# Patient Record
Sex: MALE | Race: WHITE | NOT HISPANIC OR LATINO | Employment: OTHER | URBAN - METROPOLITAN AREA
[De-identification: names, ages, dates, MRNs, and addresses within clinical notes are randomized per-mention and may not be internally consistent; named-entity substitution may affect disease eponyms.]

---

## 2017-01-05 ENCOUNTER — ALLSCRIPTS OFFICE VISIT (OUTPATIENT)
Dept: OTHER | Facility: OTHER | Age: 77
End: 2017-01-05

## 2017-02-01 ENCOUNTER — ALLSCRIPTS OFFICE VISIT (OUTPATIENT)
Dept: OTHER | Facility: OTHER | Age: 77
End: 2017-02-01

## 2017-02-27 ENCOUNTER — ALLSCRIPTS OFFICE VISIT (OUTPATIENT)
Dept: OTHER | Facility: OTHER | Age: 77
End: 2017-02-27

## 2017-06-12 ENCOUNTER — GENERIC CONVERSION - ENCOUNTER (OUTPATIENT)
Dept: OTHER | Facility: OTHER | Age: 77
End: 2017-06-12

## 2017-06-12 ENCOUNTER — TRANSCRIBE ORDERS (OUTPATIENT)
Dept: LAB | Facility: HOSPITAL | Age: 77
End: 2017-06-12

## 2017-06-12 ENCOUNTER — APPOINTMENT (OUTPATIENT)
Dept: LAB | Facility: HOSPITAL | Age: 77
End: 2017-06-12
Payer: COMMERCIAL

## 2017-06-12 DIAGNOSIS — K22.70 BARRETT'S ESOPHAGUS WITHOUT DYSPLASIA: ICD-10-CM

## 2017-06-12 DIAGNOSIS — E78.5 HYPERLIPIDEMIA: ICD-10-CM

## 2017-06-12 DIAGNOSIS — N18.30 CHRONIC KIDNEY DISEASE, STAGE III (MODERATE) (HCC): ICD-10-CM

## 2017-06-12 DIAGNOSIS — I42.9 CARDIOMYOPATHY (HCC): ICD-10-CM

## 2017-06-12 DIAGNOSIS — C18.9 MALIGNANT NEOPLASM OF COLON (HCC): ICD-10-CM

## 2017-06-12 DIAGNOSIS — I10 ESSENTIAL (PRIMARY) HYPERTENSION: ICD-10-CM

## 2017-06-12 DIAGNOSIS — R73.01 IMPAIRED FASTING GLUCOSE: ICD-10-CM

## 2017-06-12 DIAGNOSIS — E03.9 HYPOTHYROIDISM: ICD-10-CM

## 2017-06-12 LAB
ALBUMIN SERPL BCP-MCNC: 3.6 G/DL (ref 3.5–5)
ALP SERPL-CCNC: 71 U/L (ref 46–116)
ALT SERPL W P-5'-P-CCNC: 25 U/L (ref 12–78)
ANION GAP SERPL CALCULATED.3IONS-SCNC: 5 MMOL/L (ref 4–13)
AST SERPL W P-5'-P-CCNC: 20 U/L (ref 5–45)
BILIRUB SERPL-MCNC: 1.11 MG/DL (ref 0.2–1)
BUN SERPL-MCNC: 22 MG/DL (ref 5–25)
CALCIUM SERPL-MCNC: 8.7 MG/DL (ref 8.3–10.1)
CHLORIDE SERPL-SCNC: 112 MMOL/L (ref 100–108)
CHOLEST SERPL-MCNC: 162 MG/DL (ref 50–200)
CO2 SERPL-SCNC: 27 MMOL/L (ref 21–32)
CREAT SERPL-MCNC: 1.54 MG/DL (ref 0.6–1.3)
EST. AVERAGE GLUCOSE BLD GHB EST-MCNC: 100 MG/DL
GFR SERPL CREATININE-BSD FRML MDRD: 44.3 ML/MIN/1.73SQ M
GLUCOSE P FAST SERPL-MCNC: 97 MG/DL (ref 65–99)
HBA1C MFR BLD: 5.1 % (ref 4.2–6.3)
HDLC SERPL-MCNC: 58 MG/DL (ref 40–60)
LDLC SERPL CALC-MCNC: 79 MG/DL (ref 0–100)
POTASSIUM SERPL-SCNC: 4.6 MMOL/L (ref 3.5–5.3)
PROT SERPL-MCNC: 7 G/DL (ref 6.4–8.2)
SODIUM SERPL-SCNC: 144 MMOL/L (ref 136–145)
TRIGL SERPL-MCNC: 126 MG/DL
TSH SERPL DL<=0.05 MIU/L-ACNC: 2.16 UIU/ML (ref 0.36–3.74)

## 2017-06-12 PROCEDURE — 80061 LIPID PANEL: CPT

## 2017-06-12 PROCEDURE — 83036 HEMOGLOBIN GLYCOSYLATED A1C: CPT

## 2017-06-12 PROCEDURE — 84443 ASSAY THYROID STIM HORMONE: CPT

## 2017-06-12 PROCEDURE — 36415 COLL VENOUS BLD VENIPUNCTURE: CPT

## 2017-06-12 PROCEDURE — 80053 COMPREHEN METABOLIC PANEL: CPT

## 2017-06-23 ENCOUNTER — ALLSCRIPTS OFFICE VISIT (OUTPATIENT)
Dept: OTHER | Facility: OTHER | Age: 77
End: 2017-06-23

## 2017-07-19 ENCOUNTER — ALLSCRIPTS OFFICE VISIT (OUTPATIENT)
Dept: OTHER | Facility: OTHER | Age: 77
End: 2017-07-19

## 2017-10-03 ENCOUNTER — ALLSCRIPTS OFFICE VISIT (OUTPATIENT)
Dept: OTHER | Facility: OTHER | Age: 77
End: 2017-10-03

## 2017-10-18 ENCOUNTER — ALLSCRIPTS OFFICE VISIT (OUTPATIENT)
Dept: OTHER | Facility: OTHER | Age: 77
End: 2017-10-18

## 2017-10-19 NOTE — PROGRESS NOTES
Assessment  Assessed    1  Cardiomyopathy (425 4) (I42 9)   2  Hypertension (401 9) (I10)   3  Hyperlipidemia (272 4) (E78 5)   4  Ank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 (720 0) (M45 9)   5  Cardiac arrhythmia (427 9) (I49 9)    Plan  Right bundle branch block with left anterior fascicular block    · EKG/ECG- POC; Status:Complete;   Done: 58PXL8410   Perform: In Office; Due:18Oct2018; Last Updated Lucas Tapia; 10/18/2017 8:09:40 AM;Ordered; For:Right bundle branch block with left anterior fascicular block; Ordered By:Josie Villegas; Discussion/Summary  Cardiology Discussion Summary Free Text Note Form [facility]:   1  Cardiomyopathyimproved but still at risk for ventricular arrhythmias due to scar from CMP  Discussed with pt that ICD generator change would likely be soon as he is nearing ZACHARIAH  Discussed generator change procedure  current meds  HTN-well controlled on current medical regimen  R knee pain-stable, does not want to see ortho yet, takes intermittent Aleve  ank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-stable  Lifestyle modificationreal active due to R knee pain  Recommend increased cardiovascular activity as tolerated  Will f/u in one year with Dr Bari Solorio and call before this if needed  We will contact pt when device hits ZACHARIAH to set up gen  change  Chief Complaint  Chief Complaint Free Text Note Form: Pt is here for a yearly f/u office visit  Pt does not have any cardiac complaints or symptoms  History of Present Illness  Cardiology HPI Free Text Note Form [facility]: Patient is a pleasant 60-year-old male that follows with Dr Bari Solorio with a history of cardiomyopathy, hypertension and dyslipidemia  Patient has and a single-chamber ICD in place which is nearing ZACHARIAH  Last ejection fraction in 2012 improved to 50%  presents today for 1 year follow-up  He is feeling well with no complaints of chest pain, shortness of breath, palpitations or dizziness  He has no orthopnea, PND or lower extremity edema   He has recently lost some weight on purpose  He does continue to have right knee pain but did not want to see orthopedics yet  He also has intermittent back pain if he does a lot of activities  He states his knee only bothers him after he coughs or mows the lawn  Review of Systems  Cardiology Male ROS:     Cardiac: no chest pain,-- no fainting/blackouts-- and-- no palpitations present  Psychological: no palpitations present  General: no trouble sleeping-- and-- no lack of energy/fatigue  Respiratory: no shortness of breath  ROS Reviewed:   ROS reviewed  Active Problems  Problems    1  Ank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 (720 0) (M45 9)   2  Arthritis (716 90) (M19 90)   3  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)   4  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 (600 21,599 69)   (N40 1,N13 8)   5  Cancer, colon (153 9) (C18 9)   6  Cardiac arrhythmia (427 9) (I49 9)   7  Cardiomyopathy (425 4) (I42 9)   8  CKD (chronic kidney disease) stage 3, GFR 30-59 ml/min (585 3) (N18 3)   9  Dermatitis, eczematoid (692 9) (L30 9)   10  Encounter for screening colonoscopy (V76 51) (Z12 11)   11  Esophageal reflux (530 81) (K21 9)   12  Gout (274 9) (M10 9)   13  Hyperlipidemia (272 4) (E78 5)   14  Hypertension (401 9) (I10)   15  Hypothyroidism (244 9) (E03 9)   16  Impaired fasting glucose (790 21) (R73 01)   17  Medicare annual wellness visit, initial (V70 0) (Z00 00)   18  Medicare annual wellness visit, subsequent (V70 0) (Z00 00)   19  Microscopic hematuria (599 72) (R31 29)   20  Need for immunization against influenza (V04 81) (Z23)   21  Need for pneumococcal vaccination (V03 82) (Z23)   22  Need for vaccination with 13-polyvalent pneumococcal conjugate vaccine (V03 82) (Z23)   23  Non-toxic multinodular goiter (241 1) (E04 2)   24  Polymyalgia rheumatica (725) (M35 3)   25  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)   26  Screening for osteoporosis (V82 81) (Z13 820)   27   Seborrheic dermatitis (690 10) (L21 9)   28  Skin lesion (709 9) (L98 9)   29  Spondyloarthropathy (724 9)   30  Thrombocytopenia (287 5) (D69 6)   31  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)    Past Medical History  Problems    1  History of Achilles tendinitis, unspecified laterality (726 71) (M76 60)   2  History of Closed Fracture Of Multiple Ribs (807 09)   3  History of Dayna Of [employer] On [address] [facility] (Not Motorcycle (F752 7)   4  History of Colon Cancer (V10 05)   5  History of Congestive heart failure (428 0) (I50 9)   6  History of Fracture Of Multiple Ribs (807 09)   7  History of diverticulitis of colon (V12 79) (Z87 19)   8  History of gastroenteritis (V12 79) (Z87 19)   9  History of upper respiratory infection (V12 09) (Z87 09)   10  History of Non-toxic multinodular goiter (241 1) (E04 2)   11  History of Postprocedural Pneumonia (997 32)   12  History of Supraventricular tachycardia (427 89) (I47 1)  Active Problems And Past Medical History Reviewed: The active problems and past medical history were reviewed and updated today  Surgical History  Problems    1  History of Cardio-Defib Pulse Gen Venous Insertion Of Electrode For Ventricular Pacing   2  History of Catheter Ablation Atrioventricular Node   3  History of Complete Colonoscopy   4  History of Heart Surgery   5  History of Implantable Cardioverter-Defibrillator   6  History of Inguinal Hernia Repair   7  History of Knee Surgery   8  History of Right Hemicolectomy   9  History of Tonsillectomy With Adenoidectomy  Surgical History Reviewed: The surgical history was reviewed and updated today  Family History  Mother    1  Family history of Congestive Heart Failure   2  Family history of Hypertension (V17 49)   3  Family history of Osteoporosis (V17 81)  Father    4  Family history of Chronic Obstructive Pulmonary Disease   5  Family history of Emphysema   6  Family history of Hypertension (V17 49)  Sister    7   Family history of Heart Valve Replacement   8  Family history of Osteoporosis (V17 81)  Family History    9  Family history of Breast Cancer (V16 3)  Family History Reviewed: The family history was reviewed and updated today  Social History  Problems    · Denied: History of Alcohol Use (History)   · Marital History - Currently    · Never A Smoker   · Occupation: Retired  Social History Reviewed: The social history was reviewed and updated today  The social history was reviewed and is unchanged  Current Meds   1  Aspirin EC 81 MG Oral Tablet Delayed Release; TAKE 1 TABLET DAILY; Therapy: (Recorded:23Jun2017) to Recorded   2  Atorvastatin Calcium 10 MG Oral Tablet; Take 1 tablet daily; Therapy: 34INW7106 to (Evaluate:32Lzh2673)  Requested for: 19XMZ4022; Last   Rx:08Vjv3097 Ordered   3  Bisoprolol Fumarate 10 MG Oral Tablet; Take 1 tablet twice daily; Therapy: 41Ljv6361 to (Evaluate:58Kjp0284)  Requested for: 91Osk0125; Last   Rx:80Tlx9841 Ordered   4  Calcium 600+D 600-800 MG-UNIT Oral Tablet; Take 1 tablet daily; Therapy: 63HWG5583 to Recorded   5  Co Q-10 200 MG Oral Capsule; Take 1 tablet daily; Therapy: (Recorded:41Rpx6373) to Recorded   6  Finasteride 5 MG Oral Tablet; TAKE 1 TABLET AT BEDTIME; Therapy: 42Hbu7372 to (Last Rx:29Mly3980)  Requested for: 23Jun2017 Ordered   7  Lisinopril 20 MG Oral Tablet; take 1 tablet every day; Therapy: 58DVJ1508 to (Evaluate:30Jun2018)  Requested for: 77GDC9495; Last   Rx:31Rnl9215 Ordered   8  Multi-Day Oral Tablet; TAKE 1 TABLET DAILY; Therapy: 63OZQ2846 to (Evaluate:09Apr2014) Recorded   9  Omeprazole 40 MG Oral Capsule Delayed Release; take 1 capsule daily; Therapy: 63XAP3177 to (Evaluate:01Dbd5781)  Requested for: 19KFB2052; Last   Rx:65Zzo5201 Ordered   10  ProAir  (90 Base) MCG/ACT Inhalation Aerosol Solution; INHALE 2 PUFFS    EVERY 4-6 HOURS AS NEEDED;     Therapy: 21ZEC6239 to (Evaluate:28Apr2017)  Requested for: 28EDD0116; Last Rx: 69RZF3606 Ordered   11  Vitamin D 1000 UNIT CAPS; take 1 capsule daily; Therapy: 42XYQ6175 to Recorded  Medication List Reviewed: The medication list was reviewed and updated today  Allergies  Medication    1  No Known Drug Allergies    Vitals  Vital Signs    Recorded: 75XRZ3157 08:07AM   Heart Rate 57   Systolic 915, RUE, Sitting   Diastolic 82, RUE, Sitting   Height 6 ft 1 in   Weight 207 lb 1 oz   BMI Calculated 27 32   BSA Calculated 2 18     Physical Exam    Constitutional   General appearance: No acute distress, well appearing and well nourished  Eyes   Conjunctiva and Sclera examination: Conjunctiva pink, sclera anicteric  Pulmonary   Respiratory effort: No increased work of breathing or signs of respiratory distress  Auscultation of lungs: Clear to auscultation, no rales, no rhonchi, no wheezing, good air movement  Cardiovascular   Auscultation of heart: Normal rate and rhythm, normal S1 and S2, no murmurs  Examination of extremities for edema and/or varicosities: Normal     Abdomen   Abdomen: Non-tender and no distention  Skin - Skin and subcutaneous tissue: Normal without rashes or lesions  Skin is warm and well perfused, normal turgor  Neurologic - Speech: Normal     Psychiatric - Orientation to person, place, and time: Normal -- Mood and affect: Normal       Results/Data  Diagnostic Studies Reviewed Cardio: I personally reviewed the recording/images in the office today  My interpretation follows  Echocardiogram/KITA: Echocardiogram February 7, 2012 reviewed showing EF 50+/-5%, no wall motion abnormalities, mildly dilated LV with no significant valvular abnomalities  ECG Report: NSR, 57 BPM, first degree AVB with widened QRS at 134ms      Health Management  Burt esophagus   EGD; every 3 years; Last 85YRG9541; Next Due: 01Kmb8876; Active  Encounter for screening colonoscopy   COLONOSCOPY; every 3 years; Last 57Dei1952; Next Due: 08Wrh9920;  [facility] 702 Maintenance Medicare Annual Wellness Visit; every 1 year; Next Due: 26PKK2150;  Overdue    Future Appointments    Date/Time Provider Specialty Site   02/07/2018 11:00 AM Cardiology, 2021 Aj Pearson   11/03/2017 02:00 PM Cardiology, Device Remote   [address]   12/26/2017 08:40 AM Spencer Lynn, [address]   11/09/2017 08:20 AM Key Jerome MD Gastroenterology Adult [address]     Signatures   Electronically signed by : Jaswinder Brown [facility]; Oct 18 2017  8:45AM EST                       (Author)    Electronically signed by : TEJA Herron ; Oct 18 2017  2:26PM EST                       (Author)

## 2017-10-23 ENCOUNTER — ALLSCRIPTS OFFICE VISIT (OUTPATIENT)
Dept: OTHER | Facility: OTHER | Age: 77
End: 2017-10-23

## 2017-10-31 ENCOUNTER — GENERIC CONVERSION - ENCOUNTER (OUTPATIENT)
Dept: OTHER | Facility: OTHER | Age: 77
End: 2017-10-31

## 2017-11-03 ENCOUNTER — ALLSCRIPTS OFFICE VISIT (OUTPATIENT)
Dept: OTHER | Facility: OTHER | Age: 77
End: 2017-11-03

## 2017-11-07 ENCOUNTER — GENERIC CONVERSION - ENCOUNTER (OUTPATIENT)
Dept: OTHER | Facility: OTHER | Age: 77
End: 2017-11-07

## 2017-11-09 ENCOUNTER — ALLSCRIPTS OFFICE VISIT (OUTPATIENT)
Dept: OTHER | Facility: OTHER | Age: 77
End: 2017-11-09

## 2017-11-10 NOTE — PROGRESS NOTES
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)   2  Esophageal reflux (530 81) (K21 9)    Discussion/Summary  Discussion Summary:   1  Srinivasan's esophagus - C3M4  Last EGD in December 2016  Biopsies negative for dysplasia  Continue omeprazole 40 mg daily  Repeat EGD is recommended in December 2019     2  Gastroesophageal reflux disease - continue reflux precaution  Continue omeprazole 40 mg daily  Since his symptoms are well controlled he does not need follow-up until he gets his next EGD which is going to be in December 2019  Patient is also due for surveillance colonoscopy in 2019  Will we will coordinate his EGD and colonoscopy  His colonoscopy will be performed by Dr Rd Martinez  Counseling Documentation With Imm: The patient was counseled regarding prognosis,-- patient and family education,-- impressions  Goals and Barriers: The patient has the current Goals: See above  The patent has the current Barriers: None  Patient's Capacity to Self-Care: Patient is able to Self-Care  Chief Complaint  Chief Complaint Free Text Note Form: Patient is here for followup for srinivasan's esophagus and GERD  History of Present Illness  HPI: 67-year-old male with gastroesophageal reflux disease and Srinivasan's esophagus here for follow-up visit  He is on omeprazole 40 mg daily  His reflux symptoms are well controlled  He had C3M4 Srinivasan's esophagus  Biopsy was negative for dysplasia  Review of Systems  Complete-Male GI Adult:  Constitutional: No fever or chills, feels well, no tiredness, no recent weight gain or weight loss  Eyes: No complaints of eye pain, no red eyes, no discharge from eyes, no itchy eyes  ENT: no complaints of earache, no hearing loss, no nosebleeds, no nasal discharge, no sore throat, no hoarseness  Cardiovascular: No complaints of slow heart rate, no fast heart rate, no chest pain, no palpitations, no leg claudication, no lower extremity    Respiratory: No complaints of shortness of breath, no wheezing, no cough, no SOB on exertion, no orthopnea or PND  Gastrointestinal: GERD, but-- as noted in HPI  Genitourinary: No complaints of dysuria, no incontinence, no hesitancy, no nocturia, no genital lesion, no testicular pain  Musculoskeletal: No complaints of arthralgia, no myalgias, no joint swelling or stiffness, no limb pain or swelling  Integumentary: No complaints of skin rash or skin lesions, no itching, no skin wound, no dry skin  Neurological: No compliants of headache, no confusion, no convulsions, no numbness or tingling, no dizziness or fainting, no limb weakness, no difficulty walking  Psychiatric: Is not suicidal, no sleep disturbances, no anxiety or depression, no change in personality, no emotional problems  Endocrine: No complaints of proptosis, no hot flashes, no muscle weakness, no erectile dysfunction, no deepening of the voice, no feelings of weakness  Hematologic/Lymphatic: No complaints of swollen glands, no swollen glands in the neck, does not bleed easily, no easy bruising  ROS Reviewed:   ROS reviewed  Active Problems    1  Ank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 (720 0) (M45 9)   2  Arthritis (716 90) (M19 90)   3  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)   4  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 (600 21,599 69) (N40 1,N13 8)   5  Cancer, colon (153 9) (C18 9)   6  Cardiac arrhythmia (427 9) (I49 9)   7  Cardiomyopathy (425 4) (I42 9)   8  CKD (chronic kidney disease) stage 3, GFR 30-59 ml/min (585 3) (N18 3)   9  Dermatitis, eczematoid (692 9) (L30 9)   10  Encounter for screening colonoscopy (V76 51) (Z12 11)   11  Esophageal reflux (530 81) (K21 9)   12  Gout (274 9) (M10 9)   13  Hyperlipidemia (272 4) (E78 5)   14  Hypertension (401 9) (I10)   15  Hypothyroidism (244 9) (E03 9)   16  Impaired fasting glucose (790 21) (R73 01)   17  Medicare annual wellness visit, initial (V70 0) (Z00 00)   18   Medicare annual wellness visit, subsequent (V70 0) (Z00 00)   19  Microscopic hematuria (599 72) (R31 29)   20  Need for immunization against influenza (V04 81) (Z23)   21  Need for pneumococcal vaccination (V03 82) (Z23)   22  Need for vaccination with 13-polyvalent pneumococcal conjugate vaccine (V03 82) (Z23)   23  Non-toxic multinodular goiter (241 1) (E04 2)   24  Polymyalgia rheumatica (725) (M35 3)   25  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)   26  Screening for osteoporosis (V82 81) (Z13 820)   27  Seborrheic dermatitis (690 10) (L21 9)   28  Skin lesion (709 9) (L98 9)   29  Spondyloarthropathy (724 9)   30  Thrombocytopenia (287 5) (D69 6)   31  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)    Past Medical History  1  History of Achilles tendinitis, unspecified laterality (726 71) (M76 60)   2  History of Closed Fracture Of Multiple Ribs (807 09)   3  History of Dayna Of 2 [employer] On [address] [facility] (Not Motorcycle (L009 2)   4  History of Colon Cancer (V10 05)   5  History of Congestive heart failure (428 0) (I50 9)   6  History of Fracture Of Multiple Ribs (807 09)   7  History of diverticulitis of colon (V12 79) (Z87 19)   8  History of gastroenteritis (V12 79) (Z87 19)   9  History of upper respiratory infection (V12 09) (Z87 09)   10  History of Non-toxic multinodular goiter (241 1) (E04 2)   11  History of Postprocedural Pneumonia (997 32)   12  History of Supraventricular tachycardia (427 89) (I47 1)  Active Problems And Past Medical History Reviewed: The active problems and past medical history were reviewed and updated today  Surgical History  1  History of Cardio-Defib Pulse Gen Venous Insertion Of Electrode For Ventricular Pacing   2  History of Catheter Ablation Atrioventricular Node   3  History of Complete Colonoscopy   4  History of Heart Surgery   5  History of Implantable Cardioverter-Defibrillator   6  History of Inguinal Hernia Repair   7  History of Knee Surgery   8  History of Right Hemicolectomy   9  History of Tonsillectomy With Adenoidectomy  Surgical History Reviewed: The surgical history was reviewed and updated today  Family History  Mother    1  Family history of Congestive Heart Failure   2  Family history of Hypertension (V17 49)   3  Family history of Osteoporosis (V17 81)  Father    4  Family history of Chronic Obstructive Pulmonary Disease   5  Family history of Emphysema   6  Family history of Hypertension (V17 49)  Sister    7  Family history of Heart Valve Replacement   8  Family history of Osteoporosis (V17 81)  Family History    9  Family history of Breast Cancer (V16 3)  Family History Reviewed: The family history was reviewed and updated today  Social History     · Denied: History of Alcohol Use (History)   · Marital History - Currently    · Never A Smoker   · Occupation: Retired  Social History Reviewed: The social history was reviewed and updated today  The social history was reviewed and is unchanged  Current Meds   1  Aspirin EC 81 MG Oral Tablet Delayed Release; TAKE 1 TABLET DAILY; Therapy: (Recorded:23Jun2017) to Recorded   2  Atorvastatin Calcium 10 MG Oral Tablet; Take 1 tablet daily; Therapy: 21TQD8793 to (Evaluate:41Crl3968)  Requested for: 90PYG0478; Last Rx:30Olv2879 Ordered   3  Bisoprolol Fumarate 10 MG Oral Tablet; Take 1 tablet twice daily; Therapy: 52Ltv6456 to (Evaluate:32Vie2386)  Requested for: 38Sai8608; Last Rx:25Xxm1959 Ordered   4  Calcium 600+D 600-800 MG-UNIT Oral Tablet; Take 1 tablet daily; Therapy: 98EGL3750 to Recorded   5  Co Q-10 200 MG Oral Capsule; Take 1 tablet daily; Therapy: (Recorded:18Oct2017) to Recorded   6  Finasteride 5 MG Oral Tablet; TAKE 1 TABLET AT BEDTIME; Therapy: 91Vdy6507 to (Last Rx:39Aey3180)  Requested for: 23Jun2017 Ordered   7  Lisinopril 20 MG Oral Tablet; take 1 tablet every day;  Therapy: 38YOO7166 to (Evaluate:30Jun2018)  Requested for: 43NMD4744; Last Rx:00Wsq7603 Ordered   8  Multi-Day Oral Tablet; TAKE 1 TABLET DAILY; Therapy: 23MIW4987 to (Evaluate:09Apr2014) Recorded   9  Omeprazole 40 MG Oral Capsule Delayed Release; take 1 capsule daily; Therapy: 37GPQ3758 to (Evaluate:86Ixz4270)  Requested for: 81MFA9471; Last Rx:53Vwn1071 Ordered   10  ProAir  (90 Base) MCG/ACT Inhalation Aerosol Solution; INHALE 2 PUFFS  EVERY 4-6 HOURS AS NEEDED; Therapy: 18GDK6616 to (Evaluate:28Apr2017)  Requested for: 72GEB6954; Last  Rx:63Zdq3733 Ordered   11  Vitamin D 1000 UNIT CAPS; take 1 capsule daily; Therapy: 10FBK5121 to Recorded  Medication List Reviewed: The medication list was reviewed and updated today  Allergies  1  No Known Drug Allergies    Vitals  Vital Signs    Recorded: 52VOZ7247 08:18AM   Temperature 98 F, Tympanic   Heart Rate 65   Respiration 18   Systolic 763   Diastolic 76   Height 6 ft 1 in   Weight 209 lb    BMI Calculated 27 57   BSA Calculated 2 19       Physical Exam   Constitutional  General appearance: No acute distress, well appearing and well nourished  Ears, Nose, Mouth, and Throat  Nasal mucosa, septum, and turbinates: Normal without edema or erythema  Oropharynx: Normal with no erythema, edema, exudate or lesions  Pulmonary  Respiratory effort: No increased work of breathing or signs of respiratory distress  Auscultation of lungs: Clear to auscultation, equal breath sounds bilaterally, no wheezes, no rales, no rhonci  Cardiovascular  Auscultation of heart: Normal rate and rhythm, normal S1 and S2, without murmurs  Abdomen  Abdomen: Non-tender, no masses  Liver and spleen: No hepatomegaly or splenomegaly  Lymphatic  Palpation of lymph nodes in neck: No lymphadenopathy  Musculoskeletal  Gait and station: Normal    Digits and nails: Normal without clubbing or cyanosis  Inspection/palpation of joints, bones, and muscles: Normal    Skin  Skin and subcutaneous tissue: Normal without rashes or lesions     Neurologic  Cranial nerves: Cranial nerves 2-12 intact  Psychiatric  Orientation to person, place and time: Normal          Health Management  Burt esophagus   EGD; every 3 years; Last 70BIE6636; Next Due: 46Hgn1527; Active  Encounter for screening colonoscopy   COLONOSCOPY; every 3 years; Last 21Ibx7237; Next Due: 93Ifm8718; Overdue  Health Maintenance   Medicare Annual Wellness Visit; every 1 year; Next Due: 38FYF3966; Overdue    Future Appointments    Date/Time Provider Specialty Site   02/07/2018 11:00 AM Cardiology, 2021 Aj Chapin [facility]   12/06/2017 11:00 AM Cardiology, Device Remote   [address]   01/08/2018 11:00 AM Cardiology, Device Remote   [address]   12/26/2017 08:40 AM MD Song Sultana   11/15/2017 03:15 PM TEJA Padgett   Surgical Oncology CANCER CARE [facility] SURGICAL ONCOLOGY   01/09/2018 01:15 PM [facility] Larsen[facility]       Signatures   Electronically signed by : Lexis Torres MD; Nov 9 2017  8:49AM EST                       (Author)

## 2017-11-15 ENCOUNTER — ALLSCRIPTS OFFICE VISIT (OUTPATIENT)
Dept: OTHER | Facility: OTHER | Age: 77
End: 2017-11-15

## 2017-11-15 DIAGNOSIS — C43.61 MALIGNANT MELANOMA OF RIGHT UPPER EXTREMITY INCLUDING SHOULDER (HCC): ICD-10-CM

## 2017-11-16 ENCOUNTER — HOSPITAL ENCOUNTER (OUTPATIENT)
Dept: RADIOLOGY | Facility: HOSPITAL | Age: 77
Discharge: HOME | End: 2017-11-16
Attending: SURGERY
Payer: COMMERCIAL

## 2017-11-16 ENCOUNTER — TRANSCRIBE ORDERS (OUTPATIENT)
Dept: RADIOLOGY | Facility: HOSPITAL | Age: 77
End: 2017-11-16

## 2017-11-16 ENCOUNTER — APPOINTMENT (OUTPATIENT)
Dept: LAB | Facility: HOSPITAL | Age: 77
End: 2017-11-16
Attending: SURGERY
Payer: COMMERCIAL

## 2017-11-16 DIAGNOSIS — C43.61 MALIGNANT MELANOMA OF RIGHT UPPER EXTREMITY INCLUDING SHOULDER (HCC): ICD-10-CM

## 2017-11-16 LAB
ALBUMIN SERPL BCP-MCNC: 3.4 G/DL (ref 3.5–5)
ALP SERPL-CCNC: 67 U/L (ref 46–116)
ALT SERPL W P-5'-P-CCNC: 26 U/L (ref 12–78)
AST SERPL W P-5'-P-CCNC: 18 U/L (ref 5–45)
BILIRUB DIRECT SERPL-MCNC: 0.24 MG/DL (ref 0–0.2)
BILIRUB SERPL-MCNC: 1.05 MG/DL (ref 0.2–1)
PROT SERPL-MCNC: 7.1 G/DL (ref 6.4–8.2)

## 2017-11-16 PROCEDURE — 36415 COLL VENOUS BLD VENIPUNCTURE: CPT

## 2017-11-16 PROCEDURE — 71020 HB CHEST X-RAY 2VW FRONTAL&LATL: CPT

## 2017-11-16 PROCEDURE — 80076 HEPATIC FUNCTION PANEL: CPT

## 2017-11-16 NOTE — CONSULTS
Assessment    1  Malignant melanoma of right upper extremity (172 6) (C43 61)    Plan  Malignant melanoma of right upper extremity    · Vicodin 5-300 MG Oral Tablet; TAKE ONE TABLET BY MOUTH EVERY 4 HOURS ASNEEDED FOR PAIN   Rx By: Valentine Ge; Dispense: 0 Days ; #:15 Tablet; Refill: 0;For: Malignant melanoma of right upper extremity; OSWALDO = N; Print Rx   · (1) HEPATIC FUNCTION PANEL; Status:Active; Requested for:37Hrf2207;    Perform:[facility]; GRQ:[id number]; Ordered; For:Malignant melanoma of right upper extremity; Ordered By:Rudy Galindo;   · * XR CHEST PA & LATERAL; Status:Active; Requested for:68Ida0148;    Perform:[facility]; KOP:[id number]; Ordered;melanoma of right upper extremity; Ordered By:Rudy Galindo;   · Follow-up visit in 1 week Evaluation and Treatment  Follow-up  Status: Hold For -Scheduling  Requested for: 57ALS2660   Ordered;Malignant melanoma of right upper extremity; Ordered By: Valentine Ge Performed:  Order Comments: in office wide excision Due: 60XSZ6119    Discussion/Summary  Discussion Summary:   60-year-old male with a clinical I3mI8Y0 melanoma  He gives no signs or symptoms of metastasis  He will be staged with a chest x-ray and liver function studies  Assuming these are normal, I would proceed with wide excision  I also discussed the reasoning and rationale behind sentinel lymph node biopsy  With his depth of melanoma, his risk of a positive sentinel node is 3% or less  I did discuss sentinel node status will give us the best prognostic information from his melanoma and if it is positive this may change his treatment  We will have the pathology reviewed by our dermatopathologist  If the number of mitoses is low we will proceed with wide excision  If it is high, we will plan on sentinel lymph node biopsy  risks of wide excision here in the office to include bleeding, infection, recurrence, need for further surgery, and wound complications   Informed consent was obtained  We will schedule this at our earliest mutual convenience once we have his pathology reviewed  He is agreeable to this  All his questions were answered  Goals and Barriers: The patient has the current Goals: Cure  The patent has the current Barriers: None  Patient's Capacity to Self-Care: Patient is able to Self-Care  Medication SE Review and Pt Understands Tx: The treatment plan was reviewed with the patient/guardian  The patient/guardian understands and agrees with the treatment plan   Self Referrals:   Self Referrals: No      Chief Complaint  Chief Complaint Free Text Note Form: Pt here for consult for melanoma on his right arm, which he thinks he first noticed about 6 months ago  Denies any other symptoms  History of Present Illness  HPI: 78-year-old male who saw his dermatologist for his yearly visit and a new pigmented lesion was seen on the right upper arm  This measured approximately size of an eraser head  He noticed this out of the blue approximately 3-4 months prior to his visit  It was not itching or bleeding  Biopsy was performed and this revealed a 0 35 mm melanoma  No ulceration but there was at least one mitoses  He comes in now for an opinion regarding this  He denies any abdominal pain, nausea, vomiting, back pain, bone pain, headaches, or cough  He does have a history of blistering sunburns as a child  No family history of melanoma  He does have an AICD  Review of Systems  Complete Male ROS Surg Onc:  Constitutional: The patient denies new or recent history of general fatigue, no recent weight loss, no change in appetite  Eyes: No complaints of visual problems, no scleral icterus  ENT: no complaints of ear pain, no hoarseness, no difficulty swallowing,-- no tinnitus-- and-- no new masses in head, oral cavity, or neck  Cardiovascular: No complaints of chest pain, no palpitations, no ankle edema  Respiratory: No complaints of shortness of breath, no cough  Gastrointestinal: No complaints of jaundice, no bloody stools, no pale stools  Genitourinary: No complaints of dysuria, no hematuria, no nocturia, no frequent urination, no urethral discharge  Musculoskeletal: No complaints of weakness, paralysis, joint stiffness or arthralgias,  Integumentary: skin lesion-- and-- skin wound, but-- no rashes,-- no itching,-- no breast pain,-- no breast lump-- and-- no nipple discharge  Neurological: No complaints of convulsions, no seizures, no dizziness  Hematologic/Lymphatic: No complaints of easy bruising  ROS Reviewed:   ROS reviewed  Active Problems    1  Ank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 (720 0) (M45 9)   2  Arthritis (716 90) (M19 90)   3  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)   4  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 (600 21,599 69) (N40 1,N13 8)   5  Cancer, colon (153 9) (C18 9)   6  Cardiac arrhythmia (427 9) (I49 9)   7  Cardiomyopathy (425 4) (I42 9)   8  CKD (chronic kidney disease) stage 3, GFR 30-59 ml/min (585 3) (N18 3)   9  Encounter for screening colonoscopy (V76 51) (Z12 11)   10  Esophageal reflux (530 81) (K21 9)   11  Gout (274 9) (M10 9)   12  Hyperlipidemia (272 4) (E78 5)   13  Hypertension (401 9) (I10)   14  Hypothyroidism (244 9) (E03 9)   15  Impaired fasting glucose (790 21) (R73 01)   16  Microscopic hematuria (599 72) (R31 29)   17  Non-toxic multinodular goiter (241 1) (E04 2)   18  Polymyalgia rheumatica (725) (M35 3)   19  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)   20  Skin lesion (709 9) (L98 9)   21  Thrombocytopenia (287 5) (D69 6)    Past Medical History    1  History of Achilles tendinitis, unspecified laterality (726 71) (M76 60)   2  History of Closed Fracture Of Multiple Ribs (807 09)   3  History of [place] Of [employer] On [address] [place] [address] (Not Motorcycle (N406 5)   4  History of Colon Cancer (V10 05)   5  History of Congestive heart failure (428 0) (I50 9)   6  History of Fracture Of Multiple Ribs (807 09)   7  History of diverticulitis of colon (V12 79) (Z87 19)   8  History of eczema (V13 3) (Z87 2)   9  History of gastroenteritis (V12 79) (Z87 19)   10  History of influenza vaccination (V49 89) (Z92 29)   11  History of pneumococcal vaccination (V49 89) (Z92 29)   12  History of seborrheic dermatitis (V13 3) (Z87 2)   13  History of upper respiratory infection (V12 09) (Z87 09)   14  History of Medicare annual wellness visit, initial (V70 0) (Z00 00)   15  History of Medicare annual wellness visit, subsequent (V70 0) (Z00 00)   16  History of Need for vaccination with 13-polyvalent pneumococcal conjugate vaccine  (V03 82) (Z23)   17  History of Non-toxic multinodular goiter (241 1) (E04 2)   18  History of Postprocedural Pneumonia (997 32)   19  History of Screening for osteoporosis (V82 81) (Z13 820)   20  History of Spondyloarthropathy (724 9)   21  History of Supraventricular tachycardia (427 89) (I47 1)   22  History of 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)  Active Problems And Past Medical History Reviewed: The active problems and past medical history were reviewed and updated today  Surgical History  1  History of Cardio-Defib Pulse Gen Venous Insertion Of Electrode For Ventricular Pacing   2  History of Catheter Ablation Atrioventricular Node   3  History of Complete Colonoscopy   4  History of Heart Surgery   5  History of Implantable Cardioverter-Defibrillator   6  History of Inguinal Hernia Repair   7  History of Knee Surgery   8  History of Right Hemicolectomy   9  History of Tonsillectomy With Adenoidectomy  Surgical History Reviewed: The surgical history was reviewed and updated today  Family History  Mother    1  Family history of Congestive Heart Failure   2  Family history of Hypertension (V17 49)   3  Family history of Osteoporosis (V17 81)  Father    4  Family history of Chronic Obstructive Pulmonary Disease   5   Family history of Emphysema   6  Family history of Hypertension (V17 49)  Sister    7  Family history of Heart Valve Replacement   8  Family history of Osteoporosis (V17 81)  Family History    9  Family history of Breast Cancer (V16 3)  Family History Reviewed: The family history was reviewed and updated today  Social History     · Denied: History of Alcohol Use (History)   · Marital History - Currently    · Never A Smoker   · Occupation: Retired  Social History Reviewed: The social history was reviewed and updated today  Current Meds   1  Aspirin EC 81 MG Oral Tablet Delayed Release; TAKE 1 TABLET DAILY; Therapy: (Recorded:23Jun2017) to Recorded   2  Atorvastatin Calcium 10 MG Oral Tablet; Take 1 tablet daily; Therapy: 73NSC7584 to (Evaluate:64Vqz0954)  Requested for: 07OMI3813; Last Rx:79Ahc9017 Ordered   3  Bisoprolol Fumarate 10 MG Oral Tablet; Take 1 tablet twice daily; Therapy: 49Kig5865 to (Evaluate:07Afi9295)  Requested for: 57Pjg5321; Last Rx:37Lgh9986 Ordered   4  Calcium 600+D 600-800 MG-UNIT Oral Tablet; Take 1 tablet daily; Therapy: 40JJJ0472 to Recorded   5  Co Q-10 200 MG Oral Capsule; Take 1 tablet daily; Therapy: (Recorded:18Oct2017) to Recorded   6  Finasteride 5 MG Oral Tablet; TAKE 1 TABLET AT BEDTIME; Therapy: 17Ctp1781 to (Last Rx:51Akk5153)  Requested for: 23Jun2017 Ordered   7  Lisinopril 20 MG Oral Tablet; take 1 tablet every day; Therapy: 15YWY2762 to (Evaluate:30Jun2018)  Requested for: 23TOK6932; Last Rx:41Laz4884 Ordered   8  Multi-Day Oral Tablet; TAKE 1 TABLET DAILY; Therapy: 82ADD3842 to (Evaluate:09Apr2014) Recorded   9  Omeprazole 40 MG Oral Capsule Delayed Release; take 1 capsule daily; Therapy: 60LJA2954 to (Evaluate:39Jvg2908)  Requested for: 33KYD9582; Last Rx:34Jdh1539 Ordered   10  ProAir  (90 Base) MCG/ACT Inhalation Aerosol Solution; INHALE 2 PUFFS  EVERY 4-6 HOURS AS NEEDED;   Therapy: 82ICL4565 to (Evaluate:28Apr2017)  Requested for: 00VYP6967; Last  Rx:13Qgl5340 Ordered   11  Vitamin D 1000 UNIT CAPS; take 1 capsule daily; Therapy: 41TSP4269 to Recorded  Medication List Reviewed: The medication list was reviewed and updated today  Allergies  1  No Known Drug Allergies    Vitals  Vital Signs    Recorded: 26ZMB7581 03:47PM   Temperature 97 7 F   Heart Rate 85   Respiration 16   Systolic 479   Diastolic 88   Height 6 ft 1 in   Weight 212 lb    BMI Calculated 27 97   BSA Calculated 2 2   O2 Saturation 94       Physical Exam   Constitutional: General appearance: The Patient is well-developed, well-nourished male who appears his stated age in no acute distress  He is pleasant and talkative  HEENT: FERN, EOMI and the sclerae are anicteric  -- There is no oral pathology noted  -- There is no nasal pathology noted  -- The thyroid is normal to inspection and palpation  -- There is no appreciable cervical adenopathy   -- There are no carotid bruits  -- The Cranial Nerves II - XII are grossly intact  -- Neck is supple without adenopathy  Chest: The chest is normal to inspection  -- The lungs are clear to auscultation  -- There are bilaterally symmetrical breath sounds  -- There is a RRR, normal S1 and S2, without murmurs, rub or gallop  -- The pulses are normal at the radial and dorsalis pedis and symmetrical    Abdomen: The abdomen is normal to inspection and percussion  It is soft, non-tender  There are normal bowel sounds  There are no abnormal masses  -- There is no evidence of hepatosplenomegaly  -- He does not have an umbilical hernia  Extremities: There is no clubbing or cyanosis  -- There is no edema  -- Sensation is intact to light touch  Neuro: Grossly nonfocal    Lymphatic: no evidence of cervical adenopathy bilaterally  -- no evidence of axillary adenopathy bilaterally  -- no evidence of inguinal adenopathy bilaterally  Skin: Warm, anicteric  -- (Biopsy site is healing well  No evidence of local recurrence or satellite lesions)  Results/Data  Diagnostic Studies Reviewed Surg Onc:  Pathology Review Melanoma of the right upper extremity 0 35 mmulcerationleast one mitoses        Future Appointments    Date/Time Provider Specialty Site   02/07/2018 11:00 AM Cardiology, [address] [facility]   12/06/2017 11:00 AM Cardiology, Device Remote   [address]   01/08/2018 11:00 AM Cardiology, Device Remote   [address]   12/26/2017 08:40 AM Lisandro Dorsey MD Family Medicine [facility] [address]   01/09/2018 01:15 PM Magnolia Hernandez, [address]  Urology  [address] [facility] FOR UROLOGY [facility]       Signatures   Electronically signed by : TEJA Catalan ; Nov 15 2017  4:17PM EST                       (Author)

## 2017-11-21 ENCOUNTER — GENERIC CONVERSION - ENCOUNTER (OUTPATIENT)
Dept: OTHER | Facility: OTHER | Age: 77
End: 2017-11-21

## 2017-12-05 ENCOUNTER — LAB REQUISITION (OUTPATIENT)
Dept: LAB | Facility: HOSPITAL | Age: 77
End: 2017-12-05
Payer: COMMERCIAL

## 2017-12-05 ENCOUNTER — GENERIC CONVERSION - ENCOUNTER (OUTPATIENT)
Dept: OTHER | Facility: OTHER | Age: 77
End: 2017-12-05

## 2017-12-05 DIAGNOSIS — C43.61 MALIGNANT MELANOMA OF RIGHT UPPER EXTREMITY INCLUDING SHOULDER (HCC): ICD-10-CM

## 2017-12-05 PROCEDURE — 88341 IMHCHEM/IMCYTCHM EA ADD ANTB: CPT | Performed by: SURGERY

## 2017-12-05 PROCEDURE — 88342 IMHCHEM/IMCYTCHM 1ST ANTB: CPT | Performed by: SURGERY

## 2017-12-05 PROCEDURE — 88305 TISSUE EXAM BY PATHOLOGIST: CPT | Performed by: SURGERY

## 2017-12-07 ENCOUNTER — ALLSCRIPTS OFFICE VISIT (OUTPATIENT)
Dept: OTHER | Facility: OTHER | Age: 77
End: 2017-12-07

## 2017-12-07 ENCOUNTER — GENERIC CONVERSION - ENCOUNTER (OUTPATIENT)
Dept: OTHER | Facility: OTHER | Age: 77
End: 2017-12-07

## 2017-12-13 ENCOUNTER — GENERIC CONVERSION - ENCOUNTER (OUTPATIENT)
Dept: OTHER | Facility: OTHER | Age: 77
End: 2017-12-13

## 2017-12-13 ENCOUNTER — APPOINTMENT (OUTPATIENT)
Dept: LAB | Facility: HOSPITAL | Age: 77
End: 2017-12-13
Payer: COMMERCIAL

## 2017-12-13 ENCOUNTER — TRANSCRIBE ORDERS (OUTPATIENT)
Dept: LAB | Facility: HOSPITAL | Age: 77
End: 2017-12-13

## 2017-12-13 DIAGNOSIS — Z13.820 ENCOUNTER FOR SCREENING FOR OSTEOPOROSIS: ICD-10-CM

## 2017-12-13 DIAGNOSIS — E04.2 NONTOXIC MULTINODULAR GOITER: ICD-10-CM

## 2017-12-13 DIAGNOSIS — I10 ESSENTIAL (PRIMARY) HYPERTENSION: ICD-10-CM

## 2017-12-13 DIAGNOSIS — R73.01 IMPAIRED FASTING GLUCOSE: ICD-10-CM

## 2017-12-13 DIAGNOSIS — E03.9 HYPOTHYROIDISM: ICD-10-CM

## 2017-12-13 DIAGNOSIS — E78.5 HYPERLIPIDEMIA: ICD-10-CM

## 2017-12-13 LAB
ALBUMIN SERPL BCP-MCNC: 3.5 G/DL (ref 3.5–5)
ALP SERPL-CCNC: 74 U/L (ref 46–116)
ALT SERPL W P-5'-P-CCNC: 23 U/L (ref 12–78)
ANION GAP SERPL CALCULATED.3IONS-SCNC: 5 MMOL/L (ref 4–13)
AST SERPL W P-5'-P-CCNC: 20 U/L (ref 5–45)
BASOPHILS # BLD AUTO: 0.03 THOUSANDS/ΜL (ref 0–0.1)
BASOPHILS NFR BLD AUTO: 0 % (ref 0–1)
BILIRUB SERPL-MCNC: 1.01 MG/DL (ref 0.2–1)
BUN SERPL-MCNC: 19 MG/DL (ref 5–25)
CALCIUM SERPL-MCNC: 9 MG/DL (ref 8.3–10.1)
CHLORIDE SERPL-SCNC: 109 MMOL/L (ref 100–108)
CHOLEST SERPL-MCNC: 160 MG/DL (ref 50–200)
CO2 SERPL-SCNC: 29 MMOL/L (ref 21–32)
CREAT SERPL-MCNC: 1.47 MG/DL (ref 0.6–1.3)
EOSINOPHIL # BLD AUTO: 0.31 THOUSAND/ΜL (ref 0–0.61)
EOSINOPHIL NFR BLD AUTO: 3 % (ref 0–6)
ERYTHROCYTE [DISTWIDTH] IN BLOOD BY AUTOMATED COUNT: 14.3 % (ref 11.6–15.1)
EST. AVERAGE GLUCOSE BLD GHB EST-MCNC: 103 MG/DL
GFR SERPL CREATININE-BSD FRML MDRD: 46 ML/MIN/1.73SQ M
GLUCOSE P FAST SERPL-MCNC: 89 MG/DL (ref 65–99)
HBA1C MFR BLD: 5.2 % (ref 4.2–6.3)
HCT VFR BLD AUTO: 45 % (ref 36.5–49.3)
HDLC SERPL-MCNC: 61 MG/DL (ref 40–60)
HGB BLD-MCNC: 15.6 G/DL (ref 12–17)
LDLC SERPL CALC-MCNC: 69 MG/DL (ref 0–100)
LYMPHOCYTES # BLD AUTO: 1.57 THOUSANDS/ΜL (ref 0.6–4.47)
LYMPHOCYTES NFR BLD AUTO: 17 % (ref 14–44)
MCH RBC QN AUTO: 32.8 PG (ref 26.8–34.3)
MCHC RBC AUTO-ENTMCNC: 34.7 G/DL (ref 31.4–37.4)
MCV RBC AUTO: 95 FL (ref 82–98)
MONOCYTES # BLD AUTO: 0.92 THOUSAND/ΜL (ref 0.17–1.22)
MONOCYTES NFR BLD AUTO: 10 % (ref 4–12)
NEUTROPHILS # BLD AUTO: 6.56 THOUSANDS/ΜL (ref 1.85–7.62)
NEUTS SEG NFR BLD AUTO: 70 % (ref 43–75)
NRBC BLD AUTO-RTO: 0 /100 WBCS
PLATELET # BLD AUTO: 169 THOUSANDS/UL (ref 149–390)
PMV BLD AUTO: 9.8 FL (ref 8.9–12.7)
POTASSIUM SERPL-SCNC: 4.4 MMOL/L (ref 3.5–5.3)
PROT SERPL-MCNC: 7.7 G/DL (ref 6.4–8.2)
RBC # BLD AUTO: 4.76 MILLION/UL (ref 3.88–5.62)
SODIUM SERPL-SCNC: 143 MMOL/L (ref 136–145)
T4 FREE SERPL-MCNC: 0.94 NG/DL (ref 0.76–1.46)
TRIGL SERPL-MCNC: 149 MG/DL
TSH SERPL DL<=0.05 MIU/L-ACNC: 4.08 UIU/ML (ref 0.36–3.74)
WBC # BLD AUTO: 9.4 THOUSAND/UL (ref 4.31–10.16)

## 2017-12-13 PROCEDURE — 83036 HEMOGLOBIN GLYCOSYLATED A1C: CPT

## 2017-12-13 PROCEDURE — 84443 ASSAY THYROID STIM HORMONE: CPT

## 2017-12-13 PROCEDURE — 80061 LIPID PANEL: CPT

## 2017-12-13 PROCEDURE — 85025 COMPLETE CBC W/AUTO DIFF WBC: CPT

## 2017-12-13 PROCEDURE — 84439 ASSAY OF FREE THYROXINE: CPT

## 2017-12-13 PROCEDURE — 36415 COLL VENOUS BLD VENIPUNCTURE: CPT

## 2017-12-13 PROCEDURE — 80053 COMPREHEN METABOLIC PANEL: CPT

## 2017-12-15 ENCOUNTER — GENERIC CONVERSION - ENCOUNTER (OUTPATIENT)
Dept: OTHER | Facility: OTHER | Age: 77
End: 2017-12-15

## 2017-12-26 ENCOUNTER — GENERIC CONVERSION - ENCOUNTER (OUTPATIENT)
Dept: OTHER | Facility: OTHER | Age: 77
End: 2017-12-26

## 2017-12-26 ENCOUNTER — ALLSCRIPTS OFFICE VISIT (OUTPATIENT)
Dept: OTHER | Facility: OTHER | Age: 77
End: 2017-12-26

## 2017-12-27 NOTE — PROGRESS NOTES
Assessment   1  Hypertension (401 9) (I10)   2  Hyperlipidemia (272 4) (E78 5)   3  Hypothyroidism (244 9) (E03 9)   4  Cardiomyopathy (425 4) (I42 9)   5  CKD (chronic kidney disease) stage 3, GFR 30-59 ml/min (585 3) (N18 3)   6  Impaired fasting glucose (790 21) (R73 01)   7  Screening for osteoporosis (V82 81) (Z13 820)   8  Non-toxic multinodular goiter (241 1) (E04 2)   9  Malignant melanoma of right upper extremity (172 6) (C43 61)    Plan   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    · Finasteride 5 MG Oral Tablet; TAKE 1 TABLET AT BEDTIME  Cardiomyopathy, CKD (chronic kidney disease) stage 3, GFR 30-59 ml/min,    Hyperlipidemia, Hypertension, Hypothyroidism, Impaired fasting glucose, Non-toxic    multinodular goiter    · (1) COMPREHENSIVE METABOLIC PANEL; Status:Active; Requested QSC:[id number];    · (1) HEMOGLOBIN A1C; Status:Active; Requested DKP:[id number];    · (1) LIPID PANEL FASTING W DIRECT LDL REFLEX; Status:Active; Requested    TMY:60VYA6999;    · (1) TSH WITH FT4 REFLEX; Status:Active; Requested RHC:16WVX3134;   Esophageal reflux    · Omeprazole 40 MG Oral Capsule Delayed Release; take 1 capsule daily  Health Maintenance    · *VB - Fall Risk Assessment  (Dx Z13 89 Screen for Neurologic Disorder);    Status:Complete;   Done: 77VAK5298 09:10AM   · *VB-Depression Screening; Status:Complete;   Done: 79WRL8467 09:10AM  Hyperlipidemia    · Atorvastatin Calcium 10 MG Oral Tablet; Take 1 tablet daily  Hypertension    · Bisoprolol Fumarate 10 MG Oral Tablet;  Take 1 tablet twice daily  Hypertension, Hypothyroidism    · Lisinopril 20 MG Oral Tablet; take 1 tablet every day  Malignant melanoma of right upper extremity    · Vicodin 5-300 MG Oral Tablet  Non-toxic multinodular goiter    · US THYROID; Status:Hold For - Scheduling; Requested for:27Ygg1125;   PMH: History of acute bronchitis with bronchospasm    · ProAir  (90 Base) MCG/ACT Inhalation Aerosol Solution; INHALE 2 PUFFS EVERY 4-6 HOURS AS NEEDED  Screening for osteoporosis    · * DXA BONE DENSITY SPINE HIP AND PELVIS; Status:Hold For - Scheduling; Requested    for:08Vlj2621; Unlinked    · Aspirin EC 81 MG Oral Tablet Delayed Release; TAKE 1 TABLET DAILY   · Calcium 600+D 600-800 MG-UNIT Oral Tablet; Take 1 tablet daily   · Co Q-10 200 MG Oral Capsule; Take 1 tablet daily   · Multi-Day Oral Tablet; TAKE 1 TABLET DAILY   · Vitamin D 1000 UNIT CAPS; take 1 capsule daily    Discussion/Summary      Reviewed lab in 12/2017 4 08 and Free T4 0 94 ok 5 2 normal ok showed Cr 1 47 and GFR 46 stable 160/149/61/69 good current meds  thyroid 6/2016 stable  Give US today  PCV13 5/2015  Got pneumovax 2016  Dr Naresh Ramirez for colonoscopy in 3/14/2016, repeat in 3 years  urology for PSA  is on omeprazole for years  Would like to check Dexa  Give Dexa script today again  in 6 months  Possible side effects of new medications were reviewed with the patient/guardian today  The treatment plan was reviewed with the patient/guardian  The patient/guardian understands and agrees with the treatment plan      Chief Complaint   Patient here for 6 month follow up for Hyperlipidemia, Hypertension, Hypothyroidism, Impaired fasting glucose, and Non-toxic multinodular goiter  History of Present Illness   Pt is here by himself  not check BP at home  He is on bisoprolol and lisinopril   cardiology for cardiomyopathy, tachycardia, s/p pacemaker  Stable  Cancer care for melanoma s/p wide excision on right upper arm recently  Stable  atorvastatin now  Denies SE   hgA1C 5 2 normal TSH 4 08 slightly elevated and Free T4 0 94 normal  Not on meds  of colon cancer s/p surgery and chemo, had colonoscopy 3/2016 which showed diverticulosis  Repeat in 3 years  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, do EGD every 3 years  Last EGD was in 12/2016  Pt is on omeprazole 40mg daily now  Dr Sima Vera for COPD before, but pt states he is not COPD  Not follow any more  Denies SOB  Does not use any inhaler  urology for BPH yearly, PSA 0 5 normal in 12/2016  with wife  Does all ADL's  Still drive  recent falls  depression  The patient states his hyperlipidemia has been under good control since the last visit  Symptoms: The patient is currently asymptomatic  Medications: the patient is adherent with his medication regimen  -- He denies medication side effects  the patient's LDL goal is 7069 mg/dL  -- 12/2017  The patient presents for follow-up of essential hypertension  The patient states he has been doing well with his blood pressure control since the last visit  Comorbid Illnesses: cardiac failure  Symptoms: The patient is currently asymptomatic  Home monitoring: The patient is not checking blood pressure at home  Medications: the patient is adherent with his medication regimen  -- He denies medication side effects  Review of Systems        Constitutional: No fever or chills, feels well, no tiredness, no recent weight gain or weight loss  ENT: no complaints of earache, no hearing loss, no nosebleeds, no nasal discharge, no sore throat, no hoarseness  Cardiovascular: No complaints of slow heart rate, no fast heart rate, no chest pain, no palpitations, no leg claudication, no lower extremity  Respiratory: No complaints of shortness of breath, no wheezing, no cough, no SOB on exertion, no orthopnea or PND  Gastrointestinal: No complaints of abdominal pain, no constipation, no nausea or vomiting, no diarrhea or bloody stools  Musculoskeletal: No complaints of arthralgia, no myalgias, no joint swelling or stiffness, no limb pain or swelling  Preventive Quality 65 and Older: The patient is currently asymptomatic Symptoms Include: no recent fall       Active Problems   1  Ank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 (720 0) (M45 9)   2  Arthritis (716 90) (M19 90)   3  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)   4   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 (600 21,599 69)     (N40 1,N13 8)   5  Cancer, colon (153 9) (C18 9)   6  Cardiac arrhythmia (427 9) (I49 9)   7  Cardiomyopathy (425 4) (I42 9)   8  CKD (chronic kidney disease) stage 3, GFR 30-59 ml/min (585 3) (N18 3)   9  Encounter for screening colonoscopy (V76 51) (Z12 11)   10  Esophageal reflux (530 81) (K21 9)   11  Gout (274 9) (M10 9)   12  Hyperlipidemia (272 4) (E78 5)   13  Hypertension (401 9) (I10)   14  Hypothyroidism (244 9) (E03 9)   15  Impaired fasting glucose (790 21) (R73 01)   16  Malignant melanoma of right upper extremity (172 6) (C43 61)   17  Microscopic hematuria (599 72) (R31 29)   18  Non-toxic multinodular goiter (241 1) (E04 2)   19  Polymyalgia rheumatica (725) (M35 3)   20  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)   21  Skin lesion (709 9) (L98 9)   22  Thrombocytopenia (287 5) (D69 6)    Past Medical History   1  History of Achilles tendinitis, unspecified laterality (726 71) (M76 60)   2  History of Closed Fracture Of Multiple Ribs (807 09)   3  History of [place] Of [employer] On [address] [facility] (O686 3)   4  History of Colon Cancer (V10 05)   5  History of Congestive heart failure (428 0) (I50 9)   6  History of Fracture Of Multiple Ribs (807 09)   7  History of diverticulitis of colon (V12 79) (Z87 19)   8  History of eczema (V13 3) (Z87 2)   9  History of gastroenteritis (V12 79) (Z87 19)   10  History of influenza vaccination (V49 89) (Z92 29)   11  History of pneumococcal vaccination (V49 89) (Z92 29)   12  History of seborrheic dermatitis (V13 3) (Z87 2)   13  History of upper respiratory infection (V12 09) (Z87 09)   14  History of Medicare annual wellness visit, initial (V70 0) (Z00 00)   15  History of Medicare annual wellness visit, subsequent (V70 0) (Z00 00)   16  History of Need for vaccination with 13-polyvalent pneumococcal conjugate vaccine      (V03 82) (Z23)   17  History of Non-toxic multinodular goiter (241 1) (E04 2)   18  History of Postprocedural Pneumonia (997 32)   19  History of Spondyloarthropathy (724 9)   20  History of Supraventricular tachycardia (427 89) (I47 1)   21  History of 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)    Surgical History   1  History of Cardio-Defib Pulse Gen Venous Insertion Of Electrode For Ventricular Pacing   2  History of Catheter Ablation Atrioventricular Node   3  History of Complete Colonoscopy   4  History of Excision Of Lesion Arms Malignant   5  History of Heart Surgery   6  History of Implantable Cardioverter-Defibrillator   7  History of Inguinal Hernia Repair   8  History of Knee Surgery   9  History of Right Hemicolectomy   10  History of Tonsillectomy With Adenoidectomy    Family History   Mother    1  Family history of Congestive Heart Failure   2  Family history of Hypertension (V17 49)   3  Family history of Osteoporosis (V17 81)  Father    4  Family history of Chronic Obstructive Pulmonary Disease   5  Family history of Emphysema   6  Family history of Hypertension (V17 49)  Sister    7  Family history of Heart Valve Replacement   8  Family history of Osteoporosis (V17 81)  Family History    9  Family history of Breast Cancer (V16 3)    Social History    · Denied: History of Alcohol Use (History)   · Marital History - Currently    · Never A Smoker   · Occupation: Retired    Current Meds    1  Aspirin EC 81 MG Oral Tablet Delayed Release; TAKE 1 TABLET DAILY; Therapy: (Recorded:00Agz8641) to Recorded   2  Atorvastatin Calcium 10 MG Oral Tablet; Take 1 tablet daily; Therapy: 90OOH3990 to (Evaluate:38Jve8276)  Requested for: 57QSM4382; Last     Rx:38Sra7093 Ordered   3  Bisoprolol Fumarate 10 MG Oral Tablet; Take 1 tablet twice daily; Therapy: 64Tlb9455 to (Evaluate:59Mnu6622)  Requested for: 30Gip5880; Last     Rx:07Oqs5049 Ordered   4  Calcium 600+D 600-800 MG-UNIT Oral Tablet;  Take 1 tablet daily; Therapy: 09MZJ0249 to Recorded   5  Co Q-10 200 MG Oral Capsule; Take 1 tablet daily; Therapy: (Recorded:95Uox9252) to Recorded   6  Finasteride 5 MG Oral Tablet; TAKE 1 TABLET AT BEDTIME; Therapy: 55Vns1046 to (Last Rx:66Zbo9555)  Requested for: 23Jun2017 Ordered   7  Lisinopril 20 MG Oral Tablet; take 1 tablet every day; Therapy: 16WBH2095 to (Evaluate:30Jun2018)  Requested for: 41IFW6102; Last     Rx:56Gyc2846 Ordered   8  Multi-Day Oral Tablet; TAKE 1 TABLET DAILY; Therapy: 44RDG0769 to (Evaluate:09Apr2014) Recorded   9  Omeprazole 40 MG Oral Capsule Delayed Release; take 1 capsule daily; Therapy: 64QJW6961 to (Evaluate:12Jun2018)  Requested for: 28VKU0090; Last     Rx:74Qmg0987 Ordered   10  ProAir  (90 Base) MCG/ACT Inhalation Aerosol Solution; INHALE 2 PUFFS      EVERY 4-6 HOURS AS NEEDED; Therapy: 26EDE9873 to (Evaluate:28Apr2017)  Requested for: 58FGW7502; Last      Rx:84Adv6550 Ordered   11  Vicodin 5-300 MG Oral Tablet; TAKE ONE TABLET BY MOUTH EVERY 4 HOURS AS      NEEDED FOR PAIN;      Therapy: 59WXR4458 to (Last Rx:48Hhr0722) Ordered   12  Vitamin D 1000 UNIT CAPS; take 1 capsule daily; Therapy: 97Ipu6096 to Recorded    Allergies   1  No Known Drug Allergies    Vitals   Vital Signs    Recorded: 01QZR8105 08:59AM Recorded: 27RWK7573 08:25AM   Temperature  97 2 F, Tympanic   Heart Rate  68, L Radial   Respiration  16   Systolic 320 035, LUE   Diastolic 82 98, LUE   Height  6 ft 0 75 in   Weight  214 lb 6 4 oz   BMI Calculated  28 48   BSA Calculated  2 21   Pain Scale  0     Physical Exam        Constitutional      General appearance: No acute distress, well appearing and well nourished  Pulmonary      Respiratory effort: No increased work of breathing or signs of respiratory distress  Auscultation of lungs: Clear to auscultation, equal breath sounds bilaterally, no wheezes, no rales, no rhonci         Cardiovascular      Auscultation of heart: Normal rate and rhythm, normal S1 and S2, without murmurs  Examination of extremities for edema and/or varicosities: Normal        Carotid pulses: Normal        Abdomen      Abdomen: Non-tender, no masses  Liver and spleen: No hepatomegaly or splenomegaly  Lymphatic      Palpation of lymph nodes in neck: No lymphadenopathy  Musculoskeletal      Gait and station: Normal           Results/Data   (1) CBC/PLT/DIFF 39RFA0520 08:45AM Real Time Content   TW Order Number: [id number]      Test Name Result Flag Reference   WBC COUNT 9 40 Thousand/uL  4 31-10 16   RBC COUNT 4 76 Million/uL  3 88-5 62   HEMOGLOBIN 15 6 g/dL  12 0-17 0   HEMATOCRIT 45 0 %  36 5-49 3   MCV 95 fL  82-98   MCH 32 8 pg  26 8-34 3   MCHC 34 7 g/dL  31 4-37 4   RDW 14 3 %  11 6-15 1   MPV 9 8 fL  8 9-12 7   PLATELET COUNT 830 Thousands/uL  149-390   nRBC AUTOMATED 0 /100 WBCs     NEUTROPHILS RELATIVE PERCENT 70 %  43-75   LYMPHOCYTES RELATIVE PERCENT 17 %  14-44   MONOCYTES RELATIVE PERCENT 10 %  4-12   EOSINOPHILS RELATIVE PERCENT 3 %  0-6   BASOPHILS RELATIVE PERCENT 0 %  0-1   NEUTROPHILS ABSOLUTE COUNT 6 56 Thousands/? ??L  1 85-7 62   LYMPHOCYTES ABSOLUTE COUNT 1 57 Thousands/? ??L  0 60-4 47   MONOCYTES ABSOLUTE COUNT 0 92 Thousand/? ??L  0 17-1 22   EOSINOPHILS ABSOLUTE COUNT 0 31 Thousand/? ??L  0 00-0 61   BASOPHILS ABSOLUTE COUNT 0 03 Thousands/? ??L  0 00-0 10      (1) COMPREHENSIVE METABOLIC PANEL 93XDX8474 78:34DR Real Time Content    Order Number: [id number]      Test Name Result Flag Reference   SODIUM 143 mmol/L  136-145   POTASSIUM 4 4 mmol/L  3 5-5 3   CHLORIDE 109 mmol/L H 100-108   CARBON DIOXIDE 29 mmol/L  21-32   ANION GAP (CALC) 5 mmol/L  4-13   BLOOD UREA NITROGEN 19 mg/dL  5-25   CREATININE 1 47 mg/dL H 0 60-1 30   Standardized to IDMS reference method   CALCIUM 9 0 mg/dL  8 3-10 1   BILI, TOTAL 1 01 mg/dL H 0 20-1 00   ALK PHOSPHATAS 74 U/L     ALT (SGPT) 23 U/L  12-78   Specimen collection should occur prior to Sulfasalazine and/or Sulfapyridine administration due to the potential for falsely depressed results  AST(SGOT) 20 U/L  5-45   Specimen collection should occur prior to Sulfasalazine administration due to the potential for falsely depressed results  ALBUMIN 3 5 g/dL  3 5-5 0   TOTAL PROTEIN 7 7 g/dL  6 4-8 2   eGFR 46 ml/min/1 73sq m     [facility] Disease Education Program recommendations are as follows:     GFR calculation is accurate only with a steady state creatinine     Chronic Kidney disease less than 60 ml/min/1 73 sq  meters     Kidney failure less than 15 ml/min/1 73 sq  meters  GLUCOSE FASTING 89 mg/dL  65-99   Specimen collection should occur prior to Sulfasalazine administration due to the potential for falsely depressed results  Specimen collection should occur prior to Sulfapyridine administration due to the potential for falsely elevated results  (1) HEMOGLOBIN A1C 34Yxn5569 08:45AM Yana YIN Order Number: [id number]      Test Name Result Flag Reference   HEMOGLOBIN A1C 5 2 %  4 2-6 3   EST  AVG  GLUCOSE 103 mg/dl        (1) LIPID PANEL FASTING W DIRECT LDL REFLEX 70ATS2723 08:45AM Yana YIN Order Number: [id number]      Test Name Result Flag Reference   CHOLESTEROL 160 mg/dL     LDL CHOLESTEROL CALCULATED 69 mg/dL  0-100   Triglyceride:           Normal <150 mg/dl      Borderline High 150-199 mg/dl      High 200-499 mg/dl      Very High >499 mg/dl               Cholesterol:          Desirable <200 mg/dl       Borderline High 200-239 mg/dl       High >239 mg/dl               HDL Cholesterol:          High>59 mg/dL       Low <41 mg/dL               HDL Cholesterol:          High>59 mg/dL       Low <41 mg/dL               This screening LDL is a calculated result  It does not have the accuracy of the Direct Measured LDL in the monitoring of patients with hyperlipidemia and/or statin therapy        Direct Measure LDL (IXL423) must be ordered separately in these patients  TRIGLYCERIDES 149 mg/dL  <=150   Specimen collection should occur prior to N-Acetylcysteine or Metamizole administration due to the potential for falsely depressed results  HDL,DIRECT 61 mg/dL H 40-60   Specimen collection should occur prior to Metamizole administration due to the potential for falsley depressed results  (1) TSH WITH FT4 REFLEX 61Tki4289 08:45AM Spencer YIN Order Number: [id number]      Test Name Result Flag Reference   TSH 4 080 uIU/mL H 0 358-3 740   Patients undergoing fluorescein dye angiography may retain small amounts of fluorescein in the body for 48-72 hours post procedure  Samples containing fluorescein can produce falsely depressed TSH values  If the patient had this procedure,a specimen should be resubmitted post fluorescein clearance  T4,FREE 0 94 ng/dL  0 76-1 46   Specimen collection should occur prior to Sulfasalazine administration due to the potential for falsely elevated results  Health Management   Burt esophagus   EGD; every 3 years; Last 76YUZ4611; Next Due: 05Fpd9099; Active  Encounter for screening colonoscopy   COLONOSCOPY; every 3 years; Last 58Thl4018; Next Due: 32Xzj1281; Overdue  Health Maintenance   Medicare Annual Wellness Visit; every 1 year; Next Due: 64LXI6852; Overdue    Future Appointments      Date/Time Provider Specialty Site   02/07/2018 11:00 AM Cardiology, [address] [facility]   01/08/2018 11:00 AM Cardiology, Device Remote  [address]   06/15/2018 08:30 AM TEJA James  Surgical Oncology [facility]   01/26/2018 09:15 AM Ailyn Hoff [facility] Urology [facility] Paola Dietz     Signatures    Electronically signed by :  Nuha Calix MD; Dec 26 2017  9:10AM EST                       (Author)

## 2018-01-05 DIAGNOSIS — N40.1 ENLARGED PROSTATE WITH LOWER URINARY TRACT SYMPTOMS (LUTS): ICD-10-CM

## 2018-01-08 ENCOUNTER — APPOINTMENT (OUTPATIENT)
Dept: LAB | Facility: HOSPITAL | Age: 78
End: 2018-01-08
Attending: UROLOGY
Payer: COMMERCIAL

## 2018-01-08 ENCOUNTER — TRANSCRIBE ORDERS (OUTPATIENT)
Dept: LAB | Facility: HOSPITAL | Age: 78
End: 2018-01-08

## 2018-01-08 ENCOUNTER — GENERIC CONVERSION - ENCOUNTER (OUTPATIENT)
Dept: OTHER | Facility: OTHER | Age: 78
End: 2018-01-08

## 2018-01-08 DIAGNOSIS — N40.1 ENLARGED PROSTATE WITH LOWER URINARY TRACT SYMPTOMS (LUTS): ICD-10-CM

## 2018-01-08 LAB — PSA SERPL-MCNC: 0.4 NG/ML (ref 0–4)

## 2018-01-08 PROCEDURE — 84153 ASSAY OF PSA TOTAL: CPT

## 2018-01-08 PROCEDURE — 36415 COLL VENOUS BLD VENIPUNCTURE: CPT

## 2018-01-09 NOTE — RESULT NOTES
Verified Results  (1) TISSUE EXAM [id number] 12:31PM Libertad Bear     Test Name Result Flag Reference   LAB AP CASE REPORT (Report)     Surgical Pathology Report             Case: [id number]                   Authorizing Provider: Bess Rm MD      Collected:      12/05/2016 1231        Ordering Location:   [address] [place] [address]   Received:      12/05/2016 [facility] Endoscopy                               Pathologist:      Vu Lee MD                                 Specimens:  A) - Esophagus, Biopsy          B) - Esophagus, Biopsy          C) - Esophagus, Biopsy   LAB AP FINAL DIAGNOSIS (Report)     A  Esophagus, 37 cm (biopsy):    - Cardiac gastric mucosa with intestinal metaplasia (goblet cells   present) and reactive epithelial changes  - Findings compatible with Burt's esophagus in the appropriate   endoscopic context  - No dysplasia or neoplasia identified  B  Esophagus, 35 cm (biopsy):    - Cardiac gastric mucosa with extensive intestinal metaplasia (goblet   cells present) and reactive epithelial changes  - Findings compatible with Burt's esophagus in the appropriate   endoscopic context  - No dysplasia or neoplasia identified  C  Esophagus, 33 cm (biopsy):    - Cardiac gastric and squamous junctional mucosa with few likely goblet   cells present     - Findings suggestive of Burt's esophagus in the appropriate   endoscopic context  - Squamous eosinophils number less than 2 per HPF     - No dysplasia or neoplasia identified  Electronically signed by Vu Lee MD on 12/12/2016 at 2:53 PM  Preliminary result electronically signed by Vu Lee MD on 12/7/2016 at 1:39 PM   LAB AP NOTE      Interpretation performed at [facility], [address] [place] [address], [address] [place] [address]  Intradepartmental consultation concurs with the diagnosis     LAB AP SURGICAL ADDITIONAL INFORMATION (Report)     These tests were developed and their performance characteristics   determined by [facility] or [facility]  They may not be cleared or approved by the [place]  Food and   Drug Administration  The FDA has determined that such clearance or   approval is not necessary  These tests are used for clinical purposes  They should not be regarded as investigational or for research  This   laboratory has been approved by [place] [address], designated as a high-complexity   laboratory and is qualified to perform these tests  LAB AP GROSS DESCRIPTION (Report)     A  The specimen is received in formalin, labeled with the patient's name   and hospital number, and is designated biopsy at 37 cm; Burt's; rule   out dysplasia, are four irregularly shaped fragments of tan-pink soft   tissue measuring 0 5 x 0 5 x 0 1 cm in aggregate  Entirely submitted  One   cassette  B  The specimen is received in formalin, labeled with the patient's name   and hospital number, and is designated biopsy at 35 cm; Burt's; rule   out dysplasia, are two irregularly shaped fragments of tan-red soft   tissue each measuring 0 5 cm in greatest dimension  Entirely submitted  One cassette  C  The specimen is received in formalin, labeled with the patient's name   and hospital number, and is designated biopsy at 33 cm; Burt's; rule   out dysplasia, are two irregularly shaped fragments of tan-red soft   tissue each measuring 0 3 cm in greatest dimension  Entirely submitted  One cassette  Note: The estimated total formalin fixation time based upon information   provided by the submitting clinician and the standard processing schedule   is 16 0 hours        RLR   LAB AP CLINICAL INFORMATION      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

## 2018-01-10 NOTE — PROGRESS NOTES
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)   4  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 (600 21,599 69)   (N40 1,N13 8)   5  Cancer, colon (153 9) (C18 9)   6  Cardiac arrhythmia (427 9) (I49 9)   7  Cardiomyopathy (425 4) (I42 9)   8  CKD (chronic kidney disease) stage 3, GFR 30-59 ml/min (585 3) (N18 3)   9  Dermatitis, eczematoid (692 9) (L30 9)   10  Encounter for screening colonoscopy (V76 51) (Z12 11)   11  Esophageal reflux (530 81) (K21 9)   12  Gout (274 9) (M10 9)   13  Hyperlipidemia (272 4) (E78 5)   14  Hypertension (401 9) (I10)   15  Hypothyroidism (244 9) (E03 9)   16  Impaired fasting glucose (790 21) (R73 01)   17  Medicare annual wellness visit, initial (V70 0) (Z00 00)   18  Medicare annual wellness visit, subsequent (V70 0) (Z00 00)   19  Microscopic hematuria (599 72) (R31 29)   20  Need for immunization against influenza (V04 81) (Z23)   21  Need for pneumococcal vaccination (V03 82) (Z23)   22  Need for vaccination with 13-polyvalent pneumococcal conjugate vaccine (V03 82) (Z23)   23  Non-toxic multinodular goiter (241 1) (E04 2)   24  Polymyalgia rheumatica (725) (M35 3)   25  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)   26  Screening for osteoporosis (V82 81) (Z13 820)   27  Seborrheic dermatitis (690 10) (L21 9)   28  Skin lesion (709 9) (L98 9)   29  Spondyloarthropathy (724 9)   30  Thrombocytopenia (287 5) (D69 6)   31  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)    Current Meds   1  Aspirin EC 81 MG Oral Tablet Delayed Release; TAKE 1 TABLET DAILY; Therapy: (Recorded:23Jun2017) to Recorded   2  Atorvastatin Calcium 10 MG Oral Tablet; Take 1 tablet daily; Therapy: 52YBV2350 to (Evaluate:73Cbt4574)  Requested for: 54WPA8327; Last   Rx:91Bax4562 Ordered   3  Bisoprolol Fumarate 10 MG Oral Tablet; Take 1 tablet twice daily; Therapy: 71Boi5504 to (Evaluate:07Wrt7685)  Requested for: 31Utt6368; Last   Rx:79Xng9426 Ordered   4  Calcium 600+D 600-800 MG-UNIT Oral Tablet; Take 1 tablet daily; Therapy: 43TWS7360 to Recorded   5  Co Q-10 200 MG Oral Capsule; Take 1 tablet daily; Therapy: (Recorded:27Dgv8157) to Recorded   6  Finasteride 5 MG Oral Tablet; TAKE 1 TABLET AT BEDTIME; Therapy: 56Qjn9218 to (Last Rx:46Rxd1334)  Requested for: 23Jun2017 Ordered   7  Lisinopril 20 MG Oral Tablet; take 1 tablet every day; Therapy: 29RWO1507 to (Evaluate:30Jun2018)  Requested for: 26ROI8309; Last   Rx:08Neb1647 Ordered   8  Multi-Day Oral Tablet; TAKE 1 TABLET DAILY; Therapy: 29GQW7685 to (Evaluate:09Apr2014) Recorded   9  Omeprazole 40 MG Oral Capsule Delayed Release; take 1 capsule daily; Therapy: 75RXB5051 to (Evaluate:47Gpo2730)  Requested for: 20WZB4512; Last   Rx:70Sys9883 Ordered   10  ProAir  (90 Base) MCG/ACT Inhalation Aerosol Solution; INHALE 2 PUFFS    EVERY 4-6 HOURS AS NEEDED; Therapy: 95ENQ3225 to (Evaluate:28Apr2017)  Requested for: 19VFF9289; Last    Rx:24Lyq4488 Ordered   11  Vitamin D 1000 UNIT CAPS; take 1 capsule daily; Therapy: 82Mef1995 to Recorded    Allergies    1  No Known Drug Allergies    Plan  Need for immunization against influenza    · Fluzone High-Dose 0 5 ML Intramuscular Suspension Prefilled Syringe    Future Appointments    Date/Time Provider Specialty Site   02/07/2018 11:00 AM Cardiology, [address] Aj [facility]y   11/03/2017 02:00 PM Cardiology, Device Remote   [facility]   12/26/2017 08:40 AM Elljw Showers, [address] [facility]   11/09/2017 08:20 Jenelle Raya MD Gastroenterology Adult [address] [address]     Signatures   Electronically signed by :  Félix Waters MD; Oct 23 2017  3:53PM EST                       (Review)

## 2018-01-12 ENCOUNTER — HOSPITAL ENCOUNTER (OUTPATIENT)
Dept: RADIOLOGY | Facility: HOSPITAL | Age: 78
Discharge: HOME | End: 2018-01-12
Payer: COMMERCIAL

## 2018-01-12 VITALS
BODY MASS INDEX: 27.7 KG/M2 | SYSTOLIC BLOOD PRESSURE: 145 MMHG | RESPIRATION RATE: 18 BRPM | TEMPERATURE: 98 F | DIASTOLIC BLOOD PRESSURE: 76 MMHG | HEART RATE: 65 BPM | HEIGHT: 73 IN | WEIGHT: 209 LBS

## 2018-01-12 VITALS
OXYGEN SATURATION: 94 % | TEMPERATURE: 97.7 F | DIASTOLIC BLOOD PRESSURE: 88 MMHG | SYSTOLIC BLOOD PRESSURE: 138 MMHG | BODY MASS INDEX: 28.1 KG/M2 | HEART RATE: 85 BPM | RESPIRATION RATE: 16 BRPM | HEIGHT: 73 IN | WEIGHT: 212 LBS

## 2018-01-12 DIAGNOSIS — E04.2 NONTOXIC MULTINODULAR GOITER: ICD-10-CM

## 2018-01-12 PROCEDURE — 76536 US EXAM OF HEAD AND NECK: CPT

## 2018-01-12 NOTE — PROGRESS NOTES
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)   8  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 (600 21,599 69)   (N40 1,N13 8)   9  Blood pressure elevated (401 9) (I10)   10  Bronchitis (490) (J40)   11  Cancer, colon (153 9) (C18 9)   12  Cardiac arrhythmia (427 9) (I49 9)   13  Cardiomyopathy (425 4) (I42 9)   14  Chest congestion (786 9) (R09 89)   15  Chronic obstructive pulmonary disease (496) (J44 9)   16  CKD (chronic kidney disease) stage 3, GFR 30-59 ml/min (585 3) (N18 3)   17  COPD exacerbation (491 21) (J44 1)   18  Cough (786 2) (R05)   19  Dehydration (276 51) (E86 0)   20  Dehydration (276 51) (E86 0)   21  Dermatitis, eczematoid (692 9) (L30 9)   22  Diverticulitis of colon (562 11) (K57 32)   23  Double vision (368 2) (H53 2)   24  Encounter for screening colonoscopy (V76 51) (Z12 11)   25  Esophageal reflux (530 81) (K21 9)   26  Gout (274 9) (M10 9)   27  Hyperlipidemia (272 4) (E78 5)   28  Hypertension (401 9) (I10)   29  Hypothyroidism (244 9) (E03 9)   30  Impaired fasting glucose (790 21) (R73 01)   31  Loss of weight (783 21) (R63 4)   32  Medicare annual wellness visit, initial (V70 0) (Z00 00)   33  Microscopic hematuria (599 72) (R31 29)   34  Nausea (787 02) (R11 0)   35  Need for immunization against influenza (V04 81) (Z23)   36  Need for vaccination with 13-polyvalent pneumococcal conjugate vaccine (V03 82) (Z23)   37  Non-toxic multinodular goiter (241 1) (E04 2)   38  Pleural effusion (511 9) (J90)   39  Polymyalgia rheumatica (725) (M35 3)   40  Post-nasal drip (784 91) (R09 82)   41  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)   42  Seborrheic dermatitis (690 10) (L21 9)   43  Spondyloarthropathy (724 9)   44  Tachycardia (785 0) (R00 0)   45  Thrombocytopenia (287 5) (D69 6)   46  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)    Current Meds   1  Aspirin EC 81 MG Oral Tablet Delayed Release; TAKE 1 TABLET DAILY; Therapy: (Recorded:16Mar2016) to Recorded   2  Atorvastatin Calcium 10 MG Oral Tablet; Take 1 tablet daily; Therapy: 46DIQ1534 to (Evaluate:67Gvd4337)  Requested for: 21Jun2016; Last   Rx:22Feb2016 Ordered   3  Bisoprolol Fumarate 10 MG Oral Tablet; take 1 tablet twice a day; Therapy: 95VMX0223 to (Last Rx:07Kjr6812)  Requested for: 21Jun2016 Ordered   4  Calcium 600+D 600-800 MG-UNIT Oral Tablet; Take 1 tablet daily; Therapy: 24RKM9646 to Recorded   5  CoQ-10 10 MG CAPS; Therapy: (Recorded:16Mar2016) to Recorded   6  Cyclobenzaprine HCl - 5 MG Oral Tablet; TAKE 1 TABLET AT BEDTIME AS NEEDED; Therapy: 21Jun2016 to (Elkin Gallegos)  Requested for: 21Jun2016; Last   Rx:21Jun2016 Ordered   7  Finasteride 5 MG Oral Tablet; TAKE 1 TABLET AT BEDTIME; Therapy: 63Aaz7469 to (Last YH:94YLQ6760)  Requested for: 21Jun2016 Ordered   8  Lisinopril 20 MG Oral Tablet; take 1 tablet every day; Therapy: 95IBE3199 to (Elkin Gallegos)  Requested for: 41UQO3243; Last   Rx:34Pmq0785 Ordered   9  Multi-Day Oral Tablet; TAKE 1 TABLET DAILY; Therapy: 59UPF4614 to (Evaluate:09Apr2014) Recorded   10  Omeprazole 20 MG Oral Capsule Delayed Release; TAKE 1 CAPSULE DAILY EVERY    MORNING BEFORE BREAKFAST; Therapy: 04DCG2786 to (Greyson Quick)  Requested for: 62XYQ7521; Last    Rx:09Oct2016 Ordered   11  Omeprazole 20 MG Oral Capsule Delayed Release; TAKE 1 CAPSULE DAILY; Therapy: 55ANN5352 to (Kianna Ribera)  Requested for: 21Jun2016; Last    Rx:28Nov2012 Ordered   12   Vitamin D 1000 UNIT CAPS; take 1 capsule daily; Therapy: 04Pkz1690 to Recorded    Allergies    1  No Known Drug Allergies    Plan  Need for immunization against influenza    · Fluzone High-Dose 0 5 ML Intramuscular Suspension Prefilled Syringe    Future Appointments    Date/Time Provider Specialty Site   02/01/2017 11:00 AM Cardiology, 2021 Aj Pearson   05/01/2017 03:00 PM Cardiology, Device Remote  [address]   12/21/2016 09:40 AM Song Santos   12/05/2016 12:30 PM Jonah Galloway MD Gastroenterology Adult [address]     Signatures   Electronically signed by :  Paulo Harry MD; Nov 12 2016 11:53AM EST                       (Review)

## 2018-01-12 NOTE — RESULT NOTES
Verified Results  (1) URINE CULTURE 86KND6626 05:04PM Jaqui De La Fuente     Test Name Result Flag Reference   CLINICAL REPORT (Report)     Test:        Urine culture  Specimen Source:  Urine, Unspecified Source  Specimen Type:   Urine  Specimen Date:   3/16/2016 5:04 PM  Result Date:    3/17/2016 6:20 PM  Result Status:   Final result  Resulting Lab:   [address] [address]            Tel: [phone]                 CULTURE                                       ------------------                                   No Growth <1000 cfu/mL

## 2018-01-12 NOTE — RESULT NOTES
Message   US thyroid showed left nodule stable  Verified Results  US THYROID [id number] 09:28AM Zari Hernandez Order Number: [id number]   TW Order Number: [id number]     Test Name Result Flag Reference   US THYROID (Report)     THYROID ULTRASOUND     INDICATION: Nodule follow-up     COMPARISON: 2/11/2015 and priors     TECHNIQUE:  Ultrasound of the thyroid was performed with a high frequency linear transducer in transverse and sagittal planes including volumetric imaging sweeps as well as traditional still imaging technique  FINDINGS:   Normal homogeneous smooth echotexture  Right gland: 3 6 x 1 8 x 1 2 cm  No dominant nodules  Left gland: 3 5 x 1 9 x 0 9 cm  Left lower pole solid nodule with tiny echogenic foci which may represent calcifications measures 8 x 7 x 8 mm  Prior measurement 7 x 6 x 8 mm  This is not significantly changed since the most recent exam given variation in measuring technique  On the   prior exam of 12/10/2008, this measured 6 x 5 x 8 mm  Isthmus: 5 cm in AP dimension  No dominant nodules  IMPRESSION:      Subcentimeter left lower pole nodule without significant change         Workstation performed: [id number]     Signed by:   Jocelyn Chapin MD   6/28/16

## 2018-01-12 NOTE — RESULT NOTES
Verified Results  (1) COMPREHENSIVE METABOLIC PANEL [id number] 44:16DB Gisella Shen    Order Number: [id number]     Test Name Result Flag Reference   SODIUM 144 mmol/L  136-145   POTASSIUM 4 6 mmol/L  3 5-5 3   CHLORIDE 112 mmol/L H 100-108   CARBON DIOXIDE 27 mmol/L  21-32   ANION GAP (CALC) 5 mmol/L  4-13   BLOOD UREA NITROGEN 22 mg/dL  5-25   CREATININE 1 54 mg/dL H 0 60-1 30   Standardized to IDMS reference method   CALCIUM 8 7 mg/dL  8 3-10 1   BILI, TOTAL 1 11 mg/dL H 0 20-1 00   ALK PHOSPHATAS 71 U/L     ALT (SGPT) 25 U/L  12-78   AST(SGOT) 20 U/L  5-45   ALBUMIN 3 6 g/dL  3 5-5 0   TOTAL PROTEIN 7 0 g/dL  6 4-8 2   eGFR Non-African American 44 3 ml/min/1 73sq m     National Kidney Disease Education Program recommendations are as follows:  GFR calculation is accurate only with a steady state creatinine  Chronic Kidney disease less than 60 ml/min/1 73 sq  meters  Kidney failure less than 15 ml/min/1 73 sq  meters  GLUCOSE FASTING 97 mg/dL  65-99     (1) HEMOGLOBIN A1C 12Jun2017 09:20AM Gisella Shen    Order Number: [id number]     Test Name Result Flag Reference   HEMOGLOBIN A1C 5 1 %  4 2-6 3   EST  AVG  GLUCOSE 100 mg/dl       (1) LIPID PANEL FASTING W DIRECT LDL REFLEX 60SFH7572 09:20AM Gisella Shen    Order Number: [id number]     Test Name Result Flag Reference   CHOLESTEROL 162 mg/dL     LDL CHOLESTEROL CALCULATED 79 mg/dL  0-100   - Patient Instructions:  This is a fasting blood test  Water, black tea or black coffee only after 9:00pm the night before test   Drink 2 glasses of water the morning of test       Triglyceride:         Normal              <150 mg/dl       Borderline High    150-199 mg/dl       High               200-499 mg/dl       Very High          >499 mg/dl  Cholesterol:         Desirable        <200 mg/dl      Borderline High  200-239 mg/dl      High             >239 mg/dl  HDL Cholesterol:        High    >59 mg/dL      Low     <41 mg/dL  LDL Cholesterol:        Optimal          <100 mg/dl        Near Optimal     100-129 mg/dl        Above Optimal          Borderline High   130-159 mg/dl          High              160-189 mg/dl          Very High        >189 mg/dl  LDL CALCULATED:    This screening LDL is a calculated result  It does not have the accuracy of the Direct Measured LDL in the monitoring of patients with hyperlipidemia and/or statin therapy  Direct Measure LDL (BLH360) must be ordered separately in these patients  TRIGLYCERIDES 126 mg/dL  <=150   Specimen collection should occur prior to N-Acetylcysteine or Metamizole administration due to the potential for falsely depressed results  HDL,DIRECT 58 mg/dL  40-60   Specimen collection should occur prior to Metamizole administration due to the potential for falsely depressed results  (1) TSH WITH FT4 REFLEX 12Jun2017 09:20AM [facility] Order Number: [id number]     Test Name Result Flag Reference   TSH 2 160 uIU/mL  0 358-3 740   Patients undergoing fluorescein dye angiography may retain small amounts of fluorescein in the body for 48-72 hours post procedure  Samples containing fluorescein can produce falsely depressed TSH values  If the patient had this procedure,a specimen should be resubmitted post fluorescein clearance

## 2018-01-12 NOTE — RESULT NOTES
Verified Results  Urine Dip Automated- POC [id number] 02:08PM Jacki Crawford     Test Name Result Flag Reference   Color Orange     Clarity Hazy     Leukocytes 0     Nitrite 0     Urobilinogen 0     Protein 1     Ph 3 25     Specific Gravity 0     Ketone 0     Glucose 0

## 2018-01-13 VITALS
BODY MASS INDEX: 28.47 KG/M2 | WEIGHT: 214.8 LBS | TEMPERATURE: 99 F | HEART RATE: 64 BPM | SYSTOLIC BLOOD PRESSURE: 110 MMHG | RESPIRATION RATE: 12 BRPM | HEIGHT: 73 IN | DIASTOLIC BLOOD PRESSURE: 68 MMHG | OXYGEN SATURATION: 94 %

## 2018-01-14 VITALS
DIASTOLIC BLOOD PRESSURE: 82 MMHG | HEART RATE: 60 BPM | WEIGHT: 217.38 LBS | SYSTOLIC BLOOD PRESSURE: 122 MMHG | BODY MASS INDEX: 28.81 KG/M2 | HEIGHT: 73 IN

## 2018-01-14 VITALS
HEIGHT: 73 IN | SYSTOLIC BLOOD PRESSURE: 132 MMHG | BODY MASS INDEX: 27.44 KG/M2 | HEART RATE: 57 BPM | DIASTOLIC BLOOD PRESSURE: 82 MMHG | WEIGHT: 207.06 LBS

## 2018-01-14 VITALS
HEART RATE: 64 BPM | HEIGHT: 73 IN | WEIGHT: 209.2 LBS | RESPIRATION RATE: 16 BRPM | SYSTOLIC BLOOD PRESSURE: 120 MMHG | TEMPERATURE: 97 F | BODY MASS INDEX: 27.73 KG/M2 | DIASTOLIC BLOOD PRESSURE: 76 MMHG

## 2018-01-15 ENCOUNTER — GENERIC CONVERSION - ENCOUNTER (OUTPATIENT)
Dept: OTHER | Facility: OTHER | Age: 78
End: 2018-01-15

## 2018-01-18 ENCOUNTER — HOSPITAL ENCOUNTER (OUTPATIENT)
Dept: RADIOLOGY | Age: 78
Discharge: HOME | End: 2018-01-18
Payer: COMMERCIAL

## 2018-01-18 DIAGNOSIS — Z13.820 ENCOUNTER FOR SCREENING FOR OSTEOPOROSIS: ICD-10-CM

## 2018-01-18 PROCEDURE — 77080 DXA BONE DENSITY AXIAL: CPT

## 2018-01-19 ENCOUNTER — GENERIC CONVERSION - ENCOUNTER (OUTPATIENT)
Dept: OTHER | Facility: OTHER | Age: 78
End: 2018-01-19

## 2018-01-22 VITALS
DIASTOLIC BLOOD PRESSURE: 76 MMHG | RESPIRATION RATE: 16 BRPM | OXYGEN SATURATION: 96 % | SYSTOLIC BLOOD PRESSURE: 142 MMHG | HEIGHT: 73 IN | TEMPERATURE: 97.8 F | BODY MASS INDEX: 28.1 KG/M2 | WEIGHT: 212 LBS | HEART RATE: 77 BPM

## 2018-01-23 VITALS
HEART RATE: 68 BPM | RESPIRATION RATE: 16 BRPM | SYSTOLIC BLOOD PRESSURE: 140 MMHG | BODY MASS INDEX: 28.41 KG/M2 | DIASTOLIC BLOOD PRESSURE: 82 MMHG | TEMPERATURE: 97.2 F | WEIGHT: 214.4 LBS | HEIGHT: 73 IN

## 2018-01-23 NOTE — RESULT NOTES
Verified Results  US THYROID 12Jan2018 08:15AM Dayanara Smith    Order Number: [id number]    - Patient Instructions: To schedule this appointment, please contact Central Scheduling at [phone]  Test Name Result Flag Reference   US THYROID (Report)     THYROID ULTRASOUND     INDICATION: Nontoxic multinodular goiter  Follow-up nodule  COMPARISON: Thyroid ultrasound 6/27/2016     TECHNIQUE:  Ultrasound of the thyroid was performed with a high frequency linear transducer in transverse and sagittal planes including volumetric imaging sweeps as well as traditional still imaging technique  FINDINGS: Normal homogeneous smooth echotexture  Right lobe: 3 3 x 1 8 x 1 7 cm  Left lobe: 4 0 x 2 1 x 1 6 cm  Isthmus: 0 9 cm      1 1 x 0 8 x 0 8 cm isoechoic lower pole solid nodule is probably unchanged accounting for differences in interobserver variability  It is not taller than wide  Punctate echogenic foci again seen which could represent calcifications  Margins are    somewhat ill-defined  This is most compatible with TI-RADS category TR 4  IMPRESSION:   No nodule meets current ACR criteria for requiring biopsy but followup ultrasound is recommended in 1 year  Reference: ACR Thyroid Imaging, Reporting and Data System (TI-RADS): White Paper of the [place] [facility]   J AM Manoj Radiol 3448;72:763-529  (additional recommendations based on American Thyroid Association 2015 guidelines )       Workstation performed: [id number]     Signed by:   Ligia Thomas DO   1/12/18

## 2018-01-23 NOTE — RESULT NOTES
Verified Results  * DXA BONE DENSITY SPINE HIP AND PELVIS [id number] 07:39AM Maldonado Lara    Order Number: [id number]    - Patient Instructions: To schedule this appointment, please contact Central Scheduling at [phone]  Test Name Result Flag Reference   DXA BONE DENSITY SPINE HIP AND PELVIS (Report)     CENTRAL DXA SCAN     CLINICAL HISTORY:  69year old  male risk factors include history of compression fracture T12  Colon carcinoma  Gastroesophageal reflux with Prilosec use  TECHNIQUE: Bone densitometry was performed using a Horizon A bone densitometer  Regions of interest appear properly placed  There are no obvious fractures or other confounding variables which could limit the study  Degenerative changes of the lumbar    spine and hip  This will falsely elevate the bone mineral densities in these regions  COMPARISON: June 9, 2009     RESULTS:    LUMBAR SPINE: L1-L4:   BMD 1 163 gm/cm2   T-score 0 7   Z-score 1 7     LEFT TOTAL HIP:   BMD 1 0 18 gm/cm2   T-score -0 1   Z-score 0 8     LEFT FEMORAL NECK:   BMD 0 743 gm/cm2   T-score -1 4   Z-score 0 0             IMPRESSION:   1  Based on the [facility] classification, the T-score of -1 4 in the left hip is consistent with low bone mineral density  Although not part of the [facility] classification, the presence of a fragility fracture (stress fracture), regardless of T-score, should be    considered diagnostic of OSTEOPOROSIS (provided other causes for the fracture have been excluded)  2  When compared to the prior examination, there has been a 6 % INCREASE in the total bone mineral density of the lumbar spine and a 5 % DECREASE in the total bone mineral density of the left hip  The increase in bone mineral density seen in the    lumbar spine is likely falsely elevated due to progression of degenerative changes within this region   When follow-up densitometry is performed, it is recommended that bone mineral density of the forearm also be assessed, due to the advanced    degenerative changes seen in the lumbar spine and hip  Bone densitometry of the forearm is less susceptible to degenerative disc and degenerative joint disease  3  Any secondary causes of low bone mineral density should be excluded prior to treatment, if clinically indicated  4  A daily intake of at least 1200 mg calcium and 800 to 1000 IU of Vitamin D, as well as weight bearing and muscle strengthening exercise, fall prevention and avoidance of tobacco and excessive alcohol intake as basic preventive measures are suggested  5  Repeat DXA in 18 - 24 months, on the same machine, as clinically indicated  The 10 year risk of hip fracture is 3%, with the 10 year risk of major osteoporotic fracture being 10%, as calculated by the [facility] fracture risk assessment tool (FRAX)  The current NOF guidelines recommend treating patients with FRAX 10 year risk score of   >3% for hip fracture and >20% for major osteoporotic fracture        WHO CLASSIFICATION:   Normal (a T-score of -1 0 or higher)   Low bone mineral density (a T-score of less than -1 0 but higher than -2 5)   Osteoporosis (a T-score of -2 5 or less)   Severe osteoporosis (a T-score of -2 5 or less with a fragility fracture)             Workstation performed: [id number]     Signed by:   Henry Palacios DO   1/18/18

## 2018-01-23 NOTE — RESULT NOTES
Verified Results  (1) CBC/PLT/DIFF [id number] 08:45AM Ayan Guerra    Order Number: [id number]     Test Name Result Flag Reference   WBC COUNT 9 40 Thousand/uL  4 31-10 16   RBC COUNT 4 76 Million/uL  3 88-5 62   HEMOGLOBIN 15 6 g/dL  12 0-17 0   HEMATOCRIT 45 0 %  36 5-49 3   MCV 95 fL  82-98   MCH 32 8 pg  26 8-34 3   MCHC 34 7 g/dL  31 4-37 4   RDW 14 3 %  11 6-15 1   MPV 9 8 fL  8 9-12 7   PLATELET COUNT 955 Thousands/uL  149-390   nRBC AUTOMATED 0 /100 WBCs     NEUTROPHILS RELATIVE PERCENT 70 %  43-75   LYMPHOCYTES RELATIVE PERCENT 17 %  14-44   MONOCYTES RELATIVE PERCENT 10 %  4-12   EOSINOPHILS RELATIVE PERCENT 3 %  0-6   BASOPHILS RELATIVE PERCENT 0 %  0-1   NEUTROPHILS ABSOLUTE COUNT 6 56 Thousands/? ??L  1 85-7 62   LYMPHOCYTES ABSOLUTE COUNT 1 57 Thousands/? ??L  0 60-4 47   MONOCYTES ABSOLUTE COUNT 0 92 Thousand/? ??L  0 17-1 22   EOSINOPHILS ABSOLUTE COUNT 0 31 Thousand/? ??L  0 00-0 61   BASOPHILS ABSOLUTE COUNT 0 03 Thousands/? ??L  0 00-0 10     (1) COMPREHENSIVE METABOLIC PANEL [id number] 67:18BQ Ayan Guerra    Order Number: [id number]     Test Name Result Flag Reference   SODIUM 143 mmol/L  136-145   POTASSIUM 4 4 mmol/L  3 5-5 3   CHLORIDE 109 mmol/L H 100-108   CARBON DIOXIDE 29 mmol/L  21-32   ANION GAP (CALC) 5 mmol/L  4-13   BLOOD UREA NITROGEN 19 mg/dL  5-25   CREATININE 1 47 mg/dL H 0 60-1 30   Standardized to IDMS reference method   CALCIUM 9 0 mg/dL  8 3-10 1   BILI, TOTAL 1 01 mg/dL H 0 20-1 00   ALK PHOSPHATAS 74 U/L     ALT (SGPT) 23 U/L  12-78   Specimen collection should occur prior to Sulfasalazine and/or Sulfapyridine administration due to the potential for falsely depressed results  AST(SGOT) 20 U/L  5-45   Specimen collection should occur prior to Sulfasalazine administration due to the potential for falsely depressed results     ALBUMIN 3 5 g/dL  3 5-5 0   TOTAL PROTEIN 7 7 g/dL  6 4-8 2   eGFR 46 ml/min/1 73sq m     National Kidney Disease Education Program recommendations are as follows:  GFR calculation is accurate only with a steady state creatinine  Chronic Kidney disease less than 60 ml/min/1 73 sq  meters  Kidney failure less than 15 ml/min/1 73 sq  meters  GLUCOSE FASTING 89 mg/dL  65-99   Specimen collection should occur prior to Sulfasalazine administration due to the potential for falsely depressed results  Specimen collection should occur prior to Sulfapyridine administration due to the potential for falsely elevated results  (1) HEMOGLOBIN A1C [id number] 08:45AM Jolayne Fleischer TW Order Number: [id number]     Test Name Result Flag Reference   HEMOGLOBIN A1C 5 2 %  4 2-6 3   EST  AVG  GLUCOSE 103 mg/dl       (1) LIPID PANEL FASTING W DIRECT LDL REFLEX [id number] 08:45AM Jolayne Fleischer TW Order Number: [id number]     Test Name Result Flag Reference   CHOLESTEROL 160 mg/dL     LDL CHOLESTEROL CALCULATED 69 mg/dL  0-100   Triglyceride:        Normal <150 mg/dl   Borderline High 150-199 mg/dl   High 200-499 mg/dl   Very High >499 mg/dl      Cholesterol:       Desirable <200 mg/dl    Borderline High 200-239 mg/dl    High >239 mg/dl      HDL Cholesterol:       High>59 mg/dL    Low <41 mg/dL      HDL Cholesterol:       High>59 mg/dL    Low <41 mg/dL      This screening LDL is a calculated result  It does not have the accuracy of the Direct Measured LDL in the monitoring of patients with hyperlipidemia and/or statin therapy  Direct Measure LDL (PSJ088) must be ordered separately in these patients  TRIGLYCERIDES 149 mg/dL  <=150   Specimen collection should occur prior to N-Acetylcysteine or Metamizole administration due to the potential for falsely depressed results  HDL,DIRECT 61 mg/dL H 40-60   Specimen collection should occur prior to Metamizole administration due to the potential for falsley depressed results       (1) TSH WITH FT4 REFLEX [id number] 08:45AM Jolayne Fleischer TW Order Number: [id number]     Test Name Result Flag Reference   TSH 4 080 uIU/mL H 0 358-3 740   Patients undergoing fluorescein dye angiography may retain small amounts of fluorescein in the body for 48-72 hours post procedure  Samples containing fluorescein can produce falsely depressed TSH values  If the patient had this procedure,a specimen should be resubmitted post fluorescein clearance  T4,FREE 0 94 ng/dL  0 76-1 46   Specimen collection should occur prior to Sulfasalazine administration due to the potential for falsely elevated results

## 2018-01-24 VITALS
WEIGHT: 213 LBS | TEMPERATURE: 97.7 F | BODY MASS INDEX: 28.23 KG/M2 | SYSTOLIC BLOOD PRESSURE: 122 MMHG | HEIGHT: 73 IN | HEART RATE: 60 BPM | RESPIRATION RATE: 16 BRPM | DIASTOLIC BLOOD PRESSURE: 88 MMHG

## 2018-01-24 VITALS
SYSTOLIC BLOOD PRESSURE: 142 MMHG | BODY MASS INDEX: 28.36 KG/M2 | HEART RATE: 51 BPM | HEIGHT: 73 IN | OXYGEN SATURATION: 94 % | WEIGHT: 214 LBS | TEMPERATURE: 97.6 F | RESPIRATION RATE: 16 BRPM | DIASTOLIC BLOOD PRESSURE: 86 MMHG

## 2018-01-26 ENCOUNTER — OFFICE VISIT (OUTPATIENT)
Dept: UROLOGY | Facility: AMBULATORY SURGERY CENTER | Age: 78
End: 2018-01-26
Payer: COMMERCIAL

## 2018-01-26 VITALS
HEIGHT: 72 IN | SYSTOLIC BLOOD PRESSURE: 140 MMHG | BODY MASS INDEX: 28.69 KG/M2 | WEIGHT: 211.8 LBS | DIASTOLIC BLOOD PRESSURE: 86 MMHG | HEART RATE: 70 BPM

## 2018-01-26 DIAGNOSIS — N40.1 BPH WITH OBSTRUCTION/LOWER URINARY TRACT SYMPTOMS: Primary | ICD-10-CM

## 2018-01-26 DIAGNOSIS — R31.29 MICROSCOPIC HEMATURIA: ICD-10-CM

## 2018-01-26 DIAGNOSIS — N13.8 BPH WITH OBSTRUCTION/LOWER URINARY TRACT SYMPTOMS: Primary | ICD-10-CM

## 2018-01-26 PROCEDURE — 99213 OFFICE O/P EST LOW 20 MIN: CPT | Performed by: NURSE PRACTITIONER

## 2018-01-26 RX ORDER — UBIDECARENONE 75 MG
1 CAPSULE ORAL DAILY
COMMUNITY

## 2018-01-26 RX ORDER — FINASTERIDE 5 MG/1
5 TABLET, FILM COATED ORAL DAILY
Qty: 90 TABLET | Refills: 3 | Status: SHIPPED | OUTPATIENT
Start: 2018-01-26 | End: 2018-01-26 | Stop reason: SDUPTHER

## 2018-01-26 RX ORDER — FINASTERIDE 5 MG/1
5 TABLET, FILM COATED ORAL DAILY
Qty: 90 TABLET | Refills: 0 | Status: SHIPPED | OUTPATIENT
Start: 2018-01-26 | End: 2018-02-07 | Stop reason: SDUPTHER

## 2018-01-26 NOTE — PROGRESS NOTES
1/26/2018    Alhaji Kori  1940  [id number]        Assessment  BPH  Microscopic hematuria      Discussion  Vadim Esqueda is a 69 y o  male being managed by Dr Jarrett Velasquez  He is doing well with no urinary complaints  He will continue to take finasteride daily  Refills were provided  His PSA remains stable at 0 4 ( 0 8 corrected with use of finasteride)  We discussed based on age and PSA stability we no longer need to continue with prostate cancer screening per AUA guidelines  He will return in 1 year for follow-up  All questions were answered  History of Present Illness  69 y o  male with a history of BPH and microscopic hematuria presents today for 1 year follow up  He continues to take Finasteride daily  He denies any lower urinary tract symptoms except occasional incomplete bladder emptying with his 1st void in the a m  He denies any gross hematuria  He denies any changes in his overall health  Review of Systems  Review of Systems   Constitutional: Negative  HENT: Negative  Respiratory: Negative  Cardiovascular: Negative  Gastrointestinal: Negative  Genitourinary: Negative  Musculoskeletal: Negative  Skin: Negative  Neurological: Negative  Hematological: Negative            Past Medical History  Past Medical History:   Diagnosis Date    AICD (automatic cardioverter/defibrillator) present     Arthritis     Asthma     Burt esophagus     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     Cardiac arrhythmia     Cardiomyopathy ([facility] [address] )     CKD (chronic kidney disease)     Colon cancer (HCC)     COPD (chronic obstructive pulmonary disease) (HCC)     Diverticulitis     Esophageal reflux     Gout     Hernia     Hyperlipidemia     Hypertension     Hypertension     Hypothyroidism     Pleural effusion     Polymyalgia rheumatica (HCC)     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     Spondyloarthropathy [facility])        Past Social History  Past Surgical History:   Procedure Laterality Date    ATRIAL ABLATION SURGERY      CARDIAC DEFIBRILLATOR PLACEMENT      CARDIAC DEFIBRILLATOR PLACEMENT      COLONOSCOPY      COLONOSCOPY N/A 3/14/2016    Procedure: COLONOSCOPY;  Surgeon: Justin Vanegas MD;  Location: [facility]; Service:     HEMICOLECTOMY      HERNIA REPAIR      KNEE SURGERY      MT ESOPHAGOGASTRODUODENOSCOPY TRANSORAL DIAGNOSTIC N/A 12/5/2016    Procedure: ESOPHAGOGASTRODUODENOSCOPY (EGD); Surgeon: Mima Bellamy MD;  Location: [facility]; Service: Gastroenterology    TONSILLECTOMY         Past Family History  History reviewed  No pertinent family history  Past Social history  Social History     Social History    Marital status: /Civil Union     Spouse name: N/A    Number of children: N/A    Years of education: N/A     Occupational History    Not on file  Social History Main Topics    Smoking status: Never Smoker    Smokeless tobacco: Never Used    Alcohol use No    Drug use: No    Sexual activity: Not Currently     Other Topics Concern    Not on file     Social History Narrative    No narrative on file       Current Medications  Current Outpatient Prescriptions   Medication Sig Dispense Refill    aspirin 81 MG tablet Take 81 mg by mouth daily   atorvastatin (LIPITOR) 10 mg tablet Take 10 mg by mouth daily   bisoprolol (ZEBETA) 10 MG tablet Take 10 mg by mouth 2 (two) times a day   Calcium Carb-Cholecalciferol (CALCIUM 600 + D PO) Take 1 tablet by mouth daily   Coenzyme Q10 (CO Q-10) 200 MG CAPS Take 1 tablet by mouth daily      finasteride (PROSCAR) 5 mg tablet Take 1 tablet by mouth daily 90 tablet 0    lisinopril (ZESTRIL) 20 mg tablet Take 20 mg by mouth daily   Multiple Vitamins-Minerals (CENTRUM SILVER PO) Take 1 tablet by mouth daily   omeprazole (PriLOSEC) 20 mg delayed release capsule Take 20 mg by mouth daily        Vitamin D, Cholecalciferol, 1000 UNITS CAPS Take 1 tablet by mouth daily   DHA-EPA-Coenzyme Q10-Vitamin E (GNP COQ-10 & FISH OIL) [id number] CAPS Take 200 mg by mouth  No current facility-administered medications for this visit  Allergies  No Known Allergies    Past Medical History, Social History, Family History, medications and allergies were reviewed  Vitals  Vitals:    01/26/18 1038   BP: 140/86   Pulse: 70   Weight: 96 1 kg (211 lb 12 8 oz)   Height: 6' (1 829 m)       Physical Exam  Skin: warm, dry, intact  Pulmonary: Non-labored breathing  Abdomen: Soft, non-tender, non-distended  Musculoskeletal: AROM with no joint deformity or tenderness    Neurology: alert, oriented x3, affect appropriate      Results  Lab Results   Component Value Date    PSA 0 4 01/08/2018    PSA 0 5 12/14/2016    PSA 0 5 12/07/2015     Lab Results   Component Value Date    GLUCOSE 98 12/14/2016    CALCIUM 9 0 12/13/2017     12/13/2017    K 4 4 12/13/2017    CO2 29 12/13/2017     (H) 12/13/2017    BUN 19 12/13/2017    CREATININE 1 47 (H) 12/13/2017     Lab Results   Component Value Date    WBC 9 40 12/13/2017    HGB 15 6 12/13/2017    HCT 45 0 12/13/2017    MCV 95 12/13/2017     12/13/2017

## 2018-02-07 ENCOUNTER — CLINICAL SUPPORT (OUTPATIENT)
Dept: CARDIOLOGY CLINIC | Facility: CLINIC | Age: 78
End: 2018-02-07
Payer: COMMERCIAL

## 2018-02-07 DIAGNOSIS — E27.0 HYPERCORTISOLEMIA (HCC): Primary | ICD-10-CM

## 2018-02-07 DIAGNOSIS — Z95.810 AICD (AUTOMATIC CARDIOVERTER/DEFIBRILLATOR) PRESENT: ICD-10-CM

## 2018-02-07 DIAGNOSIS — N13.8 BPH WITH OBSTRUCTION/LOWER URINARY TRACT SYMPTOMS: ICD-10-CM

## 2018-02-07 DIAGNOSIS — Z45.02 ENCOUNTER FOR IMPLANTABLE DEFIBRILLATOR REPROGRAMMING OR CHECK: ICD-10-CM

## 2018-02-07 DIAGNOSIS — N40.1 BPH WITH OBSTRUCTION/LOWER URINARY TRACT SYMPTOMS: ICD-10-CM

## 2018-02-07 DIAGNOSIS — I47.2 VENTRICULAR TACHYARRHYTHMIA (HCC): Primary | ICD-10-CM

## 2018-02-07 DIAGNOSIS — I10 HYPERTENSION, UNSPECIFIED TYPE: ICD-10-CM

## 2018-02-07 PROCEDURE — 93282 PRGRMG EVAL IMPLANTABLE DFB: CPT | Performed by: INTERNAL MEDICINE

## 2018-02-07 RX ORDER — ATORVASTATIN CALCIUM 10 MG/1
10 TABLET, FILM COATED ORAL DAILY
Qty: 90 TABLET | Refills: 3 | Status: SHIPPED | OUTPATIENT
Start: 2018-02-07 | End: 2018-02-10 | Stop reason: SDUPTHER

## 2018-02-07 RX ORDER — LISINOPRIL 20 MG/1
20 TABLET ORAL DAILY
Qty: 90 TABLET | Refills: 3 | Status: SHIPPED | OUTPATIENT
Start: 2018-02-07 | End: 2019-01-20 | Stop reason: SDUPTHER

## 2018-02-07 RX ORDER — FINASTERIDE 5 MG/1
5 TABLET, FILM COATED ORAL DAILY
Qty: 90 TABLET | Refills: 3 | Status: SHIPPED | OUTPATIENT
Start: 2018-02-07 | End: 2018-12-13 | Stop reason: SDUPTHER

## 2018-02-07 RX ORDER — BISOPROLOL FUMARATE 10 MG/1
10 TABLET ORAL 2 TIMES DAILY
Qty: 180 TABLET | Refills: 3 | Status: SHIPPED | OUTPATIENT
Start: 2018-02-07 | End: 2018-02-28 | Stop reason: SDUPTHER

## 2018-02-07 NOTE — PROGRESS NOTES
DEVICE INTERROGATED IN THE [place] OFFICE  BATTERY VOLTAGE NEARING ZACHARIAH (2 63V-RRT=2 63V BUT HAS NOT INDICATED ZACHARIAH)  WILL SCHEDULE MONTHLY BATTERY CHECKS  -3%  ALL LEAD PARAMETERS WITHIN NORMAL LIMITS  ALL OTHER TESTING WITHIN NORMAL LIMITS  NO SIGNIFICANT HIGH RATE EPISODES  OPTI-VOL WITHIN NORMAL LIMITS  NORMAL DEVICE FUNCTION   GV

## 2018-02-10 DIAGNOSIS — E27.0 HYPERCORTISOLEMIA (HCC): ICD-10-CM

## 2018-02-10 RX ORDER — ATORVASTATIN CALCIUM 10 MG/1
TABLET, FILM COATED ORAL
Qty: 90 TABLET | Refills: 3 | Status: SHIPPED | OUTPATIENT
Start: 2018-02-10 | End: 2020-08-07

## 2018-02-12 ENCOUNTER — TELEPHONE (OUTPATIENT)
Dept: UROLOGY | Facility: AMBULATORY SURGERY CENTER | Age: 78
End: 2018-02-12

## 2018-02-12 NOTE — TELEPHONE ENCOUNTER
Patient called office 2/7/18 requesting finasteride be sent to [address], and request was sent  Today received fax from [facility] on [facility] for finasteride refill request   Left message with patient to see if he wants it at [facility] or at [facility]  Requested patient call back

## 2018-02-13 NOTE — TELEPHONE ENCOUNTER
Called patient and he informed me that someone had reached out to him yesterday and took care of sending in medication

## 2018-02-28 DIAGNOSIS — I10 HYPERTENSION, UNSPECIFIED TYPE: ICD-10-CM

## 2018-02-28 RX ORDER — BISOPROLOL FUMARATE 10 MG/1
TABLET ORAL
Qty: 180 TABLET | Refills: 2 | Status: SHIPPED | OUTPATIENT
Start: 2018-02-28 | End: 2020-08-07

## 2018-03-07 NOTE — PROGRESS NOTES
"  Discussion/Summary  Normal device function      Results/Data  Cardiac Device Remote [id number] 04:17AM Josette Davey     Test Name Result Flag Reference   MISCELLANEOUS COMMENT (Report)     NONBILLABLE- CARELINK TRANSMISSION BATTERY CK  L6EEYCUSTZ VOLTAGE NEARING ZACHARIAH (2 63V/RRT=2 63V)  WILL CONTINUE MONTHLY BATTERY CHECKS   - 4%  ALL LEAD PARAMETERS APPEAR WITHIN NORMAL LIMITS & STABLE  NO HIGH RATE EPISODES  OPTI-VOL WITHIN NORMAL LIMITS  APPROPRIATELY FUNCTIONING ICD NEARING ZACHARIAH   eb   Cardiac Electrophysiology Report      [id number] Regional Medical CenteryaimaBeverly Hospital_[id number]  pdf   DEVICE TYPE ICD       Cardiac Electrophysiology Report [id number] 04:17AM Josette Davey     Test Name Result Flag Reference   Cardiac Electrophysiology Report      [id number]  pdf     Signatures   Electronically signed by : Yolanda Leiva, ; Nov  3 2017  8:47AM EST                       (Author)    Electronically signed by : Beatrice Connelly DO; Nov 5 2017  6:15PM EST                       (Author)    "

## 2018-03-07 NOTE — PROGRESS NOTES
"  Discussion/Summary  Normal device function      Results/Data  Cardiac Device Remote [id number] 12:37PM Yunier Slava     Test Name Result Flag Reference   MISCELLANEOUS COMMENT (Report)     CARELINK TRANSMISSION: BATTERY VOLTAGE NEARING ZACHARIAH 2 63V (RRT=2 63V)  ZACHARIAH NOT INDICATED  WILL CONTINUE MONTHLY BATTERY CHECKS   3 5%  ALL LEAD PARAMETERS WITHIN NORMAL LIMITS  NO SIGNIFICANT HIGH RATE EPISODES  OPTI-VOL WITHIN NORMAL LIMITS  NORMAL DEVICE FUNCTION  RG   Cardiac Electrophysiology Report      [id number] Scottsdale Thompson Peak Medical CentertoniaFree Hospital for Women_[id number]  pdf   DEVICE TYPE ICD       Cardiac Electrophysiology Report [id number] 12:37PM Yunier Maricao     Test Name Result Flag Reference   Cardiac Electrophysiology Report      [id number]  pdf     Signatures   Electronically signed by : Jw Hannah, ; Dec  7 2017  2:37PM EST                       (Author)    Electronically signed by : Justin Phan DO; Dec  7 2017  4:54PM EST                       (Author)    "

## 2018-03-07 NOTE — PROGRESS NOTES
"  Discussion/Summary  Normal device function     Approaching ZACHARIAH  Results/Data  Cardiac Device In Clinic [id number] 04:30PM [facility]     Test Name Result Flag Reference   MISCELLANEOUS COMMENT      DEVICE INTERROGATED IN THE [place] OFFICE: BATTERY VOLTAGE NEARING ZACHARIAH, (6-9 MONTHS)  WILL SCHEDULE MONTHLY BATTERY CHECKS   1 3%  NO SIGNIFICANT HIGH RATE EPISODES  ALL LEAD PARAMETERS WITHIN NORMAL LIMITS  NORMAL DEVICE FUNCTION  NC   Cardiac Electrophysiology Report      [id number] [id number]  pdf   DEVICE TYPE ICD       Cardiac Electrophysiology Report [id number] 04:30PM Let[facility]     Test Name Result Flag Reference   Cardiac Electrophysiology Report      [id number]  pdf     Signatures   Electronically signed by : Denia Jang, ; Feb 1 2017  1:42PM EST                       (Author)    Electronically signed by : TEJA Schrader ; Feb 6 2017  9:59PM EST                       (Author)    "

## 2018-03-07 NOTE — PROGRESS NOTES
"  Discussion/Summary  Normal device function      Results/Data  Cardiac Device Remote [id number] 05:22AM Riley Pin     Test Name Result Flag Reference   MISCELLANEOUS COMMENT      CARELINK TRANSMISSION: Belkis Gregorio ADEQUATE   2%  ALL AVAILABLE LEAD PARAMETERS WITHIN NORMAL LIMITS  NO SIGNIFICANT HIGH RATE EPISODES  OPTI-VOL WITHIN NORMAL LIMITS  NORMAL DEVICE FUNCTION  ---CONNER   Cardiac Electrophysiology Report      [id number]}  pdf   DEVICE TYPE ICD       Cardiac Electrophysiology Report [id number] 05:22AM Lima Pin     Test Name Result Flag Reference   Cardiac Electrophysiology Report      [id number]  pdf     Signatures   Electronically signed by : Cornelio Carolina, ; Jul 26 2016 10:26AM EST                       (Author)    Electronically signed by : Karla Polo DO; Jul 27 2016  1:46PM EST                       (Author)    "

## 2018-03-07 NOTE — PROGRESS NOTES
"  Discussion/Summary  Normal device function      Results/Data  Cardiac Device Remote [id number] 07:33AM Ruby Quinonez     Test Name Result Flag Reference   MISCELLANEOUS COMMENT      CARELINK TRANSMISSION: BATTERY VOLTAGE NEARING ZACHARIAH (2 63V/RRT=2 63V)  WILL CONTINUE MONTHLY BATTERY CHECKS   6 1%  ALL LEAD PARAMETERS WITHIN NORMAL LIMITS  NO HIGH RATE EPISODES  OPTI-VOL WITHIN NORMAL LIMITS  NORMAL DEVICE FUNCTION  RG   Cardiac Electrophysiology Report      [id number] Valley Regional Medical CentertoniaVibra Hospital of [facility] [place]_[id number]  pdf   DEVICE TYPE ICD       Cardiac Electrophysiology Report [id number] 07:33AM Ruby Quinonez     Test Name Result Flag Reference   Cardiac Electrophysiology Report      [id number]  pdf     Signatures   Electronically signed by : Gloria Aldrich, ; Oct  3 2017 11:23AM EST                       (Author)    Electronically signed by : Jimenez Montoya DO; Oct  3 2017  4:16PM EST                       (Author)    "

## 2018-03-07 NOTE — PROGRESS NOTES
"  Discussion/Summary  Normal device function      Results/Data  Results   Cardiac Device Remote [id number] 07:35AM Ruby Quinonez     Test Name Result Flag Reference   MISCELLANEOUS COMMENT      CARELINK TRANSMISSION: BATTERY VOLTAGE ADEQUATE (2 91V-RRT=2 63V)  -1%  NO SIGNIFICANT HIGH RATE EPISODES  OPTI-VOL WITHIN NORMAL LIMITS  ALL AVAILABLE LEAD PARAMETERS WITHIN NORMAL LIMITS  NORMAL DEVICE FUNCTION  GV   Cardiac Electrophysiology Report      [id number]  pdf   DEVICE TYPE ICD       Cardiac Electrophysiology Report [id number] 07:35AM Ruby Quinonez     Test Name Result Flag Reference   Cardiac Electrophysiology Report      [id number]  pdf     Signatures   Electronically signed by : Azucena Callahan RN; Apr 22 2016  1:48PM EST                       (Author)    Electronically signed by : Jimenez Montoya DO;  Apr 23 2016  4:59PM EST                       (Author)    "

## 2018-03-07 NOTE — PROGRESS NOTES
"  Discussion/Summary  Normal device function      Results/Data  Cardiac Device Remote [id number] 04:22AM Lucille Harrisr     Test Name Result Flag Reference   MISCELLANEOUS COMMENT (Report)     CARELINK TRANSMISSION: BATTERY VOLTAGE ADEQUATE (2 80V/ RRT = 2 63V);  = 1 5%; NO SIGNIFICANT HIGH RATE EPISODES; ALL AVAILABLE LEAD PARAMETERS APPEAR WITHIN NORMAL LIMITS/STABLE; OPTI-VOL APPEAR TO BE TRENDING UPWARD BUT NOT WITHIN NORMAL LIMITS; NORMAL DEVICE FUNCTION  eb   Cardiac Electrophysiology Report      [id number]_[facility]_[id number]_[id number]  pdf   DEVICE TYPE ICD       Cardiac Electrophysiology Report [id number] 04:22AM Lucille Harrisr     Test Name Result Flag Reference   Cardiac Electrophysiology Report      [id number]  pdf     Signatures   Electronically signed by : Stephy Gilliland, ; Oct 28 2016  2:13PM EST                       (Author)    Electronically signed by : Gerardo Elias DO; Oct 30 2016  7:34PM EST                       (Author)    "

## 2018-03-07 NOTE — PROGRESS NOTES
"  Discussion/Summary  Normal device function      Results/Data  Cardiac Device Remote [id number] 07:18AM Lev Car     Test Name Result Flag Reference   MISCELLANEOUS COMMENT (Report)     CARELINK TRANSMISSION: BATTERY VOLTAGE NEARING ZACHARIAH  WILL SCHEDULE MONTHLY BATTERY CHECKS  (2 65V ZACHARIAH 2 63V)   3%  ALL AVAILABLE LEAD PARAMETERS WITHIN NORMAL LIMITS  NO SIGNIFICANT HIGH RATE EPISODES  OPTI-VOL WITHIN NORMAL LIMITS  NORMAL DEVICE FUNCTION  ---CONNER   Cardiac Electrophysiology Report      [id number]_[facility]_[id number]_[id number]  pdf   DEVICE TYPE ICD       Cardiac Electrophysiology Report [id number] 07:18AM Lev Car     Test Name Result Flag Reference   Cardiac Electrophysiology Report      [id number] pdf     Signatures   Electronically signed by : Teri Holder, ; Jul 21 2017 10:02AM EST                       (Author)    Electronically signed by : Joaquim Dave DO; Jul 21 2017  1:20PM EST                       (Author)    "

## 2018-03-07 NOTE — PROGRESS NOTES
"  Discussion/Summary  Normal device function      Results/Data  Results   Cardiac Device In Clinic [id number] 07:45PM Mabell[facility] enrique Urban     Test Name Result Flag Reference   MISCELLANEOUS COMMENT      DEVICE INTERROGATED IN THE [place] OFFICE: BATTERY VOLTAGE ADEQUATE  ALL AVAILABLE LEAD PARAMETERS WITHIN NORMAL LIMITS  NO SIGNIFICANT HIGH RATE EPISODES  OPTI-VOL WITHIN NORMAL LIMITS  NORMAL DEVICE FUNCTION  NC   Cardiac Electrophysiology Report      [id number] [id number]  pdf   DEVICE TYPE ICD       Cardiac Electrophysiology Report [id number] 07:45PM Mabel Shondaelly     Test Name Result Flag Reference   Cardiac Electrophysiology Report      [id number]  pdf     Signatures   Electronically signed by : Louis Lares, ; Feb 1 2016 10:20AM EST                       (Author)    Electronically signed by : Stephen Velez DO; Feb 13 2016  4:04PM EST                       (Author)    "

## 2018-03-08 ENCOUNTER — CLINICAL SUPPORT (OUTPATIENT)
Dept: CARDIOLOGY CLINIC | Facility: CLINIC | Age: 78
End: 2018-03-08

## 2018-03-08 DIAGNOSIS — Z95.810 PRESENCE OF CARDIOVERTER DEFIBRILLATOR: ICD-10-CM

## 2018-03-08 DIAGNOSIS — I47.2 VENTRICULAR TACHYCARDIA (HCC): Primary | ICD-10-CM

## 2018-03-08 PROCEDURE — 99024 POSTOP FOLLOW-UP VISIT: CPT | Performed by: INTERNAL MEDICINE

## 2018-03-09 NOTE — PROGRESS NOTES
NONBILLABLE CARELINK TRANSMISSION: (MIKA CK)  BATTERY VOLTAGE NEARING ZACHARIAH (2 62V-RRT=2 63V BUT HAS NOT INDICATED ZACHARIAH)  WILL CONTINUE MONTHLY BATTERY CHECKS  -1%  ALL LEAD PARAMETERS APPEAR WITHIN NORMAL LIMITS  NO SIGNIFICANT HIGH RATE EPISODES  OPTI-VOL WITHIN NORMAL LIMITS  NORMAL DEVICE FUNCTION   eb

## 2018-03-19 ENCOUNTER — TELEPHONE (OUTPATIENT)
Dept: GASTROENTEROLOGY | Facility: CLINIC | Age: 78
End: 2018-03-19

## 2018-03-19 NOTE — TELEPHONE ENCOUNTER
Dr Gaurav Jeffrey patient requesting refill on omeprazole 40 mg ,  90 day supply, with 3 refills if possible called into [facility]

## 2018-03-20 ENCOUNTER — TELEPHONE (OUTPATIENT)
Dept: GASTROENTEROLOGY | Facility: CLINIC | Age: 78
End: 2018-03-20

## 2018-03-20 DIAGNOSIS — K22.70 BARRETT'S ESOPHAGUS WITHOUT DYSPLASIA: Primary | ICD-10-CM

## 2018-03-20 RX ORDER — OMEPRAZOLE 20 MG/1
20 CAPSULE, DELAYED RELEASE ORAL DAILY
Qty: 90 CAPSULE | Refills: 3 | Status: SHIPPED | OUTPATIENT
Start: 2018-03-20 | End: 2019-02-03 | Stop reason: SDUPTHER

## 2018-04-11 ENCOUNTER — CLINICAL SUPPORT (OUTPATIENT)
Dept: CARDIOLOGY CLINIC | Facility: CLINIC | Age: 78
End: 2018-04-11

## 2018-04-11 DIAGNOSIS — I47.2 PAROXYSMAL VENTRICULAR TACHYCARDIA (HCC): Primary | ICD-10-CM

## 2018-04-11 DIAGNOSIS — Z95.810 AICD (AUTOMATIC CARDIOVERTER/DEFIBRILLATOR) PRESENT: ICD-10-CM

## 2018-04-11 PROCEDURE — 99024 POSTOP FOLLOW-UP VISIT: CPT | Performed by: INTERNAL MEDICINE

## 2018-04-11 NOTE — PROGRESS NOTES
CARELINK TRANSMISSION: *NB/MIKA*BATTERY VOLTAGE NEARING ZACHARIAH   WILL SCHEDULE MONTHLY BATTERY CHECKS   1 5%  ALL AVAILABLE LEAD PARAMETERS WITHIN NORMAL LIMITS  NO SIGNIFICANT HIGH RATE EPISODES  OPTI-VOL WITHIN NORMAL LIMITS  NORMAL DEVICE FUNCTION   NC

## 2018-04-30 ENCOUNTER — OFFICE VISIT (OUTPATIENT)
Dept: FAMILY MEDICINE CLINIC | Facility: CLINIC | Age: 78
End: 2018-04-30
Payer: COMMERCIAL

## 2018-04-30 VITALS
OXYGEN SATURATION: 95 % | RESPIRATION RATE: 20 BRPM | WEIGHT: 209.6 LBS | DIASTOLIC BLOOD PRESSURE: 84 MMHG | SYSTOLIC BLOOD PRESSURE: 142 MMHG | TEMPERATURE: 98.4 F | HEART RATE: 60 BPM | BODY MASS INDEX: 28.43 KG/M2

## 2018-04-30 DIAGNOSIS — J40 BRONCHITIS: Primary | ICD-10-CM

## 2018-04-30 PROBLEM — L98.9 SKIN LESION: Status: ACTIVE | Noted: 2017-06-23

## 2018-04-30 PROBLEM — C43.61 MALIGNANT MELANOMA OF RIGHT UPPER EXTREMITY (HCC): Status: ACTIVE | Noted: 2017-11-15

## 2018-04-30 PROCEDURE — 99213 OFFICE O/P EST LOW 20 MIN: CPT | Performed by: FAMILY MEDICINE

## 2018-04-30 RX ORDER — HYDROCODONE BITARTRATE AND ACETAMINOPHEN 5; 300 MG/1; MG/1
1 TABLET ORAL EVERY 4 HOURS PRN
COMMUNITY
Start: 2017-11-15 | End: 2018-04-30 | Stop reason: CLARIF

## 2018-04-30 RX ORDER — PROMETHAZINE HYDROCHLORIDE AND CODEINE PHOSPHATE 6.25; 1 MG/5ML; MG/5ML
5 SYRUP ORAL EVERY 4 HOURS PRN
Qty: 120 ML | Refills: 0 | Status: SHIPPED | OUTPATIENT
Start: 2018-04-30 | End: 2018-05-21 | Stop reason: ALTCHOICE

## 2018-04-30 RX ORDER — AZITHROMYCIN 250 MG/1
TABLET, FILM COATED ORAL
Qty: 6 TABLET | Refills: 0 | Status: SHIPPED | OUTPATIENT
Start: 2018-04-30 | End: 2018-05-04

## 2018-04-30 NOTE — PROGRESS NOTES
Chief Complaint   Patient presents with    Cold Like Symptoms     Symptoms chest congestion and productive cough since last Wednesday  Assessment/Plan:    A lot of fluids and rest  Tylenol or motrin for fever or pain  Give zpack  Side effects educated patient  Give cough medication  Side effects educated patient  Call office if symptoms no improving or worse  Diagnoses and all orders for this visit:    Bronchitis  -     azithromycin (ZITHROMAX) 250 mg tablet; Take 2 tabs on day 1, then take 1 tab daily from day 2-day 5   -     promethazine-codeine (PHENERGAN WITH CODEINE) 6 25-10 mg/5 mL syrup; Take 5 mL by mouth every 4 (four) hours as needed for cough          Subjective:      Patient ID: Daniel Loza is a 69 y o  male  HPI  Pt is here by himself  c/o cough for 5 days  Cough with phlegm  Sometimes wheezing, no SOB  Denies fever, ear pain, sore throat, chest pain, n/v/abd pain  No smoking  Denies hx of asthma or COPd  Had sick contact  The following portions of the patient's history were reviewed and updated as appropriate: allergies, current medications, past family history, past medical history, past social history, past surgical history and problem list     Review of Systems   Constitutional: Negative for appetite change, chills and fever  HENT: Negative for congestion, ear pain, sinus pain and sore throat  Eyes: Negative for discharge and itching  Respiratory: Positive for cough and wheezing  Negative for apnea, chest tightness and shortness of breath  Cardiovascular: Negative for chest pain, palpitations and leg swelling  Gastrointestinal: Negative for abdominal pain, anal bleeding, constipation, diarrhea, nausea and vomiting  Endocrine: Negative for cold intolerance, heat intolerance and polyuria  Genitourinary: Negative for difficulty urinating and dysuria  Musculoskeletal: Negative for arthralgias, back pain and myalgias     Skin: Negative for rash  Neurological: Negative for dizziness and headaches  Psychiatric/Behavioral: Negative for agitation  Objective:      /84 (BP Location: Left arm, Patient Position: Sitting, Cuff Size: Standard)   Pulse 60   Temp 98 4 °F (36 9 °C) (Tympanic)   Resp 20   Wt 95 1 kg (209 lb 9 6 oz)   BMI 28 43 kg/m²          Physical Exam   Constitutional: He appears well-developed  HENT:   Head: Normocephalic and atraumatic  Right Ear: External ear normal    Left Ear: External ear normal    Mouth/Throat: Oropharynx is clear and moist    b/l nasal swollen   Eyes: Conjunctivae are normal  Pupils are equal, round, and reactive to light  Neck: Normal range of motion  Neck supple  Cardiovascular: Normal rate, regular rhythm, normal heart sounds and intact distal pulses  Exam reveals no gallop and no friction rub  No murmur heard  Pulmonary/Chest: Effort normal  No respiratory distress  He has wheezes  He has rales  He exhibits no tenderness  Abdominal: Soft  Bowel sounds are normal    Musculoskeletal: Normal range of motion  Neurological: He is alert

## 2018-05-08 ENCOUNTER — HOSPITAL ENCOUNTER (OUTPATIENT)
Dept: RADIOLOGY | Facility: HOSPITAL | Age: 78
Discharge: HOME | End: 2018-05-08
Payer: COMMERCIAL

## 2018-05-08 ENCOUNTER — OFFICE VISIT (OUTPATIENT)
Dept: FAMILY MEDICINE CLINIC | Facility: CLINIC | Age: 78
End: 2018-05-08
Payer: COMMERCIAL

## 2018-05-08 ENCOUNTER — TRANSCRIBE ORDERS (OUTPATIENT)
Dept: RADIOLOGY | Facility: HOSPITAL | Age: 78
End: 2018-05-08

## 2018-05-08 VITALS
OXYGEN SATURATION: 96 % | TEMPERATURE: 97.2 F | HEART RATE: 64 BPM | SYSTOLIC BLOOD PRESSURE: 142 MMHG | RESPIRATION RATE: 16 BRPM | BODY MASS INDEX: 28.43 KG/M2 | DIASTOLIC BLOOD PRESSURE: 88 MMHG | WEIGHT: 209.6 LBS

## 2018-05-08 DIAGNOSIS — R05.9 COUGHING: ICD-10-CM

## 2018-05-08 DIAGNOSIS — R05.9 COUGHING: Primary | ICD-10-CM

## 2018-05-08 DIAGNOSIS — R06.2 WHEEZING: ICD-10-CM

## 2018-05-08 PROCEDURE — 99214 OFFICE O/P EST MOD 30 MIN: CPT | Performed by: FAMILY MEDICINE

## 2018-05-08 PROCEDURE — 71046 X-RAY EXAM CHEST 2 VIEWS: CPT

## 2018-05-08 RX ORDER — ALBUTEROL SULFATE 90 UG/1
2 AEROSOL, METERED RESPIRATORY (INHALATION) EVERY 6 HOURS PRN
Qty: 1 INHALER | Refills: 0
Start: 2018-05-08 | End: 2018-09-20 | Stop reason: ALTCHOICE

## 2018-05-08 NOTE — PROGRESS NOTES
Chief Complaint   Patient presents with    URI     Symptoms chest congestion, productive cough, tickle in throat, and wheezing for 2 weeks seen 04/30/18 finished antibiotic, no improvements  Assessment/Plan:    Will check CXR  If positive for pneumonia, will give Levaquin  If negative for penumonia, will give prednisone  Advised pt to use proair as needed for wheezing  Pt agreed  Diagnoses and all orders for this visit:    Coughing  -     XR chest pa & lateral; Future          Subjective:      Patient ID: Juancarlos Mayberry is a 69 y o  male  HPI    Pt is here by himself  c/o cough for 3 weeks  Cough with phlegm, sometimes wheezing  Denies SOB  Denies fever, chest pain, n/v/abd pain  Got zpack last week, helped some, but still cough and wheezing  Denies hx of asthma or COPD  Denies smoking  The following portions of the patient's history were reviewed and updated as appropriate: allergies, current medications, past family history, past medical history, past social history, past surgical history and problem list     Review of Systems   Constitutional: Negative for appetite change, chills and fever  HENT: Negative for congestion, ear pain, sinus pain and sore throat  Eyes: Negative for discharge and itching  Respiratory: Positive for cough and wheezing  Negative for apnea, chest tightness and shortness of breath  Cardiovascular: Negative for chest pain, palpitations and leg swelling  Gastrointestinal: Negative for abdominal pain, anal bleeding, constipation, diarrhea, nausea and vomiting  Endocrine: Negative for cold intolerance, heat intolerance and polyuria  Genitourinary: Negative for difficulty urinating and dysuria  Musculoskeletal: Negative for arthralgias, back pain and myalgias  Skin: Negative for rash  Neurological: Negative for dizziness and headaches  Psychiatric/Behavioral: Negative for agitation           Objective:      Vitals:    05/08/18 1419   BP: 142/88   Pulse: 64   Resp: 16   Temp: (!) 97 2 °F (36 2 °C)   SpO2: 96%              Physical Exam   Constitutional: He appears well-developed  HENT:   Head: Normocephalic and atraumatic  Right Ear: External ear normal    Left Ear: External ear normal    Nose: Nose normal    Throat---allergy signs   Eyes: Conjunctivae are normal  Pupils are equal, round, and reactive to light  Neck: Normal range of motion  Neck supple  Cardiovascular: Normal rate, regular rhythm, normal heart sounds and intact distal pulses  Pulmonary/Chest: Effort normal  He has wheezes  He has rales  Abdominal: Soft  Bowel sounds are normal    Musculoskeletal: Normal range of motion  Neurological: He is alert

## 2018-05-10 ENCOUNTER — TELEPHONE (OUTPATIENT)
Dept: FAMILY MEDICINE CLINIC | Facility: CLINIC | Age: 78
End: 2018-05-10

## 2018-05-11 ENCOUNTER — TELEPHONE (OUTPATIENT)
Dept: FAMILY MEDICINE CLINIC | Facility: CLINIC | Age: 78
End: 2018-05-11

## 2018-05-11 ENCOUNTER — IN-CLINIC DEVICE VISIT (OUTPATIENT)
Dept: CARDIOLOGY CLINIC | Facility: CLINIC | Age: 78
End: 2018-05-11
Payer: COMMERCIAL

## 2018-05-11 DIAGNOSIS — I47.2 VT (VENTRICULAR TACHYCARDIA) (HCC): Primary | ICD-10-CM

## 2018-05-11 DIAGNOSIS — J45.909 REACTIVE AIRWAY DISEASE WITHOUT COMPLICATION, UNSPECIFIED ASTHMA SEVERITY, UNSPECIFIED WHETHER PERSISTENT: ICD-10-CM

## 2018-05-11 DIAGNOSIS — R06.2 WHEEZING: Primary | ICD-10-CM

## 2018-05-11 DIAGNOSIS — Z95.810 PRESENCE OF IMPLANTABLE CARDIOVERTER-DEFIBRILLATOR (ICD): ICD-10-CM

## 2018-05-11 PROCEDURE — 93296 REM INTERROG EVL PM/IDS: CPT | Performed by: INTERNAL MEDICINE

## 2018-05-11 PROCEDURE — 93295 DEV INTERROG REMOTE 1/2/MLT: CPT | Performed by: INTERNAL MEDICINE

## 2018-05-11 RX ORDER — PREDNISONE 10 MG/1
TABLET ORAL
Qty: 28 TABLET | Refills: 0 | Status: SHIPPED | OUTPATIENT
Start: 2018-05-11 | End: 2018-06-15 | Stop reason: HOSPADM

## 2018-05-11 NOTE — PROGRESS NOTES
MDT SINGLE CHAMBER ICD  CARELINK TRANSMISSION:  BATTERY VOLTAGE NEARING ZACHARIAH (2 63V/RRT=2 63V, ZACHARIAH NOT INDICATED)   WILL CONTINUE MONTHLY BATTERY CHECKS    1 4%    ALL LEAD PARAMETERS WITHIN NORMAL LIMITS   NO SIGNIFICANT HIGH RATE EPISODES   OPTI-VOL WITHIN NORMAL LIMITS   NORMAL DEVICE FUNCTION   RG

## 2018-05-21 ENCOUNTER — OFFICE VISIT (OUTPATIENT)
Dept: CARDIOLOGY CLINIC | Facility: CLINIC | Age: 78
End: 2018-05-21
Payer: COMMERCIAL

## 2018-05-21 VITALS
HEART RATE: 63 BPM | SYSTOLIC BLOOD PRESSURE: 142 MMHG | BODY MASS INDEX: 27.45 KG/M2 | HEIGHT: 73 IN | WEIGHT: 207.1 LBS | DIASTOLIC BLOOD PRESSURE: 84 MMHG

## 2018-05-21 DIAGNOSIS — I45.2 RBBB (RIGHT BUNDLE BRANCH BLOCK WITH LEFT ANTERIOR FASCICULAR BLOCK): Primary | ICD-10-CM

## 2018-05-21 DIAGNOSIS — I10 ESSENTIAL HYPERTENSION: ICD-10-CM

## 2018-05-21 DIAGNOSIS — I45.2 RIGHT BUNDLE BRANCH BLOCK WITH LEFT ANTERIOR FASCICULAR BLOCK: ICD-10-CM

## 2018-05-21 DIAGNOSIS — I47.1 SUPRAVENTRICULAR TACHYCARDIA (HCC): ICD-10-CM

## 2018-05-21 DIAGNOSIS — I42.9 CARDIOMYOPATHY, UNSPECIFIED TYPE (HCC): ICD-10-CM

## 2018-05-21 PROCEDURE — 99214 OFFICE O/P EST MOD 30 MIN: CPT | Performed by: INTERNAL MEDICINE

## 2018-05-21 PROCEDURE — 93000 ELECTROCARDIOGRAM COMPLETE: CPT | Performed by: INTERNAL MEDICINE

## 2018-05-21 NOTE — PROGRESS NOTES
EPS Progress Note - Junior Martinez 69 y o  male MRN: [id number]           ASSESSMENT:  1  RBBB (right bundle branch block with left anterior fascicular block)  POCT ECG   2  Supraventricular tachycardia ([address] )     3  Cardiomyopathy, unspecified type ([address] )     4  Essential hypertension     5  Right bundle branch block with left anterior fascicular block     6  Bradycardia        PLAN:  Will get an echo and at time of generator change will upgrade to dual chamber AICD  He has LBBB morphology conduction delay  He has hx successful avnrt ablation  NICM that had resolved  HTN adequate control  On atorvastatin for hyperlipidemia  HR histogram flat will upgrade to dual chamber aicd  In [employer] league if possible will do generator in September  Has not tripped quirino yet  HPI:   Interim history overall feeling  Has to rest at times during activity  No trouble with breathing aches and pains in back  ROS:  negative, palpitations, tachycardia, irregular heart beat, paroxysmal nocturnal dyspnea, orthopnea, perisyncopal episodes, lower extremity edema  positive arthritis  Prostate problems  All other 12 point ROS negative  Objective:     Vitals: Blood pressure 142/84, pulse 63, height 6' 1" (1 854 m), weight 93 9 kg (207 lb 1 6 oz)  , Body mass index is 27 32 kg/m²  ,        Physical Exam:    GEN: Junior Martinez appears well, alert and oriented x 3, pleasant and cooperative   HEENT: pupils equal, round, and reactive to light; extraocular muscles intact  NECK: supple, no carotid bruits   HEART: regular rhythm, normal S1 and S2, no murmurs, clicks, gallops or rubs   LUNGS: clear to auscultation bilaterally; no wheezes, rales, or rhonchi   ABDOMEN: normal bowel sounds, soft, no tenderness, no distention  EXTREMITIES: peripheral pulses normal; no clubbing, cyanosis, or edema  NEURO: no focal findings   SKIN: normal without suspicious lesions on exposed skin    Medications:      Current Outpatient Prescriptions:     albuterol (PROAIR HFA) 90 mcg/act inhaler, Inhale 2 puffs every 6 (six) hours as needed for wheezing, Disp: 1 Inhaler, Rfl: 0    aspirin 81 MG tablet, Take 81 mg by mouth daily  , Disp: , Rfl:     atorvastatin (LIPITOR) 10 mg tablet, TAKE 1 TABLET DAILY, Disp: 90 tablet, Rfl: 3    bisoprolol (ZEBETA) 10 MG tablet, TAKE 1 TABLET TWICE DAILY, Disp: 180 tablet, Rfl: 2    Calcium Carb-Cholecalciferol (CALCIUM 600 + D PO), Take 1 tablet by mouth daily  , Disp: , Rfl:     Coenzyme Q10 (CO Q-10) 200 MG CAPS, Take 1 tablet by mouth daily, Disp: , Rfl:     finasteride (PROSCAR) 5 mg tablet, Take 1 tablet (5 mg total) by mouth daily, Disp: 90 tablet, Rfl: 3    lisinopril (ZESTRIL) 20 mg tablet, Take 1 tablet (20 mg total) by mouth daily, Disp: 90 tablet, Rfl: 3    Multiple Vitamins-Minerals (CENTRUM SILVER PO), Take 1 tablet by mouth daily  , Disp: , Rfl:     omeprazole (PriLOSEC) 20 mg delayed release capsule, Take 1 capsule (20 mg total) by mouth daily, Disp: 90 capsule, Rfl: 3    predniSONE 10 mg tablet, Take 4 tabs for 4 days, then 3 tabs for 2 days, then 2 tabs for 2 days and then 1 tab for 2 day, Disp: 28 tablet, Rfl: 0    Vitamin D, Cholecalciferol, 1000 UNITS CAPS, Take 1 tablet by mouth daily  , Disp: , Rfl:      Family History: father - emphysema  mother - no cardiac problems  Labs & Results:  Below is the patient's most recent value for Albumin, ALT, AST, BUN, Calcium, Chloride, Cholesterol, CO2, Creatinine, GFR, Glucose, HDL, Hematocrit, Hemoglobin, Hemoglobin A1C, LDL, Magnesium, Phosphorus, Platelets, Potassium, PSA, Sodium, Triglycerides, and WBC     Lab Results   Component Value Date    ALT 23 12/13/2017    AST 20 12/13/2017    BUN 19 12/13/2017    CALCIUM 9 0 12/13/2017     (H) 12/13/2017    CHOL 160 12/13/2017    CO2 29 12/13/2017    CREATININE 1 47 (H) 12/13/2017    HDL 61 (H) 12/13/2017    HCT 45 0 12/13/2017    HGB 15 6 12/13/2017    HGBA1C 5 2 12/13/2017 MG 2 0 2015     2017    K 4 4 2017    PSA 0 4 2018     2017    TRIG 149 2017    WBC 9 40 2017     Note: for a comprehensive list of the patient's lab results, access the Results Review activity  Cardiac testing:   Results for orders placed in visit on 06/23/15   Echo complete with contrast if indicated    Narrative [address]   [facility] [place], [place], [address] [facility]   Phone: [phone]   TRANSTHORACIC ECHOCARDIOGRAM   2D, M-MODE, DOPPLER, AND COLOR DOPPLER   Study date:  2015   Patient: Nathan Elizonod   MR number: [id number]   Account number: [de-identified]   : 1940   Age: 69 years   Gender: Male   Status: Outpatient   Location: Echo lab   Height: 73 in   Weight: 199 5 lb   BP: 105/ 59 mmHg   Indications: Cardiomyopathy  Sonographer:  Kimberly Arciniega RDCS   Primary Physician:  Sergio Dwyer MD   Referring Physician:  BRANDON Paz   Group:  [facility]   Interpreting Physician:  Cha Jean-Baptiste MD   SUMMARY   LEFT VENTRICLE:   Systolic function was mildly reduced  Ejection fraction was estimated to be 45   %  There were no regional wall motion abnormalities  There was mild diffuse hypokinesis  Wall thickness was markedly increased  LEFT ATRIUM:   The atrium was mildly to moderately dilated  RIGHT ATRIUM:   The atrium was mildly dilated  MITRAL VALVE:   There was trace regurgitation  TRICUSPID VALVE:   There was trace regurgitation  Pulmonary artery systolic pressure was within the normal range  PULMONIC VALVE:   There was trace regurgitation  TRANSTHORACIC ECHOCARDIOGRAM   Patient: Nathan Elizondo   MR number: [id number]    ------ Page 2   HISTORY: PRIOR HISTORY: Cardiomyopathy, ICD implanted, hypertension and   hyperlipidemia  Cardiomyopathy  Risk factors: hypertension and   hypercholesterolemia  PRIOR PROCEDURES: ICD implantation     PROCEDURE: The procedure was performed in the echo lab  This was a routine   study  The transthoracic approach was used  The study included complete 2D   imaging, M-mode, complete spectral Doppler, and color Doppler  Images were   obtained from the parasternal, apical, subcostal, and suprasternal notch   acoustic windows  Subcostal views were limited  This was a technically   difficult study  LEFT VENTRICLE: Size was normal  Systolic function was mildly reduced  Ejection   fraction was estimated to be 45 %  There were no regional wall motion   abnormalities  There was mild diffuse hypokinesis  Wall thickness was markedly   increased  No evidence of apical thrombus  DOPPLER: Left ventricular diastolic   function parameters were normal    RIGHT VENTRICLE: The size was normal  Systolic function was normal  Wall   thickness was normal    LEFT ATRIUM: The atrium was mildly to moderately dilated  RIGHT ATRIUM: The atrium was mildly dilated  MITRAL VALVE: Valve structure was normal  There was mild thickening  There was   normal leaflet separation  DOPPLER: The transmitral velocity was within the   normal range  There was no evidence for stenosis  There was trace    regurgitation  AORTIC VALVE: The valve was trileaflet  Leaflets exhibited mildly increased   thickness, mild calcification, and normal cuspal separation  DOPPLER:   Transaortic velocity was within the normal range  There was no evidence for   stenosis  There was no significant regurgitation  TRICUSPID VALVE: The valve structure was normal  There was normal leaflet   separation  DOPPLER: The transtricuspid velocity was within the normal range  There was no evidence for stenosis  There was trace regurgitation  Pulmonary   artery systolic pressure was within the normal range  PULMONIC VALVE: Leaflets exhibited normal thickness, no calcification, and   normal cuspal separation  DOPPLER: The transpulmonic velocity was within the   normal range   There was trace regurgitation  PERICARDIUM: There was no pericardial effusion  The pericardium was normal in   appearance  AORTA: The root exhibited normal size  SYSTEMIC VEINS: IVC: The inferior vena cava was normal in size     SYSTEM MEASUREMENT TABLES   TRANSTHORACIC ECHOCARDIOGRAM   Patient: Shaara Shrestha   MR number: [id number]    ------ Page 3   2D   %FS: 25 44 %   EF(Teich): 50 58 %   IVSd: 1 99 cm   LA Diam: 4 54 cm   [id number]: 51 4 %   LVIDd: 4 15 cm   LVIDs: 3 1 cm   LVPWd: 2 01 cm   CW   TR Vmax: 2 45 m/s   TR maxP 09 mmHg   PW   E/E': 10 59   MV E/A Ratio: 0 63   [employer] Accredited Echocardiography Laboratory   Prepared and electronically signed by   Kevan Romero MD   Signed [id number] 13:11:17                EKG personally reviewed by Dorys Nichols DO  NSR with LBBB morphology ecg PVC present

## 2018-06-12 ENCOUNTER — REMOTE DEVICE CLINIC VISIT (OUTPATIENT)
Dept: CARDIOLOGY CLINIC | Facility: CLINIC | Age: 78
End: 2018-06-12

## 2018-06-12 DIAGNOSIS — Z95.810 PRESENCE OF AUTOMATIC CARDIOVERTER/DEFIBRILLATOR (AICD): ICD-10-CM

## 2018-06-12 DIAGNOSIS — I47.2 VENTRICULAR TACHYCARDIA (HCC): Primary | ICD-10-CM

## 2018-06-12 PROCEDURE — 99024 POSTOP FOLLOW-UP VISIT: CPT | Performed by: INTERNAL MEDICINE

## 2018-06-15 ENCOUNTER — OFFICE VISIT (OUTPATIENT)
Dept: SURGICAL ONCOLOGY | Facility: CLINIC | Age: 78
End: 2018-06-15
Payer: COMMERCIAL

## 2018-06-15 VITALS
RESPIRATION RATE: 16 BRPM | SYSTOLIC BLOOD PRESSURE: 142 MMHG | TEMPERATURE: 97.6 F | BODY MASS INDEX: 27.17 KG/M2 | DIASTOLIC BLOOD PRESSURE: 88 MMHG | HEIGHT: 73 IN | HEART RATE: 70 BPM | WEIGHT: 205 LBS

## 2018-06-15 DIAGNOSIS — C43.61 MALIGNANT MELANOMA OF RIGHT UPPER EXTREMITY (HCC): Primary | ICD-10-CM

## 2018-06-15 PROCEDURE — 99214 OFFICE O/P EST MOD 30 MIN: CPT | Performed by: SURGERY

## 2018-06-15 NOTE — PROGRESS NOTES
Surgical Oncology Follow Up       [address] [facility] CANCER CARE SURGICAL ONCOLOGY [facility]  [address]  [place] [address] [facility] [address]  [address] Krystal Ornelas  1940  [id number]  [address] [facility] CANCER [facility] SURGICAL ONCOLOGY [facility]  [address]  [place] [address] [facility] [address] [phone]    Diagnoses and all orders for this visit:    Malignant melanoma of right upper extremity [facility])        Chief Complaint   Patient presents with    Follow-up     Pt here for 6 mo melanoma f/u  Return in about 6 months (around 12/15/2018) for Office Visit  No history exists  Diagnosis and Staging: [id number] melanoma December 2017   Treatment History: Wide excision melanoma December 2017   Current Therapy: Observation   Disease Status: TONY     History of Present Illness:   Patient returns in follow-up of his melanoma  He is doing well at this time with no complaints  He denies any new abdominal pain, nausea, vomiting, back pain, bone pain, headaches, or cough  He continues to follow-up with his dermatologist     Review of Systems  Complete ROS Surg Onc:   Complete ROS Surg Onc:   Constitutional: The patient denies new or recent history of general fatigue, no recent weight loss, no change in appetite  Eyes: No complaints of visual problems, no scleral icterus  ENT: no complaints of ear pain, no hoarseness, no difficulty swallowing,  no tinnitus and no new masses in head, oral cavity, or neck  Cardiovascular: No complaints of chest pain, no palpitations, no ankle edema  Respiratory: No complaints of shortness of breath, no cough  Gastrointestinal: No complaints of jaundice, no bloody stools, no pale stools  Genitourinary: No complaints of dysuria, no hematuria, no nocturia, no frequent urination, no urethral discharge     Musculoskeletal: No complaints of weakness, paralysis, joint stiffness or arthralgias  Integumentary: No complaints of rash, no new lesions  Neurological: No complaints of convulsions, no seizures, no dizziness  Hematologic/Lymphatic: No complaints of easy bruising  Endocrine:  No hot or cold intolerance  No polydipsia, polyphagia, or polyuria  Allergy/immunology:  No environmental allergies  No food allergies  Not immunocompromised  Skin:  No pallor or rash  No wound          Patient Active Problem List   Diagnosis    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    Microscopic hematuria    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    Arthritis    Burt esophagus    Cancer, colon ([facility] )    Cardiac arrhythmia    Cardiomyopathy ([facility] )    CKD (chronic kidney disease) stage 3, GFR 30-59 ml/min    Esophageal reflux    Gout    Hyperlipidemia    Hypertension    Hypothyroidism    Impaired fasting glucose    Malignant melanoma of right upper extremity (HCC)    Non-toxic multinodular goiter    Polymyalgia rheumatica (HCC)    Right bundle branch block with left anterior fascicular block    Skin lesion    Thrombocytopenia (HCC)     Past Medical History:   Diagnosis Date    AICD (automatic cardioverter/defibrillator) present     Arthritis     Asthma     Burt esophagus     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     Cardiac arrhythmia     Cardiomyopathy ([facility]     CKD (chronic kidney disease)     Colon cancer (HCC)     Congestive heart failure (CHF) (HCC)     COPD (chronic obstructive pulmonary disease) ([facility] )     Diverticulitis     Last assessed: 4/5/13    Esophageal reflux     Gout     Hernia     Hyperlipidemia     Hypertension     Hypertension     Hypothyroidism     Pleural effusion     Polymyalgia rheumatica (HCC)     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     Spondyloarthropathy ([facility] )     Last assessed: 11/28/12     Past Surgical History:   Procedure Laterality Date    ARM SKIN LESION BIOPSY / EXCISION      Malignant    ATRIAL ABLATION SURGERY      AV NODE ABLATION      CARDIAC DEFIBRILLATOR PLACEMENT  2009    Cardio-Defib Pulse Gen Venous Insertion of Electrode for Ventricular Pacing  Implantable    CARDIAC SURGERY  2009    Catheterization    COLONOSCOPY N/A 3/14/2016    Procedure: COLONOSCOPY;  Surgeon: Kana Carroll MD;  Location: [facility]; Service  February 22, 2013, mildly enlarged prostate, history of colon CA with normal appearance of anastomosis at the midtransverse colon, severe diverticulosis in the sigmoid, descending and transverse colon  Repeat colonoscopy in 3 yrs    HEMICOLECTOMY Right 05/05/2004    INGUINAL HERNIA REPAIR Bilateral     Left 3/2004, right 5/2008    KNEE SURGERY  1972    Meniscectomy    SC ESOPHAGOGASTRODUODENOSCOPY TRANSORAL DIAGNOSTIC N/A 12/5/2016    Procedure: ESOPHAGOGASTRODUODENOSCOPY (EGD); Surgeon: Jaqueline Jewell MD;  Location: [facility]; Service: Gastroenterology    TONSILLECTOMY AND ADENOIDECTOMY       Family History   Problem Relation Age of Onset    Heart failure Mother         Congestive    Hypertension Mother     Osteoporosis Mother     COPD Father     Emphysema Father     Hypertension Father     Heart disease Sister         Heart Valve Replacement    Osteoporosis Sister     Breast cancer Family      Social History     Social History    Marital status: /Civil Union     Spouse name: N/A    Number of children: N/A    Years of education: N/A     Occupational History    Manager-Retired      Social History Main Topics    Smoking status: Never Smoker    Smokeless tobacco: Never Used    Alcohol use No    Drug use: No    Sexual activity: Not Currently     Other Topics Concern    Not on file     Social History Narrative    No narrative on file       Current Outpatient Prescriptions:     aspirin 81 MG tablet, Take 81 mg by mouth daily  , Disp: , Rfl:     atorvastatin (LIPITOR) 10 mg tablet, TAKE 1 TABLET DAILY, Disp: 90 tablet, Rfl: 3    bisoprolol (ZEBETA) 10 MG tablet, TAKE 1 TABLET TWICE DAILY, Disp: 180 tablet, Rfl: 2    Calcium Carb-Cholecalciferol (CALCIUM 600 + D PO), Take 1 tablet by mouth daily  , Disp: , Rfl:     Coenzyme Q10 (CO Q-10) 200 MG CAPS, Take 1 tablet by mouth daily, Disp: , Rfl:     finasteride (PROSCAR) 5 mg tablet, Take 1 tablet (5 mg total) by mouth daily, Disp: 90 tablet, Rfl: 3    lisinopril (ZESTRIL) 20 mg tablet, Take 1 tablet (20 mg total) by mouth daily, Disp: 90 tablet, Rfl: 3    Multiple Vitamins-Minerals (CENTRUM SILVER PO), Take 1 tablet by mouth daily  , Disp: , Rfl:     omeprazole (PriLOSEC) 20 mg delayed release capsule, Take 1 capsule (20 mg total) by mouth daily, Disp: 90 capsule, Rfl: 3    Vitamin D, Cholecalciferol, 1000 UNITS CAPS, Take 1 tablet by mouth daily  , Disp: , Rfl:     albuterol (PROAIR HFA) 90 mcg/act inhaler, Inhale 2 puffs every 6 (six) hours as needed for wheezing, Disp: 1 Inhaler, Rfl: 0  No Known Allergies  Vitals:    06/15/18 0836   BP: 142/88   Pulse: 70   Resp: 16   Temp: 97 6 °F (36 4 °C)       Physical Exam  Constitutional: General appearance: The Patient is well-developed and well-nourished who appears the stated age in no acute distress  Patient is pleasant and talkative  HEENT:  Normocephalic  Sclerae are anicteric  Mucous membranes are moist  Neck is supple without adenopathy  No JVD  Chest: The lungs are clear to auscultation  Cardiac: Heart is regular rate  Abdomen: Abdomen is soft, non-tender, non-distended and without masses  Extremities: There is no clubbing or cyanosis  There is no edema  Symmetric  Neuro: Grossly nonfocal  Gait is normal      Lymphatic: No evidence of cervical adenopathy bilaterally  No evidence of axillary adenopathy bilaterally  No evidence of inguinal adenopathy bilaterally  Skin: Warm, anicteric    wide excision on the right arm has healed well    No evidence of local recurrence or in-transit disease  Psych:  Patient is pleasant and talkative  Breasts:        Pathology:      Labs:      Imaging  No results found  I reviewed the above laboratory and imaging data  Discussion/Summary:   64-year-old male status post wide excision of a [id number] melanoma  He is clinically TONY at 6 months  There is no evidence of recurrence by physical exam, or symptomatology  He will continue to follow up with his dermatologist   I will see him again in 6 months for another clinical exam  If he has any new, persistent, or unexplained symptoms he will contact us and directed imaging may be performed  He is agreeable to this  All his questions were answered

## 2018-06-15 NOTE — LETTER
Gillian 15, 2018     Garima Navarro, [address]    Patient: Leonides Skinner   YOB: 1940   Date of Visit: 6/15/2018       Dear Dr Octavio Espinoza: Thank you for referring Leonides Skinner to me for evaluation  Below are my notes for this consultation  If you have questions, please do not hesitate to call me  I look forward to following your patient along with you  Sincerely,        Constantino Camarena MD        CC: Cyndie Goldberg, MD Tereso Sarna, MD  6/15/2018  8:54 AM  Sign at close encounter               Surgical Oncology Follow Up       [facility]  [address]  [address] [address] [phone]    Leonides Skinner  1940  [id number]  [address] [facility] [place]  [address]  [place] [address]   [phone]    Diagnoses and all orders for this visit:    Malignant melanoma of right upper extremity [facility])        Chief Complaint   Patient presents with    Follow-up     Pt here for 6 mo melanoma f/u  Return in about 6 months (around 12/15/2018) for Office Visit  No history exists  Diagnosis and Staging: [id number] melanoma December 2017   Treatment History: Wide excision melanoma December 2017   Current Therapy: Observation   Disease Status: TONY     History of Present Illness:   Patient returns in follow-up of his melanoma  He is doing well at this time with no complaints  He denies any new abdominal pain, nausea, vomiting, back pain, bone pain, headaches, or cough  He continues to follow-up with his dermatologist     Review of Systems  Complete ROS Surg Onc:   Complete ROS Surg Onc:   Constitutional: The patient denies new or recent history of general fatigue, no recent weight loss, no change in appetite  Eyes: No complaints of visual problems, no scleral icterus     ENT: no complaints of ear pain, no hoarseness, no difficulty swallowing,  no tinnitus and no new masses in head, oral cavity, or neck  Cardiovascular: No complaints of chest pain, no palpitations, no ankle edema  Respiratory: No complaints of shortness of breath, no cough  Gastrointestinal: No complaints of jaundice, no bloody stools, no pale stools  Genitourinary: No complaints of dysuria, no hematuria, no nocturia, no frequent urination, no urethral discharge  Musculoskeletal: No complaints of weakness, paralysis, joint stiffness or arthralgias  Integumentary: No complaints of rash, no new lesions  Neurological: No complaints of convulsions, no seizures, no dizziness  Hematologic/Lymphatic: No complaints of easy bruising  Endocrine:  No hot or cold intolerance  No polydipsia, polyphagia, or polyuria  Allergy/immunology:  No environmental allergies  No food allergies  Not immunocompromised  Skin:  No pallor or rash  No wound          Patient Active Problem List   Diagnosis    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    Microscopic hematuria    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    Arthritis    Burt esophagus    Cancer, colon ([facility] )    Cardiac arrhythmia    Cardiomyopathy ([facility]    CKD (chronic kidney disease) stage 3, GFR 30-59 ml/min    Esophageal reflux    Gout    Hyperlipidemia    Hypertension    Hypothyroidism    Impaired fasting glucose    Malignant melanoma of right upper extremity (HCC)    Non-toxic multinodular goiter    Polymyalgia rheumatica (HCC)    Right bundle branch block with left anterior fascicular block    Skin lesion    Thrombocytopenia (HCC)     Past Medical History:   Diagnosis Date    AICD (automatic cardioverter/defibrillator) present     Arthritis     Asthma     Burt esophagus     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     Cardiac arrhythmia     Cardiomyopathy ([facility]     CKD (chronic kidney disease)  Colon cancer ([facility] )     Congestive heart failure (CHF) (HCC)     COPD (chronic obstructive pulmonary disease) ([facility] )     Diverticulitis     Last assessed: 4/5/13    Esophageal reflux     Gout     Hernia     Hyperlipidemia     Hypertension     Hypertension     Hypothyroidism     Pleural effusion     Polymyalgia rheumatica (HCC)     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     Sp[facility]yl[facility] ([facility] )     Last assessed: 11/28/12     Past Surgical History:   Procedure Laterality Date    ARM SKIN LESION BIOPSY / EXCISION      Malignant    ATRIAL ABLATION SURGERY      AV NODE ABLATION      CARDIAC DEFIBRILLATOR PLACEMENT  2009    Cardio-Defib Pulse Gen Venous Insertion of Electrode for Ventricular Pacing  Implantable    CARDIAC SURGERY  2009    Catheterization    COLONOSCOPY N/A 3/14/2016    Procedure: COLONOSCOPY;  Surgeon: Ventura Rdz MD;  [facility]; Service  February 22, 2013, mildly enlarged prostate, history of colon CA with normal appearance of anastomosis at the midtransverse colon, severe diverticulosis in the sigmoid, descending and transverse colon  Repeat colonoscopy in 3 yrs    HEMICOLECTOMY Right 05/05/2004    INGUINAL HERNIA REPAIR Bilateral     Left 3/2004, right 5/2008    KNEE SURGERY  1972    Meniscectomy    NC ESOPHAGOGASTRODUODENOSCOPY TRANSORAL DIAGNOSTIC N/A 12/5/2016    Procedure: ESOPHAGOGASTRODUODENOSCOPY (EGD); Surgeon: Brittany Miles MD;  [facility];   Service: Gastroenterology    TONSILLECTOMY AND ADENOIDECTOMY       Family History   Problem Relation Age of Onset    Heart failure Mother         Congestive    Hypertension Mother     Osteoporosis Mother     COPD Father     Emphysema Father     Hypertension Father     Heart disease Sister         Heart Valve Replacement    Osteoporosis Sister     Breast cancer Family      Social History     Social History    Marital status: /Civil Union     Spouse name: N/A    Number of children: N/A    Years of education: N/A     Occupational History    Manager-Retired      Social History Main Topics    Smoking status: Never Smoker    Smokeless tobacco: Never Used    Alcohol use No    Drug use: No    Sexual activity: Not Currently     Other Topics Concern    Not on file     Social History Narrative    No narrative on file       Current Outpatient Prescriptions:     aspirin 81 MG tablet, Take 81 mg by mouth daily  , Disp: , Rfl:     atorvastatin (LIPITOR) 10 mg tablet, TAKE 1 TABLET DAILY, Disp: 90 tablet, Rfl: 3    bisoprolol (ZEBETA) 10 MG tablet, TAKE 1 TABLET TWICE DAILY, Disp: 180 tablet, Rfl: 2    Calcium Carb-Cholecalciferol (CALCIUM 600 + D PO), Take 1 tablet by mouth daily  , Disp: , Rfl:     Coenzyme Q10 (CO Q-10) 200 MG CAPS, Take 1 tablet by mouth daily, Disp: , Rfl:     finasteride (PROSCAR) 5 mg tablet, Take 1 tablet (5 mg total) by mouth daily, Disp: 90 tablet, Rfl: 3    lisinopril (ZESTRIL) 20 mg tablet, Take 1 tablet (20 mg total) by mouth daily, Disp: 90 tablet, Rfl: 3    Multiple Vitamins-Minerals (CENTRUM SILVER PO), Take 1 tablet by mouth daily  , Disp: , Rfl:     omeprazole (PriLOSEC) 20 mg delayed release capsule, Take 1 capsule (20 mg total) by mouth daily, Disp: 90 capsule, Rfl: 3    Vitamin D, Cholecalciferol, 1000 UNITS CAPS, Take 1 tablet by mouth daily  , Disp: , Rfl:     albuterol (PROAIR HFA) 90 mcg/act inhaler, Inhale 2 puffs every 6 (six) hours as needed for wheezing, Disp: 1 Inhaler, Rfl: 0  No Known Allergies  Vitals:    06/15/18 0836   BP: 142/88   Pulse: 70   Resp: 16   Temp: 97 6 °F (36 4 °C)       Physical Exam  Constitutional: General appearance: The Patient is well-developed and well-nourished who appears the stated age in no acute distress  Patient is pleasant and talkative  HEENT:  Normocephalic  Sclerae are anicteric  Mucous membranes are moist  Neck is supple without adenopathy  No JVD       Chest: The lungs are clear to auscultation  Cardiac: Heart is regular rate  Abdomen: Abdomen is soft, non-tender, non-distended and without masses  Extremities: There is no clubbing or cyanosis  There is no edema  Symmetric  Neuro: Grossly nonfocal  Gait is normal      Lymphatic: No evidence of cervical adenopathy bilaterally  No evidence of axillary adenopathy bilaterally  No evidence of inguinal adenopathy bilaterally  Skin: Warm, anicteric     wide excision on the right arm has healed well  No evidence of local recurrence or in-transit disease  Psych:  Patient is pleasant and talkative  Breasts:        Pathology:      Labs:      Imaging  No results found  I reviewed the above laboratory and imaging data  Discussion/Summary:   28-year-old male status post wide excision of a [id number] melanoma  He is clinically TONY at 6 months  There is no evidence of recurrence by physical exam, or symptomatology  He will continue to follow up with his dermatologist   I will see him again in 6 months for another clinical exam  If he has any new, persistent, or unexplained symptoms he will contact us and directed imaging may be performed  He is agreeable to this  All his questions were answered

## 2018-06-16 DIAGNOSIS — N18.30 CHRONIC KIDNEY DISEASE, STAGE III (MODERATE) (HCC): ICD-10-CM

## 2018-06-16 DIAGNOSIS — E04.2 NONTOXIC MULTINODULAR GOITER: ICD-10-CM

## 2018-06-16 DIAGNOSIS — R73.01 IMPAIRED FASTING GLUCOSE: ICD-10-CM

## 2018-06-16 DIAGNOSIS — E03.9 HYPOTHYROIDISM: ICD-10-CM

## 2018-06-16 DIAGNOSIS — I42.9 CARDIOMYOPATHY (HCC): ICD-10-CM

## 2018-06-16 DIAGNOSIS — I10 ESSENTIAL (PRIMARY) HYPERTENSION: ICD-10-CM

## 2018-06-16 DIAGNOSIS — E78.5 HYPERLIPIDEMIA: ICD-10-CM

## 2018-06-18 ENCOUNTER — APPOINTMENT (OUTPATIENT)
Dept: LAB | Facility: CLINIC | Age: 78
End: 2018-06-18
Payer: COMMERCIAL

## 2018-06-18 DIAGNOSIS — E03.9 HYPOTHYROIDISM: ICD-10-CM

## 2018-06-18 DIAGNOSIS — I10 ESSENTIAL (PRIMARY) HYPERTENSION: ICD-10-CM

## 2018-06-18 DIAGNOSIS — E78.5 HYPERLIPIDEMIA: ICD-10-CM

## 2018-06-18 DIAGNOSIS — R73.01 IMPAIRED FASTING GLUCOSE: ICD-10-CM

## 2018-06-18 DIAGNOSIS — E04.2 NONTOXIC MULTINODULAR GOITER: ICD-10-CM

## 2018-06-18 DIAGNOSIS — I42.9 CARDIOMYOPATHY (HCC): ICD-10-CM

## 2018-06-18 DIAGNOSIS — N18.30 CHRONIC KIDNEY DISEASE, STAGE III (MODERATE) (HCC): ICD-10-CM

## 2018-06-18 LAB
ALBUMIN SERPL BCP-MCNC: 3.7 G/DL (ref 3.5–5)
ALP SERPL-CCNC: 63 U/L (ref 46–116)
ALT SERPL W P-5'-P-CCNC: 27 U/L (ref 12–78)
ANION GAP SERPL CALCULATED.3IONS-SCNC: 6 MMOL/L (ref 4–13)
AST SERPL W P-5'-P-CCNC: 21 U/L (ref 5–45)
BILIRUB SERPL-MCNC: 1.02 MG/DL (ref 0.2–1)
BUN SERPL-MCNC: 18 MG/DL (ref 5–25)
CALCIUM SERPL-MCNC: 8.6 MG/DL (ref 8.3–10.1)
CHLORIDE SERPL-SCNC: 109 MMOL/L (ref 100–108)
CHOLEST SERPL-MCNC: 145 MG/DL (ref 50–200)
CO2 SERPL-SCNC: 27 MMOL/L (ref 21–32)
CREAT SERPL-MCNC: 1.51 MG/DL (ref 0.6–1.3)
EST. AVERAGE GLUCOSE BLD GHB EST-MCNC: 117 MG/DL
GFR SERPL CREATININE-BSD FRML MDRD: 44 ML/MIN/1.73SQ M
GLUCOSE P FAST SERPL-MCNC: 100 MG/DL (ref 65–99)
HBA1C MFR BLD: 5.7 % (ref 4.2–6.3)
HDLC SERPL-MCNC: 56 MG/DL (ref 40–60)
LDLC SERPL CALC-MCNC: 65 MG/DL (ref 0–100)
POTASSIUM SERPL-SCNC: 4.5 MMOL/L (ref 3.5–5.3)
PROT SERPL-MCNC: 7.3 G/DL (ref 6.4–8.2)
SODIUM SERPL-SCNC: 142 MMOL/L (ref 136–145)
TRIGL SERPL-MCNC: 120 MG/DL
TSH SERPL DL<=0.05 MIU/L-ACNC: 3.16 UIU/ML (ref 0.36–3.74)

## 2018-06-18 PROCEDURE — 36415 COLL VENOUS BLD VENIPUNCTURE: CPT

## 2018-06-18 PROCEDURE — 84443 ASSAY THYROID STIM HORMONE: CPT

## 2018-06-18 PROCEDURE — 80061 LIPID PANEL: CPT

## 2018-06-18 PROCEDURE — 80053 COMPREHEN METABOLIC PANEL: CPT

## 2018-06-18 PROCEDURE — 83036 HEMOGLOBIN GLYCOSYLATED A1C: CPT

## 2018-06-21 ENCOUNTER — HOSPITAL ENCOUNTER (OUTPATIENT)
Dept: NON INVASIVE DIAGNOSTICS | Facility: CLINIC | Age: 78
Discharge: HOME | End: 2018-06-21
Payer: COMMERCIAL

## 2018-06-21 DIAGNOSIS — I42.9 CARDIOMYOPATHY, UNSPECIFIED TYPE (HCC): ICD-10-CM

## 2018-06-21 PROCEDURE — 93306 TTE W/DOPPLER COMPLETE: CPT | Performed by: INTERNAL MEDICINE

## 2018-06-21 PROCEDURE — 93306 TTE W/DOPPLER COMPLETE: CPT

## 2018-06-29 ENCOUNTER — OFFICE VISIT (OUTPATIENT)
Dept: FAMILY MEDICINE CLINIC | Facility: CLINIC | Age: 78
End: 2018-06-29
Payer: COMMERCIAL

## 2018-06-29 VITALS
WEIGHT: 205.6 LBS | HEART RATE: 68 BPM | TEMPERATURE: 97.2 F | BODY MASS INDEX: 28.78 KG/M2 | RESPIRATION RATE: 16 BRPM | HEIGHT: 71 IN | SYSTOLIC BLOOD PRESSURE: 130 MMHG | DIASTOLIC BLOOD PRESSURE: 84 MMHG

## 2018-06-29 DIAGNOSIS — M85.80 OSTEOPENIA, UNSPECIFIED LOCATION: ICD-10-CM

## 2018-06-29 DIAGNOSIS — Z00.00 MEDICARE ANNUAL WELLNESS VISIT, SUBSEQUENT: Primary | ICD-10-CM

## 2018-06-29 DIAGNOSIS — I10 ESSENTIAL HYPERTENSION: ICD-10-CM

## 2018-06-29 DIAGNOSIS — R73.01 IMPAIRED FASTING GLUCOSE: ICD-10-CM

## 2018-06-29 DIAGNOSIS — E78.5 HYPERLIPIDEMIA, UNSPECIFIED HYPERLIPIDEMIA TYPE: ICD-10-CM

## 2018-06-29 DIAGNOSIS — N18.30 CKD (CHRONIC KIDNEY DISEASE) STAGE 3, GFR 30-59 ML/MIN (HCC): ICD-10-CM

## 2018-06-29 DIAGNOSIS — E03.9 HYPOTHYROIDISM, UNSPECIFIED TYPE: ICD-10-CM

## 2018-06-29 PROCEDURE — 99214 OFFICE O/P EST MOD 30 MIN: CPT | Performed by: FAMILY MEDICINE

## 2018-06-29 PROCEDURE — 3725F SCREEN DEPRESSION PERFORMED: CPT | Performed by: FAMILY MEDICINE

## 2018-06-29 PROCEDURE — 1101F PT FALLS ASSESS-DOCD LE1/YR: CPT | Performed by: FAMILY MEDICINE

## 2018-06-29 PROCEDURE — G0439 PPPS, SUBSEQ VISIT: HCPCS | Performed by: FAMILY MEDICINE

## 2018-06-29 NOTE — PROGRESS NOTES
Chief Complaint   Patient presents with   [facility] Wellness Visit     Annual wellness visit   Follow-up     6 month follow up  Assessment and Plan:  Problem List Items Addressed This Visit        Endocrine    Hypothyroidism    Relevant Orders    TSH, 3rd generation    Impaired fasting glucose    Relevant Orders    Hemoglobin A1C       Cardiovascular and Mediastinum    Hypertension    Relevant Orders    Comprehensive metabolic panel       Musculoskeletal and Integument    Osteopenia       Genitourinary    CKD (chronic kidney disease) stage 3, GFR 30-59 ml/min       Other    Hyperlipidemia    Relevant Orders    Lipid panel      Other Visit Diagnoses     Medicare annual wellness visit, subsequent    -  Primary        MMSE 30/30 today  Living will---DNR per pt  POA---wife  Health Maintenance Due   Topic Date Due    SLP PLAN OF CARE  1940    DTaP,Tdap,and Td Vaccines (1 - Tdap) 06/26/1962    GLAUCOMA SCREENING 67+ YR  06/26/2008    Annual Camila Webb Visit (AWV)  12/21/2017         HPI:  Finesse Aguilar is a 69 y o  male here for his Subsequent Wellness Visit      Patient Active Problem List   Diagnosis    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    Microscopic hematuria    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    Arthritis    Burt esophagus    Cancer[facility] colon ([facility]    Cardiac arrhythmia    Cardiomyopathy ([facility]    CKD (chronic kidney disease) stage 3, GFR 30-59 ml/min    Esophageal reflux    Gout    Hyperlipidemia    Hypertension    Hypothyroidism    Impaired fasting glucose    Malignant melanoma of right upper extremity (HCC)    Non-toxic multinodular goiter    Polymyalgia rheumatica (HCC)    Right bundle branch block with left anterior fascicular block    Skin lesion    Thrombocytopenia (HCC)    Osteopenia     Past Medical History:   Diagnosis Date    AICD (automatic cardioverter/defibrillator) present     Arthritis     Asthma     Burt esophagus  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     Cardiac arrhythmia     [facility]     CKD (chronic kidney disease)     Colon cancer (HCC)     Congestive heart failure (CHF) (HCC)     COPD [facility]ch[facility] obstructive [facility] disease) [facility]     Diverticulitis     Last assessed: 4/5/13    Esophageal reflux     Gout     Hernia     Hyperlipidemia     Hypertension     Hypertension     Hypothyroidism     Pleural effusion     Polymyalgia rheumatica (HCC)     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     Spondyloarthropathy ([facility] )     Last assessed: 11/28/12     Past Surgical History:   Procedure Laterality Date    ARM SKIN LESION BIOPSY / EXCISION      Malignant    ATRIAL ABLATION SURGERY      AV NODE ABLATION      CARDIAC DEFIBRILLATOR PLACEMENT  2009    Cardio-Defib Pulse Gen Venous Insertion of Electrode for Ventricular Pacing  Implantable    CARDIAC SURGERY  2009    Catheterization    COLONOSCOPY N/A 3/14/2016    Procedure: COLONOSCOPY;  Surgeon: Carlos Cheung MD;  [facility]; Service  February 22, 2013, mildly enlarged prostate, history of colon CA with normal appearance of anastomosis at the midtransverse colon, severe diverticulosis in the sigmoid, descending and transverse colon  Repeat colonoscopy in 3 yrs    HEMICOLECTOMY Right 05/05/2004    INGUINAL HERNIA REPAIR Bilateral     Left 3/2004, right 5/2008    KNEE SURGERY  1972    Meniscectomy    IA ESOPHAGOGASTRODUODENOSCOPY TRANSORAL DIAGNOSTIC N/A 12/5/2016    Procedure: ESOPHAGOGASTRODUODENOSCOPY (EGD); Surgeon: Marleny Richardson MD;  [facility];   Service: Gastroenterology    TONSILLECTOMY AND ADENOIDECTOMY       Family History   Problem Relation Age of Onset    Heart failure Mother         Congestive    Hypertension Mother     Osteoporosis Mother     COPD Father     Emphysema Father     Hypertension Father     Heart disease Sister         Heart Valve Replacement    Osteoporosis Sister     Breast cancer Family      History   Smoking Status    Never Smoker   Smokeless Tobacco    Never Used     History   Alcohol Use    Yes     Comment: Rare      History   Drug Use No         Current Outpatient Prescriptions   Medication Sig Dispense Refill    albuterol (PROAIR HFA) 90 mcg/act inhaler Inhale 2 puffs every 6 (six) hours as needed for wheezing 1 Inhaler 0    aspirin 81 MG tablet Take 81 mg by mouth daily   atorvastatin (LIPITOR) 10 mg tablet TAKE 1 TABLET DAILY 90 tablet 3    bisoprolol (ZEBETA) 10 MG tablet TAKE 1 TABLET TWICE DAILY 180 tablet 2    Calcium Carb-Cholecalciferol (CALCIUM 600 + D PO) Take 1 tablet by mouth daily   Coenzyme Q10 (CO Q-10) 200 MG CAPS Take 1 tablet by mouth daily      finasteride (PROSCAR) 5 mg tablet Take 1 tablet (5 mg total) by mouth daily 90 tablet 3    lisinopril (ZESTRIL) 20 mg tablet Take 1 tablet (20 mg total) by mouth daily 90 tablet 3    Multiple Vitamins-Minerals (CENTRUM SILVER PO) Take 1 tablet by mouth daily   omeprazole (PriLOSEC) 20 mg delayed release capsule Take 1 capsule (20 mg total) by mouth daily 90 capsule 3    Vitamin D, Cholecalciferol, 1000 UNITS CAPS Take 1 tablet by mouth daily  No current facility-administered medications for this visit        No Known Allergies  Immunization History   Administered Date(s) Administered    Influenza Split High Dose Preservative Free IM 10/28/2014, 10/26/2015, 11/11/2016, 10/23/2017    Influenza TIV (IM) 11/27/2001, 10/04/2007, 12/09/2008, 10/29/2010, 10/19/2011, 09/29/2012, 10/31/2013    Pneumococcal Conjugate 13-Valent 05/13/2015    Pneumococcal Polysaccharide PPV23 11/28/2006, 12/21/2016    Zoster 12/01/2011       Patient Care Team:  Camila Kaur MD as PCP - General  MD Davis Lagunas MD Norval Catchings, MD    Medicare Screening Tests and Risk Assessments:  V Clinical     ISAR:   Previous hospitalizations?:  No       Once in a Lifetime Medicare Screening:   AAA screening performed? (if performed, please add date to Health Maintenance):  No       Medicare Screening Tests and Risk Assessment:   AAA Risk Assessment     Tobacco use (males only):  No    Family history of AAA:  No   Osteoporosis Risk Assessment    :  Yes    Age over 48:  Yes    HIV Risk Assessment        Drug and Alcohol Use:   Tobacco use    Cigarettes:  never smoker    Smokeless:  never used smokeless tobacco    Tobacco use duration    Tobacco Cessation Readiness    Alcohol use    Alcohol use:  rare use    Alcohol Treatment Readiness   Illicit Drug Use    Drug use:  never        Diet & Exercise:   Diet   What is your diet?:  Limited junk food   How many servings a day of the following:   Fruits and Vegetables:  1-2 Meat:  1-2   Whole Grains:  1 Simple Carbs:  1   Dairy:  2 Soda:  1   Coffee:  0 Tea:  0   Exercise    Do you currently exercise?:  yes    Frequency:  rarely   Times per week:  3 Minutes per week:  300    Type of exercise:  walking       Cognitive Impairment Screening:   Depression screening preformed:  Yes     PHQ-9 Depression scale score:  0   Cognitive Impairment Screening        Functional Ability/Level of Safety:   Hearing     Bilateral:  normal   Left:  normal Right:  normal   Hearing aid:  No    Hearing Impairment Assessment    Current Activities    Help needed with the folllowing:    ADL    Fall Risk   Have you fallen in the last 12 months?:  No    How many times?:  0    Injury History       Home Safety:   Home Safety Risk Factors       Advanced Directives:   Advanced Directives    Living Will:  Yes    Advanced directive:  Yes    Patient's End of Life Decisions        Urinary Incontinence:       Glaucoma:            Provider Screening     Preventative Screening/Counseling:   Cardiovascular Screening/Counseling:   (Labs Q5 years, EKG optional one-time)   General:  Risks and Benefits Discussed, Screening Current Diabetes Screening/Counseling:   (2 tests/year if Pre-Diabetes or 1 test/year if no Diabetes)   General:  Risks and Benefits Discussed           Colorectal Cancer Screening/Counseling:   (FOBT Q1 yr; Flex Sig Q4 yrs or Q10 yrs after Screening Colonoscopy; Screening Colonoscpy Q2 yrs High Risk or Q10 yrs Low Risk; Barium Enema Q2 yrs High Risk or Q4 yrs Low Risk)   General:  Risks and Benefits Discussed, Screening Current           Prostate Cancer Screening/Counseling:   (Annual)    General:  Risks and Benefits Discussed, Screening Current          Breast Cancer Screening/Counseling:   (Baseline Age 28 - 43; Annual Age 36+)         Cervical Cancer Screening/Counseling:   (Annual for High Risk or Childbearing Age with Abnormal Pap in Last 3 yrs; Every 2 all others)         Osteoporosis Screening/Counseling:   (Every 2 Yrs if at risk or more if medically necessary)   General:  Risks and Benefits Discussed, Screening Current           AAA Screening/Counseling:   (Once per Lifetime with risk factors)    Family History of AAA:  No  Tobacco use (males only):  No         Glaucoma Screening/Counseling:   (Annual)         HIV Screening/Counseling:   (Voluntary; Once annually for high risk OR 3 times for Pregnancy at diagnosis of IUP; 3rd trimester; and at Labor         Hepatitis C Screening:             Immunizations:        Other Preventative Couseling (Non-Medicare Wellness Visit Required):       Referrals (Non-Medicare Wellness Visit Required):       Medical Equipment/Suppliers:             Physical Exam

## 2018-06-29 NOTE — PROGRESS NOTES
Chief Complaint   Patient presents with   [facility] Wellness Visit     Annual wellness visit   Follow-up     6 month follow up  Health Maintenance   Topic Date Due    SLP PLAN OF CARE  1940    DTaP,Tdap,and Td Vaccines (1 - Tdap) 06/26/1962    GLAUCOMA SCREENING 67+ YR  06/26/2008    Annual Wellnes Visit (AWV)  12/21/2017    INFLUENZA VACCINE  09/01/2018    TSH LEVEL  12/18/2018    HEMOGLOBIN A1C  12/18/2018    Fall Risk  12/26/2018    CRC Screening: Colonoscopy  03/14/2019    Depression Screening PHQ-9  06/29/2019    DXA SCAN  01/18/2023    PNEUMOCOCCAL POLYSACCHARIDE VACCINE AGE 65 AND OVER  Completed     Assessment/Plan:  Reviewed lab in 6/2018  TSH normal  CMP showed Cr 1 51 elevated but stable  HgA1c 5 7 slightly elevated  Lipid 145/120/56/65    HTN---controlled  Continue bisoprolol and lisinopril  Hyperlipidemia---controlled  Continue atorvastain  IFG---low carb diet  Hypothyroidism---not on any levothyroxine  6/2018 TSH normal  CKD3---stable  Keep hydrated  Avoid motrin/ibuprofen/aleve NSAIDs  Osteopenia---1/2018 Dexa  Continue calcium, vitD and walk daily  Got PCV13 5/2015  Got pneumovax 2016  Got zoster in 2011  FU Dr Cristin Bojorquez for colonoscopy in 3/14/2016, repeat in 3 years  FU urology for PSA  He is on omeprazole for years  1/2018 Dexa showed osteopenia  RTO in 6 months  Living will---DNR per pt  POA----wife  Diagnoses and all orders for this visit:    Medicare annual wellness visit, subsequent    Essential hypertension  -     Comprehensive metabolic panel; Future    Hyperlipidemia, unspecified hyperlipidemia type  -     Lipid panel; Future    Impaired fasting glucose  -     Hemoglobin A1C; Future    Hypothyroidism, unspecified type  -     TSH, 3rd generation; Future    CKD (chronic kidney disease) stage 3, GFR 30-59 ml/min    Osteopenia, unspecified location          Subjective:      Patient ID: dAriana Lugo is a 69 y o  male     HPI    Pt is here by himself  HTN---Does not check BP at home  He is on bisoprolol and lisinopril  FU cardiology for cardiomyopathy, tachycardia, s/p pacemaker  Stable  [facility] Cancer care for melanoma s/p wide excision on right upper arm recently  Stable  Hyperlipidemia---on atorvastatin now  Denies SE    IFG---12/2017 hgA1C 5 2 normal  Hypothyroidism---12/2017 TSH 4 08 slightly elevated and Free T4 0 94 normal  Not on meds  Hx of colon cancer s/p surgery and chemo, had colonoscopy 3/2016 which showed diverticulosis  Repeat in 3 years  FU GI Dr Jonathan Maurice for Burt's, do EGD every 3 years  Last EGD was in 12/2016  Pt is on omeprazole 40mg daily now  FU Dr Celsa Maravilla for COPD before, but pt states he is not COPD  Not follow any more  Denies SOB  Does not use any inhaler  FU urology for BPH yearly, PSA 0 5 normal in 12/2016  Lives with wife  Does all ADL's  Still drive  Denies recent falls  denies depression  Denies smoking, alcohol or drugs  The following portions of the patient's history were reviewed and updated as appropriate: allergies, current medications, past family history, past medical history, past social history, past surgical history and problem list     Review of Systems   Constitutional: Negative for appetite change, chills and fever  HENT: Negative for congestion, ear pain, sinus pain and sore throat  Eyes: Negative for discharge and itching  Respiratory: Negative for apnea, cough, chest tightness, shortness of breath and wheezing  Cardiovascular: Negative for chest pain, palpitations and leg swelling  Gastrointestinal: Negative for abdominal pain, anal bleeding, constipation, diarrhea, nausea and vomiting  Endocrine: Negative for cold intolerance, heat intolerance and polyuria  Genitourinary: Negative for difficulty urinating and dysuria  Musculoskeletal: Negative for arthralgias, back pain and myalgias  Skin: Negative for rash     Neurological: Negative for dizziness and headaches  Psychiatric/Behavioral: Negative for agitation  Objective:    Vitals:    06/29/18 0859   BP: 130/84   Pulse: 68   Resp: 16   Temp: (!) 97 2 °F (36 2 °C)                Physical Exam   Constitutional: He appears well-developed  HENT:   Head: Normocephalic and atraumatic  Eyes: Conjunctivae are normal  Pupils are equal, round, and reactive to light  Neck: Normal range of motion  Neck supple  Cardiovascular: Normal rate, regular rhythm, normal heart sounds and intact distal pulses  Pulmonary/Chest: Effort normal and breath sounds normal    Abdominal: Soft  Bowel sounds are normal    Musculoskeletal: Normal range of motion  Neurological: He is alert

## 2018-07-17 ENCOUNTER — REMOTE DEVICE CLINIC VISIT (OUTPATIENT)
Dept: CARDIOLOGY CLINIC | Facility: CLINIC | Age: 78
End: 2018-07-17

## 2018-07-17 DIAGNOSIS — I47.2 VT (VENTRICULAR TACHYCARDIA) (HCC): Primary | ICD-10-CM

## 2018-07-17 DIAGNOSIS — Z95.810 PRESENCE OF IMPLANTABLE CARDIOVERTER-DEFIBRILLATOR (ICD): ICD-10-CM

## 2018-07-17 PROCEDURE — 99024 POSTOP FOLLOW-UP VISIT: CPT | Performed by: INTERNAL MEDICINE

## 2018-07-17 NOTE — PROGRESS NOTES
MDT SINGLE CHAMBER ICD  NON-BILLABLE CARELINK TRANSMISSION TO CHECK BATTERY STATUS:  BATTERY VOLTAGE NEAR ZACHARIAH (2 61V/RRT=2 63V)   ZACHARIAH NOT INDICATED   WILL CONTINUE MONTHLY BATTERY CHECKS    3 9%    ALL LEAD PARAMETERS WITHIN NORMAL LIMITS   NO SIGNIFICANT HIGH RATE EPISODES   OPTI-VOL WITHIN NORMAL LIMITS   NORMAL DEVICE FUNCTION   RG

## 2018-08-21 ENCOUNTER — REMOTE DEVICE CLINIC VISIT (OUTPATIENT)
Dept: CARDIOLOGY CLINIC | Facility: CLINIC | Age: 78
End: 2018-08-21
Payer: COMMERCIAL

## 2018-08-21 DIAGNOSIS — Z95.810 AICD (AUTOMATIC CARDIOVERTER/DEFIBRILLATOR) PRESENT: ICD-10-CM

## 2018-08-21 DIAGNOSIS — I47.2 VENTRICULAR TACHYARRHYTHMIA (HCC): Primary | ICD-10-CM

## 2018-08-21 PROCEDURE — 93296 REM INTERROG EVL PM/IDS: CPT | Performed by: INTERNAL MEDICINE

## 2018-08-21 PROCEDURE — 93295 DEV INTERROG REMOTE 1/2/MLT: CPT | Performed by: INTERNAL MEDICINE

## 2018-08-21 NOTE — PROGRESS NOTES
Results for orders placed or performed in visit on 08/21/18   Cardiac EP device report    Narrative    MDT SINGLE CHAMBER ICD  CARELINK TRANSMISSION: BATTERY VOLTAGE NEARING ZACHARIAH (2 61V-RRT=2 63V BUT HAS NOT INDICATED ZACHARIAH)  WILL SCHEDULE MONTHLY BATTERY CHECKS  -4%  ALL AVAILABLE LEAD PARAMETERS WITHIN NORMAL LIMITS  NO SIGNIFICANT HIGH RATE EPISODES  OPTI-VOL WITHIN NORMAL LIMITS  NORMAL DEVICE FUNCTION   GV

## 2018-09-20 ENCOUNTER — OFFICE VISIT (OUTPATIENT)
Dept: CARDIOLOGY CLINIC | Facility: CLINIC | Age: 78
End: 2018-09-20
Payer: COMMERCIAL

## 2018-09-20 VITALS
HEART RATE: 64 BPM | WEIGHT: 205.5 LBS | DIASTOLIC BLOOD PRESSURE: 78 MMHG | BODY MASS INDEX: 28.77 KG/M2 | HEIGHT: 71 IN | SYSTOLIC BLOOD PRESSURE: 110 MMHG

## 2018-09-20 DIAGNOSIS — Z45.02 AICD AT END OF BATTERY LIFE: Primary | ICD-10-CM

## 2018-09-20 DIAGNOSIS — E78.00 PURE HYPERCHOLESTEROLEMIA: ICD-10-CM

## 2018-09-20 DIAGNOSIS — I47.1 SUPRAVENTRICULAR TACHYCARDIA (HCC): ICD-10-CM

## 2018-09-20 DIAGNOSIS — I42.0 DILATED CARDIOMYOPATHY (HCC): ICD-10-CM

## 2018-09-20 DIAGNOSIS — I10 ESSENTIAL HYPERTENSION: ICD-10-CM

## 2018-09-20 DIAGNOSIS — I45.2 RIGHT BUNDLE BRANCH BLOCK WITH LEFT ANTERIOR FASCICULAR BLOCK: ICD-10-CM

## 2018-09-20 DIAGNOSIS — Z45.02 ICD (IMPLANTABLE CARDIOVERTER-DEFIBRILLATOR) BATTERY DEPLETION: Primary | ICD-10-CM

## 2018-09-20 PROCEDURE — 93000 ELECTROCARDIOGRAM COMPLETE: CPT | Performed by: INTERNAL MEDICINE

## 2018-09-20 PROCEDURE — 99214 OFFICE O/P EST MOD 30 MIN: CPT | Performed by: INTERNAL MEDICINE

## 2018-09-20 NOTE — PROGRESS NOTES
EPS Progress Note - Marleny Arenas 69 y o  male MRN: [id number]           ASSESSMENT:  1  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  POCT ECG   2  Essential hypertension     3  Dilated cardiomyopathy ([address] )     4  Supraventricular tachycardia ([facility] [address] )     5  Right bundle branch block with left anterior fascicular block     6  Pure hypercholesterolemia             PLAN:   his blood pressure is well controlled     His cardiomyopathy has improved greatly on medications including beta-blocker and ACE-inhibitor    He did ask me about aspirin use today with new study and his 10 year cardiovascular risk is greater than 10% therefore recommend he stay on baby aspirin    He has sinus bradycardia and his conduction system disease he is due for generator change and I recommended an upgrade to a dual-chamber ICD    He also is tolerating his statin and his lipid panel is well controlled     I am arranging for left-sided venogram and upgrade to dual-chamber ICD his ICD has reached elective replacement    HPI:   Interim history index ICD has reached elective replacement index 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 he is doing overall well cardiovascular perspective chest pain no shortness breath no lightheadedness no dizziness all other 12 point review of systems          ROS: 12 point ROS negative complaints    Objective:     Vitals: Blood pressure 110/78, pulse 64, height 5' 11" (1 803 m), weight 93 2 kg (205 lb 8 oz)  , Body mass index is 28 66 kg/m²  ,        Physical Exam:    GEN: Marleny Arenas appears well, alert and oriented x 3, pleasant and cooperative   HEENT: pupils equal, round, and reactive to light; extraocular muscles intact  NECK: supple, no carotid bruits   HEART: regular rhythm, normal S1 and S2, no murmurs, clicks, gallops or rubs   LUNGS: clear to auscultation bilaterally; no wheezes, rales, or rhonchi   ABDOMEN: normal bowel sounds, soft, no tenderness, no distention  EXTREMITIES: peripheral pulses normal; no clubbing, cyanosis, or edema  NEURO: no focal findings   SKIN: normal without suspicious lesions on exposed skin    Medications:      Current Outpatient Prescriptions:     aspirin 81 MG tablet, Take 81 mg by mouth daily  , Disp: , Rfl:     atorvastatin (LIPITOR) 10 mg tablet, TAKE 1 TABLET DAILY, Disp: 90 tablet, Rfl: 3    bisoprolol (ZEBETA) 10 MG tablet, TAKE 1 TABLET TWICE DAILY, Disp: 180 tablet, Rfl: 2    Calcium Carb-Cholecalciferol (CALCIUM 600 + D PO), Take 1 tablet by mouth daily  , Disp: , Rfl:     Coenzyme Q10 (CO Q-10) 200 MG CAPS, Take 1 tablet by mouth daily, Disp: , Rfl:     finasteride (PROSCAR) 5 mg tablet, Take 1 tablet (5 mg total) by mouth daily, Disp: 90 tablet, Rfl: 3    lisinopril (ZESTRIL) 20 mg tablet, Take 1 tablet (20 mg total) by mouth daily, Disp: 90 tablet, Rfl: 3    Multiple Vitamins-Minerals (CENTRUM SILVER PO), Take 1 tablet by mouth daily  , Disp: , Rfl:     omeprazole (PriLOSEC) 20 mg delayed release capsule, Take 1 capsule (20 mg total) by mouth daily, Disp: 90 capsule, Rfl: 3    Vitamin D, Cholecalciferol, 1000 UNITS CAPS, Take 1 tablet by mouth daily  , Disp: , Rfl:      Family History:   negative for known early coronary disease   social history:  Nonsmoker nondrinker  Labs & Results:  Below is the patient's most recent value for Albumin, ALT, AST, BUN, Calcium, Chloride, Cholesterol, CO2, Creatinine, GFR, Glucose, HDL, Hematocrit, Hemoglobin, Hemoglobin A1C, LDL, Magnesium, Phosphorus, Platelets, Potassium, PSA, Sodium, Triglycerides, and WBC     Lab Results   Component Value Date    ALT 27 06/18/2018    AST 21 06/18/2018    BUN 18 06/18/2018    CALCIUM 8 6 06/18/2018     (H) 06/18/2018    CHOL 163 12/19/2015    CO2 27 06/18/2018    CREATININE 1 51 (H) 06/18/2018    HDL 56 06/18/2018    HCT 45 0 12/13/2017    HGB 15 6 12/13/2017    HGBA1C 5 7 06/18/2018    MG 2 0 06/26/2015    PLT 169 2017    K 4 5 2018    PSA 0 4 2018     2018    TRIG 120 2018    WBC 9 40 2017     Note: for a comprehensive list of the patient's lab results, access the Results Review activity  Cardiac testing:   Results for orders placed during the hospital encounter of 18   Echo complete with contrast if indicated    Narrative [address]  [facility], [address] [place] [address]  [phone]    Transthoracic Echocardiogram  2D, M-mode, Doppler, and Color Doppler    Study date:  2018    Patient: Gladis Godwin  MR number: [id number]  Account number: [de-identified]  : 1940  Age: 69 years  Gender: Male  Status: Outpatient  Location: [address] [place] [address] [facility]  Height: 73 in  Weight: 205 lb  BP: 142/ 88 mmHg    Indications: Cardiomyopathy    Diagnoses: I42 9 - Cardiomyopathy, unspecified    Sonographer:  TORIE Perdomo  Primary Physician:  Trenton Vernon MD  Referring Physician:  Jolie Sena DO  Group:  [facility]  Interpreting Physician:  Alyssa Caballero MD    SUMMARY    COMPARISONS:  Ejection fraction has not changed  Aortic regurgitation not reported  study    LEFT VENTRICLE:  The ventricle was mildly dilated  Systolic function was at the lower limits of normal  Ejection fraction was estimated in the range of 50 %  Although no diagnostic regional wall motion abnormality was identified, this possibility cannot be completely excluded on the basis of this study  Wall thickness was mildly increased  Doppler parameters were consistent with abnormal left ventricular relaxation (grade 1 diastolic dysfunction)  LEFT ATRIUM:  The atrium was mildly dilated  MITRAL VALVE:  Valve structure was normal   There was mild regurgitation  AORTIC VALVE:  The valve was trileaflet  Leaflets exhibited mildly increased thickness, mild calcification, and normal cuspal separation    There was mild to moderate regurgitation  TRICUSPID VALVE:  There was mild regurgitation  Pulmonary artery systolic pressure was at the upper limits of normal     COMPARISONS:  Ejection fraction has not changed  Aortic regurgitation not reported 2015 study    HISTORY: PRIOR HISTORY: Hypertension, hyperlipidemia, cardiomyopathy, congested heart failure, ICD implantation, right, arrhythmia, bundle branch block, pleural effusion, gout, asthma, COPD, chronic kidney disease    PROCEDURE: The study was performed in the [address]  This was a routine study  The transthoracic approach was used  The study included complete 2D imaging, M-mode, complete spectral Doppler, and color Doppler  Image  quality was adequate  LEFT VENTRICLE: The ventricle was mildly dilated  Systolic function was at the lower limits of normal  Ejection fraction was estimated in the range of 50 %  Although no diagnostic regional wall motion abnormality was identified, this  possibility cannot be completely excluded on the basis of this study  Wall thickness was mildly increased  DOPPLER: Doppler parameters were consistent with abnormal left ventricular relaxation (grade 1 diastolic dysfunction)  RIGHT VENTRICLE: The size was normal  Systolic function was normal  Wall thickness was normal     LEFT ATRIUM: The atrium was mildly dilated  RIGHT ATRIUM: Size was at the upper limits of normal     MITRAL VALVE: Valve structure was normal  There was normal leaflet separation  DOPPLER: The transmitral velocity was within the normal range  There was no evidence for stenosis  There was mild regurgitation  The regurgitant jet was  centrally directed  AORTIC VALVE: The valve was trileaflet  Leaflets exhibited mildly increased thickness, mild calcification, and normal cuspal separation  DOPPLER: Transaortic velocity was within the normal range  There was no evidence for stenosis  There  was mild to moderate regurgitation      TRICUSPID VALVE: The valve structure was normal  There was normal leaflet separation  DOPPLER: The transtricuspid velocity was within the normal range  There was no evidence for stenosis  There was mild regurgitation  Pulmonary artery  systolic pressure was at the upper limits of normal     PULMONIC VALVE: Leaflets exhibited normal thickness, no calcification, and normal cuspal separation  DOPPLER: The transpulmonic velocity was within the normal range  There was no significant regurgitation  PERICARDIUM: There was no pericardial effusion  The pericardium was normal in appearance  AORTA: The root exhibited normal size  SYSTEMIC VEINS: IVC: The inferior vena cava was normal in size  SYSTEM MEASUREMENT TABLES    2D  %FS: 14 92 %  AV Diam: 4 13 cm  EDV(Teich): 204 74 ml  EF Biplane: 35 68 %  EF(Cube): 38 41 %  EF(Teich): 30 93 %  ESV(Cube): 157 59 ml  ESV(Teich): 141 4 ml  IVSd: 1 4 cm  LA Area: 23 26 cm2  LA Diam: 4 74 cm  LVEDV MOD A2C: 299 32 ml  LVEDV MOD A4C: 281 87 ml  LVEDV MOD BP: 291 9 ml  LVEF MOD A2C: 32 06 %  LVEF MOD A4C: 41 26 %  LVESV MOD A2C: 203 36 ml  LVESV MOD A4C: 165 56 ml  LVESV MOD BP: 187 74 ml  LVIDd: 6 35 cm  LVIDs: 5 4 cm  LVLd A2C: 9 71 cm  LVLd A4C: 9 6 cm  LVLs A2C: 8 9 cm  LVLs A4C: 8 45 cm  LVPWd: 1 37 cm  RA Area: 21 21 cm2  RV Diam : 3 81 cm  SV MOD A2C: 95 96 ml  SV MOD A4C: 116 31 ml  SV(Cube): 98 28 ml  SV(Teich): 63 33 ml    CW  AR Dec [place]: 1 9 m/s2  AR Dec Time: 2175 49 ms  AR PHT: 630 89 ms  AR Vmax: 4 12 m/s  AR maxP 11 mmHg  TR Vmax: 2 83 m/s  TR maxP 05 mmHg    MM  TAPSE: 1 72 cm    PW  AVC: 426 61 ms  E': 0 03 m/s  E/E': 13 41  MV A Benigno: 0 45 m/s  MV Dec [place]: 1 68 m/s2  MV DecT: 239 15 ms  MV E Benigno: 0 4 m/s  MV E/A Ratio: 0 9    [employer] Accredited Echocardiography Laboratory    Prepared and electronically signed by    Julia Pierre MD  Signed 2018 13:02:37       No results found for this or any previous visit    No results found for this or any previous visit  No results found for this or any previous visit  EKG personally reviewed by Dayna Dietz, DO    Sinus rhythm nonspecific interventricular conduction

## 2018-09-21 ENCOUNTER — REMOTE DEVICE CLINIC VISIT (OUTPATIENT)
Dept: CARDIOLOGY CLINIC | Facility: CLINIC | Age: 78
End: 2018-09-21

## 2018-09-21 ENCOUNTER — APPOINTMENT (OUTPATIENT)
Dept: LAB | Facility: HOSPITAL | Age: 78
End: 2018-09-21
Attending: INTERNAL MEDICINE
Payer: COMMERCIAL

## 2018-09-21 DIAGNOSIS — Z95.810 AICD (AUTOMATIC CARDIOVERTER/DEFIBRILLATOR) PRESENT: ICD-10-CM

## 2018-09-21 DIAGNOSIS — I47.2 PAROXYSMAL VENTRICULAR TACHYCARDIA (HCC): Primary | ICD-10-CM

## 2018-09-21 LAB
ALBUMIN SERPL BCP-MCNC: 3.4 G/DL (ref 3.5–5)
ALP SERPL-CCNC: 68 U/L (ref 46–116)
ALT SERPL W P-5'-P-CCNC: 22 U/L (ref 12–78)
ANION GAP SERPL CALCULATED.3IONS-SCNC: 7 MMOL/L (ref 4–13)
AST SERPL W P-5'-P-CCNC: 19 U/L (ref 5–45)
BASOPHILS # BLD AUTO: 0.04 THOUSANDS/ΜL (ref 0–0.1)
BASOPHILS NFR BLD AUTO: 1 % (ref 0–1)
BILIRUB SERPL-MCNC: 1.34 MG/DL (ref 0.2–1)
BUN SERPL-MCNC: 20 MG/DL (ref 5–25)
CALCIUM SERPL-MCNC: 8.7 MG/DL (ref 8.3–10.1)
CHLORIDE SERPL-SCNC: 108 MMOL/L (ref 100–108)
CO2 SERPL-SCNC: 26 MMOL/L (ref 21–32)
CREAT SERPL-MCNC: 1.39 MG/DL (ref 0.6–1.3)
EOSINOPHIL # BLD AUTO: 0.25 THOUSAND/ΜL (ref 0–0.61)
EOSINOPHIL NFR BLD AUTO: 3 % (ref 0–6)
ERYTHROCYTE [DISTWIDTH] IN BLOOD BY AUTOMATED COUNT: 13.8 % (ref 11.6–15.1)
GFR SERPL CREATININE-BSD FRML MDRD: 49 ML/MIN/1.73SQ M
GLUCOSE SERPL-MCNC: 84 MG/DL (ref 65–140)
HCT VFR BLD AUTO: 43.8 % (ref 36.5–49.3)
HGB BLD-MCNC: 14.5 G/DL (ref 12–17)
IMM GRANULOCYTES # BLD AUTO: 0.03 THOUSAND/UL (ref 0–0.2)
IMM GRANULOCYTES NFR BLD AUTO: 0 % (ref 0–2)
LYMPHOCYTES # BLD AUTO: 1.43 THOUSANDS/ΜL (ref 0.6–4.47)
LYMPHOCYTES NFR BLD AUTO: 20 % (ref 14–44)
MCH RBC QN AUTO: 32.6 PG (ref 26.8–34.3)
MCHC RBC AUTO-ENTMCNC: 33.1 G/DL (ref 31.4–37.4)
MCV RBC AUTO: 98 FL (ref 82–98)
MONOCYTES # BLD AUTO: 0.79 THOUSAND/ΜL (ref 0.17–1.22)
MONOCYTES NFR BLD AUTO: 11 % (ref 4–12)
NEUTROPHILS # BLD AUTO: 4.77 THOUSANDS/ΜL (ref 1.85–7.62)
NEUTS SEG NFR BLD AUTO: 65 % (ref 43–75)
NRBC BLD AUTO-RTO: 0 /100 WBCS
PLATELET # BLD AUTO: 152 THOUSANDS/UL (ref 149–390)
PMV BLD AUTO: 9.7 FL (ref 8.9–12.7)
POTASSIUM SERPL-SCNC: 4.8 MMOL/L (ref 3.5–5.3)
PROT SERPL-MCNC: 6.9 G/DL (ref 6.4–8.2)
RBC # BLD AUTO: 4.45 MILLION/UL (ref 3.88–5.62)
SODIUM SERPL-SCNC: 141 MMOL/L (ref 136–145)
WBC # BLD AUTO: 7.31 THOUSAND/UL (ref 4.31–10.16)

## 2018-09-21 PROCEDURE — 36415 COLL VENOUS BLD VENIPUNCTURE: CPT

## 2018-09-21 PROCEDURE — 80053 COMPREHEN METABOLIC PANEL: CPT

## 2018-09-21 PROCEDURE — 99024 POSTOP FOLLOW-UP VISIT: CPT | Performed by: INTERNAL MEDICINE

## 2018-09-21 PROCEDURE — 85025 COMPLETE CBC W/AUTO DIFF WBC: CPT

## 2018-09-21 NOTE — PROGRESS NOTES
Results for orders placed or performed in visit on 09/21/18   Cardiac EP device report    Narrative    MDT SINGLE CHAMBER ICD  CARELINK TRANSMISSION: *MIKA/NB*BATTERY VOLTAGE NEARING ZACHARIAH  WILL SCHEDULE MONTHLY BATTERY CHECKS   2%  ALL AVAILABLE LEAD PARAMETERS WITHIN NORMAL LIMITS  NO SIGNIFICANT HIGH RATE EPISODES  OPTI-VOL WITHIN NORMAL LIMITS  NORMAL DEVICE FUNCTION   [place]

## 2018-10-04 ENCOUNTER — HOSPITAL ENCOUNTER (OUTPATIENT)
Dept: RADIOLOGY | Facility: HOSPITAL | Age: 78
Discharge: HOME | End: 2018-10-04
Attending: INTERNAL MEDICINE
Payer: COMMERCIAL

## 2018-10-04 DIAGNOSIS — Z45.02 AICD AT END OF BATTERY LIFE: ICD-10-CM

## 2018-10-04 PROCEDURE — 36005 INJECTION EXT VENOGRAPHY: CPT

## 2018-10-04 PROCEDURE — 75820 VEIN X-RAY ARM/LEG: CPT

## 2018-10-04 RX ADMIN — IODIXANOL 20 ML: 320 INJECTION, SOLUTION INTRAVASCULAR at 14:18

## 2018-10-09 ENCOUNTER — TELEPHONE (OUTPATIENT)
Dept: INPATIENT UNIT | Facility: HOSPITAL | Age: 78
End: 2018-10-09

## 2018-10-10 ENCOUNTER — APPOINTMENT (OUTPATIENT)
Dept: RADIOLOGY | Facility: HOSPITAL | Age: 78
End: 2018-10-10
Payer: COMMERCIAL

## 2018-10-10 ENCOUNTER — HOSPITAL ENCOUNTER (OUTPATIENT)
Dept: NON INVASIVE DIAGNOSTICS | Facility: HOSPITAL | Age: 78
Discharge: HOME | End: 2018-10-10
Attending: INTERNAL MEDICINE | Admitting: INTERNAL MEDICINE
Payer: COMMERCIAL

## 2018-10-10 ENCOUNTER — ANESTHESIA EVENT (OUTPATIENT)
Dept: SURGERY | Facility: HOSPITAL | Age: 78
End: 2018-10-10
Payer: COMMERCIAL

## 2018-10-10 ENCOUNTER — ANESTHESIA (OUTPATIENT)
Dept: SURGERY | Facility: HOSPITAL | Age: 78
End: 2018-10-10
Payer: COMMERCIAL

## 2018-10-10 VITALS
OXYGEN SATURATION: 93 % | TEMPERATURE: 97.7 F | WEIGHT: 205 LBS | HEIGHT: 71 IN | RESPIRATION RATE: 16 BRPM | BODY MASS INDEX: 28.7 KG/M2 | DIASTOLIC BLOOD PRESSURE: 85 MMHG | SYSTOLIC BLOOD PRESSURE: 135 MMHG | HEART RATE: 60 BPM

## 2018-10-10 DIAGNOSIS — Z45.02 ENCOUNTER DUE TO AICD AT END OF BATTERY LIFE: ICD-10-CM

## 2018-10-10 LAB
ANION GAP SERPL CALCULATED.3IONS-SCNC: 5 MMOL/L (ref 4–13)
ATRIAL RATE: 58 BPM
ATRIAL RATE: 61 BPM
BASOPHILS # BLD AUTO: 0.04 THOUSANDS/ΜL (ref 0–0.1)
BASOPHILS NFR BLD AUTO: 1 % (ref 0–1)
BUN SERPL-MCNC: 23 MG/DL (ref 5–25)
CALCIUM SERPL-MCNC: 8.8 MG/DL (ref 8.3–10.1)
CHLORIDE SERPL-SCNC: 107 MMOL/L (ref 100–108)
CO2 SERPL-SCNC: 27 MMOL/L (ref 21–32)
CREAT SERPL-MCNC: 1.45 MG/DL (ref 0.6–1.3)
EOSINOPHIL # BLD AUTO: 0.21 THOUSAND/ΜL (ref 0–0.61)
EOSINOPHIL NFR BLD AUTO: 3 % (ref 0–6)
ERYTHROCYTE [DISTWIDTH] IN BLOOD BY AUTOMATED COUNT: 13.6 % (ref 11.6–15.1)
GFR SERPL CREATININE-BSD FRML MDRD: 46 ML/MIN/1.73SQ M
GLUCOSE P FAST SERPL-MCNC: 94 MG/DL (ref 65–99)
GLUCOSE SERPL-MCNC: 94 MG/DL (ref 65–140)
HCT VFR BLD AUTO: 41.9 % (ref 36.5–49.3)
HGB BLD-MCNC: 14.1 G/DL (ref 12–17)
IMM GRANULOCYTES # BLD AUTO: 0.02 THOUSAND/UL (ref 0–0.2)
IMM GRANULOCYTES NFR BLD AUTO: 0 % (ref 0–2)
INR PPP: 0.97 (ref 0.86–1.17)
LYMPHOCYTES # BLD AUTO: 1.33 THOUSANDS/ΜL (ref 0.6–4.47)
LYMPHOCYTES NFR BLD AUTO: 18 % (ref 14–44)
MCH RBC QN AUTO: 31.5 PG (ref 26.8–34.3)
MCHC RBC AUTO-ENTMCNC: 33.7 G/DL (ref 31.4–37.4)
MCV RBC AUTO: 94 FL (ref 82–98)
MONOCYTES # BLD AUTO: 0.73 THOUSAND/ΜL (ref 0.17–1.22)
MONOCYTES NFR BLD AUTO: 10 % (ref 4–12)
NEUTROPHILS # BLD AUTO: 4.94 THOUSANDS/ΜL (ref 1.85–7.62)
NEUTS SEG NFR BLD AUTO: 68 % (ref 43–75)
NRBC BLD AUTO-RTO: 0 /100 WBCS
P AXIS: -23 DEGREES
P AXIS: 49 DEGREES
PLATELET # BLD AUTO: 166 THOUSANDS/UL (ref 149–390)
PMV BLD AUTO: 9.3 FL (ref 8.9–12.7)
POTASSIUM SERPL-SCNC: 4.7 MMOL/L (ref 3.5–5.3)
PR INTERVAL: 272 MS
PR INTERVAL: 306 MS
PROTHROMBIN TIME: 13 SECONDS (ref 11.8–14.2)
QRS AXIS: -47 DEGREES
QRS AXIS: -48 DEGREES
QRSD INTERVAL: 140 MS
QRSD INTERVAL: 142 MS
QT INTERVAL: 486 MS
QT INTERVAL: 486 MS
QTC INTERVAL: 477 MS
QTC INTERVAL: 489 MS
RBC # BLD AUTO: 4.47 MILLION/UL (ref 3.88–5.62)
SODIUM SERPL-SCNC: 139 MMOL/L (ref 136–145)
T WAVE AXIS: 129 DEGREES
T WAVE AXIS: 133 DEGREES
VENTRICULAR RATE: 58 BPM
VENTRICULAR RATE: 61 BPM
WBC # BLD AUTO: 7.27 THOUSAND/UL (ref 4.31–10.16)

## 2018-10-10 PROCEDURE — 80048 BASIC METABOLIC PNL TOTAL CA: CPT | Performed by: PHYSICIAN ASSISTANT

## 2018-10-10 PROCEDURE — C1721 AICD, DUAL CHAMBER: HCPCS

## 2018-10-10 PROCEDURE — 33241 REMOVE PULSE GENERATOR: CPT

## 2018-10-10 PROCEDURE — C1892 INTRO/SHEATH,FIXED,PEEL-AWAY: HCPCS | Performed by: INTERNAL MEDICINE

## 2018-10-10 PROCEDURE — 71045 X-RAY EXAM CHEST 1 VIEW: CPT

## 2018-10-10 PROCEDURE — 85610 PROTHROMBIN TIME: CPT | Performed by: PHYSICIAN ASSISTANT

## 2018-10-10 PROCEDURE — 33241 REMOVE PULSE GENERATOR: CPT | Performed by: INTERNAL MEDICINE

## 2018-10-10 PROCEDURE — C1898 LEAD, PMKR, OTHER THAN TRANS: HCPCS

## 2018-10-10 PROCEDURE — 93005 ELECTROCARDIOGRAM TRACING: CPT

## 2018-10-10 PROCEDURE — 93010 ELECTROCARDIOGRAM REPORT: CPT | Performed by: INTERNAL MEDICINE

## 2018-10-10 PROCEDURE — 85025 COMPLETE CBC W/AUTO DIFF WBC: CPT | Performed by: PHYSICIAN ASSISTANT

## 2018-10-10 PROCEDURE — 33249 INSJ/RPLCMT DEFIB W/LEAD(S): CPT

## 2018-10-10 PROCEDURE — 33249 INSJ/RPLCMT DEFIB W/LEAD(S): CPT | Performed by: INTERNAL MEDICINE

## 2018-10-10 RX ORDER — ATORVASTATIN CALCIUM 10 MG/1
10 TABLET, FILM COATED ORAL DAILY
Status: DISCONTINUED | OUTPATIENT
Start: 2018-10-10 | End: 2018-10-10 | Stop reason: HOSPADM

## 2018-10-10 RX ORDER — FENTANYL CITRATE 50 UG/ML
INJECTION, SOLUTION INTRAMUSCULAR; INTRAVENOUS AS NEEDED
Status: DISCONTINUED | OUTPATIENT
Start: 2018-10-10 | End: 2018-10-10 | Stop reason: SURG

## 2018-10-10 RX ORDER — SODIUM CHLORIDE, SODIUM LACTATE, POTASSIUM CHLORIDE, CALCIUM CHLORIDE 600; 310; 30; 20 MG/100ML; MG/100ML; MG/100ML; MG/100ML
100 INJECTION, SOLUTION INTRAVENOUS CONTINUOUS
Status: CANCELLED | OUTPATIENT
Start: 2018-10-10

## 2018-10-10 RX ORDER — ACETAMINOPHEN 325 MG/1
650 TABLET ORAL EVERY 4 HOURS PRN
Status: DISCONTINUED | OUTPATIENT
Start: 2018-10-10 | End: 2018-10-10 | Stop reason: HOSPADM

## 2018-10-10 RX ORDER — PROPOFOL 10 MG/ML
INJECTION, EMULSION INTRAVENOUS CONTINUOUS PRN
Status: DISCONTINUED | OUTPATIENT
Start: 2018-10-10 | End: 2018-10-10 | Stop reason: SURG

## 2018-10-10 RX ORDER — HYDROXYZINE 50 MG/1
50 TABLET, FILM COATED ORAL EVERY 6 HOURS PRN
Status: DISCONTINUED | OUTPATIENT
Start: 2018-10-10 | End: 2018-10-10 | Stop reason: HOSPADM

## 2018-10-10 RX ORDER — DOCUSATE SODIUM 100 MG/1
100 CAPSULE, LIQUID FILLED ORAL 2 TIMES DAILY PRN
Status: DISCONTINUED | OUTPATIENT
Start: 2018-10-10 | End: 2018-10-10 | Stop reason: HOSPADM

## 2018-10-10 RX ORDER — PANTOPRAZOLE SODIUM 20 MG/1
20 TABLET, DELAYED RELEASE ORAL
Status: DISCONTINUED | OUTPATIENT
Start: 2018-10-10 | End: 2018-10-10 | Stop reason: HOSPADM

## 2018-10-10 RX ORDER — PROMETHAZINE HYDROCHLORIDE 25 MG/ML
12.5 INJECTION, SOLUTION INTRAMUSCULAR; INTRAVENOUS ONCE AS NEEDED
Status: CANCELLED | OUTPATIENT
Start: 2018-10-10

## 2018-10-10 RX ORDER — BISOPROLOL FUMARATE 5 MG/1
10 TABLET ORAL 2 TIMES DAILY
Status: DISCONTINUED | OUTPATIENT
Start: 2018-10-10 | End: 2018-10-10 | Stop reason: HOSPADM

## 2018-10-10 RX ORDER — LIDOCAINE HYDROCHLORIDE 10 MG/ML
INJECTION, SOLUTION INFILTRATION; PERINEURAL CODE/TRAUMA/SEDATION MEDICATION
Status: COMPLETED | OUTPATIENT
Start: 2018-10-10 | End: 2018-10-10

## 2018-10-10 RX ORDER — LISINOPRIL 20 MG/1
20 TABLET ORAL DAILY
Status: DISCONTINUED | OUTPATIENT
Start: 2018-10-10 | End: 2018-10-10 | Stop reason: HOSPADM

## 2018-10-10 RX ORDER — ASPIRIN 81 MG/1
81 TABLET, CHEWABLE ORAL DAILY
Status: DISCONTINUED | OUTPATIENT
Start: 2018-10-10 | End: 2018-10-10 | Stop reason: HOSPADM

## 2018-10-10 RX ORDER — GENTAMICIN SULFATE 40 MG/ML
INJECTION, SOLUTION INTRAMUSCULAR; INTRAVENOUS CODE/TRAUMA/SEDATION MEDICATION
Status: COMPLETED | OUTPATIENT
Start: 2018-10-10 | End: 2018-10-10

## 2018-10-10 RX ORDER — FENTANYL CITRATE/PF 50 MCG/ML
25 SYRINGE (ML) INJECTION
Status: CANCELLED | OUTPATIENT
Start: 2018-10-10

## 2018-10-10 RX ORDER — FINASTERIDE 5 MG/1
5 TABLET, FILM COATED ORAL DAILY
Status: DISCONTINUED | OUTPATIENT
Start: 2018-10-10 | End: 2018-10-10 | Stop reason: HOSPADM

## 2018-10-10 RX ORDER — METOCLOPRAMIDE HYDROCHLORIDE 5 MG/ML
10 INJECTION INTRAMUSCULAR; INTRAVENOUS ONCE AS NEEDED
Status: CANCELLED | OUTPATIENT
Start: 2018-10-10

## 2018-10-10 RX ORDER — MIDAZOLAM HYDROCHLORIDE 1 MG/ML
INJECTION INTRAMUSCULAR; INTRAVENOUS AS NEEDED
Status: DISCONTINUED | OUTPATIENT
Start: 2018-10-10 | End: 2018-10-10 | Stop reason: SURG

## 2018-10-10 RX ORDER — ONDANSETRON 2 MG/ML
4 INJECTION INTRAMUSCULAR; INTRAVENOUS ONCE AS NEEDED
Status: CANCELLED | OUTPATIENT
Start: 2018-10-10

## 2018-10-10 RX ORDER — SODIUM CHLORIDE 9 MG/ML
INJECTION, SOLUTION INTRAVENOUS CONTINUOUS PRN
Status: DISCONTINUED | OUTPATIENT
Start: 2018-10-10 | End: 2018-10-10 | Stop reason: SURG

## 2018-10-10 RX ORDER — HYDROMORPHONE HCL/PF 1 MG/ML
0.4 SYRINGE (ML) INJECTION
Status: CANCELLED | OUTPATIENT
Start: 2018-10-10

## 2018-10-10 RX ADMIN — FENTANYL CITRATE 50 MCG: 50 INJECTION, SOLUTION INTRAMUSCULAR; INTRAVENOUS at 09:42

## 2018-10-10 RX ADMIN — LIDOCAINE HYDROCHLORIDE 20 ML: 10 INJECTION, SOLUTION INFILTRATION; PERINEURAL at 10:19

## 2018-10-10 RX ADMIN — IOHEXOL 10 ML: 350 INJECTION, SOLUTION INTRAVENOUS at 10:29

## 2018-10-10 RX ADMIN — GENTAMICIN SULFATE 80 MG: 40 INJECTION, SOLUTION INTRAMUSCULAR; INTRAVENOUS at 10:54

## 2018-10-10 RX ADMIN — MIDAZOLAM 2 MG: 1 INJECTION INTRAMUSCULAR; INTRAVENOUS at 09:42

## 2018-10-10 RX ADMIN — SODIUM CHLORIDE: 0.9 INJECTION, SOLUTION INTRAVENOUS at 09:00

## 2018-10-10 RX ADMIN — PROPOFOL 60 MCG/KG/MIN: 10 INJECTION, EMULSION INTRAVENOUS at 09:45

## 2018-10-10 RX ADMIN — CEFAZOLIN SODIUM 2000 MG: 2 SOLUTION INTRAVENOUS at 09:40

## 2018-10-10 RX ADMIN — ACETAMINOPHEN 650 MG: 325 TABLET, FILM COATED ORAL at 13:43

## 2018-10-10 NOTE — PROGRESS NOTES
Per Pop mccormack Pa with EP once the x-ray is read and patients bedrest is over the patient can be discharged

## 2018-10-10 NOTE — DISCHARGE INSTRUCTIONS
Please refer to post ICD implantation discharge instructions and restrictions below and your ICD booklet/temporary card  Keep incision dry for one week  Do not use lotions/powders/creams on incision  Remove outer bandage 48 hours after implantation  Leave underlying steri-strips in place, they will either fall off on their own or will be removed at 2 week follow up appointment  No overhead reaching/pushing/pulling/lifting greater than 5-10lbs with left arm for one month  Please call the office if you notice redness, swelling, bleeding, or drainage from incision or if you develop fevers    Implantable Cardioverter Defibrillator      If you have any questions, please call [phone] to speak with a nurse (8:30am-4pm, or [phone] after hours)  For appointments, please call [phone]  WHAT YOU SHOULD KNOW:    An implantable cardioverter defibrillator (ICD) is a small device that monitors your heart rate and rhythm  It is commonly placed inside your upper chest region  It may be used if you have a ventricular arrhythmia, which is an irregular, dangerous rhythm from the bottom chamber of your heart  Some arrhythmias may cause your heart to suddenly stop beating  An ICD can give a shock to your heart to make it start beating again, or it can give pacing therapy (also known as pain-free therapy) to return your heart to normal rhythm  It is also a pacemaker, so it will pace your heart if needed to prevent it from beating too slowly            AFTER YOU LEAVE:      Follow up with your primary healthcare provider or cardiologist as directed: You will need to follow-up to have your ICD checked and make sure you are not having problems  Write down your questions so you remember to ask them during your visits  Self-care:   · Ask about activity: Ask if you need to avoid moving your shoulder or arm, and for how long  Ask if you should avoid lifting heavy objects   Do not play any contact sports, such as football or wrestling, until your primary healthcare provider [facility] or cardiologist tells you it is okay  You may only be able to drive for a certain amount of time per day, or during certain hours  Ask when you can return to work  · Care for your skin over the ICD: Ask your PHP or cardiologist when it is okay for you to bathe  Do not put any lotion or powder on the incision area  Do not rub or wear tight clothing over the ICD area until your PHP or cardiologist tells you it is okay  When you get a shock from your ICD: A shock may feel like someone has hit you, or you may feel a thump in the chest  If someone is touching you when you get a shock, they may feel a tingling feeling  The first time you feel a shock, it may scare you  Sit or lie down and stay calm  Ask someone to stay with you if possible  Please either call your cardiologist or report to an emergency room  Safety instructions when you have an ICD:   · Carry an ID card for the ICD: This card has important information about your ICD  · Stay away from magnets or machines with electric fields: This includes MRI machines  Avoid leaning into a car engine or doing welding  These things can interfere with how your ICD works  · Tell airport security you have an ICD: You may need to be searched by hand when you go through a security gate  The security gate or handheld wand could harm your ICD  · Keep an ICD diary: Record when you get a shock and what you were doing before you got the shock  Keep track of how you felt before and after the shock, as well as how many shocks you received  Write down the day and time of each shock  Bring the diary with you when you see your PHP or cardiologist    · Carry medical alert identification: Wear medical alert jewelry or carry a card that says you have an ICD  Ask your PHP or cardiologist where to get these items  Contact your primary healthcare provider or cardiologist if:   · You have a fever      · You feel 1 or more shocks from your ICD and feel fine afterwards  · Your feet or ankles swell  · The area around your ICD is painful or tender after surgery  · The skin around your stitches or staples is red, swollen, or draining pus or fluid  · You have chills, a cough, and feel weak or achy  · You are sad or anxious and find it hard to do your usual activities  · You have questions or concerns about your condition or care  Seek care immediately or call 911 if:   · Your stitches or staples come apart  · Blood soaks through your bandage  · You feel more than 3 shocks in a row from your ICD  · You become weak, dizzy, or faint  · You feel your heart skip beats or beat very fast or slow, but you do not feel a shock from your ICD  · You have chest pain that does not go away with rest or medicine  © 2014 [employer] is for End User's use only and may not be sold, redistributed or otherwise used for commercial purposes  All illustrations and images included in CareNotes® are the copyrighted property of [employer]  or Paul Moreno  The above information is an  only  It is not intended as medical advice for individual conditions or treatments  Talk to your doctor, nurse or pharmacist before following any medical regimen to see if it is safe and effective for you

## 2018-10-10 NOTE — ANESTHESIA PREPROCEDURE EVALUATION
Review of Systems/Medical History  Patient summary reviewed  Chart reviewed  No history of anesthetic complications     Cardiovascular  Exercise tolerance (METS): <4,  Pacemaker/AICD (ICD at end of battery life), Hyperlipidemia, Hypertension controlled, Dysrhythmias (RBBB with LAFB) , CHF , NYHA Classification: II ,   Comment: Cardiomyopathy, LVEF 50% 6/21/18,  Pulmonary  COPD mild- PRN medicaiton , Asthma , well controlled/ stable ,        GI/Hepatic    GERD , Esophageal disease srinivasan esophagus, GI malignancy (colon Ca),        Chronic kidney disease stage 3, Prostatic disorder, benign prostatic hyperplasia       Endo/Other  History of thyroid disease , hypothyroidism,      GYN  Negative gynecology ROS          Hematology    Thrombocytopenia,    Musculoskeletal  Gout,   Comment: Polymyalgia rheumatica Arthritis     Neurology  Negative neurology ROS      Psychology   Negative psychology ROS              Physical Exam    Airway    Mallampati score: II  TM Distance: >3 FB  Neck ROM: full     Dental   No notable dental hx     Cardiovascular      Pulmonary      Other Findings        Anesthesia Plan  ASA Score- 3     Anesthesia Type- IV sedation with anesthesia with ASA Monitors  Additional Monitors:   Airway Plan:         Plan Factors- Patient instructed to abstain from smoking on day of procedure  Patient did not smoke on day of surgery  Induction-     Postoperative Plan- Plan for postoperative opioid use  Informed Consent- Anesthetic plan and risks discussed with patient  I personally reviewed this patient with the CRNA  Discussed and agreed on the Anesthesia Plan with the CRNA  Ariadna Rojas

## 2018-10-10 NOTE — ANESTHESIA POSTPROCEDURE EVALUATION
Post-Op Assessment Note      CV Status:  Stable    Mental Status:  Alert and awake    Hydration Status:  Euvolemic    PONV Controlled:  Controlled    Airway Patency:  Patent    Post Op Vitals Reviewed: Yes          Staff: Anesthesiologist, CRNA           BP   156/87   Temp      Pulse  72   Resp   16   SpO2   98%

## 2018-10-10 NOTE — PROGRESS NOTES
Xray was read and confirmed by Irasema MERCHANT, with EP and at this time patient can be discharged will provide information

## 2018-10-10 NOTE — H&P (VIEW-ONLY)
EPS Progress Note - Jesus Goodrich 69 y o  male MRN: [id number]           ASSESSMENT:  1  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  POCT ECG   2  Essential hypertension     3  Dilated cardiomyopathy ([address] )     4  Supraventricular tachycardia ([address] )     5  Right bundle branch block with left anterior fascicular block     6  Pure hypercholesterolemia             PLAN:   his blood pressure is well controlled     His cardiomyopathy has improved greatly on medications including beta-blocker and ACE-inhibitor    He did ask me about aspirin use today with new study and his 10 year cardiovascular risk is greater than 10% therefore recommend he stay on baby aspirin    He has sinus bradycardia and his conduction system disease he is due for generator change and I recommended an upgrade to a dual-chamber ICD    He also is tolerating his statin and his lipid panel is well controlled     I am arranging for left-sided venogram and upgrade to dual-chamber ICD his ICD has reached elective replacement    HPI:   Interim history index ICD has reached elective replacement index 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 he is doing overall well cardiovascular perspective chest pain no shortness breath no lightheadedness no dizziness all other 12 point review of systems          ROS: 12 point ROS negative complaints    Objective:     Vitals: Blood pressure 110/78, pulse 64, height 5' 11" (1 803 m), weight 93 2 kg (205 lb 8 oz)  , Body mass index is 28 66 kg/m²  ,        Physical Exam:    GEN: Jesus Goodrich appears well, alert and oriented x 3, pleasant and cooperative   HEENT: pupils equal, round, and reactive to light; extraocular muscles intact  NECK: supple, no carotid bruits   HEART: regular rhythm, normal S1 and S2, no murmurs, clicks, gallops or rubs   LUNGS: clear to auscultation bilaterally; no wheezes, rales, or rhonchi   ABDOMEN: normal bowel sounds, soft, no tenderness, no distention  EXTREMITIES: peripheral pulses normal; no clubbing, cyanosis, or edema  NEURO: no focal findings   SKIN: normal without suspicious lesions on exposed skin    Medications:      Current Outpatient Prescriptions:     aspirin 81 MG tablet, Take 81 mg by mouth daily  , Disp: , Rfl:     atorvastatin (LIPITOR) 10 mg tablet, TAKE 1 TABLET DAILY, Disp: 90 tablet, Rfl: 3    bisoprolol (ZEBETA) 10 MG tablet, TAKE 1 TABLET TWICE DAILY, Disp: 180 tablet, Rfl: 2    Calcium Carb-Cholecalciferol (CALCIUM 600 + D PO), Take 1 tablet by mouth daily  , Disp: , Rfl:     Coenzyme Q10 (CO Q-10) 200 MG CAPS, Take 1 tablet by mouth daily, Disp: , Rfl:     finasteride (PROSCAR) 5 mg tablet, Take 1 tablet (5 mg total) by mouth daily, Disp: 90 tablet, Rfl: 3    lisinopril (ZESTRIL) 20 mg tablet, Take 1 tablet (20 mg total) by mouth daily, Disp: 90 tablet, Rfl: 3    Multiple Vitamins-Minerals (CENTRUM SILVER PO), Take 1 tablet by mouth daily  , Disp: , Rfl:     omeprazole (PriLOSEC) 20 mg delayed release capsule, Take 1 capsule (20 mg total) by mouth daily, Disp: 90 capsule, Rfl: 3    Vitamin D, Cholecalciferol, 1000 UNITS CAPS, Take 1 tablet by mouth daily  , Disp: , Rfl:      Family History:   negative for known early coronary disease   social history:  Nonsmoker nondrinker  Labs & Results:  Below is the patient's most recent value for Albumin, ALT, AST, BUN, Calcium, Chloride, Cholesterol, CO2, Creatinine, GFR, Glucose, HDL, Hematocrit, Hemoglobin, Hemoglobin A1C, LDL, Magnesium, Phosphorus, Platelets, Potassium, PSA, Sodium, Triglycerides, and WBC     Lab Results   Component Value Date    ALT 27 06/18/2018    AST 21 06/18/2018    BUN 18 06/18/2018    CALCIUM 8 6 06/18/2018     (H) 06/18/2018    CHOL 163 12/19/2015    CO2 27 06/18/2018    CREATININE 1 51 (H) 06/18/2018    HDL 56 06/18/2018    HCT 45 0 12/13/2017    HGB 15 6 12/13/2017    HGBA1C 5 7 06/18/2018    MG 2 0 06/26/2015    PLT 169 2017    K 4 5 2018    PSA 0 4 2018     2018    TRIG 120 2018    WBC 9 40 2017     Note: for a comprehensive list of the patient's lab results, access the Results Review activity  Cardiac testing:   Results for orders placed during the hospital encounter of 18   Echo complete with contrast if indicated    Narrative [address]  [address]  [phone]    Transthoracic Echocardiogram  2D, M-mode, Doppler, and Color Doppler    Study date:  2018    Patient: Renae Guzman  MR number: [id number]  Account number: [de-identified]  : 1940  Age: 69 years  Gender: Male  Status: Outpatient  Location: [address] [place] [address] and [facility] [place]  Height: 73 in  Weight: 205 lb  BP: 142/ 88 mmHg    Indications: Cardiomyopathy    Diagnoses: I42 9 - Cardiomyopathy, unspecified    Sonographer:  TORIE Coleman  Primary Physician:  Ricki Abdi MD  Referring Physician:  Sophia Chavarria DO  Group:  [id number]  Interpreting Physician:  Joshua Connelly MD    SUMMARY    COMPARISONS:  Ejection fraction has not changed  Aortic regurgitation not reported  study    LEFT VENTRICLE:  The ventricle was mildly dilated  Systolic function was at the lower limits of normal  Ejection fraction was estimated in the range of 50 %  Although no diagnostic regional wall motion abnormality was identified, this possibility cannot be completely excluded on the basis of this study  Wall thickness was mildly increased  Doppler parameters were consistent with abnormal left ventricular relaxation (grade 1 diastolic dysfunction)  LEFT ATRIUM:  The atrium was mildly dilated  MITRAL VALVE:  Valve structure was normal   There was mild regurgitation  AORTIC VALVE:  The valve was trileaflet  Leaflets exhibited mildly increased thickness, mild calcification, and normal cuspal separation    There was mild to moderate regurgitation  TRICUSPID VALVE:  There was mild regurgitation  Pulmonary artery systolic pressure was at the upper limits of normal     COMPARISONS:  Ejection fraction has not changed  Aortic regurgitation not reported 2015 study    HISTORY: PRIOR HISTORY: Hypertension, hyperlipidemia, cardiomyopathy, congested heart failure, ICD implantation, right, arrhythmia, bundle branch block, pleural effusion, gout, asthma, COPD, chronic kidney disease    PROCEDURE: The study was performed in the [address]  This was a routine study  The transthoracic approach was used  The study included complete 2D imaging, M-mode, complete spectral Doppler, and color Doppler  Image  quality was adequate  LEFT VENTRICLE: The ventricle was mildly dilated  Systolic function was at the lower limits of normal  Ejection fraction was estimated in the range of 50 %  Although no diagnostic regional wall motion abnormality was identified, this  possibility cannot be completely excluded on the basis of this study  Wall thickness was mildly increased  DOPPLER: Doppler parameters were consistent with abnormal left ventricular relaxation (grade 1 diastolic dysfunction)  RIGHT VENTRICLE: The size was normal  Systolic function was normal  Wall thickness was normal     LEFT ATRIUM: The atrium was mildly dilated  RIGHT ATRIUM: Size was at the upper limits of normal     MITRAL VALVE: Valve structure was normal  There was normal leaflet separation  DOPPLER: The transmitral velocity was within the normal range  There was no evidence for stenosis  There was mild regurgitation  The regurgitant jet was  centrally directed  AORTIC VALVE: The valve was trileaflet  Leaflets exhibited mildly increased thickness, mild calcification, and normal cuspal separation  DOPPLER: Transaortic velocity was within the normal range  There was no evidence for stenosis  There  was mild to moderate regurgitation      TRICUSPID VALVE: The valve structure was normal  There was normal leaflet separation  DOPPLER: The transtricuspid velocity was within the normal range  There was no evidence for stenosis  There was mild regurgitation  Pulmonary artery  systolic pressure was at the upper limits of normal     PULMONIC VALVE: Leaflets exhibited normal thickness, no calcification, and normal cuspal separation  DOPPLER: The transpulmonic velocity was within the normal range  There was no significant regurgitation  PERICARDIUM: There was no pericardial effusion  The pericardium was normal in appearance  AORTA: The root exhibited normal size  SYSTEMIC VEINS: IVC: The inferior vena cava was normal in size  SYSTEM MEASUREMENT TABLES    2D  %FS: 14 92 %  AV Diam: 4 13 cm  EDV(Teich): 204 74 ml  EF Biplane: 35 68 %  EF(Cube): 38 41 %  EF(Teich): 30 93 %  ESV(Cube): 157 59 ml  ESV(Teich): 141 4 ml  IVSd: 1 4 cm  LA Area: 23 26 cm2  LA Diam: 4 74 cm  LVEDV MOD A2C: 299 32 ml  LVEDV MOD A4C: 281 87 ml  LVEDV MOD BP: 291 9 ml  LVEF MOD A2C: 32 06 %  LVEF MOD A4C: 41 26 %  LVESV MOD A2C: 203 36 ml  LVESV MOD A4C: 165 56 ml  LVESV MOD BP: 187 74 ml  LVIDd: 6 35 cm  LVIDs: 5 4 cm  LVLd A2C: 9 71 cm  LVLd A4C: 9 6 cm  LVLs A2C: 8 9 cm  LVLs A4C: 8 45 cm  LVPWd: 1 37 cm  RA Area: 21 21 cm2  RV Diam : 3 81 cm  SV MOD A2C: 95 96 ml  SV MOD A4C: 116 31 ml  SV(Cube): 98 28 ml  SV(Teich): 63 33 ml    CW  AR Dec Campbell: 1 9 m/s2  AR Dec Time: 2175 49 ms  AR PHT: 630 89 ms  AR Vmax: 4 12 m/s  AR maxP 11 mmHg  TR Vmax: 2 83 m/s  TR maxP 05 mmHg    MM  TAPSE: 1 72 cm    PW  AVC: 426 61 ms  E': 0 03 m/s  E/E': 13 41  MV A Benigno: 0 45 m/s  MV Dec Campbell: 1 68 m/s2  MV DecT: 239 15 ms  MV E Benigno: 0 4 m/s  MV E/A Ratio: 0 9    [employer] Accredited Echocardiography Laboratory    Prepared and electronically signed by    Lorine Osler, MD  Signed 2018 13:02:37       No results found for this or any previous visit    No results found for this or any previous visit  No results found for this or any previous visit  EKG personally reviewed by Ashely Jean, DO    Sinus rhythm nonspecific interventricular conduction

## 2018-10-24 ENCOUNTER — IN-CLINIC DEVICE VISIT (OUTPATIENT)
Dept: CARDIOLOGY CLINIC | Facility: CLINIC | Age: 78
End: 2018-10-24

## 2018-10-24 DIAGNOSIS — Z45.02 ENCOUNTER FOR IMPLANTABLE DEFIBRILLATOR REPROGRAMMING OR CHECK: ICD-10-CM

## 2018-10-24 DIAGNOSIS — I49.5 SICK SINUS SYNDROME (HCC): Primary | ICD-10-CM

## 2018-10-24 DIAGNOSIS — I47.2 VENTRICULAR TACHYARRHYTHMIA (HCC): ICD-10-CM

## 2018-10-24 DIAGNOSIS — Z95.810 AICD (AUTOMATIC CARDIOVERTER/DEFIBRILLATOR) PRESENT: ICD-10-CM

## 2018-10-24 PROCEDURE — 99024 POSTOP FOLLOW-UP VISIT: CPT | Performed by: INTERNAL MEDICINE

## 2018-10-24 NOTE — PROGRESS NOTES
Results for orders placed or performed in visit on 10/24/18   Cardiac EP device report    Narrative    MDT DUAL CHAMBER ICD  DEVICE INTERROGATED IN THE [facility] OFFICE  WOUND CHECK: INCISION CLEAN AND DRY WITH EDGES APPROXIMATED; WOUND CARE AND RESTRICTIONS REVIEWED WITH PATIENT  BATTERY VOLTAGE ADEQUATE (10 YRS)  AP-54%, -1%  ALL LEAD PARAMETERS WITHIN NORMAL LIMITS  NO SIGNIFICANT HIGH RATE EPISODES  OPTI-VOL WITHIN NORMAL LIMITS  NORMAL DEVICE FUNCTION   GV

## 2018-12-10 DIAGNOSIS — E27.0 HYPERCORTISOLEMIA (HCC): ICD-10-CM

## 2018-12-10 DIAGNOSIS — I10 HYPERTENSION, UNSPECIFIED TYPE: ICD-10-CM

## 2018-12-10 DIAGNOSIS — E78.00 HYPERCHOLESTEROLEMIA: Primary | ICD-10-CM

## 2018-12-10 RX ORDER — ATORVASTATIN CALCIUM 10 MG/1
TABLET, FILM COATED ORAL
Qty: 90 TABLET | Refills: 3 | Status: SHIPPED | OUTPATIENT
Start: 2018-12-10 | End: 2018-12-13

## 2018-12-10 RX ORDER — BISOPROLOL FUMARATE 10 MG/1
TABLET ORAL
Qty: 180 TABLET | Refills: 3 | Status: SHIPPED | OUTPATIENT
Start: 2018-12-10 | End: 2018-12-13

## 2018-12-10 RX ORDER — A/SINGAPORE/GP1908/2015 IVR-180 (H1N1) (AN A/MICHIGAN/45/2015 (H1N1)PDM09-LIKE VIRUS), A/HONG KONG/4801/2014, NYMC X-263B (H3N2) (AN A/HONG KONG/4801/2014-LIKE VIRUS), AND B/BRISBANE/60/2008, WILD TYPE (A B/BRISBANE/60/2008-LIKE VIRUS) 15; 15; 15 UG/.5ML; UG/.5ML; UG/.5ML
INJECTION, SUSPENSION INTRAMUSCULAR
Refills: 0 | COMMUNITY
Start: 2018-10-20 | End: 2018-12-13

## 2018-12-13 ENCOUNTER — OFFICE VISIT (OUTPATIENT)
Dept: SURGICAL ONCOLOGY | Facility: CLINIC | Age: 78
End: 2018-12-13
Payer: COMMERCIAL

## 2018-12-13 VITALS
TEMPERATURE: 98.6 F | DIASTOLIC BLOOD PRESSURE: 60 MMHG | SYSTOLIC BLOOD PRESSURE: 120 MMHG | BODY MASS INDEX: 29.4 KG/M2 | HEART RATE: 80 BPM | HEIGHT: 71 IN | WEIGHT: 210 LBS | RESPIRATION RATE: 18 BRPM

## 2018-12-13 DIAGNOSIS — C43.61 MALIGNANT MELANOMA OF RIGHT UPPER EXTREMITY (HCC): Primary | ICD-10-CM

## 2018-12-13 DIAGNOSIS — N40.1 BPH WITH OBSTRUCTION/LOWER URINARY TRACT SYMPTOMS: ICD-10-CM

## 2018-12-13 DIAGNOSIS — N13.8 BPH WITH OBSTRUCTION/LOWER URINARY TRACT SYMPTOMS: ICD-10-CM

## 2018-12-13 PROCEDURE — 4040F PNEUMOC VAC/ADMIN/RCVD: CPT | Performed by: SURGERY

## 2018-12-13 PROCEDURE — 99213 OFFICE O/P EST LOW 20 MIN: CPT | Performed by: SURGERY

## 2018-12-13 RX ORDER — FINASTERIDE 5 MG/1
5 TABLET, FILM COATED ORAL DAILY
Qty: 90 TABLET | Refills: 0 | Status: SHIPPED | OUTPATIENT
Start: 2018-12-13 | End: 2019-06-03 | Stop reason: SDUPTHER

## 2018-12-13 NOTE — PROGRESS NOTES
Surgical Oncology Follow Up       [address] [facility] [place]  [address]  [place] [address] [facility] [address]  [address] [facility] [place]  1940  [id number]  [address] [facility] [place]  [address] [facility] [address]  [place] [address] [facility] [address] [phone]    Diagnoses and all orders for this visit:    Malignant melanoma of right upper extremity ([address] )    Other orders  -     Discontinue: FLUAD 0 5 ML KAYLA; ADM 0 5ML IM UTD        Chief Complaint   Patient presents with    Follow-up     pt is here for a 6 month Melanoma follow up       Return in about 6 months (around 6/13/2019) for Office Visit  Malignant melanoma of right upper extremity ([address] )    10/31/2017 Initial Diagnosis     Malignant melanoma of right upper extremity ([address] )         12/5/2017 Surgery     Wide excision            Diagnosis and Staging: [id number] melanoma December 2017   Treatment History: Wide excision melanoma December 2017   Current Therapy: Observation   Disease Status: TONY     History of Present Illness:  Patient returns in follow-up of his melanoma  He is doing well at this time with no complaints  Denies any new abdominal pain, nausea, vomiting, back pain, bone pain, headaches, cough  He recently saw his dermatologist Dr Deuce Null and had several lesions removed  Review of Systems  Complete ROS Surg Onc:   Complete ROS Surg Onc:   Constitutional: The patient denies new or recent history of general fatigue, no recent weight loss, no change in appetite  Eyes: No complaints of visual problems, no scleral icterus  ENT: no complaints of ear pain, no hoarseness, no difficulty swallowing,  no tinnitus and no new masses in head, oral cavity, or neck  Cardiovascular: No complaints of chest pain, no palpitations, no ankle edema  Respiratory: No complaints of shortness of breath, no cough  Gastrointestinal: No complaints of jaundice, no bloody stools, no pale stools  Genitourinary: No complaints of dysuria, no hematuria, no nocturia, no frequent urination, no urethral discharge  Musculoskeletal: No complaints of weakness, paralysis, joint stiffness or arthralgias  Integumentary: No complaints of rash, no new lesions  Neurological: No complaints of convulsions, no seizures, no dizziness  Hematologic/Lymphatic: No complaints of easy bruising  Endocrine:  No hot or cold intolerance  No polydipsia, polyphagia, or polyuria  Allergy/immunology:  No environmental allergies  No food allergies  Not immunocompromised  Skin:  No pallor or rash  No wound          Patient Active Problem List   Diagnosis    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    Microscopic hematuria    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    Arthritis    Burt esophagus    Cancer, colon ([facility] )    Cardiac arrhythmia    Cardiomyopathy ([facility] )    CKD (chronic kidney disease) stage 3, GFR 30-59 ml/min (HCC)    Esophageal reflux    Gout    Hyperlipidemia    Hypertension    Hypothyroidism    Impaired fasting glucose    Malignant melanoma of right upper extremity (HCC)    Non-toxic multinodular goiter    Polymyalgia rheumatica (HCC)    Right bundle branch block with left anterior fascicular block    Skin lesion    Thrombocytopenia (HCC)    Osteopenia    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     Past Medical History:   Diagnosis Date    AICD (automatic cardioverter/defibrillator) present     Arthritis     Asthma     Burt esophagus     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     Cardiac arrhythmia     Cardiomyopathy ([facility] )     CKD (chronic kidney disease)     Colon cancer (HCC)     Congestive heart failure (CHF) (HCC)     COPD (chronic obstructive pulmonary disease) ([facility] )     Diverticulitis     Last assessed: 4/5/13    Esophageal reflux     Gout     Hernia     Hyperlipidemia     Hypertension     Hypertension     Hypothyroidism     Pleural effusion     Polymyalgia rheumatica (HCC)     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     Spondyloarthropathy ([address] )     Last assessed: 11/28/12     Past Surgical History:   Procedure Laterality Date    ARM SKIN LESION BIOPSY / EXCISION      Malignant    ATRIAL ABLATION SURGERY      AV NODE ABLATION      CARDIAC DEFIBRILLATOR PLACEMENT  2009    Cardio-Defib Pulse Gen Venous Insertion of Electrode for Ventricular Pacing  Implantable    CARDIAC SURGERY  2009    Catheterization    COLONOSCOPY N/A 3/14/2016    Procedure: COLONOSCOPY;  Surgeon: Lilliam Fallon MD;  Location: [facility]; Service  February 22, 2013, mildly enlarged prostate, history of colon CA with normal appearance of anastomosis at the midtransverse colon, severe diverticulosis in the sigmoid, descending and transverse colon  Repeat colonoscopy in 3 yrs    HEMICOLECTOMY Right 05/05/2004    INGUINAL HERNIA REPAIR Bilateral     Left 3/2004, right 5/2008    IR VENOGRAM  10/4/2018    KNEE SURGERY  1972    Meniscectomy    PA ESOPHAGOGASTRODUODENOSCOPY TRANSORAL DIAGNOSTIC N/A 12/5/2016    Procedure: ESOPHAGOGASTRODUODENOSCOPY (EGD); Surgeon: Rosa Isela Hurtado MD;  Location: [facility];   Service: Gastroenterology    TONSILLECTOMY AND ADENOIDECTOMY       Family History   Problem Relation Age of Onset    Heart failure Mother         Congestive    Hypertension Mother     Osteoporosis Mother     COPD Father     Emphysema Father     Hypertension Father     Heart disease Sister         Heart Valve Replacement    Osteoporosis Sister     Breast cancer Family      Social History     Social History    Marital status: /Civil Union     Spouse name: N/A    Number of children: N/A    Years of education: N/A     Occupational History    Manager-Retired      Social History Main Topics    Smoking status: Never Smoker    Smokeless tobacco: Never Used    Alcohol use No      Comment: Rare    Drug use: No    Sexual activity: Not Currently     Other Topics Concern    Not on file     Social History Narrative    No narrative on file       Current Outpatient Prescriptions:     aspirin 81 MG tablet, Take 81 mg by mouth daily  , Disp: , Rfl:     atorvastatin (LIPITOR) 10 mg tablet, TAKE 1 TABLET DAILY, Disp: 90 tablet, Rfl: 3    bisoprolol (ZEBETA) 10 MG tablet, TAKE 1 TABLET TWICE DAILY, Disp: 180 tablet, Rfl: 2    Calcium Carb-Cholecalciferol (CALCIUM 600 + D PO), Take 1 tablet by mouth daily  , Disp: , Rfl:     Coenzyme Q10 (CO Q-10) 200 MG CAPS, Take 1 tablet by mouth daily, Disp: , Rfl:     finasteride (PROSCAR) 5 mg tablet, Take 1 tablet (5 mg total) by mouth daily, Disp: 90 tablet, Rfl: 3    lisinopril (ZESTRIL) 20 mg tablet, Take 1 tablet (20 mg total) by mouth daily, Disp: 90 tablet, Rfl: 3    Multiple Vitamins-Minerals (CENTRUM SILVER PO), Take 1 tablet by mouth daily  , Disp: , Rfl:     omeprazole (PriLOSEC) 20 mg delayed release capsule, Take 1 capsule (20 mg total) by mouth daily, Disp: 90 capsule, Rfl: 3    Vitamin D, Cholecalciferol, 1000 UNITS CAPS, Take 1 tablet by mouth daily  , Disp: , Rfl:   No Known Allergies  Vitals:    12/13/18 0838   BP: 120/60   Pulse: 80   Resp: 18   Temp: 98 6 °F (37 °C)       Physical Exam  Constitutional: General appearance: The Patient is well-developed and well-nourished who appears the stated age in no acute distress  Patient is pleasant and talkative  HEENT:  Normocephalic  Sclerae are anicteric  Mucous membranes are moist  Neck is supple without adenopathy  No JVD  Chest: The lungs are clear to auscultation  Cardiac: Heart is regular rate  Abdomen: Abdomen is soft, non-tender, non-distended and without masses  Extremities: There is no clubbing or cyanosis  There is no edema  Symmetric    Neuro: Grossly nonfocal  Gait is normal  Lymphatic: No evidence of cervical adenopathy bilaterally  No evidence of axillary adenopathy bilaterally  No evidence of inguinal adenopathy bilaterally  Skin: Warm, anicteric    wide excision on the right upper arm has healed well  No evidence of local recurrence or in-transit disease  Psych:  Patient is pleasant and talkative  Breasts:        Pathology:  [unfilled]    Labs:      Imaging  No results found  I reviewed the above laboratory and imaging data  Discussion/Summary: 31-year-old male status post wide excision of a [id number] melanoma  He is clinically TONY at 1 year  There is no evidence of recurrence by physical exam, or symptomatology  He will continue to follow up with his dermatologist   I will see him again in 6 months for another clinical exam  If he has any new, persistent, or unexplained symptoms he will contact us and directed imaging may be performed  He is agreeable to this  All his questions were answered

## 2018-12-13 NOTE — LETTER
December 13, 2018     Anirudh Max, [facility] [address] [facility]    Patient: Sarai Jacobs   YOB: 1940   Date of Visit: 12/13/2018       Dear Dr Sidra Arteaga: Thank you for referring Sarai Jacobs to me for evaluation  Below are my notes for this consultation  If you have questions, please do not hesitate to call me  I look forward to following your patient along with you  Sincerely,        Nic Starks MD        CC: MD Nic Butler MD  12/13/2018  8:54 AM  Sign at close encounter               Surgical Oncology Follow Up       [facility] [address]  [address]  [address] [address]  An Elijah Jeff [birth year]  1940  [id number]  [address] [facility] Sandra Power  [address]  [facility] [address] [place] [address]  [phone]    Diagnoses and all orders for this visit:    Malignant melanoma of right upper extremity ([address] )    Other orders  -     Discontinue: FLUAD 0 5 ML KAYLA; ADM 0 5ML IM UTD        Chief Complaint   Patient presents with    Follow-up     pt is here for a 6 month Melanoma follow up       Return in about 6 months (around 6/13/2019) for Office Visit  Malignant melanoma of right upper extremity ([address] )    10/31/2017 Initial Diagnosis     Malignant melanoma of right upper extremity ([address] )         12/5/2017 Surgery     Wide excision            Diagnosis and Staging: [id number] melanoma December 2017   Treatment History: Wide excision melanoma December 2017   Current Therapy: Observation   Disease Status: TONY     History of Present Illness:  Patient returns in follow-up of his melanoma  He is doing well at this time with no complaints  Denies any new abdominal pain, nausea, vomiting, back pain, bone pain, headaches, cough    He recently saw his dermatologist Dr Brendan Goodson and had several lesions removed  Review of Systems  Complete ROS Surg Onc:   Complete ROS Surg Onc:   Constitutional: The patient denies new or recent history of general fatigue, no recent weight loss, no change in appetite  Eyes: No complaints of visual problems, no scleral icterus  ENT: no complaints of ear pain, no hoarseness, no difficulty swallowing,  no tinnitus and no new masses in head, oral cavity, or neck  Cardiovascular: No complaints of chest pain, no palpitations, no ankle edema  Respiratory: No complaints of shortness of breath, no cough  Gastrointestinal: No complaints of jaundice, no bloody stools, no pale stools  Genitourinary: No complaints of dysuria, no hematuria, no nocturia, no frequent urination, no urethral discharge  Musculoskeletal: No complaints of weakness, paralysis, joint stiffness or arthralgias  Integumentary: No complaints of rash, no new lesions  Neurological: No complaints of convulsions, no seizures, no dizziness  Hematologic/Lymphatic: No complaints of easy bruising  Endocrine:  No hot or cold intolerance  No polydipsia, polyphagia, or polyuria  Allergy/immunology:  No environmental allergies  No food allergies  Not immunocompromised  Skin:  No pallor or rash  No wound          Patient Active Problem List   Diagnosis    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    Microscopic hematuria    [facility]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 ([facility]    Arthritis    Burt esophagus    Cancer, colon ([facility] )    Cardiac arrhythmia    Cardiomyopathy ([facility] )    CKD (chronic kidney disease) stage 3, GFR 30-59 ml/min (HCC)    Esophageal reflux    Gout    Hyperlipidemia    Hypertension    Hypothyroidism    Impaired fasting glucose    Malignant melanoma of right upper extremity (HCC)    Non-toxic multinodular goiter    Polymyalgia rheumatica (HCC)    Right bundle branch block with left anterior fascicular block    Skin lesion    Thrombocytopenia (HCC)    Osteopenia  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     Past Medical History:   Diagnosis Date    AICD (automatic cardioverter/defibrillator) present     Arthritis     Asthma     Burt esophagus     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     Cardiac arrhythmia     Cardiomyopathy ([facility] )     CKD (chronic kidney disease)     Colon cancer (HCC)     Congestive heart failure (CHF) (HCC)     COPD (chronic obstructive pulmonary disease) ([facility] )     Diverticulitis     Last assessed: 4/5/13    Esophageal reflux     Gout     Hernia     Hyperlipidemia     Hypertension     Hypertension     Hypothyroidism     Pleural effusion     Polymyalgia rheumatica (HCC)     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     Spondyloarthropathy ([facility] )     Last assessed: 11/28/12     Past Surgical History:   Procedure Laterality Date    ARM SKIN LESION BIOPSY / EXCISION      Malignant    ATRIAL ABLATION SURGERY      AV NODE ABLATION      CARDIAC DEFIBRILLATOR PLACEMENT  2009    Cardio-Defib Pulse Gen Venous Insertion of Electrode for Ventricular Pacing  Implantable    CARDIAC SURGERY  2009    Catheterization    COLONOSCOPY N/A 3/14/2016    Procedure: COLONOSCOPY;  Surgeon: Jacqueline Lanza MD;  Location: [facility]; Service  February 22, 2013, mildly enlarged prostate, history of colon CA with normal appearance of anastomosis at the midtransverse colon, severe diverticulosis in the sigmoid, descending and transverse colon  Repeat colonoscopy in 3 yrs    HEMICOLECTOMY Right 05/05/2004    INGUINAL HERNIA REPAIR Bilateral     Left 3/2004, right 5/2008    IR VENOGRAM  10/4/2018    KNEE SURGERY  1972    Meniscectomy    IN ESOPHAGOGASTRODUODENOSCOPY TRANSORAL DIAGNOSTIC N/A 12/5/2016    Procedure: ESOPHAGOGASTRODUODENOSCOPY (EGD); Surgeon: Hans Monge MD;  Location: [facility];   Service: Gastroenterology    TONSILLECTOMY AND ADENOIDECTOMY Family History   Problem Relation Age of Onset    Heart failure Mother         Congestive    Hypertension Mother     Osteoporosis Mother     COPD Father     Emphysema Father     Hypertension Father     Heart disease Sister         Heart Valve Replacement    Osteoporosis Sister     Breast cancer Family      Social History     Social History    Marital status: /Civil Union     Spouse name: N/A    Number of children: N/A    Years of education: N/A     Occupational History    Manager-Retired      Social History Main Topics    Smoking status: Never Smoker    Smokeless tobacco: Never Used    Alcohol use No      Comment: Rare    Drug use: No    Sexual activity: Not Currently     Other Topics Concern    Not on file     Social History Narrative    No narrative on file       Current Outpatient Prescriptions:     aspirin 81 MG tablet, Take 81 mg by mouth daily  , Disp: , Rfl:     atorvastatin (LIPITOR) 10 mg tablet, TAKE 1 TABLET DAILY, Disp: 90 tablet, Rfl: 3    bisoprolol (ZEBETA) 10 MG tablet, TAKE 1 TABLET TWICE DAILY, Disp: 180 tablet, Rfl: 2    Calcium Carb-Cholecalciferol (CALCIUM 600 + D PO), Take 1 tablet by mouth daily  , Disp: , Rfl:     Coenzyme Q10 (CO Q-10) 200 MG CAPS, Take 1 tablet by mouth daily, Disp: , Rfl:     finasteride (PROSCAR) 5 mg tablet, Take 1 tablet (5 mg total) by mouth daily, Disp: 90 tablet, Rfl: 3    lisinopril (ZESTRIL) 20 mg tablet, Take 1 tablet (20 mg total) by mouth daily, Disp: 90 tablet, Rfl: 3    Multiple Vitamins-Minerals (CENTRUM SILVER PO), Take 1 tablet by mouth daily  , Disp: , Rfl:     omeprazole (PriLOSEC) 20 mg delayed release capsule, Take 1 capsule (20 mg total) by mouth daily, Disp: 90 capsule, Rfl: 3    Vitamin D, Cholecalciferol, 1000 UNITS CAPS, Take 1 tablet by mouth daily  , Disp: , Rfl:   No Known Allergies  Vitals:    12/13/18 0838   BP: 120/60   Pulse: 80   Resp: 18   Temp: 98 6 °F (37 °C)       Physical Exam  Constitutional: General appearance: The Patient is well-developed and well-nourished who appears the stated age in no acute distress  Patient is pleasant and talkative  HEENT:  Normocephalic  Sclerae are anicteric  Mucous membranes are moist  Neck is supple without adenopathy  No JVD  Chest: The lungs are clear to auscultation  Cardiac: Heart is regular rate  Abdomen: Abdomen is soft, non-tender, non-distended and without masses  Extremities: There is no clubbing or cyanosis  There is no edema  Symmetric  Neuro: Grossly nonfocal  Gait is normal      Lymphatic: No evidence of cervical adenopathy bilaterally  No evidence of axillary adenopathy bilaterally  No evidence of inguinal adenopathy bilaterally  Skin: Warm, anicteric     wide excision on the right upper arm has healed well  No evidence of local recurrence or in-transit disease  Psych:  Patient is pleasant and talkative  Breasts:        Pathology:  [unfilled]    Labs:      Imaging  No results found  I reviewed the above laboratory and imaging data  Discussion/Summary: 80-year-old male status post wide excision of a [id number] melanoma  He is clinically TONY at 1 year  There is no evidence of recurrence by physical exam, or symptomatology  He will continue to follow up with his dermatologist   I will see him again in 6 months for another clinical exam  If he has any new, persistent, or unexplained symptoms he will contact us and directed imaging may be performed  He is agreeable to this  All his questions were answered

## 2018-12-29 ENCOUNTER — APPOINTMENT (OUTPATIENT)
Dept: LAB | Facility: HOSPITAL | Age: 78
End: 2018-12-29
Payer: COMMERCIAL

## 2018-12-29 DIAGNOSIS — I10 ESSENTIAL HYPERTENSION: ICD-10-CM

## 2018-12-29 DIAGNOSIS — E03.9 HYPOTHYROIDISM, UNSPECIFIED TYPE: ICD-10-CM

## 2018-12-29 DIAGNOSIS — R73.01 IMPAIRED FASTING GLUCOSE: ICD-10-CM

## 2018-12-29 DIAGNOSIS — E78.5 HYPERLIPIDEMIA, UNSPECIFIED HYPERLIPIDEMIA TYPE: ICD-10-CM

## 2018-12-29 LAB
ALBUMIN SERPL BCP-MCNC: 3.5 G/DL (ref 3.5–5)
ALP SERPL-CCNC: 68 U/L (ref 46–116)
ALT SERPL W P-5'-P-CCNC: 29 U/L (ref 12–78)
ANION GAP SERPL CALCULATED.3IONS-SCNC: 8 MMOL/L (ref 4–13)
AST SERPL W P-5'-P-CCNC: 19 U/L (ref 5–45)
BILIRUB SERPL-MCNC: 1.03 MG/DL (ref 0.2–1)
BUN SERPL-MCNC: 20 MG/DL (ref 5–25)
CALCIUM SERPL-MCNC: 8.9 MG/DL (ref 8.3–10.1)
CHLORIDE SERPL-SCNC: 109 MMOL/L (ref 100–108)
CHOLEST SERPL-MCNC: 152 MG/DL (ref 50–200)
CO2 SERPL-SCNC: 27 MMOL/L (ref 21–32)
CREAT SERPL-MCNC: 1.4 MG/DL (ref 0.6–1.3)
EST. AVERAGE GLUCOSE BLD GHB EST-MCNC: 103 MG/DL
GFR SERPL CREATININE-BSD FRML MDRD: 48 ML/MIN/1.73SQ M
GLUCOSE P FAST SERPL-MCNC: 87 MG/DL (ref 65–99)
HBA1C MFR BLD: 5.2 % (ref 4.2–6.3)
HDLC SERPL-MCNC: 55 MG/DL (ref 40–60)
LDLC SERPL CALC-MCNC: 66 MG/DL (ref 0–100)
NONHDLC SERPL-MCNC: 97 MG/DL
POTASSIUM SERPL-SCNC: 4.5 MMOL/L (ref 3.5–5.3)
PROT SERPL-MCNC: 7.1 G/DL (ref 6.4–8.2)
SODIUM SERPL-SCNC: 144 MMOL/L (ref 136–145)
TRIGL SERPL-MCNC: 153 MG/DL
TSH SERPL DL<=0.05 MIU/L-ACNC: 3.35 UIU/ML (ref 0.36–3.74)

## 2018-12-29 PROCEDURE — 80053 COMPREHEN METABOLIC PANEL: CPT

## 2018-12-29 PROCEDURE — 83036 HEMOGLOBIN GLYCOSYLATED A1C: CPT

## 2018-12-29 PROCEDURE — 36415 COLL VENOUS BLD VENIPUNCTURE: CPT

## 2018-12-29 PROCEDURE — 80061 LIPID PANEL: CPT

## 2018-12-29 PROCEDURE — 84443 ASSAY THYROID STIM HORMONE: CPT

## 2019-01-04 ENCOUNTER — OFFICE VISIT (OUTPATIENT)
Dept: FAMILY MEDICINE CLINIC | Facility: CLINIC | Age: 79
End: 2019-01-04
Payer: COMMERCIAL

## 2019-01-04 VITALS
DIASTOLIC BLOOD PRESSURE: 88 MMHG | WEIGHT: 208 LBS | BODY MASS INDEX: 29.12 KG/M2 | SYSTOLIC BLOOD PRESSURE: 128 MMHG | HEIGHT: 71 IN | HEART RATE: 72 BPM | RESPIRATION RATE: 16 BRPM

## 2019-01-04 DIAGNOSIS — N18.30 HYPERTENSIVE KIDNEY DISEASE WITH STAGE 3 CHRONIC KIDNEY DISEASE (HCC): ICD-10-CM

## 2019-01-04 DIAGNOSIS — E78.2 MIXED HYPERLIPIDEMIA: ICD-10-CM

## 2019-01-04 DIAGNOSIS — E03.9 HYPOTHYROIDISM, UNSPECIFIED TYPE: ICD-10-CM

## 2019-01-04 DIAGNOSIS — R73.01 IMPAIRED FASTING GLUCOSE: ICD-10-CM

## 2019-01-04 DIAGNOSIS — I12.9 HYPERTENSIVE KIDNEY DISEASE WITH STAGE 3 CHRONIC KIDNEY DISEASE (HCC): ICD-10-CM

## 2019-01-04 DIAGNOSIS — I10 ESSENTIAL HYPERTENSION: Primary | ICD-10-CM

## 2019-01-04 DIAGNOSIS — K22.719 BARRETT'S ESOPHAGUS WITH DYSPLASIA: ICD-10-CM

## 2019-01-04 PROCEDURE — 3725F SCREEN DEPRESSION PERFORMED: CPT | Performed by: FAMILY MEDICINE

## 2019-01-04 PROCEDURE — 99214 OFFICE O/P EST MOD 30 MIN: CPT | Performed by: FAMILY MEDICINE

## 2019-01-04 PROCEDURE — 3074F SYST BP LT 130 MM HG: CPT | Performed by: FAMILY MEDICINE

## 2019-01-04 PROCEDURE — 1101F PT FALLS ASSESS-DOCD LE1/YR: CPT | Performed by: FAMILY MEDICINE

## 2019-01-04 PROCEDURE — 3079F DIAST BP 80-89 MM HG: CPT | Performed by: FAMILY MEDICINE

## 2019-01-04 NOTE — PROGRESS NOTES
Chief Complaint   Patient presents with    Follow-up     6 month follow up  Health Maintenance   Topic Date Due    SLP PLAN OF CARE  1940    DTaP,Tdap,and Td Vaccines (1 - Tdap) 06/26/1962    CRC Screening: Colonoscopy  03/14/2019    Fall Risk  06/29/2019    Depression Screening PHQ  06/29/2019    Medicare Annual Wellness Visit (AWV)  06/29/2019    HEMOGLOBIN A1C  06/29/2019    DXA SCAN  01/18/2023    INFLUENZA VACCINE  Completed    Pneumococcal PPSV23/PCV13 65+ Years / High and Highest Risk  Completed     Assessment/Plan:  Reviewed lab in 12/2018  TSH ok  CMP ok  Cr 1 4 and GFR 48 stable  hgA1C 5 2 normal  Lipid 152/153/55/66 ok    HTN---controlled  Continue bisoprolol and lisinopril  FU cardiology  Hyperlipidemia---controlled  Low fat diet  Continue atorvastain  IFG---low carb diet  Hypothyroidism---not on any levothyroxine  Controlled  CKD3---stable  Keep hydrated  Avoid motrin/ibuprofen/aleve NSAIDs       Got flu shot this season  Got PCV13 5/2015  Got pneumovax 2016  Got zoster in 2011  Will check insurance for coverage of Tdap and shingrix  FU Dr Willis Alarcon for colonoscopy in 3/14/2016, repeat in 3 years  FU urology for PSA  He is on omeprazole for years  1/2018 Dexa showed osteopenia  RTO in 6 months          Diagnoses and all orders for this visit:    Essential hypertension  -     CBC; Future  -     Comprehensive metabolic panel; Future    Mixed hyperlipidemia  -     Lipid panel; Future    Impaired fasting glucose  -     Hemoglobin A1C; Future    Hypothyroidism, unspecified type    Burt's esophagus with dysplasia    Hypertensive kidney disease with stage 3 chronic kidney disease ([facility] )    Other orders  -     Cancel: Ambulatory referral to Gastroenterology; Future          Subjective:      Patient ID: Abigail Moss is a 69 y o  male  HPI    Pt is here by himself  HTN---Does not check BP at home  He is on bisoprolol and lisinopril  BP today 128/88 today  FU cardiology for cardiomyopathy, tachycardia, s/p pacemaker  Stable       [facility] Cancer care for melanoma s/p wide excision on right upper arm recently  Stable       Hyperlipidemia---on atorvastatin now  Denies SE      IFG---12/2018 hgA1C 5 2 normal    Hypothyroidism---12/2017 TSH 4 08 slightly elevated and Free T4 0 94 normal  Not on meds  Hx of colon cancer s/p surgery and chemo, had colonoscopy 3/2016 which showed diverticulosis  Repeat in 3 years       FU GI Dr Paulina Frank for Burt's, do EGD every 3 years  Last EGD was in 12/2016  Pt is on omeprazole 40mg daily now  FU Dr Fernando Correa for COPD before, but pt states he is not COPD  Not follow any more  Denies SOB  Does not use any inhaler  FU urology for BPH yearly  Lives with wife  Does all ADL's  Still drive  Denies recent falls  Denies depression          The following portions of the patient's history were reviewed and updated as appropriate: allergies, current medications, past family history, past medical history, past social history, past surgical history and problem list     Review of Systems   Constitutional: Negative for appetite change, chills and fever  HENT: Negative for congestion, ear pain, sinus pain and sore throat  Eyes: Negative for discharge and itching  Respiratory: Negative for apnea, cough, chest tightness, shortness of breath and wheezing  Cardiovascular: Negative for chest pain, palpitations and leg swelling  Gastrointestinal: Negative for abdominal pain, anal bleeding, constipation, diarrhea, nausea and vomiting  Endocrine: Negative for cold intolerance, heat intolerance and polyuria  Genitourinary: Negative for difficulty urinating and dysuria  Musculoskeletal: Negative for arthralgias, back pain and myalgias  Skin: Negative for rash  Neurological: Negative for dizziness and headaches  Psychiatric/Behavioral: Negative for agitation           Objective:      /88 (BP Location: Left arm, Patient Position: Sitting, Cuff Size: Standard)   Pulse 72   Resp 16   Ht 5' 11" (1 803 m)   Wt 94 3 kg (208 lb)   BMI 29 01 kg/m²          Physical Exam   Constitutional: He appears well-developed  HENT:   Head: Normocephalic and atraumatic  Eyes: Pupils are equal, round, and reactive to light  Conjunctivae are normal    Neck: Normal range of motion  Neck supple  Cardiovascular: Normal rate, regular rhythm, normal heart sounds and intact distal pulses  Pulmonary/Chest: Effort normal and breath sounds normal    Abdominal: Soft  Bowel sounds are normal    Musculoskeletal: Normal range of motion  Neurological: He is alert

## 2019-01-20 DIAGNOSIS — I10 HYPERTENSION, UNSPECIFIED TYPE: ICD-10-CM

## 2019-01-21 RX ORDER — LISINOPRIL 20 MG/1
TABLET ORAL
Qty: 90 TABLET | Refills: 3 | Status: SHIPPED | OUTPATIENT
Start: 2019-01-21 | End: 2019-08-30 | Stop reason: SDUPTHER

## 2019-01-28 ENCOUNTER — OFFICE VISIT (OUTPATIENT)
Dept: FAMILY MEDICINE CLINIC | Facility: CLINIC | Age: 79
End: 2019-01-28
Payer: COMMERCIAL

## 2019-01-28 ENCOUNTER — APPOINTMENT (OUTPATIENT)
Dept: LAB | Facility: HOSPITAL | Age: 79
DRG: 291 | End: 2019-01-28
Payer: COMMERCIAL

## 2019-01-28 VITALS
HEART RATE: 88 BPM | OXYGEN SATURATION: 95 % | SYSTOLIC BLOOD PRESSURE: 138 MMHG | TEMPERATURE: 97.6 F | RESPIRATION RATE: 16 BRPM | DIASTOLIC BLOOD PRESSURE: 82 MMHG | BODY MASS INDEX: 29.57 KG/M2 | WEIGHT: 211.2 LBS | HEIGHT: 71 IN

## 2019-01-28 DIAGNOSIS — I42.0 DILATED CARDIOMYOPATHY (HCC): ICD-10-CM

## 2019-01-28 DIAGNOSIS — J40 BRONCHITIS: Primary | ICD-10-CM

## 2019-01-28 DIAGNOSIS — R06.02 SOB (SHORTNESS OF BREATH): Primary | ICD-10-CM

## 2019-01-28 LAB
ALBUMIN SERPL BCP-MCNC: 3.4 G/DL (ref 3.5–5)
ALP SERPL-CCNC: 74 U/L (ref 46–116)
ALT SERPL W P-5'-P-CCNC: 25 U/L (ref 12–78)
ANION GAP SERPL CALCULATED.3IONS-SCNC: 7 MMOL/L (ref 4–13)
AST SERPL W P-5'-P-CCNC: 23 U/L (ref 5–45)
BILIRUB SERPL-MCNC: 1.19 MG/DL (ref 0.2–1)
BUN SERPL-MCNC: 22 MG/DL (ref 5–25)
CALCIUM SERPL-MCNC: 8.5 MG/DL (ref 8.3–10.1)
CHLORIDE SERPL-SCNC: 108 MMOL/L (ref 100–108)
CO2 SERPL-SCNC: 26 MMOL/L (ref 21–32)
CREAT SERPL-MCNC: 1.57 MG/DL (ref 0.6–1.3)
GFR SERPL CREATININE-BSD FRML MDRD: 42 ML/MIN/1.73SQ M
GLUCOSE P FAST SERPL-MCNC: 93 MG/DL (ref 65–99)
NT-PROBNP SERPL-MCNC: 6942 PG/ML
POTASSIUM SERPL-SCNC: 4.6 MMOL/L (ref 3.5–5.3)
PROT SERPL-MCNC: 7 G/DL (ref 6.4–8.2)
SODIUM SERPL-SCNC: 141 MMOL/L (ref 136–145)

## 2019-01-28 PROCEDURE — 36415 COLL VENOUS BLD VENIPUNCTURE: CPT

## 2019-01-28 PROCEDURE — 83880 ASSAY OF NATRIURETIC PEPTIDE: CPT

## 2019-01-28 PROCEDURE — 99214 OFFICE O/P EST MOD 30 MIN: CPT | Performed by: FAMILY MEDICINE

## 2019-01-28 PROCEDURE — 80053 COMPREHEN METABOLIC PANEL: CPT

## 2019-01-28 RX ORDER — AZITHROMYCIN 250 MG/1
TABLET, FILM COATED ORAL
Qty: 6 TABLET | Refills: 0 | Status: SHIPPED | OUTPATIENT
Start: 2019-01-28 | End: 2019-02-01

## 2019-01-28 RX ORDER — ALBUTEROL SULFATE 90 UG/1
2 AEROSOL, METERED RESPIRATORY (INHALATION) EVERY 4 HOURS PRN
Qty: 18 G | Refills: 0 | Status: SHIPPED | OUTPATIENT
Start: 2019-01-28 | End: 2019-07-11 | Stop reason: ALTCHOICE

## 2019-01-28 RX ORDER — FUROSEMIDE 20 MG/1
20 TABLET ORAL 2 TIMES DAILY
Qty: 30 TABLET | Refills: 0 | Status: SHIPPED | OUTPATIENT
Start: 2019-01-28 | End: 2019-01-29

## 2019-01-28 NOTE — PROGRESS NOTES
Chief Complaint   Patient presents with    Cold Like Symptoms     Symptoms non productive cough when laying down, chest congestion, and SOB started last week  Health Maintenance   Topic Date Due    SLP PLAN OF CARE  1940    DTaP,Tdap,and Td Vaccines (1 - Tdap) 06/26/1962    CRC Screening: Colonoscopy  03/14/2019    Medicare Annual Wellness Visit (AWV)  06/29/2019    HEMOGLOBIN A1C  06/29/2019    Fall Risk  01/04/2020    Depression Screening PHQ  01/04/2020    DXA SCAN  01/18/2023    INFLUENZA VACCINE  Completed    Pneumococcal PPSV23/PCV13 65+ Years / High and Highest Risk  Completed     Assessment/Plan:    A lot of fluids and rest  Tylenol or motrin for fever or pain  Give zpack  Side effects educated patient  Give albuterol, use as needed  Will check labs r/o CHF  Call office if symptoms no improving or worse  Diagnoses and all orders for this visit:    Bronchitis  -     azithromycin (ZITHROMAX) 250 mg tablet; Take 2 tabs on day 1, then take 1 tab daily from day 2-day 5   -     albuterol (VENTOLIN HFA) 90 mcg/act inhaler; Inhale 2 puffs every 4 (four) hours as needed for wheezing or shortness of breath    Dilated cardiomyopathy (HCC)  -     Comprehensive metabolic panel; Future  -     NT-BNP PRO; Future    Other orders  -     PREVIDENT 5000 ENAMEL PROTECT 1 1-5 % PSTE; Use as directed          Subjective:      Patient ID: Nadine Martínez is a 69 y o  male  HPI    Pt is here by himself  C/o cough and wheezing for 1 week  Worse if he lay down  Pt states he cannot sleep in bed at night  Cough is better now, but he still feels wheezing and SOB while laying down  Denies fever, chest pain, n/v/abd pain  No diarrhea  No smoking  Denies hx of asthma or COPD  Tried albuterol which helped  FU cardiology for cardiomyopathy, tachycardia, s/p pacemaker       The following portions of the patient's history were reviewed and updated as appropriate: allergies, current medications, past family history, past medical history, past social history, past surgical history and problem list     Review of Systems   Constitutional: Negative for appetite change, chills and fever  HENT: Negative for congestion, ear pain, sinus pain and sore throat  Eyes: Negative for discharge and itching  Respiratory: Positive for cough, shortness of breath and wheezing  Negative for apnea and chest tightness  Cardiovascular: Negative for chest pain, palpitations and leg swelling  Gastrointestinal: Negative for abdominal pain, anal bleeding, constipation, diarrhea, nausea and vomiting  Endocrine: Negative for cold intolerance, heat intolerance and polyuria  Genitourinary: Negative for difficulty urinating and dysuria  Musculoskeletal: Negative for arthralgias, back pain and myalgias  Skin: Negative for rash  Neurological: Negative for dizziness and headaches  Psychiatric/Behavioral: Negative for agitation  Objective:      /82 (BP Location: Left arm, Patient Position: Sitting, Cuff Size: Standard)   Pulse 88   Temp 97 6 °F (36 4 °C) (Oral)   Resp 16   Ht 5' 11" (1 803 m)   Wt 95 8 kg (211 lb 3 2 oz)   SpO2 95%   BMI 29 46 kg/m²          Physical Exam   Constitutional: He appears well-developed  HENT:   Head: Normocephalic and atraumatic  Right Ear: External ear normal    Left Ear: External ear normal    Mouth/Throat: Oropharynx is clear and moist    B/l nasal swollen    Eyes: Pupils are equal, round, and reactive to light  Conjunctivae are normal    Neck: Normal range of motion  Neck supple  Cardiovascular: Normal rate, regular rhythm, normal heart sounds and intact distal pulses  Pulmonary/Chest: Effort normal and breath sounds normal  No respiratory distress  He has no wheezes  He has no rales  He exhibits no tenderness  Abdominal: Soft  Bowel sounds are normal    Musculoskeletal: Normal range of motion  He exhibits no edema     Neurological: He is alert

## 2019-01-29 ENCOUNTER — HOSPITAL ENCOUNTER (INPATIENT)
Facility: HOSPITAL | Age: 79
LOS: 1 days | Discharge: HOME | DRG: 291 | End: 2019-01-29
Attending: EMERGENCY MEDICINE | Admitting: HOSPITALIST
Payer: COMMERCIAL

## 2019-01-29 ENCOUNTER — APPOINTMENT (INPATIENT)
Dept: NON INVASIVE DIAGNOSTICS | Facility: HOSPITAL | Age: 79
DRG: 291 | End: 2019-01-29
Payer: COMMERCIAL

## 2019-01-29 ENCOUNTER — APPOINTMENT (EMERGENCY)
Dept: RADIOLOGY | Facility: HOSPITAL | Age: 79
DRG: 291 | End: 2019-01-29
Payer: COMMERCIAL

## 2019-01-29 VITALS
OXYGEN SATURATION: 92 % | HEIGHT: 72 IN | DIASTOLIC BLOOD PRESSURE: 57 MMHG | WEIGHT: 205 LBS | SYSTOLIC BLOOD PRESSURE: 121 MMHG | RESPIRATION RATE: 18 BRPM | BODY MASS INDEX: 27.77 KG/M2 | TEMPERATURE: 97.8 F | HEART RATE: 64 BPM

## 2019-01-29 DIAGNOSIS — I42.0 DILATED CARDIOMYOPATHY (HCC): Primary | ICD-10-CM

## 2019-01-29 DIAGNOSIS — I50.21 ACUTE SYSTOLIC CONGESTIVE HEART FAILURE (HCC): ICD-10-CM

## 2019-01-29 DIAGNOSIS — R06.02 SOB (SHORTNESS OF BREATH): ICD-10-CM

## 2019-01-29 DIAGNOSIS — R06.02 SHORTNESS OF BREATH: ICD-10-CM

## 2019-01-29 DIAGNOSIS — N18.30 CKD (CHRONIC KIDNEY DISEASE) STAGE 3, GFR 30-59 ML/MIN (HCC): ICD-10-CM

## 2019-01-29 PROBLEM — J44.9 COPD (CHRONIC OBSTRUCTIVE PULMONARY DISEASE) (HCC): Status: ACTIVE | Noted: 2019-01-29

## 2019-01-29 PROBLEM — J40 BRONCHITIS: Status: ACTIVE | Noted: 2019-01-29

## 2019-01-29 PROBLEM — I50.23 ACUTE ON CHRONIC SYSTOLIC CONGESTIVE HEART FAILURE (HCC): Status: ACTIVE | Noted: 2019-01-29

## 2019-01-29 LAB
ALBUMIN SERPL BCP-MCNC: 3.8 G/DL (ref 3.5–5)
ALP SERPL-CCNC: 81 U/L (ref 46–116)
ALT SERPL W P-5'-P-CCNC: 28 U/L (ref 12–78)
ANION GAP SERPL CALCULATED.3IONS-SCNC: 6 MMOL/L (ref 4–13)
AST SERPL W P-5'-P-CCNC: 22 U/L (ref 5–45)
ATRIAL RATE: 357 BPM
BASOPHILS # BLD AUTO: 0.06 THOUSANDS/ΜL (ref 0–0.1)
BASOPHILS NFR BLD AUTO: 1 % (ref 0–1)
BILIRUB SERPL-MCNC: 1.28 MG/DL (ref 0.2–1)
BUN SERPL-MCNC: 20 MG/DL (ref 5–25)
CALCIUM SERPL-MCNC: 8.8 MG/DL (ref 8.3–10.1)
CHLORIDE SERPL-SCNC: 108 MMOL/L (ref 100–108)
CO2 SERPL-SCNC: 28 MMOL/L (ref 21–32)
CREAT SERPL-MCNC: 1.52 MG/DL (ref 0.6–1.3)
EOSINOPHIL # BLD AUTO: 0.26 THOUSAND/ΜL (ref 0–0.61)
EOSINOPHIL NFR BLD AUTO: 3 % (ref 0–6)
ERYTHROCYTE [DISTWIDTH] IN BLOOD BY AUTOMATED COUNT: 15.1 % (ref 11.6–15.1)
GFR SERPL CREATININE-BSD FRML MDRD: 44 ML/MIN/1.73SQ M
GLUCOSE SERPL-MCNC: 119 MG/DL (ref 65–140)
HCT VFR BLD AUTO: 47.3 % (ref 36.5–49.3)
HGB BLD-MCNC: 15.9 G/DL (ref 12–17)
IMM GRANULOCYTES # BLD AUTO: 0.03 THOUSAND/UL (ref 0–0.2)
IMM GRANULOCYTES NFR BLD AUTO: 0 % (ref 0–2)
LYMPHOCYTES # BLD AUTO: 1.99 THOUSANDS/ΜL (ref 0.6–4.47)
LYMPHOCYTES NFR BLD AUTO: 19 % (ref 14–44)
MCH RBC QN AUTO: 32.3 PG (ref 26.8–34.3)
MCHC RBC AUTO-ENTMCNC: 33.6 G/DL (ref 31.4–37.4)
MCV RBC AUTO: 96 FL (ref 82–98)
MONOCYTES # BLD AUTO: 0.86 THOUSAND/ΜL (ref 0.17–1.22)
MONOCYTES NFR BLD AUTO: 8 % (ref 4–12)
NEUTROPHILS # BLD AUTO: 7.21 THOUSANDS/ΜL (ref 1.85–7.62)
NEUTS SEG NFR BLD AUTO: 69 % (ref 43–75)
NRBC BLD AUTO-RTO: 0 /100 WBCS
NT-PROBNP SERPL-MCNC: 6192 PG/ML
P AXIS: 87 DEGREES
PLATELET # BLD AUTO: 211 THOUSANDS/UL (ref 149–390)
PMV BLD AUTO: 9.3 FL (ref 8.9–12.7)
POTASSIUM SERPL-SCNC: 4.2 MMOL/L (ref 3.5–5.3)
PROT SERPL-MCNC: 7.6 G/DL (ref 6.4–8.2)
QRS AXIS: -56 DEGREES
QRSD INTERVAL: 134 MS
QT INTERVAL: 428 MS
QTC INTERVAL: 477 MS
RBC # BLD AUTO: 4.92 MILLION/UL (ref 3.88–5.62)
SODIUM SERPL-SCNC: 142 MMOL/L (ref 136–145)
T WAVE AXIS: 113 DEGREES
TROPONIN I SERPL-MCNC: 0.02 NG/ML
VENTRICULAR RATE: 75 BPM
WBC # BLD AUTO: 10.41 THOUSAND/UL (ref 4.31–10.16)

## 2019-01-29 PROCEDURE — 80053 COMPREHEN METABOLIC PANEL: CPT | Performed by: EMERGENCY MEDICINE

## 2019-01-29 PROCEDURE — 93325 DOPPLER ECHO COLOR FLOW MAPG: CPT | Performed by: INTERNAL MEDICINE

## 2019-01-29 PROCEDURE — 99223 1ST HOSP IP/OBS HIGH 75: CPT | Performed by: HOSPITALIST

## 2019-01-29 PROCEDURE — 99222 1ST HOSP IP/OBS MODERATE 55: CPT | Performed by: INTERNAL MEDICINE

## 2019-01-29 PROCEDURE — 36415 COLL VENOUS BLD VENIPUNCTURE: CPT

## 2019-01-29 PROCEDURE — 71046 X-RAY EXAM CHEST 2 VIEWS: CPT

## 2019-01-29 PROCEDURE — 93321 DOPPLER ECHO F-UP/LMTD STD: CPT | Performed by: INTERNAL MEDICINE

## 2019-01-29 PROCEDURE — 83880 ASSAY OF NATRIURETIC PEPTIDE: CPT | Performed by: EMERGENCY MEDICINE

## 2019-01-29 PROCEDURE — 93306 TTE W/DOPPLER COMPLETE: CPT

## 2019-01-29 PROCEDURE — 99285 EMERGENCY DEPT VISIT HI MDM: CPT

## 2019-01-29 PROCEDURE — 84484 ASSAY OF TROPONIN QUANT: CPT | Performed by: EMERGENCY MEDICINE

## 2019-01-29 PROCEDURE — 85025 COMPLETE CBC W/AUTO DIFF WBC: CPT | Performed by: EMERGENCY MEDICINE

## 2019-01-29 PROCEDURE — 93308 TTE F-UP OR LMTD: CPT | Performed by: INTERNAL MEDICINE

## 2019-01-29 PROCEDURE — 93005 ELECTROCARDIOGRAM TRACING: CPT

## 2019-01-29 PROCEDURE — RECHECK: Performed by: NURSE PRACTITIONER

## 2019-01-29 PROCEDURE — 93010 ELECTROCARDIOGRAM REPORT: CPT | Performed by: INTERNAL MEDICINE

## 2019-01-29 RX ORDER — FUROSEMIDE 10 MG/ML
20 INJECTION INTRAMUSCULAR; INTRAVENOUS ONCE
Status: COMPLETED | OUTPATIENT
Start: 2019-01-29 | End: 2019-01-29

## 2019-01-29 RX ORDER — FUROSEMIDE 20 MG/1
40 TABLET ORAL DAILY
Qty: 30 TABLET | Refills: 0
Start: 2019-01-29 | End: 2019-02-09 | Stop reason: SDUPTHER

## 2019-01-29 RX ORDER — LISINOPRIL 20 MG/1
20 TABLET ORAL DAILY
Status: DISCONTINUED | OUTPATIENT
Start: 2019-01-29 | End: 2019-01-29 | Stop reason: HOSPADM

## 2019-01-29 RX ORDER — ONDANSETRON 2 MG/ML
4 INJECTION INTRAMUSCULAR; INTRAVENOUS EVERY 8 HOURS PRN
Status: DISCONTINUED | OUTPATIENT
Start: 2019-01-29 | End: 2019-01-29 | Stop reason: HOSPADM

## 2019-01-29 RX ORDER — MELATONIN
1000 DAILY
Status: DISCONTINUED | OUTPATIENT
Start: 2019-01-29 | End: 2019-01-29 | Stop reason: HOSPADM

## 2019-01-29 RX ORDER — CALCIUM CARBONATE 500(1250)
1 TABLET ORAL
Status: DISCONTINUED | OUTPATIENT
Start: 2019-01-29 | End: 2019-01-29 | Stop reason: HOSPADM

## 2019-01-29 RX ORDER — BISOPROLOL FUMARATE 5 MG/1
10 TABLET ORAL DAILY
Status: DISCONTINUED | OUTPATIENT
Start: 2019-01-29 | End: 2019-01-29 | Stop reason: HOSPADM

## 2019-01-29 RX ORDER — FINASTERIDE 5 MG/1
5 TABLET, FILM COATED ORAL DAILY
Status: DISCONTINUED | OUTPATIENT
Start: 2019-01-29 | End: 2019-01-29 | Stop reason: HOSPADM

## 2019-01-29 RX ORDER — ASPIRIN 81 MG/1
81 TABLET, CHEWABLE ORAL DAILY
Status: DISCONTINUED | OUTPATIENT
Start: 2019-01-29 | End: 2019-01-29 | Stop reason: HOSPADM

## 2019-01-29 RX ORDER — ATORVASTATIN CALCIUM 10 MG/1
10 TABLET, FILM COATED ORAL
Status: DISCONTINUED | OUTPATIENT
Start: 2019-01-29 | End: 2019-01-29 | Stop reason: HOSPADM

## 2019-01-29 RX ORDER — FUROSEMIDE 10 MG/ML
20 INJECTION INTRAMUSCULAR; INTRAVENOUS
Status: DISCONTINUED | OUTPATIENT
Start: 2019-01-29 | End: 2019-01-29

## 2019-01-29 RX ORDER — CHOLECALCIFEROL (VITAMIN D3) 125 MCG
200 CAPSULE ORAL DAILY
Status: DISCONTINUED | OUTPATIENT
Start: 2019-01-29 | End: 2019-01-29 | Stop reason: HOSPADM

## 2019-01-29 RX ORDER — AZITHROMYCIN 250 MG/1
500 TABLET, FILM COATED ORAL EVERY 24 HOURS
Status: DISCONTINUED | OUTPATIENT
Start: 2019-01-29 | End: 2019-01-29 | Stop reason: HOSPADM

## 2019-01-29 RX ORDER — FUROSEMIDE 40 MG/1
40 TABLET ORAL DAILY
Status: DISCONTINUED | OUTPATIENT
Start: 2019-01-30 | End: 2019-01-29 | Stop reason: HOSPADM

## 2019-01-29 RX ORDER — PANTOPRAZOLE SODIUM 40 MG/1
40 TABLET, DELAYED RELEASE ORAL
Status: DISCONTINUED | OUTPATIENT
Start: 2019-01-29 | End: 2019-01-29 | Stop reason: HOSPADM

## 2019-01-29 RX ADMIN — AZITHROMYCIN 500 MG: 250 TABLET, FILM COATED ORAL at 09:35

## 2019-01-29 RX ADMIN — VITAMIN D, TAB 1000IU (100/BT) 1000 UNITS: 25 TAB at 11:20

## 2019-01-29 RX ADMIN — Medication 1 TABLET: at 09:21

## 2019-01-29 RX ADMIN — CALCIUM 1 TABLET: 500 TABLET ORAL at 09:21

## 2019-01-29 RX ADMIN — LISINOPRIL 20 MG: 20 TABLET ORAL at 09:20

## 2019-01-29 RX ADMIN — Medication 200 MG: at 11:20

## 2019-01-29 RX ADMIN — BISOPROLOL FUMARATE 10 MG: 5 TABLET ORAL at 09:21

## 2019-01-29 RX ADMIN — FUROSEMIDE 20 MG: 10 INJECTION, SOLUTION INTRAMUSCULAR; INTRAVENOUS at 09:18

## 2019-01-29 RX ADMIN — PANTOPRAZOLE SODIUM 40 MG: 40 TABLET, DELAYED RELEASE ORAL at 09:30

## 2019-01-29 NOTE — ED ATTENDING ATTESTATION
Lindsay Moody MD, saw and evaluated the patient  I have discussed the patient with the resident/non-physician practitioner and agree with the resident's/non-physician practitioner's findings, Plan of Care, and MDM as documented in the resident's/non-physician practitioner's note, except where noted  All available labs and Radiology studies were reviewed  At this point I agree with the current assessment done in the Emergency Department  I have conducted an independent evaluation of this patient a history and physical is as follows:      Critical Care Time  Procedures     70 yo male with hx of cardiomyopathy, c/o sob worse at rest or exertion over the last 10 days  Pt last ef 66% with diastolic dysfunction  no fever, no cp, occassional cough, no weight gain  No n/v/d  Vss, afebrile, lungs cta, rrr, abdomen soft nontender, no pedal edema  Cardiac wokup, bnp, cxr, ekg

## 2019-01-29 NOTE — ASSESSMENT & PLAN NOTE
· Patient had been seen by his PCP on January 28th and was diagnosed with bronchitis but had yet to start the Zithromax he was prescribed  · Recommended to continue with Z-Zacarias as prescribed by his PCP  · Albuterol as prescribed by his PCP as needed  · Follow-up with his primary care provider if symptoms worsen or fail to improve

## 2019-01-29 NOTE — ASSESSMENT & PLAN NOTE
· Presented with shortness of breath and reports of orthopnea  · BNP 6192  · Patient states he feels better since admission  · Appreciate Cardiology consultation recommendations noted plan of care discussed  · Was given IV Lasix in the ED and will continue with 40 mg of Lasix after discharge  He had seen his PCP yesterday and was already prescribed 20 mg b i d  It was discussed he should take 40 mg once a day    · Daily weights  · Follow-up with established cardiologist after discharge

## 2019-01-29 NOTE — H&P
History and Physical - [facility] Internal Medicine    Patient Information: Portia Adams 69 y o  male MRN: [id number]  Unit/Bed#: CRB Encounter: [id number]  Admitting Physician: Elizabeth Hdz MD  PCP: Maximo Wilkinson MD  Date of Admission:  01/29/19    Assessment/Plan:    Hospital Problem List:     Principal Problem:    Acute on chronic systolic congestive heart failure ([facility] )  Active Problems:    Burt esophagus    Cardiomyopathy ([place] [address] )    CKD (chronic kidney disease) stage 3, GFR 30-59 ml/min ([facility])    Hyperlipidemia    Hypertension    Shortness of breath    COPD (chronic obstructive pulmonary disease) ([facility] )    Present on Admission:   Burt esophagus   Cardiomyopathy ([place] [address] )   CKD (chronic kidney disease) stage 3, GFR 30-59 ml/min ([facility] )   Hyperlipidemia   Hypertension   Acute on chronic systolic congestive heart failure ([facility] )   COPD (chronic obstructive pulmonary disease) ([place] [address] )      Plan for the Primary Problem(s):  · Acute on chronic systolic CHF: in pt with hx systolic chf/AICD, last known EF pt reports was 55%  · Admit for further work-up and treatment, lasix IV bid, daily weights, I/o, check echo, cardiology consultation will be obtained, continue lisinopril as taking prior to admission, monitor hemodynamics and electrolytes    Plan for Additional Problems:   · Stage 3 CKD, creatinine at baseline, monitor closely with diuresis  · COPD: no evidence of acute exacerbation, prn bronchodilators, to complete course of azithromycin  · Barretts esophagus: on ppi as outpt will continue  · Hypertension: resume outpt antihypertensive regimen, on lisinopril   · HL: on atorvastatin  · Hx colon cancer noted    VTE Prophylaxis: Enoxaparin (Lovenox)  / sequential compression device   Code Status: full  POLST: There is no POLST form on file for this patient (pre-hospital)    Anticipated Length of Stay:  Patient will be admitted on an Inpatient basis with an anticipated length of stay of  Greater than 2 midnights  Justification for Hospital Stay: IV lasix, work up chf, cardiology consult    Total Time for Visit, including Counseling / Coordination of Care: 45 minutes  Greater than 50% of this total time spent on direct patient counseling and coordination of care  Chief Complaint:   Shortness of breath    History of Present Illness:    Rj Michelle is a 69 y o  male with hx cardiomyopathy, hypertension, COPD, PMR, Barrettes esophagus, HL, presents to the ED for evaluation of shortness of breath  Patient reports chest congestion and SOB, cough productive of yelow sputum for past week, his PCP prescribed azithromycin which he has not picked up from pharmacy yet  He reports over the past 2-3 days a different sensation of shortness of breath that was similar to when he had congestive heart failure  He denies chest pain, LH or palpitations, no fevers, chills or sweats, no increased LE edema  His PCP performed labs and he was told his BNP was elevated and se prescribed lasix which he has not been able to  from pharmacy  In ED he was given lasix 20mg IV and reports feeling better          Review of Systems:  No fevers, chills or sweats  No chest pain, LH or palpitations  No change in urin e habits  No abd pain, nausea or emesis, no diarrhea  Remainder 12 system review negative unless mentioned in HPI    All systems are reviewed  Positive as per history of presenting illness  Patient answered no to all other questions         Past Medical and Surgical History:     Past Medical History:   Diagnosis Date    AICD (automatic cardioverter/defibrillator) present     Arthritis     Asthma     Burt esophagus     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     Cardiac arrhythmia     Cardiomy[facility] )     CKD (chronic kidney disease)     Colon cancer (HCC)     Congestive heart failure (CHF) (HCC)     COPD (chronic obstructive pulmonary disease) (HCC)     Diverticulitis     Last assessed: 4/5/13    Esophageal reflux     Gout     Hernia     Hyperlipidemia     Hypertension     Hypertension     Hypothyroidism     Pleural effusion     Polymyalgia rheumatica (HCC)     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     Spondyloarthropathy ([facility] )     Last assessed: 11/28/12       Past Surgical History:   Procedure Laterality Date    ARM SKIN LESION BIOPSY / EXCISION      Malignant    ATRIAL ABLATION SURGERY      AV NODE ABLATION      CARDIAC DEFIBRILLATOR PLACEMENT  2009    Cardio-Defib Pulse Gen Venous Insertion of Electrode for Ventricular Pacing  Implantable    CARDIAC SURGERY  2009    Catheterization    COLONOSCOPY N/A 3/14/2016    Procedure: COLONOSCOPY;  Surgeon: Ale Nash MD;  Location: [facility]; Service  February 22, 2013, mildly enlarged prostate, history of colon CA with normal appearance of anastomosis at the midtransverse colon, severe diverticulosis in the sigmoid, descending and transverse colon  Repeat colonoscopy in 3 yrs    HEMICOLECTOMY Right 05/05/2004    INGUINAL HERNIA REPAIR Bilateral     Left 3/2004, right 5/2008    IR VENOGRAM  10/4/2018    KNEE SURGERY  1972    Meniscectomy    NH ESOPHAGOGASTRODUODENOSCOPY TRANSORAL DIAGNOSTIC N/A 12/5/2016    Procedure: ESOPHAGOGASTRODUODENOSCOPY (EGD); Surgeon: Marelyn Hammans, MD;  Location: [facility]; Service: Gastroenterology    TONSILLECTOMY AND ADENOIDECTOMY         Meds/Allergies:    Prior to Admission medications    Medication Sig Start Date End Date Taking? Authorizing Provider   albuterol (VENTOLIN HFA) 90 mcg/act inhaler Inhale 2 puffs every 4 (four) hours as needed for wheezing or shortness of breath 1/28/19  Yes Regi Peraza MD   aspirin 81 MG tablet Take 81 mg by mouth daily     Yes Historical Provider, MD   atorvastatin (LIPITOR) 10 mg tablet TAKE 1 TABLET DAILY 2/10/18  Yes Cody Grissom,    azithromycin (ZITHROMAX) 250 mg tablet Take 2 tabs on day 1, then take 1 tab daily from day 2-day 5  1/28/19 2/1/19 Yes Maximo Wilkinson MD   bisoprolol (ZEBETA) 10 MG tablet TAKE 1 TABLET TWICE DAILY 2/28/18  Yes Cody Jurado DO   Calcium Carb-Cholecalciferol (CALCIUM 600 + D PO) Take 1 tablet by mouth daily  Yes Historical Provider, MD   Coenzyme Q10 (CO Q-10) 200 MG CAPS Take 1 tablet by mouth daily   Yes Historical Provider, MD   finasteride (PROSCAR) 5 mg tablet Take 1 tablet (5 mg total) by mouth daily 12/13/18  Yes BRANDON Fuentes   furosemide (LASIX) 20 mg tablet Take 1 tablet (20 mg total) by mouth 2 (two) times a day 1/28/19  Yes Maximo Wilkinson MD   lisinopril (ZESTRIL) 20 mg tablet TAKE ONE TABLET BY MOUTH EVERY DAY 1/21/19  Yes Alice Smith MD   Multiple Vitamins-Minerals (CENTRUM SILVER PO) Take 1 tablet by mouth daily  Yes Historical Provider, MD   omeprazole (PriLOSEC) 20 mg delayed release capsule Take 1 capsule (20 mg total) by mouth daily 3/20/18  Yes Lily Smith PA-C   PREVIDENT 5000 ENAMEL PROTECT 1 1-5 % PSTE Use as directed 1/22/19  Yes Historical Provider, MD   Vitamin D, Cholecalciferol, 1000 UNITS CAPS Take 1 tablet by mouth daily  Yes Historical Provider, MD     I have reviewed home medications using allscripts      Allergies: No Known Allergies    Social History:     Marital Status: /Civil Union   Patient Pre-hospital Diet Restrictions:   Substance Use History:   History   Alcohol Use No     Comment: Rare     History   Smoking Status    Never Smoker   Smokeless Tobacco    Never Used     History   Drug Use No       Family History:    non-contributory      Physical Exam:  Vitals:   Blood Pressure: 127/76 (01/29/19 0630)  Pulse: 66 (01/29/19 0630)  Temperature: 97 8 °F (36 6 °C) (01/29/19 0516)  Temp Source: Tympanic (01/29/19 0516)  Respirations: 13 (01/29/19 0630)  Height: 6' (182 9 cm) (01/29/19 0516)  Weight - Scale: 93 kg (205 lb) (01/29/19 0516)  SpO2: 94 % (01/29/19 0630)    Vital signs are reviewed as above  No distress  Awake and alert  RRR  Decreased air exchange, no wheezes  Soft, +bs  No edema  No rash  Calm and cooperative  +distal pulses  AAO x3, nonfocal exam          Additional Data:     Lab Results: I have personally reviewed pertinent reports  Results from last 7 days  Lab Units 01/29/19  0531   WBC Thousand/uL 10 41*   HEMOGLOBIN g/dL 15 9   HEMATOCRIT % 47 3   PLATELETS Thousands/uL 211   NEUTROS PCT % 69   LYMPHS PCT % 19   MONOS PCT % 8   EOS PCT % 3       Results from last 7 days  Lab Units 01/29/19  0531   POTASSIUM mmol/L 4 2   CHLORIDE mmol/L 108   CO2 mmol/L 28   BUN mg/dL 20   CREATININE mg/dL 1 52*   CALCIUM mg/dL 8 8   ALK PHOS U/L 81   ALT U/L 28   AST U/L 22           Imaging: I have personally reviewed pertinent reports  Pulmonary vascular congestion    Allscripts Records Reviewed: Yes     ** Please Note: Dragon 360 Dictation voice to text software may have been used in the creation of this document   **

## 2019-01-29 NOTE — CONSULTS
Consultation - Cardiology Team One  Debbie Rivera 69 y o  male MRN: [id number]  Unit/Bed#: CRB Encounter: [id number]    Inpatient consult to Cardiology  Consult performed by: Serjio Montes ordered by: Ema Triana        Physician Requesting Consult: Travis Hurley MD  Reason for Consult / Principal Problem:  Acute on chronic systolic CHF    Assessment:  URI:  Symptoms of cough, congestion, wheezing x1 week  Sub PCP yesterday and prescribed Z-Zacarias which she has not started yet  White count 10 4  Very mild Acute on chronic systolic/diastolic CHF:  EF 51%, grade 1 diastolic dysfunction per echo 06/2018  Appears euvolemic on exam   BNP 6942 however no prior for comparison so baseline unclear  Chest x-ray with mild central pulmonary vascular distension  Reports no change in weight, - LANDRY  Given 20 mg IV Lasix  Patient is not on a diuretic at home  Dilated cardiomyopathy s/p dual chamber ICD:  Recently upgraded to a dual-chamber ICD 10/2018  Hypertension:  Average /80  Hyperlipidemia:  Lipid panel 12/2018 , , HDL 55, LDL 66  On atorvastatin 10 mg daily  CKD III:  Creatinine 1 57 which is at baseline  COPD: Denies this diagnosis and history of smoking     Plan/Recommendations:  · Patient does not appear overtly volume overloaded on exam however describes symptoms of both URI and very mild CHF with orthopnea  · Given 20 mg IV Lasix  Will monitor output, and if any symptomatic improvement although patient denies any current symptoms  · Will obtain a limited echocardiogram to evaluate heart function and valvular status    · If Echocardiogram is stable and patient feels symptomatic improvement after Lasix may discharge today on daily oral Lasix for maintenance     ____________________________________________________________    CC:  Shortness of breath, cough, congestion x 10 days    History of Present Illness   HPI: Debbie Rivera is a 69y o  year old male who has a history of dilated cardiomyopathy s/p dual chamber ICD, hypertension, SVT, dyslipidemia, CKD 3, COPD who follows with cardiologist Dr Katie Angulo presented to the emergency room with 10 days of shortness of breath, cough and congestion  In the ER BNP was 6942, creatinine at baseline at 1 57, WBC 10 4, troponin negative x1, other labs unremarkable  Chest x-ray was obtained with report pending  Cardiology was consulted for further evaluation management of acute on chronic CHF  Home medication regimen includes aspirin 81 mg daily, atorvastatin 10 mg daily, bisoprolol 10 mg b i d, lisinopril 20 mg daily  Patient is not on home diuretic  Patient resting in bed during consultation and states over the last week he has had congestion and cough feeling like he had bronchitis  Over the last 2 days he has been sleeping in a recliner as he has a sense of shortness of breath and be unable to take a deep breath when lying down flat  He went to his primary care physician yesterday and was prescribed a Z-Zacarias which she has not picked up from the pharmacy yet  Last evening patient had shortness of breath and some wheezing prompting emergency room evaluation as this felt similar to his CHF in 2009  Patient states he coughed quite a bit prior to coming into the ER and his symptoms had resolved by the time he got to the hospital   Patient states his weight has been at 205 and only yesterday morning it was up to 207  In the ER he is back 02/02/2005  Patient denies any chest pain, palpitations, lightheadedness, dizziness, syncope, near syncope, or lower extremity edema  Echocardiogram 06/21/2018:   Mildly dilated LV with lower limits of normal EF at 50%, no wall motion abnormality, grade 1 diastolic dysfunction, normal right ventricular size and function, mild YOU, mild-moderate AI  No significant change in EF when compared to this echocardiogram from 2015       EKG reviewed personally:  Not obtained      Telemetry reviewed personally:  Normal sinus rhythm with no arrhythmias, heart blocks or pauses noted  Review of Systems   Constitution: Negative  Negative for chills and weakness  HENT: Positive for congestion  Cardiovascular: Negative for chest pain, dyspnea on exertion, leg swelling, near-syncope, orthopnea, palpitations and paroxysmal nocturnal dyspnea  Respiratory: Positive for cough and shortness of breath  Gastrointestinal: Negative for diarrhea and nausea  Psychiatric/Behavioral: Negative for altered mental status and suicidal ideas  All other systems reviewed and are negative  Historical Information   Past Medical History:   Diagnosis Date    AICD (automatic cardioverter/defibrillator) present     Arthritis     Asthma     Burt esophagus     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     Cardiac arrhythmia     Cardiomyopathy ([facility] )     CKD (chronic kidney disease)     Colon cancer (HCC)     [facility]     COPD (chronic obstructive pulmonary disease) ([facility] )     Diverticulitis     Last assessed: 4/5/13    Esophageal reflux     Gout     Hernia     Hyperlipidemia     Hypertension     Hypertension     Hypothyroidism     Pleural effusion     Polymyalgia rheumatica (HCC)     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     Spondyloarthropathy ([place] [id number] )     Last assessed: 11/28/12     Past Surgical History:   Procedure Laterality Date    ARM SKIN LESION BIOPSY / EXCISION      Malignant    ATRIAL ABLATION SURGERY      AV NODE ABLATION      CARDIAC DEFIBRILLATOR PLACEMENT  2009    Cardio-Defib Pulse Gen Venous Insertion of Electrode for Ventricular Pacing  Implantable    CARDIAC SURGERY  2009    Catheterization    COLONOSCOPY N/A 3/14/2016    Procedure: COLONOSCOPY;  Surgeon: Chicho Brasher MD;  Location: [facility]; Service   February 22, 2013, mildly enlarged prostate, history of colon CA with normal appearance of anastomosis at the midtransverse colon, severe diverticulosis in the sigmoid, descending and transverse colon  Repeat colonoscopy in 3 yrs    HEMICOLECTOMY Right 05/05/2004    INGUINAL HERNIA REPAIR Bilateral     Left 3/2004, right 5/2008    IR VENOGRAM  10/4/2018    KNEE SURGERY  1972    Meniscectomy    W[place] ESOPHAGOGASTRODUODENOSCOPY TRANSORAL DIAGNOSTIC N/A 12/5/2016    Procedure: ESOPHAGOGASTRODUODENOSCOPY (EGD); Surgeon: Arlette Gomez MD;  Location: [facility];   Service: Gastroenterology    TONSILLECTOMY AND ADENOIDECTOMY       History   Alcohol Use No     Comment: Rare     History   Drug Use No     History   Smoking Status    Never Smoker   Smokeless Tobacco    Never Used     Family History:   Family History   Problem Relation Age of Onset    Heart failure Mother         Congestive    Hypertension Mother     Osteoporosis Mother     COPD Father     Emphysema Father     Hypertension Father     Heart disease Sister         Heart Valve Replacement    Osteoporosis Sister     Breast cancer Family        Meds/Allergies   all current active meds have been reviewed, current meds:   Current Facility-Administered Medications   Medication Dose Route Frequency    aspirin chewable tablet 81 mg  81 mg Oral Daily    atorvastatin (LIPITOR) tablet 10 mg  10 mg Oral After Dinner    azithromycin (ZITHROMAX) tablet 500 mg  500 mg Oral Q24H    bisoprolol (ZEBETA) tablet 10 mg  10 mg Oral Daily    calcium carbonate (OYSTER SHELL,OSCAL) 500 mg tablet 1 tablet  1 tablet Oral Daily With Breakfast    cholecalciferol (VITAMIN D3) tablet 1,000 Units  1,000 Units Oral Daily    co-enzyme Q-10 capsule 200 mg  200 mg Oral Daily    enoxaparin (LOVENOX) subcutaneous injection 40 mg  40 mg Subcutaneous Daily    finasteride (PROSCAR) tablet 5 mg  5 mg Oral Daily    furosemide (LASIX) injection 20 mg  20 mg Intravenous Once    furosemide (LASIX) injection 20 mg  20 mg Intravenous BID (diuretic)    lisinopril (ZESTRIL) tablet 20 mg  20 mg Oral Daily    multivitamin-minerals (CENTRUM) tablet 1 tablet  1 tablet Oral Daily    ondansetron (ZOFRAN) injection 4 mg  4 mg Intravenous Q8H PRN    pantoprazole (PROTONIX) EC tablet 40 mg  40 mg Oral Early Morning    and PTA meds:   Prior to Admission Medications   Prescriptions Last Dose Informant Patient Reported? Taking? Calcium Carb-Cholecalciferol (CALCIUM 600 + D PO)  Self Yes Yes   Sig: Take 1 tablet by mouth daily  Coenzyme Q10 (CO Q-10) 200 MG CAPS  Self Yes Yes   Sig: Take 1 tablet by mouth daily   Multiple Vitamins-Minerals (CENTRUM SILVER PO)  Self Yes Yes   Sig: Take 1 tablet by mouth daily  PREVIDENT 5000 ENAMEL PROTECT 1 1-5 % PSTE  Self Yes Yes   Sig: Use as directed   Vitamin D, Cholecalciferol, 1000 UNITS CAPS  Self Yes Yes   Sig: Take 1 tablet by mouth daily  albuterol (VENTOLIN HFA) 90 mcg/act inhaler   No Yes   Sig: Inhale 2 puffs every 4 (four) hours as needed for wheezing or shortness of breath   aspirin 81 MG tablet  Self Yes Yes   Sig: Take 81 mg by mouth daily  atorvastatin (LIPITOR) 10 mg tablet  Self No Yes   Sig: TAKE 1 TABLET DAILY   azithromycin (ZITHROMAX) 250 mg tablet   No Yes   Sig: Take 2 tabs on day 1, then take 1 tab daily from day 2-day 5    bisoprolol (ZEBETA) 10 MG tablet  Self No Yes   Sig: TAKE 1 TABLET TWICE DAILY   finasteride (PROSCAR) 5 mg tablet  Self No Yes   Sig: Take 1 tablet (5 mg total) by mouth daily   furosemide (LASIX) 20 mg tablet   No Yes   Sig: Take 1 tablet (20 mg total) by mouth 2 (two) times a day   lisinopril (ZESTRIL) 20 mg tablet  Self No Yes   Sig: TAKE ONE TABLET BY MOUTH EVERY DAY   omeprazole (PriLOSEC) 20 mg delayed release capsule  Self No Yes   Sig: Take 1 capsule (20 mg total) by mouth daily      Facility-Administered Medications: None          No Known Allergies    Objective   Vitals: Blood pressure 127/76, pulse 66, temperature 97 8 °F (36 6 °C), temperature source Tympanic, resp   rate 13, height 6' (1 829 m), weight 93 kg (205 lb), SpO2 94 %  ,     Body mass index is 27 8 kg/m²  ,     Systolic (91UAT), SZP:273 , Min:127 , BHH:311     Diastolic (22GKN), HAH:48, Min:76, Max:84    No intake or output data in the 24 hours ending 01/29/19 0846  Weight (last 2 days)     Date/Time   Weight    01/29/19 0516  93 (205)            Invasive Devices     Peripheral Intravenous Line            Peripheral IV 01/29/19 Right Antecubital less than 1 day              Physical Exam   Constitutional: He is oriented to person, place, and time  He appears well-developed and well-nourished  HENT:   Head: Normocephalic and atraumatic  Neck: Normal range of motion  Neck supple  No JVD present  Cardiovascular: Normal rate, regular rhythm and normal heart sounds  Pulmonary/Chest: Effort normal and breath sounds normal  No respiratory distress  He has no wheezes  He has no rales  ICD in left chest  Very slightly diminished breath sounds   Abdominal: Soft  Bowel sounds are normal    Musculoskeletal: Normal range of motion  He exhibits no edema  Neurological: He is alert and oriented to person, place, and time  Skin: Skin is warm and dry  Psychiatric: He has a normal mood and affect  His behavior is normal  Judgment and thought content normal    Nursing note and vitals reviewed        LABORATORY RESULTS:    Results from last 7 days  Lab Units 01/29/19  0531   TROPONIN I ng/mL 0 02     CBC with diff:   Results from last 7 days  Lab Units 01/29/19  0531   WBC Thousand/uL 10 41*   HEMOGLOBIN g/dL 15 9   HEMATOCRIT % 47 3   MCV fL 96   PLATELETS Thousands/uL 211   MCH pg 32 3   MCHC g/dL 33 6   RDW % 15 1   MPV fL 9 3   NRBC AUTO /100 WBCs 0     CMP:  Results from last 7 days  Lab Units 01/29/19  0531 01/28/19  1412   POTASSIUM mmol/L 4 2 4 6   CHLORIDE mmol/L 108 108   CO2 mmol/L 28 26   BUN mg/dL 20 22   CREATININE mg/dL 1 52* 1 57*   CALCIUM mg/dL 8 8 8 5   AST U/L 22 23   ALT U/L 28 25   ALK PHOS U/L 81 74   EGFR ml/min/1 73sq m 44 42     BMP:  Results from last 7 days  Lab Units 19  0531 19  1412   POTASSIUM mmol/L 4 2 4 6   CHLORIDE mmol/L 108 108   CO2 mmol/L 28 26   BUN mg/dL 20 22   CREATININE mg/dL 1 52* 1 57*   CALCIUM mg/dL 8 8 8 5       Lab Results   Component Value Date    NTBNP 6,192 (H) 2019    NTBNP 6,942 (H) 2019     Lipid Profile:   Lab Results   Component Value Date    CHOL 163 2015    CHOL 168 2015    CHOL 155 2014     Lab Results   Component Value Date    HDL 55 2018    HDL 56 2018    HDL 61 (H) 2017     Lab Results   Component Value Date    LDLCALC 66 2018    LDLCALC 65 2018    LDLCALC 69 2017     Lab Results   Component Value Date    TRIG 153 (H) 2018    TRIG 120 2018    TRIG 149 2017     Cardiac testing:   Results for orders placed during the hospital encounter of 18   Echo complete with contrast if indicated    [address] [facility], [address] [place] [address]  [phone]    Transthoracic Echocardiogram  2D, M-mode, Doppler, and Color Doppler    Study date:  2018    Patient: Kamilla Taylor  MR number: [id number]  Account number: [de-identified]  : 1940  Age: 69 years  Gender: Male  Status: Outpatient  [facility] [address] [place] [address] [facility]  Height: 73 in  Weight: 205 lb  BP: 142/ 88 mmHg    Indications: Cardiomyopathy    Diagnoses: I42 9 - Cardiomyopathy, unspecified    Sonographer:  TORIE Torres  Primary Physician:  Mordechai Nyhan, MD  Referring Physician:  Krishan Velazco DO  Group:  [facility]  Interpreting Physician:  Patrick Allred MD    SUMMARY    COMPARISONS:  Ejection fraction has not changed  Aortic regurgitation not reported  study    LEFT VENTRICLE:  The ventricle was mildly dilated    Systolic function was at the lower limits of normal  Ejection fraction was estimated in the range of 50 %   Although no diagnostic regional wall motion abnormality was identified, this possibility cannot be completely excluded on the basis of this study  Wall thickness was mildly increased  Doppler parameters were consistent with abnormal left ventricular relaxation (grade 1 diastolic dysfunction)  LEFT ATRIUM:  The atrium was mildly dilated  MITRAL VALVE:  Valve structure was normal   There was mild regurgitation  AORTIC VALVE:  The valve was trileaflet  Leaflets exhibited mildly increased thickness, mild calcification, and normal cuspal separation  There was mild to moderate regurgitation  TRICUSPID VALVE:  There was mild regurgitation  Pulmonary artery systolic pressure was at the upper limits of normal     COMPARISONS:  Ejection fraction has not changed  Aortic regurgitation not reported 2015 study    HISTORY: PRIOR HISTORY: Hypertension, hyperlipidemia, cardiomyopathy, congested heart failure, ICD implantation, right, arrhythmia, bundle branch block, pleural effusion, gout, asthma, COPD, chronic kidney disease    PROCEDURE: The study was performed in the [facility]  This was a routine study  The transthoracic approach was used  The study included complete 2D imaging, M-mode, complete spectral Doppler, and color Doppler  Image  quality was adequate  LEFT VENTRICLE: The ventricle was mildly dilated  Systolic function was at the lower limits of normal  Ejection fraction was estimated in the range of 50 %  Although no diagnostic regional wall motion abnormality was identified, this  possibility cannot be completely excluded on the basis of this study  Wall thickness was mildly increased  DOPPLER: Doppler parameters were consistent with abnormal left ventricular relaxation (grade 1 diastolic dysfunction)  RIGHT VENTRICLE: The size was normal  Systolic function was normal  Wall thickness was normal     LEFT ATRIUM: The atrium was mildly dilated      RIGHT ATRIUM: Size was at the upper limits of normal     MITRAL VALVE: Valve structure was normal  There was normal leaflet separation  DOPPLER: The transmitral velocity was within the normal range  There was no evidence for stenosis  There was mild regurgitation  The regurgitant jet was  centrally directed  AORTIC VALVE: The valve was trileaflet  Leaflets exhibited mildly increased thickness, mild calcification, and normal cuspal separation  DOPPLER: Transaortic velocity was within the normal range  There was no evidence for stenosis  There  was mild to moderate regurgitation  TRICUSPID VALVE: The valve structure was normal  There was normal leaflet separation  DOPPLER: The transtricuspid velocity was within the normal range  There was no evidence for stenosis  There was mild regurgitation  Pulmonary artery  systolic pressure was at the upper limits of normal     PULMONIC VALVE: Leaflets exhibited normal thickness, no calcification, and normal cuspal separation  DOPPLER: The transpulmonic velocity was within the normal range  There was no significant regurgitation  PERICARDIUM: There was no pericardial effusion  The pericardium was normal in appearance  AORTA: The root exhibited normal size  SYSTEMIC VEINS: IVC: The inferior vena cava was normal in size      SYSTEM MEASUREMENT TABLES    2D  %FS: 14 92 %  AV Diam: 4 13 cm  EDV(Teich): 204 74 ml  EF Biplane: 35 68 %  EF(Cube): 38 41 %  EF(Teich): 30 93 %  ESV(Cube): 157 59 ml  ESV(Teich): 141 4 ml  IVSd: 1 4 cm  LA Area: 23 26 cm2  LA Diam: 4 74 cm  LVEDV MOD A2C: 299 32 ml  LVEDV MOD A4C: 281 87 ml  LVEDV MOD BP: 291 9 ml  LVEF MOD A2C: 32 06 %  LVEF MOD A4C: 41 26 %  LVESV MOD A2C: 203 36 ml  LVESV MOD A4C: 165 56 ml  LVESV MOD BP: 187 74 ml  LVIDd: 6 35 cm  LVIDs: 5 4 cm  LVLd A2C: 9 71 cm  LVLd A4C: 9 6 cm  LVLs A2C: 8 9 cm  LVLs A4C: 8 45 cm  LVPWd: 1 37 cm  RA Area: 21 21 cm2  RV Diam : 3 81 cm  SV MOD A2C: 95 96 ml  SV MOD A4C: 116 31 ml  SV(Cube): 98 28 ml  SV(Teich): 63 33 ml    CW  AR Dec [place]: 1 9 m/s2  AR Dec Time: 2175 49 ms  AR PHT: 630 89 ms  AR Vmax: 4 12 m/s  AR maxP 11 mmHg  TR Vmax: 2 83 m/s  TR maxP 05 mmHg    MM  TAPSE: 1 72 cm    PW  AVC: 426 61 ms  E': 0 03 m/s  E/E': 13 41  MV A Ben[place]: 0 45 m/s  MV Dec [place]: 1 68 m/s2  MV DecT: 239 15 ms  MV E Benign[place]: 0 4 m/s  MV E/A Ratio: 0 9    [employer] Accredited Echocardiography Laboratory    Prepared and electronically signed by    Agatha Severino MD  Signed 2018 13:02:37         Imaging: I have personally reviewed pertinent reports  No results found  Counseling / Coordination of Care  Total floor / unit time spent today 45 minutes  Greater than 50% of total time was spent with the patient and / or family counseling and / or coordination of care  A description of the counseling / coordination of care: Review of history, current assessment, development of a plan  Code Status: Level 1 - Full Code    ** Please Note: Dragon 360 Dictation voice to text software may have been used in the creation of this document   **

## 2019-01-29 NOTE — ASSESSMENT & PLAN NOTE
· Has history of dilated cardiomyopathy diagnosed in 2009 status post ICD  · Echocardiogram completed shows reduced ejection fraction 40% with prior EF of 50% noted June of 2018  · Per Cardiology will need outpatient stress test  · Follow-up with status cardiologist after discharge

## 2019-01-29 NOTE — ED PROVIDER NOTES
ASSESSMENT AND PLAN    Arjun Silva is a 69 y o  male with a history of nonischemic cardiomyopathy with his most recent ejection fraction being 60% who presents with progressive dyspnea at rest and with exertion  On arrival, the patient is hemodynamically stable, well-appearing, without acute distress  He is nontoxic in appearance  He does have an increased work of breathing which appears to be conversational, however does not display any signs concerning for significant fluid overload on exam   Overall, his physical exam is largely unremarkable   -lab work significant for a BNP of 6192  He also had a BNP drawn yesterday, however no prior BNPs in the past to compare to  Lab work otherwise largely unremarkable  -EKG not ischemic  -bedside echocardiogram displayed grossly decreased function, as well as diffuse hypokinesis  This does appear to be a change from his prior echocardiogram   -chest x-ray with increased cephalization, concerning for congestive heart failure  -will give single dose of IV Lasix  -plan for admission to the medicine service for further evaluation dyspnea, which may be secondary to worsening of his cardiomyopathy, and resultant congestive heart failure  He was certainly benefit from repeat echocardiogram   Discussed this plan with the patient  I discussed this case in detail with the admitting medicine team     History  Chief Complaint   Patient presents with    Shortness of Breath     Patient reports SOB since yesterday  Was seen at PCP and told his BNP was elevated  Patient reports feeling that he can't catch his breath, similar to feeling when dx with cardiomyopathy  This is a 61-year-old male who presents for evaluation of dyspnea  The patient does have a history of nonischemic cardiomyopathy, the most recent ejection fraction of 60%  He was diagnosed cardiomyopathy several years ago    The patient states that his dyspnea is worse with exertion over the last 10 days, however over the last week has also been occurring even with rest   He states that he feels similar to when he was initially diagnosed with his cardiomyopathy  He denies any fevers, chills, recent infectious type symptoms, recent episodes of chest pain, pleuritic chest pain, nausea, vomiting, diarrhea, abdominal pain, urinary symptoms  He has no recent travel, recent surgeries, or recent hospitalizations  Prior to Admission Medications   Prescriptions Last Dose Informant Patient Reported? Taking? Calcium Carb-Cholecalciferol (CALCIUM 600 + D PO)  Self Yes Yes   Sig: Take 1 tablet by mouth daily  Coenzyme Q10 (CO Q-10) 200 MG CAPS  Self Yes Yes   Sig: Take 1 tablet by mouth daily   Multiple Vitamins-Minerals (CENTRUM SILVER PO)  Self Yes Yes   Sig: Take 1 tablet by mouth daily  PREVIDENT 5000 ENAMEL PROTECT 1 1-5 % PSTE  Self Yes Yes   Sig: Use as directed   Vitamin D, Cholecalciferol, 1000 UNITS CAPS  Self Yes Yes   Sig: Take 1 tablet by mouth daily  albuterol (VENTOLIN HFA) 90 mcg/act inhaler   No Yes   Sig: Inhale 2 puffs every 4 (four) hours as needed for wheezing or shortness of breath   aspirin 81 MG tablet  Self Yes Yes   Sig: Take 81 mg by mouth daily     atorvastatin (LIPITOR) 10 mg tablet  Self No Yes   Sig: TAKE 1 TABLET DAILY   azithromycin (ZITHROMAX) 250 mg tablet   No Yes   Sig: Take 2 tabs on day 1, then take 1 tab daily from day 2-day 5    bisoprolol (ZEBETA) 10 MG tablet  Self No Yes   Sig: TAKE 1 TABLET TWICE DAILY   finasteride (PROSCAR) 5 mg tablet  Self No Yes   Sig: Take 1 tablet (5 mg total) by mouth daily   furosemide (LASIX) 20 mg tablet   No Yes   Sig: Take 1 tablet (20 mg total) by mouth 2 (two) times a day   lisinopril (ZESTRIL) 20 mg tablet  Self No Yes   Sig: TAKE ONE TABLET BY MOUTH EVERY DAY   omeprazole (PriLOSEC) 20 mg delayed release capsule  Self No Yes   Sig: Take 1 capsule (20 mg total) by mouth daily      Facility-Administered Medications: None Past Medical History:   Diagnosis Date    AICD (automatic cardioverter/defibrillator) present     Arthritis     Asthma     Burt esophagus     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     Cardiac arrhythmia     Cardiomyopathy ([facility] )     CKD (chronic kidney disease)     Colon cancer (HCC)     Congestive heart failure (CHF) (HCC)     COPD (chronic obstructive pulmonary disease) ([facility] )     Diverticulitis     Last assessed: 4/5/13    Esophageal reflux     Gout     Hernia     Hyperlipidemia     Hypertension     Hypertension     Hypothyroidism     Pleural effusion     Polymyalgia rheumatica (HCC)     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     Spondyloarthropathy ([facility] )     Last assessed: 11/28/12       Past Surgical History:   Procedure Laterality Date    ARM SKIN LESION BIOPSY / EXCISION      Malignant    ATRIAL ABLATION SURGERY      AV NODE ABLATION      CARDIAC DEFIBRILLATOR PLACEMENT  2009    Cardio-Defib Pulse Gen Venous Insertion of Electrode for Ventricular Pacing  Implantable    CARDIAC SURGERY  2009    Catheterization    COLONOSCOPY N/A 3/14/2016    Procedure: COLONOSCOPY;  Surgeon: Vinicius Fuentes MD;  Location: [facility]; Service  February 22, 2013, mildly enlarged prostate, history of colon CA with normal appearance of anastomosis at the midtransverse colon, severe diverticulosis in the sigmoid, descending and transverse colon  Repeat colonoscopy in 3 yrs    HEMICOLECTOMY Right 05/05/2004    INGUINAL HERNIA REPAIR Bilateral     Left 3/2004, right 5/2008    IR VENOGRAM  10/4/2018    KNEE SURGERY  1972    Meniscectomy    TX ESOPHAGOGASTRODUODENOSCOPY TRANSORAL DIAGNOSTIC N/A 12/5/2016    Procedure: ESOPHAGOGASTRODUODENOSCOPY (EGD); Surgeon: Peter Hernandez MD;  Location: [facility];   Service: Gastroenterology    TONSILLECTOMY AND ADENOIDECTOMY         Family History   Problem Relation Age of Onset    Heart failure Mother         Congestive    Hypertension Mother     Osteoporosis Mother     COPD Father     Emphysema Father     Hypertension Father     Heart disease Sister         Heart Valve Replacement    Osteoporosis Sister     Breast cancer Family      I have reviewed and agree with the history as documented  Social History   Substance Use Topics    Smoking status: Never Smoker    Smokeless tobacco: Never Used    Alcohol use No      Comment: Rare        Review of Systems   Constitutional: Negative for chills and fever  HENT: Negative for congestion and sinus pain  Eyes: Negative for photophobia and visual disturbance  Respiratory: Positive for cough and shortness of breath  Cardiovascular: Negative for chest pain and palpitations  Gastrointestinal: Negative for abdominal pain, diarrhea, nausea and vomiting  Genitourinary: Negative for dysuria and hematuria  Musculoskeletal: Negative for neck pain and neck stiffness  Skin: Negative for pallor and rash  Neurological: Negative for light-headedness and headaches  Physical Exam  ED Triage Vitals [01/29/19 0516]   Temperature Pulse Respirations Blood Pressure SpO2   97 8 °F (36 6 °C) 80 22 152/82 94 %      Temp Source Heart Rate Source Patient Position - Orthostatic VS BP Location FiO2 (%)   Tympanic Monitor Sitting Left arm --      Pain Score       No Pain           Orthostatic Vital Signs  Vitals:    01/29/19 1124 01/29/19 1305 01/29/19 1405 01/29/19 1501   BP: 111/77 128/75 125/80 121/57   Pulse: 73 81 60 64   Patient Position - Orthostatic VS: Sitting Lying Lying Lying       Physical Exam   Constitutional: He is oriented to person, place, and time  Resting comfortably on stretcher  Awake alert, well-appearing, no acute distress  Nontoxic in appearance  Appears stated age   HENT:   Head: Normocephalic and atraumatic  Mouth/Throat: Oropharynx is clear and moist  No oropharyngeal exudate     Eyes: Pupils are equal, round, and reactive to light  No scleral icterus  Neck: Normal range of motion  No JVD present  Cardiovascular: Normal rate, regular rhythm and normal heart sounds  No murmur heard  Pulmonary/Chest:   Mildly increased respiratory effort  Respiratory rate in the mid 20s  No rales, rhonchi, or wheezing appreciated   Abdominal: Soft  He exhibits no distension  There is no tenderness  Musculoskeletal: Normal range of motion  He exhibits no edema  Neurological: He is alert and oriented to person, place, and time  He exhibits normal muscle tone  Skin: Skin is warm and dry  No rash noted  No pallor  ED Medications  Medications   furosemide (LASIX) injection 20 mg (20 mg Intravenous Given 1/29/19 0918)       Diagnostic Studies  Results Reviewed     Procedure Component Value Units Date/Time    Troponin I [[id number]]  (Normal) Collected:  01/29/19 0531    Lab Status:  Final result Specimen:  Blood from Arm, Right Updated:  01/29/19 0607     Troponin I 0 02 ng/mL     Comprehensive metabolic panel [[id number]]  (Abnormal) Collected:  01/29/19 0531    Lab Status:  Final result Specimen:  Blood from Arm, Right Updated:  01/29/19 2994     Sodium 142 mmol/L      Potassium 4 2 mmol/L      Chloride 108 mmol/L      CO2 28 mmol/L      ANION GAP 6 mmol/L      BUN 20 mg/dL      Creatinine 1 52 (H) mg/dL      Glucose 119 mg/dL      Calcium 8 8 mg/dL      AST 22 U/L      ALT 28 U/L      Alkaline Phosphatase 81 U/L      Total Protein 7 6 g/dL      Albumin 3 8 g/dL      Total Bilirubin 1 28 (H) mg/dL      eGFR 44 ml/min/1 73sq m     Narrative:         National Kidney Disease Education Program recommendations are as follows:  GFR calculation is accurate only with a steady state creatinine  Chronic Kidney disease less than 60 ml/min/1 73 sq  meters  Kidney failure less than 15 ml/min/1 73 sq  meters      NT-BNP PRO (BNP for [facility], [facility], [facility], [place], [place], [facility], [facility], [place] campuses) [[id number]]  (Abnormal) Collected:  01/29/19 0531    Lab Status:  Final result Specimen:  Blood from Arm, Right Updated:  01/29/19 0605     NT-proBNP 6,192 (H) pg/mL     CBC and differential [[id number]]  (Abnormal) Collected:  01/29/19 0531    Lab Status:  Final result Specimen:  Blood from Arm, Right Updated:  01/29/19 0543     WBC 10 41 (H) Thousand/uL      RBC 4 92 Million/uL      Hemoglobin 15 9 g/dL      Hematocrit 47 3 %      MCV 96 fL      MCH 32 3 pg      MCHC 33 6 g/dL      RDW 15 1 %      MPV 9 3 fL      Platelets 586 Thousands/uL      nRBC 0 /100 WBCs      Neutrophils Relative 69 %      Immat GRANS % 0 %      Lymphocytes Relative 19 %      Monocytes Relative 8 %      Eosinophils Relative 3 %      Basophils Relative 1 %      Neutrophils Absolute 7 21 Thousands/µL      Immature Grans Absolute 0 03 Thousand/uL      Lymphocytes Absolute 1 99 Thousands/µL      Monocytes Absolute 0 86 Thousand/µL      Eosinophils Absolute 0 26 Thousand/µL      Basophils Absolute 0 06 Thousands/µL                  X-ray chest 2 views   Final Result by Patrica Syed MD (01/29 8145)      Cardiomegaly and mild central pulmonary vascular distention  Minimal pleural effusions  Workstation performed: [id number]               Procedures  ECG 12 Lead Documentation  Date/Time: 1/31/2019 2:15 PM  Performed by: Adele Tovar  Authorized by: Adele Tovar     ECG reviewed by me, the ED Provider: yes    Patient location:  ED  Previous ECG:     Previous ECG:  Compared to current    Similarity:  No change    Comparison to cardiac monitor: Yes    Interpretation:     Interpretation: non-specific    Comments:      Sinus rhythm with intermittent paced beats, and intermittent PVCs  No ST/T-wave changes  Left bundle branch block    On comparison to prior EKG, prior EKG is entirely paced          Phone Consults  ED Phone Contact    ED Course                               MDM    Disposition  Final diagnoses:   Acute systolic congestive heart failure ([facility] )     Time reflects when diagnosis was documented in both MDM as applicable and the Disposition within this note     Time User Action Codes Description Comment    1/29/2019  7:44 AM Rayray Picket Add [I42 0] Dilated cardiomyopathy ([facility] )     1/29/2019  7:44 AM Rayray Picket Modify [I42 0] Dilated cardiomyopathy ([facility] )     1/29/2019  7:44 AM Rayray Picket Remove [I42 0] Dilated cardiomyopathy ([facility] )     1/29/2019  7:44 AM Rayray Picket Add [M45 47] Acute systolic congestive heart failure ([facility] )     1/29/2019  7:46 AM Hildred Bathe Modify [M33 39] Acute systolic congestive heart failure ([facility] )     1/29/2019  2:13 PM Lovina Ita L Add [R06 02] SOB (shortness of breath)     1/29/2019  2:17 PM Lovina Ita L Add [N18 3] CKD (chronic kidney disease) stage 3, GFR 30-59 ml/min ([facility] )     1/29/2019  2:41 PM Dagoberto Jansky Modify [R08 99] Acute systolic congestive heart failure ([facility] )     1/29/2019  2:41 PM Dagoberto Jansky Add [I42 0] Dilated cardiomyopathy ([facility] )     1/29/2019  2:41 PM Dagoberto Jansky Add [R06 02] Shortness of breath       ED Disposition     ED Disposition Condition Date/Time Comment    Discharge  Tue Jan 29, 2019  3:42 PM Case was discussed with KIEL and the patient's admission status was agreed to be Admission Status: inpatient status to the service of Dr Fatuma Euceda   Follow-up Information     Follow up With Specialties Details Why Contact Puma Mancilla MD Family Medicine Schedule an appointment as soon as possible for a visit Recommend appointment for post hospital follow-up to be seen within 5-7 days after hospital discharge [address] [facility]  [address] [place], [address] [place], Cardiology Imaging, Nurse Practitioner Go on 2/11/2019 at 4:00 p m  Please arrive 15 minutes prior to scheduled appointment time with a list of current medications   [address] [place] [address]  [phone]            Discharge Medication List as of 1/29/2019  2:23 PM      CONTINUE these medications which have CHANGED    Details   furosemide (LASIX) 20 mg tablet Take 2 tablets (40 mg total) by mouth daily, Starting Tue 1/29/2019, No Print         CONTINUE these medications which have NOT CHANGED    Details   albuterol (VENTOLIN HFA) 90 mcg/act inhaler Inhale 2 puffs every 4 (four) hours as needed for wheezing or shortness of breath, Starting Mon 1/28/2019, Normal      aspirin 81 MG tablet Take 81 mg by mouth daily  , Historical Med      atorvastatin (LIPITOR) 10 mg tablet TAKE 1 TABLET DAILY, Normal      azithromycin (ZITHROMAX) 250 mg tablet Take 2 tabs on day 1, then take 1 tab daily from day 2-day 5 , Normal      bisoprolol (ZEBETA) 10 MG tablet TAKE 1 TABLET TWICE DAILY, Normal      Calcium Carb-Cholecalciferol (CALCIUM 600 + D PO) Take 1 tablet by mouth daily  , Historical Med      Coenzyme Q10 (CO Q-10) 200 MG CAPS Take 1 tablet by mouth daily, Historical Med      finasteride (PROSCAR) 5 mg tablet Take 1 tablet (5 mg total) by mouth daily, Starting Thu 12/13/2018, Normal      lisinopril (ZESTRIL) 20 mg tablet TAKE ONE TABLET BY MOUTH EVERY DAY, Normal      Multiple Vitamins-Minerals (CENTRUM SILVER PO) Take 1 tablet by mouth daily  , Historical Med      omeprazole (PriLOSEC) 20 mg delayed release capsule Take 1 capsule (20 mg total) by mouth daily, Starting Tue 3/20/2018, Normal      PREVIDENT 5000 ENAMEL PROTECT 1 1-5 % PSTE Use as directed, Starting Tue 1/22/2019, Historical Med      Vitamin D, Cholecalciferol, 1000 UNITS CAPS Take 1 tablet by mouth daily  , Historical Med             Outpatient Discharge Orders  Basic metabolic panel (BMP)   Standing Status: Future  Standing Exp   Date: 01/29/20     Discharge Diet     Activity as tolerated     Call provider for:  persistent nausea or vomiting     Call provider for:  severe uncontrolled pain     Call provider for:  difficulty breathing, headache or visual disturbances     Call provider for:  persistent dizziness or light-headedness     Call provider for:  extreme fatigue     Call provider for:     NM myocardial perfusion spect (stress and/or rest)   Standing Status: Future Number of Occurrences: 1 Standing Exp  Date: 01/29/23         ED Provider  Attending physically available and evaluated Rj Michelle I managed the patient along with the ED Attending      Electronically Signed by         Tiesha Cr MD  01/31/19 0064

## 2019-01-29 NOTE — ASSESSMENT & PLAN NOTE
· Present on admission suspect secondary to bronchitis and suspected mild acute congestive heart failure exacerbation  · To start on Lasix 40 mg daily  · Echocardiogram with EF of 40% (down from 50% June 2018)  · Daily weights this was discussed with patient who states he has already been doing this on a regular basis    · Follow-up with established cardiologist after discharge  · To have and a cardiac stress test as per Cardiology after discharge

## 2019-01-29 NOTE — ASSESSMENT & PLAN NOTE
· To start on Lasix 40 mg daily per Cardiology (patient was already called in a prescription by his PCP for Lasix 20 mg b i d   Discussed with patient that he should take 40 mg daily as directed by Cardiology)  · Suspected Creatinine baseline approximately 1 4-1 45  · Repeat BMP in 1 week to ensure stability given new start Lasix

## 2019-01-29 NOTE — DISCHARGE INSTRUCTIONS
Please continue to take the prescription of a Z-Zacarias as prescribed by your primary care provider    Lasix will be 40 mg once daily (your family doctor had prescribed 20 mg twice a day into your pharmacy given that this was already prescribed just recommend to take take 2 tablets once a day to equal to 40 mg as directed by your cardiologist)    Continue to check your weight daily if you gain more than 3 lb in 1 day or 5 lb in 2 days to 1 week notify your cardiologist

## 2019-01-29 NOTE — ED NOTES
Per admitting, awaiting report from cardiology after reading echo; may d/c patient rather than admit to Dedrick Cortez RN  01/29/19 5445

## 2019-01-29 NOTE — DISCHARGE SUMMARY
Discharge- Angeles Rivera 1940, 69 y o  male MRN: [id number]    Unit/Bed#: CRB Encounter: [id number]    Primary Care Provider: Radha Roberts MD   Date and time admitted to hospital: 1/29/2019  5:17 AM        Shortness of breath   Assessment & Plan    · Present on admission suspect secondary to bronchitis and suspected mild acute congestive heart failure exacerbation  · To start on Lasix 40 mg daily  · Echocardiogram with EF of 40% (down from 50% June 2018)  · Daily weights this was discussed with patient who states he has already been doing this on a regular basis  · Follow-up with established cardiologist after discharge  · To have and a cardiac stress test as per Cardiology after discharge     * Acute on chronic systolic congestive heart failure ([address] )   Assessment & Plan    · Presented with shortness of breath and reports of orthopnea  · BNP 6192  · Patient states he feels better since admission  · [address] Cardiology consultation recommendations noted plan of care discussed  · Was given IV Lasix in the ED and will continue with 40 mg of Lasix after discharge  He had seen his PCP yesterday and was already prescribed 20 mg b i d  It was discussed he should take 40 mg once a day    · Daily weights  · Follow-up with established cardiologist after discharge     Bronchitis   Assessment & Plan    · Patient had been seen by his PCP on January 28th and was diagnosed with bronchitis but had yet to start the Zithromax he was prescribed  · Recommended to continue with Z-Zacarias as prescribed by his PCP  · Albuterol as prescribed by his PCP as needed  · Follow-up with his primary care provider if symptoms worsen or fail to improve     Hypertension   Assessment & Plan    · Blood pressure stable  · Continue home meds     Hyperlipidemia   Assessment & Plan    · Continue statin     CKD (chronic kidney disease) stage 3, GFR 30-59 ml/min ([facility])   Assessment & Plan    · To start on Lasix 40 mg daily per Cardiology (patient was already called in a prescription by his PCP for Lasix 20 mg b i d  Discussed with patient that he should take 40 mg daily as directed by Cardiology)  · Suspected Creatinine baseline approximately 1 4-1 45  · Repeat BMP in 1 week to ensure stability given new start Lasix     Cardiomyopathy [facility])   Assessment & Plan    · Has history of dilated cardiomyopathy diagnosed in 2009 status post ICD  · Echocardiogram completed shows reduced ejection fraction 40% with prior EF of 50% noted June of 2018  · Per Cardiology will need outpatient stress test  · Follow-up with status cardiologist after discharge         Discharging Physician / Practitioner: Ailyn Munoz  PCP: Regi Peraza MD  Admission Date:   Admission Orders     Ordered        01/29/19 0746  Inpatient Admission  Once         01/29/19 0746  Inpatient Admission (expected length of stay for this patient Order details is greater than two midnights)  Once             Discharge Date: 01/29/19    Resolved Problems  Date Reviewed: 1/29/2019    None          Consultations During Hospital Stay:  · cardiology    Procedures Performed:   ECHO; EF 40%There was moderate diffuse hypokinesis with distinct regional wall motion abnormalities  There was akinesis of the basal inferior wall(s)  Wall thickness was at the upper limits of normal The changes were consistent with eccentric hypertrophy  Mild pulm HTN      Significant Findings / Test Results:   CXR cardiomegaly and mild central pulm vascular congestion  Minimal pleural effusions    Incidental Findings:   · All findings as above     Test Results Pending at Discharge (will require follow up):   · none     Outpatient Tests Requested:  · Per cardiology plan for OP ischemic workup after d/c    Complications:  none    Reason for Admission: SOB    Hospital Course:      Eva Simpson is a 69 y o  male patient with hx of cardiomyopathy, hypertension, COPD, PMR, Barrettes esophagus, HL, who originally presented to the hospital on 1/29/2019 due to SOB  He was seen ot his PCP office for cough, yellow sputum and SOB and was prescribed zithromax and lasix  He was unable to  the medications for the pharmacy and his symptoms persisted therefore he presented tot the ER  He was given lasix 20 mg IV in ED and felt improved  Cardiology was consulted and ordered an echo  He was started on lasix 40 mg daily (same dose prescribed by PCP but 40mg daily advised not 20mg BID as per PCP)  Echo showed a decline in his EF to 40% with regional wall motion abnormalities in the basal inferior walls  Given that he was feeling better with the addition of diuretics Cardiology cleared him for discharge but recommended an OP ischemic workup  He was advised to have an OP ischemia workup with a nuclear stress test and lasix 40mg daily with low sodium FR diet with daily weights per cardiology  The patient, initially admitted to the hospital as inpatient, was discharged earlier than expected given the following: Improvement in respiratory status after receiving IV Lasix and clearance from Cardiology team with outpatient services arranged       Please see above list of diagnoses and related plan for additional information  Condition at Discharge: stable     Discharge Day Visit / Exam:     Subjective:  Denies any further respiratory difficulty  Cough is occasional and productive  No chest pain  No nausea or vomiting  Tolerating diet  Vitals: Blood Pressure: 125/80 (01/29/19 1405)  Pulse: 60 (01/29/19 1405)  Temperature: 97 8 °F (36 6 °C) (01/29/19 1124)  Temp Source: Oral (01/29/19 1124)  Respirations: 18 (01/29/19 1405)  Height: 6' (182 9 cm) (01/29/19 0516)  Weight - Scale: 93 kg (205 lb) (01/29/19 0516)  SpO2: 90 % (01/29/19 1405)  Exam:   Physical Exam   Constitutional: He is oriented to person, place, and time  He appears well-developed and well-nourished  No distress  HENT:   Head: Normocephalic and atraumatic     Mouth/Throat: Oropharynx is clear and moist    Neck: Normal range of motion  Neck supple  Cardiovascular: Normal rate and regular rhythm  No murmur heard  Pulmonary/Chest: Effort normal and breath sounds normal  No respiratory distress  Abdominal: Soft  Bowel sounds are normal  He exhibits no distension  There is no tenderness  Musculoskeletal: Normal range of motion  He exhibits no edema  Neurological: He is alert and oriented to person, place, and time  No cranial nerve deficit  Skin: Skin is warm and dry  Psychiatric: He has a normal mood and affect  His behavior is normal    Vitals reviewed  Discussion with Family: wife at bedside    Discharge instructions/Information to patient and family:   See after visit summary for information provided to patient and family  Provisions for Follow-Up Care:  See after visit summary for information related to follow-up care and any pertinent home health orders  Disposition:     Home    For Discharges to [facility] SNF:   · Not Applicable to this Patient - Not Applicable to this Patient    Planned Readmission: no     Discharge Statement:  I spent 45 minutes discharging the patient  This time was spent on the day of discharge  I had direct contact with the patient on the day of discharge  Greater than 50% of the total time was spent examining patient, answering all patient questions, arranging and discussing plan of care with patient as well as directly providing post-discharge instructions  Additional time then spent on discharge activities  Discharge Medications:  See after visit summary for reconciled discharge medications provided to patient and family        ** Please Note: This note has been constructed using a voice recognition system **

## 2019-01-29 NOTE — QUICK NOTE
Echocardiogram was reviewed that shows decline in EF to 40% with regional wall motion abnormalities in the basal inferior walls  Reviewed results with patient in detail  Symptomatically patient feels well without any recurrent shortness of breath      Plan/recommendations:  · Outpatient ischemic evaluation with nuclear stress test in 1 week  · Start Lasix 40 mg daily for maintenance  · Recommend low-sodium, fluid-restricted diet with daily weights  · Follow-up with BRANDON Evans 2/11/19

## 2019-01-29 NOTE — Clinical Note
Case was discussed with KIEL and the patient's admission status was agreed to be Admission Status: inpatient status to the service of Dr Rio Zuh

## 2019-01-30 ENCOUNTER — TRANSITIONAL CARE MANAGEMENT (OUTPATIENT)
Dept: FAMILY MEDICINE CLINIC | Facility: CLINIC | Age: 79
End: 2019-01-30

## 2019-01-30 NOTE — UTILIZATION REVIEW
Network Utilization Review Department  Phone: [phone]; Fax [phone]  [email or web address]  ATTENTION: Please call with any questions or concerns to [phone]  and carefully listen to the prompts so that you are directed to the right person  Send all requests for admission clinical reviews, approved or denied determinations and any other requests to fax [phone]  All voicemails are confidential   =======================================================================  Initial Clinical Review- [place]  Admission: Date/Time/Statement: 1/29/19 @ 0746  Orders Placed This Encounter   Procedures    Inpatient Admission (expected length of stay for this patient Order details is greater than two midnights)     Standing Status:   Standing     Number of Occurrences:   1     Order Specific Question:   Admitting Physician     Answer:   Courtney Bhatia [[id number]]     Order Specific Question:   Level of Care     Answer:   Med Surg [[id number]]     Order Specific Question:   Estimated length of stay     Answer:   More than 2 Midnights     Order Specific Question:   Certification     Answer:   I certify that inpatient services are medically necessary for this patient for a duration of greater than two midnights  See H&P and MD Progress Notes for additional information about the patient's course of treatment  ED: Date/Time/Mode of Arrival:   ED Arrival Information     Expected Arrival Acuity Means of Arrival Escorted By Service Admission Type    - 1/29/2019 05:12 Emergent Walk-In Family Member Hospitalist Emergency    Arrival Complaint    Shortness of Breath        Chief Complaint:   Chief Complaint   Patient presents with    Shortness of Breath     Patient reports SOB since yesterday  Was seen at PCP and told his BNP was elevated  Patient reports feeling that he can't catch his breath, similar to feeling when dx with cardiomyopathy       History of Illness:   Arjun Kwon is a 69 y o  male with hx cardiomyopathy, hypertension, COPD, PMR, Barrettes esophagus, HL, presents to the ED for evaluation of shortness of breath  Patient reports chest congestion and SOB, cough productive of yelow sputum for past week, his PCP prescribed azithromycin which he has not picked up from pharmacy yet  He reports over the past 2-3 days a different sensation of shortness of breath that was similar to when he had congestive heart failure  ED Vital Signs:   ED Triage Vitals [19 0516]   Temperature Pulse Respirations Blood Pressure SpO2   97 8 °F (36 6 °C) 80 22 152/82 94 %      Temp Source Heart Rate Source Patient Position - Orthostatic VS BP Location FiO2 (%)   Tympanic Monitor Sitting Left arm --      Pain Score       No Pain        Wt Readings from Last 1 Encounters:   19 93 kg (205 lb)   Pertinent Labs/Diagnostic Test Results:   WBC Thousand/uL 10 41*   HEMOGLOBIN g/dL 15 9   HEMATOCRIT % 47 3   PLATELETS Thousands/uL 211   Sodium 141  POTASSIUM mmol/L 4 2   CHLORIDE mmol/L 108   CO2 mmol/L 28   BUN mg/dL 20   CREATININE mg/dL 1 52*   CALCIUM mg/dL 8 8   ALK PHOS U/L 81   ALT U/L 28   AST U/L 22   Glucose 93  NT-proBNP 6,942 (H) <450 pg/mL   TROP 0 02  Chest X:  Cardiomegaly and mild central pulmonary vascular distention   Minimal pleural effusions    EC, Sinus rhythm with premature ventricular or aberrantly conducted complexes  Left axis deviation  Non-specific intra-ventricular conduction block  Left ventricular hypertrophy with QRS widening and repolarization abnormality    ED Treatment:   Medication Administration from 2019 0512 to 2019 1215       Date/Time Order Dose Route Action Action by Comments     2019 0918 furosemide (LASIX) injection 20 mg 20 mg Intravenous Given Kevin August RN bp 141/94     2019 1031 aspirin chewable tablet 81 mg 81 mg Oral Not Given Kevin August RN      2019 0935 azithromycin (ZITHROMAX) tablet 500 mg 500 mg Oral Given Patrick Wiseman RN      01/29/2019 9949 bisoprolol (ZEBETA) tablet 10 mg 10 mg Oral Given Patrick Wiseman RN bp 345/62     01/29/2019 0921 calcium carbonate (OYSTER SHELL,OSCAL) 500 mg tablet 1 tablet 1 tablet Oral Given Patrick Wiseman RN      01/29/2019 1120 co-enzyme Q-10 capsule 200 mg 200 mg Oral Given Patrick Wiseman RN      01/29/2019 1031 finasteride (PROSCAR) tablet 5 mg 5 mg Oral Not Given Patrick Wiseman RN      01/29/2019 0920 lisinopril (ZESTRIL) tablet 20 mg 20 mg Oral Given Patrick Wiseman RN bp 141/94     01/29/2019 0921 multivitamin-minerals (CENTRUM) tablet 1 tablet 1 tablet Oral Given Patrick Wiseman RN      01/29/2019 0930 pantoprazole (PROTONIX) EC tablet 40 mg 40 mg Oral Given Patrick Wiseman RN      01/29/2019 1120 cholecalciferol (VITAMIN D3) tablet 1,000 Units 1,000 Units Oral Given Patrick Wiseman RN      01/29/2019 0931 enoxaparin (LOVENOX) subcutaneous injection 40 mg 40 mg Subcutaneous Not Given Patrick Wiseman RN         Past Medical/Surgical History:    Active Ambulatory Problems     Diagnosis Date Noted    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 12/02/2013    Microscopic hematuria 12/02/2013    Ank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 ([facility] ) 12/30/2014    Arthritis 11/26/2013    Ubrt esophagus 04/05/2013    Cancer, colon ([place] [address] ) 04/05/2013    Cardiac arrhythmia 04/05/2013    Cardiomyopathy ([facility] ) 10/26/2012    CKD (chronic kidney disease) stage 3, GFR 30-59 ml/min ([facility]) 06/21/2016    Esophageal reflux 11/28/2012    Gout 09/29/2012    Hyperlipidemia 09/05/2012    Hypertension 04/05/2013    Hypothyroidism 05/30/2013    Impaired fasting glucose 05/30/2013    Malignant melanoma of right upper extremity ([facility] ) 11/15/2017    Non-toxic multinodular goiter 11/28/2012    Polymyalgia rheumatica ([facility] ) 11/26/2013    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    Skin lesion 06/23/2017    Thrombocytopenia [facility] ) 06/23/2015  Osteopenia 06/29/2018    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 09/20/2018    Hypertensive kidney disease with stage 3 chronic kidney disease ([place] [address] ) 01/04/2019     Past Medical History:   Diagnosis Date    AICD (automatic cardioverter/defibrillator) present     Arthritis     Asthma     Burt esophagus     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     Cardiac arrhythmia     Cardiomyopathy ([place] [address] )     CKD (chronic kidney disease)     [facility] cancer [facility])     Congestive heart failure (CHF) ([facility])     COPD (chronic obstructive pulmonary disease) ([facility])     Diverticulitis     Esophageal reflux     Gout     Hernia     Hyperlipidemia     Hypertension     Hypertension     Hypothyroidism     Pleural effusion     Polymyalgia rheumatica ([facility])     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     Spondyloarthropathy ([place] [address] )      Admitting Diagnosis: Shortness of breath [R06 02]  Age/Sex: 69 y o  male  Assessment/Plan:   Present on Admission:   Burt esophagus   Cardiomyopathy ([place] [address] )   CKD (chronic kidney disease) stage 3, GFR 30-59 ml/min ([facility])   Hyperlipidemia   Hypertension   Acute on chronic systolic congestive heart failure (HCC)   COPD (chronic obstructive pulmonary disease) ([place] [address] )        Plan for the Primary Problem(s):  · Acute on chronic systolic CHF: in pt with hx systolic chf/AICD, last known EF pt reports was 55%  ?  Admit for further work-up and treatment, lasix IV bid, daily weights, I/o, check echo, cardiology consultation will be obtained, continue lisinopril as taking prior to admission, monitor hemodynamics and electrolytes     Plan for Additional Problems:   · Stage 3 CKD, creatinine at baseline, monitor closely with diuresis  · COPD: no evidence of acute exacerbation, prn bronchodilators, to complete course of azithromycin  · Barretts esophagus: on ppi as outpt will continue  · Hypertension: resume outpt antihypertensive regimen, on lisinopril   · HL: on atorvastatin  · Hx colon cancer noted     VTE Prophylaxis: Enoxaparin (Lovenox)  / sequential compression device   Anticipated Length of Stay:  Patient will be admitted on an Inpatient basis with an anticipated length of stay of  Greater than 2 midnights  Justification for Hospital Stay: IV lasix, work up chf, cardiology consult  Admission Orders:  Medication Dose Route Frequency    aspirin chewable tablet 81 mg  81 mg Oral Daily    atorvastatin (LIPITOR) tablet 10 mg  10 mg Oral After Dinner    azithromycin (ZITHROMAX) tablet 500 mg  500 mg Oral Q24H    bisoprolol (ZEBETA) tablet 10 mg  10 mg Oral Daily    calcium carbonate (OYSTER SHELL,OSCAL) 500 mg tablet 1 tablet  1 tablet Oral Daily With Breakfast    cholecalciferol (VITAMIN D3) tablet 1,000 Units  1,000 Units Oral Daily    co-enzyme Q-10 capsule 200 mg  200 mg Oral Daily    enoxaparin (LOVENOX) subcutaneous injection 40 mg  40 mg Subcutaneous Daily    finasteride (PROSCAR) tablet 5 mg  5 mg Oral Daily    furosemide (LASIX) injection 20 mg  20 mg Intravenous Once    furosemide (LASIX) injection 20 mg  20 mg Intravenous BID (diuretic)    lisinopril (ZESTRIL) tablet 20 mg  20 mg Oral Daily    multivitamin-minerals (CENTRUM) tablet 1 tablet  1 tablet Oral Daily    ondansetron (ZOFRAN) injection 4 mg  4 mg Intravenous Q8H PRN    pantoprazole (PROTONIX) EC tablet 40 mg      Activity as tolerated / Up and OOB as tolerated  NPO > Stress - Cancelled  TELM  Cardio diet  Consult cardio  ECHO:  Pending  --------------------------------------------------------------------------------------------------------------------------------------------------  Consult Cardio  URI:  Symptoms of cough, congestion, wheezing x1 week  Sub PCP yesterday and prescribed Z-Zacarias which she has not started yet  White count 10 4    Very mild Acute on chronic systolic/diastolic CHF:  EF 51%, grade 1 diastolic dysfunction per echo 06/2018  Appears euvolemic on exam   BNP 6942 however no prior for comparison so baseline unclear  Chest x-ray with mild central pulmonary vascular distension  Reports no change in weight, - LANDRY  Given 20 mg IV Lasix  Patient is not on a diuretic at home  Dilated cardiomyopathy s/p dual chamber ICD:  Recently upgraded to a dual-chamber ICD 10/2018  Hypertension:  Average /80  Hyperlipidemia:  Lipid panel 12/2018 , , HDL 55, LDL 66  On atorvastatin 10 mg daily  CKD III:  Creatinine 1 57 which is at baseline  COPD: Denies this diagnosis and history of smoking     Plan/Recommendations:  · Patient does not appear overtly volume overloaded on exam however describes symptoms of both URI and very mild CHF with orthopnea  · Given 20 mg IV Lasix  Will monitor output, and if any symptomatic improvement although patient denies any current symptoms  · Will obtain a limited echocardiogram to evaluate heart function and valvular status  · If Echocardiogram is stable and patient feels symptomatic improvement after Lasix may discharge today on daily oral Lasix for maintenance

## 2019-01-31 ENCOUNTER — HOSPITAL ENCOUNTER (OUTPATIENT)
Dept: NON INVASIVE DIAGNOSTICS | Facility: CLINIC | Age: 79
Discharge: HOME | End: 2019-01-31
Payer: COMMERCIAL

## 2019-01-31 DIAGNOSIS — R06.02 SHORTNESS OF BREATH: ICD-10-CM

## 2019-01-31 DIAGNOSIS — I42.0 DILATED CARDIOMYOPATHY (HCC): ICD-10-CM

## 2019-01-31 PROCEDURE — 93016 CV STRESS TEST SUPVJ ONLY: CPT | Performed by: INTERNAL MEDICINE

## 2019-01-31 PROCEDURE — 78452 HT MUSCLE IMAGE SPECT MULT: CPT | Performed by: INTERNAL MEDICINE

## 2019-01-31 PROCEDURE — 93017 CV STRESS TEST TRACING ONLY: CPT

## 2019-01-31 PROCEDURE — A9502 TC99M TETROFOSMIN: HCPCS

## 2019-01-31 PROCEDURE — 93018 CV STRESS TEST I&R ONLY: CPT | Performed by: INTERNAL MEDICINE

## 2019-01-31 PROCEDURE — 78452 HT MUSCLE IMAGE SPECT MULT: CPT

## 2019-01-31 RX ADMIN — REGADENOSON 0.4 MG: 0.08 INJECTION, SOLUTION INTRAVENOUS at 10:08

## 2019-02-03 DIAGNOSIS — K22.70 BARRETT'S ESOPHAGUS WITHOUT DYSPLASIA: ICD-10-CM

## 2019-02-05 ENCOUNTER — APPOINTMENT (OUTPATIENT)
Dept: LAB | Facility: HOSPITAL | Age: 79
End: 2019-02-05
Payer: COMMERCIAL

## 2019-02-05 DIAGNOSIS — N18.30 CKD (CHRONIC KIDNEY DISEASE) STAGE 3, GFR 30-59 ML/MIN (HCC): ICD-10-CM

## 2019-02-05 LAB
ANION GAP SERPL CALCULATED.3IONS-SCNC: 5 MMOL/L (ref 4–13)
BUN SERPL-MCNC: 27 MG/DL (ref 5–25)
CALCIUM SERPL-MCNC: 9.2 MG/DL (ref 8.3–10.1)
CHLORIDE SERPL-SCNC: 109 MMOL/L (ref 100–108)
CO2 SERPL-SCNC: 27 MMOL/L (ref 21–32)
CREAT SERPL-MCNC: 1.62 MG/DL (ref 0.6–1.3)
GFR SERPL CREATININE-BSD FRML MDRD: 40 ML/MIN/1.73SQ M
GLUCOSE P FAST SERPL-MCNC: 102 MG/DL (ref 65–99)
POTASSIUM SERPL-SCNC: 4.1 MMOL/L (ref 3.5–5.3)
SODIUM SERPL-SCNC: 141 MMOL/L (ref 136–145)

## 2019-02-05 PROCEDURE — 36415 COLL VENOUS BLD VENIPUNCTURE: CPT

## 2019-02-05 PROCEDURE — 80048 BASIC METABOLIC PNL TOTAL CA: CPT

## 2019-02-05 RX ORDER — OMEPRAZOLE 20 MG/1
CAPSULE, DELAYED RELEASE ORAL
Qty: 90 CAPSULE | Refills: 3 | Status: SHIPPED | OUTPATIENT
Start: 2019-02-05 | End: 2019-12-19 | Stop reason: SDUPTHER

## 2019-02-07 NOTE — PROGRESS NOTES
2/8/2019    Haroontano Mayer  1940  [id number]      Assessment  -BPH  -Microscopic hematuria    Discussion/Plan  Jennifer Triana is a 69 y o  male being managed by Dr Liza Colbert        -Patient continues to do well without any urinary complaints  He will continue to take finasteride 5 mg daily  Patient wishes to return in 1 year for re-evaluation  He was instructed to call with any issues  -All questions answered, patient agrees with plan    History of Present Illness  69 y o  male with a history of BPH and microscopic hematuria presents today for follow up  Patient continues to take Finasteride daily  He states he is comfortable with his current urinary pattern  He continues to urinate up to 2 times per night, but does not find bothersome  Patient feels he is emptying his bladder to completion and has a strong urinary stream   No episodes of gross hematuria or dysuria  His last PSA 1/8/18 was 0 4 (0 8 corrected with finasteride)  Patient denies any strong family history of prostate cancer  He was recently started on Lasix 20 mg for CHF exacerbation  No additional changes to his overall health  Review of Systems  Review of Systems   Constitutional: Negative  HENT: Negative  Respiratory: Negative  Cardiovascular: Negative  Gastrointestinal: Negative  Genitourinary: Negative for decreased urine volume, difficulty urinating, dysuria, flank pain, frequency, hematuria and urgency  Musculoskeletal: Negative  Skin: Negative  Neurological: Negative  Psychiatric/Behavioral: Negative  AUA SYMPTOM SCORE      Most Recent Value   AUA SYMPTOM SCORE   How often have you had a sensation of not emptying your bladder completely after you finished urinating? 0   How often have you had to urinate again less than two hours after you finished urinating?   1   How often have you found you stopped and started again several times when you urinate?  0   How often have you found it difficult to postpone urination? 1   How often have you had a weak urinary stream?  0   How often have you had to push or strain to begin urination? 0   How many times did you most typically get up to urinate from the time you went to bed at night until the time you got up in the morning? 2   Quality of Life: If you were to spend the rest of your life with your urinary condition just the way it is now, how would you feel about that?  1   AUA SYMPTOM SCORE  4          Past Medical History  Past Medical History:   Diagnosis Date    AICD (automatic cardioverter/defibrillator) present     Arthritis     Asthma     Burt esophagus     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     Cardiac arrhythmia     Cardiomyopathy ([facility] )     CKD (chronic kidney disease)     Colon cancer ([facility] )     Congestive heart failure (CHF) ([facility] )     COPD (chronic obstructive pulmonary disease) ([facility] )     Diverticulitis     Last assessed: 4/5/13    Esophageal reflux     Gout     Hernia     Hyperlipidemia     Hypertension     Hypertension     Hypothyroidism     Pleural effusion     Polymyalgia rheumatica (HCC)     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     Spondyloarthropathy ([facility] )     Last assessed: 11/28/12       Past Social History  Past Surgical History:   Procedure Laterality Date    ARM SKIN LESION BIOPSY / EXCISION      Malignant    ATRIAL ABLATION SURGERY      AV NODE ABLATION      CARDIAC DEFIBRILLATOR PLACEMENT  2009    Cardio-Defib Pulse Gen Venous Insertion of Electrode for Ventricular Pacing  Implantable    CARDIAC SURGERY  2009    Catheterization    COLONOSCOPY N/A 3/14/2016    Procedure: COLONOSCOPY;  Surgeon: Amaris Kaur MD;  Location: [facility]; Service  February 22, 2013, mildly enlarged prostate, history of colon CA with normal appearance of anastomosis at the midtransverse colon, severe diverticulosis in the sigmoid, descending and transverse colon  Repeat colonoscopy in 3 yrs    HEMICOLECTOMY Right 05/05/2004    INGUINAL HERNIA REPAIR Bilateral     Left 3/2004, right 5/2008    IR VENOGRAM  10/4/2018    KNEE SURGERY  1972    Meniscectomy    [place] ESOPHAGOGASTRODUODENOSCOPY TRANSORAL DIAGNOSTIC N/A 12/5/2016    Procedure: ESOPHAGOGASTRODUODENOSCOPY (EGD); Surgeon: Ethan Santana MD;  Location: [facility]; Service: Gastroenterology    TONSILLECTOMY AND ADENOIDECTOMY         Past Family History  Family History   Problem Relation Age of Onset    Heart failure Mother         Congestive    Hypertension Mother     Osteoporosis Mother     COPD Father     Emphysema Father     Hypertension Father     Heart disease Sister         Heart Valve Replacement    Osteoporosis Sister     Breast cancer Family        Past Social history  Social History     Social History    Marital status: /Civil Union     Spouse name: N/A    Number of children: N/A    Years of education: N/A     Occupational History    Manager-Retired      Social History Main Topics    Smoking status: Never Smoker    Smokeless tobacco: Never Used    Alcohol use No      Comment: Rare    Drug use: No    Sexual activity: Not Currently     Other Topics Concern    Not on file     Social History Narrative    No narrative on file       Current Medications  Current Outpatient Prescriptions   Medication Sig Dispense Refill    albuterol (VENTOLIN HFA) 90 mcg/act inhaler Inhale 2 puffs every 4 (four) hours as needed for wheezing or shortness of breath 18 g 0    aspirin 81 MG tablet Take 81 mg by mouth daily   atorvastatin (LIPITOR) 10 mg tablet TAKE 1 TABLET DAILY 90 tablet 3    bisoprolol (ZEBETA) 10 MG tablet TAKE 1 TABLET TWICE DAILY 180 tablet 2    Calcium Carb-Cholecalciferol (CALCIUM 600 + D PO) Take 1 tablet by mouth daily        Coenzyme Q10 (CO Q-10) 200 MG CAPS Take 1 tablet by mouth daily      finasteride (PROSCAR) 5 mg tablet Take 1 tablet (5 mg total) by mouth daily 90 tablet 0    furosemide (LASIX) 20 mg tablet Take 2 tablets (40 mg total) by mouth daily 30 tablet 0    lisinopril (ZESTRIL) 20 mg tablet TAKE ONE TABLET BY MOUTH EVERY DAY 90 tablet 3    Multiple Vitamins-Minerals (CENTRUM SILVER PO) Take 1 tablet by mouth daily   omeprazole (PriLOSEC) 20 mg delayed release capsule TAKE ONE CAPSULE BY MOUTH EVERY DAY 90 capsule 3    PREVIDENT 5000 ENAMEL PROTECT 1 1-5 % PSTE Use as directed  3    Vitamin D, Cholecalciferol, 1000 UNITS CAPS Take 1 tablet by mouth daily  No current facility-administered medications for this visit  Allergies  No Known Allergies    Past Medical History, Social History, Family History, medications and allergies were reviewed  Vitals  There were no vitals filed for this visit  Physical Exam  Physical Exam   Constitutional: He is oriented to person, place, and time  He appears well-developed and well-nourished  HENT:   Head: Normocephalic  Eyes: Pupils are equal, round, and reactive to light  Neck: Normal range of motion  Cardiovascular: Normal rate and regular rhythm  Pulmonary/Chest: Effort normal    Abdominal: Soft  Normal appearance  He exhibits no distension  There is no tenderness  There is no CVA tenderness  Musculoskeletal: Normal range of motion  Neurological: He is alert and oriented to person, place, and time  Skin: Skin is warm and dry  Psychiatric: He has a normal mood and affect   His behavior is normal  Judgment and thought content normal        Results    I have personally reviewed all pertinent lab results and reviewed with patient  Lab Results   Component Value Date    PSA 0 4 01/08/2018    PSA 0 5 12/14/2016    PSA 0 5 12/07/2015     Lab Results   Component Value Date    GLUCOSE 101 12/19/2015    CALCIUM 9 2 02/05/2019     (H) 12/19/2015    K 4 1 02/05/2019    CO2 27 02/05/2019     (H) 02/05/2019    BUN 27 (H) 02/05/2019    CREATININE 1 62 (H) 02/05/2019     Lab Results Component Value Date    WBC 10 41 (H) 01/29/2019    HGB 15 9 01/29/2019    HCT 47 3 01/29/2019    MCV 96 01/29/2019     01/29/2019     No results found for this or any previous visit (from the past 1 hour(s))

## 2019-02-08 ENCOUNTER — OFFICE VISIT (OUTPATIENT)
Dept: UROLOGY | Facility: AMBULATORY SURGERY CENTER | Age: 79
End: 2019-02-08
Payer: COMMERCIAL

## 2019-02-08 VITALS
SYSTOLIC BLOOD PRESSURE: 118 MMHG | DIASTOLIC BLOOD PRESSURE: 68 MMHG | BODY MASS INDEX: 28.73 KG/M2 | WEIGHT: 205.2 LBS | HEART RATE: 60 BPM | HEIGHT: 71 IN

## 2019-02-08 DIAGNOSIS — N40.1 BENIGN PROSTATIC HYPERPLASIA WITH LOWER URINARY TRACT SYMPTOMS, SYMPTOM DETAILS UNSPECIFIED: Primary | ICD-10-CM

## 2019-02-08 PROCEDURE — 99213 OFFICE O/P EST LOW 20 MIN: CPT | Performed by: NURSE PRACTITIONER

## 2019-02-08 PROCEDURE — 1111F DSCHRG MED/CURRENT MED MERGE: CPT | Performed by: NURSE PRACTITIONER

## 2019-02-09 DIAGNOSIS — R06.02 SOB (SHORTNESS OF BREATH): ICD-10-CM

## 2019-02-09 DIAGNOSIS — I42.9 CARDIOMYOPATHY, UNSPECIFIED TYPE (HCC): Primary | ICD-10-CM

## 2019-02-09 RX ORDER — FUROSEMIDE 40 MG/1
40 TABLET ORAL DAILY
Qty: 90 TABLET | Refills: 3 | Status: SHIPPED | OUTPATIENT
Start: 2019-02-09 | End: 2019-02-25 | Stop reason: SDUPTHER

## 2019-02-09 RX ORDER — FUROSEMIDE 40 MG/1
40 TABLET ORAL DAILY
Qty: 30 TABLET | Refills: 0 | Status: SHIPPED | OUTPATIENT
Start: 2019-02-09 | End: 2019-02-09 | Stop reason: SDUPTHER

## 2019-02-11 ENCOUNTER — IN-CLINIC DEVICE VISIT (OUTPATIENT)
Dept: CARDIOLOGY CLINIC | Facility: CLINIC | Age: 79
End: 2019-02-11
Payer: COMMERCIAL

## 2019-02-11 ENCOUNTER — OFFICE VISIT (OUTPATIENT)
Dept: CARDIOLOGY CLINIC | Facility: CLINIC | Age: 79
End: 2019-02-11
Payer: COMMERCIAL

## 2019-02-11 VITALS
HEART RATE: 78 BPM | HEIGHT: 71 IN | BODY MASS INDEX: 28.56 KG/M2 | SYSTOLIC BLOOD PRESSURE: 126 MMHG | OXYGEN SATURATION: 96 % | WEIGHT: 204 LBS | DIASTOLIC BLOOD PRESSURE: 62 MMHG

## 2019-02-11 DIAGNOSIS — Z45.02 ICD (IMPLANTABLE CARDIOVERTER-DEFIBRILLATOR) BATTERY DEPLETION: ICD-10-CM

## 2019-02-11 DIAGNOSIS — I50.22 CHRONIC SYSTOLIC CONGESTIVE HEART FAILURE (HCC): Primary | ICD-10-CM

## 2019-02-11 DIAGNOSIS — I42.0 DILATED CARDIOMYOPATHY (HCC): Primary | ICD-10-CM

## 2019-02-11 DIAGNOSIS — N18.30 CKD (CHRONIC KIDNEY DISEASE) STAGE 3, GFR 30-59 ML/MIN (HCC): ICD-10-CM

## 2019-02-11 DIAGNOSIS — Z95.810 AICD (AUTOMATIC CARDIOVERTER/DEFIBRILLATOR) PRESENT: ICD-10-CM

## 2019-02-11 DIAGNOSIS — I42.0 DILATED CARDIOMYOPATHY (HCC): ICD-10-CM

## 2019-02-11 PROCEDURE — 93283 PRGRMG EVAL IMPLANTABLE DFB: CPT | Performed by: INTERNAL MEDICINE

## 2019-02-11 PROCEDURE — 99215 OFFICE O/P EST HI 40 MIN: CPT | Performed by: NURSE PRACTITIONER

## 2019-02-11 NOTE — PROGRESS NOTES
Heart Failure Office Visit   Eva Simpson 69 y o  male MRN: [id number]    [facility]  Mr Eva Simpson is a 69year old male with a known past medical history of   DCM 6/15 LVEF 45% LIVDd 4 15cm, , 6/18 LVEF 50% LVIDd 6 35cm, 1/2019 LVEF 40% LVIDd 6 cm  2009  ICD placed, 9/21/18 upgrade to [facility] ICD   1/31/19 Nuclear stress test small to medium sized fixed inferoseptal perfusion defect  The rest of perfusion is somewhat heterogenous due to dilated cardiomyopathy  No definitive reversible ischemia The image quality was good  The left ventricle was  severely dilated  CKD III baseline creat 1 40-1  57  Patient Active Problem List   Diagnosis    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    Microscopic hematuria    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    Arthritis    Burt esophagus    Cancer, colon ([facility] )    Cardiac arrhythmia    Cardiomyopathy ([facility] )    CKD (chronic kidney disease) stage 3, GFR 30-59 ml/min ([facility]    Esophageal reflux    Gout    Hyperlipidemia    Hypertension    Hypothyroidism    Impaired fasting glucose    Malignant melanoma of right upper extremity (HCC)    Non-toxic multinodular goiter    Polymyalgia rheumatica (HCC)    Right bundle branch block with left anterior fascicular block    Skin lesion    Thrombocytopenia (HCC)    Osteopenia    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    Hypertensive kidney disease with stage 3 chronic kidney disease (HCC)    Shortness of breath    Acute on chronic systolic congestive heart failure (HCC)    Bronchitis     Mr Eva Simpson was recently seen at [place] [address] ED on 1/29/19 due to dyspnea  NT pro BNP 6192  He was treated with a single dose of IV Lasix and discharged home    TTE showed LV systo function moderately reduced LVEF 40% LVIdd 6 0 cm,  moderate diffuse hypokinesis with distinct regional wall motion abnormalities as, akinesis of basal inferior wall, wall thickness upper limits of normal, left atrium mildly dilated  There was mild mitral valve regurgitation and mild aortic valve regurgitation  Trace tricuspid valve regurgitation with a peak PA pressure 40 mmHg  He was discharged home  Today Angy Jackson presents to our office for a recent ED follow up visit  Angy Jackson denies dyspnea with minimal or moderate exertion, CP, palpitations, lightheadedness or dizziness  His weight has been stable at home for a long time running around 200 pounds  He noted when he presented to the ED his weight had increased  Currently his weight is 200 pounds at home and stable  Weight in the office is 204 pounds  Lab studies done on 2/05/19 sodium 141, potassium 4 1, BUN 27, creat 1 62  Review of Systems   All other systems reviewed and are negative  Objective:   Vitals:   Vitals:    02/11/19 1622   BP: 126/62   BP Location: Right arm   Patient Position: Sitting   Cuff Size: Standard   Pulse: 78   SpO2: 96%   Weight: 92 5 kg (204 lb)   Height: 5' 11" (1 803 m)     Body mass index is 28 45 kg/m²      Wt Readings from Last 3 Encounters:   02/08/19 93 1 kg (205 lb 3 2 oz)   01/29/19 93 kg (205 lb)   01/28/19 95 8 kg (211 lb 3 2 oz)         Physical Exam:  GEN: Arjun Silva appears well, alert and oriented x 3, pleasant and cooperative   HEENT: pupils equal, round, and reactive to light; extraocular muscles intact  NECK: supple, no carotid bruits   HEART: regular rhythm, normal S1 and S2, no murmurs, clicks, gallops or rubs, JVP is  flat  LUNGS: clear to auscultation bilaterally; no wheezes, rales, or rhonchi   ABDOMEN: normal bowel sounds, soft, no tenderness, no distention  EXTREMITIES: peripheral pulses normal; no clubbing, cyanosis, or edema  NEURO: no focal findings   SKIN: normal without suspicious lesions on exposed skin      Current Outpatient Medications:     albuterol (VENTOLIN HFA) 90 mcg/act inhaler, Inhale 2 puffs every 4 (four) hours as needed for wheezing or shortness of breath, Disp: 18 g, Rfl: 0    aspirin 81 MG tablet, Take 81 mg by mouth daily  , Disp: , Rfl:     atorvastatin (LIPITOR) 10 mg tablet, TAKE 1 TABLET DAILY, Disp: 90 tablet, Rfl: 3    bisoprolol (ZEBETA) 10 MG tablet, TAKE 1 TABLET TWICE DAILY, Disp: 180 tablet, Rfl: 2    Calcium Carb-Cholecalciferol (CALCIUM 600 + D PO), Take 1 tablet by mouth daily  , Disp: , Rfl:     Coenzyme Q10 (CO Q-10) 200 MG CAPS, Take 1 tablet by mouth daily, Disp: , Rfl:     finasteride (PROSCAR) 5 mg tablet, Take 1 tablet (5 mg total) by mouth daily, Disp: 90 tablet, Rfl: 0    furosemide (LASIX) 40 mg tablet, Take 1 tablet (40 mg total) by mouth daily, Disp: 90 tablet, Rfl: 3    lisinopril (ZESTRIL) 20 mg tablet, TAKE ONE TABLET BY MOUTH EVERY DAY, Disp: 90 tablet, Rfl: 3    Multiple Vitamins-Minerals (CENTRUM SILVER PO), Take 1 tablet by mouth daily  , Disp: , Rfl:     omeprazole (PriLOSEC) 20 mg delayed release capsule, TAKE ONE CAPSULE BY MOUTH EVERY DAY, Disp: 90 capsule, Rfl: 3    PREVIDENT 5000 ENAMEL PROTECT 1 1-5 % PSTE, Use as directed, Disp: , Rfl: 3    Vitamin D, Cholecalciferol, 1000 UNITS CAPS, Take 1 tablet by mouth daily  , Disp: , Rfl:       Labs & Results:              Results from last 7 days   Lab Units 02/05/19  0819   POTASSIUM mmol/L 4 1   CHLORIDE mmol/L 109*   CO2 mmol/L 27   BUN mg/dL 27*   CREATININE mg/dL 1 62*   CALCIUM mg/dL 9 2               Assessment/Plan:   1  Chronic systolic heart failure LVEF 40% NYHA class III stage C  On PE eu volemic and compensated  HR and BP controlled, weight at home is 200 pounds and in the office is 204 pounds  Continue on  Lisinopril 20mg daily and bisoprolol 10mg daily and Lasix 40mg daily  Maintain a 2 gram daily sodium diet and 1500 ml daily fluid restriction  Check daily weights  If you gained 3 pounds in one day, 5 pounds in one week, or experience worsening shortness of breath or increasing lower leg swelling    Please call the heart failure office at [phone]  Please bring a  list of your current medications and daily weights to the office visit  2  DCM LVEF 45% LIVDd 4 15cm, , 6/18 LVEF 50% LVIDd 6 35cm, 1/2019 LVEF 40% LVIDd 6 cm, BIV ICD in situ  3  CKD III Baseline creat 1 40-1 57- creat slightly elevated today 1 62 , BMP in one week   Follow up with Dr Frederick Lux in 4-6 weeks  Rohan Doctor was not on a diuretic prior to ED visit  He is now on lasix 40mg daily, weight stable at home, if Creat continues elevated will cut back on Lasix to 20mg daily with PRN dose of 20mg for weight gain 3 pounds in one day     BRANDON Garcias  2/11/2019  3:55 PM

## 2019-02-13 NOTE — PATIENT INSTRUCTIONS
Maintain a 2 gram daily sodium diet and 1500 ml daily fluid restriction  Check daily weights  If you gained 3 pounds in one day, 5 pounds in one week, or experience worsening shortness of breath or increasing lower leg swelling  Please call the heart failure office at [phone]    Please bring a  list of your current medications and daily weights to the office visit

## 2019-02-15 NOTE — PROGRESS NOTES
Results for orders placed or performed in visit on 02/11/19   Cardiac EP device report    Narrative    MDT DUAL CHAMBER ICD  DEVICE INTERROGATED IN THE [facility] OFFICE: BATTERY VOLTAGE ADEQUATE  AP 63%  4%  ALL LEAD PARAMETERS WITHIN NORMAL LIMITS  2 VHRS NOTED, NO THERAPIES GIVEN  AVAIL EGRAMS PRESENT AS NSVT & VT  PT ON BISOPROLOL & EF 30% (2019 NUC)  OPTI-VOL WITHIN NORMAL LIMITS  NO PROGRAMMING CHANGES MADE TO DEVICE PARAMETERS  NORMAL DEVICE FUNCTION   NC

## 2019-02-23 ENCOUNTER — APPOINTMENT (OUTPATIENT)
Dept: LAB | Facility: HOSPITAL | Age: 79
End: 2019-02-23
Payer: COMMERCIAL

## 2019-02-23 LAB
ANION GAP SERPL CALCULATED.3IONS-SCNC: 4 MMOL/L (ref 4–13)
BUN SERPL-MCNC: 26 MG/DL (ref 5–25)
CALCIUM SERPL-MCNC: 9.1 MG/DL (ref 8.3–10.1)
CHLORIDE SERPL-SCNC: 107 MMOL/L (ref 100–108)
CO2 SERPL-SCNC: 30 MMOL/L (ref 21–32)
CREAT SERPL-MCNC: 1.78 MG/DL (ref 0.6–1.3)
GFR SERPL CREATININE-BSD FRML MDRD: 36 ML/MIN/1.73SQ M
GLUCOSE P FAST SERPL-MCNC: 89 MG/DL (ref 65–99)
MAGNESIUM SERPL-MCNC: 2.4 MG/DL (ref 1.6–2.6)
POTASSIUM SERPL-SCNC: 4.1 MMOL/L (ref 3.5–5.3)
SODIUM SERPL-SCNC: 141 MMOL/L (ref 136–145)

## 2019-02-23 PROCEDURE — 83735 ASSAY OF MAGNESIUM: CPT | Performed by: NURSE PRACTITIONER

## 2019-02-23 PROCEDURE — 36415 COLL VENOUS BLD VENIPUNCTURE: CPT | Performed by: NURSE PRACTITIONER

## 2019-02-23 PROCEDURE — 80048 BASIC METABOLIC PNL TOTAL CA: CPT | Performed by: NURSE PRACTITIONER

## 2019-02-25 DIAGNOSIS — N18.30 CKD (CHRONIC KIDNEY DISEASE) STAGE 3, GFR 30-59 ML/MIN (HCC): Primary | ICD-10-CM

## 2019-02-25 DIAGNOSIS — R06.02 SOB (SHORTNESS OF BREATH): ICD-10-CM

## 2019-02-25 DIAGNOSIS — I42.9 CARDIOMYOPATHY, UNSPECIFIED TYPE (HCC): ICD-10-CM

## 2019-02-25 RX ORDER — FUROSEMIDE 40 MG/1
20 TABLET ORAL DAILY
Qty: 90 TABLET | Refills: 3
Start: 2019-02-25 | End: 2020-10-12 | Stop reason: SDUPTHER

## 2019-03-01 ENCOUNTER — TELEPHONE (OUTPATIENT)
Dept: CARDIOLOGY CLINIC | Facility: CLINIC | Age: 79
End: 2019-03-01

## 2019-03-01 NOTE — TELEPHONE ENCOUNTER
P/C states he decreased to lasix 20 mg daily as of 2/26/19  See there is a BMP ordered, when do you want pt to get the lab done?         Please advise

## 2019-03-04 DIAGNOSIS — I50.22 CHRONIC SYSTOLIC HEART FAILURE (HCC): Primary | ICD-10-CM

## 2019-03-09 ENCOUNTER — APPOINTMENT (OUTPATIENT)
Dept: LAB | Facility: HOSPITAL | Age: 79
End: 2019-03-09
Payer: COMMERCIAL

## 2019-03-09 LAB
ANION GAP SERPL CALCULATED.3IONS-SCNC: 5 MMOL/L (ref 4–13)
BUN SERPL-MCNC: 21 MG/DL (ref 5–25)
CALCIUM SERPL-MCNC: 8.8 MG/DL (ref 8.3–10.1)
CHLORIDE SERPL-SCNC: 108 MMOL/L (ref 100–108)
CO2 SERPL-SCNC: 30 MMOL/L (ref 21–32)
CREAT SERPL-MCNC: 1.64 MG/DL (ref 0.6–1.3)
GFR SERPL CREATININE-BSD FRML MDRD: 40 ML/MIN/1.73SQ M
GLUCOSE P FAST SERPL-MCNC: 97 MG/DL (ref 65–99)
POTASSIUM SERPL-SCNC: 4.5 MMOL/L (ref 3.5–5.3)
SODIUM SERPL-SCNC: 143 MMOL/L (ref 136–145)

## 2019-03-09 PROCEDURE — 80048 BASIC METABOLIC PNL TOTAL CA: CPT | Performed by: NURSE PRACTITIONER

## 2019-03-09 PROCEDURE — 36415 COLL VENOUS BLD VENIPUNCTURE: CPT | Performed by: NURSE PRACTITIONER

## 2019-05-13 ENCOUNTER — OFFICE VISIT (OUTPATIENT)
Dept: CARDIOLOGY CLINIC | Facility: CLINIC | Age: 79
End: 2019-05-13
Payer: COMMERCIAL

## 2019-05-13 VITALS
BODY MASS INDEX: 29.06 KG/M2 | OXYGEN SATURATION: 98 % | HEIGHT: 71 IN | WEIGHT: 207.6 LBS | DIASTOLIC BLOOD PRESSURE: 70 MMHG | HEART RATE: 76 BPM | SYSTOLIC BLOOD PRESSURE: 108 MMHG

## 2019-05-13 DIAGNOSIS — I50.22 CHRONIC SYSTOLIC CONGESTIVE HEART FAILURE (HCC): Primary | ICD-10-CM

## 2019-05-13 DIAGNOSIS — N18.30 CKD (CHRONIC KIDNEY DISEASE), STAGE III (HCC): ICD-10-CM

## 2019-05-13 DIAGNOSIS — I42.0 DILATED CARDIOMYOPATHY (HCC): ICD-10-CM

## 2019-05-13 PROCEDURE — 99213 OFFICE O/P EST LOW 20 MIN: CPT | Performed by: NURSE PRACTITIONER

## 2019-05-15 ENCOUNTER — REMOTE DEVICE CLINIC VISIT (OUTPATIENT)
Dept: CARDIOLOGY CLINIC | Facility: CLINIC | Age: 79
End: 2019-05-15
Payer: COMMERCIAL

## 2019-05-15 DIAGNOSIS — Z95.810 AICD (AUTOMATIC CARDIOVERTER/DEFIBRILLATOR) PRESENT: Primary | ICD-10-CM

## 2019-05-15 PROCEDURE — 93297 REM INTERROG DEV EVAL ICPMS: CPT | Performed by: INTERNAL MEDICINE

## 2019-05-15 PROCEDURE — 93296 REM INTERROG EVL PM/IDS: CPT | Performed by: INTERNAL MEDICINE

## 2019-05-15 PROCEDURE — 93295 DEV INTERROG REMOTE 1/2/MLT: CPT | Performed by: INTERNAL MEDICINE

## 2019-06-03 DIAGNOSIS — N13.8 BPH WITH OBSTRUCTION/LOWER URINARY TRACT SYMPTOMS: ICD-10-CM

## 2019-06-03 DIAGNOSIS — N40.1 BPH WITH OBSTRUCTION/LOWER URINARY TRACT SYMPTOMS: ICD-10-CM

## 2019-06-03 RX ORDER — FINASTERIDE 5 MG/1
TABLET, FILM COATED ORAL
Qty: 90 TABLET | Refills: 1 | Status: SHIPPED | OUTPATIENT
Start: 2019-06-03 | End: 2019-11-09 | Stop reason: SDUPTHER

## 2019-06-16 ENCOUNTER — ANESTHESIA EVENT (OUTPATIENT)
Dept: GASTROENTEROLOGY | Facility: HOSPITAL | Age: 79
End: 2019-06-16

## 2019-06-16 RX ORDER — SODIUM CHLORIDE 9 MG/ML
30 INJECTION, SOLUTION INTRAVENOUS CONTINUOUS
Status: CANCELLED | OUTPATIENT
Start: 2019-06-16

## 2019-06-17 ENCOUNTER — HOSPITAL ENCOUNTER (OUTPATIENT)
Dept: GASTROENTEROLOGY | Facility: HOSPITAL | Age: 79
Setting detail: OUTPATIENT SURGERY
Discharge: HOME | End: 2019-06-17
Attending: COLON & RECTAL SURGERY | Admitting: COLON & RECTAL SURGERY
Payer: COMMERCIAL

## 2019-06-17 ENCOUNTER — ANESTHESIA (OUTPATIENT)
Dept: GASTROENTEROLOGY | Facility: HOSPITAL | Age: 79
End: 2019-06-17

## 2019-06-17 VITALS
TEMPERATURE: 96.1 F | HEIGHT: 71 IN | DIASTOLIC BLOOD PRESSURE: 61 MMHG | WEIGHT: 207 LBS | SYSTOLIC BLOOD PRESSURE: 110 MMHG | RESPIRATION RATE: 20 BRPM | BODY MASS INDEX: 28.98 KG/M2 | OXYGEN SATURATION: 94 % | HEART RATE: 66 BPM

## 2019-06-17 DIAGNOSIS — Z85.038 HISTORY OF COLON CANCER: ICD-10-CM

## 2019-06-17 PROCEDURE — 99213 OFFICE O/P EST LOW 20 MIN: CPT | Performed by: COLON & RECTAL SURGERY

## 2019-06-17 PROCEDURE — G0105 COLORECTAL SCRN; HI RISK IND: HCPCS | Performed by: COLON & RECTAL SURGERY

## 2019-06-17 RX ORDER — PROPOFOL 10 MG/ML
INJECTION, EMULSION INTRAVENOUS AS NEEDED
Status: DISCONTINUED | OUTPATIENT
Start: 2019-06-17 | End: 2019-06-17 | Stop reason: SURG

## 2019-06-17 RX ORDER — SODIUM CHLORIDE 9 MG/ML
INJECTION, SOLUTION INTRAVENOUS CONTINUOUS PRN
Status: DISCONTINUED | OUTPATIENT
Start: 2019-06-17 | End: 2019-06-17 | Stop reason: SURG

## 2019-06-17 RX ORDER — EPHEDRINE SULFATE 50 MG/ML
INJECTION INTRAVENOUS AS NEEDED
Status: DISCONTINUED | OUTPATIENT
Start: 2019-06-17 | End: 2019-06-17 | Stop reason: SURG

## 2019-06-17 RX ORDER — PROPOFOL 10 MG/ML
INJECTION, EMULSION INTRAVENOUS CONTINUOUS PRN
Status: DISCONTINUED | OUTPATIENT
Start: 2019-06-17 | End: 2019-06-17 | Stop reason: SURG

## 2019-06-17 RX ADMIN — PROPOFOL 100 MG: 10 INJECTION, EMULSION INTRAVENOUS at 07:35

## 2019-06-17 RX ADMIN — SODIUM CHLORIDE: 0.9 INJECTION, SOLUTION INTRAVENOUS at 07:29

## 2019-06-17 RX ADMIN — EPHEDRINE SULFATE 5 MG: 50 INJECTION, SOLUTION INTRAVENOUS at 07:35

## 2019-06-17 RX ADMIN — PROPOFOL 120 MCG/KG/MIN: 10 INJECTION, EMULSION INTRAVENOUS at 07:35

## 2019-07-06 ENCOUNTER — APPOINTMENT (OUTPATIENT)
Dept: LAB | Facility: HOSPITAL | Age: 79
End: 2019-07-06
Payer: COMMERCIAL

## 2019-07-06 DIAGNOSIS — E78.2 MIXED HYPERLIPIDEMIA: ICD-10-CM

## 2019-07-06 DIAGNOSIS — R73.01 IMPAIRED FASTING GLUCOSE: ICD-10-CM

## 2019-07-06 DIAGNOSIS — I10 ESSENTIAL HYPERTENSION: ICD-10-CM

## 2019-07-06 LAB
ALBUMIN SERPL BCP-MCNC: 3.8 G/DL (ref 3.5–5)
ALP SERPL-CCNC: 68 U/L (ref 46–116)
ALT SERPL W P-5'-P-CCNC: 29 U/L (ref 12–78)
ANION GAP SERPL CALCULATED.3IONS-SCNC: 4 MMOL/L (ref 4–13)
AST SERPL W P-5'-P-CCNC: 19 U/L (ref 5–45)
BILIRUB SERPL-MCNC: 1.11 MG/DL (ref 0.2–1)
BUN SERPL-MCNC: 23 MG/DL (ref 5–25)
CALCIUM SERPL-MCNC: 9 MG/DL (ref 8.3–10.1)
CHLORIDE SERPL-SCNC: 110 MMOL/L (ref 100–108)
CHOLEST SERPL-MCNC: 168 MG/DL (ref 50–200)
CO2 SERPL-SCNC: 26 MMOL/L (ref 21–32)
CREAT SERPL-MCNC: 1.49 MG/DL (ref 0.6–1.3)
ERYTHROCYTE [DISTWIDTH] IN BLOOD BY AUTOMATED COUNT: 14 % (ref 11.6–15.1)
EST. AVERAGE GLUCOSE BLD GHB EST-MCNC: 105 MG/DL
GFR SERPL CREATININE-BSD FRML MDRD: 44 ML/MIN/1.73SQ M
GLUCOSE P FAST SERPL-MCNC: 97 MG/DL (ref 65–99)
HBA1C MFR BLD: 5.3 % (ref 4.2–6.3)
HCT VFR BLD AUTO: 44.9 % (ref 36.5–49.3)
HDLC SERPL-MCNC: 54 MG/DL (ref 40–60)
HGB BLD-MCNC: 15.3 G/DL (ref 12–17)
LDLC SERPL CALC-MCNC: 81 MG/DL (ref 0–100)
MCH RBC QN AUTO: 32.5 PG (ref 26.8–34.3)
MCHC RBC AUTO-ENTMCNC: 34.1 G/DL (ref 31.4–37.4)
MCV RBC AUTO: 95 FL (ref 82–98)
NONHDLC SERPL-MCNC: 114 MG/DL
PLATELET # BLD AUTO: 183 THOUSANDS/UL (ref 149–390)
PMV BLD AUTO: 9.4 FL (ref 8.9–12.7)
POTASSIUM SERPL-SCNC: 4.2 MMOL/L (ref 3.5–5.3)
PROT SERPL-MCNC: 7.4 G/DL (ref 6.4–8.2)
RBC # BLD AUTO: 4.71 MILLION/UL (ref 3.88–5.62)
SODIUM SERPL-SCNC: 140 MMOL/L (ref 136–145)
TRIGL SERPL-MCNC: 166 MG/DL
WBC # BLD AUTO: 9.03 THOUSAND/UL (ref 4.31–10.16)

## 2019-07-06 PROCEDURE — 36415 COLL VENOUS BLD VENIPUNCTURE: CPT

## 2019-07-06 PROCEDURE — 80053 COMPREHEN METABOLIC PANEL: CPT

## 2019-07-06 PROCEDURE — 85027 COMPLETE CBC AUTOMATED: CPT

## 2019-07-06 PROCEDURE — 80061 LIPID PANEL: CPT

## 2019-07-06 PROCEDURE — 83036 HEMOGLOBIN GLYCOSYLATED A1C: CPT

## 2019-07-11 ENCOUNTER — OFFICE VISIT (OUTPATIENT)
Dept: FAMILY MEDICINE CLINIC | Facility: CLINIC | Age: 79
End: 2019-07-11
Payer: COMMERCIAL

## 2019-07-11 ENCOUNTER — OFFICE VISIT (OUTPATIENT)
Dept: SURGICAL ONCOLOGY | Facility: CLINIC | Age: 79
End: 2019-07-11
Payer: COMMERCIAL

## 2019-07-11 VITALS
HEART RATE: 80 BPM | SYSTOLIC BLOOD PRESSURE: 120 MMHG | BODY MASS INDEX: 28.25 KG/M2 | RESPIRATION RATE: 16 BRPM | OXYGEN SATURATION: 98 % | WEIGHT: 201.8 LBS | HEIGHT: 71 IN | TEMPERATURE: 97.3 F | DIASTOLIC BLOOD PRESSURE: 75 MMHG

## 2019-07-11 VITALS
TEMPERATURE: 97 F | BODY MASS INDEX: 28.42 KG/M2 | HEIGHT: 71 IN | WEIGHT: 203 LBS | HEART RATE: 80 BPM | SYSTOLIC BLOOD PRESSURE: 144 MMHG | DIASTOLIC BLOOD PRESSURE: 88 MMHG | RESPIRATION RATE: 16 BRPM

## 2019-07-11 DIAGNOSIS — E03.9 HYPOTHYROIDISM, UNSPECIFIED TYPE: ICD-10-CM

## 2019-07-11 DIAGNOSIS — Z08 ENCOUNTER FOR FOLLOW-UP SURVEILLANCE OF MELANOMA: Primary | ICD-10-CM

## 2019-07-11 DIAGNOSIS — Z00.00 MEDICARE ANNUAL WELLNESS VISIT, SUBSEQUENT: ICD-10-CM

## 2019-07-11 DIAGNOSIS — E78.2 MIXED HYPERLIPIDEMIA: ICD-10-CM

## 2019-07-11 DIAGNOSIS — E04.2 NON-TOXIC MULTINODULAR GOITER: Primary | ICD-10-CM

## 2019-07-11 DIAGNOSIS — N18.30 HYPERTENSIVE KIDNEY DISEASE WITH STAGE 3 CHRONIC KIDNEY DISEASE (HCC): ICD-10-CM

## 2019-07-11 DIAGNOSIS — I10 ESSENTIAL HYPERTENSION: ICD-10-CM

## 2019-07-11 DIAGNOSIS — I12.9 HYPERTENSIVE KIDNEY DISEASE WITH STAGE 3 CHRONIC KIDNEY DISEASE (HCC): ICD-10-CM

## 2019-07-11 DIAGNOSIS — R73.01 IMPAIRED FASTING GLUCOSE: ICD-10-CM

## 2019-07-11 DIAGNOSIS — Z85.820 ENCOUNTER FOR FOLLOW-UP SURVEILLANCE OF MELANOMA: Primary | ICD-10-CM

## 2019-07-11 DIAGNOSIS — Z85.820 PERSONAL HISTORY OF MALIGNANT MELANOMA: ICD-10-CM

## 2019-07-11 PROBLEM — R06.02 SHORTNESS OF BREATH: Status: RESOLVED | Noted: 2019-01-29 | Resolved: 2019-07-11

## 2019-07-11 PROBLEM — J40 BRONCHITIS: Status: RESOLVED | Noted: 2019-01-29 | Resolved: 2019-07-11

## 2019-07-11 PROBLEM — I50.22 CHRONIC SYSTOLIC CHF (CONGESTIVE HEART FAILURE) (HCC): Status: ACTIVE | Noted: 2019-01-29

## 2019-07-11 PROCEDURE — G0439 PPPS, SUBSEQ VISIT: HCPCS | Performed by: FAMILY MEDICINE

## 2019-07-11 PROCEDURE — 99214 OFFICE O/P EST MOD 30 MIN: CPT | Performed by: FAMILY MEDICINE

## 2019-07-11 PROCEDURE — 3725F SCREEN DEPRESSION PERFORMED: CPT | Performed by: FAMILY MEDICINE

## 2019-07-11 PROCEDURE — 1036F TOBACCO NON-USER: CPT | Performed by: FAMILY MEDICINE

## 2019-07-11 PROCEDURE — 1125F AMNT PAIN NOTED PAIN PRSNT: CPT | Performed by: FAMILY MEDICINE

## 2019-07-11 PROCEDURE — 99213 OFFICE O/P EST LOW 20 MIN: CPT | Performed by: SURGERY

## 2019-07-11 PROCEDURE — 1101F PT FALLS ASSESS-DOCD LE1/YR: CPT | Performed by: FAMILY MEDICINE

## 2019-07-11 PROCEDURE — 3078F DIAST BP <80 MM HG: CPT | Performed by: FAMILY MEDICINE

## 2019-07-11 PROCEDURE — 1170F FXNL STATUS ASSESSED: CPT | Performed by: FAMILY MEDICINE

## 2019-07-11 PROCEDURE — 3074F SYST BP LT 130 MM HG: CPT | Performed by: FAMILY MEDICINE

## 2019-07-11 NOTE — PROGRESS NOTES
Chief Complaint   Patient presents with   [facility] Wellness Visit     AWV   Follow-up     6 months  Health Maintenance   Topic Date Due    SLP PLAN OF CARE  1940    BMI: Followup Plan  06/26/1959    HEPATITIS B VACCINES (1 of 3 - Risk 3-dose series) 06/26/1960    DTaP,Tdap,and Td Vaccines (1 - Tdap) 06/26/1962    Medicare Annual Wellness Visit (AWV)  06/29/2019    INFLUENZA VACCINE  07/01/2019    Fall Risk  01/04/2020    Depression Screening PHQ  01/04/2020    HEMOGLOBIN A1C  01/06/2020    BMI: Adult  06/17/2020    CRC Screening: Colonoscopy  06/17/2022    DXA SCAN  01/18/2023    Pneumococcal Vaccine: 65+ Years  Completed    Pneumococcal Vaccine: Pediatrics (0 to 5 Years) and At-Risk Patients (6 to 59 Years)  Aged Out      Assessment and Plan:     Problem List Items Addressed This Visit        Endocrine    Hypothyroidism    Relevant Orders    Comprehensive metabolic panel    Hemoglobin A1C    Lipid panel    TSH, 3rd generation with Free T4 reflex    Impaired fasting glucose    Relevant Orders    Comprehensive metabolic panel    Hemoglobin A1C    Lipid panel    TSH, 3rd generation with Free T4 reflex    Non-toxic multinodular goiter - Primary    Relevant Orders    US thyroid       Cardiovascular and Mediastinum    Hypertension    Relevant Orders    Comprehensive metabolic panel    Hemoglobin A1C    Lipid panel    TSH, 3rd generation with Free T4 reflex       Genitourinary    Hypertensive kidney disease with stage 3 chronic kidney disease (HCC)    Relevant Orders    Comprehensive metabolic panel    Hemoglobin A1C    Lipid panel    TSH, 3rd generation with Free T4 reflex       Other    Hyperlipidemia    Relevant Orders    Comprehensive metabolic panel    Hemoglobin A1C    Lipid panel    TSH, 3rd generation with Free T4 reflex        Mini-cog 5/5 today          History of Present Illness:     Patient presents for Medicare Annual Wellness visit    Patient Care Team:  Lizzy Michaels MD as PCP - General  Brian DO Ramana Jang MD Nonie Gilding, MD Theodore Banas, MD Debria Bison, MD as Endoscopist  Derek Garcia MD (Colon and Rectal Surgery)     Problem List:     Patient Active Problem List   Diagnosis    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    Microscopic hematuria    [facility]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 [facility] [address] [facility]    Arthritis    Burt esophagus    Cancer, colon ([facility]    Cardiac arrhythmia    Cardiomyopathy [facility] [address] [facility]    CKD (chronic kidney disease) stage 3, GFR 30-59 ml/min (HCC)    Esophageal reflux    Hyperlipidemia    Hypertension    Hypothyroidism    Impaired fasting glucose    Personal history of malignant melanoma    Non-toxic multinodular goiter    Polymyalgia rheumatica (HCC)    Right bundle branch block with left anterior fascicular block    Skin lesion    Thrombocytopenia ([facility]    Osteopenia    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    Hypertensive kidney disease with stage 3 chronic kidney disease (HCC)    Chronic systolic CHF (congestive heart failure) ([facility] [address] [facility]    Encounter for follow-up surveillance of melanoma      Past Medical and Surgical History:     Past Medical History:   Diagnosis Date    AICD (automatic cardioverter/defibrillator) present     Arthritis     Asthma     Burt esophagus     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     Cardiac arrhythmia     Cardiomyopathy ([facility] [address] [facility]     CKD (chronic kidney disease)     Colon cancer ([facility] [address] [facility]     Congestive heart failure (CHF) ([facility] [facility]     COPD (chronic obstructive pulmonary disease) ([facility] [address] [facility]     Diverticulitis     Last assessed: 4/5/13    Esophageal reflux     Gout     Hernia     Hyperlipidemia     Hypertension     Hypertension     Hypothyroidism     Pleural effusion     Polymyalgia rheumatica (HCC)     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     Spondyloarthropathy ([facility] [address] [facility]     Last assessed: 11/28/12 Past Surgical History:   Procedure Laterality Date    ARM SKIN LESION BIOPSY / EXCISION      Malignant    ATRIAL ABLATION SURGERY      AV NODE ABLATION      CARDIAC DEFIBRILLATOR PLACEMENT  2009    Cardio-Defib Pulse Gen Venous Insertion of Electrode for Ventricular Pacing  Implantable    CARDIAC SURGERY  2009    Catheterization    COLONOSCOPY N/A 3/14/2016    Procedure: COLONOSCOPY;  Surgeon: Kiana Pena MD;  Location: [facility]; Service  February 22, 2013, mildly enlarged prostate, history of colon CA with normal appearance of anastomosis at the midtransverse colon, severe diverticulosis in the sigmoid, descending and transverse colon  Repeat colonoscopy in 3 yrs    HEMICOLECTOMY Right 05/05/2004    INGUINAL HERNIA REPAIR Bilateral     Left 3/2004, right 5/2008    IR VENOGRAM  10/4/2018    KNEE SURGERY  1972    Meniscectomy    OR ESOPHAGOGASTRODUODENOSCOPY TRANSORAL DIAGNOSTIC N/A 12/5/2016    Procedure: ESOPHAGOGASTRODUODENOSCOPY (EGD); Surgeon: John Vera MD;  Location: [facility]; Service: Gastroenterology    TONSILLECTOMY AND ADENOIDECTOMY        Family History:     Family History   Problem Relation Age of Onset    Heart failure Mother         Congestive    Hypertension Mother     Osteoporosis Mother     COPD Father     Emphysema Father     Hypertension Father     Heart disease Sister         Heart Valve Replacement    Osteoporosis Sister     Breast cancer Family       Social History:     Social History     Tobacco Use   Smoking Status Never Smoker   Smokeless Tobacco Never Used     Social History     Substance and Sexual Activity   Alcohol Use No    Comment: Rare     Social History     Substance and Sexual Activity   Drug Use No      Medications and Allergies:     Current Outpatient Medications   Medication Sig Dispense Refill    aspirin 81 MG tablet Take 81 mg by mouth daily        atorvastatin (LIPITOR) 10 mg tablet TAKE 1 TABLET DAILY 90 tablet 3    bisoprolol (ZEBETA) 10 MG tablet TAKE 1 TABLET TWICE DAILY 180 tablet 2    Calcium Carb-Cholecalciferol (CALCIUM 600 + D PO) Take 1 tablet by mouth daily   Coenzyme Q10 (CO Q-10) 200 MG CAPS Take 1 tablet by mouth daily      finasteride (PROSCAR) 5 mg tablet TAKE ONE TABLET BY MOUTH EVERY DAY 90 tablet 1    furosemide (LASIX) 40 mg tablet Take 0 5 tablets (20 mg total) by mouth daily 90 tablet 3    lisinopril (ZESTRIL) 20 mg tablet TAKE ONE TABLET BY MOUTH EVERY DAY 90 tablet 3    Multiple Vitamins-Minerals (CENTRUM SILVER PO) Take 1 tablet by mouth daily   omeprazole (PriLOSEC) 20 mg delayed release capsule TAKE ONE CAPSULE BY MOUTH EVERY DAY 90 capsule 3    PREVIDENT 5000 ENAMEL PROTECT 1 1-5 % PSTE Use as directed  3    Vitamin D, Cholecalciferol, 1000 UNITS CAPS Take 1 tablet by mouth daily  No current facility-administered medications for this visit  No Known Allergies   Immunizations:     Immunization History   Administered Date(s) Administered    INFLUENZA 10/20/2018    Influenza Split High Dose Preservative Free IM 10/28/2014, 10/26/2015, 11/11/2016, 10/23/2017    Influenza TIV (IM) 11/27/2001, 10/04/2007, 12/09/2008, 10/29/2010, 10/19/2011, 09/29/2012, 10/31/2013    Pneumococcal Conjugate 13-Valent 05/13/2015    Pneumococcal Polysaccharide PPV23 11/28/2006, 12/21/2016    Zoster 12/01/2011      Medicare Screening Tests and Risk Assessments:     Javid Stein is here for his Subsequent Wellness visit  Health Risk Assessment:  Patient rates overall health as good  Patient feels that their physical health rating is Same  Eyesight was rated as Same  Hearing was rated as Same  Patient feels that their emotional and mental health rating is Same  Pain experienced by patient in the last 7 days has been Some  Patient's pain rating has been 2/10  Emotional/Mental Health:  Patient has not been feeling nervous/anxious      PHQ-9 Depression Screening:    Frequency of the following problems over the past two weeks:      1  Little interest or pleasure in doing things: 0 - not at all      2  Feeling down, depressed, or hopeless: 0 - not at all  PHQ-2 Score: 0          Broken Bones/Falls: Fall Risk Assessment:    In the past year, patient has experienced: No history of falling in past year          Bladder/Bowel:  Patient has not leaked urine accidently in the last six months  Patient reports no loss of bowel control  Immunizations:  Patient has had a flu vaccination within the last year  Patient has received a pneumonia shot  Patient has received a shingles shot  Patient has not received tetanus/diphtheria shot  Home Safety:  Patient does not have trouble with stairs inside or outside of their home  Patient currently reports that there are no safety hazards present in home, working smoke alarms, working carbon monoxide detectors  Preventative Screenings:   prostate cancer screen performed, colon cancer screen completed, cholesterol screen completed, no glaucoma eye exam completed    Nutrition:  Current diet: Regular and Limited junk food with servings of the following:    Medications:  Patient is currently taking over-the-counter supplements  Patient is able to manage medications  Lifestyle Choices:  Patient reports no tobacco use  Patient has not smoked or used tobacco in the past   Patient reports no alcohol use  Patient drives a vehicle  Patient wears seat belt  Current level of exercise of physical activity described by patient as: Golf 2x per week, cut the grass, yard work, home repair, and pool maintenance          Activities of Daily Living:  Can get out of bed by his or her self, able to dress self, able to make own meals, able to do own shopping, able to bathe self, can do own laundry/housekeeping, can manage own money, pay bills and track expenses    Previous Hospitalizations:  Hospitalization or ED visit in past 12 months  Number of hospitalizations within the last year: 1-2  Additional Comments: ICD replacement and Emergency room for SOB    Advanced Directives:  Patient has decided on a power of   Patient has spoken to designated power of   Patient has completed advanced directive  Preventative Screening/Counseling:      Cardiovascular:      General: Risks and Benefits Discussed and Screening Current          Diabetes:      General: Risks and Benefits Discussed and Screening Current          Colorectal Cancer:      General: Risks and Benefits Discussed and Screening Current          Prostate Cancer:      General: Risks and Benefits Discussed and Screening Not Indicated          Advanced Directives:   Patient has living will for healthcare, does not have durable POA for healthcare, patient does not have an advanced directive  Information on ACP and/or AD provided  5 wishes given       Immunizations:      Influenza: Risks & Benefits Discussed and Influenza Recommended Annually      Pneumococcal: Risks & Benefits Discussed and Lifetime Vaccine Completed      Shingrix: Risks & Benefits Discussed and Shingrix Vaccine Needed Today      TDAP: Risks & Benefits Discussed and Tdap Vaccine Needed Today

## 2019-07-11 NOTE — PROGRESS NOTES
Assessment/Plan:  Reviewed lab in 7/2019  CBC ok  CMP ok  HgA1C 5 3 normal  Lipid 168/166/54/81 ok    Thyroid nodule---Check US  Hypothyroidism---check labs  IFG---controlled  Low carb diet  HTN---controlled  Continue bisoprolol and lisinopril  [facility] cardiology  Hyperlipidemia---controlled  Low fat diet  Continue atorvastain  CKD3---stable  Keep hydrated  Avoid motrin/ibuprofen/aleve NSAIDs  BPH---continue proscar       Got flu shot yearly  Got PCV13 5/2015  Got pneumovax 2016  Got zoster in 2011  Will check insurance for coverage of Tdap and shingrix  FU Dr Aj Mabry for colonoscopy in 6/2019 1/2018 Dexa showed osteopenia  RTO in 6 months  No POA  Living will---Full code now  DNR if end of life  Diagnoses and all orders for this visit:    Non-toxic multinodular goiter  -     US thyroid; Future    Hypothyroidism, unspecified type  -     Comprehensive metabolic panel; Future  -     Hemoglobin A1C; Future  -     Lipid panel; Future  -     TSH, 3rd generation with Free T4 reflex; Future    Impaired fasting glucose  -     Comprehensive metabolic panel; Future  -     Hemoglobin A1C; Future  -     Lipid panel; Future  -     TSH, 3rd generation with Free T4 reflex; Future    Essential hypertension  -     Comprehensive metabolic panel; Future  -     Hemoglobin A1C; Future  -     Lipid panel; Future  -     TSH, 3rd generation with Free T4 reflex; Future    Hypertensive kidney disease with stage 3 chronic kidney disease ([facility] [address] )  -     Comprehensive metabolic panel; Future  -     Hemoglobin A1C; Future  -     Lipid panel; Future  -     TSH, 3rd generation with Free T4 reflex; Future    Mixed hyperlipidemia  -     Comprehensive metabolic panel; Future  -     Hemoglobin A1C; Future  -     Lipid panel; Future  -     TSH, 3rd generation with Free T4 reflex; Future          Subjective:      Patient ID: Mer Gutiérrez is a 67 y o  male  HPI    Pt is here by himself     Thyroid nodule---1/2018 US stable  Hypothyroidism---12/2018 TSH normal  Not on meds  IFG---Follows low carb diet  HTN---Does not check BP at home  He is on bisoprolol and lisinopril  [facility] cardiology for cardiomyopathy, tachycardia, s/p pacemaker  Stable  He is on lasix 20mg QD  CKD3---stable  Use aleve once per week before golf  Hyperlipidemia---on atorvastatin now  Denies SE        [facility] Cancer care Q 6 months for melanoma s/p wide excision on right upper arm recently  Stable  Hx of colon cancer s/p surgery and chemo, had colonoscopy 3/2016 which showed diverticulosis  Repeat in 3 years       [facility] GI Dr Andre Braga for Burt's, do EGD every 3 years  Last EGD was in 12/2016  Pt is on omeprazole 40mg daily now       [facility] urology for BPH yearly  Prefer us to give prescription in the future       Lives with wife  Does all ADL's  Still drive  Denies recent falls  Denies depression            The following portions of the patient's history were reviewed and updated as appropriate: allergies, current medications, past family history, past medical history, past social history, past surgical history and problem list     Review of Systems   Constitutional: Negative for appetite change, chills and fever  HENT: Negative for congestion, ear pain, sinus pain and sore throat  Eyes: Negative for discharge and itching  Respiratory: Negative for apnea, cough, chest tightness, shortness of breath and wheezing  Cardiovascular: Negative for chest pain, palpitations and leg swelling  Gastrointestinal: Negative for abdominal pain, anal bleeding, constipation, diarrhea, nausea and vomiting  Endocrine: Negative for cold intolerance, heat intolerance and polyuria  Genitourinary: Negative for difficulty urinating and dysuria  Musculoskeletal: Negative for arthralgias, back pain and myalgias  Skin: Negative for rash  Neurological: Negative for dizziness and headaches  Psychiatric/Behavioral: Negative for agitation  Objective:      /75 (BP Location: Left arm, Patient Position: Sitting, Cuff Size: Adult)   Pulse 80   Temp (!) 97 3 °F (36 3 °C) (Tympanic)   Resp 16   Ht 5' 11" (1 803 m)   Wt 91 5 kg (201 lb 12 8 oz)   SpO2 98%   BMI 28 15 kg/m²          Physical Exam   Constitutional: He appears well-developed  HENT:   Head: Normocephalic and atraumatic  Right Ear: External ear normal    Left Ear: External ear normal    Nose: Nose normal    Mouth/Throat: Oropharynx is clear and moist    Eyes: Pupils are equal, round, and reactive to light  Conjunctivae are normal    Neck: Normal range of motion  Neck supple  Cardiovascular: Normal rate, regular rhythm, normal heart sounds and intact distal pulses  Pulmonary/Chest: Effort normal and breath sounds normal    Abdominal: Soft  Bowel sounds are normal    Musculoskeletal: Normal range of motion  He exhibits no edema  Neurological: He is alert

## 2019-07-11 NOTE — PATIENT INSTRUCTIONS
Obesity   AMBULATORY CARE:   Obesity  is when your body mass index (BMI) is greater than 30  Your healthcare provider will use your height and weight to measure your BMI  The risks of obesity include  many health problems, such as injuries or physical disability  You may need tests to check for the following:  · Diabetes     · High blood pressure or high cholesterol     · Heart disease     · Gallbladder or liver disease     · Cancer of the colon, breast, prostate, liver, or kidney     · Sleep apnea     · Arthritis or gout  Seek care immediately if:   · You have a severe headache, confusion, or difficulty speaking  · You have weakness on one side of your body  · You have chest pain, sweating, or shortness of breath  Contact your healthcare provider if:   · You have symptoms of gallbladder or liver disease, such as pain in your upper abdomen  · You have knee or hip pain and discomfort while walking  · You have symptoms of diabetes, such as intense hunger and thirst, and frequent urination  · You have symptoms of sleep apnea, such as snoring or daytime sleepiness  · You have questions or concerns about your condition or care  Treatment for obesity  focuses on helping you lose weight to improve your health  Even a small decrease in BMI can reduce the risk for many health problems  Your healthcare provider will help you set a weight-loss goal   · Lifestyle changes  are the first step in treating obesity  These include making healthy food choices and getting regular physical activity  Your healthcare provider may suggest a weight-loss program that involves coaching, education, and therapy  · Medicine  may help you lose weight when it is used with a healthy diet and physical activity  · Surgery  can help you lose weight if you are very obese and have other health problems  There are several types of weight-loss surgery  Ask your healthcare provider for more information    Be successful losing weight:   · Set small, realistic goals  An example of a small goal is to walk for 20 minutes 5 days a week  Anther goal is to lose 5% of your body weight  · Tell friends, family members, and coworkers about your goals  and ask for their support  Ask a friend to lose weight with you, or join a weight-loss support group  · Identify foods or triggers that may cause you to overeat , and find ways to avoid them  Remove tempting high-calorie foods from your home and workplace  Place a bowl of fresh fruit on your kitchen counter  If stress causes you to eat, then find other ways to cope with stress  · Keep a diary to track what you eat and drink  Also write down how many minutes of physical activity you do each day  Weigh yourself once a week and record it in your diary  Eating changes: You will need to eat 500 to 1,000 fewer calories each day than you currently eat to lose 1 to 2 pounds a week  The following changes will help you cut calories:  · Eat smaller portions  Use small plates, no larger than 9 inches in diameter  Fill your plate half full of fruits and vegetables  Measure your food using measuring cups until you know what a serving size looks like  · Eat 3 meals and 1 or 2 snacks each day  Plan your meals in advance  [employer] and eat at home most of the time  Eat slowly  · Eat fruits and vegetables at every meal   They are low in calories and high in fiber, which makes you feel full  Do not add butter, margarine, or cream sauce to vegetables  Use herbs to season steamed vegetables  · Eat less fat and fewer fried foods  Eat more baked or grilled chicken and fish  These protein sources are lower in calories and fat than red meat  Limit fast food  Dress your salads with olive oil and vinegar instead of bottled dressing  · Limit the amount of sugar you eat  Do not drink sugary beverages  Limit alcohol  Activity changes:  Physical activity is good for your body in many ways   It helps you burn calories and build strong muscles  It decreases stress and depression, and improves your mood  It can also help you sleep better  Talk to your healthcare provider before you begin an exercise program   · Exercise for at least 30 minutes 5 days a week  Start slowly  Set aside time each day for physical activity that you enjoy and that is convenient for you  It is best to do both weight training and an activity that increases your heart rate, such as walking, bicycling, or swimming  · Find ways to be more active  Do yard work and housecleaning  Walk up the stairs instead of using elevators  Spend your leisure time going to events that require walking, such as outdoor festivals or fairs  This extra physical activity can help you lose weight and keep it off  Follow up with your healthcare provider as directed: You may need to meet with a dietitian  Write down your questions so you remember to ask them during your visits  © 2017 [address] Information is for End User's use only and may not be sold, redistributed or otherwise used for commercial purposes  All illustrations and images included in CareNotes® are the copyrighted property of [employer]  or Paul Moreno  The above information is an  only  It is not intended as medical advice for individual conditions or treatments  Talk to your doctor, nurse or pharmacist before following any medical regimen to see if it is safe and effective for you  Urinary Incontinence   WHAT YOU NEED TO KNOW:   What is urinary incontinence? Urinary incontinence (UI) is when you lose control of your bladder  What causes UI? UI occurs because your bladder cannot store or empty urine properly  The following are the most common types of UI:  · Stress incontinence  is when you leak urine due to increased bladder pressure  This may happen when you cough, sneeze, or exercise       · Urge incontinence  is when you feel the need to urinate right away and leak urine accidentally  · Mixed incontinence  is when you have both stress and urge UI  What are the signs and symptoms of UI?   · You feel like your bladder does not empty completely when you urinate  · You urinate often and need to urinate immediately  · You leak urine when you sleep, or you wake up with the urge to urinate  · You leak urine when you cough, sneeze, exercise, or laugh  How is UI diagnosed? Your healthcare provider will ask how often you leak urine and whether you have stress or urge symptoms  Tell him which medicines you take, how often you urinate, and how much liquid you drink each day  You may need any of the following tests:  · Urine tests  may show infection or kidney function  · A pelvic exam  may be done to check for blockages  A pelvic exam will also show if your bladder, uterus, or other organs have moved out of place  · An x-ray, ultrasound, or CT  may show problems with parts of your urinary system  You may be given contrast liquid to help your organs show up better in the pictures  Tell the healthcare provider if you have ever had an allergic reaction to contrast liquid  Do not enter the MRI room with anything metal  Metal can cause serious injury  Tell the healthcare provider if you have any metal in or on your body  · A bladder scan  will show how much urine is left in your bladder after you urinate  You will be asked to urinate and then healthcare providers will use a small ultrasound machine to check the urine left in your bladder  · Cystometry  is used to check the function of your urinary system  Your healthcare provider checks the pressure in your bladder while filling it with fluid  Your bladder pressure may also be tested when your bladder is full and while you urinate  How is UI treated? · Medicines  can help strengthen your bladder control      · Electrical stimulation  is used to send a small amount of electrical energy to your pelvic floor muscles  This helps control your bladder function  Electrodes may be placed outside your body or in your rectum  For women, the electrodes may be placed in the vagina  · A bulking agent  may be injected into the wall of your urethra to make it thicker  This helps keep your urethra closed and decreases urine leakage  · Devices  such as a clamp, pessary, or tampon may help stop urine leaks  Ask your healthcare provider for more information about these and other devices  · Surgery  may be needed if other treatments do not work  Several types of surgery can help improve your bladder control  Ask your healthcare provider for more information about the surgery you may need  How can I manage my symptoms? · Do pelvic muscle exercises often  Your pelvic muscles help you stop urinating  Squeeze these muscles tight for 5 seconds, then relax for 5 seconds  Gradually work up to squeezing for 10 seconds  Do 3 sets of 15 repetitions a day, or as directed  This will help strengthen your pelvic muscles and improve bladder control  · A catheter  may be used to help empty your bladder  A catheter is a tiny, plastic tube that is put into your bladder to drain your urine  Your healthcare provider may tell you to use a catheter to prevent your bladder from getting too full and leaking urine  · Keep a UI record  Write down how often you leak urine and how much you leak  Make a note of what you were doing when you leaked urine  · Train your bladder  Go to the bathroom at set times, such as every 2 hours, even if you do not feel the urge to go  You can also try to hold your urine when you feel the urge to go  For example, hold your urine for 5 minutes when you feel the urge to go  As that becomes easier, hold your urine for 10 minutes  · Drink liquids as directed  Ask your healthcare provider how much liquid to drink each day and which liquids are best for you   You may need to limit the amount of liquid you drink to help control your urine leakage  Limit or do not have drinks that contain caffeine or alcohol  Do not drink any liquid right before you go to bed  · Prevent constipation  Eat a variety of high-fiber foods  Good examples are high-fiber cereals, beans, vegetables, and whole-grain breads  Prune juice may help make your bowel movement softer  Walking is the best way to trigger your intestines to have a bowel movement  · Exercise regularly and maintain a healthy weight  Ask your healthcare provider how much you should weigh and about the best exercise plan for you  Weight loss and exercise will decrease pressure on your bladder and help you control your leakage  Ask him to help you create a weight loss plan if you are overweight  When should I seek immediate care? · You have severe pain  · You are confused or cannot think clearly  When should I contact my healthcare provider? · You have a fever  · You see blood in your urine  · You have pain when you urinate  · You have new or worse pain, even after treatment  · Your mouth feels dry or you have vision changes  · Your urine is cloudy or smells bad  · You have questions or concerns about your condition or care  CARE AGREEMENT:   You have the right to help plan your care  Learn about your health condition and how it may be treated  Discuss treatment options with your caregivers to decide what care you want to receive  You always have the right to refuse treatment  The above information is an  only  It is not intended as medical advice for individual conditions or treatments  Talk to your doctor, nurse or pharmacist before following any medical regimen to see if it is safe and effective for you  © 2017 [address] Information is for End User's use only and may not be sold, redistributed or otherwise used for commercial purposes   All illustrations and images included in CareNotes® are the copyrighted property of A D A M , Inc  or Paul Moreno  Cigarette Smoking and Your Health   AMBULATORY CARE:   Risks to your health if you smoke:  Nicotine and other chemicals found in tobacco damage every cell in your body  Even if you are a light smoker, you have an increased risk for cancer, heart disease, and lung disease  If you are pregnant or have diabetes, smoking increases your risk for complications  Benefits to your health if you stop smoking:   · You decrease respiratory symptoms such as coughing, wheezing, and shortness of breath  · You reduce your risk for cancers of the lung, mouth, throat, kidney, bladder, pancreas, stomach, and cervix  If you already have cancer, you increase the benefits of chemotherapy  You also reduce your risk for cancer returning or a second cancer from developing  · You reduce your risk for heart disease, blood clots, heart attack, and stroke  · You reduce your risk for lung infections, and diseases such as pneumonia, asthma, chronic bronchitis, and emphysema  · Your circulation improves  More oxygen can be delivered to your body  If you have diabetes, you lower your risk for complications, such as kidney, artery, and eye diseases  You also lower your risk for nerve damage  Nerve damage can lead to amputations, poor vision, and blindness  · You improve your body's ability to heal and to fight infections  Benefits to the health of others if you stop smoking:  Tobacco is harmful to nonsmokers who breathe in your secondhand smoke  The following are ways the health of others around you may improve when you stop smoking:  · You lower the risks for lung cancer and heart disease in nonsmoking adults  · If you are pregnant, you lower the risk for miscarriage, early delivery, low birth weight, and stillbirth  You also lower your baby's risk for SIDS, obesity, developmental delay, and neurobehavioral problems, such as ADHD  · If you have children, you lower their risk for ear infections, colds, pneumonia, bronchitis, and asthma  For more information and support to stop smoking:   · Smokefree  gov  Phone: 2- 934 - 763-3192  Web Address: www smokefrClerk  Follow up with your healthcare provider as directed:  Write down your questions so you remember to ask them during your visits  © 2017 [address] Information is for End User's use only and may not be sold, redistributed or otherwise used for commercial purposes  All illustrations and images included in CareNotes® are the copyrighted property of A [employer] A M , Inc  or Paul Moreno  The above information is an  only  It is not intended as medical advice for individual conditions or treatments  Talk to your doctor, nurse or pharmacist before following any medical regimen to see if it is safe and effective for you  Fall Prevention   WHAT YOU NEED TO KNOW:   What is fall prevention? Fall prevention includes ways to make your home and other areas safer  It also includes ways you can move more carefully to prevent a fall  What increases my risk for falls? · Lack of vitamin D    · Not getting enough sleep each night    · Trouble walking or keeping your balance, or foot problems    · Health conditions that cause changes in your blood pressure, vision, or muscle strength and coordination    · Medicines that make you dizzy, weak, or sleepy    · Problems seeing clearly    · Shoes that have high heels or are not supportive    · Tripping hazards, such as items left on the floor, no handrails on the stairs, or broken steps  How can I help protect myself from falls? · Stand or sit up slowly  This may help you keep your balance and prevent falls  If you need to get up during the night, sit up first  Be sure you are fully awake before you stand  Turn on the light before you start walking  Go slowly in case you are still sleepy   Make sure you will not trip over any pets sleeping in the bedroom  · Use assistive devices as directed  Your healthcare provider may suggest that you use a cane or walker to help you keep your balance  You may need to have grab bars put in your bathroom near the toilet or in the shower  · Wear shoes that fit well and have soles that   Wear shoes both inside and outside  Use slippers with good   Do not wear shoes with high heels  · Wear a personal alarm  This is a device that allows you to call 911 if you fall and need help  Ask your healthcare provider for more information  · Stay active  Exercise can help strengthen your muscles and improve your balance  Your healthcare provider may recommend water aerobics or walking  He or she may also recommend physical therapy to improve your coordination  Never start an exercise program without talking to your healthcare provider first      · Manage medical conditions  Keep all appointments with your healthcare providers  Visit your eye doctor as directed  How can I make my home safer? · Add items to prevent falls in the bathroom  Put nonslip strips on your bath or shower floor to prevent you from slipping  Use a bath mat if you do not have carpet in the bathroom  This will prevent you from falling when you step out of the bath or shower  Use a shower seat so you do not need to stand while you shower  Sit on the toilet or a chair in your bathroom to dry yourself and put on clothing  This will prevent you from losing your balance from drying or dressing yourself while you are standing  · Keep paths clear  Remove books, shoes, and other objects from walkways and stairs  Place cords for telephones and lamps out of the way so that you do not need to walk over them  Tape them down if you cannot move them  Remove small rugs  If you cannot remove a rug, secure it with double-sided tape  This will prevent you from tripping  · Install bright lights in your home  Use night lights to help light paths to the bathroom or kitchen  Always turn on the light before you start walking  · Keep items you use often on shelves within reach  Do not use a step stool to help you reach an item  · Paint or place reflective tape on the edges of your stairs  This will help you see the stairs better  Call 911 or have someone else call if:   · You have fallen and are unconscious  · You have fallen and cannot move part of your body  Contact your healthcare provider if:   · You have fallen and have pain or a headache  · You have questions or concerns about your condition or care  CARE AGREEMENT:   You have the right to help plan your care  Learn about your health condition and how it may be treated  Discuss treatment options with your caregivers to decide what care you want to receive  You always have the right to refuse treatment  The above information is an  only  It is not intended as medical advice for individual conditions or treatments  Talk to your doctor, nurse or pharmacist before following any medical regimen to see if it is safe and effective for you  © 2017 [address] [facility] Information is for End User's use only and may not be sold, redistributed or otherwise used for commercial purposes  All illustrations and images included in CareNotes® are the copyrighted property of "[employer] A M , Inc  or Paul Moreno  Advance Directives   WHAT YOU NEED TO KNOW:   What are advance directives? Advance directives are legal documents that state your wishes and plans for medical care  These plans are made ahead of time in case you lose your ability to make decisions for yourself  Advance directives can apply to any medical decision, such as the treatments you want, and if you want to donate organs  What are the types of advance directives? There are many types of advance directives, and each state has rules about how to use them   You may choose a combination of any of the following:  · Living will: This is a written record of the treatment you want  You can also choose which treatments you do not want, which to limit, and which to stop at a certain time  This includes surgery, medicine, IV fluid, and tube feedings  · Durable power of  for healthcare [facility]): This is a written record that states who you want to make healthcare choices for you when you are unable to make them for yourself  This person, called a proxy, is usually a family member or a friend  You may choose more than 1 proxy  · Do not resuscitate (DNR) order:  A DNR order is used in case your heart stops beating or you stop breathing  It is a request not to have certain forms of treatment, such as CPR  A DNR order may be included in other types of advance directives  · Medical directive: This covers the care that you want if you are in a coma, near death, or unable to make decisions for yourself  You can list the treatments you want for each condition  Treatment may include pain medicine, surgery, blood transfusions, dialysis, IV or tube feedings, and a ventilator (breathing machine)  · Values history: This document has questions about your views, beliefs, and how you feel and think about life  This information can help others choose the care that you would choose  Why are advance directives important? An advance directive helps you control your care  Although spoken wishes may be used, it is better to have your wishes written down  Spoken wishes can be misunderstood, or not followed  Treatments may be given even if you do not want them  An advance directive may make it easier for your family to make difficult choices about your care  How do I decide what to put in my advance directives? · Make informed decisions:  Make sure you fully understand treatments or care you may receive   Think about the benefits and problems your decisions could cause for you or your family  Talk to healthcare providers if you have concerns or questions before you write down your wishes  You may also want to talk with your Protestant or , or a   Check your state laws to make sure that what you put in your advance directive is legal      · Sign all forms:  Sign and date your advance directive when you have finished  You may also need 2 witnesses to sign the forms  Witnesses cannot be your doctor or his staff, your spouse, heirs or beneficiaries, people you owe money to, or your chosen proxy  Talk to your family, proxy, and healthcare providers about your advance directive  Give each person a copy, and keep one for yourself in a place you can get to easily  Do not keep it hidden or locked away  · Review and revise your plans: You can revise your advance directive at any time, as long as you are able to make decisions  Review your plan every year, and when there are changes in your life, or your health  When you make changes, let your family, proxy, and healthcare providers know  Give each a new copy  Where can I find more information? · [employer]  [address] , [address]  Phone: 6- 996 - [phone]  Phone: 8- 955 - [phone]  Web Address: http://www  aafp org  · [address] [facility])  [address] [facility] [place], [address]  Phone: [phone] - [phone]  Phone: [phone]  Web Address: Nikki feliciano  [facility] AGREEMENT:   You have the right to help plan your care  To help with this plan, you must learn about your health condition and treatment options  You must also learn about advance directives and how they are used  Work with your healthcare providers to decide what care will be used to treat you  You always have the right to refuse treatment  The above information is an  only   It is not intended as medical advice for individual conditions or treatments  Talk to your doctor, nurse or pharmacist before following any medical regimen to see if it is safe and effective for you  © 2017 [address] Information is for End User's use only and may not be sold, redistributed or otherwise used for commercial purposes  All illustrations and images included in CareNotes® are the copyrighted property of A D A M , Inc  or Paul Moreno

## 2019-07-11 NOTE — PROGRESS NOTES
Surgical Oncology Follow Up       [address] [facility] [place]  [address]  [facility] [address]  [address] Krystal Mcgee Oumar  1940  [id number]  [address] [facility] CANCER [facility] [address] [address] [phone]    Diagnoses and all orders for this visit:    Encounter for follow-up surveillance of melanoma    Personal history of malignant melanoma        Chief Complaint   Patient presents with    Follow-up     Pt here today for a 6 month Melanoma follow up       Return in about 6 months (around 1/11/2020) for Office Visit  Personal history of malignant melanoma    10/31/2017 Initial Diagnosis     Malignant melanoma of right upper extremity ([address] )      12/5/2017 Surgery     Wide excision of right upper arm  [id number]         Diagnosis and Staging: [id number] melanoma right upper extremity December 2017   Treatment History: Wide excision melanoma December 2017   Current Therapy: Observation   Disease Status: TONY     History of Present Illness:  Patient returns in follow-up of his melanoma  He is doing well at this time with no complaints  He denies any new abdominal pain, nausea, vomiting, back pain, bone pain, headaches, or cough  He recently had a pre cancerous lesion removed from his left neck  Review of Systems  Complete ROS Surg Onc:   Complete ROS Surg Onc:   Constitutional: The patient denies new or recent history of general fatigue, no recent weight loss, no change in appetite  Eyes: No complaints of visual problems, no scleral icterus  ENT: no complaints of ear pain, no hoarseness, no difficulty swallowing,  no tinnitus and no new masses in head, oral cavity, or neck  Cardiovascular: No complaints of chest pain, no palpitations, no ankle edema  Respiratory: No complaints of shortness of breath, no cough     Gastrointestinal: No complaints of jaundice, no bloody stools, no pale stools  Genitourinary: No complaints of dysuria, no hematuria, no nocturia, no frequent urination, no urethral discharge  Musculoskeletal: No complaints of weakness, paralysis, joint stiffness or arthralgias  Integumentary: No complaints of rash, no new lesions  Neurological: No complaints of convulsions, no seizures, no dizziness  Hematologic/Lymphatic: No complaints of easy bruising  Endocrine:  No hot or cold intolerance  No polydipsia, polyphagia, or polyuria  Allergy/immunology:  No environmental allergies  No food allergies  Not immunocompromised  Skin:  No pallor or rash  No wound          Patient Active Problem List   Diagnosis    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    Microscopic hematuria    Ank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 ([facility] )    Arthritis    Burt esophagus    Cancer, colon ([place] [address] )    Cardiac arrhythmia    Cardiomyopathy ([place] [address] )    CKD (chronic kidney disease) stage 3, GFR 30-59 ml/min (HCC)    Esophageal reflux    Hyperlipidemia    Hypertension    Hypothyroidism    Impaired fasting glucose    Personal history of malignant melanoma    Non-toxic multinodular goiter    Polymyalgia rheumatica (HCC)    Right bundle branch block with left anterior fascicular block    Skin lesion    Thrombocytopenia (HCC)    Osteopenia    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    Hypertensive kidney disease with stage 3 chronic kidney disease (HCC)    Chronic systolic CHF (congestive heart failure) ([place] [address] )    Encounter for follow-up surveillance of melanoma     Past Medical History:   Diagnosis Date    AICD (automatic cardioverter/defibrillator) present     Arthritis     Asthma     Burt esophagus     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     Cardiac arrhythmia     Cardiomyopathy ([facility] )     CKD (chronic kidney disease)     Colon cancer (HCC)     Congest[facility] failure ([facility]F) [facility]     COPD (chronic obstructive pulmonary disease) ([facility] )     Diverticulitis     Last assessed: 4/5/13    Esophageal reflux     Gout     Hernia     Hyperlipidemia     Hypertension     Hypertension     Hypothyroidism     Pleural effusion     Polymyalgia rheumatica (HCC)     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     Spondyloarthropathy ([facility] )     Last assessed: 11/28/12     Past Surgical History:   Procedure Laterality Date    ARM SKIN LESION BIOPSY / EXCISION      Malignant    ATRIAL ABLATION SURGERY      AV NODE ABLATION      CARDIAC DEFIBRILLATOR PLACEMENT  2009    Cardio-Defib Pulse Gen Venous Insertion of Electrode for Ventricular Pacing  Implantable    CARDIAC SURGERY  2009    Catheterization    COLONOSCOPY N/A 3/14/2016    Procedure: COLONOSCOPY;  Surgeon: Agnieszka Pablo MD;  [facility]; Service  February 22, 2013, mildly enlarged prostate, history of colon CA with normal appearance of anastomosis at the midtransverse colon, severe diverticulosis in the sigmoid, descending and transverse colon  Repeat colonoscopy in 3 yrs    HEMICOLECTOMY Right 05/05/2004    INGUINAL HERNIA REPAIR Bilateral     Left 3/2004, right 5/2008    IR VENOGRAM  10/4/2018    KNEE SURGERY  1972    Meniscectomy    MI ESOPHAGOGASTRODUODENOSCOPY TRANSORAL DIAGNOSTIC N/A 12/5/2016    Procedure: ESOPHAGOGASTRODUODENOSCOPY (EGD); Surgeon: Rigoberto Zelaya MD;  [facility];   Service: Gastroenterology    TONSILLECTOMY AND ADENOIDECTOMY       Family History   Problem Relation Age of Onset    Heart failure Mother         Congestive    Hypertension Mother     Osteoporosis Mother     COPD Father     Emphysema Father     Hypertension Father     Heart disease Sister         Heart Valve Replacement    Osteoporosis Sister     Breast cancer Family      Social History     Socioeconomic History    Marital status: /Civil Union     Spouse name: Not on file    Number of children: Not on file    Years of education: Not on file    Highest education level: Not on file   Occupational History    Occupation: Manager-Retired   Social Needs    Financial resource strain: Not on file    Food insecurity:     Worry: Not on file     Inability: Not on file    Transportation needs:     Medical: Not on file     Non-medical: Not on file   Tobacco Use    Smoking status: Never Smoker    Smokeless tobacco: Never Used   Substance and Sexual Activity    Alcohol use: No     Comment: Rare    Drug use: No    Sexual activity: Not Currently   Lifestyle    Physical activity:     Days per week: Not on file     Minutes per session: Not on file    Stress: Not on file   Relationships    Social connections:     Talks on phone: Not on file     Gets together: Not on file     Attends Yazidism service: Not on file     Active member of club or organization: Not on file     Attends meetings of clubs or organizations: Not on file     Relationship status: Not on file    Intimate partner violence:     Fear of current or ex partner: Not on file     Emotionally abused: Not on file     Physically abused: Not on file     Forced sexual activity: Not on file   Other Topics Concern    Not on file   Social History Narrative    Not on file       Current Outpatient Medications:     aspirin 81 MG tablet, Take 81 mg by mouth daily  , Disp: , Rfl:     atorvastatin (LIPITOR) 10 mg tablet, TAKE 1 TABLET DAILY, Disp: 90 tablet, Rfl: 3    bisoprolol (ZEBETA) 10 MG tablet, TAKE 1 TABLET TWICE DAILY, Disp: 180 tablet, Rfl: 2    Calcium Carb-Cholecalciferol (CALCIUM 600 + D PO), Take 1 tablet by mouth daily  , Disp: , Rfl:     Coenzyme Q10 (CO Q-10) 200 MG CAPS, Take 1 tablet by mouth daily, Disp: , Rfl:     finasteride (PROSCAR) 5 mg tablet, TAKE ONE TABLET BY MOUTH EVERY DAY, Disp: 90 tablet, Rfl: 1    furosemide (LASIX) 40 mg tablet, Take 0 5 tablets (20 mg total) by mouth daily, Disp: 90 tablet, Rfl: 3    lisinopril (ZESTRIL) 20 mg tablet, TAKE ONE TABLET BY MOUTH EVERY DAY, Disp: 90 tablet, Rfl: 3    Multiple Vitamins-Minerals (CENTRUM SILVER PO), Take 1 tablet by mouth daily  , Disp: , Rfl:     omeprazole (PriLOSEC) 20 mg delayed release capsule, TAKE ONE CAPSULE BY MOUTH EVERY DAY, Disp: 90 capsule, Rfl: 3    PREVIDENT 5000 ENAMEL PROTECT 1 1-5 % PSTE, Use as directed, Disp: , Rfl: 3    Vitamin D, Cholecalciferol, 1000 UNITS CAPS, Take 1 tablet by mouth daily  , Disp: , Rfl:   No Known Allergies  Vitals:    07/11/19 1530   BP: 144/88   Pulse: 80   Resp: 16   Temp: (!) 97 °F (36 1 °C)       Physical Exam  Constitutional: General appearance: The Patient is well-developed and well-nourished who appears the stated age in no acute distress  Patient is pleasant and talkative  HEENT:  Normocephalic  Sclerae are anicteric  Mucous membranes are moist  Neck is supple without adenopathy  No JVD  Chest: The lungs are clear to auscultation  Cardiac: Heart is regular rate  Abdomen: Abdomen is soft, non-tender, non-distended and without masses  Extremities: There is no clubbing or cyanosis  There is no edema  Symmetric  Neuro: Grossly nonfocal  Gait is normal      Lymphatic: No evidence of cervical adenopathy bilaterally  No evidence of axillary adenopathy bilaterally  No evidence of inguinal adenopathy bilaterally  Skin: Warm, anicteric  Wide excision on the right arm has healed well  No evidence of local recurrence or in-transit disease  Psych:  Patient is pleasant and talkative  Breasts:        Pathology:  [unfilled]    Labs:      Imaging  No results found  I reviewed the above laboratory and imaging data      Discussion/Summary: 52-year-old male status post wide excision of a [id number] Roselia Washburn is clinically TONY at 1 and 1/2 years  Farrah May is no evidence of recurrence by physical exam, or symptomatology  Michelle Hernandez will continue to follow up with his dermatologist,   Kati Velazquez him again in 6 months for another clinical exam  If he has any new, persistent, or unexplained symptoms he will contact us and directed imaging may be performed  He is agreeable to this  All his questions were answered

## 2019-07-11 NOTE — LETTER
July 11, 2019     [address]    Patient: Marleny Arenas   YOB: 1940   Date of Visit: 7/11/2019       Dear Dr Cedric Bates: Thank you for referring Marleny Arenas to me for evaluation  Below are my notes for this consultation  If you have questions, please do not hesitate to call me  I look forward to following your patient along with you  Sincerely,        Gael Britton MD        CC: MD Gael Jaffe MD  7/11/2019  3:39 PM  Sign at close encounter               Surgical Oncology Follow Up       [address] [facility] [address] [address]  An Elijah Jeff [address]  1940  [id number]  [address] [facility] CANCER [facility] [address] [address] [phone]    Diagnoses and all orders for this visit:    Encounter for follow-up surveillance of melanoma    Personal history of malignant melanoma        Chief Complaint   Patient presents with    Follow-up     Pt here today for a 6 month Melanoma follow up       Return in about 6 months (around 1/11/2020) for Office Visit  Personal history of malignant melanoma    10/31/2017 Initial Diagnosis     Malignant melanoma of right upper extremity ([address] )      12/5/2017 Surgery     Wide excision of right upper arm  [id number]         Diagnosis and Staging: [id number] melanoma right upper extremity December 2017   Treatment History: Wide excision melanoma December 2017   Current Therapy: Observation   Disease Status: TONY     History of Present Illness:  Patient returns in follow-up of his melanoma  He is doing well at this time with no complaints  He denies any new abdominal pain, nausea, vomiting, back pain, bone pain, headaches, or cough  He recently had a pre cancerous lesion removed from his left neck      Review of Systems  Complete ROS Surg Onc:   Complete ROS Surg Onc:   Constitutional: The patient denies new or recent history of general fatigue, no recent weight loss, no change in appetite  Eyes: No complaints of visual problems, no scleral icterus  ENT: no complaints of ear pain, no hoarseness, no difficulty swallowing,  no tinnitus and no new masses in head, oral cavity, or neck  Cardiovascular: No complaints of chest pain, no palpitations, no ankle edema  Respiratory: No complaints of shortness of breath, no cough  Gastrointestinal: No complaints of jaundice, no bloody stools, no pale stools  Genitourinary: No complaints of dysuria, no hematuria, no nocturia, no frequent urination, no urethral discharge  Musculoskeletal: No complaints of weakness, paralysis, joint stiffness or arthralgias  Integumentary: No complaints of rash, no new lesions  Neurological: No complaints of convulsions, no seizures, no dizziness  Hematologic/Lymphatic: No complaints of easy bruising  Endocrine:  No hot or cold intolerance  No polydipsia, polyphagia, or polyuria  Allergy/immunology:  No environmental allergies  No food allergies  Not immunocompromised  Skin:  No pallor or rash  No wound          Patient Active Problem List   Diagnosis    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    Microscopic hematuria    [facility]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    Arthritis    Burt esophagus    Cancer, colon ([facility]    Cardiac arrhythmia    Cardiomyopathy ([facility]    CKD (chronic kidney disease) stage 3, GFR 30-59 ml/min (HCC)    Esophageal reflux    Hyperlipidemia    Hypertension    Hypothyroidism    Impaired fasting glucose    Personal history of malignant melanoma    Non-toxic multinodular goiter    Polymyalgia rheumatica (HCC)    Right bundle branch block with left anterior fascicular block    Skin lesion    Thrombocytopenia (HCC)    Osteopenia    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    Hypertensive kidney disease with stage 3 chronic kidney disease (HCC)    Chronic systolic CHF (congestive heart failure) ([facility] )    Encounter for follow-up surveillance of melanoma     Past Medical History:   Diagnosis Date    AICD (automatic cardioverter/defibrillator) present     Arthritis     Asthma     Burt esophagus     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     Cardiac arrhythmia     Cardiomyopathy ([facility] )     CKD (chronic kidney disease)     Colon cancer (HCC)     Congestive heart failure (CHF) (HCC)     COPD (chronic obstructive pulmonary disease) ([facility] )     Diverticulitis     Last assessed: 4/5/13    Esophageal reflux     Gout     Hernia     Hyperlipidemia     Hypertension     Hypertension     Hypothyroidism     Pleural effusion     Polymyalgia rheumatica (HCC)     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     Spondyloarthropathy ([facility] )     Last assessed: 11/28/12     Past Surgical History:   Procedure Laterality Date    ARM SKIN LESION BIOPSY / EXCISION      Malignant    ATRIAL ABLATION SURGERY      AV NODE ABLATION      CARDIAC DEFIBRILLATOR PLACEMENT  2009    Cardio-Defib Pulse Gen Venous Insertion of Electrode for Ventricular Pacing  Implantable    CARDIAC SURGERY  2009    Catheterization    COLONOSCOPY N/A 3/14/2016    Procedure: COLONOSCOPY;  Surgeon: Higinio Phillips MD;  [facility]; Service  February 22, 2013, mildly enlarged prostate, history of colon CA with normal appearance of anastomosis at the midtransverse colon, severe diverticulosis in the sigmoid, descending and transverse colon   Repeat colonoscopy in 3 yrs    HEMICOLECTOMY Right 05/05/2004    INGUINAL HERNIA REPAIR Bilateral     Left 3/2004, right 5/2008    IR VENOGRAM  10/4/2018    KNEE SURGERY  1972    Meniscectomy    FL ESOPHAGOGASTRODUODENOSCOPY TRANSORAL DIAGNOSTIC N/A 12/5/2016    Procedure: ESOPHAGOGASTRODUODENOSCOPY (EGD); Surgeon: Eliana Fuller MD;  [facility]; Service: Gastroenterology    TONSILLECTOMY AND ADENOIDECTOMY       Family History   Problem Relation Age of Onset    Heart failure Mother         Congestive    Hypertension Mother     Osteoporosis Mother     COPD Father     Emphysema Father     Hypertension Father     Heart disease Sister         Heart Valve Replacement    Osteoporosis Sister     Breast cancer Family      Social History     Socioeconomic History    Marital status: /Civil Union     Spouse name: Not on file    Number of children: Not on file    Years of education: Not on file    Highest education level: Not on file   Occupational History    Occupation: Manager-Retired   Social Needs    Financial resource strain: Not on file    Food insecurity:     Worry: Not on file     Inability: Not on file    Transportation needs:     Medical: Not on file     Non-medical: Not on file   Tobacco Use    Smoking status: Never Smoker    Smokeless tobacco: Never Used   Substance and Sexual Activity    Alcohol use: No     Comment: Rare    Drug use: No    Sexual activity: Not Currently   Lifestyle    Physical activity:     Days per week: Not on file     Minutes per session: Not on file    Stress: Not on file   Relationships    Social connections:     Talks on phone: Not on file     Gets together: Not on file     Attends Oriental orthodox service: Not on file     Active member of club or organization: Not on file     Attends meetings of clubs or organizations: Not on file     Relationship status: Not on file    Intimate partner violence:     Fear of current or ex partner: Not on file     Emotionally abused: Not on file     Physically abused: Not on file     Forced sexual activity: Not on file   Other Topics Concern    Not on file   Social History Narrative    Not on file       Current Outpatient Medications:     aspirin 81 MG tablet, Take 81 mg by mouth daily  , Disp: , Rfl:     atorvastatin (LIPITOR) 10 mg tablet, TAKE 1 TABLET DAILY, Disp: 90 tablet, Rfl: 3    bisoprolol (ZEBETA) 10 MG tablet, TAKE 1 TABLET TWICE DAILY, Disp: 180 tablet, Rfl: 2    Calcium Carb-Cholecalciferol (CALCIUM 600 + D PO), Take 1 tablet by mouth daily  , Disp: , Rfl:     Coenzyme Q10 (CO Q-10) 200 MG CAPS, Take 1 tablet by mouth daily, Disp: , Rfl:     finasteride (PROSCAR) 5 mg tablet, TAKE ONE TABLET BY MOUTH EVERY DAY, Disp: 90 tablet, Rfl: 1    furosemide (LASIX) 40 mg tablet, Take 0 5 tablets (20 mg total) by mouth daily, Disp: 90 tablet, Rfl: 3    lisinopril (ZESTRIL) 20 mg tablet, TAKE ONE TABLET BY MOUTH EVERY DAY, Disp: 90 tablet, Rfl: 3    Multiple Vitamins-Minerals (CENTRUM SILVER PO), Take 1 tablet by mouth daily  , Disp: , Rfl:     omeprazole (PriLOSEC) 20 mg delayed release capsule, TAKE ONE CAPSULE BY MOUTH EVERY DAY, Disp: 90 capsule, Rfl: 3    PREVIDENT 5000 ENAMEL PROTECT 1 1-5 % PSTE, Use as directed, Disp: , Rfl: 3    Vitamin D, Cholecalciferol, 1000 UNITS CAPS, Take 1 tablet by mouth daily  , Disp: , Rfl:   No Known Allergies  Vitals:    07/11/19 1530   BP: 144/88   Pulse: 80   Resp: 16   Temp: (!) 97 °F (36 1 °C)       Physical Exam  Constitutional: General appearance: The Patient is well-developed and well-nourished who appears the stated age in no acute distress  Patient is pleasant and talkative  HEENT:  Normocephalic  Sclerae are anicteric  Mucous membranes are moist  Neck is supple without adenopathy  No JVD  Chest: The lungs are clear to auscultation  Cardiac: Heart is regular rate  Abdomen: Abdomen is soft, non-tender, non-distended and without masses  Extremities: There is no clubbing or cyanosis  There is no edema  Symmetric  Neuro: Grossly nonfocal  Gait is normal      Lymphatic: No evidence of cervical adenopathy bilaterally  No evidence of axillary adenopathy bilaterally  No evidence of inguinal adenopathy bilaterally       Skin: Warm, anicteric  Wide excision on the right arm has healed well  No evidence of local recurrence or in-transit disease  Psych:  Patient is pleasant and talkative  Breasts:        Pathology:  [unfilled]    Labs:      Imaging  No results found  I reviewed the above laboratory and imaging data  Discussion/Summary: 20-year-old male status post wide excision of a [id number] Tian Alonso is clinically TONY at 1  and 1/2 years  [facility] is no evidence of recurrence by physical exam, or symptomatology  Eunice Weber will continue to follow up with his dermatologist, Dr Chiquis Navape him again in 6 months for another clinical exam  If he has any new, persistent, or unexplained symptoms he will contact us and directed imaging may be performed  He is agreeable to this  All his questions were answered

## 2019-07-22 ENCOUNTER — HOSPITAL ENCOUNTER (OUTPATIENT)
Dept: RADIOLOGY | Facility: HOSPITAL | Age: 79
Discharge: HOME | End: 2019-07-22
Payer: COMMERCIAL

## 2019-07-22 DIAGNOSIS — E04.2 NON-TOXIC MULTINODULAR GOITER: ICD-10-CM

## 2019-07-22 PROCEDURE — 76536 US EXAM OF HEAD AND NECK: CPT

## 2019-08-20 ENCOUNTER — REMOTE DEVICE CLINIC VISIT (OUTPATIENT)
Dept: CARDIOLOGY CLINIC | Facility: CLINIC | Age: 79
End: 2019-08-20
Payer: COMMERCIAL

## 2019-08-20 DIAGNOSIS — Z95.810 PRESENCE OF AUTOMATIC CARDIOVERTER/DEFIBRILLATOR (AICD): Primary | ICD-10-CM

## 2019-08-20 PROCEDURE — 93296 REM INTERROG EVL PM/IDS: CPT | Performed by: INTERNAL MEDICINE

## 2019-08-20 PROCEDURE — 93295 DEV INTERROG REMOTE 1/2/MLT: CPT | Performed by: INTERNAL MEDICINE

## 2019-08-20 NOTE — PROGRESS NOTES
Results for orders placed or performed in visit on 08/20/19   Cardiac EP device report    Narrative    MDT-DUAL CHAMBER ICD (AAIR-DDDR MODE)  CARELINK TRANSMISSION: BATTERY ADEQUATE (9 6 YRS)  AP 69%;  6%  ALL LEAD PARAMETERS WITHIN NORMAL LIMITS  4 AF EPISODES (UP TO 2:02 MNTS)  1 VT MONITORED EPISODE (UP  BPM & 15 SECONDS)  PT  TAKES BISOPROLOL  OPTI-VOL WITHIN NORMAL LIMITS  NORMAL DEVICE FUNCTION   PL

## 2019-08-30 ENCOUNTER — OFFICE VISIT (OUTPATIENT)
Dept: CARDIOLOGY CLINIC | Facility: CLINIC | Age: 79
End: 2019-08-30
Payer: COMMERCIAL

## 2019-08-30 VITALS
BODY MASS INDEX: 28.85 KG/M2 | OXYGEN SATURATION: 97 % | DIASTOLIC BLOOD PRESSURE: 70 MMHG | WEIGHT: 206.1 LBS | HEIGHT: 71 IN | SYSTOLIC BLOOD PRESSURE: 130 MMHG | HEART RATE: 84 BPM

## 2019-08-30 DIAGNOSIS — I10 HTN (HYPERTENSION), BENIGN: ICD-10-CM

## 2019-08-30 DIAGNOSIS — I50.22 CHRONIC SYSTOLIC CHF (CONGESTIVE HEART FAILURE), NYHA CLASS 2 (HCC): ICD-10-CM

## 2019-08-30 DIAGNOSIS — I42.8 NICM (NONISCHEMIC CARDIOMYOPATHY) (HCC): Primary | ICD-10-CM

## 2019-08-30 DIAGNOSIS — I10 HYPERTENSION, UNSPECIFIED TYPE: ICD-10-CM

## 2019-08-30 PROCEDURE — 3075F SYST BP GE 130 - 139MM HG: CPT | Performed by: INTERNAL MEDICINE

## 2019-08-30 PROCEDURE — 3078F DIAST BP <80 MM HG: CPT | Performed by: INTERNAL MEDICINE

## 2019-08-30 PROCEDURE — 99204 OFFICE O/P NEW MOD 45 MIN: CPT | Performed by: INTERNAL MEDICINE

## 2019-08-30 RX ORDER — LISINOPRIL 30 MG/1
30 TABLET ORAL DAILY
Qty: 90 TABLET | Refills: 3 | Status: SHIPPED | OUTPATIENT
Start: 2019-08-30 | End: 2020-06-21

## 2019-08-30 NOTE — PROGRESS NOTES
Heart Failure Consult Note - Arianna Monique 67 y o  male MRN: [id number]    @ Encounter: [id number]      Assessment/Plan:    Patient Active Problem List    Diagnosis Date Noted    Encounter for follow-up surveillance of melanoma 07/11/2019     Priority: Low    Chronic systolic CHF (congestive heart failure) ([facility] ) 01/29/2019     Priority: Low    Hypertensive kidney disease with stage 3 chronic kidney disease ([facility] ) 01/04/2019     Priority: Low    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 09/20/2018     Priority: Low    Osteopenia 06/29/2018     Priority: Low    Personal history of malignant melanoma 11/15/2017     Priority: Low    Skin lesion 06/23/2017     Priority: Low    CKD (chronic kidney disease) stage 3, GFR 30-59 ml/min ([facility]) 06/21/2016     Priority: Low    Thrombocytopenia ([facility] ) 06/23/2015     Priority: Low    Ank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 ([place] [address] ) 12/30/2014     Priority: Low    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 12/02/2013     Priority: Low    Microscopic hematuria 12/02/2013     Priority: Low    Arthritis 11/26/2013     Priority: Low    Polymyalgia rheumatica ([facility] ) 11/26/2013     Priority: Low    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     Priority: Low    Hypothyroidism 05/30/2013     Priority: Low    Impaired fasting glucose 05/30/2013     Priority: Low    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     Priority: Low    Cancer, colon ([place] [address] ) 04/05/2013     Priority: Low    Cardiac arrhythmia 04/05/2013     Priority: Low    Hypertension 04/05/2013     Priority: Low    Esophageal reflux 11/28/2012     Priority: Low    Non-toxic multinodular goiter 11/28/2012     Priority: Low    Cardiomyopathy ([facility] ) 10/26/2012     Priority: Low    Hyperlipidemia 09/05/2012     Priority: Low       # Chronic Systolic CHF, Stage C, NYHA 2  Etiology: NICM, presumed viral  No family hx of SCD  No heavy ETOH    Weight:   Wt Readings from Last 3 Encounters:   08/30/19 93 5 kg (206 lb 1 6 oz)   07/11/19 92 1 kg (203 lb)   07/11/19 91 5 kg (201 lb 12 8 oz)       NT pro BNP: 1/29/19: 6192    Echocardiogram 1/29/19  LVEF: 40%  LVIDd: 6 cm  RV: normal  MR: mild  PASP: 40 mmHg  RVOT:   Other:    Echo 6/21/18:  LVEF: 50%  LVEDd: 6 35 cm    Nuclear Stress Test 1/31/19: small to medium sized fixed defect infersoseptum  No ischemia  EF: 30%    Lab Results   Component Value Date    NTBNP 6,192 (H) 01/29/2019        Neurohormonal Blockade:  --Beta-Blocker: bisoprolol 10 mg daily  --ACEi, ARB or ARNi: lisinopril 20 mg daily  --Aldosterone Receptor Blocker:  --Diuretic: lasix 20 mg daily    Sudden Cardiac Death Risk Reduction:  MDT dual chamber ICD:   Interrogation 8/20/19: AP 69%, 1 VT monitored episode, optivol OK    Electrical Resynchronization:  --Candidacy for BiV device: IVCD    Advanced Therapies (if appropriate): --Inotrope:  --LVAD/Transplant Candidacy:    # HTN  # SVT  # hyperlipidemia- atorvastatin 10 mg  7/6/19: LDL 81, HDL 54, Tri 166  # hx of malignanat melanoma  # CKD, Cr 1 49 on 7/6/19    Today's Plan:  Increase lisinopril to 30 mg daily to optimize GDMT  --2g sodium diet  Weights daily    HPI:      68 yo first evaluation in HF clinic for NICM diagnosed in 2009, s/p ICD, PSVT, HTN and dyslipidemia  Last in hospital in January for dyspnea felt to be more viral but was volume overloaded with NT pro BNP of 6192  Given one dose of IV lasix  LVEF: 40% with LVEDd 6 cm  He followed up with NP and weight was 204 lbs  Never smoked and does not drink  Plays golf 2x a week, cuts grass, does not feel limited in activity  No significant edema in legs  He is limited by knees and back not respiration  Review of Systems   Constitutional: Negative for activity change, appetite change, fatigue and unexpected weight change  HENT: Negative for congestion and nosebleeds  Eyes: Negative      Respiratory: Negative for cough, chest tightness and shortness of breath  Cardiovascular: Negative for chest pain, palpitations and leg swelling  Gastrointestinal: Negative for abdominal distention  Endocrine: Negative  Genitourinary: Negative  Musculoskeletal: Positive for arthralgias  Skin: Negative  Neurological: Negative for dizziness, syncope and weakness  Hematological: Negative  Psychiatric/Behavioral: Negative  Past Medical History:   Diagnosis Date    AICD (automatic cardioverter/defibrillator) present     Arthritis     Asthma     Burt esophagus     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     Cardiac arrhythmia     Cardiomyopathy ([place] [address] )     CKD (chronic kidney disease)     [facility]     Congestive heart failure (CHF) ([facility])     COPD (chronic obstructive pulmonary disease) ([place] [address] )     Diverticulitis     Last assessed: 4/5/13    Esophageal reflux     Gout     Hernia     Hyperlipidemia     Hypertension     Hypertension     Hypothyroidism     Pleural effusion     Polymyalgia rheumatica ([facility])     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     Spondyloarthropathy ([place] [address] )     Last assessed: 11/28/12         No Known Allergies    Current Outpatient Medications:     aspirin 81 MG tablet, Take 81 mg by mouth daily  , Disp: , Rfl:     atorvastatin (LIPITOR) 10 mg tablet, TAKE 1 TABLET DAILY, Disp: 90 tablet, Rfl: 3    bisoprolol (ZEBETA) 10 MG tablet, TAKE 1 TABLET TWICE DAILY, Disp: 180 tablet, Rfl: 2    Calcium Carb-Cholecalciferol (CALCIUM 600 + D PO), Take 2 tablets by mouth daily , Disp: , Rfl:     Coenzyme Q10 (CO Q-10) 200 MG CAPS, Take 1 tablet by mouth daily, Disp: , Rfl:     finasteride (PROSCAR) 5 mg tablet, TAKE ONE TABLET BY MOUTH EVERY DAY, Disp: 90 tablet, Rfl: 1    furosemide (LASIX) 40 mg tablet, Take 0 5 tablets (20 mg total) by mouth daily, Disp: 90 tablet, Rfl: 3    lisinopril (ZESTRIL) 20 mg tablet, TAKE ONE TABLET BY MOUTH EVERY DAY, Disp: 90 tablet, Rfl: 3    Multiple Vitamins-Minerals (CENTRUM SILVER PO), Take 1 tablet by mouth daily  , Disp: , Rfl:     omeprazole (PriLOSEC) 20 mg delayed release capsule, TAKE ONE CAPSULE BY MOUTH EVERY DAY, Disp: 90 capsule, Rfl: 3    PREVIDENT 5000 ENAMEL PROTECT 1 1-5 % PSTE, Use as directed, Disp: , Rfl: 3    Vitamin D, Cholecalciferol, 1000 UNITS CAPS, Take 1 tablet by mouth daily  , Disp: , Rfl:     Social History     Socioeconomic History    Marital status: /Civil Union     Spouse name: Not on file    Number of children: Not on file    Years of education: Not on file    Highest education level: Not on file   Occupational History    Occupation: Manager-Retired   Social Needs    Financial resource strain: Not on file    Food insecurity:     Worry: Not on file     Inability: Not on file   Spectropath needs:     Medical: Not on file     Non-medical: Not on file   Tobacco Use    Smoking status: Never Smoker    Smokeless tobacco: Never Used   Substance and Sexual Activity    Alcohol use: No     Comment: Rare    Drug use: No    Sexual activity: Not Currently   Lifestyle    Physical activity:     Days per week: Not on file     Minutes per session: Not on file    Stress: Not on file   Relationships    Social connections:     Talks on phone: Not on file     Gets together: Not on file     Attends [employer] service: Not on file     Active member of club or organization: Not on file     Attends meetings of clubs or organizations: Not on file     Relationship status: Not on file    Intimate partner violence:     Fear of current or ex partner: Not on file     Emotionally abused: Not on file     Physically abused: Not on file     Forced sexual activity: Not on file   Other Topics Concern    Not on file   Social History Narrative    Not on file       Family History   Problem Relation Age of Onset    Heart failure Mother         Congestive    Hypertension Mother     Osteoporosis Mother     COPD Father     Emphysema Father     Hypertension Father     Heart disease Sister         Heart Valve Replacement    Osteoporosis Sister     Breast cancer Family        Physical Exam:    Vitals: Blood pressure 130/70, pulse 84, height 5' 11" (1 803 m), weight 93 5 kg (206 lb 1 6 oz), SpO2 97 %  , Body mass index is 28 75 kg/m² ,   Wt Readings from Last 3 Encounters:   08/30/19 93 5 kg (206 lb 1 6 oz)   07/11/19 92 1 kg (203 lb)   07/11/19 91 5 kg (201 lb 12 8 oz)       Physical Exam    Labs & Results:    Lab Results   Component Value Date    WBC 9 03 07/06/2019    HGB 15 3 07/06/2019    HCT 44 9 07/06/2019    MCV 95 07/06/2019     07/06/2019     Lab Results   Component Value Date    SODIUM 140 07/06/2019    K 4 2 07/06/2019     (H) 07/06/2019    CO2 26 07/06/2019    BUN 23 07/06/2019    CREATININE 1 49 (H) 07/06/2019    GLUC 119 01/29/2019    CALCIUM 9 0 07/06/2019     Lab Results   Component Value Date    NTBNP 6,192 (H) 01/29/2019      Lab Results   Component Value Date    CHOL 163 12/19/2015    CHOL 168 04/17/2015    CHOL 155 12/04/2014     Lab Results   Component Value Date    HDL 54 07/06/2019    HDL 55 12/29/2018    HDL 56 06/18/2018     Lab Results   Component Value Date    LDLCALC 81 07/06/2019    LDLCALC 66 12/29/2018    LDLCALC 65 06/18/2018     Lab Results   Component Value Date    TRIG 166 (H) 07/06/2019    TRIG 153 (H) 12/29/2018    TRIG 120 06/18/2018     No results found for: CHOLHDL    EKG personally reviewed by Shae Parnell DO  Counseling / Coordination of Care  Total floor / unit time spent today 40 minutes  Greater than 50% of total time was spent with the patient and / or family counseling and / or coordination of care  A description of the counseling / coordination of care: 25 min  Thank you for the opportunity to participate in the care of this patient  Shae PALMA    Director of Advanced Heart Failure Program  Medical Director of [address] [place] [address]  [facility]

## 2019-10-18 DIAGNOSIS — E78.00 HYPERCHOLESTEROLEMIA: ICD-10-CM

## 2019-10-18 DIAGNOSIS — I10 HYPERTENSION, UNSPECIFIED TYPE: ICD-10-CM

## 2019-10-18 RX ORDER — BISOPROLOL FUMARATE 10 MG/1
TABLET ORAL
Qty: 180 TABLET | Refills: 3 | Status: SHIPPED | OUTPATIENT
Start: 2019-10-18 | End: 2019-11-27 | Stop reason: SDUPTHER

## 2019-10-18 RX ORDER — ATORVASTATIN CALCIUM 10 MG/1
TABLET, FILM COATED ORAL
Qty: 90 TABLET | Refills: 3 | Status: SHIPPED | OUTPATIENT
Start: 2019-10-18 | End: 2019-11-27 | Stop reason: SDUPTHER

## 2019-11-05 ENCOUNTER — IMMUNIZATIONS (OUTPATIENT)
Dept: FAMILY MEDICINE CLINIC | Facility: CLINIC | Age: 79
End: 2019-11-05
Payer: COMMERCIAL

## 2019-11-05 DIAGNOSIS — Z23 ENCOUNTER FOR IMMUNIZATION: ICD-10-CM

## 2019-11-05 PROCEDURE — 90662 IIV NO PRSV INCREASED AG IM: CPT

## 2019-11-05 PROCEDURE — G0008 ADMIN INFLUENZA VIRUS VAC: HCPCS

## 2019-11-09 DIAGNOSIS — N13.8 BPH WITH OBSTRUCTION/LOWER URINARY TRACT SYMPTOMS: ICD-10-CM

## 2019-11-09 DIAGNOSIS — N40.1 BPH WITH OBSTRUCTION/LOWER URINARY TRACT SYMPTOMS: ICD-10-CM

## 2019-11-11 RX ORDER — FINASTERIDE 5 MG/1
TABLET, FILM COATED ORAL
Qty: 90 TABLET | Refills: 1 | Status: SHIPPED | OUTPATIENT
Start: 2019-11-11 | End: 2020-04-24

## 2019-11-20 ENCOUNTER — REMOTE DEVICE CLINIC VISIT (OUTPATIENT)
Dept: CARDIOLOGY CLINIC | Facility: CLINIC | Age: 79
End: 2019-11-20
Payer: COMMERCIAL

## 2019-11-20 DIAGNOSIS — Z95.810 AICD (AUTOMATIC CARDIOVERTER/DEFIBRILLATOR) PRESENT: Primary | ICD-10-CM

## 2019-11-20 PROCEDURE — 93296 REM INTERROG EVL PM/IDS: CPT | Performed by: INTERNAL MEDICINE

## 2019-11-20 PROCEDURE — 93295 DEV INTERROG REMOTE 1/2/MLT: CPT | Performed by: INTERNAL MEDICINE

## 2019-11-20 NOTE — PROGRESS NOTES
Results for orders placed or performed in visit on 11/20/19   Cardiac EP device report    Narrative    MDT-DUAL CHAMBER ICD (AAIR-DDDR MODE)  CARELINK TRANSMISSION: BATTERY VOLTAGE ADEQUATE (9 3 YRS)  AP-68%, -5%  ALL AVAILABLE LEAD PARAMETERS WITHIN NORMAL LIMITS  1 NSVT EPISODE FOR 11 BEATS, AVG CL~330MS  EF-30% (NST 1/31/19)  PT ON ASA 81MG & BISOPROLOL  1 AHR EPISODE LASTING 38 SEC- COMPETITIVE ATRIAL PACING & FARFIELD OVERSENSING ON EGM  OPTI-VOL WITHIN NORMAL LIMITS  NORMAL DEVICE FUNCTION   GV

## 2019-11-27 ENCOUNTER — OFFICE VISIT (OUTPATIENT)
Dept: CARDIOLOGY CLINIC | Facility: CLINIC | Age: 79
End: 2019-11-27
Payer: COMMERCIAL

## 2019-11-27 VITALS
HEART RATE: 72 BPM | DIASTOLIC BLOOD PRESSURE: 84 MMHG | SYSTOLIC BLOOD PRESSURE: 128 MMHG | WEIGHT: 206 LBS | HEIGHT: 71 IN | BODY MASS INDEX: 28.84 KG/M2

## 2019-11-27 DIAGNOSIS — I47.1 SVT (SUPRAVENTRICULAR TACHYCARDIA) (HCC): ICD-10-CM

## 2019-11-27 DIAGNOSIS — I42.0 DILATED CARDIOMYOPATHY (HCC): Primary | ICD-10-CM

## 2019-11-27 DIAGNOSIS — I50.22 CHRONIC SYSTOLIC CHF (CONGESTIVE HEART FAILURE) (HCC): ICD-10-CM

## 2019-11-27 DIAGNOSIS — E78.5 HYPERLIPIDEMIA: ICD-10-CM

## 2019-11-27 PROCEDURE — 99214 OFFICE O/P EST MOD 30 MIN: CPT | Performed by: INTERNAL MEDICINE

## 2019-11-27 NOTE — PROGRESS NOTES
Heart Failure Consult Note - Katerin Iraheta 67 y o  male MRN: [id number]    @ Encounter: [id number]      Assessment/Plan:    Patient Active Problem List    Diagnosis Date Noted    Encounter for follow-up surveillance of melanoma 07/11/2019     Priority: Low    Chronic systolic CHF (congestive heart failure) ([facility] ) 01/29/2019     Priority: Low    Hypertensive kidney disease with stage 3 chronic kidney disease ([place] [address] ) 01/04/2019     Priority: Low    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 09/20/2018     Priority: Low    Osteopenia 06/29/2018     Priority: Low    Personal history of malignant melanoma 11/15/2017     Priority: Low    Skin lesion 06/23/2017     Priority: Low    CKD (chronic kidney disease) stage 3, GFR 30-59 ml/min ([facility]) 06/21/2016     Priority: Low    Thrombocytopenia ([place] [address] ) 06/23/2015     Priority: Low    Ank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 ([place] [address] ) 12/30/2014     Priority: Low    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 12/02/2013     Priority: Low    Microscopic hematuria 12/02/2013     Priority: Low    Arthritis 11/26/2013     Priority: Low    Polymyalgia rheumatica ([place] [address] ) 11/26/2013     Priority: Low    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     Priority: Low    Hypothyroidism 05/30/2013     Priority: Low    Impaired fasting glucose 05/30/2013     Priority: Low    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     Priority: Low    Cancer, colon ([place] [address] ) 04/05/2013     Priority: Low    Cardiac arrhythmia 04/05/2013     Priority: Low    Hypertension 04/05/2013     Priority: Low    Esophageal reflux 11/28/2012     Priority: Low    Non-toxic multinodular goiter 11/28/2012     Priority: Low    Cardiomyopathy ([place] [address] ) 10/26/2012     Priority: Low    Hyperlipidemia 09/05/2012     Priority: Low       # Chronic Systolic CHF, Stage C, NYHA 2  Etiology: NICM, presumed viral  No family hx of SCD  No ETOH    Weight:   Wt Readings from Last 3 Encounters:   11/27/19 93 4 kg (206 lb)   08/30/19 93 5 kg (206 lb 1 6 oz)   07/11/19 92 1 kg (203 lb)       NT pro BNP: 1/29/19: 6192    Echocardiogram 1/29/19  LVEF: 40%  LVIDd: 6 cm  RV: normal  MR: mild  PASP: 40 mmHg  RVOT:   Other:    Echo 6/21/18:  LVEF: 50%  LVEDd: 6 35 cm    Nuclear Stress Test 1/31/19: small to medium sized fixed defect infersoseptum  No ischemia  EF: 30%    Lab Results   Component Value Date    NTBNP 6,192 (H) 01/29/2019        Neurohormonal Blockade:  --Beta-Blocker: bisoprolol 10 mg daily  --ACEi, ARB or ARNi: lisinopril 30 mg daily  --Aldosterone Receptor Blocker:  --Diuretic: lasix 20 mg daily    Sudden Cardiac Death Risk Reduction:  MDT dual chamber ICD:   Interrogation 11/20/19: AP 68%,  5%  1 NSVT 11 beats    Electrical Resynchronization:  --Candidacy for BiV device: IVCD    Advanced Therapies (if appropriate): --Inotrope:  --LVAD/Transplant Candidacy:    # HTN- controlled, room to escalate therapy  # SVT  # hyperlipidemia- atorvastatin 10 mg  7/6/19: LDL 81, HDL 54, Tri 166  # hx of malignanat melanoma  # CKD, Cr 1 49 on 7/6/19    Today's Plan:  No changes in meds  Doing well  --2g sodium diet  Weights daily    HPI:      66 yo recently had first evaluation in HF clinic for NICM diagnosed in 2009, s/p ICD, PSVT, HTN and dyslipidemia  Last in hospital in January for dyspnea felt to be more viral but was volume overloaded with NT pro BNP of 6192  Given one dose of IV lasix  LVEF: 40% with LVEDd 6 cm  He followed up with NP and weight was 204 lbs  Never smoked and does not drink  Plays golf 2x a week, cuts grass, does not feel limited in activity  No significant edema in legs  He is limited by knees and back not respiration  Interim:   No new symptoms  No complaints    Review of Systems   Constitutional: Negative for activity change, appetite change, fatigue and unexpected weight change  HENT: Negative for congestion and nosebleeds  Eyes: Negative  Respiratory: Negative for cough, chest tightness and shortness of breath  Cardiovascular: Negative for chest pain, palpitations and leg swelling  Gastrointestinal: Negative for abdominal distention  Endocrine: Negative  Genitourinary: Negative  Musculoskeletal: Positive for arthralgias  Skin: Negative  Neurological: Negative for dizziness, syncope and weakness  Hematological: Negative  Psychiatric/Behavioral: Negative  Past Medical History:   Diagnosis Date    AICD (automatic cardioverter/defibrillator) present     Arthritis     Asthma     Burt esophagus     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     Cardiac arrhythmia     Cardiomyopathy ([facility] )     CKD (chronic kidney disease)     Colon cancer (HCC)     [facility]     COPD (chronic obstructive pulmonary disease) ([facility] )     Diverticulitis     Last assessed: 4/5/13    Esophageal reflux     Gout     Hernia     Hyperlipidemia     Hypertension     Hypertension     Hypothyroidism     Pleural effusion     Polymyalgia rheumatica ([facility])     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     Spondyloarthropathy     Last assessed: 11/28/12         No Known Allergies    Current Outpatient Medications:     aspirin 81 MG tablet, Take 81 mg by mouth daily  , Disp: , Rfl:     atorvastatin (LIPITOR) 10 mg tablet, TAKE 1 TABLET DAILY, Disp: 90 tablet, Rfl: 3    bisoprolol (ZEBETA) 10 MG tablet, TAKE 1 TABLET TWICE DAILY, Disp: 180 tablet, Rfl: 2    Calcium Carb-Cholecalciferol (CALCIUM 600 + D PO), Take 2 tablets by mouth daily , Disp: , Rfl:     Coenzyme Q10 (CO Q-10) 200 MG CAPS, Take 1 tablet by mouth daily, Disp: , Rfl:     finasteride (PROSCAR) 5 mg tablet, TAKE ONE TABLET BY MOUTH EVERY DAY, Disp: 90 tablet, Rfl: 1    furosemide (LASIX) 40 mg tablet, Take 0 5 tablets (20 mg total) by mouth daily, Disp: 90 tablet, Rfl: 3    lisinopril (ZESTRIL) 30 mg tablet, Take 1 tablet (30 mg total) by mouth daily, Disp: 90 tablet, Rfl: 3    Multiple Vitamins-Minerals (CENTRUM SILVER PO), Take 1 tablet by mouth daily  , Disp: , Rfl:     omeprazole (PriLOSEC) 20 mg delayed release capsule, TAKE ONE CAPSULE BY MOUTH EVERY DAY, Disp: 90 capsule, Rfl: 3    PREVIDENT 5000 ENAMEL PROTECT 1 1-5 % PSTE, Use as directed, Disp: , Rfl: 3    Vitamin D, Cholecalciferol, 1000 UNITS CAPS, Take 1 tablet by mouth daily  , Disp: , Rfl:     Social History     Socioeconomic History    Marital status: /Civil Union     Spouse name: Not on file    Number of children: Not on file    Years of education: Not on file    Highest education level: Not on file   Occupational History    Occupation: Manager-Retired   Social Needs    Financial resource strain: Not on file    Food insecurity:     Worry: Not on file     Inability: Not on file   [place] needs:     Medical: Not on file     Non-medical: Not on file   Tobacco Use    Smoking status: Never Smoker    Smokeless tobacco: Never Used   Substance and Sexual Activity    Alcohol use: No     Comment: Rare    Drug use: No    Sexual activity: Not Currently   Lifestyle    Physical activity:     Days per week: Not on file     Minutes per session: Not on file    Stress: Not on file   Relationships    Social connections:     Talks on phone: Not on file     Gets together: Not on file     Attends [employer] service: Not on file     Active member of club or organization: Not on file     Attends meetings of clubs or organizations: Not on file     Relationship status: Not on file    Intimate partner violence:     Fear of current or ex partner: Not on file     Emotionally abused: Not on file     Physically abused: Not on file     Forced sexual activity: Not on file   Other Topics Concern    Not on file   Social History Narrative    Not on file       Family History   Problem Relation Age of Onset    Heart failure Mother Congestive    Hypertension Mother     Osteoporosis Mother     COPD Father     Emphysema Father     Hypertension Father     Heart disease Sister         Heart Valve Replacement    Osteoporosis Sister     Breast cancer Family        Physical Exam:    Vitals: Blood pressure 128/84, pulse 72, height 5' 11" (1 803 m), weight 93 4 kg (206 lb)  , Body mass index is 28 73 kg/m² ,   Wt Readings from Last 3 Encounters:   11/27/19 93 4 kg (206 lb)   08/30/19 93 5 kg (206 lb 1 6 oz)   07/11/19 92 1 kg (203 lb)       Physical Exam    Labs & Results:    Lab Results   Component Value Date    WBC 9 03 07/06/2019    HGB 15 3 07/06/2019    HCT 44 9 07/06/2019    MCV 95 07/06/2019     07/06/2019     Lab Results   Component Value Date    SODIUM 140 07/06/2019    K 4 2 07/06/2019     (H) 07/06/2019    CO2 26 07/06/2019    BUN 23 07/06/2019    CREATININE 1 49 (H) 07/06/2019    GLUC 119 01/29/2019    CALCIUM 9 0 07/06/2019     Lab Results   Component Value Date    NTBNP 6,192 (H) 01/29/2019      Lab Results   Component Value Date    CHOL 163 12/19/2015    CHOL 168 04/17/2015    CHOL 155 12/04/2014     Lab Results   Component Value Date    HDL 54 07/06/2019    HDL 55 12/29/2018    HDL 56 06/18/2018     Lab Results   Component Value Date    LDLCALC 81 07/06/2019    LDLCALC 66 12/29/2018    LDLCALC 65 06/18/2018     Lab Results   Component Value Date    TRIG 166 (H) 07/06/2019    TRIG 153 (H) 12/29/2018    TRIG 120 06/18/2018     No results found for: CHOLHDL    EKG personally reviewed by Siena Holguin DO  Counseling / Coordination of Care  Total floor / unit time spent today 25 minutes  Greater than 50% of total time was spent with the patient and / or family counseling and / or coordination of care  A description of the counseling / coordination of care: 15 min  Thank you for the opportunity to participate in the care of this patient  Siena PALMA    Director of Advanced Heart Failure Program  Medical Director of [address] [facility]

## 2019-12-19 DIAGNOSIS — K22.70 BARRETT'S ESOPHAGUS WITHOUT DYSPLASIA: ICD-10-CM

## 2019-12-19 RX ORDER — OMEPRAZOLE 20 MG/1
CAPSULE, DELAYED RELEASE ORAL
Qty: 90 CAPSULE | Refills: 3 | Status: SHIPPED | OUTPATIENT
Start: 2019-12-19 | End: 2020-10-23 | Stop reason: SDUPTHER

## 2020-01-09 ENCOUNTER — OFFICE VISIT (OUTPATIENT)
Dept: SURGICAL ONCOLOGY | Facility: CLINIC | Age: 80
End: 2020-01-09
Payer: COMMERCIAL

## 2020-01-09 VITALS
DIASTOLIC BLOOD PRESSURE: 70 MMHG | WEIGHT: 207 LBS | SYSTOLIC BLOOD PRESSURE: 120 MMHG | BODY MASS INDEX: 28.98 KG/M2 | HEIGHT: 71 IN | RESPIRATION RATE: 16 BRPM | HEART RATE: 81 BPM | TEMPERATURE: 98.6 F

## 2020-01-09 DIAGNOSIS — Z08 ENCOUNTER FOR FOLLOW-UP SURVEILLANCE OF MELANOMA: Primary | ICD-10-CM

## 2020-01-09 DIAGNOSIS — Z85.820 ENCOUNTER FOR FOLLOW-UP SURVEILLANCE OF MELANOMA: Primary | ICD-10-CM

## 2020-01-09 DIAGNOSIS — Z85.820 PERSONAL HISTORY OF MALIGNANT MELANOMA: ICD-10-CM

## 2020-01-09 PROCEDURE — 99213 OFFICE O/P EST LOW 20 MIN: CPT | Performed by: SURGERY

## 2020-01-09 NOTE — PROGRESS NOTES
Surgical Oncology Follow Up       [address] [facility] [place]  [address]  [facility] [address]  [address] Krystal Phan  1940  [id number]  [address] [facility] [address] [id number] [phone]    Diagnoses and all orders for this visit:    Encounter for follow-up surveillance of melanoma    Personal history of malignant melanoma        Chief Complaint   Patient presents with    Follow-up     6 month follow up  Return in about 6 months (around 7/9/2020) for Office Visit  Personal history of malignant melanoma    10/31/2017 Initial Diagnosis     Malignant melanoma of right upper extremity ([address] )      12/5/2017 Surgery     Wide excision of right upper arm  [id number]         Diagnosis and Staging: [id number] melanoma right upper extremity December 2017   Treatment History: Wide excision melanoma December 2017   Current Therapy: Observation   Disease Status: TONY      History of Present Illness:  Patient returns in follow-up of his melanoma  He is doing well at this time with no complaints  He denies any new abdominal pain, nausea, vomiting, back pain, bone pain, headaches, cough  Continues to follow-up with his dermatologist, Dr Isela Reyes  Review of Systems  Complete ROS Surg Onc:   Complete ROS Surg Onc:   Constitutional: The patient denies new or recent history of general fatigue, no recent weight loss, no change in appetite  Eyes: No complaints of visual problems, no scleral icterus  ENT: no complaints of ear pain, no hoarseness, no difficulty swallowing,  no tinnitus and no new masses in head, oral cavity, or neck  Cardiovascular: No complaints of chest pain, no palpitations, no ankle edema  Respiratory: No complaints of shortness of breath, no cough     Gastrointestinal: No complaints of jaundice, no bloody stools, no pale stools  Genitourinary: No complaints of dysuria, no hematuria, no nocturia, no frequent urination, no urethral discharge  Musculoskeletal: No complaints of weakness, paralysis, joint stiffness or arthralgias  Integumentary: No complaints of rash, no new lesions  Neurological: No complaints of convulsions, no seizures, no dizziness  Hematologic/Lymphatic: No complaints of easy bruising  Endocrine:  No hot or cold intolerance  No polydipsia, polyphagia, or polyuria  Allergy/immunology:  No environmental allergies  No food allergies  Not immunocompromised  Skin:  No pallor or rash  No wound          Patient Active Problem List   Diagnosis    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    Microscopic hematuria    Ank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 ([place] [address] )    Arthritis    Burt esophagus    Cancer, colon ([place] [address] )    Cardiac arrhythmia    Cardiomyopathy ([place] [address] )    CKD (chronic kidney disease) stage 3, GFR 30-59 ml/min ([facility])    Esophageal reflux    Hyperlipidemia    Hypertension    Hypothyroidism    Impaired fasting glucose    Personal history of malignant melanoma    Non-toxic multinodular goiter    Polymyalgia rheumatica (HCC)    Right bundle branch block with left anterior fascicular block    Skin lesion    Thrombocytopenia (HCC)    Osteopenia    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    Hypertensive kidney disease with stage 3 chronic kidney disease (HCC)    Chronic systolic CHF (congestive heart failure) ([place] [address] )    Encounter for follow-up surveillance of melanoma     Past Medical History:   Diagnosis Date    AICD (automatic cardioverter/defibrillator) present     Arthritis     Asthma     Burt esophagus     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     Cardiac arrhythmia     Cardiomyopathy ([place] [address] )     CKD (chronic kidney disease)     Colon cancer (HCC)     Congestive heart failure (CHF) (HCC)     COPD (chronic obstructive pulmonary disease) [facility]     Diverticulitis     Last assessed: 4/5/13    Esophageal reflux     Gout     Hernia     Hyperlipidemia     Hypertension     Hypertension     Hypothyroidism     Pleural effusion     Polymyalgia rheumatica (HCC)     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     Spondyloarthropathy     Last assessed: 11/28/12     Past Surgical History:   Procedure Laterality Date    ARM SKIN LESION BIOPSY / EXCISION      Malignant    ATRIAL ABLATION SURGERY      AV NODE ABLATION      CARDIAC DEFIBRILLATOR PLACEMENT  2009    Cardio-Defib Pulse Gen Venous Insertion of Electrode for Ventricular Pacing  Implantable    CARDIAC SURGERY  2009    Catheterization    COLONOSCOPY N/A 3/14/2016    Procedure: COLONOSCOPY;  Surgeon: Clary Manzo MD;  [facility]; Service  February 22, 2013, mildly enlarged prostate, history of colon CA with normal appearance of anastomosis at the midtransverse colon, severe diverticulosis in the sigmoid, descending and transverse colon  Repeat colonoscopy in 3 yrs    HEMICOLECTOMY Right 05/05/2004    INGUINAL HERNIA REPAIR Bilateral     Left 3/2004, right 5/2008    IR VENOGRAM  10/4/2018    KNEE SURGERY  1972    Meniscectomy    [place] ESOPHAGOGASTRODUODENOSCOPY TRANSORAL DIAGNOSTIC N/A 12/5/2016    Procedure: ESOPHAGOGASTRODUODENOSCOPY (EGD); Surgeon: Ángela Lim MD;  [facility];   Service: Gastroenterology    TONSILLECTOMY AND ADENOIDECTOMY       Family History   Problem Relation Age of Onset    Heart failure Mother         Congestive    Hypertension Mother     Osteoporosis Mother     COPD Father     Emphysema Father     Hypertension Father     Heart disease Sister         Heart Valve Replacement    Osteoporosis Sister     Breast cancer Family      Social History     Socioeconomic History    Marital status: /Civil Union     Spouse name: Not on file    Number of children: Not on file    Years of education: Not on file    Highest education level: Not on file   Occupational History    Occupation: Manager-Retired   Social Needs    Financial resource strain: Not on file    Food insecurity:     Worry: Not on file     Inability: Not on file    Transportation needs:     Medical: Not on file     Non-medical: Not on file   Tobacco Use    Smoking status: Never Smoker    Smokeless tobacco: Never Used   Substance and Sexual Activity    Alcohol use: No     Comment: Rare    Drug use: No    Sexual activity: Not Currently   Lifestyle    Physical activity:     Days per week: Not on file     Minutes per session: Not on file    Stress: Not on file   Relationships    Social connections:     Talks on phone: Not on file     Gets together: Not on file     Attends Cheondoism service: Not on file     Active member of club or organization: Not on file     Attends meetings of clubs or organizations: Not on file     Relationship status: Not on file    Intimate partner violence:     Fear of current or ex partner: Not on file     Emotionally abused: Not on file     Physically abused: Not on file     Forced sexual activity: Not on file   Other Topics Concern    Not on file   Social History Narrative    Not on file       Current Outpatient Medications:     aspirin 81 MG tablet, Take 81 mg by mouth daily  , Disp: , Rfl:     atorvastatin (LIPITOR) 10 mg tablet, TAKE 1 TABLET DAILY, Disp: 90 tablet, Rfl: 3    bisoprolol (ZEBETA) 10 MG tablet, TAKE 1 TABLET TWICE DAILY, Disp: 180 tablet, Rfl: 2    Calcium Carb-Cholecalciferol (CALCIUM 600 + D PO), Take 2 tablets by mouth daily , Disp: , Rfl:     Coenzyme Q10 (CO Q-10) 200 MG CAPS, Take 1 tablet by mouth daily, Disp: , Rfl:     finasteride (PROSCAR) 5 mg tablet, TAKE ONE TABLET BY MOUTH EVERY DAY, Disp: 90 tablet, Rfl: 1    furosemide (LASIX) 40 mg tablet, Take 0 5 tablets (20 mg total) by mouth daily, Disp: 90 tablet, Rfl: 3    lisinopril (ZESTRIL) 30 mg tablet, Take 1 tablet (30 mg total) by mouth daily, Disp: 90 tablet, Rfl: 3    Multiple Vitamins-Minerals (CENTRUM SILVER PO), Take 1 tablet by mouth daily  , Disp: , Rfl:     omeprazole (PriLOSEC) 20 mg delayed release capsule, TAKE ONE CAPSULE BY MOUTH EVERY DAY, Disp: 90 capsule, Rfl: 3    PREVIDENT 5000 ENAMEL PROTECT 1 1-5 % PSTE, Use as directed, Disp: , Rfl: 3    Vitamin D, Cholecalciferol, 1000 UNITS CAPS, Take 1 tablet by mouth daily  , Disp: , Rfl:   No Known Allergies  Vitals:    01/09/20 1408   BP: 120/70   Pulse: 81   Resp: 16   Temp: 98 6 °F (37 °C)       Physical Exam  Constitutional: General appearance: The Patient is well-developed and well-nourished who appears the stated age in no acute distress  Patient is pleasant and talkative  HEENT:  Normocephalic  Sclerae are anicteric  Mucous membranes are moist  Neck is supple without adenopathy  No JVD  Chest: The lungs are clear to auscultation  Cardiac: Heart is regular rate  Abdomen: Abdomen is soft, non-tender, non-distended and without masses  Extremities: There is no clubbing or cyanosis  There is no edema  Symmetric  Neuro: Grossly nonfocal  Gait is normal      Lymphatic: No evidence of cervical adenopathy bilaterally  No evidence of axillary adenopathy bilaterally  Skin: Warm, anicteric  Wide excision on the right arm is healed well  No evidence of local recurrence or in-transit disease  Psych:  Patient is pleasant and talkative  Breasts:        Pathology:  [unfilled]    Labs:      Imaging  No results found  I reviewed the above laboratory and imaging data      Discussion/Summary: 30-year-old male status post wide excision of a [id number] melanoma   He is clinically TONY at 2 years  [facility] is no evidence of recurrence by physical exam, or symptomatology  Robinson Figueroa will continue to follow up with his dermatologist, Dr Tucker carlisle again in 6 months for another clinical exam  If he has any new, persistent, or unexplained symptoms he will contact us and directed imaging may be performed  He is agreeable to this   All his questions were answered

## 2020-01-09 NOTE — LETTER
January 9, 2020     Mar Montoya, [address]    Patient: Alexander Schuster   YOB: 1940   Date of Visit: 1/9/2020       Dear Dr Li Vincent: Thank you for referring Alexander Schuster to me for evaluation  Below are my notes for this consultation  If you have questions, please do not hesitate to call me  I look forward to following your patient along with you  Sincerely,        Giacomo Ferrer MD        CC: MD Giacomo Gordon MD  1/9/2020  2:19 PM  Sign at close encounter               Surgical Oncology Follow Up       [address] [facility] [address] [address]  [address] K[address] Michael  1940  [id number]  [address] [facility] [address] [address] [phone]    Diagnoses and all orders for this visit:    Encounter for follow-up surveillance of melanoma    Personal history of malignant melanoma        Chief Complaint   Patient presents with    Follow-up     6 month follow up  Return in about 6 months (around 7/9/2020) for Office Visit  Personal history of malignant melanoma    10/31/2017 Initial Diagnosis     Malignant melanoma of right upper extremity ([address] )      12/5/2017 Surgery     Wide excision of right upper arm  [id number]         Diagnosis and Staging: [id number] melanoma right upper extremity December 2017   Treatment History: Wide excision melanoma December 2017   Current Therapy: Observation   Disease Status: TONY      History of Present Illness:  Patient returns in follow-up of his melanoma  He is doing well at this time with no complaints  He denies any new abdominal pain, nausea, vomiting, back pain, bone pain, headaches, cough  Continues to follow-up with his dermatologist, Dr Phuong Mcintosh      Review of Systems  Complete ROS Surg Onc:   Complete ROS Surg Onc:   Constitutional: The patient denies new or recent history of general fatigue, no recent weight loss, no change in appetite  Eyes: No complaints of visual problems, no scleral icterus  ENT: no complaints of ear pain, no hoarseness, no difficulty swallowing,  no tinnitus and no new masses in head, oral cavity, or neck  Cardiovascular: No complaints of chest pain, no palpitations, no ankle edema  Respiratory: No complaints of shortness of breath, no cough  Gastrointestinal: No complaints of jaundice, no bloody stools, no pale stools  Genitourinary: No complaints of dysuria, no hematuria, no nocturia, no frequent urination, no urethral discharge  Musculoskeletal: No complaints of weakness, paralysis, joint stiffness or arthralgias  Integumentary: No complaints of rash, no new lesions  Neurological: No complaints of convulsions, no seizures, no dizziness  Hematologic/Lymphatic: No complaints of easy bruising  Endocrine:  No hot or cold intolerance  No polydipsia, polyphagia, or polyuria  Allergy/immunology:  No environmental allergies  No food allergies  Not immunocompromised  Skin:  No pallor or rash  No wound          Patient Active Problem List   Diagnosis    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    Microscopic hematuria    Ank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 ([facility]    Arthritis    Burt esophagus    Cancer, colon ([facility] )    Cardiac arrhythmia    Cardiomyopathy ([facility]    CKD (chronic kidney disease) stage 3, GFR 30-59 ml/min (HCC)    Esophageal reflux    Hyperlipidemia    Hypertension    Hypothyroidism    Impaired fasting glucose    Personal history of malignant melanoma    Non-toxic multinodular goiter    Polymyalgia rheumatica (HCC)    Right bundle branch block with left anterior fascicular block    Skin lesion    Thrombocytopenia (HCC)    Osteopenia    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    Hypertensive kidney disease with stage 3 chronic kidney disease (HCC)    Chronic systolic CHF (congestive heart failure) ([facility] )    Encounter for follow-up surveillance of melanoma     Past Medical History:   Diagnosis Date    AICD (automatic cardioverter/defibrillator) present     Arthritis     Asthma     Burt esophagus     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     Cardiac arrhythmia     Cardiomyopathy ([facility] )     CKD (chronic kidney disease)     Colon cancer (HCC)     Congestive heart failure (CHF) (HCC)     COPD (chronic obstructive pulmonary disease) ([facility] )     Diverticulitis     Last assessed: 4/5/13    Esophageal reflux     Gout     Hernia     Hyperlipidemia     Hypertension     Hypertension     Hypothyroidism     Pleural effusion     Polymyalgia rheumatica (HCC)     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     Spondyloarthropathy     Last assessed: 11/28/12     Past Surgical History:   Procedure Laterality Date    ARM SKIN LESION BIOPSY / EXCISION      Malignant    ATRIAL ABLATION SURGERY      AV NODE ABLATION      CARDIAC DEFIBRILLATOR PLACEMENT  2009    Cardio-Defib Pulse Gen Venous Insertion of Electrode for Ventricular Pacing  Implantable    CARDIAC SURGERY  2009    Catheterization    COLONOSCOPY N/A 3/14/2016    Procedure: COLONOSCOPY;  Surgeon: Neil Yeh MD;  [facility]; Service  February 22, 2013, mildly enlarged prostate, history of colon CA with normal appearance of anastomosis at the midtransverse colon, severe diverticulosis in the sigmoid, descending and transverse colon  Repeat colonoscopy in 3 yrs    HEMICOLECTOMY Right 05/05/2004    INGUINAL HERNIA REPAIR Bilateral     Left 3/2004, right 5/2008    IR VENOGRAM  10/4/2018    KNEE SURGERY  1972    Meniscectomy    [place] ESOPHAGOGASTRODUODENOSCOPY TRANSORAL DIAGNOSTIC N/A 12/5/2016    Procedure: ESOPHAGOGASTRODUODENOSCOPY (EGD);   Surgeon: Makayla Galan MD; [facility]; Service: Gastroenterology    TONSILLECTOMY AND ADENOIDECTOMY       Family History   Problem Relation Age of Onset    Heart failure Mother         Congestive    Hypertension Mother     Osteoporosis Mother     COPD Father     Emphysema Father     Hypertension Father     Heart disease Sister         Heart Valve Replacement    Osteoporosis Sister     Breast cancer Family      Social History     Socioeconomic History    Marital status: /Civil Union     Spouse name: Not on file    Number of children: Not on file    Years of education: Not on file    Highest education level: Not on file   Occupational History    Occupation: Manager-Retired   Social Needs    Financial resource strain: Not on file    Food insecurity:     Worry: Not on file     Inability: Not on file    Transportation needs:     Medical: Not on file     Non-medical: Not on file   Tobacco Use    Smoking status: Never Smoker    Smokeless tobacco: Never Used   Substance and Sexual Activity    Alcohol use: No     Comment: Rare    Drug use: No    Sexual activity: Not Currently   Lifestyle    Physical activity:     Days per week: Not on file     Minutes per session: Not on file    Stress: Not on file   Relationships    Social connections:     Talks on phone: Not on file     Gets together: Not on file     Attends Protestant service: Not on file     Active member of club or organization: Not on file     Attends meetings of clubs or organizations: Not on file     Relationship status: Not on file    Intimate partner violence:     Fear of current or ex partner: Not on file     Emotionally abused: Not on file     Physically abused: Not on file     Forced sexual activity: Not on file   Other Topics Concern    Not on file   Social History Narrative    Not on file       Current Outpatient Medications:     aspirin 81 MG tablet, Take 81 mg by mouth daily  , Disp: , Rfl:     atorvastatin (LIPITOR) 10 mg tablet, TAKE 1 TABLET DAILY, Disp: 90 tablet, Rfl: 3    bisoprolol (ZEBETA) 10 MG tablet, TAKE 1 TABLET TWICE DAILY, Disp: 180 tablet, Rfl: 2    Calcium Carb-Cholecalciferol (CALCIUM 600 + D PO), Take 2 tablets by mouth daily , Disp: , Rfl:     Coenzyme Q10 (CO Q-10) 200 MG CAPS, Take 1 tablet by mouth daily, Disp: , Rfl:     finasteride (PROSCAR) 5 mg tablet, TAKE ONE TABLET BY MOUTH EVERY DAY, Disp: 90 tablet, Rfl: 1    furosemide (LASIX) 40 mg tablet, Take 0 5 tablets (20 mg total) by mouth daily, Disp: 90 tablet, Rfl: 3    lisinopril (ZESTRIL) 30 mg tablet, Take 1 tablet (30 mg total) by mouth daily, Disp: 90 tablet, Rfl: 3    Multiple Vitamins-Minerals (CENTRUM SILVER PO), Take 1 tablet by mouth daily  , Disp: , Rfl:     omeprazole (PriLOSEC) 20 mg delayed release capsule, TAKE ONE CAPSULE BY MOUTH EVERY DAY, Disp: 90 capsule, Rfl: 3    PREVIDENT 5000 ENAMEL PROTECT 1 1-5 % PSTE, Use as directed, Disp: , Rfl: 3    Vitamin D, Cholecalciferol, 1000 UNITS CAPS, Take 1 tablet by mouth daily  , Disp: , Rfl:   No Known Allergies  Vitals:    01/09/20 1408   BP: 120/70   Pulse: 81   Resp: 16   Temp: 98 6 °F (37 °C)       Physical Exam  Constitutional: General appearance: The Patient is well-developed and well-nourished who appears the stated age in no acute distress  Patient is pleasant and talkative  HEENT:  Normocephalic  Sclerae are anicteric  Mucous membranes are moist  Neck is supple without adenopathy  No JVD  Chest: The lungs are clear to auscultation  Cardiac: Heart is regular rate  Abdomen: Abdomen is soft, non-tender, non-distended and without masses  Extremities: There is no clubbing or cyanosis  There is no edema  Symmetric  Neuro: Grossly nonfocal  Gait is normal      Lymphatic: No evidence of cervical adenopathy bilaterally  No evidence of axillary adenopathy bilaterally  Skin: Warm, anicteric  Wide excision on the right arm is healed well    No evidence of local recurrence or in-transit disease  Psych:  Patient is pleasant and talkative  Breasts:        Pathology:  [unfilled]    Labs:      Imaging  No results found  I reviewed the above laboratory and imaging data  Discussion/Summary: 71-year-old male status post wide excision of a [id number] melanoma   He is clinically TONY at 3 years  [place] is no evidence of recurrence by physical exam, or symptomatology  [facility] will continue to follow up with his dermatologist, Dr Julio Cesar David him again in 6 months for another clinical exam  If he has any new, persistent, or unexplained symptoms he will contact us and directed imaging may be performed  He is agreeable to this   All his questions were answered

## 2020-01-11 ENCOUNTER — APPOINTMENT (OUTPATIENT)
Dept: LAB | Facility: HOSPITAL | Age: 80
End: 2020-01-11
Payer: COMMERCIAL

## 2020-01-11 DIAGNOSIS — E78.2 MIXED HYPERLIPIDEMIA: ICD-10-CM

## 2020-01-11 DIAGNOSIS — I12.9 HYPERTENSIVE KIDNEY DISEASE WITH STAGE 3 CHRONIC KIDNEY DISEASE (HCC): ICD-10-CM

## 2020-01-11 DIAGNOSIS — I10 ESSENTIAL HYPERTENSION: ICD-10-CM

## 2020-01-11 DIAGNOSIS — E03.9 HYPOTHYROIDISM, UNSPECIFIED TYPE: ICD-10-CM

## 2020-01-11 DIAGNOSIS — N18.30 HYPERTENSIVE KIDNEY DISEASE WITH STAGE 3 CHRONIC KIDNEY DISEASE (HCC): ICD-10-CM

## 2020-01-11 DIAGNOSIS — R73.01 IMPAIRED FASTING GLUCOSE: ICD-10-CM

## 2020-01-11 LAB
ALBUMIN SERPL BCP-MCNC: 3.6 G/DL (ref 3.5–5)
ALP SERPL-CCNC: 69 U/L (ref 46–116)
ALT SERPL W P-5'-P-CCNC: 33 U/L (ref 12–78)
ANION GAP SERPL CALCULATED.3IONS-SCNC: 6 MMOL/L (ref 4–13)
AST SERPL W P-5'-P-CCNC: 28 U/L (ref 5–45)
BILIRUB SERPL-MCNC: 1.51 MG/DL (ref 0.2–1)
BUN SERPL-MCNC: 19 MG/DL (ref 5–25)
CALCIUM SERPL-MCNC: 8.9 MG/DL (ref 8.3–10.1)
CHLORIDE SERPL-SCNC: 110 MMOL/L (ref 100–108)
CHOLEST SERPL-MCNC: 164 MG/DL (ref 50–200)
CO2 SERPL-SCNC: 28 MMOL/L (ref 21–32)
CREAT SERPL-MCNC: 1.51 MG/DL (ref 0.6–1.3)
EST. AVERAGE GLUCOSE BLD GHB EST-MCNC: 97 MG/DL
GFR SERPL CREATININE-BSD FRML MDRD: 44 ML/MIN/1.73SQ M
GLUCOSE P FAST SERPL-MCNC: 90 MG/DL (ref 65–99)
HBA1C MFR BLD: 5 % (ref 4.2–6.3)
HDLC SERPL-MCNC: 56 MG/DL
LDLC SERPL CALC-MCNC: 74 MG/DL (ref 0–100)
NONHDLC SERPL-MCNC: 108 MG/DL
POTASSIUM SERPL-SCNC: 4.5 MMOL/L (ref 3.5–5.3)
PROT SERPL-MCNC: 7.2 G/DL (ref 6.4–8.2)
SODIUM SERPL-SCNC: 144 MMOL/L (ref 136–145)
T4 FREE SERPL-MCNC: 0.87 NG/DL (ref 0.76–1.46)
TRIGL SERPL-MCNC: 172 MG/DL
TSH SERPL DL<=0.05 MIU/L-ACNC: 4.65 UIU/ML (ref 0.36–3.74)

## 2020-01-11 PROCEDURE — 80061 LIPID PANEL: CPT

## 2020-01-11 PROCEDURE — 84439 ASSAY OF FREE THYROXINE: CPT

## 2020-01-11 PROCEDURE — 84443 ASSAY THYROID STIM HORMONE: CPT

## 2020-01-11 PROCEDURE — 83036 HEMOGLOBIN GLYCOSYLATED A1C: CPT

## 2020-01-11 PROCEDURE — 80053 COMPREHEN METABOLIC PANEL: CPT

## 2020-01-11 PROCEDURE — 36415 COLL VENOUS BLD VENIPUNCTURE: CPT

## 2020-01-17 ENCOUNTER — OFFICE VISIT (OUTPATIENT)
Dept: FAMILY MEDICINE CLINIC | Facility: CLINIC | Age: 80
End: 2020-01-17
Payer: COMMERCIAL

## 2020-01-17 VITALS
SYSTOLIC BLOOD PRESSURE: 130 MMHG | HEART RATE: 80 BPM | TEMPERATURE: 97.9 F | DIASTOLIC BLOOD PRESSURE: 88 MMHG | BODY MASS INDEX: 29.31 KG/M2 | OXYGEN SATURATION: 95 % | RESPIRATION RATE: 16 BRPM | WEIGHT: 209.4 LBS | HEIGHT: 71 IN

## 2020-01-17 DIAGNOSIS — E03.9 HYPOTHYROIDISM, UNSPECIFIED TYPE: Primary | ICD-10-CM

## 2020-01-17 DIAGNOSIS — Z13.820 SCREENING FOR OSTEOPOROSIS: ICD-10-CM

## 2020-01-17 DIAGNOSIS — M85.80 OSTEOPENIA, UNSPECIFIED LOCATION: ICD-10-CM

## 2020-01-17 DIAGNOSIS — I10 ESSENTIAL HYPERTENSION: ICD-10-CM

## 2020-01-17 DIAGNOSIS — R73.01 IMPAIRED FASTING GLUCOSE: ICD-10-CM

## 2020-01-17 DIAGNOSIS — N18.30 CKD (CHRONIC KIDNEY DISEASE) STAGE 3, GFR 30-59 ML/MIN (HCC): ICD-10-CM

## 2020-01-17 DIAGNOSIS — E78.2 MIXED HYPERLIPIDEMIA: ICD-10-CM

## 2020-01-17 PROCEDURE — 3288F FALL RISK ASSESSMENT DOCD: CPT | Performed by: FAMILY MEDICINE

## 2020-01-17 PROCEDURE — 3725F SCREEN DEPRESSION PERFORMED: CPT | Performed by: FAMILY MEDICINE

## 2020-01-17 PROCEDURE — 1101F PT FALLS ASSESS-DOCD LE1/YR: CPT | Performed by: FAMILY MEDICINE

## 2020-01-17 PROCEDURE — 3075F SYST BP GE 130 - 139MM HG: CPT | Performed by: FAMILY MEDICINE

## 2020-01-17 PROCEDURE — 99214 OFFICE O/P EST MOD 30 MIN: CPT | Performed by: FAMILY MEDICINE

## 2020-01-17 PROCEDURE — 3079F DIAST BP 80-89 MM HG: CPT | Performed by: FAMILY MEDICINE

## 2020-01-17 NOTE — PROGRESS NOTES
Chief Complaint   Patient presents with    Follow-up     6 months and review labs  Health Maintenance   Topic Date Due    SLP PLAN OF CARE  1940    DTaP,Tdap,and Td Vaccines (1 - Tdap) 06/26/1952    BMI: Followup Plan  06/26/1959    Fall Risk  07/11/2020    Depression Screening PHQ  07/11/2020    Medicare Annual Wellness Visit (AWV)  07/11/2020    BMI: Adult  01/09/2021    CRC Screening: Colonoscopy  06/17/2022    DXA SCAN  01/18/2023    Influenza Vaccine  Completed    Pneumococcal Vaccine: 65+ Years  Completed    Pneumococcal Vaccine: Pediatrics (0 to 5 Years) and At-Risk Patients (6 to 59 Years)  Aged Out    HIB Vaccine  Aged Out    Hepatitis B Vaccine  Aged Out    IPV Vaccine  Aged Out    Hepatitis A Vaccine  Aged Out    Meningococcal ACWY Vaccine  Aged Out    HPV Vaccine  Aged Out     BMI Counseling: Body mass index is 29 21 kg/m²  The BMI is above normal  Nutrition recommendations include reducing portion sizes, decreasing overall calorie intake and 3-5 servings of fruits/vegetables daily  Exercise recommendations include moderate aerobic physical activity for 150 minutes/week  Assessment/Plan:    Burt esophagus  Continue prilosec 20mg QD  Hypothyroidism  Not on meds  1/2020 TSH 4 65, free T4 0 87 normal    Impaired fasting glucose  1/2020 hgA1C 5 0 normal    Non-toxic multinodular goiter  7/2019  stable  Does not need follow up  Hypertension  Stable  Continue bisoprolol and lisinopril per cardiology  CKD (chronic kidney disease) stage 3, GFR 30-59 ml/min ([facility])  1/2020 Cr 1 51, GFR 44 stable  Avoid motrin/ibuprofen/aleve nephrotoxic agents  Hyperlipidemia  1/2020 lipid 164/172/56/74 stable  Low fat diet  Continue atorvastatin 10mg qhs  Reviewed lab in 1/2020  TSH 4 65 slightly elevated, free T4 0 87 good  CMP stable  HgA1C 5 0 good  Lipid ok     Got flu shot yearly  Got PCV13 5/2015  Got pneumovax 2016     Got zoster in 2011    ON the waiting list of shingrix    FU Dr Zonia Spicer for colonoscopy in 6/2019 1/2018 Dexa showed osteopenia  RTO in 6 months  Diagnoses and all orders for this visit:    Hypothyroidism, unspecified type  -     TSH, 3rd generation with Free T4 reflex; Future    Impaired fasting glucose  -     Hemoglobin A1C With EAG; Future    Essential hypertension  -     CBC; Future  -     Comprehensive metabolic panel; Future    Osteopenia, unspecified location    Screening for osteoporosis  -     DXA bone density spine hip and pelvis; Future    Mixed hyperlipidemia  -     Lipid panel; Future    CKD (chronic kidney disease) stage 3, GFR 30-59 ml/min ([facility])          Subjective:      Patient ID: Tyson Olson is a 67 y o  male  HPI      Pt is here by himself  Thyroid nodule---7/2019 US stable, do not need follow up  Hypothyroidism---1/2020 TSH normal  Not on meds  Feels fine       IFG---Follows low carb diet       HTN---Does not check BP at home  He is on bisoprolol 10mg Qd and lisinopril 30mg QD  FU cardiology for cardiomyopathy, tachycardia, s/p pacemaker  Stable  He is on lasix 20mg QD       CKD3---stable       Hyperlipidemia---on atorvastatin 10mg now  Denies SE        FU Cancer care Q 6 months for melanoma s/p wide excision on right upper arm  Stable       Hx of colon cancer s/p surgery and chemo, had colonoscopy 3/2016 which showed diverticulosis  Repeat in 3 years       FU GI Dr Josie Barron for Burt's, do EGD every 3 years  Last EGD was in 12/2016  Pt is on omeprazole 40mg daily now       FU urology for BPH yearly  Prefer us to give prescription in the future       Lives with wife  Does all ADL's  Still drive     Denies recent falls    Denies depression            The following portions of the patient's history were reviewed and updated as appropriate: allergies, current medications, past family history, past medical history, past social history, past surgical history and problem list     Review of Systems   Constitutional: Negative for appetite change, chills and fever  HENT: Negative for congestion, ear pain, sinus pain and sore throat  Eyes: Negative for discharge and itching  Respiratory: Negative for apnea, cough, chest tightness, shortness of breath and wheezing  Cardiovascular: Negative for chest pain, palpitations and leg swelling  Gastrointestinal: Negative for abdominal pain, anal bleeding, constipation, diarrhea, nausea and vomiting  Endocrine: Negative for cold intolerance, heat intolerance and polyuria  Genitourinary: Negative for difficulty urinating and dysuria  Musculoskeletal: Negative for arthralgias, back pain and myalgias  Skin: Negative for rash  Neurological: Negative for dizziness and headaches  Psychiatric/Behavioral: Negative for agitation  Objective:      /88 (BP Location: Left arm, Patient Position: Sitting, Cuff Size: Adult)   Pulse 80   Temp 97 9 °F (36 6 °C) (Tympanic)   Resp 16   Ht 5' 11" (1 803 m)   Wt 95 kg (209 lb 6 4 oz)   SpO2 95%   BMI 29 21 kg/m²          Physical Exam   Constitutional: He appears well-developed  HENT:   Head: Normocephalic and atraumatic  Eyes: Pupils are equal, round, and reactive to light  Conjunctivae are normal    Neck: Normal range of motion  Neck supple  Cardiovascular: Normal rate, regular rhythm, normal heart sounds and intact distal pulses  Exam reveals no gallop and no friction rub  No murmur heard  Pulmonary/Chest: Effort normal and breath sounds normal  No stridor  No respiratory distress  He has no wheezes  He has no rales  Abdominal: Soft  Bowel sounds are normal    Musculoskeletal: Normal range of motion  He exhibits no edema  Neurological: He is alert

## 2020-02-05 ENCOUNTER — OFFICE VISIT (OUTPATIENT)
Dept: GASTROENTEROLOGY | Facility: CLINIC | Age: 80
End: 2020-02-05
Payer: COMMERCIAL

## 2020-02-05 VITALS
TEMPERATURE: 97.8 F | WEIGHT: 208 LBS | HEIGHT: 71 IN | HEART RATE: 72 BPM | BODY MASS INDEX: 29.12 KG/M2 | SYSTOLIC BLOOD PRESSURE: 139 MMHG | DIASTOLIC BLOOD PRESSURE: 82 MMHG

## 2020-02-05 DIAGNOSIS — K21.9 GASTROESOPHAGEAL REFLUX DISEASE, ESOPHAGITIS PRESENCE NOT SPECIFIED: ICD-10-CM

## 2020-02-05 DIAGNOSIS — K22.70 BARRETT'S ESOPHAGUS WITHOUT DYSPLASIA: Primary | ICD-10-CM

## 2020-02-05 PROCEDURE — 99214 OFFICE O/P EST MOD 30 MIN: CPT | Performed by: INTERNAL MEDICINE

## 2020-02-05 PROCEDURE — 1160F RVW MEDS BY RX/DR IN RCRD: CPT | Performed by: INTERNAL MEDICINE

## 2020-02-05 PROCEDURE — 4040F PNEUMOC VAC/ADMIN/RCVD: CPT | Performed by: INTERNAL MEDICINE

## 2020-02-05 PROCEDURE — 1036F TOBACCO NON-USER: CPT | Performed by: INTERNAL MEDICINE

## 2020-02-05 PROCEDURE — 3008F BODY MASS INDEX DOCD: CPT | Performed by: INTERNAL MEDICINE

## 2020-02-05 PROCEDURE — 3079F DIAST BP 80-89 MM HG: CPT | Performed by: INTERNAL MEDICINE

## 2020-02-05 PROCEDURE — 3075F SYST BP GE 130 - 139MM HG: CPT | Performed by: INTERNAL MEDICINE

## 2020-02-05 NOTE — PATIENT INSTRUCTIONS
EGD scheduled with Dr Fentno at [place] on 3/19/20  Paerl Lindsey gave patient verbal instructions/paper work given

## 2020-02-05 NOTE — PROGRESS NOTES
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 -last biopsies taken in December 2016 which was negative for dysplasia  He is due for surveillance EGD to assess for dysplasia  Schedule him for EGD  Continue omeprazole 20 mg daily  Risks and benefits of procedure discussed risks include but not limited to infection, bleeding, perforation, missed lesion  2  Gastroesophageal reflux disease -his symptoms are well controlled  Continue reflux precautions  Continue omeprazole 20 mg daily    3  History of colon cancer - last colonoscopy was done in May 2019 by Dr Dany Hamilton  Follow-up colonoscopy recommended in 3 years  ______________________________________________________________________    SUBJECTIVE:  78-year-old male with history of Burt's esophagus here for follow-up visit  He he had C3M4 Brut's esophagus  His last EGD was done in 2016 will with biopsy which was negative for dysplasia  Currently he is doing well  REVIEW OF SYSTEMS IS OTHERWISE NEGATIVE        Historical Information   Past Medical History:   Diagnosis Date    AICD (automatic cardioverter/defibrillator) present     Arthritis     Asthma     Burt esophagus     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     Cardiac arrhythmia     [facility]     CKD (chronic kidney disease)     Colon cancer (HCC)     Congestive heart failure (CHF) (HCC)     COPD (chronic obstructive pulmonary disease) (HCC)     Diverticulitis     Last assessed: 4/5/13    Esophageal reflux     Gout     Hernia     Hyperlipidemia     Hypertension     Hypertension     Hypothyroidism     Pleural effusion     Polymyalgia rheumatica (HCC)     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     Spondyloarthropathy Last assessed: 11/28/12     Past Surgical History:   Procedure Laterality Date    ARM SKIN LESION BIOPSY / EXCISION      Malignant    ATRIAL ABLATION SURGERY      AV NODE ABLATION      CARDIAC DEFIBRILLATOR PLACEMENT  2009    Cardio-Defib Pulse Gen Venous Insertion of Electrode for Ventricular Pacing  Implantable    CARDIAC SURGERY  2009    Catheterization    COLONOSCOPY N/A 3/14/2016    Procedure: COLONOSCOPY;  Surgeon: Seb Bridges MD;  [facility]; Service  February 22, 2013, mildly enlarged prostate, history of colon CA with normal appearance of anastomosis at the midtransverse colon, severe diverticulosis in the sigmoid, descending and transverse colon  Repeat colonoscopy in 3 yrs    HEMICOLECTOMY Right 05/05/2004    INGUINAL HERNIA REPAIR Bilateral     Left 3/2004, right 5/2008    IR VENOGRAM  10/4/2018    KNEE SURGERY  1972    Meniscectomy    DE ESOPHAGOGASTRODUODENOSCOPY TRANSORAL DIAGNOSTIC N/A 12/5/2016    Procedure: ESOPHAGOGASTRODUODENOSCOPY (EGD); Surgeon: Abby Wick MD;  Location: [facility];   Service: Gastroenterology    TONSILLECTOMY AND ADENOIDECTOMY      UPPER GASTROINTESTINAL ENDOSCOPY       Social History   Social History     Substance and Sexual Activity   Alcohol Use No    Comment: Rare     Social History     Substance and Sexual Activity   Drug Use No     Social History     Tobacco Use   Smoking Status Never Smoker   Smokeless Tobacco Never Used     Family History   Problem Relation Age of Onset    Heart failure Mother         Congestive    Hypertension Mother     Osteoporosis Mother     COPD Father     Emphysema Father     Hypertension Father     Heart disease Sister         Heart Valve Replacement    Osteoporosis Sister     Breast cancer Family        Meds/Allergies       Current Outpatient Medications:     aspirin 81 MG tablet    atorvastatin (LIPITOR) 10 mg tablet    bisoprolol (ZEBETA) 10 MG tablet    Calcium Carb-Cholecalciferol (CALCIUM 600 + D PO)    Coenzyme Q10 (CO Q-10) 200 MG CAPS    finasteride (PROSCAR) 5 mg tablet    furosemide (LASIX) 40 mg tablet    lisinopril (ZESTRIL) 30 mg tablet    Multiple Vitamins-Minerals (CENTRUM SILVER PO)    omeprazole (PriLOSEC) 20 mg delayed release capsule    PREVIDENT 5000 ENAMEL PROTECT 1 1-5 % PSTE    Vitamin D, Cholecalciferol, 1000 UNITS CAPS    No Known Allergies        Objective     Blood pressure 139/82, pulse 72, temperature 97 8 °F (36 6 °C), temperature source Tympanic, height 5' 11" (1 803 m), weight 94 3 kg (208 lb)  Body mass index is 29 01 kg/m²  PHYSICAL EXAM:      General Appearance:   Alert, cooperative, no distress   HEENT:   Normocephalic, atraumatic, anicteric      Neck:  Supple, symmetrical, trachea midline   Lungs:   Clear to auscultation bilaterally; no rales, rhonchi or wheezing; respirations unlabored    Heart[de-identified]   Regular rate and rhythm; no murmur, rub, or gallop  Abdomen:   Soft, non-tender, non-distended; normal bowel sounds; no masses, no organomegaly    Genitalia:   Deferred    Rectal:   Deferred    Extremities:  No cyanosis, clubbing or edema    Pulses:  2+ and symmetric    Skin:  No jaundice, rashes, or lesions    Lymph nodes:  No palpable cervical lymphadenopathy        Lab Results:   No visits with results within 1 Day(s) from this visit     Latest known visit with results is:   Appointment on 01/11/2020   Component Date Value    Sodium 01/11/2020 144     Potassium 01/11/2020 4 5     Chloride 01/11/2020 110*    CO2 01/11/2020 28     ANION GAP 01/11/2020 6     BUN 01/11/2020 19     Creatinine 01/11/2020 1 51*    Glucose, Fasting 01/11/2020 90     Calcium 01/11/2020 8 9     AST 01/11/2020 28     ALT 01/11/2020 33     Alkaline Phosphatase 01/11/2020 69     Total Protein 01/11/2020 7 2     Albumin 01/11/2020 3 6     Total Bilirubin 01/11/2020 1 51*    eGFR 01/11/2020 44     Hemoglobin A1C 01/11/2020 5 0     EAG 01/11/2020 97     Cholesterol 01/11/2020 164     Triglycerides 01/11/2020 172*    HDL, Direct 01/11/2020 56     LDL Calculated 01/11/2020 74     Non-HDL-Chol (CHOL-HDL) 01/11/2020 108     TSH 3RD GENERATON 01/11/2020 4 650*    Free T4 01/11/2020 0 87          Radiology Results:   No results found

## 2020-02-11 ENCOUNTER — OFFICE VISIT (OUTPATIENT)
Dept: UROLOGY | Facility: AMBULATORY SURGERY CENTER | Age: 80
End: 2020-02-11
Payer: COMMERCIAL

## 2020-02-11 VITALS
SYSTOLIC BLOOD PRESSURE: 110 MMHG | BODY MASS INDEX: 29.12 KG/M2 | HEART RATE: 72 BPM | DIASTOLIC BLOOD PRESSURE: 80 MMHG | HEIGHT: 71 IN | WEIGHT: 208 LBS

## 2020-02-11 DIAGNOSIS — N40.1 BENIGN PROSTATIC HYPERPLASIA WITH LOWER URINARY TRACT SYMPTOMS, SYMPTOM DETAILS UNSPECIFIED: Primary | ICD-10-CM

## 2020-02-11 PROCEDURE — 3079F DIAST BP 80-89 MM HG: CPT | Performed by: NURSE PRACTITIONER

## 2020-02-11 PROCEDURE — 99213 OFFICE O/P EST LOW 20 MIN: CPT | Performed by: NURSE PRACTITIONER

## 2020-02-11 PROCEDURE — 3074F SYST BP LT 130 MM HG: CPT | Performed by: NURSE PRACTITIONER

## 2020-02-11 PROCEDURE — 3008F BODY MASS INDEX DOCD: CPT | Performed by: NURSE PRACTITIONER

## 2020-02-11 PROCEDURE — 1036F TOBACCO NON-USER: CPT | Performed by: NURSE PRACTITIONER

## 2020-02-11 PROCEDURE — 4040F PNEUMOC VAC/ADMIN/RCVD: CPT | Performed by: NURSE PRACTITIONER

## 2020-02-11 PROCEDURE — 1160F RVW MEDS BY RX/DR IN RCRD: CPT | Performed by: NURSE PRACTITIONER

## 2020-02-11 NOTE — PROGRESS NOTES
2/11/2020    Juan Carlos Hudson  1940  [id number]      Assessment  -BPH  -History of microscopic hematuria    Discussion/Plan  Tala Wing is a 67 y o  male being managed by our office    -Patient is doing well without any urinary complaints  He will continue to take finasteride 5mg daily  We did discuss follow up on as needed basis, which patient is amenable to  He was instructed to call with any issues   -All questions answered, patient agrees with plan      History of Present Illness  67 y o  male with a history of BPH and microscopic hematuria presents today for follow up  Patient previously known to Dr Delfino Sapp  He continues to take finasteride 5mg daily  Patient denies any lower urinary tract symptoms, gross hematuria, or dysuria  He has a remote history of microscopic hematuria and underwent negative hematuria workup  No overall changes to his health since last office visit  Last PSA from January 2018 was 0 4  He denies any strong family history of prostate cancer  Review of Systems  Review of Systems   Constitutional: Negative  HENT: Negative  Respiratory: Negative  Cardiovascular: Negative  Gastrointestinal: Negative  Genitourinary: Negative for decreased urine volume, difficulty urinating, dysuria, flank pain, frequency, hematuria and urgency  Musculoskeletal: Negative  Skin: Negative  Neurological: Negative  Psychiatric/Behavioral: Negative  AUA SYMPTOM SCORE      Most Recent Value   AUA SYMPTOM SCORE   How often have you had a sensation of not emptying your bladder completely after you finished urinating? 0   How often have you had to urinate again less than two hours after you finished urinating? 1   How often have you found you stopped and started again several times when you urinate?  0   How often have you found it difficult to postpone urination?   1   How often have you had a weak urinary stream?  0   How often have you had to push or strain to begin urination? 0   How many times did you most typically get up to urinate from the time you went to bed at night until the time you got up in the morning? 1   Quality of Life: If you were to spend the rest of your life with your urinary condition just the way it is now, how would you feel about that?  1   AUA SYMPTOM SCORE  3          Past Medical History  Past Medical History:   Diagnosis Date    AICD (automatic cardioverter/defibrillator) present     Arthritis     Asthma     Burt esophagus     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     Cardiac arrhythmia     [facility]     CKD (chronic kidney disease)     Colon cancer ([facility] )     Congestive heart failure (CHF) ([facility] )     COPD (chronic obstructive pulmonary disease) ([facility] )     Diverticulitis     Last assessed: 4/5/13    Esophageal reflux     Gout     Hernia     Hyperlipidemia     Hypertension     Hypertension     Hypothyroidism     Pleural effusion     [facility]     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     Spondyloarthropathy     Last assessed: 11/28/12       Past Social History  Past Surgical History:   Procedure Laterality Date    ARM SKIN LESION BIOPSY / EXCISION      Malignant    ATRIAL ABLATION SURGERY      AV NODE ABLATION      CARDIAC DEFIBRILLATOR PLACEMENT  2009    Cardio-Defib Pulse Gen Venous Insertion of Electrode for Ventricular Pacing  Implantable    CARDIAC SURGERY  2009    Catheterization    COLONOSCOPY N/A 3/14/2016    Procedure: COLONOSCOPY;  Surgeon: Chris Jalloh MD;  [facility]; Service  February 22, 2013, mildly enlarged prostate, history of colon CA with normal appearance of anastomosis at the midtransverse colon, severe diverticulosis in the sigmoid, descending and transverse colon   Repeat colonoscopy in 3 yrs    HEMICOLECTOMY Right 05/05/2004    INGUINAL HERNIA REPAIR Bilateral     Left 3/2004, right 5/2008    IR VENOGRAM  10/4/2018    KNEE SURGERY  1972    Meniscectomy    UT ESOPHAGOGASTRODUODENOSCOPY TRANSORAL DIAGNOSTIC N/A 12/5/2016    Procedure: ESOPHAGOGASTRODUODENOSCOPY (EGD); Surgeon: Frank Bernard MD;  [facility];   Service: Gastroenterology    TONSILLECTOMY AND ADENOIDECTOMY      UPPER GASTROINTESTINAL ENDOSCOPY         Past Family History  Family History   Problem Relation Age of Onset    Heart failure Mother         Congestive    Hypertension Mother     Osteoporosis Mother     COPD Father     Emphysema Father     Hypertension Father     Heart disease Sister         Heart Valve Replacement    Osteoporosis Sister     Breast cancer Family        Past Social history  Social History     Socioeconomic History    Marital status: /Civil Union     Spouse name: Not on file    Number of children: Not on file    Years of education: Not on file    Highest education level: Not on file   Occupational History    Occupation: Manager-Retired   Social Needs    Financial resource strain: Not on file    Food insecurity:     Worry: Not on file     Inability: Not on file   [employer] needs:     Medical: Not on file     Non-medical: Not on file   Tobacco Use    Smoking status: Never Smoker    Smokeless tobacco: Never Used   Substance and Sexual Activity    Alcohol use: No     Comment: Rare    Drug use: No    Sexual activity: Not Currently   Lifestyle    Physical activity:     Days per week: Not on file     Minutes per session: Not on file    Stress: Not on file   Relationships    Social connections:     Talks on phone: Not on file     Gets together: Not on file     Attends Rastafarian service: Not on file     Active member of club or organization: Not on file     Attends meetings of clubs or organizations: Not on file     Relationship status: Not on file    Intimate partner violence:     Fear of current or ex partner: Not on file     Emotionally abused: Not on file Physically abused: Not on file     Forced sexual activity: Not on file   Other Topics Concern    Not on file   Social History Narrative    Not on file       Current Medications  Current Outpatient Medications   Medication Sig Dispense Refill    aspirin 81 MG tablet Take 81 mg by mouth daily   atorvastatin (LIPITOR) 10 mg tablet TAKE 1 TABLET DAILY 90 tablet 3    bisoprolol (ZEBETA) 10 MG tablet TAKE 1 TABLET TWICE DAILY 180 tablet 2    Calcium Carb-Cholecalciferol (CALCIUM 600 + D PO) Take 2 tablets by mouth daily       Coenzyme Q10 (CO Q-10) 200 MG CAPS Take 1 tablet by mouth daily      finasteride (PROSCAR) 5 mg tablet TAKE ONE TABLET BY MOUTH EVERY DAY 90 tablet 1    furosemide (LASIX) 40 mg tablet Take 0 5 tablets (20 mg total) by mouth daily 90 tablet 3    lisinopril (ZESTRIL) 30 mg tablet Take 1 tablet (30 mg total) by mouth daily 90 tablet 3    Multiple Vitamins-Minerals (CENTRUM SILVER PO) Take 1 tablet by mouth daily   omeprazole (PriLOSEC) 20 mg delayed release capsule TAKE ONE CAPSULE BY MOUTH EVERY DAY 90 capsule 3    PREVIDENT 5000 ENAMEL PROTECT 1 1-5 % PSTE Use as directed  3    Vitamin D, Cholecalciferol, 1000 UNITS CAPS Take 1 tablet by mouth daily  No current facility-administered medications for this visit  Allergies  No Known Allergies    Past Medical History, Social History, Family History, medications and allergies were reviewed  Vitals  There were no vitals filed for this visit  Physical Exam  Physical Exam   Constitutional: He is oriented to person, place, and time  He appears well-developed and well-nourished  HENT:   Head: Normocephalic  Eyes: Pupils are equal, round, and reactive to light  Neck: Normal range of motion  Cardiovascular: Normal rate and regular rhythm  Pulmonary/Chest: Effort normal    Abdominal: Soft  Normal appearance  There is no CVA tenderness  Musculoskeletal: Normal range of motion     Neurological: He is alert and oriented to person, place, and time  Skin: Skin is warm and dry  Psychiatric: He has a normal mood and affect  His behavior is normal  Judgment and thought content normal        Results    I have personally reviewed all pertinent lab results and reviewed with patient  Lab Results   Component Value Date    PSA 0 4 01/08/2018    PSA 0 5 12/14/2016    PSA 0 5 12/07/2015     Lab Results   Component Value Date    GLUCOSE 101 12/19/2015    CALCIUM 8 9 01/11/2020     (H) 12/19/2015    K 4 5 01/11/2020    CO2 28 01/11/2020     (H) 01/11/2020    BUN 19 01/11/2020    CREATININE 1 51 (H) 01/11/2020     Lab Results   Component Value Date    WBC 9 03 07/06/2019    HGB 15 3 07/06/2019    HCT 44 9 07/06/2019    MCV 95 07/06/2019     07/06/2019     No results found for this or any previous visit (from the past 1 hour(s))

## 2020-02-17 ENCOUNTER — IN-CLINIC DEVICE VISIT (OUTPATIENT)
Dept: CARDIOLOGY CLINIC | Facility: CLINIC | Age: 80
End: 2020-02-17
Payer: COMMERCIAL

## 2020-02-17 DIAGNOSIS — Z95.810 AICD (AUTOMATIC CARDIOVERTER/DEFIBRILLATOR) PRESENT: Primary | ICD-10-CM

## 2020-02-17 PROCEDURE — 93283 PRGRMG EVAL IMPLANTABLE DFB: CPT | Performed by: INTERNAL MEDICINE

## 2020-02-17 NOTE — PROGRESS NOTES
Results for orders placed or performed in visit on 02/17/20   Cardiac EP device report    Narrative    MDT-DUAL CHAMBER ICD (AAIR-DDDR MODE)/ ACTIVE SYSTEM IS MRI CONDITIONAL  DEVICE INTERROGATED IN THE [place] OFFICE  BATTERY VOLTAGE ADEQUATE (9 1 YRS)  AP-65%, -5%  ALL LEAD PARAMETERS WITHIN NORMAL LIMITS  2 NEW AHR EPISODES MAX DURATION 49 SEC- PAC'S, COMPETITIVE ATRIAL PACING & FARFIELD OVERSENSING ON EGM'S  PT ON ASA 81MG & BISOPROLOL  OPTI-VOL WITHIN NORMAL LIMITS  PROGRAMMED PVAB METHOD TO PARTIAL+  NORMAL DEVICE FUNCTION   GV

## 2020-03-02 ENCOUNTER — HOSPITAL ENCOUNTER (OUTPATIENT)
Dept: RADIOLOGY | Age: 80
Discharge: HOME | End: 2020-03-02
Payer: COMMERCIAL

## 2020-03-02 DIAGNOSIS — Z13.820 SCREENING FOR OSTEOPOROSIS: ICD-10-CM

## 2020-03-02 PROCEDURE — 77080 DXA BONE DENSITY AXIAL: CPT

## 2020-03-18 ENCOUNTER — TELEPHONE (OUTPATIENT)
Dept: GASTROENTEROLOGY | Facility: CLINIC | Age: 80
End: 2020-03-18

## 2020-04-24 DIAGNOSIS — N13.8 BPH WITH OBSTRUCTION/LOWER URINARY TRACT SYMPTOMS: ICD-10-CM

## 2020-04-24 DIAGNOSIS — N40.1 BPH WITH OBSTRUCTION/LOWER URINARY TRACT SYMPTOMS: ICD-10-CM

## 2020-04-24 RX ORDER — FINASTERIDE 5 MG/1
TABLET, FILM COATED ORAL
Qty: 90 TABLET | Refills: 1 | Status: SHIPPED | OUTPATIENT
Start: 2020-04-24 | End: 2020-09-08

## 2020-05-20 ENCOUNTER — OFFICE VISIT (OUTPATIENT)
Dept: CARDIOLOGY CLINIC | Facility: CLINIC | Age: 80
End: 2020-05-20
Payer: COMMERCIAL

## 2020-05-20 VITALS
HEART RATE: 83 BPM | BODY MASS INDEX: 28.88 KG/M2 | DIASTOLIC BLOOD PRESSURE: 74 MMHG | HEIGHT: 71 IN | WEIGHT: 206.3 LBS | OXYGEN SATURATION: 96 % | SYSTOLIC BLOOD PRESSURE: 120 MMHG

## 2020-05-20 DIAGNOSIS — N52.01 ERECTILE DYSFUNCTION DUE TO ARTERIAL INSUFFICIENCY: ICD-10-CM

## 2020-05-20 DIAGNOSIS — I50.22 CHRONIC SYSTOLIC CHF (CONGESTIVE HEART FAILURE) (HCC): ICD-10-CM

## 2020-05-20 DIAGNOSIS — I42.0 DILATED CARDIOMYOPATHY (HCC): Primary | ICD-10-CM

## 2020-05-20 DIAGNOSIS — E78.2 MIXED HYPERLIPIDEMIA: ICD-10-CM

## 2020-05-20 DIAGNOSIS — I10 HTN (HYPERTENSION), BENIGN: ICD-10-CM

## 2020-05-20 PROCEDURE — 1036F TOBACCO NON-USER: CPT | Performed by: INTERNAL MEDICINE

## 2020-05-20 PROCEDURE — 3078F DIAST BP <80 MM HG: CPT | Performed by: INTERNAL MEDICINE

## 2020-05-20 PROCEDURE — 99214 OFFICE O/P EST MOD 30 MIN: CPT | Performed by: INTERNAL MEDICINE

## 2020-05-20 PROCEDURE — 3008F BODY MASS INDEX DOCD: CPT | Performed by: INTERNAL MEDICINE

## 2020-05-20 PROCEDURE — 3074F SYST BP LT 130 MM HG: CPT | Performed by: INTERNAL MEDICINE

## 2020-05-20 PROCEDURE — 4040F PNEUMOC VAC/ADMIN/RCVD: CPT | Performed by: INTERNAL MEDICINE

## 2020-05-20 PROCEDURE — 1160F RVW MEDS BY RX/DR IN RCRD: CPT | Performed by: INTERNAL MEDICINE

## 2020-05-20 RX ORDER — TADALAFIL 20 MG/1
20 TABLET ORAL DAILY PRN
Qty: 10 TABLET | Refills: 0 | Status: SHIPPED | OUTPATIENT
Start: 2020-05-20 | End: 2020-10-26 | Stop reason: ALTCHOICE

## 2020-05-27 ENCOUNTER — REMOTE DEVICE CLINIC VISIT (OUTPATIENT)
Dept: CARDIOLOGY CLINIC | Facility: CLINIC | Age: 80
End: 2020-05-27
Payer: COMMERCIAL

## 2020-05-27 DIAGNOSIS — Z95.810 PRESENCE OF AUTOMATIC CARDIOVERTER/DEFIBRILLATOR (AICD): Primary | ICD-10-CM

## 2020-05-27 PROCEDURE — 93296 REM INTERROG EVL PM/IDS: CPT | Performed by: INTERNAL MEDICINE

## 2020-05-27 PROCEDURE — 93295 DEV INTERROG REMOTE 1/2/MLT: CPT | Performed by: INTERNAL MEDICINE

## 2020-05-28 ENCOUNTER — PREP FOR PROCEDURE (OUTPATIENT)
Dept: GASTROENTEROLOGY | Facility: CLINIC | Age: 80
End: 2020-05-28

## 2020-05-28 DIAGNOSIS — Z20.822 ENCOUNTER FOR LABORATORY TESTING FOR COVID-19 VIRUS: Primary | ICD-10-CM

## 2020-06-01 NOTE — TELEPHONE ENCOUNTER
Patients GI provider:  Dr Washburn Apo    Number to return call: (  [phone]    Reason for call: Pt calling to have the yellow history form emailed to him because he misplaced it    Scheduled procedure/appointment date if applicable: Apt/procedure 6-12-20

## 2020-06-06 DIAGNOSIS — Z20.822 ENCOUNTER FOR LABORATORY TESTING FOR COVID-19 VIRUS: ICD-10-CM

## 2020-06-06 PROCEDURE — U0003 INFECTIOUS AGENT DETECTION BY NUCLEIC ACID (DNA OR RNA); SEVERE ACUTE RESPIRATORY SYNDROME CORONAVIRUS 2 (SARS-COV-2) (CORONAVIRUS DISEASE [COVID-19]), AMPLIFIED PROBE TECHNIQUE, MAKING USE OF HIGH THROUGHPUT TECHNOLOGIES AS DESCRIBED BY CMS-2020-01-R: HCPCS

## 2020-06-08 LAB — SARS-COV-2 RNA SPEC QL NAA+PROBE: NOT DETECTED

## 2020-06-11 ENCOUNTER — ANESTHESIA EVENT (OUTPATIENT)
Dept: GASTROENTEROLOGY | Facility: HOSPITAL | Age: 80
End: 2020-06-11

## 2020-06-11 RX ORDER — SODIUM CHLORIDE, SODIUM LACTATE, POTASSIUM CHLORIDE, CALCIUM CHLORIDE 600; 310; 30; 20 MG/100ML; MG/100ML; MG/100ML; MG/100ML
125 INJECTION, SOLUTION INTRAVENOUS CONTINUOUS
Status: CANCELLED | OUTPATIENT
Start: 2020-06-11

## 2020-06-12 ENCOUNTER — HOSPITAL ENCOUNTER (OUTPATIENT)
Dept: GASTROENTEROLOGY | Facility: HOSPITAL | Age: 80
Setting detail: OUTPATIENT SURGERY
Discharge: HOME | End: 2020-06-12
Attending: INTERNAL MEDICINE | Admitting: INTERNAL MEDICINE
Payer: COMMERCIAL

## 2020-06-12 ENCOUNTER — ANESTHESIA (OUTPATIENT)
Dept: GASTROENTEROLOGY | Facility: HOSPITAL | Age: 80
End: 2020-06-12

## 2020-06-12 VITALS
OXYGEN SATURATION: 92 % | SYSTOLIC BLOOD PRESSURE: 112 MMHG | TEMPERATURE: 96.1 F | DIASTOLIC BLOOD PRESSURE: 70 MMHG | RESPIRATION RATE: 18 BRPM | HEART RATE: 64 BPM

## 2020-06-12 DIAGNOSIS — K22.70 BARRETT'S ESOPHAGUS WITHOUT DYSPLASIA: ICD-10-CM

## 2020-06-12 PROCEDURE — 88305 TISSUE EXAM BY PATHOLOGIST: CPT | Performed by: PATHOLOGY

## 2020-06-12 RX ORDER — GLYCOPYRROLATE 0.2 MG/ML
INJECTION INTRAMUSCULAR; INTRAVENOUS AS NEEDED
Status: DISCONTINUED | OUTPATIENT
Start: 2020-06-12 | End: 2020-06-12 | Stop reason: SURG

## 2020-06-12 RX ORDER — LIDOCAINE HYDROCHLORIDE 10 MG/ML
INJECTION, SOLUTION EPIDURAL; INFILTRATION; INTRACAUDAL; PERINEURAL AS NEEDED
Status: DISCONTINUED | OUTPATIENT
Start: 2020-06-12 | End: 2020-06-12 | Stop reason: SURG

## 2020-06-12 RX ORDER — PROPOFOL 10 MG/ML
INJECTION, EMULSION INTRAVENOUS CONTINUOUS PRN
Status: DISCONTINUED | OUTPATIENT
Start: 2020-06-12 | End: 2020-06-12 | Stop reason: SURG

## 2020-06-12 RX ORDER — PROPOFOL 10 MG/ML
INJECTION, EMULSION INTRAVENOUS AS NEEDED
Status: DISCONTINUED | OUTPATIENT
Start: 2020-06-12 | End: 2020-06-12 | Stop reason: SURG

## 2020-06-12 RX ORDER — SODIUM CHLORIDE 9 MG/ML
INJECTION, SOLUTION INTRAVENOUS CONTINUOUS PRN
Status: DISCONTINUED | OUTPATIENT
Start: 2020-06-12 | End: 2020-06-12 | Stop reason: SURG

## 2020-06-12 RX ADMIN — PROPOFOL 80 MG: 10 INJECTION, EMULSION INTRAVENOUS at 08:10

## 2020-06-12 RX ADMIN — LIDOCAINE HYDROCHLORIDE 100 MG: 10 INJECTION, SOLUTION EPIDURAL; INFILTRATION; INTRACAUDAL; PERINEURAL at 08:10

## 2020-06-12 RX ADMIN — SODIUM CHLORIDE: 9 INJECTION, SOLUTION INTRAVENOUS at 08:00

## 2020-06-12 RX ADMIN — PROPOFOL 20 MG: 10 INJECTION, EMULSION INTRAVENOUS at 08:11

## 2020-06-12 RX ADMIN — GLYCOPYRROLATE 0.2 MG: 0.2 INJECTION, SOLUTION INTRAMUSCULAR; INTRAVENOUS at 08:10

## 2020-06-12 RX ADMIN — PROPOFOL 120 MCG/KG/MIN: 10 INJECTION, EMULSION INTRAVENOUS at 08:10

## 2020-06-20 DIAGNOSIS — I10 HYPERTENSION, UNSPECIFIED TYPE: ICD-10-CM

## 2020-06-20 DIAGNOSIS — I50.22 CHRONIC SYSTOLIC CHF (CONGESTIVE HEART FAILURE), NYHA CLASS 2 (HCC): ICD-10-CM

## 2020-06-21 RX ORDER — LISINOPRIL 30 MG/1
TABLET ORAL
Qty: 90 TABLET | Refills: 3 | Status: SHIPPED | OUTPATIENT
Start: 2020-06-21 | End: 2020-10-28 | Stop reason: ALTCHOICE

## 2020-07-02 ENCOUNTER — TELEPHONE (OUTPATIENT)
Dept: SURGICAL ONCOLOGY | Facility: CLINIC | Age: 80
End: 2020-07-02

## 2020-07-09 ENCOUNTER — APPOINTMENT (OUTPATIENT)
Dept: LAB | Facility: CLINIC | Age: 80
End: 2020-07-09
Payer: COMMERCIAL

## 2020-07-09 DIAGNOSIS — E78.2 MIXED HYPERLIPIDEMIA: ICD-10-CM

## 2020-07-09 DIAGNOSIS — E03.9 HYPOTHYROIDISM, UNSPECIFIED TYPE: ICD-10-CM

## 2020-07-09 DIAGNOSIS — R73.01 IMPAIRED FASTING GLUCOSE: ICD-10-CM

## 2020-07-09 DIAGNOSIS — I10 ESSENTIAL HYPERTENSION: ICD-10-CM

## 2020-07-09 LAB
ALBUMIN SERPL BCP-MCNC: 3.6 G/DL (ref 3.5–5)
ALP SERPL-CCNC: 70 U/L (ref 46–116)
ALT SERPL W P-5'-P-CCNC: 25 U/L (ref 12–78)
ANION GAP SERPL CALCULATED.3IONS-SCNC: 4 MMOL/L (ref 4–13)
AST SERPL W P-5'-P-CCNC: 17 U/L (ref 5–45)
BILIRUB SERPL-MCNC: 1.14 MG/DL (ref 0.2–1)
BUN SERPL-MCNC: 21 MG/DL (ref 5–25)
CALCIUM SERPL-MCNC: 9.1 MG/DL (ref 8.3–10.1)
CHLORIDE SERPL-SCNC: 112 MMOL/L (ref 100–108)
CHOLEST SERPL-MCNC: 150 MG/DL (ref 50–200)
CO2 SERPL-SCNC: 28 MMOL/L (ref 21–32)
CREAT SERPL-MCNC: 1.57 MG/DL (ref 0.6–1.3)
ERYTHROCYTE [DISTWIDTH] IN BLOOD BY AUTOMATED COUNT: 15.1 % (ref 11.6–15.1)
EST. AVERAGE GLUCOSE BLD GHB EST-MCNC: 103 MG/DL
GFR SERPL CREATININE-BSD FRML MDRD: 41 ML/MIN/1.73SQ M
GLUCOSE P FAST SERPL-MCNC: 94 MG/DL (ref 65–99)
HBA1C MFR BLD: 5.2 %
HCT VFR BLD AUTO: 45.6 % (ref 36.5–49.3)
HDLC SERPL-MCNC: 51 MG/DL
HGB BLD-MCNC: 14.5 G/DL (ref 12–17)
LDLC SERPL CALC-MCNC: 79 MG/DL (ref 0–100)
MCH RBC QN AUTO: 31.5 PG (ref 26.8–34.3)
MCHC RBC AUTO-ENTMCNC: 31.8 G/DL (ref 31.4–37.4)
MCV RBC AUTO: 99 FL (ref 82–98)
NONHDLC SERPL-MCNC: 99 MG/DL
PLATELET # BLD AUTO: 191 THOUSANDS/UL (ref 149–390)
PMV BLD AUTO: 10 FL (ref 8.9–12.7)
POTASSIUM SERPL-SCNC: 4.6 MMOL/L (ref 3.5–5.3)
PROT SERPL-MCNC: 7 G/DL (ref 6.4–8.2)
RBC # BLD AUTO: 4.61 MILLION/UL (ref 3.88–5.62)
SODIUM SERPL-SCNC: 144 MMOL/L (ref 136–145)
TRIGL SERPL-MCNC: 98 MG/DL
TSH SERPL DL<=0.05 MIU/L-ACNC: 3.37 UIU/ML (ref 0.36–3.74)
WBC # BLD AUTO: 9.79 THOUSAND/UL (ref 4.31–10.16)

## 2020-07-09 PROCEDURE — 80061 LIPID PANEL: CPT

## 2020-07-09 PROCEDURE — 85027 COMPLETE CBC AUTOMATED: CPT

## 2020-07-09 PROCEDURE — 83036 HEMOGLOBIN GLYCOSYLATED A1C: CPT

## 2020-07-09 PROCEDURE — 80053 COMPREHEN METABOLIC PANEL: CPT

## 2020-07-09 PROCEDURE — 36415 COLL VENOUS BLD VENIPUNCTURE: CPT

## 2020-07-09 PROCEDURE — 84443 ASSAY THYROID STIM HORMONE: CPT

## 2020-07-16 ENCOUNTER — TELEPHONE (OUTPATIENT)
Dept: SURGICAL ONCOLOGY | Facility: CLINIC | Age: 80
End: 2020-07-16

## 2020-07-17 ENCOUNTER — OFFICE VISIT (OUTPATIENT)
Dept: SURGICAL ONCOLOGY | Facility: CLINIC | Age: 80
End: 2020-07-17
Payer: COMMERCIAL

## 2020-07-17 VITALS
HEART RATE: 68 BPM | WEIGHT: 197.8 LBS | HEIGHT: 71 IN | BODY MASS INDEX: 27.69 KG/M2 | DIASTOLIC BLOOD PRESSURE: 62 MMHG | TEMPERATURE: 97.2 F | SYSTOLIC BLOOD PRESSURE: 114 MMHG

## 2020-07-17 DIAGNOSIS — Z85.820 ENCOUNTER FOR FOLLOW-UP SURVEILLANCE OF MELANOMA: Primary | ICD-10-CM

## 2020-07-17 DIAGNOSIS — Z08 ENCOUNTER FOR FOLLOW-UP SURVEILLANCE OF MELANOMA: Primary | ICD-10-CM

## 2020-07-17 DIAGNOSIS — Z85.820 PERSONAL HISTORY OF MALIGNANT MELANOMA: ICD-10-CM

## 2020-07-17 PROCEDURE — 3008F BODY MASS INDEX DOCD: CPT | Performed by: SURGERY

## 2020-07-17 PROCEDURE — 1160F RVW MEDS BY RX/DR IN RCRD: CPT | Performed by: SURGERY

## 2020-07-17 PROCEDURE — 3074F SYST BP LT 130 MM HG: CPT | Performed by: SURGERY

## 2020-07-17 PROCEDURE — 3078F DIAST BP <80 MM HG: CPT | Performed by: SURGERY

## 2020-07-17 PROCEDURE — 99213 OFFICE O/P EST LOW 20 MIN: CPT | Performed by: SURGERY

## 2020-07-17 PROCEDURE — 1036F TOBACCO NON-USER: CPT | Performed by: SURGERY

## 2020-07-17 PROCEDURE — 4040F PNEUMOC VAC/ADMIN/RCVD: CPT | Performed by: SURGERY

## 2020-07-17 NOTE — PROGRESS NOTES
Surgical Oncology Follow Up       [address] [facility] [place]  [address] [address]  [facility] [address]  [address] Roxine Kehr  1940  [id number]  [address] [facility] [address] [id number] [phone]    Diagnoses and all orders for this visit:    Encounter for follow-up surveillance of melanoma    Personal history of malignant melanoma        Chief Complaint   Patient presents with    Follow-up       Return in about 6 months (around 1/17/2021) for Office Visit  Personal history of malignant melanoma    10/31/2017 Initial Diagnosis     Malignant melanoma of right upper extremity ([address] )      12/5/2017 Surgery     Wide excision of right upper arm  [id number]         Diagnosis and Staging: [id number] melanoma right upper extremity December 2017   Treatment History: Wide excision melanoma December 2017   Current Therapy: Observation   Disease Status: TONY     History of Present Illness:  Patient returns in follow-up of his melanoma  He is doing well at this time with no complaints  He denies any abdominal pain, nausea, vomiting, back pain, bone pain, headaches, or cough  He has lost approximately 5-6 lb unintentionally  He continues to follow-up with his dermatologist, Dr Jacqueline Camacho  Review of Systems  Complete ROS Surg Onc:   Complete ROS Surg Onc:   Constitutional: The patient denies new or recent history of general fatigue, no recent weight loss, no change in appetite  Eyes: No complaints of visual problems, no scleral icterus  ENT: no complaints of ear pain, no hoarseness, no difficulty swallowing,  no tinnitus and no new masses in head, oral cavity, or neck  Cardiovascular: No complaints of chest pain, no palpitations, no ankle edema  Respiratory: No complaints of shortness of breath, no cough     Gastrointestinal: No complaints of jaundice, no bloody stools, no pale stools  Genitourinary: No complaints of dysuria, no hematuria, no nocturia, no frequent urination, no urethral discharge  Musculoskeletal: No complaints of weakness, paralysis, joint stiffness or arthralgias  Integumentary: No complaints of rash, no new lesions  Neurological: No complaints of convulsions, no seizures, no dizziness  Hematologic/Lymphatic: No complaints of easy bruising  Endocrine:  No hot or cold intolerance  No polydipsia, polyphagia, or polyuria  Allergy/immunology:  No environmental allergies  No food allergies  Not immunocompromised  Skin:  No pallor or rash  No wound          Patient Active Problem List   Diagnosis    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    Microscopic hematuria    Ank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 ([facility] )    Arthritis    Burt esophagus    Cancer, colon ([place] [address] )    Cardiac arrhythmia    Cardiomyopathy ([place] [address] )    CKD (chronic kidney disease) stage 3, GFR 30-59 ml/min (HCC)    Esophageal reflux    Hyperlipidemia    Hypertension    Hypothyroidism    Impaired fasting glucose    Personal history of malignant melanoma    Non-toxic multinodular goiter    Polymyalgia rheumatica (HCC)    Right bundle branch block with left anterior fascicular block    Skin lesion    Thrombocytopenia (HCC)    Osteopenia    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    Hypertensive kidney disease with stage 3 chronic kidney disease (HCC)    Chronic systolic CHF (congestive heart failure) ([place] [address] )    Encounter for follow-up surveillance of melanoma     Past Medical History:   Diagnosis Date    AICD (automatic cardioverter/defibrillator) present     Arthritis     Asthma     Burt esophagus     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     Cardiac arrhythmia     Cardiomyopathy ([facility] )     CKD (chronic kidney disease)     Colon cancer (HCC)     [facility]) [facility]     COPD [facility]chronic obstructive pulmonary disease) [facility] )     Diverticulitis     Last assessed: 4/5/13    Esophageal reflux     Gout     Hernia     Hyperlipidemia     Hypertension     Hypertension     Hypothyroidism     Pleural effusion     Polymyalgia rheumatica (HCC)     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     Spondyloarthropathy     Last assessed: 11/28/12     Past Surgical History:   Procedure Laterality Date    ARM SKIN LESION BIOPSY / EXCISION      Malignant    ATRIAL ABLATION SURGERY      AV NODE ABLATION      CARDIAC DEFIBRILLATOR PLACEMENT  2009    Cardio-Defib Pulse Gen Venous Insertion of Electrode for Ventricular Pacing  Implantable    CARDIAC SURGERY  2009    Catheterization    COLONOSCOPY N/A 3/14/2016    Procedure: COLONOSCOPY;  Surgeon: Jacqueline Lanza MD;  [facility]; Service  February 22, 2013, mildly enlarged prostate, history of colon CA with normal appearance of anastomosis at the midtransverse colon, severe diverticulosis in the sigmoid, descending and transverse colon  Repeat colonoscopy in 3 yrs    HEMICOLECTOMY Right 05/05/2004    INGUINAL HERNIA REPAIR Bilateral     Left 3/2004, right 5/2008    IR VENOGRAM  10/4/2018    KNEE SURGERY  1972    Meniscectomy    [place] ESOPHAGOGASTRODUODENOSCOPY TRANSORAL DIAGNOSTIC N/A 12/5/2016    Procedure: ESOPHAGOGASTRODUODENOSCOPY (EGD); Surgeon: Hans Monge MD;  [facility];   Service: Gastroenterology    TONSILLECTOMY AND ADENOIDECTOMY      UPPER GASTROINTESTINAL ENDOSCOPY       Family History   Problem Relation Age of Onset    Heart failure Mother         Congestive    Hypertension Mother     Osteoporosis Mother     COPD Father     Emphysema Father     Hypertension Father     Heart disease Sister         Heart Valve Replacement    Osteoporosis Sister     Breast cancer Family      Social History     Socioeconomic History    Marital status: /Civil Union     Spouse name: Not on file    Number of children: Not on file    Years of education: Not on file    Highest education level: Not on file   Occupational History    Occupation: Manager-Retired   Social Needs    Financial resource strain: Not on file    Food insecurity:     Worry: Not on file     Inability: Not on file   [employer] needs:     Medical: Not on file     Non-medical: Not on file   Tobacco Use    Smoking status: Never Smoker    Smokeless tobacco: Never Used   Substance and Sexual Activity    Alcohol use: No     Comment: Rare    Drug use: No    Sexual activity: Not Currently   Lifestyle    Physical activity:     Days per week: Not on file     Minutes per session: Not on file    Stress: Not on file   Relationships    Social connections:     Talks on phone: Not on file     Gets together: Not on file     Attends Latter-[employer] service: Not on file     Active member of club or organization: Not on file     Attends meetings of clubs or organizations: Not on file     Relationship status: Not on file    Intimate partner violence:     Fear of current or ex partner: Not on file     Emotionally abused: Not on file     Physically abused: Not on file     Forced sexual activity: Not on file   Other Topics Concern    Not on file   Social History Narrative    Not on file       Current Outpatient Medications:     furosemide (LASIX) 40 mg tablet, Take 0 5 tablets (20 mg total) by mouth daily, Disp: 90 tablet, Rfl: 3    lisinopril (ZESTRIL) 30 mg tablet, TAKE ONE TABLET BY MOUTH EVERY DAY, Disp: 90 tablet, Rfl: 3    aspirin 81 MG tablet, Take 81 mg by mouth daily  , Disp: , Rfl:     atorvastatin (LIPITOR) 10 mg tablet, TAKE 1 TABLET DAILY, Disp: 90 tablet, Rfl: 3    bisoprolol (ZEBETA) 10 MG tablet, TAKE 1 TABLET TWICE DAILY, Disp: 180 tablet, Rfl: 2    Calcium Carb-Cholecalciferol (CALCIUM 600 + D PO), Take 2 tablets by mouth daily , Disp: , Rfl:     Coenzyme Q10 (CO Q-10) 200 MG CAPS, Take 1 tablet by mouth daily, Disp: , Rfl:     finasteride (PROSCAR) 5 mg tablet, TAKE ONE TABLET BY MOUTH EVERY DAY, Disp: 90 tablet, Rfl: 1    Multiple Vitamins-Minerals (CENTRUM SILVER PO), Take 1 tablet by mouth daily  , Disp: , Rfl:     omeprazole (PriLOSEC) 20 mg delayed release capsule, TAKE ONE CAPSULE BY MOUTH EVERY DAY, Disp: 90 capsule, Rfl: 3    PREVIDENT 5000 ENAMEL PROTECT 1 1-5 % PSTE, Use as directed, Disp: , Rfl: 3    tadalafil (CIALIS) 20 MG tablet, Take 1 tablet (20 mg total) by mouth daily as needed for erectile dysfunction (Patient not taking: Reported on 7/17/2020), Disp: 10 tablet, Rfl: 0    Vitamin D, Cholecalciferol, 1000 UNITS CAPS, Take 1 tablet by mouth daily  , Disp: , Rfl:   No Known Allergies  Vitals:    07/17/20 0832   BP: 114/62   Pulse: 68   Temp: (!) 97 2 °F (36 2 °C)       Physical Exam  Constitutional: General appearance: The Patient is well-developed and well-nourished who appears the stated age in no acute distress  Patient is pleasant and talkative  HEENT:  Normocephalic  Sclerae are anicteric  Mucous membranes are moist  Neck is supple without adenopathy  No JVD  Chest: The lungs are clear to auscultation  Cardiac: Heart is regular rate  Abdomen: Abdomen is soft, non-tender, non-distended and without masses  Extremities: There is no clubbing or cyanosis  There is no edema  Symmetric  Neuro: Grossly nonfocal  Gait is normal      Lymphatic: No evidence of cervical adenopathy bilaterally  No evidence of axillary adenopathy bilaterally  No evidence of inguinal adenopathy bilaterally  Skin: Warm, anicteric  Wide excision on the right upper arm has healed well  No evidence of local recurrence or in-transit disease  Psych:  Patient is pleasant and talkative  Breasts:        Pathology:  [unfilled]    Labs:      Imaging  No results found  I reviewed the above laboratory and imaging data      Discussion/Summary: 71-year-old male status post wide excision of a Rudanibal Medico is clinically TONY at 2 and 1/2 years  Atiya Hill is no evidence of recurrence by physical exam, or symptomatology  Lizeth Escalera will continue to follow up with his dermatologist, Dr Ondina Maravilla Mu him again in 6 months for another clinical exam  If he has any new, persistent, or unexplained symptoms he will contact us and directed imaging may be performed  He is agreeable to this   All his questions were answered

## 2020-07-17 NOTE — LETTER
July 17, 2020     Ricki Abdi, [address]    Patient: Alhaji Sheikh   YOB: 1940   Date of Visit: 7/17/2020       Dear Dr Berto Jean Baptiste: Thank you for referring Alhaji Sheikh to me for evaluation  Below are my notes for this consultation  If you have questions, please do not hesitate to call me  I look forward to following your patient along with you  Sincerely,        Georgie Hashimoto, MD        CC: Georgette Brought, MD Georgie Hashimoto, MD  7/17/2020  8:44 AM  Sign at close encounter               Surgical Oncology Follow Up       [address] [facility] [address] [address]  [address] [facility] Chirag Vines  1940  [id number]  [address] [facility] [address] [address]  [phone]    Diagnoses and all orders for this visit:    Encounter for follow-up surveillance of melanoma    Personal history of malignant melanoma        Chief Complaint   Patient presents with    Follow-up       Return in about 6 months (around 1/17/2021) for Office Visit  Personal history of malignant melanoma    10/31/2017 Initial Diagnosis     Malignant melanoma of right upper extremity ([address] )      12/5/2017 Surgery     Wide excision of right upper arm  [id number]         Diagnosis and Staging: [id number] melanoma right upper extremity December 2017   Treatment History: Wide excision melanoma December 2017   Current Therapy: Observation   Disease Status: TONY     History of Present Illness:  Patient returns in follow-up of his melanoma  He is doing well at this time with no complaints  He denies any abdominal pain, nausea, vomiting, back pain, bone pain, headaches, or cough  He has lost approximately 5-6 lb unintentionally  He continues to follow-up with his dermatologist, Dr Mitzi Box      Review of Systems  Complete ROS Surg Onc:   Complete ROS Surg Onc:   Constitutional: The patient denies new or recent history of general fatigue, no recent weight loss, no change in appetite  Eyes: No complaints of visual problems, no scleral icterus  ENT: no complaints of ear pain, no hoarseness, no difficulty swallowing,  no tinnitus and no new masses in head, oral cavity, or neck  Cardiovascular: No complaints of chest pain, no palpitations, no ankle edema  Respiratory: No complaints of shortness of breath, no cough  Gastrointestinal: No complaints of jaundice, no bloody stools, no pale stools  Genitourinary: No complaints of dysuria, no hematuria, no nocturia, no frequent urination, no urethral discharge  Musculoskeletal: No complaints of weakness, paralysis, joint stiffness or arthralgias  Integumentary: No complaints of rash, no new lesions  Neurological: No complaints of convulsions, no seizures, no dizziness  Hematologic/Lymphatic: No complaints of easy bruising  Endocrine:  No hot or cold intolerance  No polydipsia, polyphagia, or polyuria  Allergy/immunology:  No environmental allergies  No food allergies  Not immunocompromised  Skin:  No pallor or rash  No wound          Patient Active Problem List   Diagnosis    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    Microscopic hematuria    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    Arthritis    Burt esophagus    Cancer, colon ([facility] )    Cardiac arrhythmia    Cardiomyopathy [facility]    CKD (chronic kidney disease) stage 3, GFR 30-59 ml/min (HCC)    Esophageal reflux    Hyperlipidemia    Hypertension    Hypothyroidism    Impaired fasting glucose    Personal history of malignant melanoma    Non-toxic multinodular goiter    Polymyalgia rheumatica (HCC)    Right bundle branch block with left anterior fascicular block    Skin lesion    Thrombocytopenia (HCC)    Osteopenia    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    Hypertensive kidney disease with stage 3 chronic kidney disease (HCC)    Chronic systolic CHF (congestive heart failure) ([facility] )    Encounter for follow-up surveillance of melanoma     Past Medical History:   Diagnosis Date    AICD (automatic cardioverter/defibrillator) present     Arthritis     Asthma     Burt esophagus     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     Cardiac arrhythmia     Cardiomyopathy ([facility] )     CKD (chronic kidney disease)     Colon cancer (HCC)     Congestive heart failure (CHF) (HCC)     COPD (chronic obstructive pulmonary disease) ([facility] )     Diverticulitis     Last assessed: 4/5/13    Esophageal reflux     Gout     Hernia     Hyperlipidemia     Hypertension     Hypertension     Hypothyroidism     Pleural effusion     Polymyalgia rheumatica (HCC)     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     Spondyloarthropathy     Last assessed: 11/28/12     Past Surgical History:   Procedure Laterality Date    ARM SKIN LESION BIOPSY / EXCISION      Malignant    ATRIAL ABLATION SURGERY      AV NODE ABLATION      CARDIAC DEFIBRILLATOR PLACEMENT  2009    Cardio-Defib Pulse Gen Venous Insertion of Electrode for Ventricular Pacing  Implantable    CARDIAC SURGERY  2009    Catheterization    COLONOSCOPY N/A 3/14/2016    Procedure: COLONOSCOPY;  Surgeon: Lilliam Fallon MD;  [facility]; Service  February 22, 2013, mildly enlarged prostate, history of colon CA with normal appearance of anastomosis at the midtransverse colon, severe diverticulosis in the sigmoid, descending and transverse colon   Repeat colonoscopy in 3 yrs    HEMICOLECTOMY Right 05/05/2004    INGUINAL HERNIA REPAIR Bilateral     Left 3/2004, right 5/2008    IR VENOGRAM  10/4/2018    KNEE SURGERY  1972    Meniscectomy    [place] ESOPHAGOGASTRODUODENOSCOPY TRANSORAL DIAGNOSTIC N/A 12/5/2016    Procedure: ESOPHAGOGASTRODUODENOSCOPY (EGD); Surgeon: Debbie Carrillo MD;  [facility];   Service: Gastroenterology    TONSILLECTOMY AND ADENOIDECTOMY      UPPER GASTROINTESTINAL ENDOSCOPY       Family History   Problem Relation Age of Onset    Heart failure Mother         Congestive    Hypertension Mother     Osteoporosis Mother     COPD Father     Emphysema Father     Hypertension Father     Heart disease Sister         Heart Valve Replacement    Osteoporosis Sister     Breast cancer Family      Social History     Socioeconomic History    Marital status: /Civil Union     Spouse name: Not on file    Number of children: Not on file    Years of education: Not on file    Highest education level: Not on file   Occupational History    Occupation: Manager-Retired   Social Needs    Financial resource strain: Not on file    Food insecurity:     Worry: Not on file     Inability: Not on file    Transportation needs:     Medical: Not on file     Non-medical: Not on file   Tobacco Use    Smoking status: Never Smoker    Smokeless tobacco: Never Used   Substance and Sexual Activity    Alcohol use: No     Comment: Rare    Drug use: No    Sexual activity: Not Currently   Lifestyle    Physical activity:     Days per week: Not on file     Minutes per session: Not on file    Stress: Not on file   Relationships    Social connections:     Talks on phone: Not on file     Gets together: Not on file     Attends Mos[employer] service: Not on file     Active member of club or organization: Not on file     Attends meetings of clubs or organizations: Not on file     Relationship status: Not on file    Intimate partner violence:     Fear of current or ex partner: Not on file     Emotionally abused: Not on file     Physically abused: Not on file     Forced sexual activity: Not on file   Other Topics Concern    Not on file   Social History Narrative    Not on file       Current Outpatient Medications:     furosemide (LASIX) 40 mg tablet, Take 0 5 tablets (20 mg total) by mouth daily, Disp: 90 tablet, Rfl: 3    lisinopril (ZESTRIL) 30 mg tablet, TAKE ONE TABLET BY MOUTH EVERY DAY, Disp: 90 tablet, Rfl: 3    aspirin 81 MG tablet, Take 81 mg by mouth daily  , Disp: , Rfl:     atorvastatin (LIPITOR) 10 mg tablet, TAKE 1 TABLET DAILY, Disp: 90 tablet, Rfl: 3    bisoprolol (ZEBETA) 10 MG tablet, TAKE 1 TABLET TWICE DAILY, Disp: 180 tablet, Rfl: 2    Calcium Carb-Cholecalciferol (CALCIUM 600 + D PO), Take 2 tablets by mouth daily , Disp: , Rfl:     Coenzyme Q10 (CO Q-10) 200 MG CAPS, Take 1 tablet by mouth daily, Disp: , Rfl:     finasteride (PROSCAR) 5 mg tablet, TAKE ONE TABLET BY MOUTH EVERY DAY, Disp: 90 tablet, Rfl: 1    Multiple Vitamins-Minerals (CENTRUM SILVER PO), Take 1 tablet by mouth daily  , Disp: , Rfl:     omeprazole (PriLOSEC) 20 mg delayed release capsule, TAKE ONE CAPSULE BY MOUTH EVERY DAY, Disp: 90 capsule, Rfl: 3    PREVIDENT 5000 ENAMEL PROTECT 1 1-5 % PSTE, Use as directed, Disp: , Rfl: 3    tadalafil (CIALIS) 20 MG tablet, Take 1 tablet (20 mg total) by mouth daily as needed for erectile dysfunction (Patient not taking: Reported on 7/17/2020), Disp: 10 tablet, Rfl: 0    Vitamin D, Cholecalciferol, 1000 UNITS CAPS, Take 1 tablet by mouth daily  , Disp: , Rfl:   No Known Allergies  Vitals:    07/17/20 0832   BP: 114/62   Pulse: 68   Temp: (!) 97 2 °F (36 2 °C)       Physical Exam  Constitutional: General appearance: The Patient is well-developed and well-nourished who appears the stated age in no acute distress  Patient is pleasant and talkative  HEENT:  Normocephalic  Sclerae are anicteric  Mucous membranes are moist  Neck is supple without adenopathy  No JVD  Chest: The lungs are clear to auscultation  Cardiac: Heart is regular rate  Abdomen: Abdomen is soft, non-tender, non-distended and without masses  Extremities: There is no clubbing or cyanosis  There is no edema  Symmetric    Neuro: Grossly nonfocal  Gait is normal      Lymphatic: No evidence of cervical adenopathy bilaterally  No evidence of axillary adenopathy bilaterally  No evidence of inguinal adenopathy bilaterally  Skin: Warm, anicteric  Wide excision on the right upper arm has healed well  No evidence of local recurrence or in-transit disease  Psych:  Patient is pleasant and talkative  Breasts:        Pathology:  [unfilled]    Labs:      Imaging  No results found  I reviewed the above laboratory and imaging data  Discussion/Summary: 71-year-old male status post wide excision of a [id number] Roland Rupa is clinically TONY at 2 and 1/2 years  Abhishek Cheek is no evidence of recurrence by physical exam, or symptomatology  Radha Mayorga will continue to follow up with his dermatologist, Dr Sahara Paulino  Marcie Loan him again in 6 months for another clinical exam  If he has any new, persistent, or unexplained symptoms he will contact us and directed imaging may be performed  He is agreeable to this   All his questions were answered

## 2020-07-31 ENCOUNTER — OFFICE VISIT (OUTPATIENT)
Dept: FAMILY MEDICINE CLINIC | Facility: CLINIC | Age: 80
End: 2020-07-31
Payer: COMMERCIAL

## 2020-07-31 VITALS
RESPIRATION RATE: 16 BRPM | SYSTOLIC BLOOD PRESSURE: 132 MMHG | OXYGEN SATURATION: 95 % | HEART RATE: 84 BPM | TEMPERATURE: 97.6 F | DIASTOLIC BLOOD PRESSURE: 82 MMHG | BODY MASS INDEX: 28.09 KG/M2 | WEIGHT: 196.2 LBS | HEIGHT: 70 IN

## 2020-07-31 DIAGNOSIS — I10 ESSENTIAL HYPERTENSION: ICD-10-CM

## 2020-07-31 DIAGNOSIS — I12.9 HYPERTENSIVE KIDNEY DISEASE WITH STAGE 3 CHRONIC KIDNEY DISEASE (HCC): ICD-10-CM

## 2020-07-31 DIAGNOSIS — N18.30 HYPERTENSIVE KIDNEY DISEASE WITH STAGE 3 CHRONIC KIDNEY DISEASE (HCC): ICD-10-CM

## 2020-07-31 DIAGNOSIS — R73.01 IMPAIRED FASTING GLUCOSE: Primary | ICD-10-CM

## 2020-07-31 DIAGNOSIS — K22.70 BARRETT'S ESOPHAGUS WITHOUT DYSPLASIA: ICD-10-CM

## 2020-07-31 DIAGNOSIS — Z00.00 MEDICARE ANNUAL WELLNESS VISIT, SUBSEQUENT: ICD-10-CM

## 2020-07-31 DIAGNOSIS — E78.2 MIXED HYPERLIPIDEMIA: ICD-10-CM

## 2020-07-31 DIAGNOSIS — I50.22 CHRONIC SYSTOLIC CHF (CONGESTIVE HEART FAILURE) (HCC): ICD-10-CM

## 2020-07-31 PROCEDURE — 1125F AMNT PAIN NOTED PAIN PRSNT: CPT | Performed by: FAMILY MEDICINE

## 2020-07-31 PROCEDURE — 3075F SYST BP GE 130 - 139MM HG: CPT | Performed by: FAMILY MEDICINE

## 2020-07-31 PROCEDURE — 1160F RVW MEDS BY RX/DR IN RCRD: CPT | Performed by: FAMILY MEDICINE

## 2020-07-31 PROCEDURE — 3079F DIAST BP 80-89 MM HG: CPT | Performed by: FAMILY MEDICINE

## 2020-07-31 PROCEDURE — 4040F PNEUMOC VAC/ADMIN/RCVD: CPT | Performed by: FAMILY MEDICINE

## 2020-07-31 PROCEDURE — 1036F TOBACCO NON-USER: CPT | Performed by: FAMILY MEDICINE

## 2020-07-31 PROCEDURE — 3008F BODY MASS INDEX DOCD: CPT | Performed by: FAMILY MEDICINE

## 2020-07-31 PROCEDURE — G0439 PPPS, SUBSEQ VISIT: HCPCS | Performed by: FAMILY MEDICINE

## 2020-07-31 PROCEDURE — 1170F FXNL STATUS ASSESSED: CPT | Performed by: FAMILY MEDICINE

## 2020-07-31 PROCEDURE — 99214 OFFICE O/P EST MOD 30 MIN: CPT | Performed by: FAMILY MEDICINE

## 2020-07-31 NOTE — PROGRESS NOTES
Chief Complaint   Patient presents with   [facility]     Subsequent   Follow-up     6 months and review labs  Health Maintenance   Topic Date Due    SLP PLAN OF CARE  1940    DTaP,Tdap,and Td Vaccines (1 - Tdap) 06/26/1952    Influenza Vaccine  07/01/2020    Medicare Annual Wellness Visit (AWV)  07/11/2020    Depression Screening PHQ  01/17/2021    BMI: Followup Plan  01/17/2021    Fall Risk  01/17/2021    BMI: Adult  07/31/2021    CRC Screening: Colonoscopy  06/17/2022    DXA SCAN  03/02/2025    Pneumococcal Vaccine: 65+ Years  Completed    Pneumococcal Vaccine: Pediatrics (0 to 5 Years) and At-Risk Patients (6 to 59 Years)  Aged Out    HIB Vaccine  Aged Out    Hepatitis B Vaccine  Aged Out    IPV Vaccine  Aged Out    Hepatitis A Vaccine  Aged Out    Meningococcal ACWY Vaccine  Aged Out    HPV Vaccine  Aged Out        Assessment and Plan:     Problem List Items Addressed This Visit        Digestive    Burt esophagus     [facility]  Continue omeprazole 20mg QD  EGD done in 6/2020, repeat in 3 years  Endocrine    Impaired fasting glucose - Primary     7/2020 hgA1C 5 2 normal  Low carb diet  Relevant Orders    Comprehensive metabolic panel    Hemoglobin A1C With EAG    Lipid panel       Cardiovascular and Mediastinum    Hypertension     Controlled  DASH diet  Continue bisoprolol 10mg bid and lisinopril 30mg Qd  [facility] cardiology  Relevant Orders    Comprehensive metabolic panel    Hemoglobin A1C With EAG    Lipid panel    Chronic systolic CHF (congestive heart failure) (HCC)     Wt Readings from Last 3 Encounters:   07/31/20 89 kg (196 lb 3 2 oz)   07/17/20 89 7 kg (197 lb 12 8 oz)   05/20/20 93 6 kg (206 lb 4 8 oz)     [facility] cardiology  Continue lasix 20mg QD  Genitourinary    Hypertensive kidney disease with stage 3 chronic kidney disease ([facility] [address] )     7/2020 Cr 1 57 and GFR 41 stable               Other    Hyperlipidemia    Relevant Orders    Comprehensive metabolic panel    Hemoglobin A1C With EAG    Lipid panel      Other Visit Diagnoses     Medicare annual wellness visit, subsequent               Preventive health issues were discussed with patient, and age appropriate screening tests were ordered as noted in patient's After Visit Summary  Personalized health advice and appropriate referrals for health education or preventive services given if needed, as noted in patient's After Visit Summary       History of Present Illness:     Patient presents for Medicare Annual Wellness visit    Patient Care Team:  Bia Munoz MD as PCP - General  MD Joshua Fry MD Glenda Shavers, MD Tresa Cord, MD as Endoscopist  Jacqueline Lanza MD (Colon and Rectal Surgery)     Problem List:     Patient Active Problem List   Diagnosis    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    Microscopic hematuria    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    Arthritis    Burt esophagus    Cardiac arrhythmia    Cardiomyopathy ([facility]    Esophageal reflux    Hyperlipidemia    Hypertension    Impaired fasting glucose    Personal history of malignant melanoma    Polymyalgia rheumatica ([facility]    Right bundle branch block with left anterior fascicular block    Skin lesion    Thrombocytopenia ([facility]    Osteopenia    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    Hypertensive kidney disease with stage 3 chronic kidney disease ([facility]    Chronic systolic CHF (congestive heart failure) ([facility] )    Encounter for follow-up surveillance of melanoma      Past Medical and Surgical History:     Past Medical History:   Diagnosis Date    AICD (automatic cardioverter/defibrillator) present     Arthritis     Asthma     Burt esophagus     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     Cancer, colon ([facility] ) 4/5/2013    Cardiac arrhythmia     Cardiomyopathy ([facility]     CKD (chronic kidney disease)     Colon cancer (HCC)     Congestive heart failure (CHF) (HCC)     COPD (chronic obstructive pulmonary disease) ([facility] [address] )     Diverticulitis     Last assessed: 4/5/13    Esophageal reflux     Gout     Hernia     Hyperlipidemia     Hypertension     Hypertension     Hypothyroidism     Hypothyroidism 5/30/2013    Non-toxic multinodular goiter 11/28/2012    Pleural effusion     Polymyalgia rheumatica (HCC)     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     Spondyloarthropathy     Last assessed: 11/28/12     Past Surgical History:   Procedure Laterality Date    ARM SKIN LESION BIOPSY / EXCISION      Malignant    ATRIAL ABLATION SURGERY      AV NODE ABLATION      CARDIAC DEFIBRILLATOR PLACEMENT  2009    Cardio-Defib Pulse Gen Venous Insertion of Electrode for Ventricular Pacing  Implantable    CARDIAC SURGERY  2009    Catheterization    COLONOSCOPY N/A 3/14/2016    Procedure: COLONOSCOPY;  Surgeon: Landrum Kayser, MD;  Location: [facility]; Service  February 22, 2013, mildly enlarged prostate, history of colon CA with normal appearance of anastomosis at the midtransverse colon, severe diverticulosis in the sigmoid, descending and transverse colon  Repeat colonoscopy in 3 yrs    HEMICOLECTOMY Right 05/05/2004    INGUINAL HERNIA REPAIR Bilateral     Left 3/2004, right 5/2008    IR VENOGRAM  10/4/2018    KNEE SURGERY  1972    Meniscectomy    OR ESOPHAGOGASTRODUODENOSCOPY TRANSORAL DIAGNOSTIC N/A 12/5/2016    Procedure: ESOPHAGOGASTRODUODENOSCOPY (EGD); Surgeon: Aj Gao MD;  Location: [facility];   Service: Gastroenterology    TONSILLECTOMY AND ADENOIDECTOMY      UPPER GASTROINTESTINAL ENDOSCOPY        Family History:     Family History   Problem Relation Age of Onset    Heart failure Mother         Congestive    Hypertension Mother     Osteoporosis Mother     COPD Father     Emphysema Father     Hypertension Father     Heart disease Sister         Heart Valve Replacement    Osteoporosis Sister     Breast cancer Family       Social History:        Social History     Socioeconomic History    Marital status: /Civil Union     Spouse name: None    Number of children: None    Years of education: None    Highest education level: None   Occupational History    Occupation: Manager-Retired   Social Needs    Financial resource strain: None    Food insecurity:     Worry: None     Inability: None    Transportation needs:     Medical: None     Non-medical: None   Tobacco Use    Smoking status: Never Smoker    Smokeless tobacco: Never Used   Substance and Sexual Activity    Alcohol use: No     Comment: Rare    Drug use: No    Sexual activity: Not Currently   Lifestyle    Physical activity:     Days per week: None     Minutes per session: None    Stress: None   Relationships    Social connections:     Talks on phone: None     Gets together: None     Attends Voodoo service: None     Active member of club or organization: None     Attends meetings of clubs or organizations: None     Relationship status: None    Intimate partner violence:     Fear of current or ex partner: None     Emotionally abused: None     Physically abused: None     Forced sexual activity: None   Other Topics Concern    None   Social History Narrative    None      Medications and Allergies:     Current Outpatient Medications   Medication Sig Dispense Refill    aspirin 81 MG tablet Take 81 mg by mouth daily        atorvastatin (LIPITOR) 10 mg tablet TAKE 1 TABLET DAILY 90 tablet 3    bisoprolol (ZEBETA) 10 MG tablet TAKE 1 TABLET TWICE DAILY 180 tablet 2    Calcium Carb-Cholecalciferol (CALCIUM 600 + D PO) Take 2 tablets by mouth daily       Coenzyme Q10 (CO Q-10) 200 MG CAPS Take 1 tablet by mouth daily      finasteride (PROSCAR) 5 mg tablet TAKE ONE TABLET BY MOUTH EVERY DAY 90 tablet 1    furosemide (LASIX) 40 mg tablet Take 0 5 tablets (20 mg total) by mouth daily 90 tablet 3    lisinopril (ZESTRIL) 30 mg tablet TAKE ONE TABLET BY MOUTH EVERY DAY 90 tablet 3    Multiple Vitamins-Minerals (CENTRUM SILVER PO) Take 1 tablet by mouth daily   omeprazole (PriLOSEC) 20 mg delayed release capsule TAKE ONE CAPSULE BY MOUTH EVERY DAY 90 capsule 3    PREVIDENT 5000 ENAMEL PROTECT 1 1-5 % PSTE Use as directed  3    Vitamin D, Cholecalciferol, 1000 UNITS CAPS Take 1 tablet by mouth daily   tadalafil (CIALIS) 20 MG tablet Take 1 tablet (20 mg total) by mouth daily as needed for erectile dysfunction (Patient not taking: Reported on 7/17/2020) 10 tablet 0     No current facility-administered medications for this visit  No Known Allergies   Immunizations:     Immunization History   Administered Date(s) Administered    INFLUENZA 10/20/2018    Influenza Split High Dose Preservative Free IM 10/28/2014, 10/26/2015, 11/11/2016, 10/23/2017    Influenza TIV (IM) 11/27/2001, 10/04/2007, 12/09/2008, 10/29/2010, 10/19/2011, 09/29/2012, 10/31/2013    Influenza, high dose seasonal 0 5 mL 11/05/2019    Pneumococcal Conjugate 13-Valent 05/13/2015    Pneumococcal Polysaccharide PPV23 11/28/2006, 12/21/2016    Zoster 12/01/2011, 03/11/2020    Zoster Vaccine Recombinant 03/11/2020, 07/06/2020      Health Maintenance:         Topic Date Due    CRC Screening: Colonoscopy  06/17/2022    DXA SCAN  03/02/2025         Topic Date Due    DTaP,Tdap,and Td Vaccines (1 - Tdap) 06/26/1952    Influenza Vaccine  07/01/2020      Medicare Health Risk Assessment:     /82 (BP Location: Left arm, Patient Position: Sitting, Cuff Size: Adult)   Pulse 84   Temp 97 6 °F (36 4 °C) (Oral)   Resp 16   Ht 5' 10 25" (1 784 m)   Wt 89 kg (196 lb 3 2 oz)   SpO2 95%   BMI 27 95 kg/m²      Ghassan Padilla is here for his Subsequent Wellness visit  Health Risk Assessment:   Patient rates overall health as good  Patient feels that their physical health rating is same   Eyesight was rated as same  Hearing was rated as same  Patient feels that their emotional and mental health rating is same  Pain experienced in the last 7 days has been none  Patient states that he has experienced no weight loss or gain in last 6 months  Depression Screening:   PHQ-2 Score: 0      Fall Risk Screening: In the past year, patient has experienced: no history of falling in past year      Home Safety:  Patient does not have trouble with stairs inside or outside of their home  Patient has working smoke alarms and has working carbon monoxide detector  Home safety hazards include: none  Nutrition:   Current diet is Regular and Limited junk food  Medications:   Patient is currently taking over-the-counter supplements  OTC medications include: Aspirin, Caltrate, Centrum Silver, CoQ-10, Vitamin D3  Patient is able to manage medications  Activities of Daily Living (ADLs)/Instrumental Activities of Daily Living (IADLs):   Walk and transfer into and out of bed and chair?: Yes  Dress and groom yourself?: Yes    Bathe or shower yourself?: Yes    Feed yourself?  Yes  Do your laundry/housekeeping?: Yes  Manage your money, pay your bills and track your expenses?: Yes  Make your own meals?: Yes    Do your own shopping?: Yes    Previous Hospitalizations:   Any hospitalizations or ED visits within the last 12 months?: No      Advance Care Planning:   Living will: Yes    Durable POA for healthcare: No    Advanced directive: Yes    End of Life Decisions reviewed with patient: Yes    Provider agrees with end of life decisions: Yes      Cognitive Screening:   Provider or family/friend/caregiver concerned regarding cognition?: No    PREVENTIVE SCREENINGS      Cardiovascular Screening:    General: History Lipid Disorder and Screening Current      Diabetes Screening:     General: Screening Current      Colorectal Cancer Screening:     General: Screening Current and History Colorectal Cancer      Prostate Cancer Screening:    General: Screening Not Indicated      Osteoporosis Screening:    General: Screening Current      Lung Cancer Screening:     General: Screening Not Indicated      Fernanda Alves MD

## 2020-07-31 NOTE — ASSESSMENT & PLAN NOTE
Wt Readings from Last 3 Encounters:   07/31/20 89 kg (196 lb 3 2 oz)   07/17/20 89 7 kg (197 lb 12 8 oz)   05/20/20 93 6 kg (206 lb 4 8 oz)     [facility] cardiology  Continue lasix 20mg QD

## 2020-07-31 NOTE — PROGRESS NOTES
Assessment/Plan:    Impaired fasting glucose  7/2020 hgA1C 5 2 normal  Low carb diet  Hypertension  Controlled  DASH diet  Continue bisoprolol 10mg bid and lisinopril 30mg Qd  [facility] cardiology  Chronic systolic CHF (congestive heart failure) (HCC)  Wt Readings from Last 3 Encounters:   07/31/20 89 kg (196 lb 3 2 oz)   07/17/20 89 7 kg (197 lb 12 8 oz)   05/20/20 93 6 kg (206 lb 4 8 oz)     [facility] cardiology  Continue lasix 20mg QD  Burt esophagus  [facility] GI  Continue omeprazole 20mg QD  EGD done in 6/2020, repeat in 3 years  Hypertensive kidney disease with stage 3 chronic kidney disease ([facility] [address] )  7/2020 Cr 1 57 and GFR 41 stable  Reviewed lab in 7/2020  hgA1C 5 2 normal  TSH normal  Lipid 150/98/51/79 good  CMP stable Cr 1 57 and GFR 41  CBC normal    Got flu shot yearly    Got PCV13 5/2015  Got pneumovax 2016  Got zoster in 2011  Got shingrix in 2020  Will check insurance for coverage of Tdap  FU Dr Natanael Hobbs for colonoscopy in 6/2019  Repeat in 3 years     1/2018 Dexa showed osteopenia  RTO in 6 months      Living will---DNR if end of life  No POA yet  Diagnoses and all orders for this visit:    Impaired fasting glucose  -     Comprehensive metabolic panel; Future  -     Hemoglobin A1C With EAG; Future  -     Lipid panel; Future  -     Cancel: CEA; Future    Essential hypertension  -     Comprehensive metabolic panel; Future  -     Hemoglobin A1C With EAG; Future  -     Lipid panel; Future  -     Cancel: CEA; Future    Hypertensive kidney disease with stage 3 chronic kidney disease (HCC)    Mixed hyperlipidemia  -     Comprehensive metabolic panel; Future  -     Hemoglobin A1C With EAG; Future  -     Lipid panel; Future  -     Cancel: CEA; Future    Chronic systolic CHF (congestive heart failure) (HCC)    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          Subjective:      Patient ID: Andre Youngblood is a 79 y o  male      HPI    Pt is here by himself    Thyroid nodule---7/2019 US stable, do not need follow up  Hypothyroidism---7/2020 TSH normal  Not on meds  Feels fine       IFG---Follows low carb diet       HTN---Does not check BP at home  He is on bisoprolol 10mg bid and lisinopril 30mg QD  [facility] cardiology for cardiomyopathy, tachycardia, s/p pacemaker  Stable    He is on lasix 20mg QD       CKD3---stable       Hyperlipidemia---on atorvastatin 10mg now  Denies SE        [facility] Cancer care Q 6 months for melanoma s/p wide excision on right upper arm  Stable       Hx of colon cancer s/p surgery and chemo in 2004  He had colonoscopy 6/2019 which showed diverticulosis  Repeat in 3 years       [facility] GI Dr Roderick Meier for Burt's, do EGD every 3 years  Last EGD was in 12/2016  Pt is on omeprazole 40mg daily now       [facility] urology for BPH yearly       Lives with wife  Does all ADL's  Still drive  Denies recent falls    Denies depression  The following portions of the patient's history were reviewed and updated as appropriate: allergies, current medications, past family history, past medical history, past social history, past surgical history and problem list     Review of Systems   Constitutional: Negative for appetite change, chills and fever  HENT: Negative for congestion, ear pain, sinus pain and sore throat  Eyes: Negative for discharge and itching  Respiratory: Negative for apnea, cough, chest tightness, shortness of breath and wheezing  Cardiovascular: Negative for chest pain, palpitations and leg swelling  Gastrointestinal: Negative for abdominal pain, anal bleeding, constipation, diarrhea, nausea and vomiting  Endocrine: Negative for cold intolerance, heat intolerance and polyuria  Genitourinary: Negative for difficulty urinating and dysuria  Musculoskeletal: Negative for arthralgias, back pain and myalgias  Skin: Negative for rash  Neurological: Negative for dizziness and headaches  Psychiatric/Behavioral: Negative for agitation           Objective:      /82 (BP Location: Left arm, Patient Position: Sitting, Cuff Size: Adult)   Pulse 84   Temp 97 6 °F (36 4 °C) (Oral)   Resp 16   Ht 5' 10 25" (1 784 m)   Wt 89 kg (196 lb 3 2 oz)   SpO2 95%   BMI 27 95 kg/m²          Physical Exam   Constitutional: He appears well-developed  HENT:   Head: Normocephalic and atraumatic  Eyes: Pupils are equal, round, and reactive to light  Conjunctivae are normal    Neck: Normal range of motion  Neck supple  No thyromegaly present  Cardiovascular: Normal rate, regular rhythm, normal heart sounds and intact distal pulses  Exam reveals no gallop and no friction rub  No murmur heard  Pulmonary/Chest: Effort normal and breath sounds normal  No stridor  No respiratory distress  He has no wheezes  He has no rales  Abdominal: Soft  Bowel sounds are normal    Musculoskeletal: Normal range of motion  He exhibits no edema, tenderness or deformity  Lymphadenopathy:     He has no cervical adenopathy  Neurological: He is alert

## 2020-07-31 NOTE — PATIENT INSTRUCTIONS

## 2020-08-07 DIAGNOSIS — E27.0 HYPERCORTISOLEMIA (HCC): ICD-10-CM

## 2020-08-07 DIAGNOSIS — I10 HYPERTENSION, UNSPECIFIED TYPE: ICD-10-CM

## 2020-08-07 RX ORDER — BISOPROLOL FUMARATE 10 MG/1
TABLET ORAL
Qty: 180 TABLET | Refills: 3 | Status: SHIPPED | OUTPATIENT
Start: 2020-08-07 | End: 2021-07-19

## 2020-08-07 RX ORDER — ATORVASTATIN CALCIUM 10 MG/1
TABLET, FILM COATED ORAL
Qty: 90 TABLET | Refills: 3 | Status: SHIPPED | OUTPATIENT
Start: 2020-08-07 | End: 2021-07-19

## 2020-08-26 ENCOUNTER — REMOTE DEVICE CLINIC VISIT (OUTPATIENT)
Dept: CARDIOLOGY CLINIC | Facility: CLINIC | Age: 80
End: 2020-08-26
Payer: COMMERCIAL

## 2020-08-26 DIAGNOSIS — Z95.810 AICD (AUTOMATIC CARDIOVERTER/DEFIBRILLATOR) PRESENT: Primary | ICD-10-CM

## 2020-08-26 PROCEDURE — 93296 REM INTERROG EVL PM/IDS: CPT | Performed by: INTERNAL MEDICINE

## 2020-08-26 PROCEDURE — 93295 DEV INTERROG REMOTE 1/2/MLT: CPT | Performed by: INTERNAL MEDICINE

## 2020-08-26 NOTE — PROGRESS NOTES
Results for orders placed or performed in visit on 08/26/20   Cardiac EP device report    Narrative    MDT-DUAL CHAMBER ICD (AAIR-DDDR MODE)/ ACTIVE SYSTEM IS MRI CONDITIONAL  CARELINK TRANSMISSION: BATTERY VOLTAGE ADEQUATE (8 6 YRS)  AP-64%, -3%  ALL AVAILABLE LEAD PARAMETERS WITHIN NORMAL LIMITS  NO SIGNIFICANT HIGH RATE EPISODES  OPTI-VOL WITHIN NORMAL LIMITS  NORMAL DEVICE FUNCTION   GV

## 2020-09-07 DIAGNOSIS — N13.8 BPH WITH OBSTRUCTION/LOWER URINARY TRACT SYMPTOMS: ICD-10-CM

## 2020-09-07 DIAGNOSIS — N40.1 BPH WITH OBSTRUCTION/LOWER URINARY TRACT SYMPTOMS: ICD-10-CM

## 2020-09-08 RX ORDER — FINASTERIDE 5 MG/1
TABLET, FILM COATED ORAL
Qty: 90 TABLET | Refills: 1 | Status: SHIPPED | OUTPATIENT
Start: 2020-09-08 | End: 2021-03-30

## 2020-10-01 ENCOUNTER — HOSPITAL ENCOUNTER (OUTPATIENT)
Dept: NON INVASIVE DIAGNOSTICS | Facility: HOSPITAL | Age: 80
Discharge: HOME | End: 2020-10-01
Attending: INTERNAL MEDICINE
Payer: COMMERCIAL

## 2020-10-01 DIAGNOSIS — I50.22 CHRONIC SYSTOLIC CHF (CONGESTIVE HEART FAILURE) (HCC): ICD-10-CM

## 2020-10-01 DIAGNOSIS — I42.0 DILATED CARDIOMYOPATHY (HCC): ICD-10-CM

## 2020-10-01 PROCEDURE — 93306 TTE W/DOPPLER COMPLETE: CPT | Performed by: INTERNAL MEDICINE

## 2020-10-01 PROCEDURE — 93306 TTE W/DOPPLER COMPLETE: CPT

## 2020-10-12 ENCOUNTER — IMMUNIZATIONS (OUTPATIENT)
Dept: FAMILY MEDICINE CLINIC | Facility: CLINIC | Age: 80
End: 2020-10-12
Payer: COMMERCIAL

## 2020-10-12 DIAGNOSIS — Z23 ENCOUNTER FOR IMMUNIZATION: ICD-10-CM

## 2020-10-12 DIAGNOSIS — Z23 NEED FOR INFLUENZA VACCINATION: Primary | ICD-10-CM

## 2020-10-12 DIAGNOSIS — R06.02 SOB (SHORTNESS OF BREATH): ICD-10-CM

## 2020-10-12 DIAGNOSIS — I42.9 CARDIOMYOPATHY, UNSPECIFIED TYPE (HCC): ICD-10-CM

## 2020-10-12 PROCEDURE — 90662 IIV NO PRSV INCREASED AG IM: CPT

## 2020-10-12 PROCEDURE — G0008 ADMIN INFLUENZA VIRUS VAC: HCPCS

## 2020-10-12 RX ORDER — FUROSEMIDE 40 MG/1
20 TABLET ORAL DAILY
Qty: 90 TABLET | Refills: 3
Start: 2020-10-12 | End: 2020-10-19 | Stop reason: SDUPTHER

## 2020-10-19 DIAGNOSIS — I42.9 CARDIOMYOPATHY, UNSPECIFIED TYPE (HCC): ICD-10-CM

## 2020-10-19 DIAGNOSIS — R06.02 SOB (SHORTNESS OF BREATH): ICD-10-CM

## 2020-10-19 RX ORDER — FUROSEMIDE 20 MG/1
20 TABLET ORAL DAILY
Qty: 90 TABLET | Refills: 3 | Status: SHIPPED | OUTPATIENT
Start: 2020-10-19 | End: 2021-08-12

## 2020-10-19 RX ORDER — FUROSEMIDE 40 MG/1
20 TABLET ORAL DAILY
Qty: 90 TABLET | Refills: 3 | Status: CANCELLED
Start: 2020-10-19

## 2020-10-22 DIAGNOSIS — K22.70 BARRETT'S ESOPHAGUS WITHOUT DYSPLASIA: ICD-10-CM

## 2020-10-23 RX ORDER — OMEPRAZOLE 20 MG/1
20 CAPSULE, DELAYED RELEASE ORAL DAILY
Qty: 90 CAPSULE | Refills: 1 | Status: SHIPPED | OUTPATIENT
Start: 2020-10-23 | End: 2021-04-06 | Stop reason: SDUPTHER

## 2020-10-26 ENCOUNTER — TELEMEDICINE (OUTPATIENT)
Dept: FAMILY MEDICINE CLINIC | Facility: CLINIC | Age: 80
End: 2020-10-26
Payer: COMMERCIAL

## 2020-10-26 VITALS — HEART RATE: 85 BPM | TEMPERATURE: 97.8 F | SYSTOLIC BLOOD PRESSURE: 125 MMHG | DIASTOLIC BLOOD PRESSURE: 82 MMHG

## 2020-10-26 DIAGNOSIS — J20.9 BRONCHITIS WITH BRONCHOSPASM: Primary | ICD-10-CM

## 2020-10-26 PROCEDURE — 3079F DIAST BP 80-89 MM HG: CPT | Performed by: FAMILY MEDICINE

## 2020-10-26 PROCEDURE — 99213 OFFICE O/P EST LOW 20 MIN: CPT | Performed by: FAMILY MEDICINE

## 2020-10-26 PROCEDURE — 3074F SYST BP LT 130 MM HG: CPT | Performed by: FAMILY MEDICINE

## 2020-10-26 RX ORDER — METHYLPREDNISOLONE 4 MG/1
TABLET ORAL
Qty: 21 EACH | Refills: 0 | Status: SHIPPED | OUTPATIENT
Start: 2020-10-26 | End: 2021-02-02 | Stop reason: ALTCHOICE

## 2020-10-26 RX ORDER — AZITHROMYCIN 250 MG/1
TABLET, FILM COATED ORAL
Qty: 6 TABLET | Refills: 0 | Status: SHIPPED | OUTPATIENT
Start: 2020-10-26 | End: 2020-10-30

## 2020-10-26 RX ORDER — ALBUTEROL SULFATE 90 UG/1
2 AEROSOL, METERED RESPIRATORY (INHALATION) EVERY 6 HOURS PRN
Qty: 1 INHALER | Refills: 0 | Status: SHIPPED | OUTPATIENT
Start: 2020-10-26 | End: 2021-02-02 | Stop reason: ALTCHOICE

## 2020-10-26 RX ORDER — DEXTROMETHORPHAN HYDROBROMIDE AND PROMETHAZINE HYDROCHLORIDE 15; 6.25 MG/5ML; MG/5ML
5 SOLUTION ORAL 4 TIMES DAILY PRN
Qty: 180 ML | Refills: 0 | Status: SHIPPED | OUTPATIENT
Start: 2020-10-26 | End: 2021-02-02 | Stop reason: ALTCHOICE

## 2020-10-28 ENCOUNTER — OFFICE VISIT (OUTPATIENT)
Dept: CARDIOLOGY CLINIC | Facility: CLINIC | Age: 80
End: 2020-10-28
Payer: COMMERCIAL

## 2020-10-28 VITALS
OXYGEN SATURATION: 97 % | SYSTOLIC BLOOD PRESSURE: 110 MMHG | TEMPERATURE: 97.1 F | BODY MASS INDEX: 27.01 KG/M2 | DIASTOLIC BLOOD PRESSURE: 72 MMHG | HEART RATE: 86 BPM | WEIGHT: 192.9 LBS | HEIGHT: 71 IN

## 2020-10-28 DIAGNOSIS — I42.0 DILATED CARDIOMYOPATHY (HCC): ICD-10-CM

## 2020-10-28 DIAGNOSIS — E78.2 MIXED HYPERLIPIDEMIA: Primary | ICD-10-CM

## 2020-10-28 DIAGNOSIS — I10 HTN (HYPERTENSION), BENIGN: ICD-10-CM

## 2020-10-28 DIAGNOSIS — I50.22 CHRONIC SYSTOLIC CHF (CONGESTIVE HEART FAILURE) (HCC): ICD-10-CM

## 2020-10-28 PROCEDURE — 1160F RVW MEDS BY RX/DR IN RCRD: CPT | Performed by: INTERNAL MEDICINE

## 2020-10-28 PROCEDURE — 99214 OFFICE O/P EST MOD 30 MIN: CPT | Performed by: INTERNAL MEDICINE

## 2020-10-28 RX ORDER — SACUBITRIL AND VALSARTAN 49; 51 MG/1; MG/1
1 TABLET, FILM COATED ORAL 2 TIMES DAILY
Qty: 60 TABLET | Refills: 3 | Status: SHIPPED | OUTPATIENT
Start: 2020-10-28 | End: 2020-10-28 | Stop reason: CLARIF

## 2020-10-28 RX ORDER — SACUBITRIL AND VALSARTAN 49; 51 MG/1; MG/1
1 TABLET, FILM COATED ORAL 2 TIMES DAILY
Qty: 60 TABLET | Refills: 3 | Status: SHIPPED | OUTPATIENT
Start: 2020-10-28 | End: 2020-11-17 | Stop reason: SDUPTHER

## 2020-11-17 DIAGNOSIS — I50.22 CHRONIC SYSTOLIC CHF (CONGESTIVE HEART FAILURE) (HCC): ICD-10-CM

## 2020-11-17 RX ORDER — SACUBITRIL AND VALSARTAN 49; 51 MG/1; MG/1
1 TABLET, FILM COATED ORAL 2 TIMES DAILY
Qty: 180 TABLET | Refills: 3 | Status: SHIPPED | OUTPATIENT
Start: 2020-11-17 | End: 2021-11-26 | Stop reason: SDUPTHER

## 2020-11-25 ENCOUNTER — REMOTE DEVICE CLINIC VISIT (OUTPATIENT)
Dept: CARDIOLOGY CLINIC | Facility: CLINIC | Age: 80
End: 2020-11-25
Payer: COMMERCIAL

## 2020-11-25 DIAGNOSIS — Z95.810 AICD (AUTOMATIC CARDIOVERTER/DEFIBRILLATOR) PRESENT: Primary | ICD-10-CM

## 2020-11-25 PROCEDURE — 93295 DEV INTERROG REMOTE 1/2/MLT: CPT | Performed by: INTERNAL MEDICINE

## 2020-11-25 PROCEDURE — 93296 REM INTERROG EVL PM/IDS: CPT | Performed by: INTERNAL MEDICINE

## 2021-01-20 DIAGNOSIS — Z23 ENCOUNTER FOR IMMUNIZATION: ICD-10-CM

## 2021-01-27 ENCOUNTER — LAB (OUTPATIENT)
Dept: LAB | Facility: HOSPITAL | Age: 81
End: 2021-01-27
Payer: COMMERCIAL

## 2021-01-27 ENCOUNTER — TELEPHONE (OUTPATIENT)
Dept: SURGICAL ONCOLOGY | Facility: CLINIC | Age: 81
End: 2021-01-27

## 2021-01-27 DIAGNOSIS — R73.01 IMPAIRED FASTING GLUCOSE: ICD-10-CM

## 2021-01-27 DIAGNOSIS — E78.2 MIXED HYPERLIPIDEMIA: ICD-10-CM

## 2021-01-27 DIAGNOSIS — I10 ESSENTIAL HYPERTENSION: ICD-10-CM

## 2021-01-27 LAB
ALBUMIN SERPL BCP-MCNC: 3.7 G/DL (ref 3.5–5)
ALP SERPL-CCNC: 73 U/L (ref 46–116)
ALT SERPL W P-5'-P-CCNC: 28 U/L (ref 12–78)
ANION GAP SERPL CALCULATED.3IONS-SCNC: 3 MMOL/L (ref 4–13)
AST SERPL W P-5'-P-CCNC: 23 U/L (ref 5–45)
BILIRUB SERPL-MCNC: 1.16 MG/DL (ref 0.2–1)
BUN SERPL-MCNC: 21 MG/DL (ref 5–25)
CALCIUM SERPL-MCNC: 9.7 MG/DL (ref 8.3–10.1)
CHLORIDE SERPL-SCNC: 114 MMOL/L (ref 100–108)
CHOLEST SERPL-MCNC: 175 MG/DL (ref 50–200)
CO2 SERPL-SCNC: 30 MMOL/L (ref 21–32)
CREAT SERPL-MCNC: 1.58 MG/DL (ref 0.6–1.3)
EST. AVERAGE GLUCOSE BLD GHB EST-MCNC: 105 MG/DL
GFR SERPL CREATININE-BSD FRML MDRD: 41 ML/MIN/1.73SQ M
GLUCOSE P FAST SERPL-MCNC: 87 MG/DL (ref 65–99)
HBA1C MFR BLD: 5.3 %
HDLC SERPL-MCNC: 65 MG/DL
LDLC SERPL CALC-MCNC: 82 MG/DL (ref 0–100)
NONHDLC SERPL-MCNC: 110 MG/DL
POTASSIUM SERPL-SCNC: 4.8 MMOL/L (ref 3.5–5.3)
PROT SERPL-MCNC: 7.3 G/DL (ref 6.4–8.2)
SODIUM SERPL-SCNC: 147 MMOL/L (ref 136–145)
TRIGL SERPL-MCNC: 138 MG/DL

## 2021-01-27 PROCEDURE — 36415 COLL VENOUS BLD VENIPUNCTURE: CPT

## 2021-01-27 PROCEDURE — 80061 LIPID PANEL: CPT

## 2021-01-27 PROCEDURE — 80053 COMPREHEN METABOLIC PANEL: CPT

## 2021-01-27 PROCEDURE — 83036 HEMOGLOBIN GLYCOSYLATED A1C: CPT

## 2021-01-29 ENCOUNTER — OFFICE VISIT (OUTPATIENT)
Dept: SURGICAL ONCOLOGY | Facility: CLINIC | Age: 81
End: 2021-01-29
Payer: COMMERCIAL

## 2021-01-29 VITALS
HEART RATE: 80 BPM | TEMPERATURE: 97.7 F | SYSTOLIC BLOOD PRESSURE: 122 MMHG | DIASTOLIC BLOOD PRESSURE: 82 MMHG | RESPIRATION RATE: 17 BRPM

## 2021-01-29 DIAGNOSIS — Z85.820 PERSONAL HISTORY OF MALIGNANT MELANOMA: ICD-10-CM

## 2021-01-29 DIAGNOSIS — Z85.820 ENCOUNTER FOR FOLLOW-UP SURVEILLANCE OF MELANOMA: Primary | ICD-10-CM

## 2021-01-29 DIAGNOSIS — Z08 ENCOUNTER FOR FOLLOW-UP SURVEILLANCE OF MELANOMA: Primary | ICD-10-CM

## 2021-01-29 PROCEDURE — 99213 OFFICE O/P EST LOW 20 MIN: CPT | Performed by: SURGERY

## 2021-01-29 NOTE — PROGRESS NOTES
Surgical Oncology Follow Up       [address] [facility] [place]  [address] [facility] [place]  [facility] [address]  [phone]    Joseph Mak  1940  [id number]  [address] [facility] [address] [id number] [phone]    Diagnoses and all orders for this visit:    Encounter for follow-up surveillance of melanoma    Personal history of malignant melanoma        Chief Complaint   Patient presents with    Follow-up       Return in about 1 year (around 1/29/2022) for Office Visit  Oncology History   Personal history of malignant melanoma   10/31/2017 Initial Diagnosis    Malignant melanoma of right upper extremity ([address] )     12/5/2017 Surgery    Wide excision of right upper arm  [id number]         Diagnosis and Staging: [id number] melanoma right upper extremity December 2017   Treatment History: Wide excision melanoma December 2017   Current Therapy: Observation   Disease Status: TONY     History of Present Illness:  Patient returns in follow-up of his melanoma  He is doing well at this time with no complaints  He denies any new abdominal pain, nausea, vomiting, back pain, bone pain, headaches, or cough  His appetite is good  No unintentional weight loss  He continues to follow-up with his dermatologist, Dr Dylan Galdamez  Review of Systems  Complete ROS Surg Onc:   Complete ROS Surg Onc:   Constitutional: The patient denies new or recent history of general fatigue, no recent weight loss, no change in appetite  Eyes: No complaints of visual problems, no scleral icterus  ENT: no complaints of ear pain, no hoarseness, no difficulty swallowing,  no tinnitus and no new masses in head, oral cavity, or neck  Cardiovascular: No complaints of chest pain, no palpitations, no ankle edema  Respiratory: No complaints of shortness of breath, no cough     Gastrointestinal: No complaints of jaundice, no bloody stools, no pale stools  Genitourinary: No complaints of dysuria, no hematuria, no nocturia, no frequent urination, no urethral discharge  Musculoskeletal: No complaints of weakness, paralysis, joint stiffness or arthralgias  Integumentary: No complaints of rash, no new lesions  Neurological: No complaints of convulsions, no seizures, no dizziness  Hematologic/Lymphatic: No complaints of easy bruising  Endocrine:  No hot or cold intolerance  No polydipsia, polyphagia, or polyuria  Allergy/immunology:  No environmental allergies  No food allergies  Not immunocompromised  Skin:  No pallor or rash  No wound          Patient Active Problem List   Diagnosis    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    Microscopic hematuria    Ank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 ([facility] )    Arthritis    Burt esophagus    Cardiac arrhythmia    Cardiomyopathy ([facility] )    Esophageal reflux    Hyperlipidemia    Hypertension    Impaired fasting glucose    Personal history of malignant melanoma    Polymyalgia rheumatica (HCC)    Right bundle branch block with left anterior fascicular block    Skin lesion    Thrombocytopenia (HCC)    Osteopenia    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    Hypertensive kidney disease with stage 3 chronic kidney disease    Chronic systolic CHF (congestive heart failure) ([facility] )    Encounter for follow-up surveillance of melanoma     Past Medical History:   Diagnosis Date    AICD (automatic cardioverter/defibrillator) present     Arthritis     Asthma     Burt esophagus     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     Cancer[facility] colon ([facility] ) 4/5/2013    Cardiac arrhythmia     Cardiomyopathy ([facility] )     CKD (chronic kidney disease)     Colon cancer ([facility] )     Congestive heart failure (CHF) ([facility] )     COPD (chronic obstructive pulmonary disease) ([facility] )     Diverticulitis     Last assessed: 4/5/13    Esophageal reflux     Gout     Hernia     Hyperlipidemia     Hypertension     Hypertension     Hypothyroidism     Hypothyroidism 5/30/2013    Non-toxic multinodular goiter 11/28/2012    Pleural effusion     Polymyalgia rheumatica (HCC)     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     Spondyloarthropathy     Last assessed: 11/28/12     Past Surgical History:   Procedure Laterality Date    ARM SKIN LESION BIOPSY / EXCISION      Malignant    ATRIAL ABLATION SURGERY      AV NODE ABLATION      CARDIAC DEFIBRILLATOR PLACEMENT  2009    Cardio-Defib Pulse Gen Venous Insertion of Electrode for Ventricular Pacing  Implantable    CARDIAC SURGERY  2009    Catheterization    COLONOSCOPY N/A 3/14/2016    Procedure: COLONOSCOPY;  Surgeon: Holli Ngo MD;  [facility]; Service  February 22, 2013, mildly enlarged prostate, history of colon CA with normal appearance of anastomosis at the midtransverse colon, severe diverticulosis in the sigmoid, descending and transverse colon  Repeat colonoscopy in 3 yrs    HEMICOLECTOMY Right 05/05/2004    INGUINAL HERNIA REPAIR Bilateral     Left 3/2004, right 5/2008    IR DIAGNOSTIC UPPER EXTREMITY VENOGRAM  10/4/2018    KNEE SURGERY  1972    Meniscectomy    PA ESOPHAGOGASTRODUODENOSCOPY TRANSORAL DIAGNOSTIC N/A 12/5/2016    Procedure: ESOPHAGOGASTRODUODENOSCOPY (EGD); Surgeon: Kayla Carrington MD;  [facility];   Service: Gastroenterology    TONSILLECTOMY AND ADENOIDECTOMY      UPPER GASTROINTESTINAL ENDOSCOPY       Family History   Problem Relation Age of Onset    Heart failure Mother         Congestive    Hypertension Mother     Osteoporosis Mother     COPD Father     Emphysema Father     Hypertension Father     Heart disease Sister         Heart Valve Replacement    Osteoporosis Sister     Breast cancer Family      Social History     Socioeconomic History    Marital status: /Civil Union     Spouse name: Not on file    Number of children: Not on file    Years of education: Not on file    Highest education level: Not on file   Occupational History    Occupation: Manager-Retired   Social Needs    Financial resource strain: Not on file    Food insecurity     Worry: Not on file     Inability: Not on file   [employer] needs     Medical: Not on file     Non-medical: Not on file   Tobacco Use    Smoking status: Never Smoker    Smokeless tobacco: Never Used   Substance and Sexual Activity    Alcohol use: No     Comment: Rare    Drug use: No    Sexual activity: Not Currently   Lifestyle    Physical activity     Days per week: Not on file     Minutes per session: Not on file    Stress: Not on file   Relationships    Social connections     Talks on phone: Not on file     Gets together: Not on file     Attends Cheondoism service: Not on file     Active member of club or organization: Not on file     Attends meetings of clubs or organizations: Not on file     Relationship status: Not on file    Intimate partner violence     Fear of current or ex partner: Not on file     Emotionally abused: Not on file     Physically abused: Not on file     Forced sexual activity: Not on file   Other Topics Concern    Not on file   Social History Narrative    Not on file       Current Outpatient Medications:     aspirin 81 MG tablet, Take 81 mg by mouth daily  , Disp: , Rfl:     atorvastatin (LIPITOR) 10 mg tablet, TAKE ONE TABLET BY MOUTH EVERY DAY, Disp: 90 tablet, Rfl: 3    bisoprolol (ZEBETA) 10 MG tablet, TAKE ONE TABLET BY MOUTH TWICE A DAY, Disp: 180 tablet, Rfl: 3    Calcium Carb-Cholecalciferol (CALCIUM 600 + D PO), Take 2 tablets by mouth daily , Disp: , Rfl:     Coenzyme Q10 (CO Q-10) 200 MG CAPS, Take 1 tablet by mouth daily, Disp: , Rfl:     finasteride (PROSCAR) 5 mg tablet, TAKE ONE TABLET BY MOUTH EVERY DAY, Disp: 90 tablet, Rfl: 1    Multiple Vitamins-Minerals (CENTRUM SILVER PO), Take 1 tablet by mouth daily  , Disp: , Rfl:     omeprazole (PriLOSEC) 20 mg delayed release capsule, Take 1 capsule (20 mg total) by mouth daily, Disp: 90 capsule, Rfl: 1    PREVIDENT 5000 ENAMEL PROTECT 1 1-5 % PSTE, Use as directed, Disp: , Rfl: 3    sacubitril-valsartan (Entresto) 49-51 MG TABS, Take 1 tablet by mouth 2 (two) times a day, Disp: 180 tablet, Rfl: 3    Vitamin D, Cholecalciferol, 1000 UNITS CAPS, Take 1 tablet by mouth daily  , Disp: , Rfl:     albuterol (PROVENTIL HFA,VENTOLIN HFA) 90 mcg/act inhaler, Inhale 2 puffs every 6 (six) hours as needed for wheezing or shortness of breath (Patient not taking: Reported on 10/28/2020), Disp: 1 Inhaler, Rfl: 0    furosemide (LASIX) 20 mg tablet, Take 1 tablet (20 mg total) by mouth daily, Disp: 90 tablet, Rfl: 3    methylPREDNISolone 4 MG tablet therapy pack, Use as directed on package, Disp: 21 each, Rfl: 0    Promethazine-DM (PHENERGAN-DM) 6 25-15 mg/5 mL oral syrup, Take 5 mL by mouth 4 (four) times a day as needed for cough (Patient not taking: Reported on 10/28/2020), Disp: 180 mL, Rfl: 0  No Known Allergies  Vitals:    01/29/21 1434   BP: 122/82   Pulse: 80   Resp: 17   Temp: 97 7 °F (36 5 °C)       Physical Exam  Constitutional: General appearance: The Patient is well-developed and well-nourished who appears the stated age in no acute distress  Patient is pleasant and talkative  HEENT:  Normocephalic  Sclerae are anicteric  Mucous membranes are moist  Neck is supple without adenopathy  No JVD  Chest: The lungs are clear to auscultation  Cardiac: Heart is regular rate  Abdomen: Abdomen is soft, non-tender, non-distended and without masses  Extremities: There is no clubbing or cyanosis  There is no edema  Symmetric  Neuro: Grossly nonfocal  Gait is normal      Lymphatic: No evidence of cervical adenopathy bilaterally  No evidence of axillary adenopathy bilaterally  Skin: Warm, anicteric   Wide excision site has healed well  No evidence of local recurrence or in-transit disease  Psych:  Patient is pleasant and talkative  Breasts:        Pathology:  [unfilled]    Labs:      Imaging  No results found  I reviewed the above laboratory and imaging data  Discussion/Summary: 80-year-old male status post wide excision of a [id number] melanoma   He is clinically TONY at 3 years  Natalya Fox is no evidence of recurrence by physical exam, or symptomatology  Serge Torrez will continue to follow up with his dermatologist, Dr Scott Deluca  We discussed 6 month verses 1 year follow-up  NCCN recommends 6-12 months  After some discussion we have elected on 1 year follow-up since he is 3 years out  I will see him again in 1 year for another clinical exam  If he has any new, persistent, or unexplained symptoms he will contact us and directed imaging may be performed  He is agreeable to this   All his questions were answered

## 2021-01-29 NOTE — LETTER
January 29, 2021     [facility], [address]    Patient: Tien Guan   YOB: 1940   Date of Visit: 1/29/2021       Dear Dr Solares Head: Thank you for referring Tien Guan to me for evaluation  Below are my notes for this consultation  If you have questions, please do not hesitate to call me  I look forward to following your patient along with you  Sincerely,        Bradly Disla MD        CC: MD Bradly Schumacher MD  1/29/2021  2:56 PM  Sign when Signing Visit               Surgical Oncology Follow Up       [address] [facility] [address] [address] [phone]    Tien Dunnage  1940  [id number]  [address] [facility] [address] [address]  [phone]    Diagnoses and all orders for this visit:    Encounter for follow-up surveillance of melanoma    Personal history of malignant melanoma        Chief Complaint   Patient presents with    Follow-up       Return in about 1 year (around 1/29/2022) for Office Visit  Oncology History   Personal history of malignant melanoma   10/31/2017 Initial Diagnosis    Malignant melanoma of right upper extremity ([address] )     12/5/2017 Surgery    Wide excision of right upper arm  [id number]         Diagnosis and Staging: [id number] melanoma right upper extremity December 2017   Treatment History: Wide excision melanoma December 2017   Current Therapy: Observation   Disease Status: TONY     History of Present Illness:  Patient returns in follow-up of his melanoma  He is doing well at this time with no complaints  He denies any new abdominal pain, nausea, vomiting, back pain, bone pain, headaches, or cough  His appetite is good  No unintentional weight loss    He continues to follow-up with his dermatologist,   Chris  Review of Systems  Complete ROS Surg Onc:   Complete ROS Surg Onc:   Constitutional: The patient denies new or recent history of general fatigue, no recent weight loss, no change in appetite  Eyes: No complaints of visual problems, no scleral icterus  ENT: no complaints of ear pain, no hoarseness, no difficulty swallowing,  no tinnitus and no new masses in head, oral cavity, or neck  Cardiovascular: No complaints of chest pain, no palpitations, no ankle edema  Respiratory: No complaints of shortness of breath, no cough  Gastrointestinal: No complaints of jaundice, no bloody stools, no pale stools  Genitourinary: No complaints of dysuria, no hematuria, no nocturia, no frequent urination, no urethral discharge  Musculoskeletal: No complaints of weakness, paralysis, joint stiffness or arthralgias  Integumentary: No complaints of rash, no new lesions  Neurological: No complaints of convulsions, no seizures, no dizziness  Hematologic/Lymphatic: No complaints of easy bruising  Endocrine:  No hot or cold intolerance  No polydipsia, polyphagia, or polyuria  Allergy/immunology:  No environmental allergies  No food allergies  Not immunocompromised  Skin:  No pallor or rash  No wound          Patient Active Problem List   Diagnosis    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    Microscopic hematuria    Ank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 ([address] )    Arthritis    Burt esophagus    Cardiac arrhythmia    Cardiomyopathy ([address] )    Esophageal reflux    Hyperlipidemia    Hypertension    Impaired fasting glucose    Personal history of malignant melanoma    Polymyalgia rheumatica (HCC)    Right bundle branch block with left anterior fascicular block    Skin lesion    Thrombocytopenia (HCC)    Osteopenia    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    Hypertensive kidney disease with stage 3 chronic kidney disease    Chronic systolic CHF (congestive heart failure) ([facility] )    Encounter for follow-up surveillance of melanoma     Past Medical History:   Diagnosis Date    AICD (automatic cardioverter/defibrillator) present     Arthritis     Asthma     Burt esophagus     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     Cancer, colon ([facility] ) 4/5/2013    Cardiac arrhythmia     Cardiomyopathy ([place] [address] )     CKD (chronic kidney disease)     Colon cancer (HCC)     Congestive heart failure (CHF) (HCC)     COPD (chronic obstructive pulmonary disease) ([place] [address] )     Diverticulitis     Last assessed: 4/5/13    Esophageal reflux     Gout     Hernia     Hyperlipidemia     Hypertension     Hypertension     Hypothyroidism     Hypothyroidism 5/30/2013    Non-toxic multinodular goiter 11/28/2012    Pleural effusion     Polymyalgia rheumatica (HCC)     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     Spondyloarthropathy     Last assessed: 11/28/12     Past Surgical History:   Procedure Laterality Date    ARM SKIN LESION BIOPSY / EXCISION      Malignant    ATRIAL ABLATION SURGERY      AV NODE ABLATION      CARDIAC DEFIBRILLATOR PLACEMENT  2009    Cardio-Defib Pulse Gen Venous Insertion of Electrode for Ventricular Pacing  Implantable    CARDIAC SURGERY  2009    Catheterization    COLONOSCOPY N/A 3/14/2016    Procedure: COLONOSCOPY;  Surgeon: Carina Grey MD;  [facility]; Service  February 22, 2013, mildly enlarged prostate, history of colon CA with normal appearance of anastomosis at the midtransverse colon, severe diverticulosis in the sigmoid, descending and transverse colon   Repeat colonoscopy in 3 yrs    HEMICOLECTOMY Right 05/05/2004    INGUINAL HERNIA REPAIR Bilateral     Left 3/2004, right 5/2008    IR DIAGNOSTIC UPPER EXTREMITY VENOGRAM  10/4/2018    KNEE SURGERY  1972    Meniscectomy    NM ESOPHAGOGASTRODUODENOSCOPY TRANSORAL DIAGNOSTIC N/A 12/5/2016    Procedure: ESOPHAGOGASTRODUODENOSCOPY (EGD); Surgeon: Windy Sandoval MD;  [facility];   Service: Gastroenterology    TONSILLECTOMY AND ADENOIDECTOMY      UPPER GASTROINTESTINAL ENDOSCOPY       Family History   Problem Relation Age of Onset    Heart failure Mother         Congestive    Hypertension Mother     Osteoporosis Mother     COPD Father     Emphysema Father     Hypertension Father     Heart disease Sister         Heart Valve Replacement    Osteoporosis Sister     Breast cancer Family      Social History     Socioeconomic History    Marital status: /Civil Union     Spouse name: Not on file    Number of children: Not on file    Years of education: Not on file    Highest education level: Not on file   Occupational History    Occupation: Manager-Retired   Social Needs    Financial resource strain: Not on file    Food insecurity     Worry: Not on file     Inability: Not on file   [employer] needs     Medical: Not on file     Non-medical: Not on file   Tobacco Use    Smoking status: Never Smoker    Smokeless tobacco: Never Used   Substance and Sexual Activity    Alcohol use: No     Comment: Rare    Drug use: No    Sexual activity: Not Currently   Lifestyle    Physical activity     Days per week: Not on file     Minutes per session: Not on file    Stress: Not on file   Relationships    Social connections     Talks on phone: Not on file     Gets together: Not on file     Attends [employer] service: Not on file     Active member of club or organization: Not on file     Attends meetings of clubs or organizations: Not on file     Relationship status: Not on file    Intimate partner violence     Fear of current or ex partner: Not on file     Emotionally abused: Not on file     Physically abused: Not on file     Forced sexual activity: Not on file   Other Topics Concern    Not on file   Social History Narrative    Not on file       Current Outpatient Medications:     aspirin 81 MG tablet, Take 81 mg by mouth daily  , Disp: , Rfl:     atorvastatin (LIPITOR) 10 mg tablet, TAKE ONE TABLET BY MOUTH EVERY DAY, Disp: 90 tablet, Rfl: 3    bisoprolol (ZEBETA) 10 MG tablet, TAKE ONE TABLET BY MOUTH TWICE A DAY, Disp: 180 tablet, Rfl: 3    Calcium Carb-Cholecalciferol (CALCIUM 600 + D PO), Take 2 tablets by mouth daily , Disp: , Rfl:     Coenzyme Q10 (CO Q-10) 200 MG CAPS, Take 1 tablet by mouth daily, Disp: , Rfl:     finasteride (PROSCAR) 5 mg tablet, TAKE ONE TABLET BY MOUTH EVERY DAY, Disp: 90 tablet, Rfl: 1    Multiple Vitamins-Minerals (CENTRUM SILVER PO), Take 1 tablet by mouth daily  , Disp: , Rfl:     omeprazole (PriLOSEC) 20 mg delayed release capsule, Take 1 capsule (20 mg total) by mouth daily, Disp: 90 capsule, Rfl: 1    PREVIDENT 5000 ENAMEL PROTECT 1 1-5 % PSTE, Use as directed, Disp: , Rfl: 3    sacubitril-valsartan (Entresto) 49-51 MG TABS, Take 1 tablet by mouth 2 (two) times a day, Disp: 180 tablet, Rfl: 3    Vitamin D, Cholecalciferol, 1000 UNITS CAPS, Take 1 tablet by mouth daily  , Disp: , Rfl:     albuterol (PROVENTIL HFA,VENTOLIN HFA) 90 mcg/act inhaler, Inhale 2 puffs every 6 (six) hours as needed for wheezing or shortness of breath (Patient not taking: Reported on 10/28/2020), Disp: 1 Inhaler, Rfl: 0    furosemide (LASIX) 20 mg tablet, Take 1 tablet (20 mg total) by mouth daily, Disp: 90 tablet, Rfl: 3    methylPREDNISolone 4 MG tablet therapy pack, Use as directed on package, Disp: 21 each, Rfl: 0    Promethazine-DM (PHENERGAN-DM) 6 25-15 mg/5 mL oral syrup, Take 5 mL by mouth 4 (four) times a day as needed for cough (Patient not taking: Reported on 10/28/2020), Disp: 180 mL, Rfl: 0  No Known Allergies  Vitals:    01/29/21 1434   BP: 122/82   Pulse: 80   Resp: 17   Temp: 97 7 °F (36 5 °C)       Physical Exam  Constitutional: General appearance: The Patient is well-developed and well-nourished who appears the stated age in no acute distress   Patient is pleasant and talkative  HEENT:  Normocephalic  Sclerae are anicteric  Mucous membranes are moist  Neck is supple without adenopathy  No JVD  Chest: The lungs are clear to auscultation  Cardiac: Heart is regular rate  Abdomen: Abdomen is soft, non-tender, non-distended and without masses  Extremities: There is no clubbing or cyanosis  There is no edema  Symmetric  Neuro: Grossly nonfocal  Gait is normal      Lymphatic: No evidence of cervical adenopathy bilaterally  No evidence of axillary adenopathy bilaterally  Skin: Warm, anicteric  Wide excision site has healed well  No evidence of local recurrence or in-transit disease  Psych:  Patient is pleasant and talkative  Breasts:        Pathology:  [unfilled]    Labs:      Imaging  No results found  I reviewed the above laboratory and imaging data  Discussion/Summary: 78-year-old male status post wide excision of a [id number] melanoma   He is clinically TONY at 3 years  Maribell Silverman is no evidence of recurrence by physical exam, or symptomatology  Lestine Party will continue to follow up with his dermatologist, Dr Hetal Bender  We discussed 6 month verses 1 year follow-up  NCCN recommends 6-12 months  After some discussion we have elected on 1 year follow-up since he is 3 years out  I will see him again in 1 year for another clinical exam  If he has any new, persistent, or unexplained symptoms he will contact us and directed imaging may be performed  He is agreeable to this   All his questions were answered

## 2021-02-03 ENCOUNTER — OFFICE VISIT (OUTPATIENT)
Dept: FAMILY MEDICINE CLINIC | Facility: CLINIC | Age: 81
End: 2021-02-03
Payer: COMMERCIAL

## 2021-02-03 VITALS
HEART RATE: 80 BPM | DIASTOLIC BLOOD PRESSURE: 72 MMHG | SYSTOLIC BLOOD PRESSURE: 96 MMHG | WEIGHT: 204.2 LBS | TEMPERATURE: 97.1 F | HEIGHT: 71 IN | RESPIRATION RATE: 16 BRPM | BODY MASS INDEX: 28.59 KG/M2 | OXYGEN SATURATION: 97 %

## 2021-02-03 DIAGNOSIS — J45.30 MILD PERSISTENT ASTHMA WITHOUT COMPLICATION: ICD-10-CM

## 2021-02-03 DIAGNOSIS — I10 ESSENTIAL HYPERTENSION: ICD-10-CM

## 2021-02-03 DIAGNOSIS — R73.01 IMPAIRED FASTING GLUCOSE: ICD-10-CM

## 2021-02-03 DIAGNOSIS — E78.2 MIXED HYPERLIPIDEMIA: ICD-10-CM

## 2021-02-03 DIAGNOSIS — E87.0 HYPERNATREMIA: Primary | ICD-10-CM

## 2021-02-03 DIAGNOSIS — I50.22 CHRONIC SYSTOLIC CHF (CONGESTIVE HEART FAILURE) (HCC): ICD-10-CM

## 2021-02-03 PROCEDURE — 99214 OFFICE O/P EST MOD 30 MIN: CPT | Performed by: FAMILY MEDICINE

## 2021-02-03 PROCEDURE — 3725F SCREEN DEPRESSION PERFORMED: CPT | Performed by: FAMILY MEDICINE

## 2021-02-03 PROCEDURE — 3288F FALL RISK ASSESSMENT DOCD: CPT | Performed by: FAMILY MEDICINE

## 2021-02-03 PROCEDURE — 1101F PT FALLS ASSESS-DOCD LE1/YR: CPT | Performed by: FAMILY MEDICINE

## 2021-02-03 NOTE — ASSESSMENT & PLAN NOTE
Wt Readings from Last 3 Encounters:   02/03/21 92 6 kg (204 lb 3 2 oz)   10/28/20 87 5 kg (192 lb 14 4 oz)   07/31/20 89 kg (196 lb 3 2 oz)       Wt stable at home per pt  [facility] cardiology  Continue entresto and lasix

## 2021-02-03 NOTE — PROGRESS NOTES
Chief Complaint   Patient presents with    Follow-up     6 months and review labs  Health Maintenance   Topic Date Due    SLP PLAN OF CARE  1940    DTaP,Tdap,and Td Vaccines (1 - Tdap) 06/26/1962    BMI: Followup Plan  01/17/2021    Depression Screening PHQ  07/31/2021    COVID-19 Vaccine (2 of 2 - Moderna series) 02/23/2021    Medicare Annual Wellness Visit (AWV)  07/31/2021    Fall Risk  02/03/2022    BMI: Adult  02/03/2022    Colonoscopy Surveillance  06/17/2022    DXA SCAN  03/02/2025    Pneumococcal Vaccine: 65+ Years  Completed    Influenza Vaccine  Completed    HIB Vaccine  Aged Out    Hepatitis B Vaccine  Aged Out    IPV Vaccine  Aged Out    Hepatitis A Vaccine  Aged Out    Meningococcal ACWY Vaccine  Aged Out    HPV Vaccine  Aged Out       BMI Counseling: Body mass index is 28 89 kg/m²  The BMI is above normal  Nutrition recommendations include decreasing portion sizes, encouraging healthy choices of fruits and vegetables, decreasing fast food intake, consuming healthier snacks and limiting drinks that contain sugar  Exercise recommendations include moderate physical activity 150 minutes/week  No pharmacotherapy was ordered  Assessment/Plan:    Hypertension  Controlled  DASH diet  [facility] cardiology  Continue bisoprolol  Chronic systolic CHF (congestive heart failure) (HCC)  Wt Readings from Last 3 Encounters:   02/03/21 92 6 kg (204 lb 3 2 oz)   10/28/20 87 5 kg (192 lb 14 4 oz)   07/31/20 89 kg (196 lb 3 2 oz)       Wt stable at home per pt  [facility] cardiology  Continue entresto and lasix  Impaired fasting glucose  1/2021 hgA1C 5 3 normal  Low carb diet  Hyperlipidemia  1/2021 lipid 175/138/65/82 ok  Low fat diet  Continue atorvastatin 10mg qhs per cardiology  Mild persistent asthma without complication  FU pulmonary  Continue wixela 500-50 QD       Reviewed lab in 1/2021  hgA1C 5 3 normal  CMP showed Na 147 slightly elevate  Lipid 175/138/65/82 ok    Got flu shot yearly    Got PCV13 5/2015  Got pneumovax 2016  Got zoster in 2011  Got shingrix in 2020  Got Covid19 vaccine 1 st dose  Will do Tdap next time because he just got Covid19 vaccine  FU Dr Herminia So for colonoscopy in 6/2019  Repeat in 3 years     1/2018 Dexa showed osteopenia  RTO in 6 months         Diagnoses and all orders for this visit:    Hypernatremia  -     Electrolyte panel; Future    Essential hypertension  -     CBC; Future  -     Comprehensive metabolic panel; Future  -     Hemoglobin A1C; Future  -     Lipid panel; Future  -     TSH, 3rd generation with Free T4 reflex; Future    Chronic systolic CHF (congestive heart failure) (HCC)  -     CBC; Future  -     Comprehensive metabolic panel; Future  -     Hemoglobin A1C; Future  -     Lipid panel; Future  -     TSH, 3rd generation with Free T4 reflex; Future    Impaired fasting glucose  -     CBC; Future  -     Comprehensive metabolic panel; Future  -     Hemoglobin A1C; Future  -     Lipid panel; Future  -     TSH, 3rd generation with Free T4 reflex; Future    Mixed hyperlipidemia  -     CBC; Future  -     Comprehensive metabolic panel; Future  -     Hemoglobin A1C; Future  -     Lipid panel; Future  -     TSH, 3rd generation with Free T4 reflex; Future    Mild persistent asthma without complication          Subjective:      Patient ID: Kelsey Luevano is a 79 y o  male  HPI    Pt is here by himself       IFG---Follows low carb diet       HTN---Does not check BP at home  He is on bisoprolol 10mg bid and entresto 49-51mg bid  FU cardiology for cardiomyopathy, tachycardia, s/p pacemaker  Stable    He is on lasix 20mg QD and entresto  Wt at home 195-198 lbs per pt       CKD3---stable       Hyperlipidemia---on atorvastatin 10mg now  Denies SE       Asthma----Saw pulmonary, got Wixela 500-50 QD which helped  Never smoked       [facility] Cancer care Q 6m-1y for melanoma s/p wide excision on right upper arm   Stable       Hx of colon cancer s/p surgery and chemo in 2004  He had colonoscopy 6/2019 which showed diverticulosis  Repeat in 3 years       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, do EGD every 3 years  Last EGD was in 12/2016  Pt is on omeprazole 40mg daily now       [facility] urology for BPH yearly       Lives with wife  Does all ADL's  Still drive  Denies recent falls    Denies depression  The following portions of the patient's history were reviewed and updated as appropriate: allergies, current medications, past family history, past medical history, past social history, past surgical history and problem list     Review of Systems   Constitutional: Negative for appetite change, chills and fever  HENT: Negative for congestion, ear pain, sinus pain and sore throat  Eyes: Negative for discharge and itching  Respiratory: Negative for apnea, cough, chest tightness, shortness of breath and wheezing  Cardiovascular: Negative for chest pain, palpitations and leg swelling  Gastrointestinal: Negative for abdominal pain, anal bleeding, constipation, diarrhea, nausea and vomiting  Endocrine: Negative for cold intolerance, heat intolerance and polyuria  Genitourinary: Negative for difficulty urinating and dysuria  Musculoskeletal: Negative for arthralgias, back pain and myalgias  Skin: Negative for rash  Neurological: Negative for dizziness and headaches  Psychiatric/Behavioral: Negative for agitation  Objective:      BP 96/72 (BP Location: Left arm, Patient Position: Sitting, Cuff Size: Adult)   Pulse 80   Temp (!) 97 1 °F (36 2 °C) (Temporal)   Resp 16   Ht 5' 10 5" (1 791 m)   Wt 92 6 kg (204 lb 3 2 oz)   SpO2 97%   BMI 28 89 kg/m²          Physical Exam  Constitutional:       Appearance: He is well-developed  HENT:      Head: Normocephalic and atraumatic  Eyes:      General:         Right eye: No discharge  Left eye: No discharge        Conjunctiva/sclera: Conjunctivae normal    Neck:      Musculoskeletal: Normal range of motion and neck supple  No muscular tenderness  Cardiovascular:      Rate and Rhythm: Normal rate and regular rhythm  Heart sounds: Normal heart sounds  No murmur  No friction rub  No gallop  Pulmonary:      Effort: Pulmonary effort is normal  No respiratory distress  Breath sounds: Normal breath sounds  No wheezing or rales  Abdominal:      General: Bowel sounds are normal  There is no distension  Palpations: Abdomen is soft  Tenderness: There is no abdominal tenderness  There is no guarding  Musculoskeletal: Normal range of motion  General: No swelling, tenderness or deformity  Lymphadenopathy:      Cervical: No cervical adenopathy  Neurological:      Mental Status: He is alert

## 2021-02-05 ENCOUNTER — LAB (OUTPATIENT)
Dept: LAB | Facility: HOSPITAL | Age: 81
End: 2021-02-05
Payer: COMMERCIAL

## 2021-02-05 DIAGNOSIS — E87.0 HYPERNATREMIA: ICD-10-CM

## 2021-02-05 LAB
ANION GAP SERPL CALCULATED.3IONS-SCNC: 1 MMOL/L (ref 4–13)
CHLORIDE SERPL-SCNC: 110 MMOL/L (ref 100–108)
CO2 SERPL-SCNC: 30 MMOL/L (ref 21–32)
POTASSIUM SERPL-SCNC: 4.7 MMOL/L (ref 3.5–5.3)
SODIUM SERPL-SCNC: 141 MMOL/L (ref 136–145)

## 2021-02-05 PROCEDURE — 36415 COLL VENOUS BLD VENIPUNCTURE: CPT

## 2021-02-05 PROCEDURE — 80051 ELECTROLYTE PANEL: CPT

## 2021-02-20 ENCOUNTER — TELEMEDICINE (OUTPATIENT)
Dept: FAMILY MEDICINE CLINIC | Facility: CLINIC | Age: 81
End: 2021-02-20
Payer: COMMERCIAL

## 2021-02-20 VITALS
HEART RATE: 75 BPM | SYSTOLIC BLOOD PRESSURE: 131 MMHG | TEMPERATURE: 97.6 F | OXYGEN SATURATION: 95 % | DIASTOLIC BLOOD PRESSURE: 86 MMHG

## 2021-02-20 DIAGNOSIS — J45.31 MILD PERSISTENT ASTHMA WITH ACUTE EXACERBATION: Primary | ICD-10-CM

## 2021-02-20 PROCEDURE — 99213 OFFICE O/P EST LOW 20 MIN: CPT | Performed by: FAMILY MEDICINE

## 2021-02-20 RX ORDER — AZITHROMYCIN 250 MG/1
TABLET, FILM COATED ORAL
Qty: 6 TABLET | Refills: 0 | Status: SHIPPED | OUTPATIENT
Start: 2021-02-20 | End: 2021-02-24

## 2021-02-20 RX ORDER — ALBUTEROL SULFATE 90 UG/1
2 AEROSOL, METERED RESPIRATORY (INHALATION) EVERY 6 HOURS PRN
Qty: 1 INHALER | Refills: 1 | Status: SHIPPED | OUTPATIENT
Start: 2021-02-20

## 2021-02-20 RX ORDER — METHYLPREDNISOLONE 4 MG/1
TABLET ORAL
Qty: 21 EACH | Refills: 0 | Status: SHIPPED | OUTPATIENT
Start: 2021-02-20 | End: 2021-02-26

## 2021-02-20 NOTE — PROGRESS NOTES
Virtual Regular Visit      Assessment/Plan:    Problem List Items Addressed This Visit        Respiratory    Mild persistent asthma without complication - Primary     Advised pt to increase wixela 500-50 to bid  Give zpack,  Medrol dose pack and albuterol inhaler prn  Relevant Medications    Wixela Inhub 500-50 MCG/DOSE inhaler    albuterol (PROVENTIL HFA,VENTOLIN HFA) 90 mcg/act inhaler    azithromycin (ZITHROMAX) 250 mg tablet    methylPREDNISolone 4 MG tablet therapy pack               Reason for visit is   Chief Complaint   Patient presents with    Virtual Regular Visit        Encounter provider Lisseth Fernandez MD    Provider located at [address] [place] [address]  [address] [id number]      Recent Visits  No visits were found meeting these conditions  Showing recent visits within past 7 days and meeting all other requirements     Today's Visits  Date Type Provider Dept   02/20/21 [facility] [address], [address] [place] [address] today's visits and meeting all other requirements     Future Appointments  No visits were found meeting these conditions  Showing future appointments within next 150 days and meeting all other requirements        The patient was identified by name and date of birth  Presley Bain was informed that this is a telemedicine visit and that the visit is being conducted through [address] [place] [address] and patient was informed that this is not a secure, HIPAA-compliant platform  He agrees to proceed     My office door was closed  No one else was in the room  He acknowledged consent and understanding of privacy and security of the video platform  The patient has agreed to participate and understands they can discontinue the visit at any time  Patient is aware this is a billable service  Subjective  Presley Bain is a 79 y o  male for wheezing   HPI     Wheezing for 1 week  Sore throat and it made him cough  Denies fever  Denies phlegm or Sob  Denies n/v/abd pain  Denies smoking  Denies contact with covid19 pt  Hx of asthma  He uses wixela 500-50 QD  Use albuterol at night which helped  Past Medical History:   Diagnosis Date    AICD (automatic cardioverter/defibrillator) present     Arthritis     Asthma     Burt esophagus     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     [facility] ) 4/5/2013    Cardiac arrhythmia     Cardiomyopathy ([place] [id number] )     CKD (chronic kidney disease)     Colon cancer (HCC)     Congestive heart failure (CHF) (HCC)     COPD (chronic obstructive pulmonary disease) ([place] [address] )     Diverticulitis     Last assessed: 4/5/13    Esophageal reflux     Gout     Hernia     Hyperlipidemia     Hypertension     Hypertension     Hypothyroidism     Hypothyroidism 5/30/2013    Non-toxic multinodular goiter 11/28/2012    Pleural effusion     Polymyalgia rheumatica (HCC)     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     Spondyloarthropathy     Last assessed: 11/28/12       Past Surgical History:   Procedure Laterality Date    ARM SKIN LESION BIOPSY / EXCISION      Malignant    ATRIAL ABLATION SURGERY      AV NODE ABLATION      CARDIAC DEFIBRILLATOR PLACEMENT  2009    Cardio-Defib Pulse Gen Venous Insertion of Electrode for Ventricular Pacing  Implantable    CARDIAC SURGERY  2009    Catheterization    COLONOSCOPY N/A 3/14/2016    Procedure: COLONOSCOPY;  Surgeon: Manolo Myers MD;  Location: [facility]; Service  February 22, 2013, mildly enlarged prostate, history of colon CA with normal appearance of anastomosis at the midtransverse colon, severe diverticulosis in the sigmoid, descending and transverse colon   Repeat colonoscopy in 3 yrs    HEMICOLECTOMY Right 05/05/2004    INGUINAL HERNIA REPAIR Bilateral     Left 3/2004, right 5/2008    IR DIAGNOSTIC UPPER EXTREMITY VENOGRAM  10/4/2018    KNEE SURGERY 1972    Meniscectomy    [place] ESOPHAGOGASTRODUODENOSCOPY TRANSORAL DIAGNOSTIC N/A 12/5/2016    Procedure: ESOPHAGOGASTRODUODENOSCOPY (EGD); Surgeon: Chad Erickson MD;  Location: [facility]; Service: Gastroenterology    TONSILLECTOMY AND ADENOIDECTOMY      UPPER GASTROINTESTINAL ENDOSCOPY         Current Outpatient Medications   Medication Sig Dispense Refill    albuterol (PROVENTIL HFA,VENTOLIN HFA) 90 mcg/act inhaler Inhale 2 puffs every 6 (six) hours as needed for wheezing or shortness of breath 1 Inhaler 1    aspirin 81 MG tablet Take 81 mg by mouth daily   atorvastatin (LIPITOR) 10 mg tablet TAKE ONE TABLET BY MOUTH EVERY DAY 90 tablet 3    azithromycin (ZITHROMAX) 250 mg tablet Take 2 tabs on day 1, then take 1 tab daily from day 2-day 5  6 tablet 0    bisoprolol (ZEBETA) 10 MG tablet TAKE ONE TABLET BY MOUTH TWICE A  tablet 3    Calcium Carb-Cholecalciferol (CALCIUM 600 + D PO) Take 2 tablets by mouth daily       Coenzyme Q10 (CO Q-10) 200 MG CAPS Take 1 tablet by mouth daily      finasteride (PROSCAR) 5 mg tablet TAKE ONE TABLET BY MOUTH EVERY DAY 90 tablet 1    furosemide (LASIX) 20 mg tablet Take 1 tablet (20 mg total) by mouth daily 90 tablet 3    methylPREDNISolone 4 MG tablet therapy pack Use as directed on package 21 each 0    Multiple Vitamins-Minerals (CENTRUM SILVER PO) Take 1 tablet by mouth daily   omeprazole (PriLOSEC) 20 mg delayed release capsule Take 1 capsule (20 mg total) by mouth daily 90 capsule 1    PREVIDENT 5000 ENAMEL PROTECT 1 1-5 % PSTE Use as directed  3    sacubitril-valsartan (Entresto) 49-51 MG TABS Take 1 tablet by mouth 2 (two) times a day 180 tablet 3    Vitamin D, Cholecalciferol, 1000 UNITS CAPS Take 1 tablet by mouth daily   Wixela Inhub 500-50 MCG/DOSE inhaler Inhale 1 puff 2 (two) times a day 1 Inhaler 0     No current facility-administered medications for this visit           No Known Allergies    Review of Systems   Constitutional: Negative for appetite change, chills and fever  HENT: Positive for sore throat  Negative for congestion, ear pain and sinus pain  Eyes: Negative for discharge and itching  Respiratory: Positive for wheezing  Negative for apnea, cough, chest tightness and shortness of breath  Cardiovascular: Negative for chest pain, palpitations and leg swelling  Gastrointestinal: Negative for abdominal pain, anal bleeding, constipation, diarrhea, nausea and vomiting  Endocrine: Negative for cold intolerance, heat intolerance and polyuria  Genitourinary: Negative for difficulty urinating and dysuria  Musculoskeletal: Negative for arthralgias, back pain and myalgias  Skin: Negative for rash  Neurological: Negative for dizziness and headaches  Psychiatric/Behavioral: Negative for agitation  Video Exam    Vitals:    02/20/21 1042   BP: 131/86   Pulse: 75   Temp: 97 6 °F (36 4 °C)   SpO2: 95%       Physical Exam  Constitutional:       Appearance: Normal appearance  Eyes:      General:         Right eye: No discharge  Left eye: No discharge  Conjunctiva/sclera: Conjunctivae normal    Neck:      Musculoskeletal: Normal range of motion  Pulmonary:      Effort: Pulmonary effort is normal    Musculoskeletal: Normal range of motion  Neurological:      Mental Status: He is alert  I spent 15 minutes directly with the patient during this visit      VIRTUAL VISIT DISCLAIMER    Tess Conn acknowledges that he has consented to an online visit or consultation  He understands that the online visit is based solely on information provided by him, and that, in the absence of a face-to-face physical evaluation by the physician, the diagnosis he receives is both limited and provisional in terms of accuracy and completeness  This is not intended to replace a full medical face-to-face evaluation by the physician  Tess Conn understands and accepts these terms

## 2021-02-20 NOTE — ASSESSMENT & PLAN NOTE
Advised pt to increase wixela 500-50 to bid  Give zpack,  Medrol dose pack and albuterol inhaler prn

## 2021-02-26 ENCOUNTER — HOSPITAL ENCOUNTER (INPATIENT)
Facility: HOSPITAL | Age: 81
LOS: 1 days | Discharge: HOME | DRG: 291 | End: 2021-02-27
Attending: EMERGENCY MEDICINE | Admitting: FAMILY MEDICINE
Payer: COMMERCIAL

## 2021-02-26 ENCOUNTER — APPOINTMENT (EMERGENCY)
Dept: RADIOLOGY | Facility: HOSPITAL | Age: 81
DRG: 291 | End: 2021-02-26
Payer: COMMERCIAL

## 2021-02-26 DIAGNOSIS — I50.9 CHF (CONGESTIVE HEART FAILURE) (HCC): Primary | ICD-10-CM

## 2021-02-26 DIAGNOSIS — I50.23 ACUTE ON CHRONIC SYSTOLIC CONGESTIVE HEART FAILURE (HCC): ICD-10-CM

## 2021-02-26 PROBLEM — N18.32 HYPERTENSIVE KIDNEY DISEASE WITH STAGE 3B CHRONIC KIDNEY DISEASE (HCC): Status: ACTIVE | Noted: 2019-01-04

## 2021-02-26 LAB
ALBUMIN SERPL BCP-MCNC: 3.3 G/DL (ref 3.5–5)
ALP SERPL-CCNC: 67 U/L (ref 46–116)
ALT SERPL W P-5'-P-CCNC: 40 U/L (ref 12–78)
ANION GAP SERPL CALCULATED.3IONS-SCNC: 5 MMOL/L (ref 4–13)
AST SERPL W P-5'-P-CCNC: 38 U/L (ref 5–45)
ATRIAL RATE: 104 BPM
BASOPHILS # BLD AUTO: 0.04 THOUSANDS/ΜL (ref 0–0.1)
BASOPHILS NFR BLD AUTO: 0 % (ref 0–1)
BILIRUB SERPL-MCNC: 1.21 MG/DL (ref 0.2–1)
BUN SERPL-MCNC: 28 MG/DL (ref 5–25)
CALCIUM ALBUM COR SERPL-MCNC: 10.1 MG/DL (ref 8.3–10.1)
CALCIUM SERPL-MCNC: 9.5 MG/DL (ref 8.3–10.1)
CHLORIDE SERPL-SCNC: 114 MMOL/L (ref 100–108)
CO2 SERPL-SCNC: 25 MMOL/L (ref 21–32)
CREAT SERPL-MCNC: 1.57 MG/DL (ref 0.6–1.3)
EOSINOPHIL # BLD AUTO: 0.28 THOUSAND/ΜL (ref 0–0.61)
EOSINOPHIL NFR BLD AUTO: 2 % (ref 0–6)
ERYTHROCYTE [DISTWIDTH] IN BLOOD BY AUTOMATED COUNT: 16 % (ref 11.6–15.1)
GFR SERPL CREATININE-BSD FRML MDRD: 41 ML/MIN/1.73SQ M
GLUCOSE SERPL-MCNC: 101 MG/DL (ref 65–140)
HCT VFR BLD AUTO: 45.7 % (ref 36.5–49.3)
HGB BLD-MCNC: 15 G/DL (ref 12–17)
IMM GRANULOCYTES # BLD AUTO: 0.08 THOUSAND/UL (ref 0–0.2)
IMM GRANULOCYTES NFR BLD AUTO: 1 % (ref 0–2)
LYMPHOCYTES # BLD AUTO: 2.09 THOUSANDS/ΜL (ref 0.6–4.47)
LYMPHOCYTES NFR BLD AUTO: 15 % (ref 14–44)
MCH RBC QN AUTO: 32.5 PG (ref 26.8–34.3)
MCHC RBC AUTO-ENTMCNC: 32.8 G/DL (ref 31.4–37.4)
MCV RBC AUTO: 99 FL (ref 82–98)
MONOCYTES # BLD AUTO: 1.1 THOUSAND/ΜL (ref 0.17–1.22)
MONOCYTES NFR BLD AUTO: 8 % (ref 4–12)
NEUTROPHILS # BLD AUTO: 10.87 THOUSANDS/ΜL (ref 1.85–7.62)
NEUTS SEG NFR BLD AUTO: 74 % (ref 43–75)
NRBC BLD AUTO-RTO: 0 /100 WBCS
NT-PROBNP SERPL-MCNC: ABNORMAL PG/ML
P AXIS: 113 DEGREES
PLATELET # BLD AUTO: 257 THOUSANDS/UL (ref 149–390)
PMV BLD AUTO: 10.4 FL (ref 8.9–12.7)
POTASSIUM SERPL-SCNC: 5 MMOL/L (ref 3.5–5.3)
PR INTERVAL: 344 MS
PROT SERPL-MCNC: 6.9 G/DL (ref 6.4–8.2)
QRS AXIS: -59 DEGREES
QRSD INTERVAL: 144 MS
QT INTERVAL: 398 MS
QTC INTERVAL: 523 MS
RBC # BLD AUTO: 4.61 MILLION/UL (ref 3.88–5.62)
SODIUM SERPL-SCNC: 144 MMOL/L (ref 136–145)
T WAVE AXIS: 108 DEGREES
TROPONIN I SERPL-MCNC: <0.02 NG/ML
VENTRICULAR RATE: 104 BPM
WBC # BLD AUTO: 14.46 THOUSAND/UL (ref 4.31–10.16)

## 2021-02-26 PROCEDURE — 99285 EMERGENCY DEPT VISIT HI MDM: CPT

## 2021-02-26 PROCEDURE — 71045 X-RAY EXAM CHEST 1 VIEW: CPT

## 2021-02-26 PROCEDURE — 96374 THER/PROPH/DIAG INJ IV PUSH: CPT

## 2021-02-26 PROCEDURE — 99285 EMERGENCY DEPT VISIT HI MDM: CPT | Performed by: EMERGENCY MEDICINE

## 2021-02-26 PROCEDURE — 36415 COLL VENOUS BLD VENIPUNCTURE: CPT | Performed by: EMERGENCY MEDICINE

## 2021-02-26 PROCEDURE — 84484 ASSAY OF TROPONIN QUANT: CPT | Performed by: EMERGENCY MEDICINE

## 2021-02-26 PROCEDURE — 80053 COMPREHEN METABOLIC PANEL: CPT | Performed by: EMERGENCY MEDICINE

## 2021-02-26 PROCEDURE — 85025 COMPLETE CBC W/AUTO DIFF WBC: CPT | Performed by: EMERGENCY MEDICINE

## 2021-02-26 PROCEDURE — 83880 ASSAY OF NATRIURETIC PEPTIDE: CPT | Performed by: EMERGENCY MEDICINE

## 2021-02-26 PROCEDURE — 99223 1ST HOSP IP/OBS HIGH 75: CPT | Performed by: INTERNAL MEDICINE

## 2021-02-26 PROCEDURE — 93010 ELECTROCARDIOGRAM REPORT: CPT | Performed by: INTERNAL MEDICINE

## 2021-02-26 PROCEDURE — 93005 ELECTROCARDIOGRAM TRACING: CPT

## 2021-02-26 RX ORDER — CHOLECALCIFEROL (VITAMIN D3) 125 MCG
200 CAPSULE ORAL DAILY
Status: DISCONTINUED | OUTPATIENT
Start: 2021-02-27 | End: 2021-02-27 | Stop reason: HOSPADM

## 2021-02-26 RX ORDER — BISOPROLOL FUMARATE 5 MG/1
10 TABLET ORAL 2 TIMES DAILY
Status: DISCONTINUED | OUTPATIENT
Start: 2021-02-26 | End: 2021-02-27 | Stop reason: HOSPADM

## 2021-02-26 RX ORDER — ALBUTEROL SULFATE 90 UG/1
2 AEROSOL, METERED RESPIRATORY (INHALATION) EVERY 6 HOURS PRN
Status: DISCONTINUED | OUTPATIENT
Start: 2021-02-26 | End: 2021-02-27 | Stop reason: HOSPADM

## 2021-02-26 RX ORDER — FUROSEMIDE 10 MG/ML
40 INJECTION INTRAMUSCULAR; INTRAVENOUS ONCE
Status: COMPLETED | OUTPATIENT
Start: 2021-02-26 | End: 2021-02-26

## 2021-02-26 RX ORDER — FLUTICASONE FUROATE AND VILANTEROL 200; 25 UG/1; UG/1
1 POWDER RESPIRATORY (INHALATION) DAILY
Status: DISCONTINUED | OUTPATIENT
Start: 2021-02-27 | End: 2021-02-27 | Stop reason: HOSPADM

## 2021-02-26 RX ORDER — ATORVASTATIN CALCIUM 10 MG/1
10 TABLET, FILM COATED ORAL DAILY
Status: DISCONTINUED | OUTPATIENT
Start: 2021-02-27 | End: 2021-02-27 | Stop reason: HOSPADM

## 2021-02-26 RX ORDER — B-COMPLEX WITH VITAMIN C
2 TABLET ORAL
Status: DISCONTINUED | OUTPATIENT
Start: 2021-02-27 | End: 2021-02-27 | Stop reason: HOSPADM

## 2021-02-26 RX ORDER — ASPIRIN 81 MG/1
81 TABLET ORAL DAILY
Status: DISCONTINUED | OUTPATIENT
Start: 2021-02-27 | End: 2021-02-27 | Stop reason: HOSPADM

## 2021-02-26 RX ORDER — FUROSEMIDE 10 MG/ML
40 INJECTION INTRAMUSCULAR; INTRAVENOUS 2 TIMES DAILY
Status: COMPLETED | OUTPATIENT
Start: 2021-02-26 | End: 2021-02-27

## 2021-02-26 RX ORDER — FINASTERIDE 5 MG/1
5 TABLET, FILM COATED ORAL DAILY
Status: DISCONTINUED | OUTPATIENT
Start: 2021-02-27 | End: 2021-02-27 | Stop reason: HOSPADM

## 2021-02-26 RX ORDER — ONDANSETRON 2 MG/ML
4 INJECTION INTRAMUSCULAR; INTRAVENOUS EVERY 6 HOURS PRN
Status: DISCONTINUED | OUTPATIENT
Start: 2021-02-26 | End: 2021-02-27 | Stop reason: HOSPADM

## 2021-02-26 RX ORDER — PANTOPRAZOLE SODIUM 40 MG/1
40 TABLET, DELAYED RELEASE ORAL
Status: DISCONTINUED | OUTPATIENT
Start: 2021-02-27 | End: 2021-02-27 | Stop reason: HOSPADM

## 2021-02-26 RX ADMIN — FUROSEMIDE 40 MG: 10 INJECTION, SOLUTION INTRAMUSCULAR; INTRAVENOUS at 14:31

## 2021-02-26 RX ADMIN — SACUBITRIL AND VALSARTAN 1 TABLET: 49; 51 TABLET, FILM COATED ORAL at 17:41

## 2021-02-26 RX ADMIN — FUROSEMIDE 40 MG: 10 INJECTION, SOLUTION INTRAMUSCULAR; INTRAVENOUS at 10:31

## 2021-02-26 RX ADMIN — FUROSEMIDE 40 MG: 10 INJECTION, SOLUTION INTRAMUSCULAR; INTRAVENOUS at 17:41

## 2021-02-26 RX ADMIN — BISOPROLOL FUMARATE 10 MG: 5 TABLET ORAL at 17:41

## 2021-02-26 NOTE — ASSESSMENT & PLAN NOTE
Wt Readings from Last 3 Encounters:   02/26/21 93 9 kg (207 lb)   02/03/21 92 6 kg (204 lb 3 2 oz)   10/28/20 87 5 kg (192 lb 14 4 oz)     Currently hypervolemic  Echo 10/2020 EF of 35% with reduced systolic function and PA pressure of 45mmHg  Elevated BNP and CXR with pulmonary edema   Given IV lasix 40mg in ED  Will continue IV Lasix 40mg BID, bisoprolol and Entresto, daily weights, ins and outs, 2g Na restriction

## 2021-02-26 NOTE — ED PROVIDER NOTES
History  Chief Complaint   Patient presents with    Shortness of Breath     Patient reports for the past 2-3 days he has been waking up in the middle of the night feeling like he cant breathe, as soon as he gets up he is fine  71-year-old male with history of CKD, CHF and asthma presents to the emergency department for evaluation of shortness of breath  The patient reports that over the past few nights he has been waking up around 3 in the morning feeling short of breath  Patient states that his symptoms only occur while he is lying flat and they resolve after he gets out of bed  He reports associated weight gain over the past 2 weeks  The patient states over the past 4 days he doubled his home dose of Lasix but reports that his symptoms have persisted and he is not had any weight loss  Patient states that he received the 1st dose of the COVID-19 vaccine  He denies fever, chills, nausea, vomiting, diarrhea, chest pain and diaphoresis  Prior to Admission Medications   Prescriptions Last Dose Informant Patient Reported? Taking? Calcium Carb-Cholecalciferol (CALCIUM 600 + D PO)  Self Yes Yes   Sig: Take 2 tablets by mouth daily    Coenzyme Q10 (CO Q-10) 200 MG CAPS  Self Yes Yes   Sig: Take 1 tablet by mouth daily   Multiple Vitamins-Minerals (CENTRUM SILVER PO)  Self Yes Yes   Sig: Take 1 tablet by mouth daily  PREVIDENT 5000 ENAMEL PROTECT 1 1-5 % PSTE  Self Yes Yes   Sig: Use as directed   Vitamin D, Cholecalciferol, 1000 UNITS CAPS  Self Yes Yes   Sig: Take 1 tablet by mouth daily  Wixela Inhub 500-50 MCG/DOSE inhaler   No Yes   Sig: Inhale 1 puff 2 (two) times a day   albuterol (PROVENTIL HFA,VENTOLIN HFA) 90 mcg/act inhaler   No Yes   Sig: Inhale 2 puffs every 6 (six) hours as needed for wheezing or shortness of breath   aspirin 81 MG tablet  Self Yes Yes   Sig: Take 81 mg by mouth daily     atorvastatin (LIPITOR) 10 mg tablet  Self No Yes   Sig: TAKE ONE TABLET BY MOUTH EVERY DAY bisoprolol (ZEBETA) 10 MG tablet  Self No Yes   Sig: TAKE ONE TABLET BY MOUTH TWICE A DAY   finasteride (PROSCAR) 5 mg tablet  Self No Yes   Sig: TAKE ONE TABLET BY MOUTH EVERY DAY   furosemide (LASIX) 20 mg tablet  Self No Yes   Sig: Take 1 tablet (20 mg total) by mouth daily   omeprazole (PriLOSEC) 20 mg delayed release capsule  Self No Yes   Sig: Take 1 capsule (20 mg total) by mouth daily   sacubitril-valsartan (Entresto) 49-51 MG TABS   No Yes   Sig: Take 1 tablet by mouth 2 (two) times a day      Facility-Administered Medications: None       Past Medical History:   Diagnosis Date    AICD (automatic cardioverter/defibrillator) present     Arthritis     Asthma     Burt esophagus     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     [facility] colon ([email or web address] ) 4/5/2013    Cardiac arrhythmia     Cardiomyopathy ([email or web address] )     CKD (chronic kidney disease)     Colon cancer ([email or web address] )     Congestive heart failure (CHF) ([email or web address] )     COPD (chronic obstructive pulmonary disease) ([email or web address]     Diverticulitis     Last assessed: 4/5/13    Esophageal reflux     Gout     Hernia     Hyperlipidemia     Hypertension     Hypertension     Hypothyroidism     Hypothyroidism 5/30/2013    Non-toxic multinodular goiter 11/28/2012    Pleural effusion     Polymyalgia rheumatica (HCC)     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     Spondyloarthropathy     Last assessed: 11/28/12       Past Surgical History:   Procedure Laterality Date    ARM SKIN LESION BIOPSY / EXCISION      Malignant    ATRIAL ABLATION SURGERY      AV NODE ABLATION      CARDIAC DEFIBRILLATOR PLACEMENT  2009    Cardio-Defib Pulse Gen Venous Insertion of Electrode for Ventricular Pacing  Implantable    CARDIAC SURGERY  2009    Catheterization    COLONOSCOPY N/A 3/14/2016    Procedure: COLONOSCOPY;  Surgeon: Montez Garcia MD;  Location: [facility]; Service   February 22, 2013, mildly enlarged prostate, history of colon CA with normal appearance of anastomosis at the midtransverse colon, severe diverticulosis in the sigmoid, descending and transverse colon  Repeat colonoscopy in 3 yrs    HEMICOLECTOMY Right 05/05/2004    INGUINAL HERNIA REPAIR Bilateral     Left 3/2004, right 5/2008    IR DIAGNOSTIC UPPER EXTREMITY VENOGRAM  10/4/2018    KNEE SURGERY  1972    Meniscectomy    MD ESOPHAGOGASTRODUODENOSCOPY TRANSORAL DIAGNOSTIC N/A 12/5/2016    Procedure: ESOPHAGOGASTRODUODENOSCOPY (EGD); Surgeon: Fredy Arriaga MD;  Location: [facility]; Service: Gastroenterology    TONSILLECTOMY AND ADENOIDECTOMY      UPPER GASTROINTESTINAL ENDOSCOPY         Family History   Problem Relation Age of Onset    Heart failure Mother         Congestive    Hypertension Mother     Osteoporosis Mother     COPD Father     Emphysema Father     Hypertension Father     Heart disease Sister         Heart Valve Replacement    Osteoporosis Sister     Breast cancer Family      I have reviewed and agree with the history as documented  E-Cigarette/Vaping    E-Cigarette Use Never User      E-Cigarette/Vaping Substances     Social History     Tobacco Use    Smoking status: Never Smoker    Smokeless tobacco: Never Used   Substance Use Topics    Alcohol use: No     Frequency: Never     Binge frequency: Never     Comment: Rare    Drug use: No        Review of Systems   Constitutional: Negative for chills and fever  HENT: Negative for ear pain and sore throat  Eyes: Negative for pain and visual disturbance  Respiratory: Positive for shortness of breath  Negative for cough  Cardiovascular: Negative for chest pain and palpitations  Gastrointestinal: Negative for abdominal pain and vomiting  Genitourinary: Negative for dysuria and hematuria  Musculoskeletal: Negative for arthralgias and back pain  Skin: Negative for color change and rash  Neurological: Negative for seizures and syncope     All other systems reviewed and are negative  Physical Exam  ED Triage Vitals   Temperature Pulse Respirations Blood Pressure SpO2   02/26/21 1644 02/26/21 0841 02/26/21 0841 02/26/21 0841 02/26/21 0930   (!) 97 °F (36 1 °C) (!) 109 20 (!) 145/102 95 %      Temp Source Heart Rate Source Patient Position - Orthostatic VS BP Location FiO2 (%)   02/26/21 1644 02/26/21 0841 02/26/21 0841 02/26/21 0841 --   Oral Monitor Lying Right arm       Pain Score       02/26/21 1704       No Pain             Orthostatic Vital Signs  Vitals:    02/26/21 1300 02/26/21 1431 02/26/21 1613 02/26/21 1644   BP: 121/72 131/77 122/80 132/83   Pulse: 62 63 65 67   Patient Position - Orthostatic VS: Lying Lying Lying        Physical Exam  Vitals signs and nursing note reviewed  Constitutional:       Appearance: He is well-developed  HENT:      Head: Normocephalic and atraumatic  Eyes:      Conjunctiva/sclera: Conjunctivae normal    Neck:      Musculoskeletal: Neck supple  Cardiovascular:      Rate and Rhythm: Regular rhythm  Tachycardia present  Heart sounds: No murmur  Pulmonary:      Effort: Pulmonary effort is normal  No respiratory distress  Breath sounds: Decreased breath sounds present  Abdominal:      Palpations: Abdomen is soft  Tenderness: There is no abdominal tenderness  Skin:     General: Skin is warm and dry  Neurological:      Mental Status: He is alert           ED Medications  Medications   albuterol (PROVENTIL HFA,VENTOLIN HFA) inhaler 2 puff (has no administration in time range)   aspirin (ECOTRIN LOW STRENGTH) EC tablet 81 mg (has no administration in time range)   atorvastatin (LIPITOR) tablet 10 mg (has no administration in time range)   bisoprolol (ZEBETA) tablet 10 mg (has no administration in time range)   calcium carbonate-vitamin D (OSCAL-D) 500 mg-200 units per tablet 2 tablet (has no administration in time range)   co-enzyme Q-10 capsule 200 mg (has no administration in time range)   finasteride (PROSCAR) tablet 5 mg (has no administration in time range)   pantoprazole (PROTONIX) EC tablet 40 mg (has no administration in time range)   sacubitril-valsartan (ENTRESTO) 49-51 MG per tablet 1 tablet (has no administration in time range)   fluticasone-vilanterol (BREO ELLIPTA) 200-25 MCG/INH inhaler 1 puff (has no administration in time range)   ondansetron (ZOFRAN) injection 4 mg (has no administration in time range)   furosemide (LASIX) injection 40 mg (has no administration in time range)   enoxaparin (LOVENOX) subcutaneous injection 40 mg (has no administration in time range)   furosemide (LASIX) injection 40 mg (40 mg Intravenous Given 2/26/21 1031)   furosemide (LASIX) injection 40 mg (40 mg Intravenous Given 2/26/21 1431)       Diagnostic Studies  Results Reviewed     Procedure Component Value Units Date/Time    Comprehensive metabolic panel [[id number]]  (Abnormal) Collected: 02/26/21 0851    Lab Status: Final result Specimen: Blood from Arm, Right Updated: 02/26/21 0934     Sodium 144 mmol/L      Potassium 5 0 mmol/L      Chloride 114 mmol/L      CO2 25 mmol/L      ANION GAP 5 mmol/L      BUN 28 mg/dL      Creatinine 1 57 mg/dL      Glucose 101 mg/dL      Calcium 9 5 mg/dL      Corrected Calcium 10 1 mg/dL      AST 38 U/L      ALT 40 U/L      Alkaline Phosphatase 67 U/L      Total Protein 6 9 g/dL      Albumin 3 3 g/dL      Total Bilirubin 1 21 mg/dL      eGFR 41 ml/min/1 73sq m     Narrative:      [place] guidelines for Chronic Kidney Disease (CKD):     Stage 1 with normal or high GFR (GFR > 90 mL/min/1 73 square meters)    Stage 2 Mild CKD (GFR = 60-89 mL/min/1 73 square meters)    Stage 3A Moderate CKD (GFR = 45-59 mL/min/1 73 square meters)    Stage 3B Moderate CKD (GFR = 30-44 mL/min/1 73 square meters)    Stage 4 Severe CKD (GFR = 15-29 mL/min/1 73 square meters)    Stage 5 End Stage CKD (GFR <15 mL/min/1 73 square meters)  Note: GFR calculation is accurate only with a steady state creatinine    NT-BNP PRO [[id number]]  (Abnormal) Collected: 02/26/21 0851    Lab Status: Final result Specimen: Blood from Arm, Right Updated: 02/26/21 0934     NT-proBNP 10,759 pg/mL     Troponin I [[id number]]  (Normal) Collected: 02/26/21 0851    Lab Status: Final result Specimen: Blood from Arm, Right Updated: 02/26/21 0933     Troponin I <0 02 ng/mL     CBC and differential [[id number]]  (Abnormal) Collected: 02/26/21 0851    Lab Status: Final result Specimen: Blood from Arm, Right Updated: 02/26/21 0910     WBC 14 46 Thousand/uL      RBC 4 61 Million/uL      Hemoglobin 15 0 g/dL      Hematocrit 45 7 %      MCV 99 fL      MCH 32 5 pg      MCHC 32 8 g/dL      RDW 16 0 %      MPV 10 4 fL      Platelets 282 Thousands/uL      nRBC 0 /100 WBCs      Neutrophils Relative 74 %      Immat GRANS % 1 %      Lymphocytes Relative 15 %      Monocytes Relative 8 %      Eosinophils Relative 2 %      Basophils Relative 0 %      Neutrophils Absolute 10 87 Thousands/µL      Immature Grans Absolute 0 08 Thousand/uL      Lymphocytes Absolute 2 09 Thousands/µL      Monocytes Absolute 1 10 Thousand/µL      Eosinophils Absolute 0 28 Thousand/µL      Basophils Absolute 0 04 Thousands/µL                  XR chest 1 view portable   ED Interpretation by Esha Yates MD (02/26 1013)   Mild vascular congestion  Final Result by Rock Regis MD (02/26 0243)      Mild pulmonary venous congestion                  Workstation performed: [id number]               Procedures  ECG 12 Lead Documentation Only    Date/Time: 2/26/2021 5:16 PM  Performed by: Vera Cano MD  Authorized by: Vera Cano MD     ECG reviewed by me, the ED Provider: yes    Patient location:  ED  Previous ECG:     Previous ECG:  Compared to current    Similarity:  Changes noted    Comparison to cardiac monitor: Yes    Interpretation:     Interpretation: abnormal    Rate:     ECG rate assessment: tachycardic    Rhythm:     Rhythm: sinus tachycardia    Ectopy:     Ectopy: none    QRS:     QRS intervals: Wide  Conduction:     Conduction: normal    ST segments:     ST segments:  Normal  T waves:     T waves: normal    Other findings:     Other findings: LVH            ED Course               Identification of Seniors at Risk      Most Recent Value   (ISAR) Identification of Seniors at Risk   Before the illness or injury that brought you to the Emergency, did you need someone to help you on a regular basis? 0 Filed at: 02/26/2021 0842   In the last 24 hours, have you needed more help than usual?  0 Filed at: 02/26/2021 1983   Have you been hospitalized for one or more nights during the past 6 months? 0 Filed at: 02/26/2021 0842   In general, do you see well?  0 Filed at: 02/26/2021 1948   In general, do you have serious problems with your memory? 0 Filed at: 02/26/2021 8598   Do you take more than three different medications every day? 1 Filed at: 02/26/2021 0464   ISAR Score  1 Filed at: 02/26/2021 3351                    SBIRT 20yo+      Most Recent Value   SBIRT (23 yo +)   In order to provide better care to our patients, we are screening all of our patients for alcohol and drug use  Would it be okay to ask you these screening questions? Unable to answer at this time Filed at: 02/26/2021 3332                MDM  Number of Diagnoses or Management Options  CHF (congestive heart failure) [facility]):   Diagnosis management comments: 71-year-old male presented to the emergency department for evaluation of shortness of breath  On arrival patient was awake, alert, oriented and in no acute distress  Initial vital signs were stable  On exam the patient had decreased breath sounds bilaterally  Workup done in the emergency department showed the patient had an elevated BNP consistent with CHF exacerbation  The patient was treated with 40 mg of IV Lasix    The case was discussed with the admitting team who agreed to admit the patient for further evaluation, workup and treatment  Disposition  Final diagnoses:   CHF (congestive heart failure) ([address] )     Time reflects when diagnosis was documented in both MDM as applicable and the Disposition within this note     Time User Action Codes Description Comment    2/26/2021 10:51 AM Jean Mayo Add [I50 9] CHF (congestive heart failure) [facility])       ED Disposition     ED Disposition Condition Date/Time Comment    Admit Stable Fri Feb 26, 2021 10:51 AM Case was discussed with KIEL and the patient's admission status was agreed to be Admission Status: inpatient status to the service of Dr Ki Ogden   Follow-up Information    None         Current Discharge Medication List      CONTINUE these medications which have NOT CHANGED    Details   albuterol (PROVENTIL HFA,VENTOLIN HFA) 90 mcg/act inhaler Inhale 2 puffs every 6 (six) hours as needed for wheezing or shortness of breath  Qty: 1 Inhaler, Refills: 1    Comments: Substitution to a formulary equivalent within the same pharmaceutical class is authorized  Associated Diagnoses: Mild persistent asthma with acute exacerbation      aspirin 81 MG tablet Take 81 mg by mouth daily        atorvastatin (LIPITOR) 10 mg tablet TAKE ONE TABLET BY MOUTH EVERY DAY  Qty: 90 tablet, Refills: 3    Associated Diagnoses: Hypercortisolemia      bisoprolol (ZEBETA) 10 MG tablet TAKE ONE TABLET BY MOUTH TWICE A DAY  Qty: 180 tablet, Refills: 3    Associated Diagnoses: Hypertension, unspecified type      Calcium Carb-Cholecalciferol (CALCIUM 600 + D PO) Take 2 tablets by mouth daily       Coenzyme Q10 (CO Q-10) 200 MG CAPS Take 1 tablet by mouth daily      finasteride (PROSCAR) 5 mg tablet TAKE ONE TABLET BY MOUTH EVERY DAY  Qty: 90 tablet, Refills: 1    Associated Diagnoses: BPH with obstruction/lower urinary tract symptoms      furosemide (LASIX) 20 mg tablet Take 1 tablet (20 mg total) by mouth daily  Qty: 90 tablet, Refills: 3    Associated Diagnoses: SOB (shortness of breath); Cardiomyopathy, unspecified type (HCC)      Multiple Vitamins-Minerals (CENTRUM SILVER PO) Take 1 tablet by mouth daily  omeprazole (PriLOSEC) 20 mg delayed release capsule Take 1 capsule (20 mg total) by mouth daily  Qty: 90 capsule, Refills: 1    Associated Diagnoses: 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      PREVIDENT 5000 ENAMEL PROTECT 1 1-5 % PSTE Use as directed  Refills: 3      sacubitril-valsartan (Entresto) 49-51 MG TABS Take 1 tablet by mouth 2 (two) times a day  Qty: 180 tablet, Refills: 3    Associated Diagnoses: Chronic systolic CHF (congestive heart failure) ([facility])      Vitamin D, Cholecalciferol, 1000 UNITS CAPS Take 1 tablet by mouth daily  Wixela Inhub 500-50 MCG/DOSE inhaler Inhale 1 puff 2 (two) times a day  Qty: 1 Inhaler, Refills: 0    Comments: Substitution to a formulary equivalent within the same pharmaceutical class is authorized  Associated Diagnoses: Mild persistent asthma with acute exacerbation           No discharge procedures on file  PDMP Review     None           ED Provider  Attending physically available and evaluated Fredy Setters  I managed the patient along with the ED Attending      Electronically Signed by         Hilaria Miller MD  02/26/21 3677

## 2021-02-26 NOTE — PLAN OF CARE
Problem: Potential for Falls  Goal: Patient will remain free of falls  Description: INTERVENTIONS:  - Assess patient frequently for physical needs  -  Identify cognitive and physical deficits and behaviors that affect risk of falls    -  [place] fall precautions as indicated by assessment   - Educate patient/family on patient safety including physical limitations  - Instruct patient to call for assistance with activity based on assessment  - Modify environment to reduce risk of injury  - Consider OT/PT consult to assist with strengthening/mobility  Outcome: Progressing     Problem: CARDIOVASCULAR - ADULT  Goal: Maintains optimal cardiac output and hemodynamic stability  Description: INTERVENTIONS:  - Monitor I/O, vital signs and rhythm  - Monitor for S/S and trends of decreased cardiac output  - Administer and titrate ordered vasoactive medications to optimize hemodynamic stability  - Assess quality of pulses, skin color and temperature  - Assess for signs of decreased coronary artery perfusion  - Instruct patient to report change in severity of symptoms  Outcome: Progressing  Goal: Absence of cardiac dysrhythmias or at baseline rhythm  Description: INTERVENTIONS:  - Continuous cardiac monitoring, vital signs, obtain 12 lead EKG if ordered  - Administer antiarrhythmic and heart rate control medications as ordered  - Monitor electrolytes and administer replacement therapy as ordered  Outcome: Progressing     Problem: RESPIRATORY - ADULT  Goal: Achieves optimal ventilation and oxygenation  Description: INTERVENTIONS:  - Assess for changes in respiratory status  - Assess for changes in mentation and behavior  - Position to facilitate oxygenation and minimize respiratory effort  - Oxygen administered by appropriate delivery if ordered  - Initiate smoking cessation education as indicated  - Encourage broncho-pulmonary hygiene including cough, deep breathe, Incentive Spirometry  - Assess the need for suctioning and aspirate as needed  - Assess and instruct to report SOB or any respiratory difficulty  - Respiratory Therapy support as indicated  Outcome: Progressing     Problem: METABOLIC, FLUID AND ELECTROLYTES - ADULT  Goal: Electrolytes maintained within normal limits  Description: INTERVENTIONS:  - Monitor labs and assess patient for signs and symptoms of electrolyte imbalances  - Administer electrolyte replacement as ordered  - Monitor response to electrolyte replacements, including repeat lab results as appropriate  - Instruct patient on fluid and nutrition as appropriate  Outcome: Progressing  Goal: Fluid balance maintained  Description: INTERVENTIONS:  - Monitor labs   - Monitor I/O and WT  - Instruct patient on fluid and nutrition as appropriate  - Assess for signs & symptoms of volume excess or deficit  Outcome: Progressing     Problem: SKIN/TISSUE INTEGRITY - ADULT  Goal: Skin integrity remains intact  Description: INTERVENTIONS  - Identify patients at risk for skin breakdown  - Assess and monitor skin integrity  - Assess and monitor nutrition and hydration status  - Monitor labs (i e  albumin)  - Assess for incontinence   - Turn and reposition patient  - Assist with mobility/ambulation  - Relieve pressure over bony prominences  - Avoid friction and shearing  - Provide appropriate hygiene as needed including keeping skin clean and dry  - Evaluate need for skin moisturizer/barrier cream  - Collaborate with interdisciplinary team (i e  Nutrition, Rehabilitation, etc )   - Patient/family teaching  Outcome: Progressing

## 2021-02-26 NOTE — ED NOTES
Patient ambulated to the bathroom, denies SOB      Rajeev Joya, [facility], [facility]  02/26/21 1157

## 2021-02-26 NOTE — H&P
H&P- Lissa Needy 1940, 79 y o  male MRN: [id number]    Unit/Bed#: ED 20 Encounter: [id number]    Primary Care Provider: Michael Brown MD   Date and time admitted to hospital: 2/26/2021  8:33 AM        Mild persistent asthma without complication  Assessment & Plan  Not in exacerbation  Continue Breo in place of Advair and ventolin prn    Hypertensive kidney disease with stage 3b chronic kidney disease  Assessment & Plan  Lab Results   Component Value Date    EGFR 41 02/26/2021    EGFR 41 01/27/2021    EGFR 41 07/09/2020    CREATININE 1 57 (H) 02/26/2021    CREATININE 1 58 (H) 01/27/2021    CREATININE 1 57 (H) 07/09/2020   Cr currently at baseline 1 4- 1 7  Monitor BUN/Cr   Avoid nephrotoxic agents and hypotension    Essential hypertension  Assessment & Plan  Controlled  Continue Bisoprolol     Mixed hyperlipidemia  Assessment & Plan  continue Lipitor    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  Assessment & Plan  Continue Proscar    * Acute on chronic systolic congestive heart failure (HCC)  Assessment & Plan  Wt Readings from Last 3 Encounters:   02/26/21 93 9 kg (207 lb)   02/03/21 92 6 kg (204 lb 3 2 oz)   10/28/20 87 5 kg (192 lb 14 4 oz)     Currently hypervolemic  Echo 10/2020 EF of 35% with reduced systolic function and PA pressure of 45mmHg  Elevated BNP and CXR with pulmonary edema  Given IV lasix 40mg in ED  Will continue IV Lasix 40mg BID, bisoprolol and Entresto, daily weights, ins and outs, 2g Na restriction      VTE Prophylaxis: Enoxaparin (Lovenox)  / sequential compression device   Code Status: FULL  POLST: There is no POLST form on file for this patient (pre-hospital)  Discussion with family: discussed plan with patient and wife    Anticipated Length of Stay:  Patient will be admitted on an Inpatient basis with an anticipated length of stay of more than 2 midnights     Justification for Hospital Stay: IV diuresis    Total Time for Visit, including Counseling / Coordination of Care: 1 hour  Greater than 50% of this total time spent on direct patient counseling and coordination of care  Chief Complaint:   SOB    History of Present Illness:    Lissa Mccarthy is a 79 y o  male who presents with SOB  Patient states he has felt short of breath for the last few nights  He would lay down to sleep and would awake in the middle of the night feeling smothered  He would sit up and feel better then have to lay in his recliner sitting up to improve his breathing  He has also noticed that his weight has gone up a few pounds  He states his dry weight is around 197 and as of late he has been around 207  He states some of the weight may be due to excess eating  He denies CP, fevers, chills, N/V, diaphoresis, lightheadedness, abdominal pain  He denies any change in medications, noncompliance or change in diet  Review of Systems:    Review of Systems   Constitutional: Positive for unexpected weight change  Negative for activity change, appetite change, chills, diaphoresis, fatigue and fever  HENT: Negative for congestion, rhinorrhea, sinus pressure, sinus pain and sore throat  Eyes: Negative  Respiratory: Positive for shortness of breath  Negative for cough and chest tightness  Cardiovascular: Negative for chest pain, palpitations and leg swelling  Gastrointestinal: Negative for abdominal distention, abdominal pain, constipation, diarrhea, nausea and vomiting  Endocrine: Negative  Genitourinary: Negative for difficulty urinating, dysuria, flank pain, frequency, hematuria and urgency  Musculoskeletal: Negative for back pain, gait problem and neck pain  Skin: Negative  Allergic/Immunologic: Negative  Neurological: Negative for dizziness, syncope, speech difficulty, weakness, light-headedness and headaches  Hematological: Negative  Psychiatric/Behavioral: Negative  All other systems reviewed and are negative        Past Medical and Surgical History:     Past Medical History:   Diagnosis Date    AICD (automatic cardioverter/defibrillator) present     Arthritis     Asthma     Burt esophagus     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     [facility] ) 4/5/2013    Cardiac arrhythmia     Cardiomyopathy ([facility] )     CKD (chronic kidney disease)     Colon cancer (HCC)     Congestive heart failure (CHF) (HCC)     COPD (chronic obstructive pulmonary disease) ([facility] )     Diverticulitis     Last assessed: 4/5/13    Esophageal reflux     Gout     Hernia     Hyperlipidemia     Hypertension     Hypertension     Hypothyroidism     Hypothyroidism 5/30/2013    Non-toxic multinodular goiter 11/28/2012    Pleural effusion     Polymyalgia rheumatica (HCC)     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     Spondyloarthropathy     Last assessed: 11/28/12       Past Surgical History:   Procedure Laterality Date    ARM SKIN LESION BIOPSY / EXCISION      Malignant    ATRIAL ABLATION SURGERY      AV NODE ABLATION      CARDIAC DEFIBRILLATOR PLACEMENT  2009    Cardio-Defib Pulse Gen Venous Insertion of Electrode for Ventricular Pacing  Implantable    CARDIAC SURGERY  2009    Catheterization    COLONOSCOPY N/A 3/14/2016    Procedure: COLONOSCOPY;  Surgeon: Neil Yeh MD;  Location: [facility]; Service  February 22, 2013, mildly enlarged prostate, history of colon CA with normal appearance of anastomosis at the midtransverse colon, severe diverticulosis in the sigmoid, descending and transverse colon  Repeat colonoscopy in 3 yrs    HEMICOLECTOMY Right 05/05/2004    INGUINAL HERNIA REPAIR Bilateral     Left 3/2004, right 5/2008    IR DIAGNOSTIC UPPER EXTREMITY VENOGRAM  10/4/2018    KNEE SURGERY  1972    Meniscectomy    NH ESOPHAGOGASTRODUODENOSCOPY TRANSORAL DIAGNOSTIC N/A 12/5/2016    Procedure: ESOPHAGOGASTRODUODENOSCOPY (EGD);   Surgeon: Makayla Galan MD;  Location: [facility]; Service: Gastroenterology    TONSILLECTOMY AND ADENOIDECTOMY      UPPER GASTROINTESTINAL ENDOSCOPY         Meds/Allergies:    Prior to Admission medications    Medication Sig Start Date End Date Taking? Authorizing Provider   albuterol (PROVENTIL HFA,VENTOLIN HFA) 90 mcg/act inhaler Inhale 2 puffs every 6 (six) hours as needed for wheezing or shortness of breath 2/20/21  Yes Tessy Mcgee MD   aspirin 81 MG tablet Take 81 mg by mouth daily  Yes Historical Provider, MD   atorvastatin (LIPITOR) 10 mg tablet TAKE ONE TABLET BY MOUTH EVERY DAY 8/7/20  Yes Naye Mc MD   bisoprolol (ZEBETA) 10 MG tablet TAKE ONE TABLET BY MOUTH TWICE A DAY 8/7/20  Yes Naye Mc MD   Calcium Carb-Cholecalciferol (CALCIUM 600 + D PO) Take 2 tablets by mouth daily    Yes Historical Provider, MD   Coenzyme Q10 (CO Q-10) 200 MG CAPS Take 1 tablet by mouth daily   Yes Historical Provider, MD   finasteride (PROSCAR) 5 mg tablet TAKE ONE TABLET BY MOUTH EVERY DAY 9/8/20  Yes Nerissa Sosa PA-C   furosemide (LASIX) 20 mg tablet Take 1 tablet (20 mg total) by mouth daily 10/19/20 2/26/21 Yes Tessy Mcgee MD   Multiple Vitamins-Minerals (CENTRUM SILVER PO) Take 1 tablet by mouth daily  Yes Historical Provider, MD   omeprazole (PriLOSEC) 20 mg delayed release capsule Take 1 capsule (20 mg total) by mouth daily 10/23/20  Yes Eden Gandara PA-C   PREVIDENT 5000 ENAMEL PROTECT 1 1-5 % PSTE Use as directed 1/22/19  Yes Historical Provider, MD   sacubitril-valsartan Marybel Elms) 49-51 MG TABS Take 1 tablet by mouth 2 (two) times a day 11/17/20  Yes Jesus Keto, DO   Vitamin D, Cholecalciferol, 1000 UNITS CAPS Take 1 tablet by mouth daily     Yes Historical Provider, MD Nino Garcia Inhub 500-50 MCG/DOSE inhaler Inhale 1 puff 2 (two) times a day 2/20/21  Yes Tessy Mcgee MD   methylPREDNISolone 4 MG tablet therapy pack Use as directed on package 2/20/21 2/26/21  Tessy Mcgee MD     I have reviewed home medications with patient personally  Allergies: No Known Allergies    Social History:     Marital Status: /Civil Union   Occupation: retired  Patient Pre-hospital Living Situation: with wife  Patient Pre-hospital Level of Mobility: independent  Patient Pre-hospital Diet Restrictions: sodium restricted  Substance Use History:   Social History     Substance and Sexual Activity   Alcohol Use No    Comment: Rare     Social History     Tobacco Use   Smoking Status Never Smoker   Smokeless Tobacco Never Used     Social History     Substance and Sexual Activity   Drug Use No       Family History:    Family History   Problem Relation Age of Onset    Heart failure Mother         Congestive    Hypertension Mother     Osteoporosis Mother     COPD Father     Emphysema Father     Hypertension Father     Heart disease Sister         Heart Valve Replacement    Osteoporosis Sister     Breast cancer Family        Physical Exam:     Vitals:   Blood Pressure: 131/82 (02/26/21 1030)  Pulse: 60 (02/26/21 1030)  Respirations: 16 (02/26/21 1030)  Weight - Scale: 93 9 kg (207 lb) (02/26/21 0841)  SpO2: 94 % (02/26/21 1030)    Physical Exam  Vitals signs and nursing note reviewed  Constitutional:       Appearance: Normal appearance  He is normal weight  HENT:      Head: Normocephalic and atraumatic  Right Ear: External ear normal       Left Ear: External ear normal       Nose: Nose normal       Mouth/Throat:      Mouth: Mucous membranes are moist       Pharynx: Oropharynx is clear  Eyes:      Conjunctiva/sclera: Conjunctivae normal       Pupils: Pupils are equal, round, and reactive to light  Neck:      Musculoskeletal: Neck supple  No muscular tenderness  Cardiovascular:      Rate and Rhythm: Normal rate and regular rhythm  Pulses: Normal pulses  Heart sounds: Normal heart sounds  Pulmonary:      Effort: Pulmonary effort is normal       Breath sounds: Rales present  Abdominal:      General: Abdomen is flat   Bowel sounds are normal       Palpations: Abdomen is soft  Musculoskeletal:         General: No swelling or tenderness  Skin:     General: Skin is warm and dry  Capillary Refill: Capillary refill takes less than 2 seconds  Neurological:      General: No focal deficit present  Mental Status: He is alert and oriented to person, place, and time  Mental status is at baseline  Psychiatric:         Mood and Affect: Mood normal          Behavior: Behavior normal          Thought Content: Thought content normal          Judgment: Judgment normal          Additional Data:     Lab Results: I have personally reviewed pertinent reports  Results from last 7 days   Lab Units 02/26/21  0851   WBC Thousand/uL 14 46*   HEMOGLOBIN g/dL 15 0   HEMATOCRIT % 45 7   PLATELETS Thousands/uL 257   NEUTROS PCT % 74   LYMPHS PCT % 15   MONOS PCT % 8   EOS PCT % 2     Results from last 7 days   Lab Units 02/26/21  0851   SODIUM mmol/L 144   POTASSIUM mmol/L 5 0   CHLORIDE mmol/L 114*   CO2 mmol/L 25   BUN mg/dL 28*   CREATININE mg/dL 1 57*   ANION GAP mmol/L 5   CALCIUM mg/dL 9 5   ALBUMIN g/dL 3 3*   TOTAL BILIRUBIN mg/dL 1 21*   ALK PHOS U/L 67   ALT U/L 40   AST U/L 38   GLUCOSE RANDOM mg/dL 101                       Imaging: I have personally reviewed pertinent reports  XR chest 1 view portable   ED Interpretation by Kellie Celis MD (02/26 1013)   Mild vascular congestion  Final Result by Rosendo Huizar MD (02/26 0058)      Mild pulmonary venous congestion  Workstation performed: [id number]             EKG, Pathology, and Other Studies Reviewed on Admission:   · EKG: n/a    Allscripts / Epic Records Reviewed: Yes     ** Please Note: This note has been constructed using a voice recognition system   **

## 2021-02-26 NOTE — ED ATTENDING ATTESTATION
Final Diagnosis:  1  CHF (congestive heart failure) ([facility] [address] )    2  Acute on chronic systolic congestive heart failure (HCC)           I, Fernando Becerril MD, saw and evaluated the patient  All available labs and X-rays were ordered by me or the resident and have been reviewed by myself  I discussed the patient with the resident / non-physician and agree with the resident's / non-physician practitioner's findings and plan as documented in the resident's / non-physician practicitioner's note, except where noted  At this point, I agree with the current assessment done in the ED  I was present during key portions of all procedures performed unless otherwise stated  Chief Complaint   Patient presents with    Shortness of Breath     Patient reports for the past 2-3 days he has been waking up in the middle of the night feeling like he cant breathe, as soon as he gets up he is fine  This is a 79 y o  male presenting for evaluation of dyspnea, waking  The patient states that for last 2 or 3 days he has been waking up during the night at about 3:00 a m  Because he feels incredibly short of breath but improved rapidly when he is sitting up or standing up  No fevers or chills or nausea or vomiting  Denies chest pain  Denies numbness or tingling down the arms or legs  He does have weak in his mention  No charles lower extremity edema  He has been compliant with all his medications  He is on a water pill, Lasix, the use be 40 mg but now is 20 mg because of some acute kidney injury caused by the higher dose  Denies falls injuries  No change in urination  No one is ill at home  Wife who is at bedside has been pretty well      Wt Readings from Last 6 Encounters:   02/27/21 88 1 kg (194 lb 3 6 oz)   02/03/21 92 6 kg (204 lb 3 2 oz)   10/28/20 87 5 kg (192 lb 14 4 oz)   07/31/20 89 kg (196 lb 3 2 oz)   07/17/20 89 7 kg (197 lb 12 8 oz)   05/20/20 93 6 kg (206 lb 4 8 oz)     PMH:   has a past medical history of AICD (automatic cardioverter/defibrillator) present, Arthritis, Asthma, Burt esophagus, 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 [address] ) (4/5/2013), Cardiac arrhythmia, Cardiomyopathy ([facility] ), CKD (chronic kidney disease), [facility] cancer ([facility] ), Congestive heart failure (CHF) ([facility] ), COPD (chronic obstructive pulmonary disease) ([facility] ), Diverticulitis, Esophageal reflux, Gout, Hernia, Hyperlipidemia, Hypertension, Hypertension, Hypothyroidism, Hypothyroidism (5/30/2013), Non-toxic multinodular goiter (11/28/2012), Pleural effusion, Polymyalgia rheumatica (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, and Spondyloarthropathy  PSH:   has a past surgical history that includes Atrial ablation surgery; Cardiac defibrillator placement (2009); Knee surgery (1972); Hemicolectomy (Right, 05/05/2004); pr esophagogastroduodenoscopy transoral diagnostic (N/A, 12/5/2016); Colonoscopy (N/A, 3/14/2016); AV node ablation; Arm skin lesion biopsy / excision; Cardiac surgery (2009); Inguinal hernia repair (Bilateral); Tonsillectomy and adenoidectomy; IR diagnostic upper extremity venogram (10/4/2018); and Upper gastrointestinal endoscopy  Social:  Social History     Substance and Sexual Activity   Alcohol Use Never    Frequency: Never    Binge frequency: Never    Comment: Rare     Social History     Tobacco Use   Smoking Status Never Smoker   Smokeless Tobacco Never Used     Social History     Substance and Sexual Activity   Drug Use No     PE:  Vitals:    02/27/21 0600 02/27/21 0701 02/27/21 1037 02/27/21 1556   BP:  129/77 111/69 123/75   Pulse:  66 62 67   Resp:  18 18 14   Temp:  (!) 97 2 °F (36 2 °C) 97 6 °F (36 4 °C) (!) 97 2 °F (36 2 °C)   TempSrc:  Oral     SpO2:  97% 95% 97%   Weight: 88 1 kg (194 lb 3 6 oz)      Height:       General: VSS, NAD, awake, alert  Ambulated to the room without any difficulty, no hypoxia  Well-nourished, well-developed  Appears stated age  Head: Normocephalic, atraumatic, nontender  Eyes: PERRL, EOM-I  No diplopia  No hyphema  No subconjunctival hemorrhages  Symmetrical lids  ENTAtraumatic external nose and ears  MMM  No stridor  Normal phonation  No drooling  Base of mouth is soft  No mastoid tenderness  Neck: Symmetric, trachea midline  No JVD  CV: Peripheral pulses +2 throughout  No chest wall tenderness  Lungs:   Mildly labored   +end expiratory wheeze without stethoscope  No retractions  No crepitus  No tachypnea  No paradoxical motion  Abd: +BS, soft, NT/ND    MSK:   FROM   No lower extremity edema  Back:   No CVAT  Skin: Dry, intact  Neuro: AAOx3, GCS 15, CN II-XII grossly intact  Motor grossly intact  Psychiatric/Behavioral: Appropriate mood and affect   Exam: deferred  A:  - dyspnea  - orthopnea  - weight gain  P:  - Sounds like a fair story for CHF  Will do cardiac workup including BNP  Likely increased dose of Lasix; however he has been doubling his dose of Lasix for about 3 days now with minimal effect on his symptoms  Denies dietary indiscretions  - 13 point ROS was performed and all are normal unless stated in the history above  - Nursing note reviewed  Vitals reviewed  - Orders placed by myself and/or advanced practitioner / resident     - Previous chart was reviewed  - No language barrier    - History obtained from patient wife   - There are no limitations to the history obtained  - Critical care time: Not applicable for this patient  Code Status: Prior  Advance Directive and Living Will:      Power of :    POLST: Yes    Medications   furosemide (LASIX) injection 40 mg (40 mg Intravenous Given 2/26/21 1031)   furosemide (LASIX) injection 40 mg (40 mg Intravenous Given 2/27/21 1557)   furosemide (LASIX) injection 40 mg (40 mg Intravenous Given 2/26/21 1431)     XR chest 1 view portable   ED Interpretation   Mild vascular congestion         Final Result Mild pulmonary venous congestion                  Workstation performed: [id number]           Orders Placed This Encounter   Procedures    ED ECG Documentation Only    XR chest 1 view portable    CBC and differential    Comprehensive metabolic panel    Troponin I    NT-BNP PRO    CBC (With Platelets)    Basic metabolic panel    Basic metabolic panel    Discharge Diet    Continuous cardiac monitoring    Continuous pulse oximetry    Notify admitting physician    Notify admitting physician on arrival    Activity as tolerated    Call provider for:  persistent nausea or vomiting    Call provider for:  severe uncontrolled pain    Call provider for:  redness, tenderness, or signs of infection (pain, swelling, redness, odor or green/yellow discharge around incision site)    Call provider for: active or persistent bleeding    Call provider for:  difficulty breathing, headache or visual disturbances    Call provider for:  persistent dizziness or light-headedness    Call provider for:  extreme fatigue    Inpatient Consult to Nutrition Services    Inpatient consult to Cardiology    EKG RESULTS    ECG 12 lead    ECG 12 lead    Inpatient Admission    Discharge patient     Labs Reviewed   CBC AND DIFFERENTIAL - Abnormal       Result Value Ref Range Status    WBC 14 46 (*) 4 31 - 10 16 Thousand/uL Final    RBC 4 61  3 88 - 5 62 Million/uL Final    Hemoglobin 15 0  12 0 - 17 0 g/dL Final    Hematocrit 45 7  36 5 - 49 3 % Final    MCV 99 (*) 82 - 98 fL Final    MCH 32 5  26 8 - 34 3 pg Final    MCHC 32 8  31 4 - 37 4 g/dL Final    RDW 16 0 (*) 11 6 - 15 1 % Final    MPV 10 4  8 9 - 12 7 fL Final    Platelets 727  252 - 390 Thousands/uL Final    nRBC 0  /100 WBCs Final    Neutrophils Relative 74  43 - 75 % Final    Immat GRANS % 1  0 - 2 % Final    Lymphocytes Relative 15  14 - 44 % Final    Monocytes Relative 8  4 - 12 % Final    Eosinophils Relative 2  0 - 6 % Final    Basophils Relative 0  0 - 1 % Final    Neutrophils Absolute 10 87 (*) 1 85 - 7 62 Thousands/µL Final    Immature Grans Absolute 0 08  0 00 - 0 20 Thousand/uL Final    Lymphocytes Absolute 2 09  0 60 - 4 47 Thousands/µL Final    Monocytes Absolute 1 10  0 17 - 1 22 Thousand/µL Final    Eosinophils Absolute 0 28  0 00 - 0 61 Thousand/µL Final    Basophils Absolute 0 04  0 00 - 0 10 Thousands/µL Final   COMPREHENSIVE METABOLIC PANEL - Abnormal    Sodium 144  136 - 145 mmol/L Final    Potassium 5 0  3 5 - 5 3 mmol/L Final    Comment: Slightly Hemolyzed; Results May be Affected&XA&Slightly Hemolyzed; Results May be Affected    Chloride 114 (*) 100 - 108 mmol/L Final    CO2 25  21 - 32 mmol/L Final    ANION GAP 5  4 - 13 mmol/L Final    BUN 28 (*) 5 - 25 mg/dL Final    Creatinine 1 57 (*) 0 60 - 1 30 mg/dL Final    Comment: Standardized to IDMS reference method    Glucose 101  65 - 140 mg/dL Final    Comment: If the patient is fasting, the ADA then defines impaired fasting glucose as > 100 mg/dL and diabetes as > or equal to 123 mg/dL  Specimen collection should occur prior to Sulfasalazine administration due to the potential for falsely depressed results  Specimen collection should occur prior to Sulfapyridine administration due to the potential for falsely elevated results  Calcium 9 5  8 3 - 10 1 mg/dL Final    Corrected Calcium 10 1  8 3 - 10 1 mg/dL Final    AST 38  5 - 45 U/L Final    Comment: Slightly Hemolyzed; Results May be Affected&XA&Slightly Hemolyzed; Results May be Affected  Specimen collection should occur prior to Sulfasalazine administration due to the potential for falsely depressed results  ALT 40  12 - 78 U/L Final    Comment: Specimen collection should occur prior to Sulfasalazine and/or Sulfapyridine administration due to the potential for falsely depressed results       Alkaline Phosphatase 67  46 - 116 U/L Final    Total Protein 6 9  6 4 - 8 2 g/dL Final    Albumin 3 3 (*) 3 5 - 5 0 g/dL Final    Total Bilirubin 1 21 (*) 0 20 - 1 00 mg/dL Final    Comment: Use of this assay is not recommended for patients undergoing treatment with eltrombopag due to the potential for falsely elevated results  eGFR 41  ml/min/1 73sq m Final    Narrative:     National Kidney Disease Foundation guidelines for Chronic Kidney Disease (CKD):     Stage 1 with normal or high GFR (GFR > 90 mL/min/1 73 square meters)    Stage 2 Mild CKD (GFR = 60-89 mL/min/1 73 square meters)    Stage 3A Moderate CKD (GFR = 45-59 mL/min/1 73 square meters)    Stage 3B Moderate CKD (GFR = 30-44 mL/min/1 73 square meters)    Stage 4 Severe CKD (GFR = 15-29 mL/min/1 73 square meters)    Stage 5 End Stage CKD (GFR <15 mL/min/1 73 square meters)  Note: GFR calculation is accurate only with a steady state creatinine   NT-BNP PRO (BRAIN NATRIURETIC PEPTIDE) - Abnormal    NT-proBNP 10,759 (*) <450 pg/mL Final   TROPONIN I - Normal    Troponin I <0 02  <=0 04 ng/mL Final    Comment: Siemens Chemistry analyzer 99% cutoff is > 0 04 ng/mL in network labs     o cTnI 99% cutoff is useful only when applied to patients in the clinical setting of myocardial ischemia   o cTnI 99% cutoff should be interpreted in the context of clinical history, ECG findings and possibly cardiac imaging to establish correct diagnosis  o cTnI 99% cutoff may be suggestive but clearly not indicative of a coronary event without the clinical setting of myocardial ischemia         Time reflects when diagnosis was documented in both MDM as applicable and the Disposition within this note     Time User Action Codes Description Comment    2/26/2021 10:51 AM Alfornia Meckel Add [I50 9] CHF (congestive heart failure) ([facility] [address] )     2/27/2021  4:01 PM Shaq Lara [I50 23] Acute on chronic systolic congestive heart failure [facility])       ED Disposition     ED Disposition Condition Date/Time Comment    Admit Stable Fri Feb 26, 2021 10:51 AM Case was discussed with KIEL and the patient's admission status was agreed to be Admission Status: inpatient status to the service of Dr Lindsay Rossi   Follow-up Information     Follow up With Specialties Details Why Contact Susy Carreon Like, DO Cardiology Follow up The office will call for a close follow-up appointment [address] [id number]      Guillermo Rob MD Family Medicine Follow up in 1 week(s)  [address] [facility]  [phone]          Discharge Medication List as of 2/27/2021  5:12 PM      CONTINUE these medications which have NOT CHANGED    Details   albuterol (PROVENTIL HFA,VENTOLIN HFA) 90 mcg/act inhaler Inhale 2 puffs every 6 (six) hours as needed for wheezing or shortness of breath, Starting Sat 2/20/2021, Normal      aspirin 81 MG tablet Take 81 mg by mouth daily  , Historical Med      atorvastatin (LIPITOR) 10 mg tablet TAKE ONE TABLET BY MOUTH EVERY DAY, Normal      bisoprolol (ZEBETA) 10 MG tablet TAKE ONE TABLET BY MOUTH TWICE A DAY, Normal      Calcium Carb-Cholecalciferol (CALCIUM 600 + D PO) Take 2 tablets by mouth daily , Historical Med      Coenzyme Q10 (CO Q-10) 200 MG CAPS Take 1 tablet by mouth daily, Historical Med      finasteride (PROSCAR) 5 mg tablet TAKE ONE TABLET BY MOUTH EVERY DAY, Normal      furosemide (LASIX) 20 mg tablet Take 1 tablet (20 mg total) by mouth daily, Starting Mon 10/19/2020, Until Fri 2/26/2021, Normal      Multiple Vitamins-Minerals (CENTRUM SILVER PO) Take 1 tablet by mouth daily  , Historical Med      omeprazole (PriLOSEC) 20 mg delayed release capsule Take 1 capsule (20 mg total) by mouth daily, Starting Fri 10/23/2020, Normal      PREVIDENT 5000 ENAMEL PROTECT 1 1-5 % PSTE Use as directed, Starting Tue 1/22/2019, Historical Med      sacubitril-valsartan (Entresto) 49-51 MG TABS Take 1 tablet by mouth 2 (two) times a day, Starting Tue 11/17/2020, Normal      Vitamin D, Cholecalciferol, 1000 UNITS CAPS Take 1 tablet by mouth daily  , Historical Med      Wixela Inhub 500-50 MCG/DOSE inhaler Inhale 1 puff 2 (two) times a day, Starting Sat 2/20/2021, No Print           Outpatient Discharge Orders   Basic metabolic panel   Standing Status: Future Standing Exp  Date: 02/27/22     Discharge Diet     Activity as tolerated     Call provider for:  persistent nausea or vomiting     Call provider for:  severe uncontrolled pain     Call provider for:  redness, tenderness, or signs of infection (pain, swelling, redness, odor or green/yellow discharge around incision site)     Call provider for: active or persistent bleeding     Call provider for:  difficulty breathing, headache or visual disturbances     Call provider for:  persistent dizziness or light-headedness     Call provider for:  extreme fatigue     Prior to Admission Medications   Prescriptions Last Dose Informant Patient Reported? Taking? Calcium Carb-Cholecalciferol (CALCIUM 600 + D PO)  Self Yes Yes   Sig: Take 2 tablets by mouth daily    Coenzyme Q10 (CO Q-10) 200 MG CAPS  Self Yes Yes   Sig: Take 1 tablet by mouth daily   Multiple Vitamins-Minerals (CENTRUM SILVER PO)  Self Yes Yes   Sig: Take 1 tablet by mouth daily  PREVIDENT 5000 ENAMEL PROTECT 1 1-5 % PSTE  Self Yes Yes   Sig: Use as directed   Vitamin D, Cholecalciferol, 1000 UNITS CAPS  Self Yes Yes   Sig: Take 1 tablet by mouth daily  Wixela Inhub 500-50 MCG/DOSE inhaler   No Yes   Sig: Inhale 1 puff 2 (two) times a day   albuterol (PROVENTIL HFA,VENTOLIN HFA) 90 mcg/act inhaler   No Yes   Sig: Inhale 2 puffs every 6 (six) hours as needed for wheezing or shortness of breath   aspirin 81 MG tablet  Self Yes Yes   Sig: Take 81 mg by mouth daily     atorvastatin (LIPITOR) 10 mg tablet  Self No Yes   Sig: TAKE ONE TABLET BY MOUTH EVERY DAY   bisoprolol (ZEBETA) 10 MG tablet  Self No Yes   Sig: TAKE ONE TABLET BY MOUTH TWICE A DAY   finasteride (PROSCAR) 5 mg tablet  Self No Yes   Sig: TAKE ONE TABLET BY MOUTH EVERY DAY   furosemide (LASIX) 20 mg tablet  Self No Yes   Sig: Take 1 tablet (20 mg total) by mouth daily   omeprazole (PriLOSEC) 20 mg delayed release capsule  Self No Yes   Sig: Take 1 capsule (20 mg total) by mouth daily   sacubitril-valsartan (Entresto) 49-51 MG TABS   No Yes   Sig: Take 1 tablet by mouth 2 (two) times a day      Facility-Administered Medications: None       Portions of the record may have been created with voice recognition software  Occasional wrong word or "sound a like" substitutions may have occurred due to the inherent limitations of voice recognition software  Read the chart carefully and recognize, using context, where substitutions have occurred      Electronically signed by:  Consuelo Carvajal

## 2021-02-26 NOTE — ASSESSMENT & PLAN NOTE
Lab Results   Component Value Date    EGFR 41 02/26/2021    EGFR 41 01/27/2021    EGFR 41 07/09/2020    CREATININE 1 57 (H) 02/26/2021    CREATININE 1 58 (H) 01/27/2021    CREATININE 1 57 (H) 07/09/2020   Cr currently at baseline 1 4- 1 7  Monitor BUN/Cr   Avoid nephrotoxic agents and hypotension

## 2021-02-27 VITALS
OXYGEN SATURATION: 97 % | TEMPERATURE: 97.2 F | HEIGHT: 70 IN | HEART RATE: 67 BPM | BODY MASS INDEX: 27.81 KG/M2 | WEIGHT: 194.22 LBS | SYSTOLIC BLOOD PRESSURE: 123 MMHG | DIASTOLIC BLOOD PRESSURE: 75 MMHG | RESPIRATION RATE: 14 BRPM

## 2021-02-27 LAB
ANION GAP SERPL CALCULATED.3IONS-SCNC: 7 MMOL/L (ref 4–13)
BUN SERPL-MCNC: 35 MG/DL (ref 5–25)
CALCIUM SERPL-MCNC: 9.2 MG/DL (ref 8.3–10.1)
CHLORIDE SERPL-SCNC: 108 MMOL/L (ref 100–108)
CO2 SERPL-SCNC: 28 MMOL/L (ref 21–32)
CREAT SERPL-MCNC: 1.63 MG/DL (ref 0.6–1.3)
ERYTHROCYTE [DISTWIDTH] IN BLOOD BY AUTOMATED COUNT: 15.8 % (ref 11.6–15.1)
GFR SERPL CREATININE-BSD FRML MDRD: 39 ML/MIN/1.73SQ M
GLUCOSE SERPL-MCNC: 76 MG/DL (ref 65–140)
HCT VFR BLD AUTO: 48.4 % (ref 36.5–49.3)
HGB BLD-MCNC: 16.2 G/DL (ref 12–17)
MCH RBC QN AUTO: 32.5 PG (ref 26.8–34.3)
MCHC RBC AUTO-ENTMCNC: 33.5 G/DL (ref 31.4–37.4)
MCV RBC AUTO: 97 FL (ref 82–98)
PLATELET # BLD AUTO: 208 THOUSANDS/UL (ref 149–390)
PMV BLD AUTO: 10.1 FL (ref 8.9–12.7)
POTASSIUM SERPL-SCNC: 3.6 MMOL/L (ref 3.5–5.3)
RBC # BLD AUTO: 4.99 MILLION/UL (ref 3.88–5.62)
SODIUM SERPL-SCNC: 143 MMOL/L (ref 136–145)
WBC # BLD AUTO: 12.62 THOUSAND/UL (ref 4.31–10.16)

## 2021-02-27 PROCEDURE — 99222 1ST HOSP IP/OBS MODERATE 55: CPT | Performed by: INTERNAL MEDICINE

## 2021-02-27 PROCEDURE — 85027 COMPLETE CBC AUTOMATED: CPT | Performed by: INTERNAL MEDICINE

## 2021-02-27 PROCEDURE — 99239 HOSP IP/OBS DSCHRG MGMT >30: CPT | Performed by: FAMILY MEDICINE

## 2021-02-27 PROCEDURE — 99232 SBSQ HOSP IP/OBS MODERATE 35: CPT | Performed by: NURSE PRACTITIONER

## 2021-02-27 PROCEDURE — 80048 BASIC METABOLIC PNL TOTAL CA: CPT | Performed by: FAMILY MEDICINE

## 2021-02-27 RX ADMIN — FUROSEMIDE 40 MG: 10 INJECTION, SOLUTION INTRAMUSCULAR; INTRAVENOUS at 15:57

## 2021-02-27 RX ADMIN — PANTOPRAZOLE SODIUM 40 MG: 40 TABLET, DELAYED RELEASE ORAL at 06:01

## 2021-02-27 RX ADMIN — Medication 200 MG: at 09:12

## 2021-02-27 RX ADMIN — SACUBITRIL AND VALSARTAN 1 TABLET: 49; 51 TABLET, FILM COATED ORAL at 17:05

## 2021-02-27 RX ADMIN — FINASTERIDE 5 MG: 5 TABLET, FILM COATED ORAL at 09:11

## 2021-02-27 RX ADMIN — BISOPROLOL FUMARATE 10 MG: 5 TABLET ORAL at 17:05

## 2021-02-27 RX ADMIN — ATORVASTATIN CALCIUM 10 MG: 10 TABLET, FILM COATED ORAL at 09:11

## 2021-02-27 RX ADMIN — FUROSEMIDE 40 MG: 10 INJECTION, SOLUTION INTRAMUSCULAR; INTRAVENOUS at 09:12

## 2021-02-27 RX ADMIN — SACUBITRIL AND VALSARTAN 1 TABLET: 49; 51 TABLET, FILM COATED ORAL at 09:13

## 2021-02-27 RX ADMIN — ASPIRIN 81 MG: 81 TABLET, COATED ORAL at 09:11

## 2021-02-27 RX ADMIN — Medication 2 TABLET: at 09:11

## 2021-02-27 RX ADMIN — ENOXAPARIN SODIUM 40 MG: 40 INJECTION SUBCUTANEOUS at 09:13

## 2021-02-27 RX ADMIN — BISOPROLOL FUMARATE 10 MG: 5 TABLET ORAL at 09:12

## 2021-02-27 RX ADMIN — FLUTICASONE FUROATE AND VILANTEROL TRIFENATATE 1 PUFF: 200; 25 POWDER RESPIRATORY (INHALATION) at 09:10

## 2021-02-27 NOTE — PROGRESS NOTES
Progress Note - María Elena Dobson 1940, 79 y o  male MRN: [id number]    Unit/Bed#: -01 Encounter: [id number]    Primary Care Provider: Ludy Yates MD   Date and time admitted to hospital: 2/26/2021  8:33 AM        * Acute on chronic systolic congestive heart failure [facility])  Assessment & Plan  Wt Readings from Last 3 Encounters:   02/27/21 88 1 kg (194 lb 3 6 oz)   02/03/21 92 6 kg (204 lb 3 2 oz)   10/28/20 87 5 kg (192 lb 14 4 oz)     · Volume overloaded on admission: WEIGHT IMPROVED TODAY  · Echo 10/2020 EF of 35% with reduced systolic function and PA pressure of 45mmHg  · Elevated BNP and CXR with pulmonary edema  Given IV lasix 40mg in ED  · Consult Cardiology for evaluation recommendations  Patient follows with Dr Lety Nassar in the office stand is due for follow-up enlarge  His last echocardiogram as stated above was in October  · Conitnue IV Lasix 40mg BID  · given increase in creatinine will need to closely monitor BMP   · Repeat BMP at 4 PM today (before next dose of lasix)  · Continue bisoprolol and Entresto, daily weights, ins and outs, 2g Na restriction    Hypertensive kidney disease with stage 3b chronic kidney disease  Assessment & Plan  Lab Results   Component Value Date    EGFR 39 02/27/2021    EGFR 41 02/26/2021    EGFR 41 01/27/2021    CREATININE 1 63 (H) 02/27/2021    CREATININE 1 57 (H) 02/26/2021    CREATININE 1 58 (H) 01/27/2021     · Increase noted since admission, diuresis likely cause (Cr currently at baseline 1 4- 1  7)  · Recheck BMP at 4 pm if continues to rise may need to hold this evenings dose  · Avoid nephrotoxic agents and hypotension    Mild persistent asthma without complication  Assessment & Plan  · Not in exacerbation  · Continue Breo in place of Advair and ventolin prn    Essential hypertension  Assessment & Plan  · Controlled  · Continue Bisoprolol   · Monitor    Mixed hyperlipidemia  Assessment & Plan  · continue Lipitor    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  Assessment & Plan  · Continue Proscar        VTE Pharmacologic Prophylaxis:   Pharmacologic: Enoxaparin (Lovenox)  Mechanical VTE Prophylaxis in Place: No    Patient Centered Rounds: I have performed bedside rounds with nursing staff today  Discussions with Specialists or Other Care Team Provider:  Dr Katrin Lewis    Education and Discussions with Family / Patient:  Patient and wife at bedside    Time Spent for Care: 30 minutes  More than 50% of total time spent on counseling and coordination of care as described above  Current Length of Stay: 1 day(s)    Current Patient Status: Inpatient   Certification Statement: The patient will continue to require additional inpatient hospital stay due to Increased creatinine, need for cardiology clearance    Discharge Plan:  Home when cleared by Cardiology    Code Status: Level 1 - Full Code      Subjective:   Patient states he feels improved today  He denies that he has edema of his extremities and states that he typically does not get swollen ankles  He states at home he did notice possibly increased abdominal girth  Feels better in regards to his shortness of breath  The main concern that brought into the hospital was that he could not lie prone  He feels improved  He questions if he could go home today  No dizziness lightheadedness chest pain or cough  Objective:     Vitals:   Temp (24hrs), Av 4 °F (36 3 °C), Min:97 °F (36 1 °C), Max:97 8 °F (36 6 °C)    Temp:  [97 °F (36 1 °C)-97 8 °F (36 6 °C)] 97 6 °F (36 4 °C)  HR:  [59-67] 62  Resp:  [16-21] 18  BP: (111-132)/(65-83) 111/69  SpO2:  [93 %-97 %] 95 %  Body mass index is 27 87 kg/m²  Input and Output Summary (last 24 hours): Intake/Output Summary (Last 24 hours) at 2021 1401  Last data filed at 2021 1300  Gross per 24 hour   Intake 598 ml   Output 3050 ml   Net -2452 ml       Physical Exam:     Physical Exam  Vitals signs reviewed     Constitutional: General: He is not in acute distress  Appearance: Normal appearance  HENT:      Head: Normocephalic  Neck:      Musculoskeletal: Normal range of motion  Cardiovascular:      Rate and Rhythm: Normal rate and regular rhythm  Heart sounds: No murmur  Pulmonary:      Effort: Pulmonary effort is normal  No respiratory distress  Breath sounds: Normal breath sounds  Abdominal:      General: Bowel sounds are normal  There is no distension  Palpations: Abdomen is soft  Tenderness: There is no abdominal tenderness  Musculoskeletal: Normal range of motion  Right lower leg: No edema  Left lower leg: No edema  Skin:     General: Skin is warm and dry  Neurological:      General: No focal deficit present  Mental Status: He is alert and oriented to person, place, and time  Psychiatric:         Mood and Affect: Mood normal          Behavior: Behavior normal          Additional Data:     Labs:    Results from last 7 days   Lab Units 02/27/21  0455 02/26/21  0851   WBC Thousand/uL 12 62* 14 46*   HEMOGLOBIN g/dL 16 2 15 0   HEMATOCRIT % 48 4 45 7   PLATELETS Thousands/uL 208 257   NEUTROS PCT %  --  74   LYMPHS PCT %  --  15   MONOS PCT %  --  8   EOS PCT %  --  2     Results from last 7 days   Lab Units 02/27/21  0455 02/26/21  0851   POTASSIUM mmol/L 3 6 5 0   CHLORIDE mmol/L 108 114*   CO2 mmol/L 28 25   BUN mg/dL 35* 28*   CREATININE mg/dL 1 63* 1 57*   CALCIUM mg/dL 9 2 9 5   ALK PHOS U/L  --  67   ALT U/L  --  40   AST U/L  --  38           * I Have Reviewed All Lab Data Listed Above  * Additional Pertinent Lab Tests Reviewed:  [facility] Admission Reviewed    Imaging:    Imaging Reports Reviewed Today Include:  Chest x-ray  Imaging Personally Reviewed by Myself Includes:      Recent Cultures (last 7 days):           Last 24 Hours Medication List:   Current Facility-Administered Medications   Medication Dose Route Frequency Provider Last Rate    albuterol  2 puff Inhalation Q6H PRN Neo Glez MD      aspirin  81 mg Oral Daily Neo Glez MD      atorvastatin  10 mg Oral Daily Neo Glez MD      bisoprolol  10 mg Oral BID Neo Glez MD      calcium carbonate-vitamin D  2 tablet Oral Daily With Breakfast Neo Glez MD      co-enzyme Q-10  200 mg Oral Daily Neo Glez MD      enoxaparin  40 mg Subcutaneous Daily Neo Glez MD      finasteride  5 mg Oral Daily Neo Glez MD      fluticasone-vilanterol  1 puff Inhalation Daily Neo Glez MD      furosemide  40 mg Intravenous BID Neo Glez MD      ondansetron  4 mg Intravenous Q6H PRN Neo Glez MD      pantoprazole  40 mg Oral Early Morning Neo Glez MD      sacubitril-valsartan  1 tablet Oral BID Neo Glez MD          Today, Patient Was Seen By: BRANDON Rodriges    ** Please Note: Dictation voice to text software may have been used in the creation of this document   **

## 2021-02-27 NOTE — ASSESSMENT & PLAN NOTE
Lab Results   Component Value Date    EGFR 39 02/27/2021    EGFR 41 02/26/2021    EGFR 41 01/27/2021    CREATININE 1 63 (H) 02/27/2021    CREATININE 1 57 (H) 02/26/2021    CREATININE 1 58 (H) 01/27/2021     · Increase noted since admission, diuresis likely cause (Cr currently at baseline 1 4- 1  7)  · Recheck BMP at 4 pm if continues to rise may need to hold this evenings dose  · Avoid nephrotoxic agents and hypotension

## 2021-02-27 NOTE — CONSULTS
Consultation - Cardiology Team One  Cony Graham 79 y o  male MRN: [id number]  Unit/Bed#: -01 Encounter: [id number]    Inpatient consult to Cardiology  Consult performed by: BRANDON Godfrey  Consult ordered by: BRANDON Sneed          Physician Requesting Consult: Guillermina Hernández MD     Reason for Consult / Principal Problem: CHF      Assessment/ Plan:    1  Acute on chronic systolic congestive heart failure:  Exacerbation due to dietary indiscretions patient has a Mg's frequently  He was counseled on low-sodium diet  Symptoms have improved with diuresis; status post 3 doses of 40 mg IV Lasix  Overall -2 4L  Weight down about 4 lb  Discussed with patient and his wife would give 2nd dose of IV Lasix today check BMP in the morning and can likely be transitioned to oral diuretics and discharged home tomorrow  Will need higher dose diuretic on discharge  He has follow-up scheduled with his primary cardiologist Dr Jeevan Hart  Continue remainder of cardiac medications  2  Dilated cardiomyopathy:  LVEF 30% on bisoprolol and Entresto  See 1  For volume management  Has ICD  3  Hypertension:  Blood pressure adequately controlled  4  Dyslipidemia:  Continue statin  5  Status post ICD:  No device firings    History of Present Illness   HPI: Cony Graham is a 79y o  year old male who has a history of dilated cardiomyopathy with LVEF of 30%, status post dual-chamber ICD, hypertension, SVT, dyslipidemia, chronic kidney disease stage 3, COPD  He follows with cardiologist Dr Jeevan Hart  Patient presented to the emergency department with complaints of orthopnea, lower extremity edema and weight gain  He noted orthopnea approximately 1 week ago with worsening symptoms yesterday  Reported about a 10 lb weight gain in the past 2 months  He did try taking an extra dose of Lasix for approximately 4 days this week without any change in his weight       In ED he was afebrile, SpO2 95% on room air, blood pressure 137/90  EKG showing sinus rhythm with a first-degree AV block and widened QRS  NT proBNP 90704, , troponin negative, creatinine 1 57 ( at baseline)  Chest x-ray with mild vascular congestion    Admitted for treatment of acute on chronic systolic congestive heart failure; start on Lasix 40 mg IV b i d  Cardiology was consulted for further management  Time exam he reports feeling much better  No lower extremity edema  He was able to sleep through the night lying flat in bed last night  He has not any exertional dyspnea  Still has some abdominal bloating  Denies any chest pain, palpitations dizziness lightheadedness or near syncope  His past medical history of dilated cardiomyopathy dating back to 2009; LVEF 30-35% most recently on echo 10/2020  He has ICD  Most recent medication regiment is bisoprolol 10 mg b i d , Entresto 49-51 mg b i d , aspirin, statin and Lasix 20 mg daily  He does admit to dietary indiscretions eating Mg's frequently  He did try doubling his dose of Lasix to 40 mg daily for 4 days last week and 3 days the week prior with any change in his weight  EKG reviewed personally:   2/26/2021  Sinus rhythm with first-degree AV block widened QRS    Telemetry reviewed personally:   Sinus rhythm with occasional PVC    Review of Systems   Constitution: Negative for decreased appetite and fever  Cardiovascular: Negative for chest pain, dyspnea on exertion, near-syncope, palpitations and syncope  Leg swelling: Resolve  Orthopnea: Resolved  Respiratory: Negative for cough, shortness of breath, sleep disturbances due to breathing and wheezing  Gastrointestinal: Positive for bloating  Genitourinary: Negative for dysuria  Neurological: Negative for dizziness and light-headedness  Psychiatric/Behavioral: Negative for altered mental status  All other systems reviewed and are negative       Historical Information   Past Medical History: Diagnosis Date    AICD (automatic cardioverter/defibrillator) present     Arthritis     Asthma     Burt esophagus     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     Cancer, colon ([facility] ) 4/5/2013    Cardiac arrhythmia     Cardiomyopathy ([facility] )     CKD (chronic kidney disease)     Colon cancer (HCC)     Congestive heart failure (CHF) (HCC)     [facility]     Diverticulitis     Last assessed: 4/5/13    Esophageal reflux     Gout     Hernia     Hyperlipidemia     Hypertension     Hypertension     Hypothyroidism     Hypothyroidism 5/30/2013    Non-toxic multinodular goiter 11/28/2012    Pleural effusion     Polymyalgia rheumatica (HCC)     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     Spondyloarthropathy     Last assessed: 11/28/12     Past Surgical History:   Procedure Laterality Date    ARM SKIN LESION BIOPSY / EXCISION      Malignant    ATRIAL ABLATION SURGERY      AV NODE ABLATION      CARDIAC DEFIBRILLATOR PLACEMENT  2009    Cardio-Defib Pulse Gen Venous Insertion of Electrode for Ventricular Pacing  Implantable    CARDIAC SURGERY  2009    Catheterization    COLONOSCOPY N/A 3/14/2016    Procedure: COLONOSCOPY;  Surgeon: Yordan Saini MD;  Location: [facility]; Service  February 22, 2013, mildly enlarged prostate, history of colon CA with normal appearance of anastomosis at the midtransverse colon, severe diverticulosis in the sigmoid, descending and transverse colon  Repeat colonoscopy in 3 yrs    HEMICOLECTOMY Right 05/05/2004    INGUINAL HERNIA REPAIR Bilateral     Left 3/2004, right 5/2008    IR DIAGNOSTIC UPPER EXTREMITY VENOGRAM  10/4/2018    KNEE SURGERY  1972    Meniscectomy    [place] ESOPHAGOGASTRODUODENOSCOPY TRANSORAL DIAGNOSTIC N/A 12/5/2016    Procedure: ESOPHAGOGASTRODUODENOSCOPY (EGD); Surgeon: Debbie Bryant MD;  Location: [facility];   Service: Gastroenterology   [employer] TONSILLECTOMY AND ADENOIDECTOMY      UPPER GASTROINTESTINAL ENDOSCOPY       Social History     Substance and Sexual Activity   Alcohol Use Never    Frequency: Never    Binge frequency: Never    Comment: Rare     Social History     Substance and Sexual Activity   Drug Use No     Social History     Tobacco Use   Smoking Status Never Smoker   Smokeless Tobacco Never Used     Family History:   Family History   Problem Relation Age of Onset    Heart failure Mother         Congestive    Hypertension Mother     Osteoporosis Mother     COPD Father     Emphysema Father     Hypertension Father     Heart disease Sister         Heart Valve Replacement    Osteoporosis Sister     Breast cancer Family      Meds/Allergies   all current active meds have been reviewed       No Known Allergies    Objective   Vitals: Blood pressure 111/69, pulse 62, temperature 97 6 °F (36 4 °C), resp  rate 18, height 5' 10" (1 778 m), weight 88 1 kg (194 lb 3 6 oz), SpO2 95 %  ,     Body mass index is 27 87 kg/m²  ,     Systolic (74BFO), RTH:381 , Min:111 , LVZ:897     Diastolic (60QLH), ELR:16, Min:65, Max:83      Intake/Output Summary (Last 24 hours) at 2/27/2021 1448  Last data filed at 2/27/2021 1300  Gross per 24 hour   Intake 598 ml   Output 3050 ml   Net -2452 ml     Weight (last 2 days)     Date/Time   Weight    02/27/21 0600   88 1 (194 23)    02/26/21 1633   90 1 (198 63)    02/26/21 0841   93 9 (207)            Invasive Devices     Peripheral Intravenous Line            Peripheral IV 02/26/21 Left Antecubital less than 1 day              Physical Exam  Vitals signs reviewed  Constitutional:       General: He is not in acute distress  Appearance: Normal appearance  He is not ill-appearing  HENT:      Head: Normocephalic  Mouth/Throat:      Mouth: Mucous membranes are moist    Cardiovascular:      Rate and Rhythm: Normal rate and regular rhythm  Heart sounds: S1 normal and S2 normal  No murmur     Pulmonary: Effort: Pulmonary effort is normal       Breath sounds: Normal breath sounds  No wheezing or rales  Comments: Consult to auscultation, on room air  Abdominal:      Palpations: Abdomen is soft  Musculoskeletal:      Right lower leg: No edema  Left lower leg: No edema  Skin:     General: Skin is warm and dry  Neurological:      Mental Status: He is alert and oriented to person, place, and time     Psychiatric:         Mood and Affect: Mood normal        LABORATORY RESULTS:  Results from last 7 days   Lab Units 02/26/21  0851   TROPONIN I ng/mL <0 02     CBC with diff:   Results from last 7 days   Lab Units 02/27/21  0455 02/26/21  0851   WBC Thousand/uL 12 62* 14 46*   HEMOGLOBIN g/dL 16 2 15 0   HEMATOCRIT % 48 4 45 7   MCV fL 97 99*   PLATELETS Thousands/uL 208 257   MCH pg 32 5 32 5   MCHC g/dL 33 5 32 8   RDW % 15 8* 16 0*   MPV fL 10 1 10 4   NRBC AUTO /100 WBCs  --  0     CMP:  Results from last 7 days   Lab Units 02/27/21  0455 02/26/21  0851   POTASSIUM mmol/L 3 6 5 0   CHLORIDE mmol/L 108 114*   CO2 mmol/L 28 25   BUN mg/dL 35* 28*   CREATININE mg/dL 1 63* 1 57*   CALCIUM mg/dL 9 2 9 5   AST U/L  --  38   ALT U/L  --  40   ALK PHOS U/L  --  67   EGFR ml/min/1 73sq m 39 41     BMP:  Results from last 7 days   Lab Units 02/27/21  0455 02/26/21  0851   POTASSIUM mmol/L 3 6 5 0   CHLORIDE mmol/L 108 114*   CO2 mmol/L 28 25   BUN mg/dL 35* 28*   CREATININE mg/dL 1 63* 1 57*   CALCIUM mg/dL 9 2 9 5     Lab Results   Component Value Date    NTBNP 10,759 (H) 02/26/2021    NTBNP 6,192 (H) 01/29/2019    NTBNP 6,942 (H) 01/28/2019     Lipid Profile:   Lab Results   Component Value Date    CHOL 163 12/19/2015    CHOL 168 04/17/2015    CHOL 155 12/04/2014     Lab Results   Component Value Date    HDL 65 01/27/2021    HDL 51 07/09/2020    HDL 56 01/11/2020     Lab Results   Component Value Date    LDLCALC 82 01/27/2021    LDLCALC 79 07/09/2020    LDLCALC 74 01/11/2020     Lab Results   Component Value Date TRIG 138 2021    TRIG 98 2020    TRIG 172 (H) 2020     Cardiac testing:   Results for orders placed during the hospital encounter of 10/01/20   Echo complete with contrast if indicated    Narrative [address]  [phone]    Transthoracic Echocardiogram  2D, M-mode, Doppler, and Color Doppler    Study date:  01-Oct-2020    Patient: Estephanie Clark  MR number: [id number]  Account number: [de-identified]  : 1940  Age: 79 years  Gender: Male  Status: Outpatient  Location: Echo lab  Height: 60 in  Weight: 196 lb  BP: 132/ 82 mmHg    Indications: Dilated Cardiomyopathy    Diagnoses: I42 0 - Dilated cardiomyopathy    Sonographer:  Kelli Perez RDCS  Primary Physician:  Corrine Santos MD  Referring Physician:  Leonora Robertson DO  Group:  [facility]  Cardiology Fellow:  Kun Doran DO  Interpreting Physician:  Dale Ibrahim MD    SUMMARY    LEFT VENTRICLE:  The ventricle was moderately dilated  Systolic function was moderately to markedly reduced  Ejection fraction was estimated to be 35 %  There was severe diffuse hypokinesis with distinct regional wall motion abnormalities in the inferior wall region  The changes were consistent with eccentric hypertrophy  Left ventricular diastolic function parameters were abnormal     RIGHT VENTRICLE:  The ventricle was mildly dilated  Systolic function was mildly reduced  LEFT ATRIUM:  The atrium was moderately dilated  RIGHT ATRIUM:  The atrium was mildly dilated  MITRAL VALVE:  There was moderate regurgitation  AORTIC VALVE:  There was mild regurgitation  TRICUSPID VALVE:  There was moderate to severe regurgitation  Regurgitation grade was 2-3+ on a scale of 0 to 4+  Estimated peak PA pressure was 45 mmHg  The findings suggest mild pulmonary hypertension  PULMONIC VALVE:  There was trace regurgitation      AORTA:  There was mild dilatation of the ascending aorta measuring 4 0 cm  HISTORY: PRIOR HISTORY: CM, HTN, RBBB, CHF    PROCEDURE: The procedure was performed in the echo lab  This was a routine study  The transthoracic approach was used  The study included complete 2D imaging, M-mode, complete spectral Doppler, and color Doppler  Images were obtained from  the parasternal, apical, subcostal, and suprasternal notch acoustic windows  Image quality was adequate  LEFT VENTRICLE: The ventricle was moderately dilated  Systolic function was moderately to markedly reduced  Ejection fraction was estimated to be 35 %  There was severe diffuse hypokinesis with distinct regional wall motion abnormalities  in the inferior wall region  Wall thickness was normal  The changes were consistent with eccentric hypertrophy  DOPPLER: Left ventricular diastolic function parameters were abnormal     RIGHT VENTRICLE: The ventricle was mildly dilated  Systolic function was mildly reduced  A pacing wire was present in the ventricular cavity  LEFT ATRIUM: The atrium was moderately dilated  RIGHT ATRIUM: The atrium was mildly dilated  MITRAL VALVE: Valve structure was normal  There was normal leaflet separation  DOPPLER: The transmitral velocity was within the normal range  There was no evidence for stenosis  There was moderate regurgitation  AORTIC VALVE: The valve was trileaflet  Leaflets exhibited normal cuspal separation and sclerosis  DOPPLER: Transaortic velocity was within the normal range  There was no evidence for stenosis  There was mild regurgitation  TRICUSPID VALVE: The valve structure was normal  There was normal leaflet separation  DOPPLER: The transtricuspid velocity was within the normal range  There was no evidence for stenosis  There was moderate to severe regurgitation  Regurgitation grade was 2-3+ on a scale of 0 to 4+  Estimated peak PA pressure was 45 mmHg  The findings suggest mild pulmonary hypertension      PULMONIC VALVE: Leaflets exhibited normal thickness, no calcification, and normal cuspal separation  DOPPLER: The transpulmonic velocity was within the normal range  There was trace regurgitation  PERICARDIUM: There was no pericardial effusion  AORTA: The root exhibited upper limit of normal size measuring 3 8 cm  There was mild dilatation of the ascending aorta measuring 4 0 cm  SYSTEM MEASUREMENT TABLES    2D  %FS: 15 43 %  Ao Diam: 3 77 cm  Ao asc: 4 03 cm  EDV(Teich): 215 42 ml  EF Biplane: 31 63 %  EF(Teich): 31 83 %  ESV(Teich): 146 84 ml  IVSd: 1 11 cm  LA Area: 30 86 cm2  LA Diam: 4 77 cm  LVEDV MOD A2C: 240 44 ml  LVEDV MOD A4C: 190 34 ml  LVEDV MOD BP: 241 35 ml  LVEF MOD A2C: 32 69 %  LVEF MOD A4C: 30 48 %  LVESV MOD A2C: 161 84 ml  LVESV MOD A4C: 132 32 ml  LVESV MOD BP: 165 ml  LVIDd: 6 49 cm  LVIDs: 5 49 cm  LVLd A2C: 11 45 cm  LVLd A4C: 8 92 cm  LVLs A2C: 10 03 cm  LVLs A4C: 7 85 cm  LVPWd: 0 96 cm  RA Area: 21 02 cm2  RVIDd: 4 63 cm  SV MOD A2C: 78 6 ml  SV MOD A4C: 58 02 ml  SV(Teich): 68 58 ml    CW  AR Dec [place]: 1 21 m/s2  AR Dec Time: 2694 17 ms  AR PHT: 781 31 ms  AR Vmax: 3 25 m/s  AR maxP 28 mmHg  TR Vmax: 2 78 m/s  TR maxP 95 mmHg    MM  TAPSE: 2 05 cm    PW  MV A Benigno: 0 42 m/s  MV Dec [place]: 5 12 m/s2  MV DecT: 200 11 ms  MV E Benigno: 1 02 m/s  MV E/A Ratio: 2 47  MV PHT: 58 03 ms  MVA By PHT: 3 79 cm2    [employer] Accredited Echocardiography Laboratory    Prepared and electronically signed by    Sujey Latham MD  Signed 01-Oct-2020 14:47:41       No results found for this or any previous visit  No procedure found    Results for orders placed during the hospital encounter of 19   NM myocardial perfusion spect (stress and/or rest)    [facility] [address] [place], [place] [address]vd  [phone]    Stress Gated SPECT Myocardial Perfusion Imaging after Regadenoson    Patient: Lenora Jacksonzohaib  MR number: [id number]  Account number: [de-identified]  : 1940  Age: 69 years  Gender: Male  Status: Outpatient  Location: [address] [place] [address] and [facility]  Height: 72 in  Weight: 205 lb  BP: 132/ 78 mmHg    Allergies: NO KNOWN ALLERGIES    Referring Physician:  Lucy Tucker  Primary Physician:  Andrew Choi MD  Technician:  Roosevelt Mejias  RN:  Tisha Davalos RN  Group:  [facility]  Report Prepared by[de-identified]  Tisha Davalos RN  Interpreting Physician:  Sydney Gao DO    INDICATIONS: Detection of coronary artery disease  HISTORY: The patient is a 69year old  male  Chest pain status: no chest pain  Other symptoms: dyspnea  Coronary artery disease risk factors: dyslipidemia and hypertension  Cardiovascular history: arrhythmia-SVT and nonischemic  cardiomyopathy  Prior cardiovascular procedures: BiV ICD, atrial ablation  Previous test results: abnormal ECG and abnormal resting echocardiogram     PHYSICAL EXAM: Baseline physical exam screening: no wheezes audible  REST ECG: atrial paced, resting TWI in ll, lll, and aVF    PROCEDURE: The study was performed in the [facility]  A regadenoson infusion pharmacologic stress test was performed  Gated SPECT myocardial perfusion imaging was performed during stress  Systolic blood pressure  was 132 mmHg, at the start of the study  Diastolic blood pressure was 78 mmHg, at the start of the study  The heart rate was 75 bpm, at the start of the study  IV double checked  Regadenoson protocol:  HR bpm SBP mmHg DBP mmHg Symptoms  Baseline 75 132 78 --  Immediate 88 122 80 mild dyspnea  1 min 81 134 88 none  No medications or fluids given  The patient also performed low level exercise  STRESS SUMMARY: Duration of pharmacologic stress was 3 min  Maximal heart rate during stress was 88 bpm  The rate-pressure product for the peak heart rate and blood pressure was 90648  There was no chest pain during stress   The stress test  was terminated due to protocol completion  Pre oxygen saturation: 96 %  Peak oxygen saturation: 97 %  The stress ECG was negative for ischemia and normal  There were no stress arrhythmias or conduction abnormalities  ISOTOPE ADMINISTRATION:  Resting isotope administration Stress isotope administration  Agent Tetrofosmin Tetrofosmin  Dose 10 66 mCi 31 9 mCi  Injection time 08:25 10:10  Imaging time 09:11 11:07  Injection-image interval 46 min 57 min    The radiopharmaceutical was injected at the peak effect of pharmacologic stress  MYOCARDIAL PERFUSION IMAGING:  There is a small to medium sized fixed inferoseptal perfusion defect  The rest of perfusion is somewhat heterogenous due to dilated cardiomyopathy  No definitive reversible ischemia The image quality was good  The left ventricle was  severely dilated  GATED SPECT:  The calculated left ventricular ejection fraction was 30 %  There was severely reduced myocardial thickening and motion of the inferior wall and septal wall of the left ventricle  SUMMARY:  -  Stress results: There was no chest pain during stress  -  ECG conclusions: The stress ECG was negative for ischemia and normal   -  Perfusion imaging: The left ventricle was severely dilated  -  Gated SPECT: The calculated left ventricular ejection fraction was 30 %  There was severely reduced myocardial thickening and motion of the inferior wall and septal wall of the left ventricle  -  Impressions and recommendations: Abnormal study after pharmacologic vasodilation without reproduction of symptoms  The cardiovascular risk is intermediate  Left ventricular systolic function was reduced, with regional wall motion  abnormalities  IMPRESSIONS: Abnormal study after pharmacologic vasodilation without reproduction of symptoms  The cardiovascular risk is intermediate  Left ventricular systolic function was reduced, with regional wall motion abnormalities      Prepared and signed by    Sunny King DO  Signed 01/31/2019 12:09:15       Imaging: I have personally reviewed pertinent reports  Xr Chest 1 View Portable    Result Date: 2/26/2021  Narrative: CHEST INDICATION:   SOB  COMPARISON:  Chest radiograph from 1/29/2019  EXAM PERFORMED/VIEWS:  XR CHEST PORTABLE  FINDINGS: Mild cardiomegaly  Left subclavian ICD leads in right atrium and right ventricle  Mild pulmonary venous congestion  No effusion or pneumothorax  Osseous structures normal for age  Healed left rib fracture  Impression: Mild pulmonary venous congestion  Workstation performed: [id number]     Assessment  Principal Problem:    Acute on chronic systolic congestive heart failure (HCC)  Active Problems:    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    Mixed hyperlipidemia    Essential hypertension    Hypertensive kidney disease with stage 3b chronic kidney disease    Mild persistent asthma without complication    Thank you for allowing us to participate in this patient's care  This pt will follow up with          once discharged  Counseling / Coordination of Care  Total floor / unit time spent today 45 minutes  Greater than 50% of total time was spent with the patient and / or family counseling and / or coordination of care  A description of the counseling / coordination of care: Review of history, current assessment, development of a plan  Code Status: Level 1 - Full Code    ** Please Note: Dragon 360 Dictation voice to text software may have been used in the creation of this document   **

## 2021-02-27 NOTE — UTILIZATION REVIEW
Initial Clinical Review    Admission: Date/Time/Statement:   Admission Orders (From admission, onward)     Ordered        02/26/21 1052  Inpatient Admission  Once                   Orders Placed This Encounter   Procedures    Inpatient Admission     Standing Status:   Standing     Number of Occurrences:   1     Order Specific Question:   Level of Care     Answer:   Med Surg [16]     Order Specific Question:   Estimated length of stay     Answer:   More than 2 Midnights     Order Specific Question:   Certification     Answer:   I certify that inpatient services are medically necessary for this patient for a duration of greater than two midnights  See H&P and MD Progress Notes for additional information about the patient's course of treatment  ED Arrival Information     Expected Arrival Acuity Means of Arrival Escorted By Service Admission Type    - 2/26/2021 08:22 Emergent Walk-In Family Member Hospitalist Emergency    Arrival Complaint    SOB        Chief Complaint   Patient presents with    Shortness of Breath     Patient reports for the past 2-3 days he has been waking up in the middle of the night feeling like he cant breathe, as soon as he gets up he is fine  Assessment/Plan: 80 yo male PMHX CKD, CHF and asthma to ED from home admitted as Inpatient due to acute on chronic congestive heart failure, hypertensive kidney disease w stage 3 CKD  Reports worsenign of past few nigh of SOB  Patient wakes around 3 am feeling SOB from sleep, noted symptoms while lying flat & improve when OOB  Attempted self medicating by doubling hme Lasix prior to arrival  Reports weight gain, stating his dry nc=232 LB, lately at 207 LB  IN ED: exam: rales,  tachycardia, hypertensive  CXR w vascular congestion, elevated bNP  Consult cardiology, Cont ongoing IV Lasix BID, Bisoprolol & Entresto, daily wt, I&O  NA restriction  Monitor BUN/creatinine, avoid nephrotoxins, hypotension  Cont home meds     2/27/2021 Provider  Consult Cardiology for recs  COnt IV Lasix BID, repeat BMP this PM prior to next IV Lasix, given rise in creatinine  Cont plan  2/27/2021 Cardiology  Patient states having dietary indiscretions which he tried treating w increased Lasix 40mg daily for 4 days a few weeks prior wo improvement; in last week w increasing orthopnea & PND symptoms  Received IV Lasix yesterday w full resolution of orthopnea & PND last night  Give IV Lasix x1 now, basically euvolemic & can be DC home  Instructed to increase Lasix to 20 mg BID if he experiences any more PND, orthopnea  IF he has symptoms & do not respond to oral diuretics- can give IV Lasix in office     ED Triage Vitals   Temperature Pulse Respirations Blood Pressure SpO2   02/26/21 1644 02/26/21 0841 02/26/21 0841 02/26/21 0841 02/26/21 0930   (!) 97 °F (36 1 °C) (!) 109 20 (!) 145/102 95 %      Temp Source Heart Rate Source Patient Position - Orthostatic VS BP Location FiO2 (%)   02/26/21 1644 02/26/21 0841 02/26/21 0841 02/26/21 0841 --   Oral Monitor Lying Right arm       Pain Score       02/26/21 1704       No Pain          Wt Readings from Last 1 Encounters:   02/27/21 88 1 kg (194 lb 3 6 oz)     Additional Vital Signs:   Date/Time  Temp  Pulse  Resp  BP  MAP (mmHg)  SpO2  O2 Device  Patient Position - Orthostatic VS   02/27/21 15:56:17  97 2 °F (36 2 °C)Abnormal   67  14  123/75  91  97 %  --  --   02/27/21 10:37:32  97 6 °F (36 4 °C)  62  18  111/69  83  95 %  --  --   02/27/21 07:01:53  97 2 °F (36 2 °C)Abnormal   66  18  129/77  94  97 %  --  --   02/27/21 03:20:26  97 8 °F (36 6 °C)  62  17  116/65  82  --  --  --   02/26/21 23:53:54  97 6 °F (36 4 °C)  59  17  118/67  84  93 %  --  Lying   02/26/21 2100  --  --  --  --  --  --  None (Room air)  --   02/26/21 2013  97 4 °F (36 3 °C)Abnormal   64  20  126/70  89  95 %  --  --   02/26/21 1700  --  --  --  --  --  --  None (Room air)  --   02/26/21 16:44:26  97 °F (36 1 °C)Abnormal   67  16  132/83  99  97 %  --  -- 02/26/21 1613  --  65  20  122/80  --  95 %  None (Room air)  Lying   02/26/21 1431  --  63  21  131/77  99  94 %  None (Room air)  Lying   02/26/21 1300  --  62  17  121/72  --  94 %  None (Room air)  Lying   02/26/21 1030  --  60  16  131/82  102  94 %  None (Room air)  Lying   02/26/21 0930  --  94  17  137/90  109  95 %  None (Room air)  Lying   02/26/21 0900  --  --  --  --  --  --  None (Room air)  --   02/26/21 0841  --  109Abnormal   20  145/102Abnormal   120  --  --  Lying      Weights (last 14 days)    Date/Time  Weight  Weight Method  Height   02/27/21 0600  88 1 kg (194 lb 3 6 oz)  Standing scale  --   02/26/21 1654  --  --  5' 10" (1 778 m)   02/26/21 1633  90 1 kg (198 lb 10 2 oz)  Standing scale  --   02/26/21 0841  93 9 kg (207 lb)  Stated         Pertinent Labs/Diagnostic Test Results:       Results from last 7 days   Lab Units 02/27/21  0455 02/26/21  0851   WBC Thousand/uL 12 62* 14 46*   HEMOGLOBIN g/dL 16 2 15 0   HEMATOCRIT % 48 4 45 7   PLATELETS Thousands/uL 208 257   NEUTROS ABS Thousands/µL  --  10 87*         Results from last 7 days   Lab Units 02/27/21  0455 02/26/21  0851   SODIUM mmol/L 143 144   POTASSIUM mmol/L 3 6 5 0   CHLORIDE mmol/L 108 114*   CO2 mmol/L 28 25   ANION GAP mmol/L 7 5   BUN mg/dL 35* 28*   CREATININE mg/dL 1 63* 1 57*   EGFR ml/min/1 73sq m 39 41   CALCIUM mg/dL 9 2 9 5     Results from last 7 days   Lab Units 02/26/21  0851   AST U/L 38   ALT U/L 40   ALK PHOS U/L 67   TOTAL PROTEIN g/dL 6 9   ALBUMIN g/dL 3 3*   TOTAL BILIRUBIN mg/dL 1 21*         Results from last 7 days   Lab Units 02/27/21  0455 02/26/21  0851   GLUCOSE RANDOM mg/dL 76 101       Results from last 7 days   Lab Units 02/26/21  0851   TROPONIN I ng/mL <0 02       Results from last 7 days   Lab Units 02/26/21  0851   NT-PRO BNP pg/mL 10,759*     XR chest 1 view portable   ED Interpretation  (02/26 1013)   Mild vascular congestion         Final Result  (02/26 6417)      Mild pulmonary venous congestion  2/26/2021 ekg ECG rate assessment: tachycardic    Rhythm:     Rhythm: sinus tachycardia    Ectopy:     Ectopy: none    QRS:     QRS intervals:   Wide  Conduction:     Conduction: normal    ST segments:     ST segments:  Normal  T waves:     T waves: normal    Other findings:     Other findings: LVH    ED Treatment:   Medication Administration from 02/26/2021 0822 to 02/26/2021 1629       Date/Time Order Dose Route Action     02/26/2021 1031 furosemide (LASIX) injection 40 mg 40 mg Intravenous Given     02/26/2021 1431 furosemide (LASIX) injection 40 mg 40 mg Intravenous Given        Past Medical History:   Diagnosis Date    AICD (automatic cardioverter/defibrillator) present     Arthritis     Asthma     Burt esophagus     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     Cancer, colon ([facility] ) 4/5/2013    Cardiac arrhythmia     Cardiomyopathy ([facility] )     CKD (chronic kidney disease)     Colon cancer ([facility] )     Congestive heart failure (CHF) ([facility] )     COPD (chronic obstructive pulmonary disease) ([facility] )     Diverticulitis     Last assessed: 4/5/13    Esophageal reflux     Gout     Hernia     Hyperlipidemia     Hypertension     Hypertension     Hypothyroidism     Hypothyroidism 5/30/2013    Non-toxic multinodular goiter 11/28/2012    Pleural effusion     Polymyalgia rheumatica (HCC)     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     Spondyloarthropathy     Last assessed: 11/28/12     Present on Admission:   Mild persistent asthma without complication   [facility]   Hypertensive kidney disease with stage 3b chronic kidney disease   Mixed hyperlipidemia   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   Essential hypertension    Admitting Diagnosis: CHF (congestive heart failure) (HCC) [I50 9]  SOB (shortness of breath) [R06 02]  Age/Sex: 79 y o  male  Admission Orders:  Cardiac diet & fluid 1500 ml restrict  Daily wt  scd  Scheduled Medications:  aspirin, 81 mg, Oral, Daily  atorvastatin, 10 mg, Oral, Daily  bisoprolol, 10 mg, Oral, BID  calcium carbonate-vitamin D, 2 tablet, Oral, Daily With Breakfast  co-enzyme Q-10, 200 mg, Oral, Daily  enoxaparin, 40 mg, Subcutaneous, Daily  finasteride, 5 mg, Oral, Daily  fluticasone-vilanterol, 1 puff, Inhalation, Daily  pantoprazole, 40 mg, Oral, Early Morning  sacubitril-valsartan, 1 tablet, Oral, BID    Continuous IV Infusions:     PRN Meds:  albuterol, 2 puff, Inhalation, Q6H PRN  ondansetron, 4 mg, Intravenous, Q6H PRN    IP CONSULT TO NUTRITION SERVICES  IP CONSULT TO CARDIOLOGY    Network Utilization Review Department  ATTENTION: Please call with any questions or concerns to [phone] and carefully listen to the prompts so that you are directed to the right person  All voicemails are confidential   Galina Kilpatrick all requests for admission clinical reviews, approved or denied determinations and any other requests to dedicated fax number below belonging to the campus where the patient is receiving treatment   List of dedicated fax numbers for the Facilities:  [address] DENIALS (Administrative/Medical Necessity) [phone]   [address] (Maternity/NICU/Pediatrics) [phone]   [address] Liliana Hall 7650 (  [facility]) [address] [address] [place] [address] [address] [place]) [address] [place] [address] [phone]

## 2021-02-27 NOTE — DISCHARGE INSTR - AVS FIRST PAGE
Check your weight daily, call Cardiology office if the weight increases more than 3 lb from baseline or shortness of breath or leg swelling  Follow-up with Dr Pricila Sheffield

## 2021-02-27 NOTE — ASSESSMENT & PLAN NOTE
Wt Readings from Last 3 Encounters:   02/27/21 88 1 kg (194 lb 3 6 oz)   02/03/21 92 6 kg (204 lb 3 2 oz)   10/28/20 87 5 kg (192 lb 14 4 oz)     · Volume overloaded on admission: WEIGHT IMPROVED TODAY  · Echo 10/2020 EF of 35% with reduced systolic function and PA pressure of 45mmHg  · Elevated BNP and CXR with pulmonary edema  Given IV lasix 40mg in ED  · Consult Cardiology for evaluation recommendations  Patient follows with Dr Javed Bingham in the office stand is due for follow-up enlarge    His last echocardiogram as stated above was in October  · Conitnue IV Lasix 40mg BID  · given increase in creatinine will need to closely monitor BMP   · Repeat BMP at 4 PM today (before next dose of lasix)  · Continue bisoprolol and Entresto, daily weights, ins and outs, 2g Na restriction

## 2021-02-28 NOTE — DISCHARGE SUMMARY
Discharge Summary - [address] [facility] Medicine    Patient Information: Valery Schilder 79 y o  male MRN: [id number]  Unit/Bed#: -01 Encounter: [id number]    Discharging Physician / Practitioner: Vincent Reyes MD  PCP: Corrine Santos MD  Admission Date: 2/26/2021  Discharge Date 2/27/2021  Disposition:     Home    Reason for Admission:  CHF exacerbation    Discharge Diagnoses:     Principal Problem:    Acute on chronic systolic congestive heart failure ([address] )  Active Problems:    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    Mixed hyperlipidemia    Essential hypertension    Hypertensive kidney disease with stage 3b chronic kidney disease    Mild persistent asthma without complication  Resolved Problems:    * No resolved hospital problems  *      Consultations During Hospital Stay:  · Cardiology    Procedures Performed:         Significant Findings / Test Results:     Chest x-ray 2/26/2020- mild  pulmonary venous congestion    Incidental Findings:   ·     Test Results Pending at Discharge (will require follow up):   ·      Outpatient Tests Requested:  · BMP on Monday    Complications:   none    Hospital Course: Valery Schilder is a 79 y o  male patient who originally presented to the hospital on 2/26/2021 due to shortness of breath  Patient with known history of congestive heart failure and is followed by heart failure service as an outpatient  Patient reported that patient was feeling short of breath and had orthopnea and also noted to have weight gain  Patient chest x-ray once consistent with CHF exacerbation  Patient was admitted to the hospital and started on intravenous Lasix with improvement in his symptoms  Patient was evaluated by Cardiology and recommended that patient should be able to continue if the outpatient Lasix does and follow-up with the Cardiology as an outpatient  BMP is ordered for more Monday    Patient remained hemodynamically stable and afebrile he is symptomatically improving and was eager to go home  Patient was discharged in a stable condition 2/27/2021  For details refer to the chart  All other chronic medical conditions remained stable during the hospital stay  Patient was admitted as inpatient but improved a earlier than anticipated and discharged before the 2nd midnight  Condition at Discharge: good     Discharge Day Visit / Exam:     Subjective:    Vitals: Blood Pressure: 123/75 (02/27/21 1556)  Pulse: 67 (02/27/21 1556)  Temperature: (!) 97 2 °F (36 2 °C) (02/27/21 1556)  Temp Source: Oral (02/27/21 0701)  Respirations: 14 (02/27/21 1556)  Height: 5' 10" (177 8 cm) (02/26/21 1654)  Weight - Scale: 88 1 kg (194 lb 3 6 oz) (02/27/21 0600)  SpO2: 97 % (02/27/21 1556)  Exam:   Physical Exam  Constitutional:       Appearance: Normal appearance  HENT:      Head: Normocephalic  Nose: Nose normal    Eyes:      Pupils: Pupils are equal, round, and reactive to light  Neck:      Musculoskeletal: Normal range of motion  Cardiovascular:      Rate and Rhythm: Normal rate  Pulses: Normal pulses  Pulmonary:      Effort: Pulmonary effort is normal    Abdominal:      General: Abdomen is flat  Musculoskeletal: Normal range of motion  Skin:     General: Skin is warm and dry  Neurological:      General: No focal deficit present  Mental Status: He is alert  Discussion with Family: wife     Discharge instructions/Information to patient and family:   See after visit summary for information provided to patient and family  Provisions for Follow-Up Care:  See after visit summary for information related to follow-up care and any pertinent home health orders  Planned Readmission: none     Discharge Statement:  I spent 45  minutes discharging the patient  This time was spent on the day of discharge  I had direct contact with the patient on the day of discharge   Greater than 50% of the total time was spent examining patient, answering all patient questions, arranging and discussing plan of care with patient as well as directly providing post-discharge instructions  Additional time then spent on discharge activities  Discharge Medications:  See after visit summary for reconciled discharge medications provided to patient and family        ** Please Note: This note has been constructed using a voice recognition system **

## 2021-02-28 NOTE — ASSESSMENT & PLAN NOTE
Wt Readings from Last 3 Encounters:   02/27/21 88 1 kg (194 lb 3 6 oz)   02/03/21 92 6 kg (204 lb 3 2 oz)   10/28/20 87 5 kg (192 lb 14 4 oz)     · Volume overloaded on admission: WEIGHT IMPROVED TODAY  · Echo 10/2020 EF of 35% with reduced systolic function and PA pressure of 45mmHg  · Elevated BNP and CXR with pulmonary edema  Given IV lasix 40mg in ED  · Consult Cardiology for evaluation recommendations  Patient follows with Dr Basia Ovalles in the office stand is due for follow-up enlarge    His last echocardiogram as stated above was in October  · Conitnue IV Lasix 40mg BID  · given increase in creatinine will need to closely monitor BMP   · Repeat BMP at 4 PM today (before next dose of lasix)  · Continue bisoprolol and Entresto, daily weights, ins and outs, 2g Na restriction

## 2021-03-01 ENCOUNTER — TRANSITIONAL CARE MANAGEMENT (OUTPATIENT)
Dept: FAMILY MEDICINE CLINIC | Facility: CLINIC | Age: 81
End: 2021-03-01

## 2021-03-01 ENCOUNTER — LAB (OUTPATIENT)
Dept: LAB | Facility: HOSPITAL | Age: 81
End: 2021-03-01
Attending: INTERNAL MEDICINE
Payer: COMMERCIAL

## 2021-03-01 ENCOUNTER — IN-CLINIC DEVICE VISIT (OUTPATIENT)
Dept: CARDIOLOGY CLINIC | Facility: CLINIC | Age: 81
End: 2021-03-01
Payer: COMMERCIAL

## 2021-03-01 DIAGNOSIS — Z95.810 PRESENCE OF AUTOMATIC CARDIOVERTER/DEFIBRILLATOR (AICD): Primary | ICD-10-CM

## 2021-03-01 DIAGNOSIS — I50.23 ACUTE ON CHRONIC SYSTOLIC CONGESTIVE HEART FAILURE (HCC): ICD-10-CM

## 2021-03-01 LAB
ANION GAP SERPL CALCULATED.3IONS-SCNC: 4 MMOL/L (ref 4–13)
BUN SERPL-MCNC: 39 MG/DL (ref 5–25)
CALCIUM SERPL-MCNC: 9.4 MG/DL (ref 8.3–10.1)
CHLORIDE SERPL-SCNC: 109 MMOL/L (ref 100–108)
CO2 SERPL-SCNC: 30 MMOL/L (ref 21–32)
CREAT SERPL-MCNC: 2.02 MG/DL (ref 0.6–1.3)
GFR SERPL CREATININE-BSD FRML MDRD: 30 ML/MIN/1.73SQ M
GLUCOSE P FAST SERPL-MCNC: 111 MG/DL (ref 65–99)
POTASSIUM SERPL-SCNC: 3.7 MMOL/L (ref 3.5–5.3)
SODIUM SERPL-SCNC: 143 MMOL/L (ref 136–145)

## 2021-03-01 PROCEDURE — 93283 PRGRMG EVAL IMPLANTABLE DFB: CPT | Performed by: INTERNAL MEDICINE

## 2021-03-01 PROCEDURE — 80048 BASIC METABOLIC PNL TOTAL CA: CPT

## 2021-03-01 PROCEDURE — 36415 COLL VENOUS BLD VENIPUNCTURE: CPT

## 2021-03-01 NOTE — UTILIZATION REVIEW
Notification of Inpatient Admission/Inpatient Authorization Request   This is a Notification of Inpatient Admission for [address] [place] [facility]  Be advised that this patient was admitted to our facility under Inpatient Status  Contact Ayush Mc at [phone] for additional admission information  [facility] DE[facility]  DEDICATED [phone]  Patient Name:   Valery Schilder   YOB: 1940       [address]   [address]:   Myriam Lucero  Tax ID: [id number]  NPI: [id number] Attending Provider/NPI:  Phone:  Address: [place], [place] [[id number]]  [phone]  Same as ZOEY/Zuri Darling 1106 of Service Code: 24 Place of Service Name:  [address] [place] [address]   Start Date: 2/26/21 1052 Discharge Date & Time: 2/27/2021  5:20 PM    Type of Admission: Inpatient Status Discharge Disposition (if discharged): Home/Self Care   Patient Diagnoses: CHF (congestive heart failure) ([facility] [address] ) [I50 9]  SOB (shortness of breath) [R06 02]     Orders: Admission Orders (From admission, onward)     Ordered        02/26/21 1052  Inpatient Admission  Once                    Assigned Utilization Review Contact: Ayush Mc  Utilization ,   Network Utilization Review Department  Phone: [phone]; Fax [phone]   Email: [email or web address]   ATTENTION PAYERS: Please call the assigned Utilization  directly with any questions or concerns ALL voicemails in the department are confidential  Send all requests for admission clinical reviews, approved or denied determinations and any other requests to dedicated fax number belonging to the campus where the patient is receiving treatment    Initial Clinical Review    Admission: Date/Time/Statement:   Admission Orders (From admission, onward)     Ordered        02/26/21 1052  Inpatient Admission  Once                   Orders Placed This Encounter   Procedures    Inpatient Admission     Standing Status:   Standing     Number of Occurrences:   1     Order Specific Question:   Level of Care     Answer:   Med Surg [16]     Order Specific Question:   Estimated length of stay     Answer:   More than 2 Midnights     Order Specific Question:   Certification     Answer:   I certify that inpatient services are medically necessary for this patient for a duration of greater than two midnights  See H&P and MD Progress Notes for additional information about the patient's course of treatment  ED Arrival Information     Expected Arrival Acuity Means of Arrival Escorted By Service Admission Type    - 2/26/2021 08:22 Emergent Walk-In Family Member Hospitalist Emergency    Arrival Complaint    SOB        Chief Complaint   Patient presents with    Shortness of Breath     Patient reports for the past 2-3 days he has been waking up in the middle of the night feeling like he cant breathe, as soon as he gets up he is fine  Assessment/Plan: 80 yo male PMHX CKD, CHF and asthma to ED from home admitted as Inpatient due to acute on chronic congestive heart failure, hypertensive kidney disease w stage 3 CKD  Reports worsenign of past few nigh of SOB  Patient wakes around 3 am feeling SOB from sleep, noted symptoms while lying flat & improve when OOB  Attempted self medicating by doubling hme Lasix prior to arrival  Reports weight gain, stating his dry ak=800 LB, lately at 207 LB  IN ED: exam: rales,  tachycardia, hypertensive  CXR w vascular congestion, elevated bNP  Consult cardiology, Cont ongoing IV Lasix BID, Bisoprolol & Entresto, daily wt, I&O  NA restriction  Monitor BUN/creatinine, avoid nephrotoxins, hypotension  Cont home meds  2/27/2021 Provider  Consult Cardiology for recs  COnt IV Lasix BID, repeat BMP this PM prior to next IV Lasix, given rise in creatinine  Cont plan    2/27/2021 Cardiology  Patient states having dietary indiscretions which he tried treating w increased Lasix 40mg daily for 4 days a few weeks prior wo improvement; in last week w increasing orthopnea & PND symptoms  Received IV Lasix yesterday w full resolution of orthopnea & PND last night  Give IV Lasix x1 now, basically euvolemic & can be DC home  Instructed to increase Lasix to 20 mg BID if he experiences any more PND, orthopnea  IF he has symptoms & do not respond to oral diuretics- can give IV Lasix in office     ED Triage Vitals   Temperature Pulse Respirations Blood Pressure SpO2   02/26/21 1644 02/26/21 0841 02/26/21 0841 02/26/21 0841 02/26/21 0930   (!) 97 °F (36 1 °C) (!) 109 20 (!) 145/102 95 %      Temp Source Heart Rate Source Patient Position - Orthostatic VS BP Location FiO2 (%)   02/26/21 1644 02/26/21 0841 02/26/21 0841 02/26/21 0841 --   Oral Monitor Lying Right arm       Pain Score       02/26/21 1704       No Pain          Wt Readings from Last 1 Encounters:   02/27/21 88 1 kg (194 lb 3 6 oz)     Additional Vital Signs:   Date/Time  Temp  Pulse  Resp  BP  MAP (mmHg)  SpO2  O2 Device  Patient Position - Orthostatic VS   02/27/21 15:56:17  97 2 °F (36 2 °C)Abnormal   67  14  123/75  91  97 %  --  --   02/27/21 10:37:32  97 6 °F (36 4 °C)  62  18  111/69  83  95 %  --  --   02/27/21 07:01:53  97 2 °F (36 2 °C)Abnormal   66  18  129/77  94  97 %  --  --   02/27/21 03:20:26  97 8 °F (36 6 °C)  62  17  116/65  82  --  --  --   02/26/21 23:53:54  97 6 °F (36 4 °C)  59  17  118/67  84  93 %  --  Lying   02/26/21 2100  --  --  --  --  --  --  None (Room air)  --   02/26/21 2013  97 4 °F (36 3 °C)Abnormal   64  20  126/70  89  95 %  --  --   02/26/21 1700  --  --  --  --  --  --  None (Room air)  --   02/26/21 16:44:26  97 °F (36 1 °C)Abnormal   67  16  132/83  99  97 %  --  --   02/26/21 1613  --  65  20  122/80  --  95 %  None (Room air)  Lying   02/26/21 1431  --  63  21  131/77  99  94 %  None (Room air)  Lying   02/26/21 1300  --  62  17  121/72  --  94 %  None (Room air)  Lying   02/26/21 1030  --  60  16  131/82 102  94 %  None (Room air)  Lying   02/26/21 0930  --  94  17  137/90  109  95 %  None (Room air)  Lying   02/26/21 0900  --  --  --  --  --  --  None (Room air)  --   02/26/21 0841  --  109Abnormal   20  145/102Abnormal   120  --  --  Lying      Weights (last 14 days)    Date/Time  Weight  Weight Method  Height   02/27/21 0600  88 1 kg (194 lb 3 6 oz)  Standing scale  --   02/26/21 1654  --  --  5' 10" (1 778 m)   02/26/21 1633  90 1 kg (198 lb 10 2 oz)  Standing scale  --   02/26/21 0841  93 9 kg (207 lb)  Stated         Pertinent Labs/Diagnostic Test Results:       Results from last 7 days   Lab Units 02/27/21  0455 02/26/21  0851   WBC Thousand/uL 12 62* 14 46*   HEMOGLOBIN g/dL 16 2 15 0   HEMATOCRIT % 48 4 45 7   PLATELETS Thousands/uL 208 257   NEUTROS ABS Thousands/µL  --  10 87*         Results from last 7 days   Lab Units 02/27/21  0455 02/26/21  0851   SODIUM mmol/L 143 144   POTASSIUM mmol/L 3 6 5 0   CHLORIDE mmol/L 108 114*   CO2 mmol/L 28 25   ANION GAP mmol/L 7 5   BUN mg/dL 35* 28*   CREATININE mg/dL 1 63* 1 57*   EGFR ml/min/1 73sq m 39 41   CALCIUM mg/dL 9 2 9 5     Results from last 7 days   Lab Units 02/26/21  0851   AST U/L 38   ALT U/L 40   ALK PHOS U/L 67   TOTAL PROTEIN g/dL 6 9   ALBUMIN g/dL 3 3*   TOTAL BILIRUBIN mg/dL 1 21*         Results from last 7 days   Lab Units 02/27/21  0455 02/26/21  0851   GLUCOSE RANDOM mg/dL 76 101       Results from last 7 days   Lab Units 02/26/21  0851   TROPONIN I ng/mL <0 02       Results from last 7 days   Lab Units 02/26/21  0851   NT-PRO BNP pg/mL 10,759*     XR chest 1 view portable   ED Interpretation  (02/26 1013)   Mild vascular congestion  Final Result  (02/26 8592)      Mild pulmonary venous congestion  2/26/2021 ekg ECG rate assessment: tachycardic    Rhythm:     Rhythm: sinus tachycardia    Ectopy:     Ectopy: none    QRS:     QRS intervals:   Wide  Conduction:     Conduction: normal    ST segments:     ST segments:  Normal  T waves: T waves: normal    Other findings:     Other findings: LVH    ED Treatment:   Medication Administration from 02/26/2021 0822 to 02/26/2021 1629       Date/Time Order Dose Route Action     02/26/2021 1031 furosemide (LASIX) injection 40 mg 40 mg Intravenous Given     02/26/2021 1431 furosemide (LASIX) injection 40 mg 40 mg Intravenous Given        Past Medical History:   Diagnosis Date    AICD (automatic cardioverter/defibrillator) present     Arthritis     Asthma     Burt esophagus     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     Cancer, colon ([facility] ) 4/5/2013    Cardiac arrhythmia     Cardiomyopathy ([place] [address] )     CKD (chronic kidney disease)     Colon cancer (HCC)     Congestive heart failure (CHF) ([place] [address] )     COPD (chronic obstructive pulmonary disease) ([place] [address] )     Diverticulitis     Last assessed: 4/5/13    Esophageal reflux     Gout     Hernia     Hyperlipidemia     Hypertension     Hypertension     Hypothyroidism     Hypothyroidism 5/30/2013    Non-toxic multinodular goiter 11/28/2012    Pleural effusion     Polymyalgia rheumatica (HCC)     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     Spondyloarthropathy     Last assessed: 11/28/12     Present on Admission:   Mild persistent asthma without complication   Acute on chronic systolic congestive heart failure ([place] [address] )   Hypertensive kidney disease with stage 3b chronic kidney disease   Mixed hyperlipidemia   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   Essential hypertension    Admitting Diagnosis: CHF (congestive heart failure) (HCC) [I50 9]  SOB (shortness of breath) [R06 02]  Age/Sex: 79 y o  male  Admission Orders:  Cardiac diet & fluid 1500 ml restrict  Daily wt  scd  Scheduled Medications:  aspirin, 81 mg, Oral, Daily  atorvastatin, 10 mg, Oral, Daily  bisoprolol, 10 mg, Oral, BID  calcium carbonate-vitamin D, 2 tablet, Oral, Daily With Breakfast  co-enzyme Q-10, 200 mg, Oral, Daily  enoxaparin, 40 mg, Subcutaneous, Daily  finasteride, 5 mg, Oral, Daily  fluticasone-vilanterol, 1 puff, Inhalation, Daily  pantoprazole, 40 mg, Oral, Early Morning  sacubitril-valsartan, 1 tablet, Oral, BID    Continuous IV Infusions:  No current facility-administered medications for this encounter  PRN Meds:  albuterol, 2 puff, Inhalation, Q6H PRN  ondansetron, 4 mg, Intravenous, Q6H PRN    IP CONSULT TO NUTRITION SERVICES  IP CONSULT TO CARDIOLOGY    Network Utilization Review Department  ATTENTION: Please call with any questions or concerns to [phone] and carefully listen to the prompts so that you are directed to the right person  All voicemails are confidential   Abigail Mike all requests for admission clinical reviews, approved or denied determinations and any other requests to dedicated fax number below belonging to the campus where the patient is receiving treatment  List of dedicated fax numbers for the Facilities:  [address] DENIALS (Administrative/Medical Necessity) [phone]   [address] (Maternity/NICU/Pediatrics) [phone] [address] Liliana Hall 2335 (  Vishnu Lee "Kamala" 103) [address] [place] [address] Robb Rosa Elena Segovia 1481 [address] [place]) [address] [place] [address] 95 [phone]       Notification of Discharge  This is a Notification of Discharge from our facility [address] [place] [address]  Please be advised that this patient has been discharge from our facility   Below you will find the admission and discharge date and time including the patients disposition  PRESENTATION DATE: 2/26/2021  8:33 AM  OBS ADMISSION DATE:   IP ADMISSION DATE: 2/26/21 1052   DISCHARGE DATE: 2/27/2021  5:20 PM  DISPOSITION: Home/Self Care Home/Self Care   Admission Orders listed below:  Admission Orders (From admission, onward)     Ordered        02/26/21 1052  Inpatient Admission  Once                   Please contact the UR Department if additional information is required to close this patient's authorization/case  Polo Carroll Utilization Review Department  Main: [phone] x carefully listen to the prompts  All voicemails are confidential   [email or web address]  Send all requests for admission clinical reviews, approved or denied determinations and any other requests to dedicated fax number below belonging to the campus where the patient is receiving treatment   List of dedicated fax numbers:  [address] DENIALS (Administrative/Medical Necessity) [phone]   [address] (Maternity/NICU/Pediatrics) [phone] 5400 [facility] [phone]   [facility] [phone]   Ed Josue [phone]   [address] [place] 1525 [facility] [phone]   1101 [facility] [phone]   2205 [facility]  2401 [facility] 1000 [facility] [phone]

## 2021-03-01 NOTE — PROGRESS NOTES
Results for orders placed or performed in visit on 03/01/21   Cardiac EP device report    Narrative    MDT-DUAL CHAMBER ICD (AAIR-DDDR MODE)/ ACTIVE SYSTEM IS MRI CONDITIONAL  DEVICE INTERROGATED IN THE [place] OFFICE/YEARLY  BATTERY ADEQUATE (5-7 9 YRS)  AP 61%;  4%  ALL LEAD PARAMETERS WITHIN NORMAL LIMITS  1 NSVT EPISODE (UP  BPM & 22 BEATS)  PT  TAKES ASA 81 & BISOPROLOL  OPTI-VOL WITHIN NORMAL LIMITS  WAVELET TEMPLATE UPDATED  NO PROGRAMMING CHANGES MADE TO DEVICE PARAMETERS  NORMAL DEVICE FUNCTION   PL

## 2021-03-01 NOTE — UTILIZATION REVIEW
Notification of Discharge  This is a Notification of Discharge from our facility [address]  Please be advised that this patient has been discharge from our facility  Below you will find the admission and discharge date and time including the patients disposition  PRESENTATION DATE: 2/26/2021  8:33 AM  OBS ADMISSION DATE:   IP ADMISSION DATE: 2/26/21 1052   DISCHARGE DATE: 2/27/2021  5:20 PM  DISPOSITION: Home/Self Care Home/Self Care   Admission Orders listed below:  Admission Orders (From admission, onward)     Ordered        02/26/21 1052  Inpatient Admission  Once                   Please contact the UR Department if additional information is required to close this patient's authorization/case  [address] Utilization Review Department  Main: [phone] x carefully listen to the prompts  All voicemails are confidential   [email or web address]  Send all requests for admission clinical reviews, approved or denied determinations and any other requests to dedicated fax number below belonging to the campus where the patient is receiving treatment   List of dedicated fax numbers:  [address] DENIALS (Administrative/Medical Necessity) [phone]   [address] (Maternity/NICU/Pediatrics) [phone]   Jesus Lynden [phone]   Charbel Valdez [phone]   [facility] [phone]   [facility] [address] [place] [address] [phone]   [facility]  [phone]   2205 [facility]  2401 [facility] 1000 W [facility] [phone]

## 2021-03-04 ENCOUNTER — OFFICE VISIT (OUTPATIENT)
Dept: FAMILY MEDICINE CLINIC | Facility: CLINIC | Age: 81
End: 2021-03-04
Payer: COMMERCIAL

## 2021-03-04 VITALS
WEIGHT: 200 LBS | BODY MASS INDEX: 28.63 KG/M2 | HEIGHT: 70 IN | TEMPERATURE: 97.6 F | SYSTOLIC BLOOD PRESSURE: 122 MMHG | HEART RATE: 60 BPM | OXYGEN SATURATION: 96 % | RESPIRATION RATE: 16 BRPM | DIASTOLIC BLOOD PRESSURE: 60 MMHG

## 2021-03-04 DIAGNOSIS — E78.2 MIXED HYPERLIPIDEMIA: ICD-10-CM

## 2021-03-04 DIAGNOSIS — J45.30 MILD PERSISTENT ASTHMA WITHOUT COMPLICATION: ICD-10-CM

## 2021-03-04 DIAGNOSIS — I10 ESSENTIAL HYPERTENSION: ICD-10-CM

## 2021-03-04 DIAGNOSIS — I50.23 ACUTE ON CHRONIC SYSTOLIC CONGESTIVE HEART FAILURE (HCC): ICD-10-CM

## 2021-03-04 DIAGNOSIS — Z09 HOSPITAL DISCHARGE FOLLOW-UP: Primary | ICD-10-CM

## 2021-03-04 DIAGNOSIS — I12.9 HYPERTENSIVE KIDNEY DISEASE WITH STAGE 3B CHRONIC KIDNEY DISEASE (HCC): ICD-10-CM

## 2021-03-04 DIAGNOSIS — N18.32 HYPERTENSIVE KIDNEY DISEASE WITH STAGE 3B CHRONIC KIDNEY DISEASE (HCC): ICD-10-CM

## 2021-03-04 PROCEDURE — 99496 TRANSJ CARE MGMT HIGH F2F 7D: CPT | Performed by: FAMILY MEDICINE

## 2021-03-04 PROCEDURE — 1111F DSCHRG MED/CURRENT MED MERGE: CPT | Performed by: FAMILY MEDICINE

## 2021-03-04 NOTE — ASSESSMENT & PLAN NOTE
Lab Results   Component Value Date    EGFR 30 03/01/2021    EGFR 39 02/27/2021    EGFR 41 02/26/2021    CREATININE 2 02 (H) 03/01/2021    CREATININE 1 63 (H) 02/27/2021    CREATININE 1 57 (H) 02/26/2021     Avoid nephrotoxic agents

## 2021-03-04 NOTE — ASSESSMENT & PLAN NOTE
Wt Readings from Last 3 Encounters:   03/04/21 90 7 kg (200 lb)   02/27/21 88 1 kg (194 lb 3 6 oz)   02/03/21 92 6 kg (204 lb 3 2 oz)     Continue lasix 20mg QD  2g Na restriction  Watch weight daily  If gain >3lbs in 1 day, call cardiology

## 2021-03-04 NOTE — PROGRESS NOTES
Chief Complaint   Patient presents with    Transition of Care Management     Health Maintenance   Topic Date Due    SLP PLAN OF CARE  1940    DTaP,Tdap,and Td Vaccines (1 - Tdap) 06/26/1962    COVID-19 Vaccine (2 of 2 - Moderna series) 02/23/2021    Medicare Annual Wellness Visit (AWV)  07/31/2021    Fall Risk  02/03/2022    Depression Screening PHQ  02/03/2022    BMI: Followup Plan  02/03/2022    BMI: Adult  03/04/2022    Colonoscopy Surveillance  06/17/2022    DXA SCAN  03/02/2025    Pneumococcal Vaccine: 65+ Years  Completed    Influenza Vaccine  Completed    HIB Vaccine  Aged Out    Hepatitis B Vaccine  Aged Out    IPV Vaccine  Aged Out    Hepatitis A Vaccine  Aged Out    Meningococcal ACWY Vaccine  Aged Out    HPV Vaccine  Aged Out     Assessment/Plan:     Acute on chronic systolic congestive heart failure ([address] )  Wt Readings from Last 3 Encounters:   03/04/21 90 7 kg (200 lb)   02/27/21 88 1 kg (194 lb 3 6 oz)   02/03/21 92 6 kg (204 lb 3 2 oz)     Continue lasix 20mg QD  2g Na restriction  Watch weight daily  If gain >3lbs in 1 day, call cardiology  Essential hypertension  Controlled  DASH diet  Continue entresto, bisoprolol  FU cardiology  Hypertensive kidney disease with stage 3b chronic kidney disease  Lab Results   Component Value Date    EGFR 30 03/01/2021    EGFR 39 02/27/2021    EGFR 41 02/26/2021    CREATININE 2 02 (H) 03/01/2021    CREATININE 1 63 (H) 02/27/2021    CREATININE 1 57 (H) 02/26/2021     Avoid nephrotoxic agents  Mixed hyperlipidemia  Low fat diet  Continue atorvastatin 10mg qhs  Mild persistent asthma without complication  Continue advair and ventolin prn  Diagnoses and all orders for this visit:    Hospital discharge follow-up  Comments:  Reviewed hospital records       Acute on chronic systolic congestive heart failure (HCC)    Essential hypertension    Hypertensive kidney disease with stage 3b chronic kidney disease    Mixed hyperlipidemia    Mild persistent asthma without complication         Subjective:     Patient ID: Alexander Schuster is a 79 y o  male  HPI    Pt is here by himself  Was in hospital 2/26-2/27/2021 for acute on chronic CHF  Got IV lasix with improving  Cardiology consulted  Discharged home  Feels good today  Denies fever, SOB ,CP, n/v/abd pain       HTN---He is on bisoprolol 10mg bid and entresto 49-51mg bid  FU cardiology for cardiomyopathy, tachycardia, s/p pacemaker  Stable    He is on lasix 20mg QD and entresto  Wt at home stable per pt       CKD3---3/1/2021 Cr 2 02 slightly worse       Hyperlipidemia---on atorvastatin 10mg now  Denies SE      IFG---Follows low carb diet        Asthma----Saw pulmonary, got Wixela 500-50 QD which helped  Never smoked       FU Cancer care Q 6m-1y for melanoma s/p wide excision on right upper arm  Joy Danielle Marker for Burt's, do EGD every 3 years  Last EGD was in 12/2016  Pt is on omeprazole 40mg daily now       FU urology for BPH yearly       Lives with wife  Does all ADL's  Still drive  Denies recent falls    Denies depression            Review of Systems   Constitutional: Negative for appetite change, chills and fever  HENT: Negative for congestion, ear pain, sinus pain and sore throat  Eyes: Negative for discharge and itching  Respiratory: Negative for apnea, cough, chest tightness, shortness of breath and wheezing  Cardiovascular: Negative for chest pain, palpitations and leg swelling  Gastrointestinal: Negative for abdominal pain, anal bleeding, constipation, diarrhea, nausea and vomiting  Endocrine: Negative for cold intolerance, heat intolerance and polyuria  Genitourinary: Negative for difficulty urinating and dysuria  Musculoskeletal: Negative for arthralgias, back pain and myalgias  Skin: Negative for rash  Neurological: Negative for dizziness and headaches  Psychiatric/Behavioral: Negative for agitation  Objective:     Physical Exam  Constitutional:       Appearance: He is well-developed  HENT:      Head: Normocephalic and atraumatic  Eyes:      General:         Right eye: No discharge  Left eye: No discharge  Conjunctiva/sclera: Conjunctivae normal    Neck:      Musculoskeletal: Normal range of motion and neck supple  Cardiovascular:      Rate and Rhythm: Normal rate and regular rhythm  Heart sounds: Normal heart sounds  No murmur  No friction rub  No gallop  Pulmonary:      Effort: Pulmonary effort is normal  No respiratory distress  Breath sounds: Normal breath sounds  No wheezing or rales  Abdominal:      General: Bowel sounds are normal  There is no distension  Palpations: Abdomen is soft  Tenderness: There is no abdominal tenderness  There is no guarding  Musculoskeletal: Normal range of motion  General: No swelling, tenderness or deformity  Right lower leg: No edema  Left lower leg: No edema  Neurological:      Mental Status: He is alert  Vitals:    03/04/21 0822   BP: 122/60   Pulse: 60   Resp: 16   Temp: 97 6 °F (36 4 °C)   TempSrc: Tympanic   SpO2: 96%   Weight: 90 7 kg (200 lb)   Height: 5' 10" (1 778 m)       Transitional Care Management Review:  Rolf Major is a 79 y o  male here for TCM follow up  During the TCM phone call patient stated:    TCM Call (since 2/1/2021)     Date and time call was made  3/1/2021  [address] care reviewed  Records reviewed    Patient was hospitialized at  [facility]        Date of Admission  02/26/21    Date of discharge  02/27/21    Diagnosis  Acute on chronic systolic congestive heart failure ([facility] [address] )    Disposition  Home    Were the patients medications reviewed and updated  Yes    Current Symptoms  None      TCM Call (since 2/1/2021)     Post hospital issues  None    Should patient be enrolled in anticoag monitoring? No    Scheduled for follow up?   Yes (Comment)  3/4/21    Did you obtain your prescribed medications  Yes    Do you need help managing your prescriptions or medications  No    Is transportation to your appointment needed  No    I have advised the patient to call PCP with any new or worsening symptoms  JAS Gudino    Are you recieving any outpatient services  No    Are you recieving home care services  No    Are you using any community resources  No    Current waiver services  No    Have you fallen in the last 12 months  No    Interperter language line needed  No    Counseling  Patient          Guillermo Rob MD

## 2021-03-08 DIAGNOSIS — I42.0 DILATED CARDIOMYOPATHY (HCC): Primary | ICD-10-CM

## 2021-03-10 ENCOUNTER — TELEPHONE (OUTPATIENT)
Dept: CARDIOLOGY CLINIC | Facility: CLINIC | Age: 81
End: 2021-03-10

## 2021-03-10 NOTE — TELEPHONE ENCOUNTER
Called and spoke with Mayra Moreau in regards to the results of his recent lab work  Made patient aware of repeat BMP early next week before Wednesday office visit  Patient verbalized understanding

## 2021-03-10 NOTE — TELEPHONE ENCOUNTER
----- Message from Manisha Davis sent at 3/9/2021  9:57 AM EST -----    ----- Message -----  From: Bubba Erickson MD  Sent: 3/8/2021   2:14 PM EST  To: Card[facility] [place] [facility]    Creatinine 2, previously 1 67, slightly increased after recent admission for heart failure and more aggressive diuretics  Will recommend another check in a week to ensure stability/hopefully improvement, and has an appointment with Dr Pricila Sheffield on 3/17/2021    Will have the office contact the patient with instructions to repeat the BMP

## 2021-03-15 ENCOUNTER — APPOINTMENT (OUTPATIENT)
Dept: LAB | Facility: HOSPITAL | Age: 81
End: 2021-03-15
Attending: INTERNAL MEDICINE
Payer: COMMERCIAL

## 2021-03-15 DIAGNOSIS — I42.0 DILATED CARDIOMYOPATHY (HCC): ICD-10-CM

## 2021-03-15 LAB
ANION GAP SERPL CALCULATED.3IONS-SCNC: 2 MMOL/L (ref 4–13)
BUN SERPL-MCNC: 32 MG/DL (ref 5–25)
CALCIUM SERPL-MCNC: 9.5 MG/DL (ref 8.3–10.1)
CHLORIDE SERPL-SCNC: 113 MMOL/L (ref 100–108)
CO2 SERPL-SCNC: 27 MMOL/L (ref 21–32)
CREAT SERPL-MCNC: 1.96 MG/DL (ref 0.6–1.3)
GFR SERPL CREATININE-BSD FRML MDRD: 32 ML/MIN/1.73SQ M
GLUCOSE P FAST SERPL-MCNC: 94 MG/DL (ref 65–99)
POTASSIUM SERPL-SCNC: 4.7 MMOL/L (ref 3.5–5.3)
SODIUM SERPL-SCNC: 142 MMOL/L (ref 136–145)

## 2021-03-15 PROCEDURE — 36415 COLL VENOUS BLD VENIPUNCTURE: CPT

## 2021-03-15 PROCEDURE — 80048 BASIC METABOLIC PNL TOTAL CA: CPT

## 2021-03-16 NOTE — PROGRESS NOTES
Heart Failure Office Note - Presley Prior 79 y o  male MRN: [id number]    @ Encounter: [id number]      Assessment/Plan:    Patient Active Problem List    Diagnosis Date Noted    Encounter for follow-up surveillance of melanoma 07/11/2019     Priority: Low    Acute on chronic systolic congestive heart failure ([facility] ) 01/29/2019     Priority: Low    Hypertensive kidney disease with stage 3b chronic kidney disease 01/04/2019     Priority: Low    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 09/20/2018     Priority: Low    Osteopenia 06/29/2018     Priority: Low    Personal history of malignant melanoma 11/15/2017     Priority: Low    Skin lesion 06/23/2017     Priority: Low    Thrombocytopenia ([facility] [address] ) 06/23/2015     Priority: Low    [facility]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 ) 12/30/2014     Priority: Low    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 12/02/2013     Priority: Low    Microscopic hematuria 12/02/2013     Priority: Low    Arthritis 11/26/2013     Priority: Low    Polymyalgia rheumatica (HCC) 11/26/2013     Priority: Low    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     Priority: Low    Impaired fasting glucose 05/30/2013     Priority: Low    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     Priority: Low    Cardiac arrhythmia 04/05/2013     Priority: Low    Essential hypertension 04/05/2013     Priority: Low    Esophageal reflux 11/28/2012     Priority: Low    Cardiomyopathy ([facility] ) 10/26/2012     Priority: Low    Mixed hyperlipidemia 09/05/2012     Priority: Low    Mild persistent asthma without complication 10/99/5088       # Chronic Systolic CHF, Stage C, NYHA 2  Etiology: NICM, presumed viral  No family hx of SCD  No ETOH    Weight: 192 lbs  Wt Readings from Last 3 Encounters:   03/04/21 90 7 kg (200 lb)   02/27/21 88 1 kg (194 lb 3 6 oz)   02/03/21 92 6 kg (204 lb 3 2 oz)     NT pro BNP: 1/29/19: 6192    Studies- personally reviewed by me    Echo 10/1/20:  LVEF: 35%  LVIDd: 6 5 cm  RV: mildly dilated, mildly reduced  MR: moderate  PASP:  RVOT:   Other: mild AI, ascending aorta 4 0 cm  TR: 2-3+    Echocardiogram 1/29/19  LVEF: 40%  LVIDd: 6 cm  RV: normal  MR: mild  PASP: 40 mmHg  RVOT:   Other:    Echo 6/21/18:  LVEF: 50%  LVEDd: 6 35 cm    Nuclear Stress Test 1/31/19: small to medium sized fixed defect infersoseptum  No ischemia  EF: 30%    Lab Results   Component Value Date    NTBNP 10,579 (H) 02/26/2021        Neurohormonal Blockade:  --Beta-Blocker: bisoprolol 10 mg daily  --ACEi, ARB or ARNi: Entresto 49/51 mg BID  --Aldosterone Receptor Blocker:  --Diuretic: lasix 20 mg daily    Sudden Cardiac Death Risk Reduction:  MDT dual chamber ICD: MRI conditional  Interrogation 3/1/21:  4%, 1 NSVT 22 beats 175 bpm, optivol normal  Interrogation 11/25/20: AP 67%,  3%, no high rate episodes, optivol within normal limits    Electrical Resynchronization:  --Candidacy for BiV device: IVCD    Advanced Therapies (if appropriate): --Inotrope:  --LVAD/Transplant Candidacy:    # HTN- controlled, room to escalate therapy  # Mild AI and ascending aorta 4 cm on 10/1/20 echo  # SVT  # NSVT on device check- on bisoprolol (K 3 6)  # hyperlipidemia- atorvastatin 10 mg  1/27/21: LDL 82, HDL 65  7/9/20: LDL 79, HDL 51  # hx of malignant melanoma  # CKD, Cr 1 58 on 1/27/21, up to 1 96 on 3/15/21    Today's Plan:  Continue bisoprolol and Entresto for HFrEF  Continue lasix for volume for HFrEF- cr up to 1 96 on 3/15 labs- and BP is low, decrease lasix to 10 mg - but was taking an aleve every day  Lipids at goal on atorvastatin 10 mg   --2g sodium diet  Weights daily    HPI:      78 yo recently had first evaluation in HF clinic for NICM diagnosed in 2009, s/p ICD, PSVT, HTN and dyslipidemia  Last in hospital in January for dyspnea felt to be more viral but was volume overloaded with NT pro BNP of 6192  Given one dose of IV lasix  LVEF: 40% with LVEDd 6 cm   He followed up with NP and weight was 204 lbs  Never smoked and does not drink  Plays golf 2x a week, cuts grass, does not feel limited in activity  No significant edema in legs  He is limited by knees and back not respiration  Interim:   Given drop in EF to 35% his lisinopril was changed to Entresto 49/51 mg BID  Interrogation 11/25/20: AP 67%,  3%, no high rate episodes, optivol within normal limits  Interrogation 3/1/21:  4%, 1 NSVT 22 beats 175 bpm, optivol normal  Cr up to 1 9- was taking an aleve every day  Review of Systems   Constitutional: Negative for activity change, appetite change, fatigue and unexpected weight change  HENT: Negative for congestion and nosebleeds  Eyes: Negative  Respiratory: Negative for cough, chest tightness and shortness of breath  Cardiovascular: Negative for chest pain, palpitations and leg swelling  Gastrointestinal: Negative for abdominal distention  Endocrine: Negative  Genitourinary: Negative  Musculoskeletal: Positive for arthralgias  Skin: Negative  Neurological: Negative for dizziness, syncope and weakness  Hematological: Negative  Psychiatric/Behavioral: Negative          Past Medical History:   Diagnosis Date    AICD (automatic cardioverter/defibrillator) present     Arthritis     Asthma     Burt esophagus     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     Cancer, colon ([facility] ) 4/5/2013    Cardiac arrhythmia     Cardiomyopathy ([facility] )     CKD (chronic kidney disease)     Colon cancer (HCC)     Congestive heart failure (CHF) (HCC)     COPD (chronic obstructive pulmonary disease) ([facility] )     Diverticulitis     Last assessed: 4/5/13    Esophageal reflux     Gout     Hernia     Hyperlipidemia     Hypertension     Hypertension     Hypothyroidism     Hypothyroidism 5/30/2013    Non-toxic multinodular goiter 11/28/2012    Pleural effusion     Polymyalgia rheumatica (HCC)     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     Spondyloarthropathy     Last assessed: 11/28/12         No Known Allergies    Current Outpatient Medications:     albuterol (PROVENTIL HFA,VENTOLIN HFA) 90 mcg/act inhaler, Inhale 2 puffs every 6 (six) hours as needed for wheezing or shortness of breath, Disp: 1 Inhaler, Rfl: 1    aspirin 81 MG tablet, Take 81 mg by mouth daily  , Disp: , Rfl:     atorvastatin (LIPITOR) 10 mg tablet, TAKE ONE TABLET BY MOUTH EVERY DAY, Disp: 90 tablet, Rfl: 3    bisoprolol (ZEBETA) 10 MG tablet, TAKE ONE TABLET BY MOUTH TWICE A DAY, Disp: 180 tablet, Rfl: 3    Calcium Carb-Cholecalciferol (CALCIUM 600 + D PO), Take 2 tablets by mouth daily , Disp: , Rfl:     Coenzyme Q10 (CO Q-10) 200 MG CAPS, Take 1 tablet by mouth daily, Disp: , Rfl:     finasteride (PROSCAR) 5 mg tablet, TAKE ONE TABLET BY MOUTH EVERY DAY, Disp: 90 tablet, Rfl: 1    furosemide (LASIX) 20 mg tablet, Take 1 tablet (20 mg total) by mouth daily, Disp: 90 tablet, Rfl: 3    Multiple Vitamins-Minerals (CENTRUM SILVER PO), Take 1 tablet by mouth daily  , Disp: , Rfl:     omeprazole (PriLOSEC) 20 mg delayed release capsule, Take 1 capsule (20 mg total) by mouth daily, Disp: 90 capsule, Rfl: 1    PREVIDENT 5000 ENAMEL PROTECT 1 1-5 % PSTE, Use as directed, Disp: , Rfl: 3    sacubitril-valsartan (Entresto) 49-51 MG TABS, Take 1 tablet by mouth 2 (two) times a day, Disp: 180 tablet, Rfl: 3    Vitamin D, Cholecalciferol, 1000 UNITS CAPS, Take 1 tablet by mouth daily  , Disp: , Rfl:     Wixela Inhub 500-50 MCG/DOSE inhaler, Inhale 1 puff 2 (two) times a day, Disp: 1 Inhaler, Rfl: 0    Social History     Socioeconomic History    Marital status: /Civil Union     Spouse name: Not on file    Number of children: Not on file    Years of education: Not on file    Highest education level: Not on file   Occupational History    Occupation: Manager-Retired   Social Needs    Financial resource strain: Not on file    Food insecurity     Worry: Not on file     Inability: Not on file    Transportation needs     Medical: Not on file     Non-medical: Not on file   Tobacco Use    Smoking status: Never Smoker    Smokeless tobacco: Never Used   Substance and Sexual Activity    Alcohol use: Never     Frequency: Never     Binge frequency: Never     Comment: Rare    Drug use: No    Sexual activity: Not Currently   Lifestyle    Physical activity     Days per week: Not on file     Minutes per session: Not on file    Stress: Not on file   Relationships    Social connections     Talks on phone: Not on file     Gets together: Not on file     Attends [employer] service: Not on file     Active member of club or organization: Not on file     Attends meetings of clubs or organizations: Not on file     Relationship status: Not on file    Intimate partner violence     Fear of current or ex partner: Not on file     Emotionally abused: Not on file     Physically abused: Not on file     Forced sexual activity: Not on file   Other Topics Concern    Not on file   Social History Narrative    Not on file       Family History   Problem Relation Age of Onset    Heart failure Mother         Congestive    Hypertension Mother     Osteoporosis Mother     COPD Father     Emphysema Father     Hypertension Father     Heart disease Sister         Heart Valve Replacement    Osteoporosis Sister     Breast cancer Family        Physical Exam:    Vitals: There were no vitals taken for this visit  , There is no height or weight on file to calculate BMI ,   Wt Readings from Last 3 Encounters:   03/04/21 90 7 kg (200 lb)   02/27/21 88 1 kg (194 lb 3 6 oz)   02/03/21 92 6 kg (204 lb 3 2 oz)       Physical Exam  Constitutional:       Appearance: He is well-developed  HENT:      Head: Normocephalic and atraumatic  Eyes:      Pupils: Pupils are equal, round, and reactive to light  Neck:      Musculoskeletal: Normal range of motion  Vascular: No JVD  Cardiovascular:      Rate and Rhythm: Normal rate and regular rhythm  Heart sounds: No murmur  Pulmonary:      Effort: Pulmonary effort is normal  No respiratory distress  Breath sounds: Normal breath sounds  Abdominal:      General: There is no distension  Palpations: Abdomen is soft  Tenderness: There is no abdominal tenderness  Musculoskeletal: Normal range of motion  Skin:     General: Skin is warm and dry  Findings: No rash  Neurological:      Mental Status: He is alert and oriented to person, place, and time  Labs & Results:    Lab Results   Component Value Date    WBC 12 62 (H) 02/27/2021    HGB 16 2 02/27/2021    HCT 48 4 02/27/2021    MCV 97 02/27/2021     02/27/2021     Lab Results   Component Value Date    SODIUM 142 03/15/2021    K 4 7 03/15/2021     (H) 03/15/2021    CO2 27 03/15/2021    BUN 32 (H) 03/15/2021    CREATININE 1 96 (H) 03/15/2021    GLUC 76 02/27/2021    CALCIUM 9 5 03/15/2021     Lab Results   Component Value Date    NTBNP 10,759 (H) 02/26/2021      Lab Results   Component Value Date    CHOL 163 12/19/2015    CHOL 168 04/17/2015    CHOL 155 12/04/2014     Lab Results   Component Value Date    HDL 65 01/27/2021    HDL 51 07/09/2020    HDL 56 01/11/2020     Lab Results   Component Value Date    LDLCALC 82 01/27/2021    LDLCALC 79 07/09/2020    LDLCALC 74 01/11/2020     Lab Results   Component Value Date    TRIG 138 01/27/2021    TRIG 98 07/09/2020    TRIG 172 (H) 01/11/2020     No results found for: CHOLHDL    EKG personally reviewed by Riley Stockton DO  Counseling / Coordination of Care  Time spent today 25 minutes  Greater than 50% of total time was spent with the patient and / or family counseling and / or coordination of care  We discussed diagnoses, most recent studies, tests and any changes in treatment plan  Thank you for the opportunity to participate in the care of this patient      STEPH Patel DAVE  [address] PULMONARY HYPERTENSION  MEDICAL DIRECTOR OF [place]

## 2021-03-17 ENCOUNTER — OFFICE VISIT (OUTPATIENT)
Dept: CARDIOLOGY CLINIC | Facility: CLINIC | Age: 81
End: 2021-03-17
Payer: COMMERCIAL

## 2021-03-17 VITALS
HEIGHT: 70 IN | BODY MASS INDEX: 28.26 KG/M2 | OXYGEN SATURATION: 95 % | WEIGHT: 197.4 LBS | SYSTOLIC BLOOD PRESSURE: 86 MMHG | DIASTOLIC BLOOD PRESSURE: 54 MMHG | HEART RATE: 84 BPM

## 2021-03-17 DIAGNOSIS — E78.2 MIXED HYPERLIPIDEMIA: ICD-10-CM

## 2021-03-17 DIAGNOSIS — I50.22 CHRONIC SYSTOLIC CHF (CONGESTIVE HEART FAILURE), NYHA CLASS 2 (HCC): ICD-10-CM

## 2021-03-17 DIAGNOSIS — I42.0 DILATED CARDIOMYOPATHY (HCC): Primary | ICD-10-CM

## 2021-03-17 DIAGNOSIS — I47.2 NSVT (NONSUSTAINED VENTRICULAR TACHYCARDIA) (HCC): ICD-10-CM

## 2021-03-17 PROCEDURE — 1160F RVW MEDS BY RX/DR IN RCRD: CPT | Performed by: INTERNAL MEDICINE

## 2021-03-17 PROCEDURE — 1036F TOBACCO NON-USER: CPT | Performed by: INTERNAL MEDICINE

## 2021-03-17 PROCEDURE — 99214 OFFICE O/P EST MOD 30 MIN: CPT | Performed by: INTERNAL MEDICINE

## 2021-03-17 NOTE — PATIENT INSTRUCTIONS
Decrease lasix to 10 mg - you are a little on dry side    Please check daily weights and contact the heart failure program at [phone] if you gain 3 lb in one day or 5 lb in one week  Please limit daily sodium intake to 2000 mg daily  Please limit daily fluid intake to 2000 ml daily  Bring complete list of medications to your follow up appointment       Try not to take aleve every day

## 2021-03-29 DIAGNOSIS — N40.1 BPH WITH OBSTRUCTION/LOWER URINARY TRACT SYMPTOMS: ICD-10-CM

## 2021-03-29 DIAGNOSIS — N13.8 BPH WITH OBSTRUCTION/LOWER URINARY TRACT SYMPTOMS: ICD-10-CM

## 2021-03-30 DIAGNOSIS — K22.70 BARRETT'S ESOPHAGUS WITHOUT DYSPLASIA: ICD-10-CM

## 2021-03-30 RX ORDER — FINASTERIDE 5 MG/1
TABLET, FILM COATED ORAL
Qty: 90 TABLET | Refills: 1 | Status: SHIPPED | OUTPATIENT
Start: 2021-03-30 | End: 2021-08-23

## 2021-04-01 DIAGNOSIS — K22.70 BARRETT'S ESOPHAGUS WITHOUT DYSPLASIA: ICD-10-CM

## 2021-04-06 RX ORDER — OMEPRAZOLE 20 MG/1
20 CAPSULE, DELAYED RELEASE ORAL DAILY
Qty: 90 CAPSULE | Refills: 2 | Status: SHIPPED | OUTPATIENT
Start: 2021-04-06 | End: 2022-06-06 | Stop reason: SDUPTHER

## 2021-04-06 NOTE — TELEPHONE ENCOUNTER
Patients GI provider:  Dr Shikha Nolasco     Number to return call: (n/a     Reason for call: Pt pharm [facility]  calling requesting a 90 day supply with refills    Scheduled procedure/appointment date if applicable: Apt/procedure

## 2021-04-13 RX ORDER — OMEPRAZOLE 20 MG/1
CAPSULE, DELAYED RELEASE ORAL
Qty: 90 CAPSULE | Refills: 1 | OUTPATIENT
Start: 2021-04-13

## 2021-06-04 ENCOUNTER — REMOTE DEVICE CLINIC VISIT (OUTPATIENT)
Dept: CARDIOLOGY CLINIC | Facility: CLINIC | Age: 81
End: 2021-06-04
Payer: COMMERCIAL

## 2021-06-04 DIAGNOSIS — Z95.810 PRESENCE OF AUTOMATIC CARDIOVERTER/DEFIBRILLATOR (AICD): Primary | ICD-10-CM

## 2021-06-04 PROCEDURE — 93296 REM INTERROG EVL PM/IDS: CPT | Performed by: INTERNAL MEDICINE

## 2021-06-04 PROCEDURE — 93295 DEV INTERROG REMOTE 1/2/MLT: CPT | Performed by: INTERNAL MEDICINE

## 2021-06-04 NOTE — PROGRESS NOTES
Results for orders placed or performed in visit on 06/04/21   Cardiac EP device report    Narrative    MDT-DUAL CHAMBER ICD (AAIR-DDDR MODE)/ ACTIVE SYSTEM IS MRI CONDITIONAL  CARELINK TRANSMISSION: BATTERY VOLTAGE ADEQUATE (7 4 YRS)  AP 70 5%  9 3% (AAIR-DDDR 60)  ALL AVAILABLE LEAD PARAMETERS WITHIN NORMAL LIMITS  NO SIGNIFICANT HIGH RATE EPISODES  OPTI-VOL WITHIN NORMAL LIMITS  NORMAL DEVICE FUNCTION      ES

## 2021-07-19 DIAGNOSIS — I10 HYPERTENSION, UNSPECIFIED TYPE: ICD-10-CM

## 2021-07-19 DIAGNOSIS — E27.0 HYPERCORTISOLEMIA (HCC): ICD-10-CM

## 2021-07-19 RX ORDER — ATORVASTATIN CALCIUM 10 MG/1
TABLET, FILM COATED ORAL
Qty: 90 TABLET | Refills: 3 | Status: SHIPPED | OUTPATIENT
Start: 2021-07-19

## 2021-07-19 RX ORDER — BISOPROLOL FUMARATE 10 MG/1
TABLET ORAL
Qty: 180 TABLET | Refills: 3 | Status: SHIPPED | OUTPATIENT
Start: 2021-07-19

## 2021-07-29 ENCOUNTER — RA CDI HCC (OUTPATIENT)
Dept: OTHER | Facility: HOSPITAL | Age: 81
End: 2021-07-29

## 2021-07-29 ENCOUNTER — APPOINTMENT (OUTPATIENT)
Dept: LAB | Facility: HOSPITAL | Age: 81
End: 2021-07-29
Payer: COMMERCIAL

## 2021-07-29 DIAGNOSIS — R73.01 IMPAIRED FASTING GLUCOSE: ICD-10-CM

## 2021-07-29 DIAGNOSIS — I50.22 CHRONIC SYSTOLIC CHF (CONGESTIVE HEART FAILURE) (HCC): ICD-10-CM

## 2021-07-29 DIAGNOSIS — I10 ESSENTIAL HYPERTENSION: ICD-10-CM

## 2021-07-29 DIAGNOSIS — E78.2 MIXED HYPERLIPIDEMIA: ICD-10-CM

## 2021-07-29 LAB
ALBUMIN SERPL BCP-MCNC: 3.3 G/DL (ref 3.5–5)
ALP SERPL-CCNC: 65 U/L (ref 46–116)
ALT SERPL W P-5'-P-CCNC: 29 U/L (ref 12–78)
ANION GAP SERPL CALCULATED.3IONS-SCNC: 2 MMOL/L (ref 4–13)
AST SERPL W P-5'-P-CCNC: 22 U/L (ref 5–45)
BILIRUB SERPL-MCNC: 1.31 MG/DL (ref 0.2–1)
BUN SERPL-MCNC: 22 MG/DL (ref 5–25)
CALCIUM ALBUM COR SERPL-MCNC: 9.6 MG/DL (ref 8.3–10.1)
CALCIUM SERPL-MCNC: 9 MG/DL (ref 8.3–10.1)
CHLORIDE SERPL-SCNC: 110 MMOL/L (ref 100–108)
CHOLEST SERPL-MCNC: 150 MG/DL (ref 50–200)
CO2 SERPL-SCNC: 27 MMOL/L (ref 21–32)
CREAT SERPL-MCNC: 1.58 MG/DL (ref 0.6–1.3)
ERYTHROCYTE [DISTWIDTH] IN BLOOD BY AUTOMATED COUNT: 14 % (ref 11.6–15.1)
EST. AVERAGE GLUCOSE BLD GHB EST-MCNC: 108 MG/DL
GFR SERPL CREATININE-BSD FRML MDRD: 41 ML/MIN/1.73SQ M
GLUCOSE P FAST SERPL-MCNC: 91 MG/DL (ref 65–99)
HBA1C MFR BLD: 5.4 %
HCT VFR BLD AUTO: 49 % (ref 36.5–49.3)
HDLC SERPL-MCNC: 54 MG/DL
HGB BLD-MCNC: 16.3 G/DL (ref 12–17)
LDLC SERPL CALC-MCNC: 64 MG/DL (ref 0–100)
MCH RBC QN AUTO: 32.9 PG (ref 26.8–34.3)
MCHC RBC AUTO-ENTMCNC: 33.3 G/DL (ref 31.4–37.4)
MCV RBC AUTO: 99 FL (ref 82–98)
NONHDLC SERPL-MCNC: 96 MG/DL
PLATELET # BLD AUTO: 170 THOUSANDS/UL (ref 149–390)
PMV BLD AUTO: 9.5 FL (ref 8.9–12.7)
POTASSIUM SERPL-SCNC: 4.8 MMOL/L (ref 3.5–5.3)
PROT SERPL-MCNC: 7.2 G/DL (ref 6.4–8.2)
RBC # BLD AUTO: 4.95 MILLION/UL (ref 3.88–5.62)
SODIUM SERPL-SCNC: 139 MMOL/L (ref 136–145)
TRIGL SERPL-MCNC: 161 MG/DL
TSH SERPL DL<=0.05 MIU/L-ACNC: 3.43 UIU/ML (ref 0.36–3.74)
WBC # BLD AUTO: 8.03 THOUSAND/UL (ref 4.31–10.16)

## 2021-07-29 PROCEDURE — 84443 ASSAY THYROID STIM HORMONE: CPT

## 2021-07-29 PROCEDURE — 36415 COLL VENOUS BLD VENIPUNCTURE: CPT

## 2021-07-29 PROCEDURE — 85027 COMPLETE CBC AUTOMATED: CPT

## 2021-07-29 PROCEDURE — 80053 COMPREHEN METABOLIC PANEL: CPT

## 2021-07-29 PROCEDURE — 83036 HEMOGLOBIN GLYCOSYLATED A1C: CPT

## 2021-07-29 PROCEDURE — 80061 LIPID PANEL: CPT

## 2021-07-29 NOTE — PROGRESS NOTES
[facility]  coding opportunities             Chart Reviewed * (Number of) Inbasket suggestions sent to Provider: 1     Problem listed updated   Provider Accepted, (number of) suggestions accepted: 1               Patients insurance company: [facility]     Visit status: Patient arrived for their scheduled appointment        [facility]  coding opportunities        8/4     Chart reviewed, (number of) suggestions sent to provider: 1    I13 0  Hypertensive heart and chronic kidney disease with heart failure and stage 1 through stage 4 chronic kidney disease, or unspecified chronic kidney disease- combination code per ICD10 denoting the link between hypertension, CHF and CKD                  Patients insurance company: [facility]

## 2021-08-04 ENCOUNTER — OFFICE VISIT (OUTPATIENT)
Dept: FAMILY MEDICINE CLINIC | Facility: CLINIC | Age: 81
End: 2021-08-04
Payer: COMMERCIAL

## 2021-08-04 VITALS
BODY MASS INDEX: 28.15 KG/M2 | RESPIRATION RATE: 16 BRPM | HEART RATE: 76 BPM | TEMPERATURE: 97.4 F | SYSTOLIC BLOOD PRESSURE: 130 MMHG | DIASTOLIC BLOOD PRESSURE: 82 MMHG | HEIGHT: 70 IN | WEIGHT: 196.6 LBS

## 2021-08-04 DIAGNOSIS — E66.3 OVERWEIGHT (BMI 25.0-29.9): ICD-10-CM

## 2021-08-04 DIAGNOSIS — R73.01 IMPAIRED FASTING GLUCOSE: Primary | ICD-10-CM

## 2021-08-04 DIAGNOSIS — E78.2 MIXED HYPERLIPIDEMIA: ICD-10-CM

## 2021-08-04 DIAGNOSIS — J45.30 MILD PERSISTENT ASTHMA WITHOUT COMPLICATION: ICD-10-CM

## 2021-08-04 DIAGNOSIS — I13.0 HYPERTENSIVE HEART AND KIDNEY DISEASE WITH HF AND WITH CKD STAGE III (HCC): ICD-10-CM

## 2021-08-04 DIAGNOSIS — I42.0 DILATED CARDIOMYOPATHY (HCC): ICD-10-CM

## 2021-08-04 DIAGNOSIS — N18.30 HYPERTENSIVE HEART AND KIDNEY DISEASE WITH HF AND WITH CKD STAGE III (HCC): ICD-10-CM

## 2021-08-04 DIAGNOSIS — I10 ESSENTIAL HYPERTENSION: ICD-10-CM

## 2021-08-04 DIAGNOSIS — Z00.00 MEDICARE ANNUAL WELLNESS VISIT, SUBSEQUENT: ICD-10-CM

## 2021-08-04 PROCEDURE — 3079F DIAST BP 80-89 MM HG: CPT | Performed by: FAMILY MEDICINE

## 2021-08-04 PROCEDURE — 1170F FXNL STATUS ASSESSED: CPT | Performed by: FAMILY MEDICINE

## 2021-08-04 PROCEDURE — 99214 OFFICE O/P EST MOD 30 MIN: CPT | Performed by: FAMILY MEDICINE

## 2021-08-04 PROCEDURE — 1101F PT FALLS ASSESS-DOCD LE1/YR: CPT | Performed by: FAMILY MEDICINE

## 2021-08-04 PROCEDURE — 3288F FALL RISK ASSESSMENT DOCD: CPT | Performed by: FAMILY MEDICINE

## 2021-08-04 PROCEDURE — 1160F RVW MEDS BY RX/DR IN RCRD: CPT | Performed by: FAMILY MEDICINE

## 2021-08-04 PROCEDURE — G0439 PPPS, SUBSEQ VISIT: HCPCS | Performed by: FAMILY MEDICINE

## 2021-08-04 PROCEDURE — 3075F SYST BP GE 130 - 139MM HG: CPT | Performed by: FAMILY MEDICINE

## 2021-08-04 PROCEDURE — 1125F AMNT PAIN NOTED PAIN PRSNT: CPT | Performed by: FAMILY MEDICINE

## 2021-08-04 PROCEDURE — 3725F SCREEN DEPRESSION PERFORMED: CPT | Performed by: FAMILY MEDICINE

## 2021-08-04 PROCEDURE — 1036F TOBACCO NON-USER: CPT | Performed by: FAMILY MEDICINE

## 2021-08-04 NOTE — PATIENT INSTRUCTIONS

## 2021-08-04 NOTE — PROGRESS NOTES
Assessment/Plan:    Impaired fasting glucose  7/2021 hgA1C 5 4 normal  Low carb diet  Mild persistent asthma without complication  Controlled  Continue wixela 500-50 bid  No need albuterol recently  Essential hypertension  Controlled  DASH diet  Continue bisoprolol 10mg bid and entresto per cardiology  Cardiomyopathy ([address] )  Continue entresto and lasix 20mg QD per cardiology  Mixed hyperlipidemia  7/2021 Lipid 150/161/54/64 ok  Low fat diet  Continue atorvastatin 10mg qhs  Reviewed lab in 7/2021  TSH normal  CBC normal  CMP showed Cr 1 58, GFR 41 [id number] 5 4 normal  Lipid 150/161/54/64 ok    Got flu shot yearly    Got covid19 vaccines  Denies SE  Got PCV13 5/2015  Got pneumovax 2016  Got zoster in 2011  Got shingrix in 2020    FU Dr Lebron Mcgrath for colonoscopy in 6/2019  Repeat in 3 years     3/2020 Dexa showed osteopenia  RTO in 6 months        POA----wife  Living will---DNR if end of life  Diagnoses and all orders for this visit:    Impaired fasting glucose  -     Comprehensive metabolic panel; Future  -     Lipid panel; Future  -     Hemoglobin A1C; Future    Mild persistent asthma without complication  -     Comprehensive metabolic panel; Future  -     Lipid panel; Future  -     Hemoglobin A1C; Future    Dilated cardiomyopathy (HCC)  -     Comprehensive metabolic panel; Future  -     Lipid panel; Future  -     Hemoglobin A1C; Future    Essential hypertension  -     Comprehensive metabolic panel; Future  -     Lipid panel; Future  -     Hemoglobin A1C; Future    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 (HCC)    Mixed hyperlipidemia  -     Comprehensive metabolic panel; Future  -     Lipid panel; Future  -     Hemoglobin A1C; Future    Overweight (BMI 25 0-29  9)    Medicare annual wellness visit, subsequent          Subjective:      Patient ID: Efe Copeland is a [de-identified] y o  male  HPI    Pt is here by himself     Strained his left knee yesterday while playing golf  Back of left knee hurt, better today      HTN---He is on bisoprolol 10mg bid and entresto 49-51mg bid    FU cardiology for cardiomyopathy, tachycardia, s/p pacemaker  Stable    He is on lasix 20mg QD and entresto  Wt at home stable per pt       CKD3---3/1/2021 Cr 2 02 slightly worse       Hyperlipidemia---on atorvastatin 10mg now  Denies SE       IFG---Follows low carb diet        Asthma----Saw pulmonary, got Wixela 500-50 QD which helped  Does not need albuterol recently  Never smoked       [facility] Cancer care Q 6m-1y for melanoma s/p wide excision on right upper arm  Cira Fleischer Dr Rollene Longest for Burt's, do EGD every 3 years  Last EGD was in 12/2016  Pt is on omeprazole 20mg daily now       FU urology for BPH yearly      Never smoke  No alcohol       Lives with wife  Does all ADL's  Still drive  Denies recent falls    Denies depression        The following portions of the patient's history were reviewed and updated as appropriate: allergies, current medications, past family history, past medical history, past social history, past surgical history and problem list     Review of Systems   Constitutional: Negative for appetite change, chills and fever  HENT: Negative for congestion, ear pain, sinus pain and sore throat  Eyes: Negative for discharge and itching  Respiratory: Negative for apnea, cough, chest tightness, shortness of breath and wheezing  Cardiovascular: Negative for chest pain, palpitations and leg swelling  Gastrointestinal: Negative for abdominal pain, anal bleeding, constipation, diarrhea, nausea and vomiting  Endocrine: Negative for cold intolerance, heat intolerance and polyuria  Genitourinary: Negative for difficulty urinating and dysuria  Musculoskeletal: Negative for arthralgias, back pain and myalgias  Skin: Negative for rash  Neurological: Negative for dizziness and headaches  Psychiatric/Behavioral: Negative for agitation           Objective:      BP 130/82 (BP Location: Left arm, Patient Position: Sitting, Cuff Size: Adult)   Pulse 76   Temp (!) 97 4 °F (36 3 °C) (Tympanic)   Resp 16   Ht 5' 10" (1 778 m)   Wt 89 2 kg (196 lb 9 6 oz)   BMI 28 21 kg/m²          Physical Exam  Constitutional:       Appearance: He is well-developed  HENT:      Head: Normocephalic and atraumatic  Eyes:      General:         Right eye: No discharge  Left eye: No discharge  Conjunctiva/sclera: Conjunctivae normal    Cardiovascular:      Rate and Rhythm: Normal rate and regular rhythm  Heart sounds: Normal heart sounds  No murmur heard  No friction rub  No gallop  Pulmonary:      Effort: Pulmonary effort is normal  No respiratory distress  Breath sounds: Normal breath sounds  No wheezing or rales  Abdominal:      General: Bowel sounds are normal  There is no distension  Palpations: Abdomen is soft  Tenderness: There is no abdominal tenderness  There is no guarding  Musculoskeletal:         General: Normal range of motion  Cervical back: Normal range of motion and neck supple  No tenderness  Right lower leg: No edema  Left lower leg: No edema  Lymphadenopathy:      Cervical: No cervical adenopathy  Neurological:      Mental Status: He is alert

## 2021-08-04 NOTE — PROGRESS NOTES
Chief Complaint   Patient presents with   [employer] [facility] Visit     Subsequent   Follow-up     6 months and review labs  Health Maintenance   Topic Date Due    SLP PLAN OF CARE  Never done    DTaP,Tdap,and Td Vaccines (1 - Tdap) Never done    Influenza Vaccine (1) 09/01/2021    BMI: Followup Plan  02/03/2022    Fall Risk  08/04/2022    Depression Screening PHQ  08/04/2022    Medicare Annual Wellness Visit (AWV)  08/04/2022    BMI: Adult  08/04/2022    DXA SCAN  03/02/2025    Pneumococcal Vaccine: 65+ Years  Completed    COVID-19 Vaccine  Completed    HIB Vaccine  Aged Out    Hepatitis B Vaccine  Aged Out    IPV Vaccine  Aged Out    Hepatitis A Vaccine  Aged Out    Meningococcal ACWY Vaccine  Aged Out    HPV Vaccine  Aged Out        Assessment and Plan:     Problem List Items Addressed This Visit        Endocrine    Impaired fasting glucose - Primary     7/2021 hgA1C 5 4 normal  Low carb diet  Relevant Orders    Comprehensive metabolic panel    Lipid panel    Hemoglobin A1C       Respiratory    Mild persistent asthma without complication     Controlled  Continue wixela 500-50 bid  No need albuterol recently  Relevant Orders    Comprehensive metabolic panel    Lipid panel    Hemoglobin A1C       Cardiovascular and Mediastinum    Cardiomyopathy ([address] )     Continue entresto and lasix 20mg QD per cardiology  Relevant Orders    Comprehensive metabolic panel    Lipid panel    Hemoglobin A1C    Essential hypertension     Controlled  DASH diet  Continue bisoprolol 10mg bid and entresto per cardiology  Relevant Orders    Comprehensive metabolic panel    Lipid panel    Hemoglobin A1C    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 ([address] )       Other    Mixed hyperlipidemia     7/2021 Lipid 150/161/54/64 ok  Low fat diet  Continue atorvastatin 10mg qhs            Relevant Orders    Comprehensive metabolic panel    Lipid panel    Hemoglobin A1C      Other Visit Diagnoses     Overweight (BMI 25 0-29  9)        Medicare annual wellness visit, subsequent               Preventive health issues were discussed with patient, and age appropriate screening tests were ordered as noted in patient's After Visit Summary  Personalized health advice and appropriate referrals for health education or preventive services given if needed, as noted in patient's After Visit Summary       History of Present Illness:     Patient presents for Medicare Annual Wellness visit    Patient Care Team:  Garima Navarro MD as PCP - Yvette Rodriguez, MD Constantino Marlow MD Libbie Morton, MD Janace Socks, MD as Endoscopist  Ventura Rdz MD (Colon and Rectal Surgery)  Aloma DO Albert (Cardiology)  Yvette Alvarenga MD (Dermatology)     Problem List:     Patient Active Problem List   Diagnosis    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    Microscopic hematuria    [facility]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 [facility]    Arthritis    Burt esophagus    Cardiac arrhythmia    [facility]    Esophageal reflux    Mixed hyperlipidemia    Essential hypertension    Impaired fasting glucose    Personal history of malignant melanoma    Polymyalgia rheumatica ([facility]    Right bundle branch block with left anterior fascicular block    Skin lesion    Thrombocytopenia ([facility]    Osteopenia    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    Hypertensive kidney disease with stage 3b chronic kidney disease ([facility]    Acute on chronic systolic congestive heart failure ([facility]    Encounter for follow-up surveillance of melanoma    Mild persistent asthma without complication    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 [facility])      Past Medical and Surgical History:     Past Medical History:   Diagnosis Date    AICD (automatic cardioverter/defibrillator) present     Arthritis     Asthma     Burt esophagus     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     Cancer, colon ([facility] ) 4/5/2013    Cardiac arrhythmia     Cardiomyopathy ([facility]     CKD (chronic kidney disease)     Colon cancer (HCC)     Congestive heart failure (CHF) (HCC)     COPD [facility]chronic obstructive pulmonary disease) [facility]     Diverticulitis     Last assessed: 4/5/13    Esophageal reflux     Gout     Hernia     Hyperlipidemia     Hypertension     Hypertension     Hypothyroidism     Hypothyroidism 5/30/2013    Non-toxic multinodular goiter 11/28/2012    Pleural effusion     Polymyalgia rheumatica (HCC)     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     Spondyloarthropathy     Last assessed: 11/28/12     Past Surgical History:   Procedure Laterality Date    ARM SKIN LESION BIOPSY / EXCISION      Malignant    ATRIAL ABLATION SURGERY      AV NODE ABLATION      CARDIAC DEFIBRILLATOR PLACEMENT  2009    Cardio-Defib Pulse Gen Venous Insertion of Electrode for Ventricular Pacing  Implantable    CARDIAC SURGERY  2009    Catheterization    COLONOSCOPY N/A 3/14/2016    Procedure: COLONOSCOPY;  Surgeon: Isa Cohen MD;  Location: [facility]; Service  February 22, 2013, mildly enlarged prostate, history of colon CA with normal appearance of anastomosis at the midtransverse colon, severe diverticulosis in the sigmoid, descending and transverse colon  Repeat colonoscopy in 3 yrs    HEMICOLECTOMY Right 05/05/2004    INGUINAL HERNIA REPAIR Bilateral     Left 3/2004, right 5/2008    IR UPPER EXTREMITY VENOGRAM- DIAGNOSTIC  10/4/2018    KNEE SURGERY  1972    Meniscectomy    MO ESOPHAGOGASTRODUODENOSCOPY TRANSORAL DIAGNOSTIC N/A 12/5/2016    Procedure: ESOPHAGOGASTRODUODENOSCOPY (EGD); Surgeon: Gonzales Gómez MD;  Location: [facility];   Service: Gastroenterology    TONSILLECTOMY AND ADENOIDECTOMY      UPPER GASTROINTESTINAL ENDOSCOPY        Family History:     Family History Problem Relation Age of Onset    Heart failure Mother         Congestive    Hypertension Mother     Osteoporosis Mother     COPD Father     Emphysema Father     Hypertension Father     Heart disease Sister         Heart Valve Replacement    Osteoporosis Sister     Breast cancer Family       Social History:     Social History     Socioeconomic History    Marital status: /Civil Union     Spouse name: None    Number of children: None    Years of education: None    Highest education level: None   Occupational History    Occupation: Manager-Retired   Tobacco Use    Smoking status: Never Smoker    Smokeless tobacco: Never Used   Vaping Use    Vaping Use: Never used   Substance and Sexual Activity    Alcohol use: Never     Comment: Rare    Drug use: No    Sexual activity: Not Currently   Other Topics Concern    None   Social History Narrative    None     Social Determinants of Health     Financial Resource Strain:     Difficulty of Paying Living Expenses:    Food Insecurity:     Worried About Running Out of Food in the Last Year:     Ran Out of Food in the Last Year:    Transportation Needs:     Lack of Transportation (Medical):      Lack of Transportation (Non-Medical):    Physical Activity:     Days of Exercise per Week:     Minutes of Exercise per Session:    Stress:     Feeling of Stress :    Social Connections:     Frequency of Communication with Friends and Family:     Frequency of Social Gatherings with Friends and Family:     Attends [employer] Services:     Active Member of Clubs or Organizations:     Attends Club or Organization Meetings:     Marital Status:    Intimate Partner Violence:     Fear of Current or Ex-Partner:     Emotionally Abused:     Physically Abused:     Sexually Abused:       Medications and Allergies:     Current Outpatient Medications   Medication Sig Dispense Refill    albuterol (PROVENTIL HFA,VENTOLIN HFA) 90 mcg/act inhaler Inhale 2 puffs every 6 (six) hours as needed for wheezing or shortness of breath 1 Inhaler 1    aspirin 81 MG tablet Take 81 mg by mouth daily   atorvastatin (LIPITOR) 10 mg tablet TAKE ONE TABLET BY MOUTH EVERY DAY 90 tablet 3    bisoprolol (ZEBETA) 10 MG tablet TAKE ONE TABLET BY MOUTH TWICE A  tablet 3    Calcium Carb-Cholecalciferol (CALCIUM 600 + D PO) Take 2 tablets by mouth daily       Coenzyme Q10 (CO Q-10) 200 MG CAPS Take 1 tablet by mouth daily      finasteride (PROSCAR) 5 mg tablet TAKE ONE TABLET BY MOUTH EVERY DAY 90 tablet 1    furosemide (LASIX) 20 mg tablet Take 1 tablet (20 mg total) by mouth daily 90 tablet 3    Multiple Vitamins-Minerals (CENTRUM SILVER PO) Take 1 tablet by mouth daily   omeprazole (PriLOSEC) 20 mg delayed release capsule Take 1 capsule (20 mg total) by mouth daily 90 capsule 2    PREVIDENT 5000 ENAMEL PROTECT 1 1-5 % PSTE Use as directed  3    sacubitril-valsartan (Entresto) 49-51 MG TABS Take 1 tablet by mouth 2 (two) times a day 180 tablet 3    Vitamin D, Cholecalciferol, 1000 UNITS CAPS Take 1 tablet by mouth daily   Wixela Inhub 500-50 MCG/DOSE inhaler Inhale 1 puff 2 (two) times a day 1 Inhaler 0     No current facility-administered medications for this visit       No Known Allergies   Immunizations:     Immunization History   Administered Date(s) Administered    INFLUENZA 10/20/2018    Influenza Split High Dose Preservative Free IM 10/28/2014, 10/26/2015, 11/11/2016, 10/23/2017    Influenza, high dose seasonal 0 7 mL 11/05/2019, 10/12/2020    Influenza, seasonal, injectable 11/27/2001, 10/04/2007, 12/09/2008, 10/29/2010, 10/19/2011, 09/29/2012, 10/31/2013    Pneumococcal Conjugate 13-Valent 05/13/2015    Pneumococcal Polysaccharide PPV23 11/28/2006, 12/21/2016    SARS-CoV-2 / COVID-19 mRNA IM (Moderna) 01/26/2021, 03/08/2021    Zoster 12/01/2011, 03/11/2020    Zoster Vaccine Recombinant 03/11/2020, 07/06/2020      Health Maintenance:         Topic Date Due    DXA SCAN  03/02/2025         Topic Date Due    DTaP,Tdap,and Td Vaccines (1 - Tdap) Never done    Influenza Vaccine (1) 09/01/2021      Medicare Health Risk Assessment:     /82 (BP Location: Left arm, Patient Position: Sitting, Cuff Size: Adult)   Pulse 76   Temp (!) 97 4 °F (36 3 °C) (Tympanic)   Resp 16   Ht 5' 10" (1 778 m)   Wt 89 2 kg (196 lb 9 6 oz)   BMI 28 21 kg/m²      Zora Blocker is here for his Subsequent Wellness visit  Health Risk Assessment:   Patient rates overall health as good  Patient feels that their physical health rating is same  Patient is very satisfied with their life  Eyesight was rated as same  Hearing was rated as same  Patient feels that their emotional and mental health rating is same  Patients states they are never, rarely angry  Patient states they are never, rarely unusually tired/fatigued  Pain experienced in the last 7 days has been some  Patient's pain rating has been 3/10  Patient states that he has experienced no weight loss or gain in last 6 months  Depression Screening:   PHQ-2 Score: 0      Fall Risk Screening: In the past year, patient has experienced: no history of falling in past year      Home Safety:  Patient does not have trouble with stairs inside or outside of their home  Patient has working smoke alarms and has working carbon monoxide detector  Home safety hazards include: none  Nutrition:   Current diet is No Added Salt and Other (please comment)  Low sodium diet < 2000 mg/ day    Medications:   Patient is currently taking over-the-counter supplements  OTC medications include: see medication list  Patient is able to manage medications  Activities of Daily Living (ADLs)/Instrumental Activities of Daily Living (IADLs):   Walk and transfer into and out of bed and chair?: Yes  Dress and groom yourself?: Yes    Bathe or shower yourself?: Yes    Feed yourself?  Yes  Do your laundry/housekeeping?: Yes  Manage your money, pay your bills and track your expenses?: Yes  Make your own meals?: Yes    Do your own shopping?: Yes    Previous Hospitalizations:   Any hospitalizations or ED visits within the last 12 months?: Yes    How many hospitalizations have you had in the last year?: 1-2    Hospitalization Comments: Excess water- heart    Advance Care Planning:   Living will: Yes    Durable POA for healthcare: Yes    Advanced directive: Yes    End of Life Decisions reviewed with patient: Yes    Provider agrees with end of life decisions: Yes      Cognitive Screening:   Provider or family/friend/caregiver concerned regarding cognition?: No    PREVENTIVE SCREENINGS      Cardiovascular Screening:    General: History Lipid Disorder and Screening Current      Diabetes Screening:     General: Screening Current      Colorectal Cancer Screening:     General: History Colorectal Cancer and Screening Current      Prostate Cancer Screening:    General: Screening Not Indicated      Osteoporosis Screening:    General: Screening Current      Lung Cancer Screening:     General: Screening Not Indicated    Screening, Brief Intervention, and Referral to Treatment (SBIRT)    Screening  Typical number of drinks in a day: 0  Typical number of drinks in a week: 0  Interpretation: Low risk drinking behavior      Single Item Drug Screening:  How often have you used an illegal drug (including marijuana) or a prescription medication for non-medical reasons in the past year? never    Single Item Drug Screen Score: 0  Interpretation: Negative screen for possible drug use disorder      Fernanda Griffith MD

## 2021-08-06 PROBLEM — I13.0 HYPERTENSIVE HEART AND KIDNEY DISEASE WITH HF AND WITH CKD STAGE III (HCC): Status: ACTIVE | Noted: 2021-08-06

## 2021-08-06 PROBLEM — N18.30 HYPERTENSIVE HEART AND KIDNEY DISEASE WITH HF AND WITH CKD STAGE III (HCC): Status: ACTIVE | Noted: 2021-08-06

## 2021-08-12 DIAGNOSIS — I42.9 CARDIOMYOPATHY, UNSPECIFIED TYPE (HCC): ICD-10-CM

## 2021-08-12 DIAGNOSIS — R06.02 SOB (SHORTNESS OF BREATH): ICD-10-CM

## 2021-08-12 RX ORDER — FUROSEMIDE 20 MG/1
TABLET ORAL
Qty: 90 TABLET | Refills: 3 | Status: ON HOLD | OUTPATIENT
Start: 2021-08-12 | End: 2022-07-23 | Stop reason: SDUPTHER

## 2021-08-22 DIAGNOSIS — N13.8 BPH WITH OBSTRUCTION/LOWER URINARY TRACT SYMPTOMS: ICD-10-CM

## 2021-08-22 DIAGNOSIS — N40.1 BPH WITH OBSTRUCTION/LOWER URINARY TRACT SYMPTOMS: ICD-10-CM

## 2021-08-23 RX ORDER — FINASTERIDE 5 MG/1
TABLET, FILM COATED ORAL
Qty: 90 TABLET | Refills: 1 | Status: SHIPPED | OUTPATIENT
Start: 2021-08-23 | End: 2022-02-07

## 2021-08-31 NOTE — PROGRESS NOTES
Heart Failure Office Note - Enriqueta Jara [de-identified] y o  male MRN: [id number]    @ Encounter: [id number]      Assessment/Plan:    Patient Active Problem List    Diagnosis Date Noted    Encounter for follow-up surveillance of melanoma 07/11/2019    Acute on chronic systolic congestive heart failure ([facility] ) 01/29/2019    Hypertensive kidney disease with stage 3b chronic kidney disease ([place] [address] ) 01/04/2019    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 09/20/2018    Osteopenia 06/29/2018    Personal history of malignant melanoma 11/15/2017    Skin lesion 06/23/2017    Thrombocytopenia ([place] [address] ) 06/23/2015    Ank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 ([place] [address] ) 12/30/2014    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 12/02/2013    Microscopic hematuria 12/02/2013    Arthritis 11/26/2013    Polymyalgia rheumatica ([place] [address] ) 11/26/2013    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    Impaired fasting glucose 05/30/2013    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    Cardiac arrhythmia 04/05/2013    Essential hypertension 04/05/2013    Esophageal reflux 11/28/2012    Cardiomyopathy ([place] [address] ) 10/26/2012    Mixed hyperlipidemia 09/05/2012    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 ([place] [address] ) 08/06/2021    Mild persistent asthma without complication 05/99/5833       # Chronic HFrEF, Stage C, NYHA 2  Etiology: NICM, presumed viral  No family hx of SCD  No ETOH    Weight: 197 lbs  Wt Readings from Last 3 Encounters:   09/01/21 89 7 kg (197 lb 11 2 oz)   08/04/21 89 2 kg (196 lb 9 6 oz)   03/17/21 89 5 kg (197 lb 6 4 oz)     NT pro BNP: 1/29/19: 6192    Studies- personally reviewed by me    Echo 10/1/20:  LVEF: 35%  LVIDd: 6 5 cm  RV: mildly dilated, mildly reduced  MR: moderate  PASP:  RVOT:   Other: mild AI, ascending aorta 4 0 cm  TR: 2-3+    Echocardiogram 1/29/19  LVEF: 40%  LVIDd: 6 cm  RV: normal  MR: mild  PASP: 40 mmHg  RVOT:   Other:    Echo 6/21/18:  LVEF: 50%  LVEDd: 6 35 cm    Nuclear Stress Test 1/31/19: small to medium sized fixed defect infersoseptum  No ischemia  EF: 30%    Lab Results   Component Value Date    NTBNP 10,759 (H) 02/26/2021        Neurohormonal Blockade:  --Beta-Blocker: bisoprolol 10 mg daily  --ACEi, ARB or ARNi: Entresto 49/51 mg BID  --Aldosterone Receptor Blocker:  --Diuretic: lasix 20 mg daily    Sudden Cardiac Death Risk Reduction:  MDT dual chamber ICD: MRI conditional  Interrogation 6/4/21:  9 3%, no high rate episodes, optivol normal  Interrogation 3/1/21:  4%, 1 NSVT 22 beats 175 bpm, optivol normal    Electrical Resynchronization:  --Candidacy for BiV device: IVCD    Advanced Therapies (if appropriate): --Inotrope:  --LVAD/Transplant Candidacy:    # HTN- controlled, room to escalate therapy  # Mild AI and ascending aorta 4 cm on 10/1/20 echo  # SVT  # NSVT on device check- on bisoprolol (K 3 6)  # hyperlipidemia- atorvastatin 10 mg  7/29/21: LDL 64, HDL 54  1/27/21: LDL 82, HDL 65  # hx of malignant melanoma  # CKD, Cr 1 58 on 7/29/21  # Barretts esophagus- EGD 6/12/20    Today's Plan:  Continue bisoprolol and Entresto for HFrEF  Lipids at goal on atorvastatin 10 mg   NSVT relatively quiet on bisoprolol, recent devic check  --2g sodium diet  Weights daily    HPI:      [de-identified] yo recently had first evaluation in HF clinic for NICM diagnosed in 2009, s/p ICD, PSVT, HTN and dyslipidemia  Last in hospital in January for dyspnea felt to be more viral but was volume overloaded with NT pro BNP of 6192  Given one dose of IV lasix  LVEF: 40% with LVEDd 6 cm  He followed up with NP and weight was 204 lbs  Never smoked and does not drink  Plays golf 2x a week, cuts grass, does not feel limited in activity  No significant edema in legs  He is limited by knees and back not respiration       Interim:   Given bump in Cr I decreased lasix to 10 mg daily (pt was also taking Aleve at that time)- he fluctuates between 10 and 20 mg Interrogation 6/4/21:  9 3%, no high rate episodes, optivol normal    Review of Systems   Constitutional: Negative for activity change, appetite change, fatigue and unexpected weight change  HENT: Negative for congestion and nosebleeds  Eyes: Negative  Respiratory: Negative for cough, chest tightness and shortness of breath  Cardiovascular: Negative for chest pain, palpitations and leg swelling  Gastrointestinal: Negative for abdominal distention  Endocrine: Negative  Genitourinary: Negative  Musculoskeletal: Positive for arthralgias  Skin: Negative  Neurological: Negative for dizziness, syncope and weakness  Hematological: Negative  Psychiatric/Behavioral: Negative  Past Medical History:   Diagnosis Date    AICD (automatic cardioverter/defibrillator) present     Arthritis     Asthma     Burt esophagus     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     Cancer[facility] ) 4/5/2013    Cardiac arrhythmia     Cardiomyopathy ([place] [address] )     CKD (chronic kidney disease)     Colon cancer (HCC)     Congestive heart failure (CHF) (HCC)     COPD (chronic obstructive pulmonary disease) ([place] [address] )     Diverticulitis     Last assessed: 4/5/13    Esophageal reflux     Gout     Hernia     Hyperlipidemia     Hypertension     Hypertension     Hypothyroidism     Hypothyroidism 5/30/2013    Non-toxic multinodular goiter 11/28/2012    Pleural effusion     Polymyalgia rheumatica (HCC)     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     Spondyloarthropathy     Last assessed: 11/28/12         No Known Allergies    Current Outpatient Medications:     albuterol (PROVENTIL HFA,VENTOLIN HFA) 90 mcg/act inhaler, Inhale 2 puffs every 6 (six) hours as needed for wheezing or shortness of breath, Disp: 1 Inhaler, Rfl: 1    aspirin 81 MG tablet, Take 81 mg by mouth daily  , Disp: , Rfl:     atorvastatin (LIPITOR) 10 mg tablet, TAKE ONE TABLET BY MOUTH EVERY DAY, Disp: 90 tablet, Rfl: 3    bisoprolol (ZEBETA) 10 MG tablet, TAKE ONE TABLET BY MOUTH TWICE A DAY, Disp: 180 tablet, Rfl: 3    Calcium Carb-Cholecalciferol (CALCIUM 600 + D PO), Take 2 tablets by mouth daily , Disp: , Rfl:     Coenzyme Q10 (CO Q-10) 200 MG CAPS, Take 1 tablet by mouth daily, Disp: , Rfl:     finasteride (PROSCAR) 5 mg tablet, TAKE ONE TABLET BY MOUTH EVERY DAY, Disp: 90 tablet, Rfl: 1    furosemide (LASIX) 20 mg tablet, TAKE ONE TABLET BY MOUTH EVERY DAY, Disp: 90 tablet, Rfl: 3    Multiple Vitamins-Minerals (CENTRUM SILVER PO), Take 1 tablet by mouth daily  , Disp: , Rfl:     omeprazole (PriLOSEC) 20 mg delayed release capsule, Take 1 capsule (20 mg total) by mouth daily, Disp: 90 capsule, Rfl: 2    PREVIDENT 5000 ENAMEL PROTECT 1 1-5 % PSTE, Use as directed, Disp: , Rfl: 3    sacubitril-valsartan (Entresto) 49-51 MG TABS, Take 1 tablet by mouth 2 (two) times a day, Disp: 180 tablet, Rfl: 3    Vitamin D, Cholecalciferol, 1000 UNITS CAPS, Take 1 tablet by mouth daily  , Disp: , Rfl:     Wixela Inhub 500-50 MCG/DOSE inhaler, Inhale 1 puff 2 (two) times a day, Disp: 1 Inhaler, Rfl: 0    Social History     Socioeconomic History    Marital status: /Civil Union     Spouse name: Not on file    Number of children: Not on file    Years of education: Not on file    Highest education level: Not on file   Occupational History    Occupation: Manager-Retired   Tobacco Use    Smoking status: Never Smoker    Smokeless tobacco: Never Used   Vaping Use    Vaping Use: Never used   Substance and Sexual Activity    Alcohol use: Never     Comment: Rare    Drug use: No    Sexual activity: Not Currently   Other Topics Concern    Not on file   Social History Narrative    Not on file     Social Determinants of Health     Financial Resource Strain:     Difficulty of Paying Living Expenses:    Food Insecurity:     Worried About Running Out of Food in the Last Year:  Ran Out of Food in the Last Year:    Transportation Needs:     Lack of Transportation (Medical):  Lack of Transportation (Non-Medical):    Physical Activity:     Days of Exercise per Week:     Minutes of Exercise per Session:    Stress:     Feeling of Stress :    Social Connections:     Frequency of Communication with Friends and Family:     Frequency of Social Gatherings with Friends and Family:     Attends [employer] Services:     Active Member of Clubs or Organizations:     Attends Club or Organization Meetings:     Marital Status:    Intimate Partner Violence:     Fear of Current or Ex-Partner:     Emotionally Abused:     Physically Abused:     Sexually Abused:        Family History   Problem Relation Age of Onset    Heart failure Mother         Congestive    Hypertension Mother     Osteoporosis Mother     COPD Father     Emphysema Father     Hypertension Father     Heart disease Sister         Heart Valve Replacement    Osteoporosis Sister     Breast cancer Family        Physical Exam:    Vitals: Blood pressure 110/70, pulse 87, height 5' 10" (1 778 m), weight 89 7 kg (197 lb 11 2 oz), SpO2 96 %  , Body mass index is 28 37 kg/m² ,   Wt Readings from Last 3 Encounters:   09/01/21 89 7 kg (197 lb 11 2 oz)   08/04/21 89 2 kg (196 lb 9 6 oz)   03/17/21 89 5 kg (197 lb 6 4 oz)       Physical Exam  Constitutional:       Appearance: He is well-developed  HENT:      Head: Normocephalic and atraumatic  Eyes:      Pupils: Pupils are equal, round, and reactive to light  Neck:      Vascular: No JVD  Cardiovascular:      Rate and Rhythm: Normal rate and regular rhythm  Heart sounds: No murmur heard  Pulmonary:      Effort: Pulmonary effort is normal  No respiratory distress  Breath sounds: Normal breath sounds  Abdominal:      General: There is no distension  Palpations: Abdomen is soft  Tenderness: There is no abdominal tenderness     Musculoskeletal: General: Normal range of motion  Cervical back: Normal range of motion  Skin:     General: Skin is warm and dry  Findings: No rash  Neurological:      Mental Status: He is alert and oriented to person, place, and time  Labs & Results:    Lab Results   Component Value Date    WBC 8 03 07/29/2021    HGB 16 3 07/29/2021    HCT 49 0 07/29/2021    MCV 99 (H) 07/29/2021     07/29/2021     Lab Results   Component Value Date    SODIUM 139 07/29/2021    K 4 8 07/29/2021     (H) 07/29/2021    CO2 27 07/29/2021    BUN 22 07/29/2021    CREATININE 1 58 (H) 07/29/2021    GLUC 76 02/27/2021    CALCIUM 9 0 07/29/2021     Lab Results   Component Value Date    NTBNP 10,759 (H) 02/26/2021      Lab Results   Component Value Date    CHOL 163 12/19/2015    CHOL 168 04/17/2015    CHOL 155 12/04/2014     Lab Results   Component Value Date    HDL 54 07/29/2021    HDL 65 01/27/2021    HDL 51 07/09/2020     Lab Results   Component Value Date    LDLCALC 64 07/29/2021    LDLCALC 82 01/27/2021    LDLCALC 79 07/09/2020     Lab Results   Component Value Date    TRIG 161 (H) 07/29/2021    TRIG 138 01/27/2021    TRIG 98 07/09/2020     No results found for: CHOLHDL    EKG personally reviewed by Wes Manrique DO  Counseling / Coordination of Care  Time spent today 25 minutes  Greater than 50% of total time was spent with the patient and / or family counseling and / or coordination of care  We discussed diagnoses, most recent studies, tests and any changes in treatment plan  Thank you for the opportunity to participate in the care of this patient      [address] [facility] PULMONARY [facility]YPERTENSION  MEDICAL DIRECTOR OF [place]

## 2021-09-01 ENCOUNTER — OFFICE VISIT (OUTPATIENT)
Dept: CARDIOLOGY CLINIC | Facility: CLINIC | Age: 81
End: 2021-09-01
Payer: COMMERCIAL

## 2021-09-01 VITALS
DIASTOLIC BLOOD PRESSURE: 70 MMHG | WEIGHT: 197.7 LBS | HEART RATE: 87 BPM | HEIGHT: 70 IN | OXYGEN SATURATION: 96 % | SYSTOLIC BLOOD PRESSURE: 110 MMHG | BODY MASS INDEX: 28.3 KG/M2

## 2021-09-01 DIAGNOSIS — N18.30 HYPERTENSIVE HEART AND KIDNEY DISEASE WITH HF AND WITH CKD STAGE III (HCC): ICD-10-CM

## 2021-09-01 DIAGNOSIS — I13.0 HYPERTENSIVE HEART AND KIDNEY DISEASE WITH HF AND WITH CKD STAGE III (HCC): ICD-10-CM

## 2021-09-01 DIAGNOSIS — I47.2 NSVT (NONSUSTAINED VENTRICULAR TACHYCARDIA) (HCC): ICD-10-CM

## 2021-09-01 DIAGNOSIS — I42.0 DILATED CARDIOMYOPATHY (HCC): Primary | ICD-10-CM

## 2021-09-01 DIAGNOSIS — N52.01 ERECTILE DYSFUNCTION DUE TO ARTERIAL INSUFFICIENCY: ICD-10-CM

## 2021-09-01 DIAGNOSIS — E78.2 MIXED HYPERLIPIDEMIA: ICD-10-CM

## 2021-09-01 PROCEDURE — 99214 OFFICE O/P EST MOD 30 MIN: CPT | Performed by: INTERNAL MEDICINE

## 2021-09-01 PROCEDURE — 3078F DIAST BP <80 MM HG: CPT | Performed by: INTERNAL MEDICINE

## 2021-09-01 PROCEDURE — 3074F SYST BP LT 130 MM HG: CPT | Performed by: INTERNAL MEDICINE

## 2021-09-01 PROCEDURE — 1160F RVW MEDS BY RX/DR IN RCRD: CPT | Performed by: INTERNAL MEDICINE

## 2021-09-01 RX ORDER — TADALAFIL 10 MG/1
10 TABLET ORAL DAILY PRN
Qty: 10 TABLET | Refills: 1 | Status: SHIPPED | OUTPATIENT
Start: 2021-09-01

## 2021-09-01 NOTE — PATIENT INSTRUCTIONS
Can try taking magnesium oxide 400 mg daily ( you are on omeprazole which can decrease absorption of magnesium)

## 2021-09-03 ENCOUNTER — REMOTE DEVICE CLINIC VISIT (OUTPATIENT)
Dept: CARDIOLOGY CLINIC | Facility: CLINIC | Age: 81
End: 2021-09-03
Payer: COMMERCIAL

## 2021-09-03 DIAGNOSIS — Z95.810 AICD (AUTOMATIC CARDIOVERTER/DEFIBRILLATOR) PRESENT: Primary | ICD-10-CM

## 2021-09-03 PROCEDURE — 93296 REM INTERROG EVL PM/IDS: CPT | Performed by: INTERNAL MEDICINE

## 2021-09-03 PROCEDURE — 93295 DEV INTERROG REMOTE 1/2/MLT: CPT | Performed by: INTERNAL MEDICINE

## 2021-09-03 NOTE — PROGRESS NOTES
Results for orders placed or performed in visit on 09/03/21   Cardiac EP device report    Narrative    MDT-DUAL CHAMBER ICD (AAIR-DDDR MODE)/ ACTIVE SYSTEM IS MRI CONDITIONAL  CARELINK TRANSMISSION: BATTERY VOLTAGE ADEQUATE (6 9 YRS)  AP-70%, -16%  ALL AVAILABLE LEAD PARAMETERS WITHIN NORMAL LIMITS  NO SIGNIFICANT HIGH RATE EPISODES  OPTI-VOL WITHIN NORMAL LIMITS  NORMAL DEVICE FUNCTION   GV

## 2021-10-11 ENCOUNTER — IMMUNIZATIONS (OUTPATIENT)
Dept: FAMILY MEDICINE CLINIC | Facility: CLINIC | Age: 81
End: 2021-10-11
Payer: COMMERCIAL

## 2021-10-11 DIAGNOSIS — Z23 ENCOUNTER FOR IMMUNIZATION: Primary | ICD-10-CM

## 2021-10-11 PROCEDURE — 90662 IIV NO PRSV INCREASED AG IM: CPT

## 2021-10-11 PROCEDURE — G0008 ADMIN INFLUENZA VIRUS VAC: HCPCS

## 2021-11-26 DIAGNOSIS — I50.22 CHRONIC SYSTOLIC CHF (CONGESTIVE HEART FAILURE) (HCC): ICD-10-CM

## 2021-11-26 RX ORDER — SACUBITRIL AND VALSARTAN 49; 51 MG/1; MG/1
1 TABLET, FILM COATED ORAL 2 TIMES DAILY
Qty: 180 TABLET | Refills: 3 | Status: SHIPPED | OUTPATIENT
Start: 2021-11-26

## 2021-11-26 RX ORDER — SACUBITRIL AND VALSARTAN 49; 51 MG/1; MG/1
1 TABLET, FILM COATED ORAL 2 TIMES DAILY
Qty: 180 TABLET | Refills: 3 | Status: SHIPPED | OUTPATIENT
Start: 2021-11-26 | End: 2021-11-26

## 2021-12-03 ENCOUNTER — REMOTE DEVICE CLINIC VISIT (OUTPATIENT)
Dept: CARDIOLOGY CLINIC | Facility: CLINIC | Age: 81
End: 2021-12-03
Payer: COMMERCIAL

## 2021-12-03 DIAGNOSIS — Z95.810 AICD (AUTOMATIC CARDIOVERTER/DEFIBRILLATOR) PRESENT: Primary | ICD-10-CM

## 2021-12-03 PROCEDURE — 93295 DEV INTERROG REMOTE 1/2/MLT: CPT | Performed by: INTERNAL MEDICINE

## 2021-12-03 PROCEDURE — 93296 REM INTERROG EVL PM/IDS: CPT | Performed by: INTERNAL MEDICINE

## 2022-01-25 ENCOUNTER — APPOINTMENT (OUTPATIENT)
Dept: LAB | Facility: CLINIC | Age: 82
End: 2022-01-25
Payer: COMMERCIAL

## 2022-01-25 DIAGNOSIS — E78.2 MIXED HYPERLIPIDEMIA: ICD-10-CM

## 2022-01-25 DIAGNOSIS — R73.01 IMPAIRED FASTING GLUCOSE: ICD-10-CM

## 2022-01-25 DIAGNOSIS — I10 ESSENTIAL HYPERTENSION: ICD-10-CM

## 2022-01-25 DIAGNOSIS — J45.30 MILD PERSISTENT ASTHMA WITHOUT COMPLICATION: ICD-10-CM

## 2022-01-25 DIAGNOSIS — I42.0 DILATED CARDIOMYOPATHY (HCC): ICD-10-CM

## 2022-01-25 LAB
ALBUMIN SERPL BCP-MCNC: 3.9 G/DL (ref 3.5–5)
ALP SERPL-CCNC: 66 U/L (ref 46–116)
ALT SERPL W P-5'-P-CCNC: 28 U/L (ref 12–78)
ANION GAP SERPL CALCULATED.3IONS-SCNC: 4 MMOL/L (ref 4–13)
AST SERPL W P-5'-P-CCNC: 19 U/L (ref 5–45)
BILIRUB SERPL-MCNC: 1.87 MG/DL (ref 0.2–1)
BUN SERPL-MCNC: 26 MG/DL (ref 5–25)
CALCIUM SERPL-MCNC: 9 MG/DL (ref 8.3–10.1)
CHLORIDE SERPL-SCNC: 110 MMOL/L (ref 100–108)
CHOLEST SERPL-MCNC: 147 MG/DL
CO2 SERPL-SCNC: 29 MMOL/L (ref 21–32)
CREAT SERPL-MCNC: 1.58 MG/DL (ref 0.6–1.3)
EST. AVERAGE GLUCOSE BLD GHB EST-MCNC: 108 MG/DL
GFR SERPL CREATININE-BSD FRML MDRD: 40 ML/MIN/1.73SQ M
GLUCOSE P FAST SERPL-MCNC: 92 MG/DL (ref 65–99)
HBA1C MFR BLD: 5.4 %
HDLC SERPL-MCNC: 56 MG/DL
LDLC SERPL CALC-MCNC: 66 MG/DL (ref 0–100)
NONHDLC SERPL-MCNC: 91 MG/DL
POTASSIUM SERPL-SCNC: 4.3 MMOL/L (ref 3.5–5.3)
PROT SERPL-MCNC: 7.2 G/DL (ref 6.4–8.2)
SODIUM SERPL-SCNC: 143 MMOL/L (ref 136–145)
TRIGL SERPL-MCNC: 123 MG/DL

## 2022-01-25 PROCEDURE — 80053 COMPREHEN METABOLIC PANEL: CPT

## 2022-01-25 PROCEDURE — 83036 HEMOGLOBIN GLYCOSYLATED A1C: CPT

## 2022-01-25 PROCEDURE — 80061 LIPID PANEL: CPT

## 2022-01-25 PROCEDURE — 36415 COLL VENOUS BLD VENIPUNCTURE: CPT

## 2022-02-02 ENCOUNTER — OFFICE VISIT (OUTPATIENT)
Dept: FAMILY MEDICINE CLINIC | Facility: CLINIC | Age: 82
End: 2022-02-02
Payer: COMMERCIAL

## 2022-02-02 VITALS
BODY MASS INDEX: 28.83 KG/M2 | HEIGHT: 70 IN | TEMPERATURE: 97.9 F | DIASTOLIC BLOOD PRESSURE: 85 MMHG | SYSTOLIC BLOOD PRESSURE: 130 MMHG | RESPIRATION RATE: 16 BRPM | WEIGHT: 201.4 LBS | OXYGEN SATURATION: 94 % | HEART RATE: 88 BPM

## 2022-02-02 DIAGNOSIS — M17.0 PRIMARY OSTEOARTHRITIS OF BOTH KNEES: ICD-10-CM

## 2022-02-02 DIAGNOSIS — I10 ESSENTIAL HYPERTENSION: Primary | ICD-10-CM

## 2022-02-02 DIAGNOSIS — R73.01 IMPAIRED FASTING GLUCOSE: ICD-10-CM

## 2022-02-02 DIAGNOSIS — J45.30 MILD PERSISTENT ASTHMA WITHOUT COMPLICATION: ICD-10-CM

## 2022-02-02 DIAGNOSIS — N40.1 BENIGN LOCALIZED HYPERPLASIA OF PROSTATE WITH URINARY OBSTRUCTION: ICD-10-CM

## 2022-02-02 DIAGNOSIS — I42.0 DILATED CARDIOMYOPATHY (HCC): ICD-10-CM

## 2022-02-02 DIAGNOSIS — N13.8 BENIGN LOCALIZED HYPERPLASIA OF PROSTATE WITH URINARY OBSTRUCTION: ICD-10-CM

## 2022-02-02 DIAGNOSIS — E78.2 MIXED HYPERLIPIDEMIA: ICD-10-CM

## 2022-02-02 DIAGNOSIS — N18.30 HYPERTENSIVE HEART AND KIDNEY DISEASE WITH HF AND WITH CKD STAGE III (HCC): ICD-10-CM

## 2022-02-02 DIAGNOSIS — I13.0 HYPERTENSIVE HEART AND KIDNEY DISEASE WITH HF AND WITH CKD STAGE III (HCC): ICD-10-CM

## 2022-02-02 DIAGNOSIS — K22.70 BARRETT'S ESOPHAGUS WITHOUT DYSPLASIA: ICD-10-CM

## 2022-02-02 PROBLEM — I50.23 ACUTE ON CHRONIC SYSTOLIC CONGESTIVE HEART FAILURE (HCC): Status: RESOLVED | Noted: 2019-01-29 | Resolved: 2022-02-02

## 2022-02-02 PROCEDURE — 3288F FALL RISK ASSESSMENT DOCD: CPT | Performed by: FAMILY MEDICINE

## 2022-02-02 PROCEDURE — 1101F PT FALLS ASSESS-DOCD LE1/YR: CPT | Performed by: FAMILY MEDICINE

## 2022-02-02 PROCEDURE — 99214 OFFICE O/P EST MOD 30 MIN: CPT | Performed by: FAMILY MEDICINE

## 2022-02-02 NOTE — PROGRESS NOTES
Chief Complaint   Patient presents with    Follow-up     6 months and review labs  Health Maintenance   Topic Date Due    SLP PLAN OF CARE  Never done    DTaP,Tdap,and Td Vaccines (1 - Tdap) Never done    BMI: Followup Plan  02/03/2022    Fall Risk  08/04/2022    Medicare Annual Wellness Visit (AWV)  08/04/2022    Depression Screening  02/02/2023    BMI: Adult  02/02/2023    DXA SCAN  03/02/2025    Pneumococcal Vaccine: 65+ Years  Completed    Influenza Vaccine  Completed    COVID-19 Vaccine  Completed    HIB Vaccine  Aged Out    Hepatitis B Vaccine  Aged Out    IPV Vaccine  Aged Out    Hepatitis A Vaccine  Aged Out    Meningococcal ACWY Vaccine  Aged Out    HPV Vaccine  Aged Out           Assessment/Plan:    Essential hypertension  Controlled  DASH diet  Continue bisoprolol, entresto  [facility] cardiology  Cardiomyopathy ([facility] )  Continue entresto, lasix  FU cardiology  Mixed hyperlipidemia  1/2022 Lipid good  Low fat diet  Continue atorvastatin 10mg qhs  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 ([facility] )  Wt Readings from Last 3 Encounters:   02/02/22 91 4 kg (201 lb 6 4 oz)   09/01/21 89 7 kg (197 lb 11 2 oz)   08/04/21 89 2 kg (196 lb 9 6 oz)     Stable  Impaired fasting glucose  1/2022 hgA1C 5 4 normal  Low carb diet  Mild persistent asthma without complication  Controlled  Continue wixela  Burt esophagus  FU GI  Continue omeprazole  Primary osteoarthritis of both knees  Sign disability parking form  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  FU urology  Reviewed lab in 1/2022  hgA1C 5 4 normal  CMP ok Cr 1 58, GFR 40 stable  Lipid 147/123/56/66 good    Got flu shot yearly    Got covid19 vaccines  Denies SE  Got PCV13 5/2015  Got pneumovax 2016  Got zoster in 2011  Got shingrix in 2020    FU Dr Ruvalcaba Officer for colonoscopy in 6/2019  Repeat in 3 years     3/2020 Dexa showed osteopenia     RTO in 6 months   Diagnoses and all orders for this visit:    Essential hypertension  -     CBC; Future  -     Comprehensive metabolic panel; Future  -     Hemoglobin A1C; Future  -     Lipid panel; Future  -     TSH, 3rd generation with Free T4 reflex; Future    Dilated cardiomyopathy (HCC)  -     CBC; Future  -     Comprehensive metabolic panel; Future  -     Hemoglobin A1C; Future  -     Lipid panel; Future  -     TSH, 3rd generation with Free T4 reflex; Future    Mixed hyperlipidemia  -     CBC; Future  -     Comprehensive metabolic panel; Future  -     Hemoglobin A1C; Future  -     Lipid panel; Future  -     TSH, 3rd generation with Free T4 reflex; Future    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 (HCC)  -     CBC; Future  -     Comprehensive metabolic panel; Future  -     Hemoglobin A1C; Future  -     Lipid panel; Future  -     TSH, 3rd generation with Free T4 reflex; Future    Impaired fasting glucose  -     CBC; Future  -     Comprehensive metabolic panel; Future  -     Hemoglobin A1C; Future  -     Lipid panel; Future  -     TSH, 3rd generation with Free T4 reflex; Future    Primary osteoarthritis of both knees    Mild persistent asthma without complication    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          Subjective:      Patient ID: Roxana Montes is a [de-identified] y o  male  HPI    Pt is here by himself       HTN---He is on bisoprolol 10mg bid and entresto 49-51mg bid    FU cardiology for cardiomyopathy, tachycardia, s/p pacemaker  Stable    He is on lasix 20mg QD and entresto  Wt at home stable per pt       CKD3---stable       Hyperlipidemia---on atorvastatin 10mg now  Denies SE       IFG---Follows low carb diet        Asthma----Saw pulmonary, got Wixela 500-50 QD which helped  Does not need albuterol recently  Never smoked      Knee arthritis---cannot walk far because of knee pain  Would like to have disability parking     [facility] Cancer care Q 6m-1y for melanoma s/p wide excision on right upper arm  Parvin Davila for Burt's, do EGD every 3 years  Pt is on omeprazole 20mg daily now       FU urology for BPH yearly       Never smoke  No alcohol       Lives with wife  Does all ADL's  Still drive  Denies recent falls    Denies depression        The following portions of the patient's history were reviewed and updated as appropriate: allergies, current medications, past family history, past medical history, past social history, past surgical history and problem list     Review of Systems   Constitutional: Negative for appetite change, chills and fever  HENT: Negative for congestion, ear pain, sinus pain and sore throat  Eyes: Negative for discharge and itching  Respiratory: Negative for apnea, cough, chest tightness, shortness of breath and wheezing  Cardiovascular: Negative for chest pain, palpitations and leg swelling  Gastrointestinal: Negative for abdominal pain, anal bleeding, constipation, diarrhea, nausea and vomiting  Endocrine: Negative for cold intolerance, heat intolerance and polyuria  Genitourinary: Negative for difficulty urinating and dysuria  Musculoskeletal: Positive for arthralgias  Negative for back pain and myalgias  Skin: Negative for rash  Neurological: Negative for dizziness and headaches  Psychiatric/Behavioral: Negative for agitation  Objective:      /85 (BP Location: Left arm, Patient Position: Sitting, Cuff Size: Adult)   Pulse 88   Temp 97 9 °F (36 6 °C) (Tympanic)   Resp 16   Ht 5' 10" (1 778 m)   Wt 91 4 kg (201 lb 6 4 oz)   SpO2 94%   BMI 28 90 kg/m²          Physical Exam  Constitutional:       Appearance: He is well-developed  HENT:      Head: Normocephalic and atraumatic  Eyes:      General:         Right eye: No discharge  Left eye: No discharge        Conjunctiva/sclera: Conjunctivae normal    Cardiovascular:      Rate and Rhythm: Normal rate and regular rhythm  Heart sounds: Normal heart sounds  No murmur heard  No friction rub  No gallop  Pulmonary:      Effort: Pulmonary effort is normal  No respiratory distress  Breath sounds: Normal breath sounds  No wheezing or rales  Abdominal:      General: Bowel sounds are normal  There is no distension  Palpations: Abdomen is soft  Tenderness: There is no abdominal tenderness  There is no guarding  Musculoskeletal:         General: Tenderness present  Normal range of motion  Cervical back: Normal range of motion and neck supple  No tenderness  Lymphadenopathy:      Cervical: No cervical adenopathy  Neurological:      Mental Status: He is alert

## 2022-02-02 NOTE — ASSESSMENT & PLAN NOTE
Wt Readings from Last 3 Encounters:   02/02/22 91 4 kg (201 lb 6 4 oz)   09/01/21 89 7 kg (197 lb 11 2 oz)   08/04/21 89 2 kg (196 lb 9 6 oz)     Stable

## 2022-02-04 ENCOUNTER — OFFICE VISIT (OUTPATIENT)
Dept: SURGICAL ONCOLOGY | Facility: CLINIC | Age: 82
End: 2022-02-04
Payer: COMMERCIAL

## 2022-02-04 VITALS
RESPIRATION RATE: 16 BRPM | HEIGHT: 70 IN | HEART RATE: 78 BPM | OXYGEN SATURATION: 97 % | BODY MASS INDEX: 28.63 KG/M2 | DIASTOLIC BLOOD PRESSURE: 78 MMHG | TEMPERATURE: 97.5 F | WEIGHT: 200 LBS | SYSTOLIC BLOOD PRESSURE: 124 MMHG

## 2022-02-04 DIAGNOSIS — Z08 ENCOUNTER FOR FOLLOW-UP SURVEILLANCE OF MELANOMA: Primary | ICD-10-CM

## 2022-02-04 DIAGNOSIS — Z85.820 PERSONAL HISTORY OF MALIGNANT MELANOMA: ICD-10-CM

## 2022-02-04 DIAGNOSIS — Z85.820 ENCOUNTER FOR FOLLOW-UP SURVEILLANCE OF MELANOMA: Primary | ICD-10-CM

## 2022-02-04 PROCEDURE — 99213 OFFICE O/P EST LOW 20 MIN: CPT | Performed by: SURGERY

## 2022-02-04 NOTE — PROGRESS NOTES
Surgical Oncology Follow Up       [address] [facility] JODEE [address]  [address] [facility] [address] [phone]    Merlyn Poor  1940  [id number]  [address] [facility] CANCER [facility] [address] [address] [phone]    Diagnoses and all orders for this visit:    Encounter for follow-up surveillance of melanoma    Personal history of malignant melanoma        Chief Complaint   Patient presents with    Melanoma       Return in about 1 year (around 2/4/2023) for Office Visit, with Hilda  Oncology History   Personal history of malignant melanoma   10/31/2017 Initial Diagnosis    Malignant melanoma of right upper extremity ([address] )     12/5/2017 Surgery    Wide excision of right upper arm  [id number]         Diagnosis and Staging: [id number] melanoma right upper extremity December 2017   Treatment History: Wide excision melanoma December 2017   Current Therapy: Observation   Disease Status: TONY     History of Present Illness:  Patient returns in follow-up of his melanoma  He is doing well this time with no complaints  He denies any abdominal pain, nausea,, back pain bone pain, headaches, or cough  He continues to follow-up with his dermatologist every 6 months  Review of Systems  Complete ROS Surg Onc:   Complete ROS Surg Onc:   Constitutional: The patient denies new or recent history of general fatigue, no recent weight loss, no change in appetite  Eyes: No complaints of visual problems, no scleral icterus  ENT: no complaints of ear pain, no hoarseness, no difficulty swallowing,  no tinnitus and no new masses in head, oral cavity, or neck  Cardiovascular: No complaints of chest pain, no palpitations, no ankle edema  Respiratory: No complaints of shortness of breath, no cough  Gastrointestinal: No complaints of jaundice, no bloody stools, no pale stools  Genitourinary: No complaints of dysuria, no hematuria, no nocturia, no frequent urination, no urethral discharge  Musculoskeletal: No complaints of weakness, paralysis, joint stiffness or arthralgias  Integumentary: No complaints of rash, no new lesions  Neurological: No complaints of convulsions, no seizures, no dizziness  Hematologic/Lymphatic: No complaints of easy bruising  Endocrine:  No hot or cold intolerance  No polydipsia, polyphagia, or polyuria  Allergy/immunology:  No environmental allergies  No food allergies  Not immunocompromised  Skin:  No pallor or rash  No wound          Patient Active Problem List   Diagnosis    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    Microscopic hematuria    Ank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 ([place] [address] )    Primary osteoarthritis of both knees    Burt esophagus    Cardiac arrhythmia    Cardiomyopathy ([place] [address] )    Esophageal reflux    Mixed hyperlipidemia    Essential hypertension    Impaired fasting glucose    Personal history of malignant melanoma    Polymyalgia rheumatica (HCC)    Right bundle branch block with left anterior fascicular block    Skin lesion    Osteopenia    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    Hypertensive kidney disease with stage 3b chronic kidney disease ([place] [address] )    Encounter for follow-up surveillance of melanoma    Mild persistent asthma without complication    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 ([place] [address] )     Past Medical History:   Diagnosis Date    Acute on chronic systolic congestive heart failure ([place] [address] ) 1/29/2019    AICD (automatic cardioverter/defibrillator) present     Arthritis     Asthma     Burt esophagus     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     [facility] 4/5/2013    Cardiac arrhythmia     Cardiomyopathy [facility]     CKD (chronic kidney disease)     [facility] cancer [facility]     [facility]est[facility]) [facility]     COPD (chronic obstructive pulmonary disease) ([facility] )     Diverticulitis     Last assessed: 4/5/13    Esophageal reflux     Gout     Hernia     Hyperlipidemia     Hypertension     Hypertension     Hypothyroidism     Hypothyroidism 5/30/2013    Non-toxic multinodular goiter 11/28/2012    Pleural effusion     Polymyalgia rheumatica (HCC)     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     Spondyloarthropathy     Last assessed: 11/28/12    Thrombocytopenia ([facility] ) 6/23/2015     Past Surgical History:   Procedure Laterality Date    ARM SKIN LESION BIOPSY / EXCISION      Malignant    ATRIAL ABLATION SURGERY      AV NODE ABLATION      CARDIAC DEFIBRILLATOR PLACEMENT  2009    Cardio-Defib Pulse Gen Venous Insertion of Electrode for Ventricular Pacing  Implantable    CARDIAC SURGERY  2009    Catheterization    COLONOSCOPY N/A 3/14/2016    Procedure: COLONOSCOPY;  Surgeon: Justin Vanegas MD;  [facility]; Service  February 22, 2013, mildly enlarged prostate, history of colon CA with normal appearance of anastomosis at the midtransverse colon, severe diverticulosis in the sigmoid, descending and transverse colon  Repeat colonoscopy in 3 yrs    HEMICOLECTOMY Right 05/05/2004    INGUINAL HERNIA REPAIR Bilateral     Left 3/2004, right 5/2008    IR UPPER EXTREMITY VENOGRAM- DIAGNOSTIC  10/4/2018    KNEE SURGERY  1972    Meniscectomy    CT ESOPHAGOGASTRODUODENOSCOPY TRANSORAL DIAGNOSTIC N/A 12/5/2016    Procedure: ESOPHAGOGASTRODUODENOSCOPY (EGD); Surgeon: Mima Bellamy MD;  [facility];   Service: Gastroenterology    TONSILLECTOMY AND ADENOIDECTOMY      UPPER GASTROINTESTINAL ENDOSCOPY       Family History   Problem Relation Age of Onset    Heart failure Mother         Congestive    Hypertension Mother     Osteoporosis Mother     COPD Father     Emphysema Father     Hypertension Father     Heart disease Sister         Heart Valve Replacement    Osteoporosis Sister     Breast cancer Family      Social History     Socioeconomic History    Marital status: /Civil Union     Spouse name: Not on file    Number of children: Not on file    Years of education: Not on file    Highest education level: Not on file   Occupational History    Occupation: Manager-Retired   Tobacco Use    Smoking status: Never Smoker    Smokeless tobacco: Never Used   Vaping Use    Vaping Use: Never used   Substance and Sexual Activity    Alcohol use: Never     Comment: Rare    Drug use: No    Sexual activity: Not Currently   Other Topics Concern    Not on file   Social History Narrative    Not on file     Social Determinants of Health     Financial Resource Strain: Not on file   Food Insecurity: Not on file   Transportation Needs: Not on file   Physical Activity: Not on file   Stress: Not on file   Social Connections: Not on file   Intimate Partner Violence: Not on file   Housing Stability: Not on file       Current Outpatient Medications:     albuterol (PROVENTIL HFA,VENTOLIN HFA) 90 mcg/act inhaler, Inhale 2 puffs every 6 (six) hours as needed for wheezing or shortness of breath, Disp: 1 Inhaler, Rfl: 1    aspirin 81 MG tablet, Take 81 mg by mouth daily  , Disp: , Rfl:     atorvastatin (LIPITOR) 10 mg tablet, TAKE ONE TABLET BY MOUTH EVERY DAY, Disp: 90 tablet, Rfl: 3    bisoprolol (ZEBETA) 10 MG tablet, TAKE ONE TABLET BY MOUTH TWICE A DAY, Disp: 180 tablet, Rfl: 3    Calcium Carb-Cholecalciferol (CALCIUM 600 + D PO), Take 2 tablets by mouth daily , Disp: , Rfl:     Coenzyme Q10 (CO Q-10) 200 MG CAPS, Take 1 tablet by mouth daily, Disp: , Rfl:     finasteride (PROSCAR) 5 mg tablet, TAKE ONE TABLET BY MOUTH EVERY DAY, Disp: 90 tablet, Rfl: 1    furosemide (LASIX) 20 mg tablet, TAKE ONE TABLET BY MOUTH EVERY DAY, Disp: 90 tablet, Rfl: 3    Multiple Vitamins-Minerals (CENTRUM SILVER PO), Take 1 tablet by mouth daily  , Disp: , Rfl:    omeprazole (PriLOSEC) 20 mg delayed release capsule, Take 1 capsule (20 mg total) by mouth daily, Disp: 90 capsule, Rfl: 2    PREVIDENT 5000 ENAMEL PROTECT 1 1-5 % PSTE, Use as directed, Disp: , Rfl: 3    sacubitril-valsartan (Entresto) 49-51 MG TABS, Take 1 tablet by mouth 2 (two) times a day, Disp: 180 tablet, Rfl: 3    tadalafil (CIALIS) 10 MG tablet, Take 1 tablet (10 mg total) by mouth daily as needed for erectile dysfunction, Disp: 10 tablet, Rfl: 1    Vitamin D, Cholecalciferol, 1000 UNITS CAPS, Take 1 tablet by mouth daily  , Disp: , Rfl:     Wixela Inhub 500-50 MCG/DOSE inhaler, Inhale 1 puff 2 (two) times a day, Disp: 1 Inhaler, Rfl: 0  No Known Allergies  Vitals:    02/04/22 0745   BP: 124/78   Pulse: 78   Resp: 16   Temp: 97 5 °F (36 4 °C)   SpO2: 97%       Physical Exam  Constitutional: General appearance: The Patient is well-developed and well-nourished who appears the stated age in no acute distress  Patient is pleasant and talkative  HEENT:  Normocephalic  Sclerae are anicteric  Mucous membranes are moist  Neck is supple without adenopathy  No JVD  Chest: The lungs are clear to auscultation  Cardiac: Heart is regular rate  Abdomen: Abdomen is soft, non-tender, non-distended and without masses  Extremities: There is no clubbing or cyanosis  There is no edema  Symmetric  Neuro: Grossly nonfocal  Gait is normal      Lymphatic: No evidence of cervical adenopathy bilaterally  No evidence of axillary adenopathy bilaterally  Skin: Warm, anicteric  Wide excision site has healed well  No evidence of local recurrence or in-transit disease  Psych:  Patient is pleasant and talkative  Breasts:        Pathology:  [unfilled]    Labs:      Imaging  No results found  I reviewed the above laboratory and imaging data      Discussion/Summary: [de-identified]year-old male status post wide excision of a [id number] melanoma   He is clinically TONY at 5 years  Lupe Funezff is no evidence of recurrence by physical exam, or symptomatology  Tamra Roula will continue to follow up with his dermatologist, Dr Pradeep Tello  I will see him again in 1 year for another clinical exam  If he has any new, persistent, or unexplained symptoms he will contact us and directed imaging may be performed  He is agreeable to this   All his questions were answered

## 2022-02-04 NOTE — LETTER
February 4, 2022     Paulo Piero, [address]    Patient: Fady Syed   YOB: 1940   Date of Visit: 2/4/2022       Dear Dr Rubin [place]: Thank you for referring Fady Syed to me for evaluation  Below are my notes for this consultation  If you have questions, please do not hesitate to call me  I look forward to following your patient along with you  Sincerely,        Elen Cisse MD        CC: MD Elen Cruz MD  2/4/2022  8:06 AM  Incomplete               Surgical Oncology Follow Up       [address] [facility] CANCER [facility] [address] [address]  [phone]    Fady Syed  1940  [id number]  [address] [facility] CANCER [facility] [address] [id number] [phone]    Diagnoses and all orders for this visit:    Encounter for follow-up surveillance of melanoma    Personal history of malignant melanoma        Chief Complaint   Patient presents with    Melanoma       Return in about 1 year (around 2/4/2023) for Office Visit, with [facility]  Oncology History   Personal history of malignant melanoma   10/31/2017 Initial Diagnosis    Malignant melanoma of right upper extremity ([address] )     12/5/2017 Surgery    Wide excision of right upper arm  [id number]         Diagnosis and Staging: [id number] melanoma right upper extremity December 2017   Treatment History: Wide excision melanoma December 2017   Current Therapy: Observation   Disease Status: TONY     History of Present Illness:  Patient returns in follow-up of his melanoma  He is doing well this time with no complaints  He denies any abdominal pain, nausea,, back pain bone pain, headaches, or cough  He continues to follow-up with his dermatologist every 6 months      Review of Systems  Complete ROS Surg Onc:   Complete ROS Surg Onc: Constitutional: The patient denies new or recent history of general fatigue, no recent weight loss, no change in appetite  Eyes: No complaints of visual problems, no scleral icterus  ENT: no complaints of ear pain, no hoarseness, no difficulty swallowing,  no tinnitus and no new masses in head, oral cavity, or neck  Cardiovascular: No complaints of chest pain, no palpitations, no ankle edema  Respiratory: No complaints of shortness of breath, no cough  Gastrointestinal: No complaints of jaundice, no bloody stools, no pale stools  Genitourinary: No complaints of dysuria, no hematuria, no nocturia, no frequent urination, no urethral discharge  Musculoskeletal: No complaints of weakness, paralysis, joint stiffness or arthralgias  Integumentary: No complaints of rash, no new lesions  Neurological: No complaints of convulsions, no seizures, no dizziness  Hematologic/Lymphatic: No complaints of easy bruising  Endocrine:  No hot or cold intolerance  No polydipsia, polyphagia, or polyuria  Allergy/immunology:  No environmental allergies  No food allergies  Not immunocompromised  Skin:  No pallor or rash  No wound          Patient Active Problem List   Diagnosis    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    Microscopic hematuria    [facility]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 ([facility] )    Primary osteoarthritis of both knees    Burt esophagus    Cardiac arrhythmia    Cardiomyopathy ([facility] )    Esophageal reflux    Mixed hyperlipidemia    Essential hypertension    Impaired fasting glucose    Personal history of malignant melanoma    [facility]    Right bundle branch block with left anterior fascicular block    Skin lesion    Osteopenia    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)     Past Medical History:   Diagnosis Date    Acute on chronic systolic congestive heart failure ([facility] ) 1/29/2019    AICD (automatic cardioverter/defibrillator) present     Arthritis     Asthma     Burt esophagus     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     Cancer, colon ([facility] ) 4/5/2013    Cardiac arrhythmia     Cardiomyopathy ([facility] )     CKD (chronic kidney disease)     Colon cancer ([facility] )     [facility]     COPD (chronic obstructive pulmonary disease) ([facility]     Diverticulitis     Last assessed: 4/5/13    Esophageal reflux     Gout     Hernia     Hyperlipidemia     Hypertension     Hypertension     Hypothyroidism     Hypothyroidism 5/30/2013    Non-toxic multinodular goiter 11/28/2012    Pleural effusion     Polymyalgia rheumatica (HCC)     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     Spondyloarthropathy     Last assessed: 11/28/12    Thrombocytopenia ([facility] ) 6/23/2015     Past Surgical History:   Procedure Laterality Date    ARM SKIN LESION BIOPSY / EXCISION      Malignant    ATRIAL ABLATION SURGERY      AV NODE ABLATION      CARDIAC DEFIBRILLATOR PLACEMENT  2009    Cardio-Defib Pulse Gen Venous Insertion of Electrode for Ventricular Pacing  Implantable    CARDIAC SURGERY  2009    Catheterization    COLONOSCOPY N/A 3/14/2016    Procedure: COLONOSCOPY;  Surgeon: Melva Ridley MD;  [facility]; Service  February 22, 2013, mildly enlarged prostate, history of colon CA with normal appearance of anastomosis at the midtransverse colon, severe diverticulosis in the sigmoid, descending and transverse colon   Repeat colonoscopy in 3 yrs    HEMICOLECTOMY Right 05/05/2004    INGUINAL HERNIA REPAIR Bilateral     Left 3/2004, right 5/2008    IR UPPER EXTREMITY VENOGRAM- DIAGNOSTIC  10/4/2018    KNEE SURGERY 1972    Meniscectomy    OR ESOPHAGOGASTRODUODENOSCOPY TRANSORAL DIAGNOSTIC N/A 12/5/2016    Procedure: ESOPHAGOGASTRODUODENOSCOPY (EGD); Surgeon: Monalisa Lynn MD;  [facility]; Service: Gastroenterology    TONSILLECTOMY AND ADENOIDECTOMY      UPPER GASTROINTESTINAL ENDOSCOPY       Family History   Problem Relation Age of Onset    Heart failure Mother         Congestive    Hypertension Mother     Osteoporosis Mother     COPD Father     Emphysema Father     Hypertension Father     Heart disease Sister         Heart Valve Replacement    Osteoporosis Sister     Breast cancer Family      Social History     Socioeconomic History    Marital status: /Civil Union     Spouse name: Not on file    Number of children: Not on file    Years of education: Not on file    Highest education level: Not on file   Occupational History    Occupation: Manager-Retired   Tobacco Use    Smoking status: Never Smoker    Smokeless tobacco: Never Used   Vaping Use    Vaping Use: Never used   Substance and Sexual Activity    Alcohol use: Never     Comment: Rare    Drug use: No    Sexual activity: Not Currently   Other Topics Concern    Not on file   Social History Narrative    Not on file     Social Determinants of Health     Financial Resource Strain: Not on file   Food Insecurity: Not on file   Transportation Needs: Not on file   Physical Activity: Not on file   Stress: Not on file   Social Connections: Not on file   Intimate Partner Violence: Not on file   Housing Stability: Not on file       Current Outpatient Medications:     albuterol (PROVENTIL HFA,VENTOLIN HFA) 90 mcg/act inhaler, Inhale 2 puffs every 6 (six) hours as needed for wheezing or shortness of breath, Disp: 1 Inhaler, Rfl: 1    aspirin 81 MG tablet, Take 81 mg by mouth daily  , Disp: , Rfl:     atorvastatin (LIPITOR) 10 mg tablet, TAKE ONE TABLET BY MOUTH EVERY DAY, Disp: 90 tablet, Rfl: 3    bisoprolol (ZEBETA) 10 MG tablet, TAKE ONE TABLET BY MOUTH TWICE A DAY, Disp: 180 tablet, Rfl: 3    Calcium Carb-Cholecalciferol (CALCIUM 600 + D PO), Take 2 tablets by mouth daily , Disp: , Rfl:     Coenzyme Q10 (CO Q-10) 200 MG CAPS, Take 1 tablet by mouth daily, Disp: , Rfl:     finasteride (PROSCAR) 5 mg tablet, TAKE ONE TABLET BY MOUTH EVERY DAY, Disp: 90 tablet, Rfl: 1    furosemide (LASIX) 20 mg tablet, TAKE ONE TABLET BY MOUTH EVERY DAY, Disp: 90 tablet, Rfl: 3    Multiple Vitamins-Minerals (CENTRUM SILVER PO), Take 1 tablet by mouth daily  , Disp: , Rfl:     omeprazole (PriLOSEC) 20 mg delayed release capsule, Take 1 capsule (20 mg total) by mouth daily, Disp: 90 capsule, Rfl: 2    PREVIDENT 5000 ENAMEL PROTECT 1 1-5 % PSTE, Use as directed, Disp: , Rfl: 3    sacubitril-valsartan (Entresto) 49-51 MG TABS, Take 1 tablet by mouth 2 (two) times a day, Disp: 180 tablet, Rfl: 3    tadalafil (CIALIS) 10 MG tablet, Take 1 tablet (10 mg total) by mouth daily as needed for erectile dysfunction, Disp: 10 tablet, Rfl: 1    Vitamin D, Cholecalciferol, 1000 UNITS CAPS, Take 1 tablet by mouth daily  , Disp: , Rfl:     Wixela Inhub 500-50 MCG/DOSE inhaler, Inhale 1 puff 2 (two) times a day, Disp: 1 Inhaler, Rfl: 0  No Known Allergies  Vitals:    02/04/22 0745   BP: 124/78   Pulse: 78   Resp: 16   Temp: 97 5 °F (36 4 °C)   SpO2: 97%       Physical Exam  Constitutional: General appearance: The Patient is well-developed and well-nourished who appears the stated age in no acute distress  Patient is pleasant and talkative  HEENT:  Normocephalic  Sclerae are anicteric  Mucous membranes are moist  Neck is supple without adenopathy  No JVD  Chest: The lungs are clear to auscultation  Cardiac: Heart is regular rate  Abdomen: Abdomen is soft, non-tender, non-distended and without masses  Extremities: There is no clubbing or cyanosis  There is no edema  Symmetric    Neuro: Grossly nonfocal  Gait is normal      Lymphatic: No evidence of cervical adenopathy bilaterally  No evidence of axillary adenopathy bilaterally  Skin: Warm, anicteric  Wide excision site has healed well  No evidence of local recurrence or in-transit disease  Psych:  Patient is pleasant and talkative  Breasts:        Pathology:  [unfilled]    Labs:      Imaging  No results found  I reviewed the above laboratory and imaging data  Discussion/Summary: [de-identified]year-old male status post wide excision of a [id number] melanoma   He is clinically TONY at 5 years  Saad Moreau is no evidence of recurrence by physical exam, or symptomatology  Lluvia Sheffield will continue to follow up with his dermatologist, Dr Mirela Love  I will see him again in 1 year for another clinical exam  If he has any new, persistent, or unexplained symptoms he will contact us and directed imaging may be performed  He is agreeable to this   All his questions were answered

## 2022-02-05 DIAGNOSIS — N40.1 BPH WITH OBSTRUCTION/LOWER URINARY TRACT SYMPTOMS: ICD-10-CM

## 2022-02-05 DIAGNOSIS — N13.8 BPH WITH OBSTRUCTION/LOWER URINARY TRACT SYMPTOMS: ICD-10-CM

## 2022-02-07 RX ORDER — FINASTERIDE 5 MG/1
TABLET, FILM COATED ORAL
Qty: 90 TABLET | Refills: 1 | Status: SHIPPED | OUTPATIENT
Start: 2022-02-07

## 2022-02-15 NOTE — PROGRESS NOTES
Heart Failure Office Note - Katharine Wing [de-identified] y o  male MRN: [id number]    @ Encounter: [id number]      Assessment/Plan:    Patient Active Problem List    Diagnosis Date Noted    Encounter for follow-up surveillance of melanoma 07/11/2019    Hypertensive kidney disease with stage 3b chronic kidney disease ([place] [address] ) 01/04/2019    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 09/20/2018    Osteopenia 06/29/2018    Personal history of malignant melanoma 11/15/2017    Skin lesion 06/23/2017    Ank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 ([place] [address] ) 12/30/2014    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 12/02/2013    Microscopic hematuria 12/02/2013    Primary osteoarthritis of both knees 11/26/2013    Polymyalgia rheumatica ([place] [address] ) 11/26/2013    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    Impaired fasting glucose 05/30/2013    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    Cardiac arrhythmia 04/05/2013    Essential hypertension 04/05/2013    Esophageal reflux 11/28/2012    Cardiomyopathy ([place] [address] ) 10/26/2012    Mixed hyperlipidemia 09/05/2012    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 ([place] [address] ) 08/06/2021    Mild persistent asthma without complication 99/10/8401       # Chronic HFrEF, Stage C, NYHA 2  Etiology: NICM, presumed viral  No family hx of SCD  No ETOH    Weight: 199 lbs  Wt Readings from Last 3 Encounters:   02/16/22 90 5 kg (199 lb 9 6 oz)   02/04/22 90 7 kg (200 lb)   02/02/22 91 4 kg (201 lb 6 4 oz)     NT pro BNP: 1/29/19: 6192    Studies- personally reviewed by me    Echo 10/1/20:  LVEF: 35%  LVIDd: 6 5 cm  RV: mildly dilated, mildly reduced  MR: moderate  PASP:  RVOT:   Other: mild AI, ascending aorta 4 0 cm  TR: 2-3+    Echocardiogram 1/29/19  LVEF: 40%  LVIDd: 6 cm  RV: normal  MR: mild  PASP: 40 mmHg  RVOT:   Other:    Echo 6/21/18:  LVEF: 50%  LVEDd: 6 35 cm    Nuclear Stress Test 1/31/19: small to medium sized fixed defect infersoseptum  No ischemia  EF: 30%    Lab Results   Component Value Date    NTBNP 10759 (H) 02/26/2021        Neurohormonal Blockade:  --Beta-Blocker: bisoprolol 10 mg daily  --ACEi, ARB or ARNi: Entresto 49/51 mg BID  --Aldosterone Receptor Blocker:  --Diuretic: lasix 20 mg daily    Sudden Cardiac Death Risk Reduction:  MDT dual chamber ICD: MRI conditional  Interrogation 12/3/21:  20%, no episodes, optivol normal  Interrogation 6/4/21:  9 3%, no high rate episodes, optivol normal    Electrical Resynchronization:  --Candidacy for BiV device: IVCD    Advanced Therapies (if appropriate): --Inotrope:  --LVAD/Transplant Candidacy:    # HTN- controlled, room to escalate therapy  # Mild AI and ascending aorta 4 cm on 10/1/20 echo  # SVT  # NSVT on device check- on bisoprolol (K 3 6)  # hyperlipidemia- atorvastatin 10 mg  1/25/22: LDL 66, HDL 56  7/29/21: LDL 64, HDL 54  # hx of malignant melanoma  # CKD, Cr 1 58 on 1/25/22- unchanged from 7/29  # Barretts esophagus- EGD 6/12/20    Today's Plan:  Continue bisoprolol and Entresto for HFrEF  Lipids at goal on atorvastatin 10 mg   NSVT relatively quiet on bisoprolol, recent devic check  No indication for asa  --2g sodium diet  Weights daily    HPI:      [de-identified] yo recently had first evaluation in HF clinic for NICM diagnosed in 2009, s/p ICD, PSVT, HTN and dyslipidemia  Last in hospital in January for dyspnea felt to be more viral but was volume overloaded with NT pro BNP of 6192  Given one dose of IV lasix  LVEF: 40% with LVEDd 6 cm  He followed up with NP and weight was 204 lbs  Never smoked and does not drink  Plays golf 2x a week, cuts grass, does not feel limited in activity  No significant edema in legs  He is limited by knees and back not respiration       Interim:   Interrogation 12/3/21:  20%, no episodes, optivol normal  Weight is stable    Review of Systems   Constitutional: Negative for activity change, appetite change, fatigue and unexpected weight change  HENT: Negative for congestion and nosebleeds  Eyes: Negative  Respiratory: Negative for cough, chest tightness and shortness of breath  Cardiovascular: Negative for chest pain, palpitations and leg swelling  Gastrointestinal: Negative for abdominal distention  Endocrine: Negative  Genitourinary: Negative  Musculoskeletal: Positive for arthralgias  Skin: Negative  Neurological: Negative for dizziness, syncope and weakness  Hematological: Negative  Psychiatric/Behavioral: Negative  Past Medical History:   Diagnosis Date    Acute on chronic systolic congestive heart failure ([place] [address] ) 1/29/2019    AICD (automatic cardioverter/defibrillator) present     Arthritis     Asthma     Burt esophagus     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     Cancer, colon ([place] [address] ) 4/5/2013    Cardiac arrhythmia     Cardiomyopathy ([place] [address] )     CKD (chronic kidney disease)     Colon cancer (HCC)     Congestive heart failure (CHF) ([place] [address] )     COPD (chronic obstructive pulmonary disease) ([place] [address] )     Diverticulitis     Last assessed: 4/5/13    Esophageal reflux     Gout     Hernia     Hyperlipidemia     Hypertension     Hypertension     Hypothyroidism     Hypothyroidism 5/30/2013    Non-toxic multinodular goiter 11/28/2012    Pleural effusion     Polymyalgia rheumatica (HCC)     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     Spondyloarthropathy     Last assessed: 11/28/12    Thrombocytopenia ([place] [address] ) 6/23/2015         No Known Allergies    Current Outpatient Medications:     albuterol (PROVENTIL HFA,VENTOLIN HFA) 90 mcg/act inhaler, Inhale 2 puffs every 6 (six) hours as needed for wheezing or shortness of breath, Disp: 1 Inhaler, Rfl: 1    aspirin 81 MG tablet, Take 81 mg by mouth daily  , Disp: , Rfl:     atorvastatin (LIPITOR) 10 mg tablet, TAKE ONE TABLET BY MOUTH EVERY DAY, Disp: 90 tablet, Rfl: 3    bisoprolol (ZEBETA) 10 MG tablet, TAKE ONE TABLET BY MOUTH TWICE A DAY, Disp: 180 tablet, Rfl: 3    Calcium Carb-Cholecalciferol (CALCIUM 600 + D PO), Take 3 tablets by mouth daily  , Disp: , Rfl:     Coenzyme Q10 (CO Q-10) 200 MG CAPS, Take 1 tablet by mouth daily, Disp: , Rfl:     finasteride (PROSCAR) 5 mg tablet, TAKE ONE TABLET BY MOUTH EVERY DAY, Disp: 90 tablet, Rfl: 1    furosemide (LASIX) 20 mg tablet, TAKE ONE TABLET BY MOUTH EVERY DAY, Disp: 90 tablet, Rfl: 3    Multiple Vitamins-Minerals (CENTRUM SILVER PO), Take 1 tablet by mouth daily  , Disp: , Rfl:     omeprazole (PriLOSEC) 20 mg delayed release capsule, Take 1 capsule (20 mg total) by mouth daily, Disp: 90 capsule, Rfl: 2    sacubitril-valsartan (Entresto) 49-51 MG TABS, Take 1 tablet by mouth 2 (two) times a day, Disp: 180 tablet, Rfl: 3    tadalafil (CIALIS) 10 MG tablet, Take 1 tablet (10 mg total) by mouth daily as needed for erectile dysfunction, Disp: 10 tablet, Rfl: 1    Vitamin D, Cholecalciferol, 1000 UNITS CAPS, Take 1 tablet by mouth daily  , Disp: , Rfl:     Wixela Inhub 500-50 MCG/DOSE inhaler, Inhale 1 puff 2 (two) times a day, Disp: 1 Inhaler, Rfl: 0    PREVIDENT 5000 ENAMEL PROTECT 1 1-5 % PSTE, Use as directed, Disp: , Rfl: 3    Social History     Socioeconomic History    Marital status: /Civil Union     Spouse name: Not on file    Number of children: Not on file    Years of education: Not on file    Highest education level: Not on file   Occupational History    Occupation: Manager-Retired   Tobacco Use    Smoking status: Never Smoker    Smokeless tobacco: Never Used   Vaping Use    Vaping Use: Never used   Substance and Sexual Activity    Alcohol use: Never     Comment: Rare    Drug use: No    Sexual activity: Not Currently   Other Topics Concern    Not on file   Social History Narrative    Not on file     Social Determinants of Health     Financial Resource Strain: Not on file   Food Insecurity: Not on file   Transportation Needs: Not on file   Physical Activity: Not on file   Stress: Not on file   Social Connections: Not on file   Intimate Partner Violence: Not on file   Housing Stability: Not on file       Family History   Problem Relation Age of Onset    Heart failure Mother         Congestive    Hypertension Mother    Omaira Cramer Osteoporosis Mother     COPD Father     Emphysema Father     Hypertension Father     Heart disease Sister         Heart Valve Replacement    Osteoporosis Sister     Breast cancer Family        Physical Exam:    Vitals: Blood pressure 110/66, pulse 68, height 5' 11" (1 803 m), weight 90 5 kg (199 lb 9 6 oz), SpO2 98 %  , Body mass index is 27 84 kg/m² ,   Wt Readings from Last 3 Encounters:   02/16/22 90 5 kg (199 lb 9 6 oz)   02/04/22 90 7 kg (200 lb)   02/02/22 91 4 kg (201 lb 6 4 oz)       Physical Exam  Constitutional:       Appearance: He is well-developed  HENT:      Head: Normocephalic and atraumatic  Eyes:      Pupils: Pupils are equal, round, and reactive to light  Neck:      Vascular: No JVD  Cardiovascular:      Rate and Rhythm: Normal rate and regular rhythm  Heart sounds: No murmur heard  Pulmonary:      Effort: Pulmonary effort is normal  No respiratory distress  Breath sounds: Normal breath sounds  Abdominal:      General: There is no distension  Palpations: Abdomen is soft  Tenderness: There is no abdominal tenderness  Musculoskeletal:         General: Normal range of motion  Cervical back: Normal range of motion  Skin:     General: Skin is warm and dry  Findings: No rash  Neurological:      Mental Status: He is alert and oriented to person, place, and time           Labs & Results:    Lab Results   Component Value Date    WBC 8 03 07/29/2021    HGB 16 3 07/29/2021    HCT 49 0 07/29/2021    MCV 99 (H) 07/29/2021     07/29/2021     Lab Results   Component Value Date    SODIUM 143 01/25/2022    K 4 3 01/25/2022     (H) 01/25/2022 CO2 29 01/25/2022    BUN 26 (H) 01/25/2022    CREATININE 1 58 (H) 01/25/2022    GLUC 76 02/27/2021    CALCIUM 9 0 01/25/2022     Lab Results   Component Value Date    NTBNP 10,759 (H) 02/26/2021      Lab Results   Component Value Date    CHOL 163 12/19/2015    CHOL 168 04/17/2015    CHOL 155 12/04/2014     Lab Results   Component Value Date    HDL 56 01/25/2022    HDL 54 07/29/2021    HDL 65 01/27/2021     Lab Results   Component Value Date    LDLCALC 66 01/25/2022    LDLCALC 64 07/29/2021    LDLCALC 82 01/27/2021     Lab Results   Component Value Date    TRIG 123 01/25/2022    TRIG 161 (H) 07/29/2021    TRIG 138 01/27/2021     No results found for: CHOLHDL    EKG personally reviewed by Bette Lee DO  Counseling / Coordination of Care  Time spent today 25 minutes  Greater than 50% of total time was spent with the patient and / or family counseling and / or coordination of care  We discussed diagnoses, most recent studies, tests and any changes in treatment plan  Thank you for the opportunity to participate in the care of this patient      [address] [facility] PULMONARY [facility]YPERTENSION  MEDICAL DIRECTOR OF [place]

## 2022-02-16 ENCOUNTER — OFFICE VISIT (OUTPATIENT)
Dept: CARDIOLOGY CLINIC | Facility: CLINIC | Age: 82
End: 2022-02-16
Payer: COMMERCIAL

## 2022-02-16 VITALS
DIASTOLIC BLOOD PRESSURE: 66 MMHG | WEIGHT: 199.6 LBS | HEART RATE: 68 BPM | BODY MASS INDEX: 27.94 KG/M2 | HEIGHT: 71 IN | SYSTOLIC BLOOD PRESSURE: 110 MMHG | OXYGEN SATURATION: 98 %

## 2022-02-16 DIAGNOSIS — N18.30 HYPERTENSIVE HEART AND KIDNEY DISEASE WITH HF AND WITH CKD STAGE III (HCC): ICD-10-CM

## 2022-02-16 DIAGNOSIS — I47.2 NSVT (NONSUSTAINED VENTRICULAR TACHYCARDIA) (HCC): ICD-10-CM

## 2022-02-16 DIAGNOSIS — E78.2 MIXED HYPERLIPIDEMIA: Primary | ICD-10-CM

## 2022-02-16 DIAGNOSIS — I10 ESSENTIAL HYPERTENSION: ICD-10-CM

## 2022-02-16 DIAGNOSIS — I13.0 HYPERTENSIVE HEART AND KIDNEY DISEASE WITH HF AND WITH CKD STAGE III (HCC): ICD-10-CM

## 2022-02-16 PROCEDURE — 3074F SYST BP LT 130 MM HG: CPT | Performed by: INTERNAL MEDICINE

## 2022-02-16 PROCEDURE — 1036F TOBACCO NON-USER: CPT | Performed by: INTERNAL MEDICINE

## 2022-02-16 PROCEDURE — 99214 OFFICE O/P EST MOD 30 MIN: CPT | Performed by: INTERNAL MEDICINE

## 2022-02-16 PROCEDURE — 3078F DIAST BP <80 MM HG: CPT | Performed by: INTERNAL MEDICINE

## 2022-02-16 PROCEDURE — 1160F RVW MEDS BY RX/DR IN RCRD: CPT | Performed by: INTERNAL MEDICINE

## 2022-03-07 ENCOUNTER — IN-CLINIC DEVICE VISIT (OUTPATIENT)
Dept: CARDIOLOGY CLINIC | Facility: CLINIC | Age: 82
End: 2022-03-07
Payer: COMMERCIAL

## 2022-03-07 DIAGNOSIS — Z95.810 PRESENCE OF AUTOMATIC CARDIOVERTER/DEFIBRILLATOR (AICD): Primary | ICD-10-CM

## 2022-03-07 PROCEDURE — 93283 PRGRMG EVAL IMPLANTABLE DFB: CPT | Performed by: INTERNAL MEDICINE

## 2022-03-07 NOTE — PROGRESS NOTES
Results for orders placed or performed in visit on 03/07/22   Cardiac EP device report    Narrative    MDT-DUAL CHAMBER ICD (AAIR-DDDR MODE)/ ACTIVE SYSTEM IS MRI CONDITIONAL  DEVICE INTERROGATED IN THE [place] OFFICE/YEARLY  BATTERY ADEQUTE (5-6 YRS)  AP 72%;  17% (AAIR-DDDR 60)  ALL LEAD PARAMETERS WITHIN NORMAL LIMITS  NO EPISODES  OPTI-VOL WITHIN NORMAL LIMITS  WAVELET TEMPLATE UPDATED  NO PROGRAMMING CHANGES MADE TO DEVICE PARAMETERS  NORMAL DEVICE FUNCTION   PL

## 2022-04-07 NOTE — SEDATION DOCUMENTATION
Ambulated to the bathroom with a steady gait.  Urine sample collected and sent to lab.  Patient continues to report that he feels \"fine\".  Denies pain.  Girlfriend remains at bedside.  No other verbalized needs at this time.    Left upper extremity and central vein venogram completed  20 ga gelco IV placed for procedure and Removed with cath tip intact without complications  Full report as per Dr Juanita Ramirez  Patient discharge home ambulatory

## 2022-05-12 ENCOUNTER — TELEPHONE (OUTPATIENT)
Dept: OBGYN CLINIC | Facility: MEDICAL CENTER | Age: 82
End: 2022-05-12

## 2022-05-13 ENCOUNTER — APPOINTMENT (OUTPATIENT)
Dept: RADIOLOGY | Facility: MEDICAL CENTER | Age: 82
End: 2022-05-13
Payer: COMMERCIAL

## 2022-05-13 ENCOUNTER — OFFICE VISIT (OUTPATIENT)
Dept: OBGYN CLINIC | Facility: MEDICAL CENTER | Age: 82
End: 2022-05-13
Payer: COMMERCIAL

## 2022-05-13 VITALS
BODY MASS INDEX: 27.55 KG/M2 | WEIGHT: 196.8 LBS | HEIGHT: 71 IN | SYSTOLIC BLOOD PRESSURE: 113 MMHG | DIASTOLIC BLOOD PRESSURE: 75 MMHG | HEART RATE: 86 BPM

## 2022-05-13 DIAGNOSIS — M25.552 PAIN IN LEFT HIP: ICD-10-CM

## 2022-05-13 DIAGNOSIS — M54.50 LUMBAR PAIN: Primary | ICD-10-CM

## 2022-05-13 PROCEDURE — 3074F SYST BP LT 130 MM HG: CPT | Performed by: ORTHOPAEDIC SURGERY

## 2022-05-13 PROCEDURE — 3078F DIAST BP <80 MM HG: CPT | Performed by: ORTHOPAEDIC SURGERY

## 2022-05-13 PROCEDURE — 1036F TOBACCO NON-USER: CPT | Performed by: ORTHOPAEDIC SURGERY

## 2022-05-13 PROCEDURE — 73502 X-RAY EXAM HIP UNI 2-3 VIEWS: CPT

## 2022-05-13 PROCEDURE — 99204 OFFICE O/P NEW MOD 45 MIN: CPT | Performed by: ORTHOPAEDIC SURGERY

## 2022-05-13 PROCEDURE — 1160F RVW MEDS BY RX/DR IN RCRD: CPT | Performed by: ORTHOPAEDIC SURGERY

## 2022-05-13 NOTE — PATIENT INSTRUCTIONS
Can take Tylenol 1,000mg by mouth every 8 hours as needed for pain  Do not exceed 3,000mg per day  Try PT for 6 weeks then follow up with one of our primary sports medicine physicians for your back

## 2022-05-13 NOTE — PROGRESS NOTES
Assessment/Plan     1  Lumbar pain    2  Pain in left hip      Orders Placed This Encounter   Procedures    XR hip/pelv 2-3 vws left if performed    Ambulatory Referral to Physical Therapy     - Angie Thompson was evaluated today with hip and low back pain  - recommended trialing physical therapy for about six weeks then following up with one of our sports medicine physicians for his back  - patient was educated on typical pain related to hip pathology  He is welcome to follow up should his hip become more of an issue  Today, history and exam do not correlate with a current symptomatic hip although he does have some mild arthritis on x-ray  - advised against NSAIDs  Can take Tylenol 1,000mg by mouth every 8 hours as needed for pain  Do not exceed 3,000mg per day  Return in about 6 weeks (around 6/24/2022) for with Dr Leticia Duverney or Zita Gross  I answered all of the patient's questions during the visit and provided education of the patient's condition during the visit  The patient verbalized understanding of the information given and agrees with the plan  This note was dictated using [employer] software  It may contain errors including improperly dictated words  Please contact physician directly for any questions  History of Present Illness   Chief complaint:   Chief Complaint   Patient presents with    Left Hip - Pain       HPI: María Elena Dobson is a [de-identified] y o  male that c/o left hip pain  Patient began having pain about six weeks ago without any injury or trauma  He localizes his pain to the left side of low back  There is no injury or trauma although he may have aggravated this while doing yd work  He typically has midline low back pain which he takes Aleve for prior to golfing and usually does well with this  He tried this regimen for his new onset of pain without relief  He is not having any groin pain or pain radiating down the leg  He takes Aleve as needed for pain    His pain is made worse with prolonged walking or standing  ROS:    See HPI for musculoskeletal review  All other systems reviewed are negative     Historical Information   Past Medical History:   Diagnosis Date    Acute on chronic systolic congestive heart failure ([facility] ) 1/29/2019    AICD (automatic cardioverter/defibrillator) present     Arthritis     Asthma     Burt esophagus     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     Cancer, colon ([facility] ) 4/5/2013    Cardiac arrhythmia     [facility]     CKD (chronic kidney disease)     [facility]     [facility]est[facility]) [facility]     COPD (chronic obstructive pulmonary disease) ([facility] )     Diverticulitis     Last assessed: 4/5/13    Esophageal reflux     Gout     Hernia     Hyperlipidemia     Hypertension     Hypertension     Hypothyroidism     Hypothyroidism 5/30/2013    Non-toxic multinodular goiter 11/28/2012    Pleural effusion     Polymyalgia rheumatica (HCC)     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     Spondyloarthropathy     Last assessed: 11/28/12    Thrombocytopenia ([facility] ) 6/23/2015     Past Surgical History:   Procedure Laterality Date    ARM SKIN LESION BIOPSY / EXCISION      Malignant    ATRIAL ABLATION SURGERY      AV NODE ABLATION      CARDIAC DEFIBRILLATOR PLACEMENT  2009    Cardio-Defib Pulse Gen Venous Insertion of Electrode for Ventricular Pacing  Implantable    CARDIAC SURGERY  2009    Catheterization    COLONOSCOPY N/A 3/14/2016    Procedure: COLONOSCOPY;  Surgeon: Maite Blankenship MD;  Location: [facility]; Service  February 22, 2013, mildly enlarged prostate, history of colon CA with normal appearance of anastomosis at the midtransverse colon, severe diverticulosis in the sigmoid, descending and transverse colon   Repeat colonoscopy in 3 yrs    HEMICOLECTOMY Right 05/05/2004    INGUINAL HERNIA REPAIR Bilateral     Left 3/2004, right 5/2008    IR UPPER EXTREMITY VENOGRAM- DIAGNOSTIC  10/4/2018    KNEE SURGERY  1972    Meniscectomy    CA ESOPHAGOGASTRODUODENOSCOPY TRANSORAL DIAGNOSTIC N/A 12/5/2016    Procedure: ESOPHAGOGASTRODUODENOSCOPY (EGD); Surgeon: Efren Cranker, MD;  Location: [facility]; Service: Gastroenterology    TONSILLECTOMY AND ADENOIDECTOMY      UPPER GASTROINTESTINAL ENDOSCOPY       Social History   Social History     Substance and Sexual Activity   Alcohol Use Never    Comment: Rare     Social History     Substance and Sexual Activity   Drug Use No     Social History     Tobacco Use   Smoking Status Never Smoker   Smokeless Tobacco Never Used     Family History:   Family History   Problem Relation Age of Onset    Heart failure Mother         Congestive    Hypertension Mother     Osteoporosis Mother     COPD Father     Emphysema Father     Hypertension Father     Heart disease Sister         Heart Valve Replacement    Osteoporosis Sister     Breast cancer Family        Current Outpatient Medications on File Prior to Visit   Medication Sig Dispense Refill    albuterol (PROVENTIL HFA,VENTOLIN HFA) 90 mcg/act inhaler Inhale 2 puffs every 6 (six) hours as needed for wheezing or shortness of breath 1 Inhaler 1    aspirin 81 MG tablet Take 81 mg by mouth daily   atorvastatin (LIPITOR) 10 mg tablet TAKE ONE TABLET BY MOUTH EVERY DAY 90 tablet 3    bisoprolol (ZEBETA) 10 MG tablet TAKE ONE TABLET BY MOUTH TWICE A  tablet 3    Calcium Carb-Cholecalciferol (CALCIUM 600 + D PO) Take 3 tablets by mouth daily        Coenzyme Q10 (CO Q-10) 200 MG CAPS Take 1 tablet by mouth daily      finasteride (PROSCAR) 5 mg tablet TAKE ONE TABLET BY MOUTH EVERY DAY 90 tablet 1    furosemide (LASIX) 20 mg tablet TAKE ONE TABLET BY MOUTH EVERY DAY 90 tablet 3    Multiple Vitamins-Minerals (CENTRUM SILVER PO) Take 1 tablet by mouth daily        omeprazole (PriLOSEC) 20 mg delayed release capsule Take 1 capsule (20 mg total) by mouth daily 90 capsule 2    PREVIDENT 5000 ENAMEL PROTECT 1 1-5 % PSTE Use as directed  3    sacubitril-valsartan (Entresto) 49-51 MG TABS Take 1 tablet by mouth 2 (two) times a day 180 tablet 3    tadalafil (CIALIS) 10 MG tablet Take 1 tablet (10 mg total) by mouth daily as needed for erectile dysfunction 10 tablet 1    Vitamin D, Cholecalciferol, 1000 UNITS CAPS Take 1 tablet by mouth daily   Wixela Inhub 500-50 MCG/DOSE inhaler Inhale 1 puff 2 (two) times a day 1 Inhaler 0     No current facility-administered medications on file prior to visit  No Known Allergies    Objective   Vitals: Blood pressure 113/75, pulse 86, height 5' 11" (1 803 m), weight 89 3 kg (196 lb 12 8 oz)  ,Body mass index is 27 45 kg/m²  PE:  AAOx 3  WDWN  Hearing intact, no drainage from eyes  Regular rate  no audible wheezing  no abdominal distension  LE compartments soft, skin intact    left hip:   No dislocation/deformity  Neg  Stinchfield  ROM: 0-120 degrees flexion no pain , 0-40 degrees ER mild pain, 0-10 degrees IR no pain  Pos  Gal Test  Pos  Impingement test  No TTP over greater trochanter  Abduction: 5/5  Neg  Yordan's test, mildly tight to palpation  No TTP over SIJ    leftLE:    Sensation grossly intact L4, L5, S1  Palpable pedal pulse  AT/GS intact    Back:    No TTP over lumbar spinous processes, paraspinal musculature  SLR: Neg       Imaging Studies: I have personally reviewed pertinent films in PACS  left hip XR:  Mild degenerative changes        Scribe Attestation    I,:  Austin Jean Baptiste am acting as a scribe while in the presence of the attending physician :       I,:  Ortiz Byrnes DO personally performed the services described in this documentation    as scribed in my presence :

## 2022-05-20 ENCOUNTER — EVALUATION (OUTPATIENT)
Dept: PHYSICAL THERAPY | Facility: REHABILITATION | Age: 82
End: 2022-05-20
Payer: COMMERCIAL

## 2022-05-20 DIAGNOSIS — G89.29 CHRONIC LEFT-SIDED LOW BACK PAIN, UNSPECIFIED WHETHER SCIATICA PRESENT: Primary | ICD-10-CM

## 2022-05-20 DIAGNOSIS — M54.50 LUMBAR PAIN: ICD-10-CM

## 2022-05-20 DIAGNOSIS — M54.50 CHRONIC LEFT-SIDED LOW BACK PAIN, UNSPECIFIED WHETHER SCIATICA PRESENT: Primary | ICD-10-CM

## 2022-05-20 PROCEDURE — 97161 PT EVAL LOW COMPLEX 20 MIN: CPT | Performed by: PHYSICAL THERAPIST

## 2022-05-20 PROCEDURE — 97110 THERAPEUTIC EXERCISES: CPT | Performed by: PHYSICAL THERAPIST

## 2022-05-20 NOTE — PROGRESS NOTES
PT Evaluation     Today's date: 2022  Patient name: Bhargav Hurst  : 1940  MRN: [id number]  Referring provider: Ervin Luna  Dx:   Encounter Diagnosis     ICD-10-CM    1  Chronic left-sided low back pain, unspecified whether sciatica present  M54 50     G89 29    2  Lumbar pain  M54 50 Ambulatory Referral to Physical Therapy                  Assessment  Assessment details: Bhargav Hurst is a pleasant [de-identified] y o  male who presents with acute on chronic low back pain  The primary movement problem is mobility deficit primarily into lumbar flexion, followed by left rotation and sidebending, resulting in pathoanatomical symptoms of acute on chronic low back pain, and limiting his ability to carry, exercise or recreation, get off the toilet, get out of a chair, lift, perform household chores, perform yard work, play golf, sit, squat to  objects from the floor, stand and walk  The patient reported he has had back pain in the past, but not to this severity  Reports it may have started after lifting heavy mulch at [address] about one month ago  Upon exam, he had pain with transitional movements and most lumbar spine active range of motion movements, mostly flexion, with a goward sign when returning to standing  His lower quarter screen was unremarkable, and he did not present with any red flags  His repetitive poor body mechanics have likely predisposed him to his current clinical presentation, in addition to age and other aforementioned impairments  At this time, the patient would benefit from skilled physical therapy to address his current deficits, improve his quality of life, and restore his PLOF  Problem List:  1) Mobility deficits of the lumbar spine  2) poor lifting and body mechanics     Etiologic factors include recent heavy lifting, repetitive poor body mechanics, repetitive poor lifting mechanics and poor hip mobility    Impairments: abnormal coordination, abnormal gait, abnormal muscle firing, abnormal muscle tone, abnormal or restricted ROM, abnormal movement, activity intolerance, impaired balance, impaired physical strength, lacks appropriate home exercise program, pain with function, weight-bearing intolerance, poor posture  and poor body mechanics    Symptom irritability: moderateUnderstanding of Dx/Px/POC: good   Prognosis: fair  Prognosis details: Positive prognostic indicators include positive attitude toward recovery and good understanding of diagnosis and treatment plan options  Negative prognostic indicators include chronicity of symptoms, minimal changes in pain with movement, age, recurrent history of LBP, and multiple concurrent comorbid problems  Goals  Impairment Based Goals:   Patient will improve FOTO score greater than predicted increase in 4 weeks   Patient will improve lumbar spine AROM to [facility] by discharge  Patient will have decrease in pain by at least 50% in 4 weeks  Functional Based Goals: By Discharge  Patient will be independent with home exercise program    Patient will be able to manage symptoms independently  Patient will be independent with household activities, including yard work  Patient will be able to return to playing golf      Plan  Patient would benefit from: skilled physical therapy  Referral necessary: No  Planned therapy interventions: abdominal trunk stabilization, activity modification, balance, balance/weight bearing training, body mechanics training, breathing training, flexibility, functional ROM exercises, gait training, graded activity, graded exercise, graded motor, home exercise program, coordination, manual therapy, joint mobilization, massage, muscle pump exercises, neuromuscular re-education, patient education, postural training, self care, strengthening, stretching, therapeutic activities and therapeutic exercise  Frequency: 2x week  Plan of Care beginning date: 5/20/2022  Plan of Care expiration date: 7/15/2022  Treatment plan discussed with: patient        Subjective Evaluation    History of Present Illness  Mechanism of injury: I have been having some left sided low back and hip pain over the past 6-8 weeks  I play golf 2-3 times a week, and have had issues with my back in the past  I do remember picking up some molch and top soil at [facility], and that may have aggravated my back  If I try to cut the grass, I notice my back will be very sore after it  I normally watch how I am lifting something or how often I am lifting, trying to protect my back  Past Medical History:   1) History of Bilateral Knee Pain (scoped in )   2) High Blood Pressure (Controlled with medication)   3) Implanted Defibrillator (ICD)           Recurrent probem    Pain  Current pain ratin  At best pain ratin  At worst pain ratin  Quality: dull ache, discomfort, tight and pulling  Relieving factors: relaxation, rest and change in position  Aggravating factors: sitting    Treatments  Current treatment: physical therapy  Patient Goals  Patient goals for therapy: decreased pain, improved balance, increased motion, increased strength, independence with ADLs/IADLs and return to sport/leisure activities  Patient goal: return to playing golf         Objective     Concurrent Complaints  Negative for night pain, disturbed sleep, bowel dysfunction, saddle (S4) numbness, ulcer, spleen problem, history of trauma and infectionKidney problem: history of kidney disease  History of cancer: previous history of colon cancer  General Comments:      Lumbar Comments  Lumbar AROM:   Flexion: 50% limited p!    Extension: 50% limited   Sidebending: R 50% limited   L 75% limited   Rotation: R 50% limited   L 50% limited     Lower Quarter Screen: Unremarkable     Strength:   Hip Flexion: 4/5 B   Hip Abduction Seated: 4/5 B   Knee Extension: R 4/5  L 4+/5   Knee Flexion: 4+/5 B     TU seconds     5x sit to stand: 28 seconds    Balance: Poor     Slump: Negative   MARA: Negative   FADIR: Negative   Passive SLR: Negative     Palpation: Most tender at left T10-T12     Lumbar Spine Joint Play: Hypomobile     Repeated EIS: Better   AGUSTINA: Worse   FIS: Worse             Precautions:     Past Medical History:   1) History of Bilateral Knee Pain (scoped in 1972)   2) High Blood Pressure (Controlled with medication)   3) Implanted Defibrillator (ICD)   4) Previous History of Colon Cancer        Manuals 5/20                                                                Neuro Re-Ed             [employer] - Arm Raises             Manju Gonzalez Curl             Standing Trunk Rotation - Resisted             Standing Hip Abduction                          Ther Ex             Standing Extension - Lumbar  10x            Seated Thoracic Extension Over Chair  10x            LTR             Row             Shoulder Extensions             Pallof Press             Seated Thoracic Rotation - Resisted                          Ther Activity             Farmer Carry - Unilateral              KB Deadlift             Gait Training                                       Modalities

## 2022-05-23 ENCOUNTER — OFFICE VISIT (OUTPATIENT)
Dept: PHYSICAL THERAPY | Facility: REHABILITATION | Age: 82
End: 2022-05-23
Payer: COMMERCIAL

## 2022-05-23 DIAGNOSIS — M54.50 LUMBAR PAIN: ICD-10-CM

## 2022-05-23 DIAGNOSIS — M54.50 CHRONIC LEFT-SIDED LOW BACK PAIN, UNSPECIFIED WHETHER SCIATICA PRESENT: Primary | ICD-10-CM

## 2022-05-23 DIAGNOSIS — G89.29 CHRONIC LEFT-SIDED LOW BACK PAIN, UNSPECIFIED WHETHER SCIATICA PRESENT: Primary | ICD-10-CM

## 2022-05-23 PROCEDURE — 97112 NEUROMUSCULAR REEDUCATION: CPT | Performed by: PHYSICAL THERAPIST

## 2022-05-23 PROCEDURE — 97110 THERAPEUTIC EXERCISES: CPT | Performed by: PHYSICAL THERAPIST

## 2022-05-23 NOTE — PROGRESS NOTES
PT Discharge    Today's date: 2022  Patient name: Lauren Esparza  : 1940  MRN: [id number]  Referring provider: Kurt Sandoval  Dx:   Encounter Diagnosis     ICD-10-CM    1  Chronic left-sided low back pain, unspecified whether sciatica present  M54 50     G89 29    2  Lumbar pain  M54 50         Patient reported he is going back to physician for further consultation, and will not be continuing physical therapy at this time  Has been discharged from this episode of care  Subjective: My back has been pretty much the same since the evaluation  Tried to work on the exercises over the weekend  Objective: See treatment diary below      Assessment: Tolerated treatment fair  Does continue to have back pain with flexion and transitional movements, such as when going from sitting to standing  did incorporate scapular engagement exercises today to work on posterior spine activation with facilitation for more upright posture  Patient would benefit from continued PT      Plan: Continue per plan of care        Precautions: Fall Risk    Past Medical History:   1) History of Bilateral Knee Pain (scoped in )   2) High Blood Pressure (Controlled with medication)   3) Implanted Defibrillator (ICD)   4) Previous History of Colon Cancer        Manuals                                                                Neuro Re-Ed             Standing Horizontal Abduction - Band  3x10 gtb           Standing PBall Press - Arm Raises  8x 3 way            Clamshell             Carlos Curl             Standing Trunk Rotation - Resisted             Standing Hip Abduction             Standing Pball Press with arm raise   3x10           Ther Ex             Standing Extension - Lumbar  10x Hold           Seated Thoracic Extension Over Chair  10x 2x10           LTR             Row  3x12 gtb           Shoulder Extensions             Pallof Press             Seated Thoracic Rotation - Resisted Venkata  10' L4           Ther Activity             Farmer Carry - Unilateral              KB Deadlift             Gait Training                                       Modalities

## 2022-05-27 ENCOUNTER — APPOINTMENT (OUTPATIENT)
Dept: PHYSICAL THERAPY | Facility: REHABILITATION | Age: 82
End: 2022-05-27
Payer: COMMERCIAL

## 2022-05-31 ENCOUNTER — APPOINTMENT (OUTPATIENT)
Dept: PHYSICAL THERAPY | Facility: REHABILITATION | Age: 82
End: 2022-05-31
Payer: COMMERCIAL

## 2022-06-03 DIAGNOSIS — K22.70 BARRETT'S ESOPHAGUS WITHOUT DYSPLASIA: ICD-10-CM

## 2022-06-03 NOTE — TELEPHONE ENCOUNTER
Patients GI provider:  Dr Willian Arteaga    Number to return call: (  [phone]    Reason for call: I received a call from [facility] mail order for a refill   I notified them that the patient will need an office visit first  Left VM for patient    Scheduled procedure/appointment date if applicable: Appt not scheduled, left VM for patient to call office

## 2022-06-03 NOTE — TELEPHONE ENCOUNTER
Patients GI provider:  Dr Young Luis     Number to return call: [phone]    Reason for call: Pt calling requesting to speak with a nurse to discuss if ok not to take omeprazole because he is out and his appt is not until 6/21/22     Scheduled procedure/appointment date if applicable: Apt/procedure 6/21/22

## 2022-06-06 RX ORDER — OMEPRAZOLE 20 MG/1
20 CAPSULE, DELAYED RELEASE ORAL DAILY
Qty: 90 CAPSULE | Refills: 2 | Status: SHIPPED | OUTPATIENT
Start: 2022-06-06

## 2022-06-06 NOTE — TELEPHONE ENCOUNTER
Pt aware to restart omeprazole and keep f/u appt, informed script sent to mail order pharmacy   Pt verbalized understanding and had no further questions

## 2022-06-07 ENCOUNTER — REMOTE DEVICE CLINIC VISIT (OUTPATIENT)
Dept: CARDIOLOGY CLINIC | Facility: CLINIC | Age: 82
End: 2022-06-07
Payer: COMMERCIAL

## 2022-06-07 DIAGNOSIS — Z95.810 PRESENCE OF AUTOMATIC CARDIOVERTER/DEFIBRILLATOR (AICD): Primary | ICD-10-CM

## 2022-06-07 PROCEDURE — 93295 DEV INTERROG REMOTE 1/2/MLT: CPT | Performed by: INTERNAL MEDICINE

## 2022-06-07 PROCEDURE — 93296 REM INTERROG EVL PM/IDS: CPT | Performed by: INTERNAL MEDICINE

## 2022-06-07 NOTE — PROGRESS NOTES
Results for orders placed or performed in visit on 06/07/22   Cardiac EP device report    Narrative    MDT-DUAL CHAMBER ICD (AAIR-DDDR MODE)/ ACTIVE SYSTEM IS MRI CONDITIONAL  CARELINK TRANSMISSION: BATTERY VOLTAGE ADEQUATE (5 4 YRS)  AP: 67 2%  : 13 1%  ALL AVAILABLE LEAD PARAMETERS WITHIN NORMAL LIMITS  1 VT-NS EPISODE W/ EGM SHOWING NSVT 12 BEATS @ 211 BPM  PT TAKES ZEBETA, ASA 81MG  EF: 35% (ECHO 10/1/20)  OPTI-VOL WITHIN NORMAL LIMITS  APPROPRIATELY FUNCTIONING ICD    [address]

## 2022-06-21 ENCOUNTER — OFFICE VISIT (OUTPATIENT)
Dept: GASTROENTEROLOGY | Facility: CLINIC | Age: 82
End: 2022-06-21
Payer: COMMERCIAL

## 2022-06-21 VITALS
WEIGHT: 191 LBS | OXYGEN SATURATION: 98 % | BODY MASS INDEX: 26.74 KG/M2 | SYSTOLIC BLOOD PRESSURE: 118 MMHG | HEART RATE: 77 BPM | DIASTOLIC BLOOD PRESSURE: 72 MMHG | HEIGHT: 71 IN | RESPIRATION RATE: 18 BRPM | TEMPERATURE: 97.9 F

## 2022-06-21 DIAGNOSIS — K22.70 BARRETT'S ESOPHAGUS WITHOUT DYSPLASIA: ICD-10-CM

## 2022-06-21 DIAGNOSIS — K21.9 GASTROESOPHAGEAL REFLUX DISEASE WITHOUT ESOPHAGITIS: ICD-10-CM

## 2022-06-21 DIAGNOSIS — Z12.11 COLON CANCER SCREENING: ICD-10-CM

## 2022-06-21 DIAGNOSIS — Z85.038 PERSONAL HISTORY OF COLON CANCER: Primary | ICD-10-CM

## 2022-06-21 PROCEDURE — 1160F RVW MEDS BY RX/DR IN RCRD: CPT | Performed by: INTERNAL MEDICINE

## 2022-06-21 PROCEDURE — 99214 OFFICE O/P EST MOD 30 MIN: CPT | Performed by: INTERNAL MEDICINE

## 2022-06-21 PROCEDURE — 3078F DIAST BP <80 MM HG: CPT | Performed by: INTERNAL MEDICINE

## 2022-06-21 PROCEDURE — 3074F SYST BP LT 130 MM HG: CPT | Performed by: INTERNAL MEDICINE

## 2022-06-21 PROCEDURE — 1036F TOBACCO NON-USER: CPT | Performed by: INTERNAL MEDICINE

## 2022-06-21 NOTE — PATIENT INSTRUCTIONS
Scheduled date of colonoscopy (as of today):09 28 22  Physician performing colonoscopy:DR MONTALVO  Location of colonoscopy:[facility]  Bowel prep reviewed with patient:DULCOLAX/MIRALAX  Instructions reviewed with patient by:TEAGAN  Clearances:  N/A

## 2022-06-21 NOTE — PROGRESS NOTES
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  2  Gastroesophageal reflux disease without esophagitis  Continue reflux precautions  Continue omeprazole 20 mg daily  Last EGD with biopsy in June 2020 -negative for dysplasia  Patient is requesting to have EGD done at the same time with his colonoscopy  He will check with insurance regarding coverage otherwise will do EGD for surveillance next    4  Colon cancer screening  4  Personal history of colon cancer  -he has no symptoms  -schedule him for surveillance colonoscopy  -bowel prep instructions given  We discussed risks and benefits of procedure  Risks include but not limited to fracture, bleeding, perforation, missed lesion  ______________________________________________________________________    SUBJECTIVE:  69-year-old male with personal history of colon cancer status post resection in 2004 here for follow-up visit for gastroesophageal reflux disease, Burt's esophagus and surveillance colonoscopy consult  His reflux symptoms are well controlled with omeprazole 20 mg daily  He has C3M4 Burt's esophagus  Last EGD in June 2020  Biopsies negative for dysplasia  His last colonoscopy was in May 2019  His endoscopy is recommended follow-up colonoscopy in 3 years  He is due for surveillance  REVIEW OF SYSTEMS IS OTHERWISE NEGATIVE        Historical Information   Past Medical History:   Diagnosis Date    Acute on chronic systolic congestive heart failure ([facility] ) 1/29/2019    AICD (automatic cardioverter/defibrillator) present     Arthritis     Asthma     Burt esophagus     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     Cancer, colon [facility] ) 4/5/2013    Cardiac arrhythmia     Cardiomyopathy ([facility] )     CKD (chronic kidney disease)     Colon cancer ([facility] )     Congestive heart failure (CHF) ([facility] )     COPD (chronic obstructive pulmonary disease) ([facility] )     Diverticulitis     Last assessed: 4/5/13    Esophageal reflux     Gout     Hernia     Hyperlipidemia     Hypertension     Hypertension     Hypothyroidism     Hypothyroidism 5/30/2013    Non-toxic multinodular goiter 11/28/2012    Pleural effusion     Polymyalgia rheumatica (HCC)     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     Spondyloarthropathy     Last assessed: 11/28/12    Thrombocytopenia [facility] ) 6/23/2015     Past Surgical History:   Procedure Laterality Date    ARM SKIN LESION BIOPSY / EXCISION      Malignant    ATRIAL ABLATION SURGERY      AV NODE ABLATION      CARDIAC DEFIBRILLATOR PLACEMENT  2009    Cardio-Defib Pulse Gen Venous Insertion of Electrode for Ventricular Pacing  Implantable    CARDIAC SURGERY  2009    Catheterization    COLONOSCOPY N/A 3/14/2016    Procedure: COLONOSCOPY;  Surgeon: Lebron Sanz MD;  Location: [facility]; Service  February 22, 2013, mildly enlarged prostate, history of colon CA with normal appearance of anastomosis at the midtransverse colon, severe diverticulosis in the sigmoid, descending and transverse colon  Repeat colonoscopy in 3 yrs    HEMICOLECTOMY Right 05/05/2004    INGUINAL HERNIA REPAIR Bilateral     Left 3/2004, right 5/2008    IR UPPER EXTREMITY VENOGRAM- DIAGNOSTIC  10/4/2018    KNEE SURGERY  1972    Meniscectomy    SC ESOPHAGOGASTRODUODENOSCOPY TRANSORAL DIAGNOSTIC N/A 12/5/2016    Procedure: ESOPHAGOGASTRODUODENOSCOPY (EGD); Surgeon: Baldemar Ugalde MD;  Location: [facility];   Service: Gastroenterology    TONSILLECTOMY AND ADENOIDECTOMY      UPPER GASTROINTESTINAL ENDOSCOPY       Social History   Social History     Substance and Sexual Activity   Alcohol Use Never    Comment: Rare     Social History     Substance and Sexual Activity   Drug Use No     Social History     Tobacco Use   Smoking Status Never Smoker   Smokeless Tobacco Never Used     Family History   Problem Relation Age of Onset    Heart failure Mother         Congestive    Hypertension Mother     Osteoporosis Mother     COPD Father     Emphysema Father     Hypertension Father     Heart disease Sister         Heart Valve Replacement    Osteoporosis Sister     Breast cancer Family        Meds/Allergies       Current Outpatient Medications:     albuterol (PROVENTIL HFA,VENTOLIN HFA) 90 mcg/act inhaler    atorvastatin (LIPITOR) 10 mg tablet    bisoprolol (ZEBETA) 10 MG tablet    Calcium Carb-Cholecalciferol (CALCIUM 600 + D PO)    Coenzyme Q10 (CO Q-10) 200 MG CAPS    finasteride (PROSCAR) 5 mg tablet    furosemide (LASIX) 20 mg tablet    Multiple Vitamins-Minerals (CENTRUM SILVER PO)    omeprazole (PriLOSEC) 20 mg delayed release capsule    PREVIDENT 5000 ENAMEL PROTECT 1 1-5 % PSTE    sacubitril-valsartan (Entresto) 49-51 MG TABS    tadalafil (CIALIS) 10 MG tablet    Vitamin D, Cholecalciferol, 1000 UNITS CAPS    Wixela Inhub 500-50 MCG/DOSE inhaler    aspirin 81 MG tablet    No Known Allergies        Objective     Blood pressure 118/72, pulse 77, temperature 97 9 °F (36 6 °C), temperature source Tympanic, resp  rate 18, height 5' 11" (1 803 m), weight 86 6 kg (191 lb), SpO2 98 %  Body mass index is 26 64 kg/m²  PHYSICAL EXAM:      General Appearance:   Alert, cooperative, no distress   HEENT:   Normocephalic, atraumatic, anicteric      Neck:  Supple, symmetrical, trachea midline   Lungs:   Clear to auscultation bilaterally; no rales, rhonchi or wheezing; respirations unlabored    Heart[de-identified]   Regular rate and rhythm; no murmur, rub, or gallop     Abdomen:   Soft, non-tender, non-distended; normal bowel sounds; no masses, no organomegaly    Genitalia:   Deferred    Rectal:   Deferred    Extremities:  No cyanosis, clubbing or edema    Pulses:  2+ and symmetric    Skin:  No jaundice, rashes, or lesions    Lymph nodes:  No palpable cervical lymphadenopathy        Lab Results:   No visits with results within 1 Day(s) from this visit  Latest known visit with results is:   Appointment on 01/25/2022   Component Date Value    Sodium 01/25/2022 143     Potassium 01/25/2022 4 3     Chloride 01/25/2022 110 (A)    CO2 01/25/2022 29     ANION GAP 01/25/2022 4     BUN 01/25/2022 26 (A)    Creatinine 01/25/2022 1 58 (A)    Glucose, Fasting 01/25/2022 92     Calcium 01/25/2022 9 0     AST 01/25/2022 19     ALT 01/25/2022 28     Alkaline Phosphatase 01/25/2022 66     Total Protein 01/25/2022 7 2     Albumin 01/25/2022 3 9     Total Bilirubin 01/25/2022 1 87 (A)    eGFR 01/25/2022 40     Cholesterol 01/25/2022 147     Triglycerides 01/25/2022 123     HDL, Direct 01/25/2022 56     LDL Calculated 01/25/2022 66     Non-HDL-Chol (CHOL-HDL) 01/25/2022 91     Hemoglobin A1C 01/25/2022 5 4     EAG 01/25/2022 108          Radiology Results:   Cardiac EP device report    Result Date: 6/7/2022  Narrative: MDT-DUAL CHAMBER ICD (AAIR-DDDR MODE)/ ACTIVE SYSTEM IS MRI CONDITIONAL CARELINK TRANSMISSION: BATTERY VOLTAGE ADEQUATE (5 4 YRS)  AP: 67 2%  : 13 1%  ALL AVAILABLE LEAD PARAMETERS WITHIN NORMAL LIMITS  1 VT-NS EPISODE W/ EGM SHOWING NSVT 12 BEATS @ 211 BPM  PT TAKES ZEBETA, ASA 81MG  EF: 35% (ECHO 10/1/20)  OPTI-VOL WITHIN NORMAL LIMITS  APPROPRIATELY FUNCTIONING ICD  [address]     Answers for HPI/ROS submitted by the patient on 6/20/2022  Frequency: rarely  anorexia: No  arthralgias: Yes  belching: No  constipation: No  diarrhea: No  dysuria: No  fever: No  flatus: No  frequency: No  headaches: No  hematochezia: No  hematuria: No  melena: No  myalgias: No  nausea:  No  weight loss: No  vomiting: No

## 2022-07-15 ENCOUNTER — OFFICE VISIT (OUTPATIENT)
Dept: FAMILY MEDICINE CLINIC | Facility: CLINIC | Age: 82
End: 2022-07-15
Payer: COMMERCIAL

## 2022-07-15 ENCOUNTER — HOSPITAL ENCOUNTER (OUTPATIENT)
Dept: RADIOLOGY | Facility: HOSPITAL | Age: 82
Discharge: HOME | End: 2022-07-15
Payer: COMMERCIAL

## 2022-07-15 VITALS
SYSTOLIC BLOOD PRESSURE: 90 MMHG | WEIGHT: 184.8 LBS | OXYGEN SATURATION: 94 % | HEIGHT: 71 IN | BODY MASS INDEX: 25.87 KG/M2 | RESPIRATION RATE: 20 BRPM | DIASTOLIC BLOOD PRESSURE: 60 MMHG | HEART RATE: 92 BPM | TEMPERATURE: 98.7 F

## 2022-07-15 DIAGNOSIS — J40 BRONCHITIS: ICD-10-CM

## 2022-07-15 DIAGNOSIS — J40 BRONCHITIS: Primary | ICD-10-CM

## 2022-07-15 PROCEDURE — 71046 X-RAY EXAM CHEST 2 VIEWS: CPT

## 2022-07-15 PROCEDURE — 99213 OFFICE O/P EST LOW 20 MIN: CPT | Performed by: FAMILY MEDICINE

## 2022-07-15 PROCEDURE — 1160F RVW MEDS BY RX/DR IN RCRD: CPT | Performed by: FAMILY MEDICINE

## 2022-07-15 RX ORDER — AZITHROMYCIN 250 MG/1
TABLET, FILM COATED ORAL
Qty: 6 TABLET | Refills: 0 | Status: SHIPPED | OUTPATIENT
Start: 2022-07-15 | End: 2022-07-19

## 2022-07-15 NOTE — PROGRESS NOTES
Chief Complaint   Patient presents with    Cough     With Phlegm for couple days  COVID-19 home test Negative  Health Maintenance   Topic Date Due    SLP PLAN OF CARE  Never done    BMI: Followup Plan  02/03/2022    COVID-19 Vaccine (4 - Booster for Moderna series) 02/27/2022    Medicare Annual Wellness Visit (AWV)  08/04/2022    Influenza Vaccine (1) 09/01/2022    Depression Screening  02/02/2023    Fall Risk  05/20/2023    BMI: Adult  06/21/2023    DXA SCAN  03/02/2025    Pneumococcal Vaccine: 65+ Years  Completed    HIB Vaccine  Aged Out    Hepatitis B Vaccine  Aged Out    IPV Vaccine  Aged Out    Hepatitis A Vaccine  Aged Out    Meningococcal ACWY Vaccine  Aged Out    HPV Vaccine  Aged Out           Assessment/Plan:    Pt has low BP and age>65  Risk of pneumonia  Pt refused to go to ER today  Check CXR  A lot of fluids and rest  Tylenol or motrin for fever or pain  Give zpack  Side effects educated patient  Go to ER if symptoms no improving or worse  Diagnoses and all orders for this visit:    Bronchitis  -     azithromycin (ZITHROMAX) 250 mg tablet; Take 2 tabs on day 1, then take 1 tab daily from day 2-day 5   -     XR chest pa & lateral; Future          Subjective:      Patient ID: Andre Youngblood is a 81 y o  male  HPI    Pt is here by himself  C/o cough and does not feel good for 3 days  Cough with phlegm  No wheezing  No Sob  Felt b/l lower chest pain if deep breath  Denies fever  Denies n/v/diarrhea/constipation  Home Covid19 test negative yesterday  Pt got Covid19 shots and booster  Lives with wife and she is fine  Hx of asthma  He did not use wixela or albuterol recently           The following portions of the patient's history were reviewed and updated as appropriate: allergies, current medications, past family history, past medical history, past social history, past surgical history and problem list     Review of Systems   Constitutional: Negative for appetite change, chills and fever  HENT: Negative for congestion, ear pain, sinus pain and sore throat  Eyes: Negative for discharge and itching  Respiratory: Positive for cough and chest tightness  Negative for apnea, shortness of breath and wheezing  Cardiovascular: Negative for chest pain, palpitations and leg swelling  Gastrointestinal: Negative for abdominal pain, anal bleeding, constipation, diarrhea, nausea and vomiting  Endocrine: Negative for cold intolerance, heat intolerance and polyuria  Genitourinary: Negative for difficulty urinating and dysuria  Musculoskeletal: Negative for arthralgias, back pain and myalgias  Skin: Negative for rash  Neurological: Negative for dizziness and headaches  Psychiatric/Behavioral: Negative for agitation  Objective:      BP 90/60 (BP Location: Left arm, Patient Position: Sitting, Cuff Size: Adult)   Pulse 92   Temp 98 7 °F (37 1 °C) (Tympanic)   Resp 20   Ht 5' 11" (1 803 m)   Wt 83 8 kg (184 lb 12 8 oz)   SpO2 94%   BMI 25 77 kg/m²          Physical Exam  Constitutional:       General: He is not in acute distress  Appearance: He is well-developed  HENT:      Head: Normocephalic and atraumatic  Eyes:      General:         Right eye: No discharge  Left eye: No discharge  Conjunctiva/sclera: Conjunctivae normal    Cardiovascular:      Rate and Rhythm: Normal rate and regular rhythm  Heart sounds: Normal heart sounds  No murmur heard  No friction rub  No gallop  Pulmonary:      Effort: Pulmonary effort is normal  No respiratory distress  Breath sounds: Normal breath sounds  No wheezing or rales  Abdominal:      General: Bowel sounds are normal  There is no distension  Palpations: Abdomen is soft  Tenderness: There is no abdominal tenderness  There is no guarding  Musculoskeletal:         General: Normal range of motion        Cervical back: Normal range of motion and neck supple  Right lower leg: No edema  Left lower leg: No edema  Neurological:      Mental Status: He is alert

## 2022-07-21 ENCOUNTER — APPOINTMENT (EMERGENCY)
Dept: RADIOLOGY | Facility: HOSPITAL | Age: 82
DRG: 175 | End: 2022-07-21
Payer: COMMERCIAL

## 2022-07-21 ENCOUNTER — HOSPITAL ENCOUNTER (INPATIENT)
Facility: HOSPITAL | Age: 82
LOS: 2 days | Discharge: HOME | DRG: 175 | End: 2022-07-23
Attending: EMERGENCY MEDICINE | Admitting: INTERNAL MEDICINE
Payer: COMMERCIAL

## 2022-07-21 DIAGNOSIS — R06.02 SOB (SHORTNESS OF BREATH): ICD-10-CM

## 2022-07-21 DIAGNOSIS — I26.94 MULTIPLE SUBSEGMENTAL PULMONARY EMBOLI WITHOUT ACUTE COR PULMONALE (HCC): ICD-10-CM

## 2022-07-21 DIAGNOSIS — I26.09 ACUTE PULMONARY EMBOLISM WITH ACUTE COR PULMONALE (HCC): ICD-10-CM

## 2022-07-21 DIAGNOSIS — I50.41 ACUTE COMBINED SYSTOLIC AND DIASTOLIC HEART FAILURE (HCC): Primary | ICD-10-CM

## 2022-07-21 DIAGNOSIS — I26.99 PULMONARY EMBOLISM (HCC): ICD-10-CM

## 2022-07-21 DIAGNOSIS — I42.9 CARDIOMYOPATHY, UNSPECIFIED TYPE (HCC): ICD-10-CM

## 2022-07-21 LAB
2HR DELTA HS TROPONIN: 1 NG/L
ALBUMIN SERPL BCP-MCNC: 2.8 G/DL (ref 3.5–5)
ALP SERPL-CCNC: 83 U/L (ref 46–116)
ALT SERPL W P-5'-P-CCNC: 37 U/L (ref 12–78)
ANION GAP SERPL CALCULATED.3IONS-SCNC: 6 MMOL/L (ref 4–13)
AST SERPL W P-5'-P-CCNC: 26 U/L (ref 5–45)
BASOPHILS # BLD AUTO: 0.04 THOUSANDS/ΜL (ref 0–0.1)
BASOPHILS NFR BLD AUTO: 0 % (ref 0–1)
BILIRUB SERPL-MCNC: 0.7 MG/DL (ref 0.2–1)
BUN SERPL-MCNC: 25 MG/DL (ref 5–25)
CALCIUM ALBUM COR SERPL-MCNC: 10.3 MG/DL (ref 8.3–10.1)
CALCIUM SERPL-MCNC: 9.3 MG/DL (ref 8.3–10.1)
CARDIAC TROPONIN I PNL SERPL HS: 16 NG/L
CARDIAC TROPONIN I PNL SERPL HS: 17 NG/L
CHLORIDE SERPL-SCNC: 111 MMOL/L (ref 96–108)
CO2 SERPL-SCNC: 26 MMOL/L (ref 21–32)
CREAT SERPL-MCNC: 1.51 MG/DL (ref 0.6–1.3)
D DIMER PPP FEU-MCNC: 6.22 UG/ML FEU
EOSINOPHIL # BLD AUTO: 0.15 THOUSAND/ΜL (ref 0–0.61)
EOSINOPHIL NFR BLD AUTO: 1 % (ref 0–6)
ERYTHROCYTE [DISTWIDTH] IN BLOOD BY AUTOMATED COUNT: 13.8 % (ref 11.6–15.1)
FLUAV RNA RESP QL NAA+PROBE: NEGATIVE
FLUBV RNA RESP QL NAA+PROBE: NEGATIVE
GFR SERPL CREATININE-BSD FRML MDRD: 42 ML/MIN/1.73SQ M
GLUCOSE SERPL-MCNC: 102 MG/DL (ref 65–140)
HCT VFR BLD AUTO: 41.8 % (ref 36.5–49.3)
HGB BLD-MCNC: 13.5 G/DL (ref 12–17)
IMM GRANULOCYTES # BLD AUTO: 0.04 THOUSAND/UL (ref 0–0.2)
IMM GRANULOCYTES NFR BLD AUTO: 0 % (ref 0–2)
LYMPHOCYTES # BLD AUTO: 1.61 THOUSANDS/ΜL (ref 0.6–4.47)
LYMPHOCYTES NFR BLD AUTO: 15 % (ref 14–44)
MCH RBC QN AUTO: 31.5 PG (ref 26.8–34.3)
MCHC RBC AUTO-ENTMCNC: 32.3 G/DL (ref 31.4–37.4)
MCV RBC AUTO: 97 FL (ref 82–98)
MONOCYTES # BLD AUTO: 0.85 THOUSAND/ΜL (ref 0.17–1.22)
MONOCYTES NFR BLD AUTO: 8 % (ref 4–12)
NEUTROPHILS # BLD AUTO: 8.2 THOUSANDS/ΜL (ref 1.85–7.62)
NEUTS SEG NFR BLD AUTO: 76 % (ref 43–75)
NRBC BLD AUTO-RTO: 0 /100 WBCS
PLATELET # BLD AUTO: 241 THOUSANDS/UL (ref 149–390)
PMV BLD AUTO: 9.4 FL (ref 8.9–12.7)
POTASSIUM SERPL-SCNC: 4.7 MMOL/L (ref 3.5–5.3)
PROT SERPL-MCNC: 7.3 G/DL (ref 6.4–8.4)
RBC # BLD AUTO: 4.29 MILLION/UL (ref 3.88–5.62)
RSV RNA RESP QL NAA+PROBE: NEGATIVE
SARS-COV-2 RNA RESP QL NAA+PROBE: NEGATIVE
SODIUM SERPL-SCNC: 143 MMOL/L (ref 135–147)
WBC # BLD AUTO: 10.89 THOUSAND/UL (ref 4.31–10.16)

## 2022-07-21 PROCEDURE — 84484 ASSAY OF TROPONIN QUANT: CPT

## 2022-07-21 PROCEDURE — 85025 COMPLETE CBC W/AUTO DIFF WBC: CPT

## 2022-07-21 PROCEDURE — 99285 EMERGENCY DEPT VISIT HI MDM: CPT

## 2022-07-21 PROCEDURE — 93005 ELECTROCARDIOGRAM TRACING: CPT

## 2022-07-21 PROCEDURE — 36415 COLL VENOUS BLD VENIPUNCTURE: CPT

## 2022-07-21 PROCEDURE — 0241U HB NFCT DS VIR RESP RNA 4 TRGT: CPT

## 2022-07-21 PROCEDURE — 80053 COMPREHEN METABOLIC PANEL: CPT

## 2022-07-21 PROCEDURE — 96374 THER/PROPH/DIAG INJ IV PUSH: CPT

## 2022-07-21 PROCEDURE — 71275 CT ANGIOGRAPHY CHEST: CPT

## 2022-07-21 PROCEDURE — G1004 CDSM NDSC: HCPCS

## 2022-07-21 PROCEDURE — 96375 TX/PRO/DX INJ NEW DRUG ADDON: CPT

## 2022-07-21 PROCEDURE — 96361 HYDRATE IV INFUSION ADD-ON: CPT

## 2022-07-21 PROCEDURE — 85379 FIBRIN DEGRADATION QUANT: CPT

## 2022-07-21 PROCEDURE — 99285 EMERGENCY DEPT VISIT HI MDM: CPT | Performed by: EMERGENCY MEDICINE

## 2022-07-21 RX ORDER — HEPARIN SODIUM 1000 [USP'U]/ML
6400 INJECTION, SOLUTION INTRAVENOUS; SUBCUTANEOUS
Status: DISCONTINUED | OUTPATIENT
Start: 2022-07-21 | End: 2022-07-23

## 2022-07-21 RX ORDER — ALBUTEROL SULFATE 90 UG/1
2 AEROSOL, METERED RESPIRATORY (INHALATION) EVERY 6 HOURS PRN
Status: DISCONTINUED | OUTPATIENT
Start: 2022-07-21 | End: 2022-07-22

## 2022-07-21 RX ORDER — HEPARIN SODIUM 1000 [USP'U]/ML
6400 INJECTION, SOLUTION INTRAVENOUS; SUBCUTANEOUS ONCE
Status: COMPLETED | OUTPATIENT
Start: 2022-07-21 | End: 2022-07-21

## 2022-07-21 RX ORDER — FINASTERIDE 5 MG/1
5 TABLET, FILM COATED ORAL DAILY
Status: DISCONTINUED | OUTPATIENT
Start: 2022-07-22 | End: 2022-07-23 | Stop reason: HOSPADM

## 2022-07-21 RX ORDER — CHOLECALCIFEROL (VITAMIN D3) 125 MCG
200 CAPSULE ORAL DAILY
Status: DISCONTINUED | OUTPATIENT
Start: 2022-07-22 | End: 2022-07-23 | Stop reason: HOSPADM

## 2022-07-21 RX ORDER — FUROSEMIDE 10 MG/ML
40 INJECTION INTRAMUSCULAR; INTRAVENOUS ONCE
Status: COMPLETED | OUTPATIENT
Start: 2022-07-21 | End: 2022-07-22

## 2022-07-21 RX ORDER — BISOPROLOL FUMARATE 5 MG/1
10 TABLET, FILM COATED ORAL 2 TIMES DAILY
Status: DISCONTINUED | OUTPATIENT
Start: 2022-07-22 | End: 2022-07-23 | Stop reason: HOSPADM

## 2022-07-21 RX ORDER — PANTOPRAZOLE SODIUM 20 MG/1
20 TABLET, DELAYED RELEASE ORAL
Status: DISCONTINUED | OUTPATIENT
Start: 2022-07-22 | End: 2022-07-23 | Stop reason: HOSPADM

## 2022-07-21 RX ORDER — LEVALBUTEROL 1.25 MG/.5ML
1.25 SOLUTION, CONCENTRATE RESPIRATORY (INHALATION) EVERY 6 HOURS PRN
Status: DISCONTINUED | OUTPATIENT
Start: 2022-07-21 | End: 2022-07-22

## 2022-07-21 RX ORDER — HEPARIN SODIUM 1000 [USP'U]/ML
3200 INJECTION, SOLUTION INTRAVENOUS; SUBCUTANEOUS
Status: DISCONTINUED | OUTPATIENT
Start: 2022-07-21 | End: 2022-07-23

## 2022-07-21 RX ORDER — FUROSEMIDE 20 MG/1
20 TABLET ORAL DAILY
Status: DISCONTINUED | OUTPATIENT
Start: 2022-07-22 | End: 2022-07-22

## 2022-07-21 RX ORDER — ONDANSETRON 2 MG/ML
4 INJECTION INTRAMUSCULAR; INTRAVENOUS EVERY 6 HOURS PRN
Status: DISCONTINUED | OUTPATIENT
Start: 2022-07-21 | End: 2022-07-23 | Stop reason: HOSPADM

## 2022-07-21 RX ORDER — MELATONIN
1000 DAILY
Status: DISCONTINUED | OUTPATIENT
Start: 2022-07-22 | End: 2022-07-23 | Stop reason: HOSPADM

## 2022-07-21 RX ORDER — SODIUM CHLORIDE FOR INHALATION 0.9 %
3 VIAL, NEBULIZER (ML) INHALATION
Status: DISCONTINUED | OUTPATIENT
Start: 2022-07-21 | End: 2022-07-22

## 2022-07-21 RX ORDER — ATORVASTATIN CALCIUM 10 MG/1
10 TABLET, FILM COATED ORAL DAILY
Status: DISCONTINUED | OUTPATIENT
Start: 2022-07-22 | End: 2022-07-23 | Stop reason: HOSPADM

## 2022-07-21 RX ORDER — SODIUM CHLORIDE FOR INHALATION 0.9 %
3 VIAL, NEBULIZER (ML) INHALATION EVERY 6 HOURS PRN
Status: DISCONTINUED | OUTPATIENT
Start: 2022-07-21 | End: 2022-07-22

## 2022-07-21 RX ORDER — HEPARIN SODIUM 10000 [USP'U]/100ML
3-30 INJECTION, SOLUTION INTRAVENOUS
Status: DISCONTINUED | OUTPATIENT
Start: 2022-07-21 | End: 2022-07-23

## 2022-07-21 RX ORDER — MAGNESIUM HYDROXIDE/ALUMINUM HYDROXICE/SIMETHICONE 120; 1200; 1200 MG/30ML; MG/30ML; MG/30ML
30 SUSPENSION ORAL EVERY 6 HOURS PRN
Status: DISCONTINUED | OUTPATIENT
Start: 2022-07-21 | End: 2022-07-23 | Stop reason: HOSPADM

## 2022-07-21 RX ORDER — LEVALBUTEROL 1.25 MG/.5ML
1.25 SOLUTION, CONCENTRATE RESPIRATORY (INHALATION)
Status: DISCONTINUED | OUTPATIENT
Start: 2022-07-21 | End: 2022-07-22

## 2022-07-21 RX ADMIN — HEPARIN SODIUM 6400 UNITS: 1000 INJECTION INTRAVENOUS; SUBCUTANEOUS at 22:53

## 2022-07-21 RX ADMIN — SODIUM CHLORIDE 1000 ML: 0.9 INJECTION, SOLUTION INTRAVENOUS at 20:19

## 2022-07-21 RX ADMIN — IOHEXOL 80 ML: 350 INJECTION, SOLUTION INTRAVENOUS at 20:39

## 2022-07-21 RX ADMIN — HEPARIN SODIUM 18 UNITS/KG/HR: 10000 INJECTION, SOLUTION INTRAVENOUS at 22:46

## 2022-07-21 NOTE — ED ATTENDING ATTESTATION
7/21/2022  IYaneth DO, saw and evaluated the patient  I have discussed the patient with the resident/non-physician practitioner and agree with the resident's/non-physician practitioner's findings, Plan of Care, and MDM as documented in the resident's/non-physician practitioner's note, except where noted  All available labs and Radiology studies were reviewed  I was present for key portions of any procedure(s) performed by the resident/non-physician practitioner and I was immediately available to provide assistance  At this point I agree with the current assessment done in the Emergency Department  I have conducted an independent evaluation of this patient a history and physical is as follows:    43-year-old male presents with hemoptysis  Patient states over the past week has had cough but since Sunday has had some blood in the phlegm  Patient states it looks like a blood clot in the morning and then some blood tinged mucus during the day  Denies chest pain, shortness of breath, no fevers  Did see family doctor on Friday was given azithromycin for bronchitis without improvement  Also reports 10 lb weight loss and states he has not been feeling very well for the past week  On exam-no acute distress, heart regular, lungs clear, abdomen soft nontender    Plan-check labs including D-dimer and coags, chest x-ray, consider CT if x-ray negative    ED Course         Critical Care Time  Procedures

## 2022-07-21 NOTE — ED PROVIDER NOTES
History  Chief Complaint   Patient presents with    Cough     Pt reports when he coughs up phlegm he noticed blood in it  Had an xray last week  Pt reports his BP has been low at doctors office and at home  Denies sob and chest pain  Also reports losing 10lbs in one week  82M PMH HTN, CHF, AICD, CKD presents to the emergency department with hemoptysis  He reports over the past week he has had cough which in the morning produces a solid blood clot and then red mucus later in the day  He denies fevers, chest pain, shortness of breath, abdominal pain, nausea, vomiting  He denies history of blood clots, leg swelling or pain, smoking, recent travel or surgery, or current cancer  He was treated with azithromycin for bronchitis by his PCP without improvement  Prior to Admission Medications   Prescriptions Last Dose Informant Patient Reported? Taking? Calcium Carb-Cholecalciferol (CALCIUM 600 + D PO) 7/22/2022 at Unknown time Self Yes Yes   Sig: Take 3 tablets by mouth daily     Coenzyme Q10 (CO Q-10) 200 MG CAPS 7/22/2022 at Unknown time Self Yes Yes   Sig: Take 1 tablet by mouth daily   Multiple Vitamins-Minerals (CENTRUM SILVER PO) 7/22/2022 at Unknown time Self Yes Yes   Sig: Take 1 tablet by mouth daily  PREVIDENT 5000 ENAMEL PROTECT 1 1-5 % PSTE Past Week at Unknown time Self Yes Yes   Sig: Use as directed   Vitamin D, Cholecalciferol, 1000 UNITS CAPS 7/22/2022 at Unknown time Self Yes Yes   Sig: Take 1 tablet by mouth daily     Wixela Inhub 500-50 MCG/DOSE inhaler 7/22/2022 at Unknown time Self No Yes   Sig: Inhale 1 puff 2 (two) times a day   albuterol (PROVENTIL HFA,VENTOLIN HFA) 90 mcg/act inhaler Past Month at Unknown time Self No Yes   Sig: Inhale 2 puffs every 6 (six) hours as needed for wheezing or shortness of breath   atorvastatin (LIPITOR) 10 mg tablet 7/22/2022 at Unknown time Self No Yes   Sig: TAKE ONE TABLET BY MOUTH EVERY DAY   bisoprolol (ZEBETA) 10 MG tablet 7/22/2022 at Unknown time Self No Yes   Sig: TAKE ONE TABLET BY MOUTH TWICE A DAY   finasteride (PROSCAR) 5 mg tablet 7/22/2022 at Unknown time Self No Yes   Sig: TAKE ONE TABLET BY MOUTH EVERY DAY   furosemide (LASIX) 20 mg tablet 7/22/2022 at Unknown time Self No Yes   Sig: TAKE ONE TABLET BY MOUTH EVERY DAY   omeprazole (PriLOSEC) 20 mg delayed release capsule 7/22/2022 at Unknown time Self No Yes   Sig: Take 1 capsule (20 mg total) by mouth daily   sacubitril-valsartan (Entresto) 49-51 MG TABS 7/22/2022 at Unknown time Self No Yes   Sig: Take 1 tablet by mouth 2 (two) times a day   tadalafil (CIALIS) 10 MG tablet Unknown at Unknown time Self No No   Sig: Take 1 tablet (10 mg total) by mouth daily as needed for erectile dysfunction      Facility-Administered Medications: None       Past Medical History:   Diagnosis Date    Acute on chronic systolic congestive heart failure ([facility] [address] ) 1/29/2019    AICD (automatic cardioverter/defibrillator) present     Arthritis     Asthma     Burt esophagus     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     Cancer, colon ([facility] [address] ) 4/5/2013    Cardiac arrhythmia     Cardiomyopathy ([facility] [address] )     CKD (chronic kidney disease)     Colon cancer ([facility] [address] )     Colon cancer screening 6/21/2022    Congestive heart failure (CHF) ([facility] [address] )     COPD (chronic obstructive pulmonary disease) ([facility] [address] )     Diverticulitis     Last assessed: 4/5/13    Esophageal reflux     Gout     Hernia     Hyperlipidemia     Hypertension     Hypertension     Hypothyroidism     Hypothyroidism 5/30/2013    Non-toxic multinodular goiter 11/28/2012    Pleural effusion     Polymyalgia rheumatica (HCC)     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     Spondyloarthropathy     Last assessed: 11/28/12    Thrombocytopenia ([address] ) 6/23/2015       Past Surgical History:   Procedure Laterality Date    ARM SKIN LESION BIOPSY / EXCISION      Malignant    ATRIAL ABLATION SURGERY      AV NODE ABLATION      CARDIAC DEFIBRILLATOR PLACEMENT  2009    Cardio-Defib Pulse Gen Venous Insertion of Electrode for Ventricular Pacing  Implantable    CARDIAC SURGERY  2009    Catheterization    COLONOSCOPY N/A 3/14/2016    Procedure: COLONOSCOPY;  Surgeon: Ally Mueller MD;  Location: [facility]; Service  February 22, 2013, mildly enlarged prostate, history of colon CA with normal appearance of anastomosis at the midtransverse colon, severe diverticulosis in the sigmoid, descending and transverse colon  Repeat colonoscopy in 3 yrs    HEMICOLECTOMY Right 05/05/2004    INGUINAL HERNIA REPAIR Bilateral     Left 3/2004, right 5/2008    IR UPPER EXTREMITY VENOGRAM- DIAGNOSTIC  10/4/2018    KNEE SURGERY  1972    Meniscectomy    MI ESOPHAGOGASTRODUODENOSCOPY TRANSORAL DIAGNOSTIC N/A 12/5/2016    Procedure: ESOPHAGOGASTRODUODENOSCOPY (EGD); Surgeon: Crystal Tariq MD;  Location: [facility]; Service: Gastroenterology    TONSILLECTOMY AND ADENOIDECTOMY      UPPER GASTROINTESTINAL ENDOSCOPY         Family History   Problem Relation Age of Onset    Heart failure Mother         Congestive    Hypertension Mother     Osteoporosis Mother     COPD Father     Emphysema Father     Hypertension Father     Heart disease Sister         Heart Valve Replacement    Osteoporosis Sister     Breast cancer Family      I have reviewed and agree with the history as documented  E-Cigarette/Vaping    E-Cigarette Use Never User      E-Cigarette/Vaping Substances     Social History     Tobacco Use    Smoking status: Never Smoker    Smokeless tobacco: Never Used   Vaping Use    Vaping Use: Never used   Substance Use Topics    Alcohol use: Never     Comment: Rare    Drug use: No        Review of Systems   Constitutional: Negative for fever  HENT: Negative for sore throat  Respiratory: Positive for cough  Negative for shortness of breath  Cardiovascular: Negative for chest pain and leg swelling     Gastrointestinal: Negative for abdominal pain, diarrhea, nausea and vomiting  Genitourinary: Negative for dysuria  All other systems reviewed and are negative  Physical Exam  ED Triage Vitals [07/21/22 1402]   Temperature Pulse Respirations Blood Pressure SpO2   98 °F (36 7 °C) 80 20 102/70 97 %      Temp Source Heart Rate Source Patient Position - Orthostatic VS BP Location FiO2 (%)   Oral Monitor Sitting Left arm --      Pain Score       No Pain             Orthostatic Vital Signs  Vitals:    07/22/22 1030 07/22/22 1500 07/22/22 1900 07/22/22 2242   BP: 112/68 92/58 125/70 137/62   Pulse: 70 73 76 70   Patient Position - Orthostatic VS:   Lying Lying       Physical Exam  Vitals and nursing note reviewed  Constitutional:       General: He is not in acute distress  Appearance: He is not ill-appearing  HENT:      Head: Normocephalic  Cardiovascular:      Rate and Rhythm: Normal rate and regular rhythm  Heart sounds: Normal heart sounds  No murmur heard  Pulmonary:      Effort: Pulmonary effort is normal  No respiratory distress  Breath sounds: Normal breath sounds  No wheezing, rhonchi or rales  Abdominal:      General: Abdomen is flat  There is no distension  Palpations: Abdomen is soft  Tenderness: There is no abdominal tenderness  There is no guarding or rebound  Musculoskeletal:      Right lower leg: No edema  Left lower leg: No edema  Skin:     General: Skin is warm and dry  Neurological:      Mental Status: He is alert           ED Medications  Medications   heparin (porcine) 25,000 units in 0 45% NaCl 250 mL infusion (premix) (15 Units/kg/hr × 80 kg (Order-Specific) Intravenous New Bag 7/22/22 1939)   heparin (porcine) injection 6,400 Units (has no administration in time range)   heparin (porcine) injection 3,200 Units (has no administration in time range)   atorvastatin (LIPITOR) tablet 10 mg (10 mg Oral Given 7/22/22 0807)   bisoprolol (ZEBETA) tablet 10 mg (10 mg Oral Not Given 7/22/22 1730)   calcium carbonate-vitamin D (OSCAL-D) 500 mg-200 units per tablet 3 tablet (3 tablets Oral Given 7/22/22 0806)   co-enzyme Q-10 capsule 200 mg (200 mg Oral Given 7/22/22 0806)   finasteride (PROSCAR) tablet 5 mg (5 mg Oral Given 7/22/22 0807)   multivitamin-minerals (CENTRUM) tablet 1 tablet (1 tablet Oral Given 7/22/22 0806)   pantoprazole (PROTONIX) EC tablet 20 mg (20 mg Oral Given 7/22/22 0614)   sacubitril-valsartan (ENTRESTO) 49-51 MG per tablet 1 tablet (1 tablet Oral Not Given 7/22/22 1730)   cholecalciferol (VITAMIN D3) tablet 1,000 Units (1,000 Units Oral Given 7/22/22 0806)   ondansetron (ZOFRAN) injection 4 mg (has no administration in time range)   aluminum-magnesium hydroxide-simethicone (MYLANTA) oral suspension 30 mL (has no administration in time range)   albuterol (PROVENTIL HFA,VENTOLIN HFA) inhaler 2 puff (has no administration in time range)   furosemide (LASIX) tablet 10 mg (has no administration in time range)   sodium chloride 0 9 % bolus 1,000 mL (0 mL Intravenous Stopped 7/21/22 2200)   iohexol (OMNIPAQUE) 350 MG/ML injection (SINGLE-DOSE) 100 mL (80 mL Intravenous Given 7/21/22 2039)   heparin (porcine) injection 6,400 Units (6,400 Units Intravenous Given 7/21/22 2253)   furosemide (LASIX) injection 40 mg (40 mg Intravenous Given 7/22/22 0009)       Diagnostic Studies  Results Reviewed     Procedure Component Value Units Date/Time    TSH, 3rd generation with Free T4 reflex [[id number]]  (Normal) Collected: 07/22/22 0459    Lab Status: Final result Specimen: Blood from Arm, Right Updated: 07/22/22 1654     TSH 3RD GENERATON 2 440 uIU/mL     Narrative:      Patients undergoing fluorescein dye angiography may retain small amounts of fluorescein in the body for 48-72 hours post procedure  Samples containing fluorescein can produce falsely depressed TSH values  If the patient had this procedure,a specimen should be resubmitted post fluorescein clearance        APTT [[id number]]  (Abnormal) Collected: 07/22/22 1431    Lab Status: Final result Specimen: Blood from Arm, Right Updated: 07/22/22 1516     PTT 67 seconds     Narrative:      No clots      NT-BNP PRO [[id number]]  (Abnormal) Collected: 07/22/22 0459    Lab Status: Final result Specimen: Blood from Arm, Right Updated: 07/22/22 0901     NT-proBNP 6,971 pg/mL     APTT [[id number]]  (Abnormal) Collected: 07/22/22 0459    Lab Status: Final result Specimen: Blood from Arm, Right Updated: 07/22/22 0754      seconds     Comprehensive metabolic panel [[id number]]  (Abnormal) Collected: 07/22/22 0459    Lab Status: Final result Specimen: Blood from Arm, Right Updated: 07/22/22 0530     Sodium 144 mmol/L      Potassium 4 0 mmol/L      Chloride 113 mmol/L      CO2 24 mmol/L      ANION GAP 7 mmol/L      BUN 22 mg/dL      Creatinine 1 36 mg/dL      Glucose 98 mg/dL      Calcium 8 9 mg/dL      Corrected Calcium 9 9 mg/dL      AST 22 U/L      ALT 31 U/L      Alkaline Phosphatase 78 U/L      Total Protein 6 6 g/dL      Albumin 2 7 g/dL      Total Bilirubin 0 82 mg/dL      eGFR 48 ml/min/1 73sq m     Narrative:      [place] guidelines for Chronic Kidney Disease (CKD):     Stage 1 with normal or high GFR (GFR > 90 mL/min/1 73 square meters)    Stage 2 Mild CKD (GFR = 60-89 mL/min/1 73 square meters)    Stage 3A Moderate CKD (GFR = 45-59 mL/min/1 73 square meters)    Stage 3B Moderate CKD (GFR = 30-44 mL/min/1 73 square meters)    Stage 4 Severe CKD (GFR = 15-29 mL/min/1 73 square meters)    Stage 5 End Stage CKD (GFR <15 mL/min/1 73 square meters)  Note: GFR calculation is accurate only with a steady state creatinine    Magnesium [[id number]]  (Normal) Collected: 07/22/22 0459    Lab Status: Final result Specimen: Blood from Arm, Right Updated: 07/22/22 0530     Magnesium 2 0 mg/dL     CBC and differential [[id number]] Collected: 07/22/22 0459    Lab Status: Final result Specimen: Blood from Arm, Right Updated: 07/22/22 0511     WBC 9 96 Thousand/uL      RBC 4 07 Million/uL      Hemoglobin 13 2 g/dL      Hematocrit 38 6 %      MCV 95 fL      MCH 32 4 pg      MCHC 34 2 g/dL      RDW 13 7 %      MPV 9 2 fL      Platelets 077 Thousands/uL      nRBC 0 /100 WBCs      Neutrophils Relative 74 %      Immat GRANS % 0 %      Lymphocytes Relative 17 %      Monocytes Relative 8 %      Eosinophils Relative 1 %      Basophils Relative 0 %      Neutrophils Absolute 7 30 Thousands/µL      Immature Grans Absolute 0 04 Thousand/uL      Lymphocytes Absolute 1 69 Thousands/µL      Monocytes Absolute 0 75 Thousand/µL      Eosinophils Absolute 0 14 Thousand/µL      Basophils Absolute 0 04 Thousands/µL     APTT [[id number]]  (Abnormal) Collected: 07/22/22 0008    Lab Status: Final result Specimen: Blood from Arm, Right Updated: 07/22/22 0051      seconds     Protime-INR [[id number]]  (Abnormal) Collected: 07/22/22 0008    Lab Status: Final result Specimen: Blood from Arm, Right Updated: 07/22/22 0051     Protime 16 0 seconds      INR 1 26    HS Troponin I 2hr [[id number]]  (Normal) Collected: 07/21/22 2143    Lab Status: Final result Specimen: Blood from Arm, Left Updated: 07/21/22 2225     hs TnI 2hr 17 ng/L      Delta 2hr hsTnI 1 ng/L     HS Troponin 0hr (reflex protocol) [[id number]]  (Normal) Collected: 07/21/22 1923    Lab Status: Final result Specimen: Blood from Arm, Left Updated: 07/21/22 2027     hs TnI 0hr 16 ng/L     FLU/RSV/COVID - if FLU/RSV clinically relevant [[id number]]  (Normal) Collected: 07/21/22 1934    Lab Status: Final result Specimen: Nares from Nose Updated: 07/21/22 2023     SARS-CoV-2 Negative     INFLUENZA A PCR Negative     INFLUENZA B PCR Negative     RSV PCR Negative    Narrative:      FOR PEDIATRIC PATIENTS - copy/paste COVID Guidelines URL to browser: [email or web address]    SARS-CoV-2 assay is a Nucleic Acid Amplification assay intended for the  qualitative detection of nucleic acid from SARS-CoV-2 in nasopharyngeal  swabs  Results are for the presumptive identification of SARS-CoV-2 RNA  Positive results are indicative of infection with SARS-CoV-2, the virus  causing COVID-19, but do not rule out bacterial infection or co-infection  with other viruses  Laboratories within the [place] and its  territories are required to report all positive results to the appropriate  public health authorities  Negative results do not preclude SARS-CoV-2  infection and should not be used as the sole basis for treatment or other  patient management decisions  Negative results must be combined with  clinical observations, patient history, and epidemiological information  This test has not been FDA cleared or approved  This test has been authorized by FDA under an Emergency Use Authorization  (EUA)  This test is only authorized for the duration of time the  declaration that circumstances exist justifying the authorization of the  emergency use of an in vitro diagnostic tests for detection of SARS-CoV-2  virus and/or diagnosis of COVID-19 infection under section 564(b)(1) of  the Act, 21 U  S C  554CFT-4(W)(0), unless the authorization is terminated  or revoked sooner  The test has been validated but independent review by FDA  and CLIA is pending  Test performed using [employer] GeneXpert: This RT-PCR assay targets N2,  a region unique to SARS-CoV-2  A conserved region in the E-gene was chosen  for pan-Sarbecovirus detection which includes SARS-CoV-2  D-Dimer [[id number]]  (Abnormal) Collected: 07/21/22 1923    Lab Status: Final result Specimen: Blood from Arm, Left Updated: 07/21/22 1957     D-Dimer, Quant 6 22 ug/ml FEU     Narrative:       In the evaluation for possible pulmonary embolism, in the appropriate (Well's Score of 4 or less) patient, the age adjusted d-dimer cutoff for this patient can be calculated as:    Age x 0 01 (in ug/mL) for Age-adjusted D-dimer exclusion threshold for a patient over 50 years      Comprehensive metabolic panel [[id number]]  (Abnormal) Collected: 07/21/22 1923    Lab Status: Final result Specimen: Blood from Arm, Left Updated: 07/21/22 1955     Sodium 143 mmol/L      Potassium 4 7 mmol/L      Chloride 111 mmol/L      CO2 26 mmol/L      ANION GAP 6 mmol/L      BUN 25 mg/dL      Creatinine 1 51 mg/dL      Glucose 102 mg/dL      Calcium 9 3 mg/dL      Corrected Calcium 10 3 mg/dL      AST 26 U/L      ALT 37 U/L      Alkaline Phosphatase 83 U/L      Total Protein 7 3 g/dL      Albumin 2 8 g/dL      Total Bilirubin 0 70 mg/dL      eGFR 42 ml/min/1 73sq m     Narrative:      National Kid[employer] Disease Foundation guidelines for Chronic Kidney Disease (CKD):     Stage 1 with normal or high GFR (GFR > 90 mL/min/1 73 square meters)    Stage 2 Mild CKD (GFR = 60-89 mL/min/1 73 square meters)    Stage 3A Moderate CKD (GFR = 45-59 mL/min/1 73 square meters)    Stage 3B Moderate CKD (GFR = 30-44 mL/min/1 73 square meters)    Stage 4 Severe CKD (GFR = 15-29 mL/min/1 73 square meters)    Stage 5 End Stage CKD (GFR <15 mL/min/1 73 square meters)  Note: GFR calculation is accurate only with a steady state creatinine    CBC and differential [[id number]]  (Abnormal) Collected: 07/21/22 1923    Lab Status: Final result Specimen: Blood from Arm, Left Updated: 07/21/22 1942     WBC 10 89 Thousand/uL      RBC 4 29 Million/uL      Hemoglobin 13 5 g/dL      Hematocrit 41 8 %      MCV 97 fL      MCH 31 5 pg      MCHC 32 3 g/dL      RDW 13 8 %      MPV 9 4 fL      Platelets 567 Thousands/uL      nRBC 0 /100 WBCs      Neutrophils Relative 76 %      Immat GRANS % 0 %      Lymphocytes Relative 15 %      Monocytes Relative 8 %      Eosinophils Relative 1 %      Basophils Relative 0 %      Neutrophils Absolute 8 20 Thousands/µL      Immature Grans Absolute 0 04 Thousand/uL      Lymphocytes Absolute 1 61 Thousands/µL      Monocytes Absolute 0 85 Thousand/µL Eosinophils Absolute 0 15 Thousand/µL      Basophils Absolute 0 04 Thousands/µL                  CT abdomen pelvis wo contrast   Final Result by Elbert Fletcher MD (07/22 2139)      1  Trace left pleural effusion  2   Small hiatal hernia  3   Cholelithiasis  4   Left renal cyst    5   Colonic diverticulosis  Right hemicolectomy  Workstation performed: [id number]         CTA ED chest PE study   Final Result by Suman Owens MD (07/21 2215)      1  Extensive pulmonary emboli seen within the distal bilateral main pulmonary arteries extending into the right upper and lower lobar branches and left lower lobar branches and further distally  2   Measured RV/LV ratio is within normal limits at less than 0 9  However, there is increase in size of the main pulmonary arteries which may be due to pulmonary arterial hypertension  3   Small opacities at the bilateral lower lobe bases may be due to atelectasis versus infarct  4   Trace bilateral pleural effusions  5   Cardiomegaly  6   Cholelithiasis without evidence of cholecystitis  I personally discussed this study with Mj Epps on 7/21/2022 at 10:06 PM                Workstation performed: [id number]               Procedures  Procedures      ED Course                                       MDM  Number of Diagnoses or Management Options  Diagnosis management comments: 82M PMH HTN, CHF, AICD, CKD presents to the emergency department with hemoptysis  Workup including vital signs, physical exam, EKG, labs, CT  Elevated D-dimer, CT PE scan done  Patient signed out at end of shift  Per chart review CT scan with evidence of PE, patient treated with heparin and admitted to medicine        Disposition  Final diagnoses:   Acute combined systolic and diastolic heart failure (HCC)   Multiple subsegmental pulmonary emboli without acute cor pulmonale (HCC)   Pulmonary embolism (HCC)   Acute pulmonary embolism with acute cor pulmonale ([facility] [address] ) Time reflects when diagnosis was documented in both MDM as applicable and the Disposition within this note     Time User Action Codes Description Comment    7/21/2022 10:55 PM Christ Smiling Add [I50 41] Acute combined systolic and diastolic heart failure ([facility] [address] )     7/21/2022 10:55 PM Christ Smiling Add [I26 94] Multiple subsegmental pulmonary emboli without acute cor pulmonale ([facility] [address] )     7/21/2022 10:56 PM Christ Smiling Add [I26 99] Pulmonary embolism ([facility] )     7/22/2022  3:39 PM Blanche Reil Add [I26 09] Acute pulmonary embolism with acute cor pulmonale [facility])       ED Disposition     None      Follow-up Information    None         Current Discharge Medication List      START taking these medications    Details   apixaban (Eliquis) 5 mg Take 2 tablets (10 mg total) by mouth 2 (two) times a day for 7 days, THEN 1 tablet (5 mg total) 2 (two) times a day for 23 days  Qty: 74 tablet, Refills: 0    Comments: Eliquis Starter Pack  Associated Diagnoses: Acute pulmonary embolism with acute cor pulmonale (HCC)         CONTINUE these medications which have NOT CHANGED    Details   albuterol (PROVENTIL HFA,VENTOLIN HFA) 90 mcg/act inhaler Inhale 2 puffs every 6 (six) hours as needed for wheezing or shortness of breath  Qty: 1 Inhaler, Refills: 1    Comments: Substitution to a formulary equivalent within the same pharmaceutical class is authorized    Associated Diagnoses: Mild persistent asthma with acute exacerbation      atorvastatin (LIPITOR) 10 mg tablet TAKE ONE TABLET BY MOUTH EVERY DAY  Qty: 90 tablet, Refills: 3    Associated Diagnoses: Hypercortisolemia (HCC)      bisoprolol (ZEBETA) 10 MG tablet TAKE ONE TABLET BY MOUTH TWICE A DAY  Qty: 180 tablet, Refills: 3    Associated Diagnoses: Hypertension, unspecified type      Calcium Carb-Cholecalciferol (CALCIUM 600 + D PO) Take 3 tablets by mouth daily        Coenzyme Q10 (CO Q-10) 200 MG CAPS Take 1 tablet by mouth daily      finasteride (PROSCAR) 5 mg tablet TAKE ONE TABLET BY MOUTH EVERY DAY  Qty: 90 tablet, Refills: 1    Associated Diagnoses: BPH with obstruction/lower urinary tract symptoms      furosemide (LASIX) 20 mg tablet TAKE ONE TABLET BY MOUTH EVERY DAY  Qty: 90 tablet, Refills: 3    Associated Diagnoses: SOB (shortness of breath); Cardiomyopathy, unspecified type (HCC)      Multiple Vitamins-Minerals (CENTRUM SILVER PO) Take 1 tablet by mouth daily  omeprazole (PriLOSEC) 20 mg delayed release capsule Take 1 capsule (20 mg total) by mouth daily  Qty: 90 capsule, Refills: 2    Associated Diagnoses: 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      PREVIDENT 5000 ENAMEL PROTECT 1 1-5 % PSTE Use as directed  Refills: 3      sacubitril-valsartan (Entresto) 49-51 MG TABS Take 1 tablet by mouth 2 (two) times a day  Qty: 180 tablet, Refills: 3    Associated Diagnoses: Chronic systolic CHF (congestive heart failure) ([facility])      Vitamin D, Cholecalciferol, 1000 UNITS CAPS Take 1 tablet by mouth daily  Wixela Inhub 500-50 MCG/DOSE inhaler Inhale 1 puff 2 (two) times a day  Qty: 1 Inhaler, Refills: 0    Comments: Substitution to a formulary equivalent within the same pharmaceutical class is authorized  Associated Diagnoses: Mild persistent asthma with acute exacerbation      tadalafil (CIALIS) 10 MG tablet Take 1 tablet (10 mg total) by mouth daily as needed for erectile dysfunction  Qty: 10 tablet, Refills: 1    Associated Diagnoses: Erectile dysfunction due to arterial insufficiency           No discharge procedures on file  PDMP Review     None           ED Provider  Attending physically available and evaluated Alyson Miner I managed the patient along with the ED Attending      Electronically Signed by         Margaret Méndez MD  07/23/22 1436

## 2022-07-22 ENCOUNTER — APPOINTMENT (INPATIENT)
Dept: RADIOLOGY | Facility: HOSPITAL | Age: 82
DRG: 175 | End: 2022-07-22
Payer: COMMERCIAL

## 2022-07-22 ENCOUNTER — APPOINTMENT (INPATIENT)
Dept: NON INVASIVE DIAGNOSTICS | Facility: HOSPITAL | Age: 82
DRG: 175 | End: 2022-07-22
Payer: COMMERCIAL

## 2022-07-22 PROBLEM — I26.09 ACUTE PULMONARY EMBOLISM WITH ACUTE COR PULMONALE (HCC): Status: ACTIVE | Noted: 2022-07-22

## 2022-07-22 PROBLEM — Z12.11 COLON CANCER SCREENING: Status: RESOLVED | Noted: 2022-06-21 | Resolved: 2022-07-22

## 2022-07-22 LAB
ALBUMIN SERPL BCP-MCNC: 2.7 G/DL (ref 3.5–5)
ALP SERPL-CCNC: 78 U/L (ref 46–116)
ALT SERPL W P-5'-P-CCNC: 31 U/L (ref 12–78)
ANION GAP SERPL CALCULATED.3IONS-SCNC: 7 MMOL/L (ref 4–13)
AORTIC ROOT: 4.6 CM
APICAL FOUR CHAMBER EJECTION FRACTION: 28 %
APTT PPP: 150 SECONDS (ref 23–37)
APTT PPP: 190 SECONDS (ref 23–37)
APTT PPP: 67 SECONDS (ref 23–37)
APTT PPP: 67 SECONDS (ref 23–37)
ASCENDING AORTA: 4.1 CM
AST SERPL W P-5'-P-CCNC: 22 U/L (ref 5–45)
ATRIAL RATE: 68 BPM
AV REGURGITATION PRESSURE HALF TIME: 418 MS
BASOPHILS # BLD AUTO: 0.04 THOUSANDS/ΜL (ref 0–0.1)
BASOPHILS NFR BLD AUTO: 0 % (ref 0–1)
BILIRUB SERPL-MCNC: 0.82 MG/DL (ref 0.2–1)
BUN SERPL-MCNC: 22 MG/DL (ref 5–25)
CALCIUM ALBUM COR SERPL-MCNC: 9.9 MG/DL (ref 8.3–10.1)
CALCIUM SERPL-MCNC: 8.9 MG/DL (ref 8.3–10.1)
CHLORIDE SERPL-SCNC: 113 MMOL/L (ref 96–108)
CO2 SERPL-SCNC: 24 MMOL/L (ref 21–32)
CREAT SERPL-MCNC: 1.36 MG/DL (ref 0.6–1.3)
E WAVE DECELERATION TIME: 165 MS
EOSINOPHIL # BLD AUTO: 0.14 THOUSAND/ΜL (ref 0–0.61)
EOSINOPHIL NFR BLD AUTO: 1 % (ref 0–6)
ERYTHROCYTE [DISTWIDTH] IN BLOOD BY AUTOMATED COUNT: 13.7 % (ref 11.6–15.1)
FRACTIONAL SHORTENING: 11 (ref 28–44)
GFR SERPL CREATININE-BSD FRML MDRD: 48 ML/MIN/1.73SQ M
GLUCOSE SERPL-MCNC: 98 MG/DL (ref 65–140)
HCT VFR BLD AUTO: 38.6 % (ref 36.5–49.3)
HGB BLD-MCNC: 13.2 G/DL (ref 12–17)
IMM GRANULOCYTES # BLD AUTO: 0.04 THOUSAND/UL (ref 0–0.2)
IMM GRANULOCYTES NFR BLD AUTO: 0 % (ref 0–2)
INR PPP: 1.26 (ref 0.84–1.19)
INTERVENTRICULAR SEPTUM IN DIASTOLE (PARASTERNAL SHORT AXIS VIEW): 1.6 CM
INTERVENTRICULAR SEPTUM: 1.6 CM (ref 0.6–1.1)
LAAS-AP2: 27.2 CM2
LAAS-AP4: 24.3 CM2
LEFT ATRIUM SIZE: 4.5 CM
LEFT INTERNAL DIMENSION IN SYSTOLE: 4.7 CM (ref 2.1–4)
LEFT VENTRICLE DIASTOLIC VOLUME (MOD BIPLANE): 216 ML
LEFT VENTRICLE SYSTOLIC VOLUME (MOD BIPLANE): 153 ML
LEFT VENTRICULAR INTERNAL DIMENSION IN DIASTOLE: 5.3 CM (ref 3.5–6)
LEFT VENTRICULAR POSTERIOR WALL IN END DIASTOLE: 1.3 CM
LEFT VENTRICULAR STROKE VOLUME: 34 ML
LV EF: 29 %
LVSV (TEICH): 34 ML
LYMPHOCYTES # BLD AUTO: 1.69 THOUSANDS/ΜL (ref 0.6–4.47)
LYMPHOCYTES NFR BLD AUTO: 17 % (ref 14–44)
MAGNESIUM SERPL-MCNC: 2 MG/DL (ref 1.6–2.6)
MCH RBC QN AUTO: 32.4 PG (ref 26.8–34.3)
MCHC RBC AUTO-ENTMCNC: 34.2 G/DL (ref 31.4–37.4)
MCV RBC AUTO: 95 FL (ref 82–98)
MONOCYTES # BLD AUTO: 0.75 THOUSAND/ΜL (ref 0.17–1.22)
MONOCYTES NFR BLD AUTO: 8 % (ref 4–12)
MV E'TISSUE VEL-SEP: 5 CM/S
MV PEAK A VEL: 0.57 M/S
MV PEAK E VEL: 65 CM/S
MV STENOSIS PRESSURE HALF TIME: 48 MS
MV VALVE AREA P 1/2 METHOD: 4.58
NEUTROPHILS # BLD AUTO: 7.3 THOUSANDS/ΜL (ref 1.85–7.62)
NEUTS SEG NFR BLD AUTO: 74 % (ref 43–75)
NRBC BLD AUTO-RTO: 0 /100 WBCS
NT-PROBNP SERPL-MCNC: 6971 PG/ML
P AXIS: 78 DEGREES
PLATELET # BLD AUTO: 183 THOUSANDS/UL (ref 149–390)
PMV BLD AUTO: 9.2 FL (ref 8.9–12.7)
POTASSIUM SERPL-SCNC: 4 MMOL/L (ref 3.5–5.3)
PR INTERVAL: 248 MS
PROT SERPL-MCNC: 6.6 G/DL (ref 6.4–8.4)
PROTHROMBIN TIME: 16 SECONDS (ref 11.6–14.5)
QRS AXIS: -64 DEGREES
QRSD INTERVAL: 136 MS
QT INTERVAL: 442 MS
QTC INTERVAL: 469 MS
RA PRESSURE ESTIMATED: 8 MMHG
RBC # BLD AUTO: 4.07 MILLION/UL (ref 3.88–5.62)
RIGHT ATRIUM AREA SYSTOLE A4C: 26.4 CM2
RIGHT VENTRICLE ID DIMENSION: 4.6 CM
RV PSP: 33 MMHG
SL CV AV DECELERATION TIME RETROGRADE: 1775 MS
SL CV AV PEAK GRADIENT RETROGRADE: 53 MMHG
SL CV LEFT ATRIUM LENGTH A2C: 5.6 CM
SL CV LV EF: 35
SL CV PED ECHO LEFT VENTRICLE DIASTOLIC VOLUME (MOD BIPLANE) 2D: 136 ML
SL CV PED ECHO LEFT VENTRICLE SYSTOLIC VOLUME (MOD BIPLANE) 2D: 103 ML
SODIUM SERPL-SCNC: 144 MMOL/L (ref 135–147)
T WAVE AXIS: 105 DEGREES
TR MAX PG: 25 MMHG
TR PEAK VELOCITY: 2.5 M/S
TRICUSPID VALVE PEAK REGURGITATION VELOCITY: 2.49 M/S
TSH SERPL DL<=0.05 MIU/L-ACNC: 2.44 UIU/ML (ref 0.45–4.5)
VENTRICULAR RATE: 68 BPM
WBC # BLD AUTO: 9.96 THOUSAND/UL (ref 4.31–10.16)

## 2022-07-22 PROCEDURE — 83880 ASSAY OF NATRIURETIC PEPTIDE: CPT | Performed by: STUDENT IN AN ORGANIZED HEALTH CARE EDUCATION/TRAINING PROGRAM

## 2022-07-22 PROCEDURE — 99223 1ST HOSP IP/OBS HIGH 75: CPT | Performed by: INTERNAL MEDICINE

## 2022-07-22 PROCEDURE — 99024 POSTOP FOLLOW-UP VISIT: CPT | Performed by: INTERNAL MEDICINE

## 2022-07-22 PROCEDURE — 74176 CT ABD & PELVIS W/O CONTRAST: CPT

## 2022-07-22 PROCEDURE — 84443 ASSAY THYROID STIM HORMONE: CPT | Performed by: STUDENT IN AN ORGANIZED HEALTH CARE EDUCATION/TRAINING PROGRAM

## 2022-07-22 PROCEDURE — 94640 AIRWAY INHALATION TREATMENT: CPT

## 2022-07-22 PROCEDURE — 85730 THROMBOPLASTIN TIME PARTIAL: CPT

## 2022-07-22 PROCEDURE — 85730 THROMBOPLASTIN TIME PARTIAL: CPT | Performed by: INTERNAL MEDICINE

## 2022-07-22 PROCEDURE — 93306 TTE W/DOPPLER COMPLETE: CPT

## 2022-07-22 PROCEDURE — 83735 ASSAY OF MAGNESIUM: CPT | Performed by: INTERNAL MEDICINE

## 2022-07-22 PROCEDURE — 85610 PROTHROMBIN TIME: CPT

## 2022-07-22 PROCEDURE — 36415 COLL VENOUS BLD VENIPUNCTURE: CPT

## 2022-07-22 PROCEDURE — 93306 TTE W/DOPPLER COMPLETE: CPT | Performed by: INTERNAL MEDICINE

## 2022-07-22 PROCEDURE — 93010 ELECTROCARDIOGRAM REPORT: CPT | Performed by: INTERNAL MEDICINE

## 2022-07-22 PROCEDURE — 80053 COMPREHEN METABOLIC PANEL: CPT | Performed by: INTERNAL MEDICINE

## 2022-07-22 PROCEDURE — G1004 CDSM NDSC: HCPCS

## 2022-07-22 PROCEDURE — 85025 COMPLETE CBC W/AUTO DIFF WBC: CPT | Performed by: INTERNAL MEDICINE

## 2022-07-22 RX ORDER — ALBUTEROL SULFATE 90 UG/1
2 AEROSOL, METERED RESPIRATORY (INHALATION) EVERY 4 HOURS PRN
Status: DISCONTINUED | OUTPATIENT
Start: 2022-07-22 | End: 2022-07-23 | Stop reason: HOSPADM

## 2022-07-22 RX ORDER — FUROSEMIDE 20 MG/1
10 TABLET ORAL DAILY
Status: DISCONTINUED | OUTPATIENT
Start: 2022-07-23 | End: 2022-07-23 | Stop reason: HOSPADM

## 2022-07-22 RX ADMIN — HEPARIN SODIUM 15 UNITS/KG/HR: 10000 INJECTION, SOLUTION INTRAVENOUS at 19:39

## 2022-07-22 RX ADMIN — SACUBITRIL AND VALSARTAN 1 TABLET: 49; 51 TABLET, FILM COATED ORAL at 08:06

## 2022-07-22 RX ADMIN — FUROSEMIDE 20 MG: 20 TABLET ORAL at 08:07

## 2022-07-22 RX ADMIN — Medication 1000 UNITS: at 08:06

## 2022-07-22 RX ADMIN — LEVALBUTEROL HYDROCHLORIDE 1.25 MG: 1.25 SOLUTION, CONCENTRATE RESPIRATORY (INHALATION) at 08:22

## 2022-07-22 RX ADMIN — ISODIUM CHLORIDE 3 ML: 0.03 SOLUTION RESPIRATORY (INHALATION) at 08:22

## 2022-07-22 RX ADMIN — FLUTICASONE FUROATE AND VILANTEROL TRIFENATATE 1 PUFF: 200; 25 POWDER RESPIRATORY (INHALATION) at 08:05

## 2022-07-22 RX ADMIN — Medication 3 TABLET: at 08:06

## 2022-07-22 RX ADMIN — Medication 200 MG: at 08:06

## 2022-07-22 RX ADMIN — FUROSEMIDE 40 MG: 10 INJECTION, SOLUTION INTRAMUSCULAR; INTRAVENOUS at 00:09

## 2022-07-22 RX ADMIN — Medication 1 TABLET: at 08:06

## 2022-07-22 RX ADMIN — FINASTERIDE 5 MG: 5 TABLET, FILM COATED ORAL at 08:07

## 2022-07-22 RX ADMIN — ATORVASTATIN CALCIUM 10 MG: 10 TABLET, FILM COATED ORAL at 08:07

## 2022-07-22 RX ADMIN — BISOPROLOL FUMARATE 10 MG: 5 TABLET ORAL at 08:06

## 2022-07-22 RX ADMIN — PANTOPRAZOLE SODIUM 20 MG: 20 TABLET, DELAYED RELEASE ORAL at 06:14

## 2022-07-22 NOTE — UTILIZATION REVIEW
Initial Clinical Review    Admission: Date/Time/Statement:   Admission Orders (From admission, onward)     Ordered        07/21/22 2257  Inpatient Admission  Once                      Orders Placed This Encounter   Procedures    Inpatient Admission     Standing Status:   Standing     Number of Occurrences:   1     Order Specific Question:   Level of Care     Answer:   Level 2 Stepdown / HOT [14]     Order Specific Question:   Estimated length of stay     Answer:   More than 2 Midnights     Order Specific Question:   Certification     Answer:   I certify that inpatient services are medically necessary for this patient for a duration of greater than two midnights  See H&P and MD Progress Notes for additional information about the patient's course of treatment  ED Arrival Information     Expected   -    Arrival   7/21/2022 13:34    Acuity   Urgent            Means of arrival   Walk-In    Escorted by   Self    Service   Hospitalist    Admission type   Urgent            Arrival complaint   coughing blood            Chief Complaint   Patient presents with    Cough     Pt reports when he coughs up phlegm he noticed blood in it  Had an xray last week  Pt reports his BP has been low at doctors office and at home  Denies sob and chest pain  Also reports losing 10lbs in one week  Initial Presentation: 81 y o  male ADMITTED INPATIENT to [facility] 2 STEP DOWN UNIT with ACUTE PULMONARY EMBOLISM, ACUTE on CHRONIC SYSTOLIC CHF  Pt with PMHx of HTN, CKD III, asthma, cardiomyopathy previous echo of 30-35%, pulm HTN Burt esophagus  Pt presented to ED from home with c/o cough with blood clots per sputum ~ size of quarter with whitish sputum  On exam pt with wheezes, but denied sob  Trop neg  WBC 10 89, , Cr 1 51   CTA chest PE study (+) for extensive PE seen within the distal b/l main pulmonary arteries extending into the right upper and lower lobar branches and L lower lobular branches and further distally with ? pulm HTN   Hep gtt started  Lasix IV x1 given  Monitor I/Os', daily wts  Monitor Cr  Pulmonary and cardiology consult  Continue Entresto, bisoprolol  Xopenex q 6hr  Continue protonix  SCDs    Pulmonary consult 7/22 -- Acute Submassive Pulmonary Embolism, Pulmonary HTN, mild Intermittent Asthma -- Continue Hep gtt for now, transition to oral DOAC once no further interventions planned  Obtain BNP and await echo  Previously known dilated RV d/t pulmonary HTN likely group 2 disease  If BNP is normal and echo does not show acute right heart strain then no further procedures planned  Even if there is right heart strain hemodynamically he is appropriate and on room air  Doubt there would be a reason to administer thrombolytics or pursue thrombectomy at this time  He is a never smoker and does not have COPD  He does have a history of asthma which he takes Wixela 500/50 at home  [facility]  Can continue this along with PRN albuterol  D/c xopenex nebs  He will need anticoagulation for 3-6 months for his initial PE  At that time we can re-evaluate for continued [facility] requirements  Given clot burden may favor 6 months  Pt states appetite has been poor for the past week, he does have a 7 lb weigh lost since the last 2 weeks  Needs to be up to date on all age appropriate cancers screenings    Cardiology consult 7/22 -- HFrEG 30-35%, Fusiform ectasis Ascending thoracic aorta -- continue anticoagulation for b/ PE  Can consider further w/u for underlying malignancy with CT abdomen/pelvis  Would hold further diuresis at this time  Can be restarted on home dose of 10 mg of Lasix daily starting tomorrow morning  Continue Entresto and lipitor   Pt will need low-dose CT chest in 1 year to monitor ascending aorta          ED Triage Vitals [07/21/22 1402]   Temperature Pulse Respirations Blood Pressure SpO2   98 °F (36 7 °C) 80 20 102/70 97 %      Temp Source Heart Rate Source Patient Position - Orthostatic VS BP Location FiO2 (%)   Oral Monitor Sitting Left arm --      Pain Score       No Pain          Wt Readings from Last 1 Encounters:   07/22/22 83 5 kg (184 lb)     Additional Vital Signs:   Date/Time Temp Pulse Resp BP MAP (mmHg) SpO2 O2 Device   07/22/22 1030 -- 70 20 112/68 -- 93 % None (Room air)   07/22/22 1015 -- 76 22 -- -- 95 % --   07/22/22 0822 -- -- -- -- -- 96 % None (Room air)   07/22/22 0809 -- 82 18 114/73 -- 97 % --   07/22/22 0545 -- 68 16 142/89 110 95 % None (Room air)   07/22/22 0006 -- 71 18 126/76 -- 96 % None (Room air)   07/21/22 2134 -- 71 18 118/68 -- 95 % --   07/21/22 1930 -- 68 20 121/62 -- 98 % --   07/21/22 1402 98 °F (36 7 °C) 80 20 102/70 -- 97 % None (Room air)       Pertinent Labs/Diagnostic Test Results:   CTA ED chest PE study   Final Result by Magaly Hernández MD (07/21 2215)      1  Extensive pulmonary emboli seen within the distal bilateral main pulmonary arteries extending into the right upper and lower lobar branches and left lower lobar branches and further distally  2   Measured RV/LV ratio is within normal limits at less than 0 9  However, there is increase in size of the main pulmonary arteries which may be due to pulmonary arterial hypertension  3   Small opacities at the bilateral lower lobe bases may be due to atelectasis versus infarct  4   Trace bilateral pleural effusions  5   Cardiomegaly  6   Cholelithiasis without evidence of cholecystitis           Results from last 7 days   Lab Units 07/21/22  1934   SARS-COV-2  Negative     Results from last 7 days   Lab Units 07/22/22 0459 07/21/22  1923   WBC Thousand/uL 9 96 10 89*   HEMOGLOBIN g/dL 13 2 13 5   HEMATOCRIT % 38 6 41 8   PLATELETS Thousands/uL 183 241   NEUTROS ABS Thousands/µL 7 30 8 20*     Results from last 7 days   Lab Units 07/22/22 0459 07/21/22 1923   SODIUM mmol/L 144 143   POTASSIUM mmol/L 4 0 4 7   CHLORIDE mmol/L 113* 111*   CO2 mmol/L 24 26   ANION GAP mmol/L 7 6   BUN mg/dL 22 25   CREATININE mg/dL 1 36* 1 51*   EGFR ml/min/1 73sq m 48 42   CALCIUM mg/dL 8 9 9 3   MAGNESIUM mg/dL 2 0  --      Results from last 7 days   Lab Units 07/22/22  0459 07/21/22  1923   AST U/L 22 26   ALT U/L 31 37   ALK PHOS U/L 78 83   TOTAL PROTEIN g/dL 6 6 7 3   ALBUMIN g/dL 2 7* 2 8*   TOTAL BILIRUBIN mg/dL 0 82 0 70     Results from last 7 days   Lab Units 07/22/22  0459 07/21/22  1923   GLUCOSE RANDOM mg/dL 98 102     Results from last 7 days   Lab Units 07/21/22  2143 07/21/22  1923   HS TNI 0HR ng/L  --  16   HS TNI 2HR ng/L 17  --    HSTNI D2 ng/L 1  --      Results from last 7 days   Lab Units 07/21/22  1923   D-DIMER QUANTITATIVE ug/ml FEU 6 22*     Results from last 7 days   Lab Units 07/22/22  0459 07/22/22  0008   PROTIME seconds  --  16 0*   INR   --  1 26*   PTT seconds 150* 190*     Results from last 7 days   Lab Units 07/22/22  0459   NT-PRO BNP pg/mL 6,971*     Results from last 7 days   Lab Units 07/21/22  1934   INFLUENZA A PCR  Negative   INFLUENZA B PCR  Negative   RSV PCR  Negative       ED Treatment:   Medication Administration from 07/21/2022 1334 to 07/22/2022 1149       Date/Time Order Dose Route Action     07/21/2022 2019 sodium chloride 0 9 % bolus 1,000 mL 1,000 mL Intravenous New Bag     07/21/2022 2039 iohexol (OMNIPAQUE) 350 MG/ML injection (SINGLE-DOSE) 100 mL 80 mL Intravenous Given     07/21/2022 2253 heparin (porcine) injection 6,400 Units 6,400 Units Intravenous Given     07/22/2022 0928 heparin (porcine) 25,000 units in 0 45% NaCl 250 mL infusion (premix) 15 Units/kg/hr Intravenous Restarted     07/21/2022 2246 heparin (porcine) 25,000 units in 0 45% NaCl 250 mL infusion (premix) 18 Units/kg/hr Intravenous New Bag     07/22/2022 0009 furosemide (LASIX) injection 40 mg 40 mg Intravenous Given     07/22/2022 0807 atorvastatin (LIPITOR) tablet 10 mg 10 mg Oral Given     07/22/2022 0806 bisoprolol (ZEBETA) tablet 10 mg 10 mg Oral Given     07/22/2022 4705 calcium carbonate-vitamin D (OSCAL-D) 500 mg-200 units per tablet 3 tablet 3 tablet Oral Given     07/22/2022 0806 co-enzyme Q-10 capsule 200 mg 200 mg Oral Given     07/22/2022 0807 finasteride (PROSCAR) tablet 5 mg 5 mg Oral Given     07/22/2022 1579 furosemide (LASIX) tablet 20 mg 20 mg Oral Given     07/22/2022 0806 multivitamin-minerals (CENTRUM) tablet 1 tablet 1 tablet Oral Given     07/22/2022 0614 pantoprazole (PROTONIX) EC tablet 20 mg 20 mg Oral Given     07/22/2022 0806 sacubitril-valsartan (ENTRESTO) 49-51 MG per tablet 1 tablet 1 tablet Oral Given     07/22/2022 0806 cholecalciferol (VITAMIN D3) tablet 1,000 Units 1,000 Units Oral Given     07/22/2022 0805 fluticasone-vilanterol (BREO ELLIPTA) 200-25 MCG/INH inhaler 1 puff 1 puff Inhalation Given     07/22/2022 0822 levalbuterol (XOPENEX) inhalation solution 1 25 mg 1 25 mg Nebulization Given     07/22/2022 1843 sodium chloride 0 9 % inhalation solution 3 mL 3 mL Nebulization Given     Past Medical History:   Diagnosis Date    [facility] 1/[facility]/2019    AICD (automatic cardioverter/defibrillator) present     Arthritis     Asthma     Burt esophagus     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     Cancer, colon ([facility] ) 4/5/2013    Cardiac arrhythmia     Cardiomyopathy [facility]     CKD (chronic kidney disease)     Colon cancer ([facility] )     Colon cancer screening 6/21/2022    Congestive heart failure (CHF) ([facility] )     COPD (chronic obstructive pulmonary disease) ([facility] )     Diverticulitis     Last assessed: 4/5/13    Esophageal reflux     Gout     Hernia     Hyperlipidemia     Hypertension     Hypertension     Hypothyroidism     Hypothyroidism 5/30/2013    Non-toxic multinodular goiter 11/28/2012    Pleural effusion     Polymyalgia rheumatica (HCC)     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)   Essential hypertension   Hypertensive kidney disease with stage 3b chronic kidney disease ([facility] )   Mild persistent asthma without complication   Acute pulmonary embolism with acute cor pulmonale (HCC)   Acute on chronic systolic congestive heart failure (HCC)   Burt esophagus      Admitting Diagnosis: Cough [R05 9]  Age/Sex: 81 y o  male  Admission Orders:  Scheduled Medications:  atorvastatin, 10 mg, Oral, Daily  bisoprolol, 10 mg, Oral, BID  calcium carbonate-vitamin D, 3 tablet, Oral, Daily With Breakfast  cholecalciferol, 1,000 Units, Oral, Daily  co-enzyme Q-10, 200 mg, Oral, Daily  finasteride, 5 mg, Oral, Daily  furosemide, 20 mg, Oral, Daily  multivitamin-minerals, 1 tablet, Oral, Daily  pantoprazole, 20 mg, Oral, Early Morning  sacubitril-valsartan, 1 tablet, Oral, BID    Continuous IV Infusions:  heparin (porcine), 3-30 Units/kg/hr (Order-Specific), Intravenous, Titrated    PRN Meds:  albuterol, 2 puff, Inhalation, Q4H PRN  aluminum-magnesium hydroxide-simethicone, 30 mL, Oral, Q6H PRN  heparin (porcine), 3,200 Units, Intravenous, Q1H PRN  heparin (porcine), 6,400 Units, Intravenous, Q1H PRN  ondansetron, 4 mg, Intravenous, Q6H PRN        IP CONSULT TO NUTRITION SERVICES  IP CONSULT TO PULMONOLOGY  IP CONSULT TO CARDIOLOGY  IP CONSULT TO PULMONOLOGY    Network Utilization Review Department  ATTENTION: Please call with any questions or concerns to [phone] and carefully listen to the prompts so that you are directed to the right person  All voicemails are confidential   Arsalan Joshi all requests for admission clinical reviews, approved or denied determinations and any other requests to dedicated fax number below belonging to the campus where the patient is receiving treatment   List of dedicated fax numbers for the Facilities:  FACILITY NAME UR FAX NUMBER   ADMISSION DENIALS (Administrative/Medical Necessity) [address] [facility] (Maternity/NICU/Pediatrics) [address] [place] [address],[address] [facility] [address] [place] [address]  [phone]   [address] [facility] [place] [address] [id number] [address] [phone]   [address] [phone]

## 2022-07-22 NOTE — H&P
89 Tameka Beard 1940, 81 y o  male MRN: [id number]  Unit/Bed#: ED 20 Encounter: [id number]  Primary Care Provider: Flynn Fuchs MD   Date and time admitted to hospital: 7/21/2022  6:27 PM    * Acute pulmonary embolism with acute cor pulmonale [facility])  Assessment & Plan  Patient came in with cough and complaints of blood clot per sputum that is the size of a quarter x1 mixed with whitish sputum  No fever  Physical examination with wheezes; troponin is normal   No apparent shortness of breath  Echocardiogram review has pulmonary hypertension of 45 mm Hg and multiple regurgitant flow and ejection fraction of 35%  That said, even though CT scan shows strain and pulmonary congestion this may be secondary to CHF rather than pulmonary embolism  Will continue with heparin venous thromboembolism dose  Pulmonary consult  Cardiology consult  ICU notified of  the patient being in step-down level 2 in case that patient needs upgrade to step-down level 1 or ICU  Acute on chronic systolic congestive heart failure [facility])  Assessment & Plan  Wt Readings from Last 3 Encounters:   07/15/22 83 8 kg (184 lb 12 8 oz)   06/21/22 86 6 kg (191 lb)   05/13/22 89 3 kg (196 lb 12 8 oz)       Continue Entresto  Continues bisoprolol  Patient given Lasix 40 mg x 1  Continue Lasix 20 mg daily as before  Input output and daily weights  Cardiomyopathy [facility])  Assessment & Plan  Continue medications as noted  Cardiology consult  Mild persistent asthma without complication  Assessment & Plan  Patient on Xopenex 3 times daily and every 6 hours as needed  Continue Brio 200/25 daily      Hypertensive kidney disease with stage 3b chronic kidney disease [facility])  Assessment & Plan  Lab Results   Component Value Date    EGFR 42 07/21/2022    EGFR 40 01/25/2022    EGFR 41 07/29/2021    CREATININE 1 51 (H) 07/21/2022    CREATININE 1 58 (H) 01/25/2022    CREATININE 1 58 (H) 07/29/2021   Will continue to monitor creatinine  Essential hypertension  Assessment & Plan  On bisoprolol 10 mg twice daily  Furosemide 20 mg daily  Burt esophagus  Assessment & Plan  Continue Protonix  VTE Prophylaxis: Heparin Drip  / sequential compression device   Code Status: Level 1 - Full Code   POLST: There is no POLST form on file for this patient (pre-hospital)    Anticipated Length of Stay:  Patient will be admitted on an Inpatient basis with an anticipated length of stay of  greater than 2 midnights  Justification for Hospital Stay: Please see detailed plans noted above  Chief Complaint:     Persistent cough with episode of blood clot during cough  History of Present Illness:  Sarai Jacobs is a 81 y o  male who has past medical history significant for cardiomyopathy with ejection fraction of 30 to 35% and pulmonary hypertension of 45 mm Hg with regurgitant flow  Also he has a history of asthma/COPD and denies any current smoking and claims that he never smoked  He also has a c cardioverter-defibrillator in place  He has hypertension, hyperlipidemia , Burt's esophagus and GERD  The patient came in for a history of persistent cough that has been going on for the past 3 days or so  He denies having any fever and any active shortness of breath  However he was quite concerned that this afternoon he had 1 episode of hemoptysis which she says is the size of a small coin (demonstrating a quarter) mixed with whitish sputum  No fever  He denies any nausea or vomiting or any abdominal pain  CT scan of the chest did reveal the presence of extensive pulmonary embolus in both lungs more on the right than the left with trace bilateral pleural effusions and increase in the size of main pulmonary arteries  Troponin however is normal       Currently, patient is lying on bed with no apparent shortness of breath    He does have rhonchi all over he is not in any distress or air hunger  Review of Systems:    Constitutional:  Denies fever or chills   Eyes:  Denies change in visual acuity   HENT:  Denies nasal congestion or sore throat   Respiratory:  Some cough with no apparent shortness of breath  No fever    Cardiovascular:  Denies chest pain or edema   GI:  Denies abdominal pain, nausea, vomiting, bloody stools or diarrhea   :  Denies dysuria   Musculoskeletal:  Denies back pain or joint pain   Integument:  Denies rash   Neurologic:  Denies headache, focal weakness or sensory changes   Endocrine:  Denies polyuria or polydipsia   Lymphatic:  Denies swollen glands   Psychiatric:  Denies depression or anxiety     Past Medical and Surgical History:   Past Medical History:   Diagnosis Date    Acute on chronic systolic congestive heart failure ([facility] [address] ) 1/29/2019    AICD (automatic cardioverter/defibrillator) present     Arthritis     Asthma     Burt esophagus     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     Cancer, colon ([facility] [address] ) 4/5/2013    Cardiac arrhythmia     Cardiomyopathy ([facility] [address] )     CKD (chronic kidney disease)     Colon cancer ([facility] [address] )     Congestive heart failure (CHF) ([facility] [address] )     COPD (chronic obstructive pulmonary disease) ([facility] [address] )     Diverticulitis     Last assessed: 4/5/13    Esophageal reflux     Gout     Hernia     Hyperlipidemia     Hypertension     Hypertension     Hypothyroidism     Hypothyroidism 5/30/2013    Non-toxic multinodular goiter 11/28/2012    Pleural effusion     Polymyalgia rheumatica (HCC)     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     Spondyloarthropathy     Last assessed: 11/28/12    Thrombocytopenia ([facility] [address] ) 6/23/2015     Past Surgical History:   Procedure Laterality Date    ARM SKIN LESION BIOPSY / EXCISION      Malignant    ATRIAL ABLATION SURGERY      AV NODE ABLATION      CARDIAC DEFIBRILLATOR PLACEMENT  2009    Cardio-Defib Pulse Gen Venous Insertion of Electrode for Ventricular Pacing  Implantable    CARDIAC SURGERY  2009    Catheterization    COLONOSCOPY N/A 3/14/2016    Procedure: COLONOSCOPY;  Surgeon: Dianna Montoya MD;  Location: [facility]; Service  February 22, 2013, mildly enlarged prostate, history of colon CA with normal appearance of anastomosis at the midtransverse colon, severe diverticulosis in the sigmoid, descending and transverse colon  Repeat colonoscopy in 3 yrs    HEMICOLECTOMY Right 05/05/2004    INGUINAL HERNIA REPAIR Bilateral     Left 3/2004, right 5/2008    IR UPPER EXTREMITY VENOGRAM- DIAGNOSTIC  10/4/2018    KNEE SURGERY  1972    Meniscectomy    TX ESOPHAGOGASTRODUODENOSCOPY TRANSORAL DIAGNOSTIC N/A 12/5/2016    Procedure: ESOPHAGOGASTRODUODENOSCOPY (EGD); Surgeon: Griffin Ferguson MD;  Location: [facility]; Service: Gastroenterology    TONSILLECTOMY AND ADENOIDECTOMY      UPPER GASTROINTESTINAL ENDOSCOPY         Meds/Allergies:  (Not in a hospital admission)      Allergies: No Known Allergies  History:  Marital Status: /Civil Union   Occupation:  He is retired  Patient Pre-hospital Living Situation:  Lives at home with wife  Patient Pre-hospital Level of Mobility:  Ambulatory  Patient Pre-hospital Diet Restrictions:  Cardiac    Substance Use History:   Social History     Substance and Sexual Activity   Alcohol Use Never    Comment: Rare     Social History     Tobacco Use   Smoking Status Never Smoker   Smokeless Tobacco Never Used     Social History     Substance and Sexual Activity   Drug Use No       Family History:  Family History   Problem Relation Age of Onset    Heart failure Mother         Congestive    Hypertension Mother     Osteoporosis Mother     COPD Father     Emphysema Father     Hypertension Father     Heart disease Sister         Heart Valve Replacement    Osteoporosis Sister     Breast cancer Family        Physical Exam:     Vitals:   Blood Pressure: 126/76 (07/22/22 0006)  Pulse: 71 (07/22/22 0006)  Temperature: 98 °F (36 7 °C) (07/21/22 1402)  Temp Source: Oral (07/21/22 1402)  Respirations: 18 (07/22/22 0006)  SpO2: 96 % (07/22/22 0006)    Constitutional:  Well developed, well nourished, no acute distress, non-toxic appearance   Eyes:  PERRL, conjunctiva normal   HENT:  Atraumatic, external ears normal, nose normal, oropharynx moist, no pharyngeal exudates  Neck- normal range of motion, no tenderness, supple   Respiratory:  No distress however with bronchial breath sounds, expiratory wheezes and expiratory rhonchi without any rales  Cardiovascular:  Normal rate, normal rhythm, no murmurs, no gallops, no rubs   GI:  Soft, nondistended, normal bowel sounds, nontender, no organomegaly, no mass, no rebound, no guarding   :  No costovertebral angle tenderness   Musculoskeletal:  No edema, no tenderness, no deformities  Back- no tenderness  Integument:  Well hydrated, no rash   Lymphatic:  No lymphadenopathy noted   Neurologic:  Alert &awake, communicative, CN 2-12 normal, normal motor function, normal sensory function, no focal deficits noted   Psychiatric:  Speech and behavior appropriate       Lab Results: I have personally reviewed pertinent reports  Results from last 7 days   Lab Units 07/21/22  1923   WBC Thousand/uL 10 89*   HEMOGLOBIN g/dL 13 5   HEMATOCRIT % 41 8   PLATELETS Thousands/uL 241   NEUTROS PCT % 76*   LYMPHS PCT % 15   MONOS PCT % 8   EOS PCT % 1     Results from last 7 days   Lab Units 07/21/22  1923   POTASSIUM mmol/L 4 7   CHLORIDE mmol/L 111*   CO2 mmol/L 26   BUN mg/dL 25   CREATININE mg/dL 1 51*   CALCIUM mg/dL 9 3   ALK PHOS U/L 83   ALT U/L 37   AST U/L 26           EKG:  Sinus rhythm, prolonged LA interval   No acute ST T-wave change  Imaging: I have personally reviewed pertinent reports  XR chest pa & lateral    Result Date: 7/17/2022  Narrative: CHEST INDICATION:   J40: Bronchitis, not specified as acute or chronic   COMPARISON:  Chest radiograph from 2/26/2021, chest CT from 8/19/2012  EXAM PERFORMED/VIEWS:  XR CHEST PA & LATERAL  DUAL ENERGY SUBTRACTION  FINDINGS: Normal heart size with left subclavian ICD leads in the right atrial appendage and right ventricular apex  The lungs are clear  No pneumothorax or pleural effusion  Osseous structures appear within normal limits for patient age  Healed left rib fractures  Impression: No acute cardiopulmonary disease  Workstation performed: [id number]     CTA ED chest PE study    Result Date: 7/21/2022  Narrative: CTA - CHEST WITH IV CONTRAST - PULMONARY ANGIOGRAM INDICATION:   Hemoptysis, elevated D-dimer  COMPARISON: CT of the chest abdomen pelvis August 13, 2012  TECHNIQUE: CTA examination of the chest was performed using angiographic technique according to a protocol specifically tailored to evaluate for pulmonary embolism  Axial, sagittal, and coronal 2D reformatted images were created from the source data and  submitted for interpretation  In addition, coronal 3D MIP postprocessing was performed on the acquisition scanner  Radiation dose length product (DLP) for this visit:  556 85 mGy-cm   This examination, like all CT scans performed in the [facility], was performed utilizing techniques to minimize radiation dose exposure, including the use of iterative  reconstruction and automated exposure control  IV Contrast:  80 mL of iohexol (OMNIPAQUE)  FINDINGS: PULMONARY ARTERIAL TREE:  Nonobstructive pulmonary emboli are seen within the distal bilateral main pulmonary arteries extending into lobar branches  Extensive pulmonary emboli are seen within the right upper lobe and right lower lobe and left lower lobe  The main pulmonary arteries as more dilated when compared with prior exam for example the right main pulmonary artery measures 3 2 cm, previously 2 4 cm which may be due to elevated pulmonary arterial pressures   LUNGS:  Small opacities at the bilateral lower lobe bases may be due to atelectasis versus infarct  Trace secretions within the left mainstem bronchus  PLEURA:  Trace bilateral pleural effusions  HEART/GREAT VESSELS:  Left-sided pacing device in place  The heart is enlarged  There is fusiform ectasia of the ascending thoracic aorta measuring up to 41 mm  Recommendation is for follow-up low radiation dose chest CT in one year  MEDIASTINUM AND TOSHIA:  Mediastinal lymph nodes measure up to 8 mm in short axis  CHEST WALL AND LOWER NECK:   Unremarkable  VISUALIZED STRUCTURES IN THE UPPER ABDOMEN:  Cholelithiasis without evidence of cholecystitis  OSSEOUS STRUCTURES:  No acute fracture or destructive osseous lesion  Impression: 1  Extensive pulmonary emboli seen within the distal bilateral main pulmonary arteries extending into the right upper and lower lobar branches and left lower lobar branches and further distally  2   Measured RV/LV ratio is within normal limits at less than 0 9  However, there is increase in size of the main pulmonary arteries which may be due to pulmonary arterial hypertension  3   Small opacities at the bilateral lower lobe bases may be due to atelectasis versus infarct  4   Trace bilateral pleural effusions  5   Cardiomegaly  6   Cholelithiasis without evidence of cholecystitis  I personally discussed this study with Ana Norwood on 7/21/2022 at 10:06 PM  Workstation performed: [id number]         ** Please Note: Dragon 360 Dictation voice to text software was used in the creation of this document   **

## 2022-07-22 NOTE — ASSESSMENT & PLAN NOTE
Wt Readings from Last 3 Encounters:   07/15/22 83 8 kg (184 lb 12 8 oz)   06/21/22 86 6 kg (191 lb)   05/13/22 89 3 kg (196 lb 12 8 oz)       Continue Entresto  Continues bisoprolol  Patient given Lasix 40 mg x 1  Continue Lasix 20 mg daily as before  Input output and daily weights

## 2022-07-22 NOTE — CONSULTS
PULMONOLOGY CONSULT NOTE     Name: Cooper Clemente   Age & Sex: 81 y o  male   MRN: [id number]  Unit/Bed#: ED 20   Encounter: [id number]        Reason for consultation: Acute PE    Requesting physician: Tawana Valle MD     Assessment:   1  Acute Submassive Pulmonary Embolism  2  Pulmonary Hypertension  3  Mild Intermittent Asthma  4  Heart Failure with Reduced Ejection Fraction   5  Cardiomyopathy s/p AICD  6  Chronic Kidney Disease stage 3  7  Hypertension  8  Hyperlipidemia  9  Polymyalgia Rheumatica  10  Ankylosing Spondylitis    Plan:   Continue anticoagulation, heparin infusion is fine for now  Transition to oral DOAC once no further interventions planned   Obtain BNP and await echo  Previously known dilated RV due to pulmonary HTN likely group 2 disease   If BNP is normal and echo does not show acute right heart strain then no further procedures planned   Even if there is right heart strain hemodynamically he is appropriate and on room air  Doubt there would be a reason to administer thrombolytics or pursue thrombectomy at this time   He is a never smoker and does not have COPD  He does have a history of asthma which he takes Wixela 500/50 at home  This has been transitioned to [facility] 200 here in hospital  Can continue this along with PRN albuterol  Discontinue xopenex nebulizers   He will need anticoagulation for 3-6 months for his initial PE  At that time we can re-evaluate for continued anticoagulation requirements  Given clot burden may favor 6 months   Said appetite has been poor for the past week, he does have a 7 lb weigh lost since the last 2 weeks   Needs to be up to date on all age appropriate cancers screenings   All other care per primary team     History of Present Illness   HPI:  Cooper Clemente is a 81 y o  male who has a past medical history of cardiomyopathy with heart failure with reduced ejection fraction(EF 30-35%) s/p AICD, pulmonary hypertension, asthma/(?)COPD, hypertension, hyperlipidemia, CKD3, PMR, hx of melanoma, ank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, OA, BPH,  Burt's esophagus and GERD presented to [facility] on 7/21 for persistent cough that has been ongoing for 3 days  Denies fever or shortness of breath  He had an episode of hemoptysis earlier on the day of presentation has approximately a quarter in size  It was mixed with white sputum  He denied chest pain  Denied headache, visual changes, syncope, nausea, vomiting, abdominal pain, numbness tingling or weakness bilaterally throughout  Part of the ED workup use found to have negative troponins, elevated serum creatinine, mild leukocytosis, negative COVID testing, and elevated D-dimer at 6 22  He underwent CTA of his chest which demonstrated bilateral pulmonary emboli the main pulmonary arteries extending to the right upper and lower lobar branches along with a left lower lobar branches  There is also evidence of cardiomegaly  There additionally bilateral lower lobe opacities thought to be due to atelectasis versus infarct  He was hemodynamically stable and admitted to the medicine service for acute pulmonary embolism  On my evaluation, he was a pleasant man in no acute distress  He states he had been having hemoptysis since this past Sunday  Scant at first but became quarter sized as of a day or two prior to arrival  Denies chest pain, light headedness, shortness of breath or syncope  States he feels fine and was going to leave the ED after being there for 6 hours before the results came back  Additionally, he states he has lost 10 lbs unintentionally over the last 2 or so weeks  Review of systems:  12 point review of systems was completed and was otherwise negative except as listed in HPI        Historical Information   Past Medical History:   Diagnosis Date    Acute on chronic systolic congestive heart failure ([facility] ) 1/29/2019    AICD (automatic cardioverter/defibrillator) present     Arthritis  Asthma     Burt esophagus     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     Cancer, colon (HCC) 4/5/2013    Cardiac arrhythmia     [facility]     CKD (chronic kidney disease)     Colon cancer (HCC)     Congestive heart failure (CHF) (HCC)     COPD (chronic obstructive pulmonary disease) [facility] )     Diverticulitis     Last assessed: 4/5/13    Esophageal reflux     Gout     Hernia     Hyperlipidemia     Hypertension     Hypertension     Hypothyroidism     Hypothyroidism 5/30/2013    Non-toxic multinodular goiter 11/28/2012    Pleural effusion     Polymyalgia rheumatica (HCC)     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     Spondyloarthropathy     Last assessed: 11/28/12    Thrombocytopenia ([facility] ) 6/23/2015     Past Surgical History:   Procedure Laterality Date    ARM SKIN LESION BIOPSY / EXCISION      Malignant    ATRIAL ABLATION SURGERY      AV NODE ABLATION      CARDIAC DEFIBRILLATOR PLACEMENT  2009    Cardio-Defib Pulse Gen Venous Insertion of Electrode for Ventricular Pacing  Implantable    CARDIAC SURGERY  2009    Catheterization    COLONOSCOPY N/A 3/14/2016    Procedure: COLONOSCOPY;  Surgeon: Samantha Nash MD;  Location: [facility]; Service  February 22, 2013, mildly enlarged prostate, history of colon CA with normal appearance of anastomosis at the midtransverse colon, severe diverticulosis in the sigmoid, descending and transverse colon  Repeat colonoscopy in 3 yrs    HEMICOLECTOMY Right 05/05/2004    INGUINAL HERNIA REPAIR Bilateral     Left 3/2004, right 5/2008    IR UPPER EXTREMITY VENOGRAM- DIAGNOSTIC  10/4/2018    KNEE SURGERY  1972    Meniscectomy    IN ESOPHAGOGASTRODUODENOSCOPY TRANSORAL DIAGNOSTIC N/A 12/5/2016    Procedure: ESOPHAGOGASTRODUODENOSCOPY (EGD); Surgeon: Umm Rust MD;  Location: [facility];   Service: Gastroenterology    TONSILLECTOMY AND ADENOIDECTOMY      UPPER GASTROINTESTINAL ENDOSCOPY       Family History   Problem Relation Age of Onset    Heart failure Mother         Congestive    Hypertension Mother     Osteoporosis Mother     COPD Father     Emphysema Father     Hypertension Father     Heart disease Sister         Heart Valve Replacement    Osteoporosis Sister     Breast cancer Family        Social History    Social History:   Social History     Socioeconomic History    Marital status: /Civil Union     Spouse name: Not on file    Number of children: Not on file    Years of education: Not on file    Highest education level: Not on file   Occupational History    Occupation: Manager-Retired   Tobacco Use    Smoking status: Never Smoker    Smokeless tobacco: Never Used   Vaping Use    Vaping Use: Never used   Substance and Sexual Activity    Alcohol use: Never     Comment: Rare    Drug use: No    Sexual activity: Not Currently   Other Topics Concern    Not on file   Social History Narrative    Not on file     Social Determinants of Health     Financial Resource Strain: Not on file   Food Insecurity: Not on file   Transportation Needs: Not on file   Physical Activity: Not on file   Stress: Not on file   Social Connections: Not on file   Intimate Partner Violence: Not on file   Housing Stability: Not on file       Occupational History: retired    Meds/Allergies   Current Facility-Administered Medications   Medication Dose Route Frequency    albuterol (PROVENTIL HFA,VENTOLIN HFA) inhaler 2 puff  2 puff Inhalation Q6H PRN    aluminum-magnesium hydroxide-simethicone (MYLANTA) oral suspension 30 mL  30 mL Oral Q6H PRN    atorvastatin (LIPITOR) tablet 10 mg  10 mg Oral Daily    bisoprolol (ZEBETA) tablet 10 mg  10 mg Oral BID    calcium carbonate-vitamin D (OSCAL-D) 500 mg-200 units per tablet 3 tablet  3 tablet Oral Daily With Breakfast    cholecalciferol (VITAMIN D3) tablet 1,000 Units  1,000 Units Oral Daily    co-enzyme Q-10 capsule 200 mg  200 mg Oral Daily    finasteride (PROSCAR) tablet 5 mg  5 mg Oral Daily    fluticasone-vilanterol (BREO ELLIPTA) 200-25 MCG/INH inhaler 1 puff  1 puff Inhalation Daily    furosemide (LASIX) tablet 20 mg  20 mg Oral Daily    heparin (porcine) 25,000 units in 0 45% NaCl 250 mL infusion (premix)  3-30 Units/kg/hr (Order-Specific) Intravenous Titrated    heparin (porcine) injection 3,200 Units  3,200 Units Intravenous Q1H PRN    heparin (porcine) injection 6,400 Units  6,400 Units Intravenous Q1H PRN    levalbuterol (XOPENEX) inhalation solution 1 25 mg  1 25 mg Nebulization TID    And    sodium chloride 0 9 % inhalation solution 3 mL  3 mL Nebulization TID    multivitamin-minerals (CENTRUM) tablet 1 tablet  1 tablet Oral Daily    ondansetron (ZOFRAN) injection 4 mg  4 mg Intravenous Q6H PRN    pantoprazole (PROTONIX) EC tablet 20 mg  20 mg Oral Early Morning    sacubitril-valsartan (ENTRESTO) 49-51 MG per tablet 1 tablet  1 tablet Oral BID     (Not in a hospital admission)    No Known Allergies    Objective    Vitals: Blood pressure 114/73, pulse 82, temperature 98 °F (36 7 °C), temperature source Oral, resp  rate 18, SpO2 96 % , RA, There is no height or weight on file to calculate BMI  Intake/Output Summary (Last 24 hours) at 7/22/2022 0832  Last data filed at 7/22/2022 0813  Gross per 24 hour   Intake 1000 ml   Output 1930 ml   Net -930 ml       Physical Exam  Vitals and nursing note reviewed  Constitutional:       General: He is not in acute distress  Appearance: Normal appearance  He is well-developed and normal weight  He is not ill-appearing or toxic-appearing  Interventions: He is not intubated  HENT:      Head: Normocephalic and atraumatic  Right Ear: External ear normal       Left Ear: External ear normal       Nose: Nose normal       Mouth/Throat:      Mouth: Mucous membranes are moist       Pharynx: Oropharynx is clear  Eyes:      General: No scleral icterus  Conjunctiva/sclera: Conjunctivae normal    Cardiovascular:      Rate and Rhythm: Normal rate and regular rhythm  Pulses: Normal pulses  Heart sounds: Normal heart sounds  No murmur heard  No friction rub  No gallop  Pulmonary:      Effort: Pulmonary effort is normal  No tachypnea, accessory muscle usage or respiratory distress  He is not intubated  Breath sounds: Normal air entry  No decreased air movement  Rhonchi (bilateral lower lung fields) present  No decreased breath sounds, wheezing or rales  Abdominal:      General: Abdomen is flat  Bowel sounds are normal       Palpations: Abdomen is soft  Musculoskeletal:         General: No swelling or tenderness  Cervical back: Neck supple  No tenderness  Skin:     General: Skin is warm and dry  Neurological:      General: No focal deficit present  Mental Status: He is alert and oriented to person, place, and time  Mental status is at baseline  Labs: I have personally reviewed pertinent lab results    Laboratory and Diagnostics  Results from last 7 days   Lab Units 07/22/22 0459 07/21/22 1923   WBC Thousand/uL 9 96 10 89*   HEMOGLOBIN g/dL 13 2 13 5   HEMATOCRIT % 38 6 41 8   PLATELETS Thousands/uL 183 241   NEUTROS PCT % 74 76*   MONOS PCT % 8 8     Results from last 7 days   Lab Units 07/22/22 0459 07/21/22 1923   SODIUM mmol/L 144 143   POTASSIUM mmol/L 4 0 4 7   CHLORIDE mmol/L 113* 111*   CO2 mmol/L 24 26   ANION GAP mmol/L 7 6   BUN mg/dL 22 25   CREATININE mg/dL 1 36* 1 51*   CALCIUM mg/dL 8 9 9 3   GLUCOSE RANDOM mg/dL 98 102   ALT U/L 31 37   AST U/L 22 26   ALK PHOS U/L 78 83   ALBUMIN g/dL 2 7* 2 8*   TOTAL BILIRUBIN mg/dL 0 82 0 70     Results from last 7 days   Lab Units 07/22/22 0459   MAGNESIUM mg/dL 2 0      Results from last 7 days   Lab Units 07/22/22  0459 07/22/22  0008   INR   --  1 26*   PTT seconds 150* 190*                          Results from last 7 days   Lab Units 07/21/22 1923   D-DIMER QUANTITATIVE ug/ml FEU 6 22*           ABG:       Micro:        Imaging and other studies: I have personally reviewed pertinent reports  and I have personally reviewed pertinent films in PACS   CTA chest 7/21/22: PULMONARY ARTERIAL TREE:  Nonobstructive pulmonary emboli are seen within the distal bilateral main pulmonary arteries extending into lobar branches  Extensive pulmonary emboli are seen within the right upper lobe and right lower lobe and left lower   lobe  The main pulmonary arteries as more dilated when compared with prior exam for example the right main pulmonary artery measures 3 2 cm, previously 2 4 cm which may be due to elevated pulmonary arterial pressures      LUNGS:  Small opacities at the bilateral lower lobe bases may be due to atelectasis versus infarct  Trace secretions within the left mainstem bronchus      PLEURA:  Trace bilateral pleural effusions      HEART/GREAT VESSELS:  Left-sided pacing device in place  The heart is enlarged  There is fusiform ectasia of the ascending thoracic aorta measuring up to 41 mm  Recommendation is for follow-up low radiation dose chest CT in one year      MEDIASTINUM AND TOSHIA:  Mediastinal lymph nodes measure up to 8 mm in short axis      CHEST WALL AND LOWER NECK:   Unremarkable  CXR 7/15/22: The lungs are clear  No pneumothorax or pleural effusion  CXR 2/26/21: Mild pulmonary venous congestion  No effusion or pneumothorax  CXR 1/29/2019: The lungs are clear  No pneumothorax  Probable tiny bilateral pleural effusion limiting the costophrenic angles  Osseous structures appear within normal limits for patient age    Pulmonary function testing: none    EKG, Pathology, and Other Studies: I have personally reviewed pertinent reports  10/1/2020 echo  LEFT VENTRICLE:  The ventricle was moderately dilated  Systolic function was moderately to markedly reduced  Ejection fraction was estimated to be 35 %    There was severe diffuse hypokinesis with distinct regional wall motion abnormalities in the inferior wall region  The changes were consistent with eccentric hypertrophy  Left ventricular diastolic function parameters were abnormal      RIGHT VENTRICLE:  The ventricle was mildly dilated  Systolic function was mildly reduced      LEFT ATRIUM:  The atrium was moderately dilated      RIGHT ATRIUM:  The atrium was mildly dilated      MITRAL VALVE:  There was moderate regurgitation      AORTIC VALVE:  There was mild regurgitation      TRICUSPID VALVE:  There was moderate to severe regurgitation  Regurgitation grade was 2-3+ on a scale of 0 to 4+  Estimated peak PA pressure was 45 mmHg  The findings suggest mild pulmonary hypertension      PULMONIC VALVE:  There was trace regurgitation      AORTA:  There was mild dilatation of the ascending aorta measuring 4 0 cm  Code Status: Level 1 - Full Code    VTE Pharmacologic Prophylaxis: Heparin  VTE Mechanical Prophylaxis: sequential compression device    Disclaimer: Portions of the record may have been created with voice recognition software  Occasional wrong word or "sound a like" substitutions may have occurred due to the inherent limitations of voice recognition software  Careful consideration should be taken to recognize, using context, where substitutions have occurred      Charisse Buchanan DO   Pulmonary/Critical Care Fellowship PGY-V  [facility]

## 2022-07-22 NOTE — UTILIZATION REVIEW
Inpatient Admission Authorization Request   NOTIFICATION OF INPATIENT ADMISSION/INPATIENT AUTHORIZATION REQUEST   SERVICING FACILITY:   [place] [id number]  Address: [address] [place] [address], [address] [place] [address]  Tax ID: [id number]  NPI: [id number]  Place of Service: Inpatient [address] [facility] Code: 24     ATTENDING PROVIDER:  Attending Name and NPI#: Joseph Terry Md [[id number]]  Address: [address] [place] [address], [address] [place] [address]  Phone: [phone]     UTILIZATION REVIEW CONTACT:  [facility] [place], Utilization   Network Utilization Review Department  Phone: [phone]  Fax: [phone]  Email: [email or web address]     PHYSICIAN ADVISORY SERVICES:  FOR [id number] REVIEW - MEDICAL NECESSITY DENIAL  Phone: [phone]  Fax: [phone]  Email: [email or web address]     TYPE OF REQUEST:  Inpatient Status     ADMISSION INFORMATION:  ADMISSION DATE/TIME: 7/21/22 10:57 PM  PATIENT DIAGNOSIS CODE/DESCRIPTION:  Cough [R05 9]  Acute combined systolic and diastolic heart failure (HCC) [I50 41]  Pulmonary embolism (HCC) [I26 99]  Multiple subsegmental pulmonary emboli without acute cor pulmonale (HCC) [I26 94]  DISCHARGE DATE/TIME: No discharge date for patient encounter  IMPORTANT INFORMATION:  Please contact Tico [place] directly with any questions or concerns regarding this request  Department voicemails are confidential     Send requests for admission clinical reviews, concurrent reviews, approvals, and administrative denials due to lack of clinical to fax [phone]

## 2022-07-22 NOTE — ASSESSMENT & PLAN NOTE
Patient came in with cough and complaints of blood clot per sputum that is the size of a quarter x1 mixed with whitish sputum  No fever  Physical examination with wheezes; troponin is normal   No apparent shortness of breath  Echocardiogram review has pulmonary hypertension of 45 mm Hg and multiple regurgitant flow and ejection fraction of 35%  That said, even though CT scan shows strain and pulmonary congestion this may be secondary to CHF rather than pulmonary embolism  Will continue with heparin venous thromboembolism dose  Pulmonary consult  Cardiology consult  ICU notified of  the patient being in step-down level 2 in case that patient needs upgrade to step-down level 1 or ICU

## 2022-07-22 NOTE — ASSESSMENT & PLAN NOTE
Lab Results   Component Value Date    EGFR 42 07/21/2022    EGFR 40 01/25/2022    EGFR 41 07/29/2021    CREATININE 1 51 (H) 07/21/2022    CREATININE 1 58 (H) 01/25/2022    CREATININE 1 58 (H) 07/29/2021   Will continue to monitor creatinine

## 2022-07-22 NOTE — CONSULTS
Consultation - Advanced Heart Failure Team  Rj Michelle 81 y o  male MRN: [id number]  Unit/Bed#: ED 20 Encounter: [id number]            Inpatient consult to Cardiology     Performed by  Misha Franklin MD     Authorized by Chalino Balbuena MD            PCP: Yan Chew MD  Outpatient Cardiologist: Lyndsay Ortiz      Assessment/Plan     1  Acute pulmonary embolism with acute cor pulmonale  · Appears to be unprovoked  · Could consider getting coagulation profile as an outpatient  · Currently on heparin drip, could likely be transitioned to NOAC  · CT abdomen pelvis pending  Patient had an colonoscopy in 2019 which was normal     2  HFrEF 30-35%  Stage C, NYHA II    · Etiology is non-ischemic  Has Hx of HTN and Hx of chemotherapy  No other modifiable risk factors  · Previous Echo showed EF of 30-35% with diffuse hypokinesis  · Stress reveled mild fixed defect in the inferoseptal wall  · CTA PE dose show some coronary calcifications  · Patient has been on GDMT     Neurohormonal Blockade:  --Beta-Blocker: Bisprolol 10 mg BID  --ACEi, ARB or ARNi: Entresto 49/51    (or SVR reduction)  --Aldosterone Receptor Blocker:  --Diuretic: lasix 10 mg QD ( will take 20 mg if he has increased wt gain)     Sudden Cardiac Death Risk Reduction:  --ICD: Dual Chamber ICD: 63% atrial pacing 13% V paced  12 beats of NSVT on last interrogation  3  Fusiform ectasia Ascending thoracic aorta  · Patient had fusiform ectasia measuring 41 mm seen on CT  · Patient will require low-dose radiation CT chest in 1 year  4  Pulmonary hypertension  Likely Group 2  · PA pressures elevated on previous echo at 45 mm hg  5  Essential hypertension  6  Hypertensive kidney disease with stage 3b chronic kidney disease ([address] )      Plan:  1  Continue anticoagulation for bilateral PE  2  Can consider further workup for underlying malignancy with CT abdomen/pelvis  3  Would hold further diuresis at this time    Can be restarted on home dose of 10 mg of Lasix daily starting tomorrow morning  4  Continue Entresto  5  Continue Lipitor 10 mg daily  6  Patient will need low-dose CT chest in 1 year to monitor ascending aorta  History of Present Illness   Physician Requesting Consult: Giselle Feliciano MD  Reason for Consult / Principal Problem: HFrEF    HPI: Jeff So is a 81y o  year old male with a history of HFrEF, nonischemic cardiomyopathy s/p BiV-ICD, NSVT,  Hx of CC , Hx of melanoma, pulmonary hypertension, hypertension and hyperlipidemia  Patient was in his usual state of health up until 2 weeks ago  Since 2 weeks ago patient has been having increased mucus production, malaise, and weight loss  Patient also states that he started to have cough since this past Sunday which has progressively worsened  Patient also states that his cough also progressed to hemoptysis by Tuesday this week  Patient said come to the ED once his hemoptysis worsened to the size of a quarter  Patient denies any recent travel, recent infections or sick contacts  Patient does have a history of colon cancer which was resected and received chemotherapy for in 2013  Last colonoscopy in 2019, which was normal   Patient denies any previous history of blood clots or similar symptoms  Denies any history of leg pain or or recent trauma  In the ED patient received CT PE study which did reveal significant bilateral pulmonary embolisms  Patient was admitted for anticoagulation  Cardiology was consulted for patient's underlying HFrEF  On my examination, patient denies any complaints  Does not have any overnight events  Patient is hemodynamically stable and currently not requiring oxygen  Review of Systems   Constitutional: Negative for chills, fatigue and fever  HENT: Negative for ear discharge, ear pain and sore throat  Eyes: Negative for pain and discharge  Respiratory: Negative for apnea, chest tightness, shortness of breath and wheezing  Cardiovascular: Negative for chest pain and palpitations  Gastrointestinal: Negative for abdominal distention and abdominal pain  Endocrine: Negative for polyphagia and polyuria  Genitourinary: Negative for difficulty urinating, dysuria and frequency  Musculoskeletal: Negative for arthralgias and joint swelling  Neurological: Negative for dizziness, tremors, syncope, facial asymmetry, weakness, light-headedness and headaches  Hematological: Does not bruise/bleed easily  Psychiatric/Behavioral: Negative for agitation and behavioral problems  Review of system was conducted and was negative except for as stated in the HPI        Historical Information   Past Medical History:   Diagnosis Date    Acute on chronic systolic congestive heart failure ([facility] ) 1/29/2019    AICD (automatic cardioverter/defibrillator) present     Arthritis     Asthma     Burt esophagus     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     Cancer, colon ([place] [address] ) 4/5/2013    Cardiac arrhythmia     Cardiomyopathy ([place] [address] )     CKD (chronic kidney disease)     Colon cancer ([place] [address] )     Colon cancer screening 6/21/2022    Congestive heart failure (CHF) (HCC)     COPD (chronic obstructive pulmonary disease) ([facility] )     Diverticulitis     Last assessed: 4/5/13    Esophageal reflux     Gout     Hernia     Hyperlipidemia     Hypertension     Hypertension     Hypothyroidism     Hypothyroidism 5/30/2013    Non-toxic multinodular goiter 11/28/2012    Pleural effusion     Polymyalgia rheumatica (HCC)     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     Spondyloarthropathy     Last assessed: 11/28/12    Thrombocytopenia ([facility] ) 6/23/2015     Past Surgical History:   Procedure Laterality Date    ARM SKIN LESION BIOPSY / EXCISION      Malignant    ATRIAL ABLATION SURGERY      AV NODE ABLATION      CARDIAC DEFIBRILLATOR PLACEMENT  2009    Cardio-Defib Pulse Gen Venous Insertion of Electrode for Ventricular Pacing  Implantable    CARDIAC SURGERY  2009    Catheterization    COLONOSCOPY N/A 3/14/2016    Procedure: COLONOSCOPY;  Surgeon: Mildred Crenshaw MD;  Location: [facility]; Service  February 22, 2013, mildly enlarged prostate, history of colon CA with normal appearance of anastomosis at the midtransverse colon, severe diverticulosis in the sigmoid, descending and transverse colon  Repeat colonoscopy in 3 yrs    HEMICOLECTOMY Right 05/05/2004    INGUINAL HERNIA REPAIR Bilateral     Left 3/2004, right 5/2008    IR UPPER EXTREMITY VENOGRAM- DIAGNOSTIC  10/4/2018    KNEE SURGERY  1972    Meniscectomy    UT ESOPHAGOGASTRODUODENOSCOPY TRANSORAL DIAGNOSTIC N/A 12/5/2016    Procedure: ESOPHAGOGASTRODUODENOSCOPY (EGD); Surgeon: Arlette Gomez MD;  Location: [facility];   Service: Gastroenterology    TONSILLECTOMY AND ADENOIDECTOMY      UPPER GASTROINTESTINAL ENDOSCOPY       Social History     Substance and Sexual Activity   Alcohol Use Never    Comment: Rare     Social History     Substance and Sexual Activity   Drug Use No     Social History     Tobacco Use   Smoking Status Never Smoker   Smokeless Tobacco Never Used     Family History:   Family History   Problem Relation Age of Onset    Heart failure Mother         Congestive    Hypertension Mother     Osteoporosis Mother     COPD Father     Emphysema Father     Hypertension Father     Heart disease Sister         Heart Valve Replacement    Osteoporosis Sister     Breast cancer Family        Meds/Allergies   Hospital Medications:   Current Facility-Administered Medications   Medication Dose Route Frequency    albuterol (PROVENTIL HFA,VENTOLIN HFA) inhaler 2 puff  2 puff Inhalation Q4H PRN    aluminum-magnesium hydroxide-simethicone (MYLANTA) oral suspension 30 mL  30 mL Oral Q6H PRN    atorvastatin (LIPITOR) tablet 10 mg  10 mg Oral Daily    bisoprolol (ZEBETA) tablet 10 mg  10 mg Oral BID    calcium carbonate-vitamin D (OSCAL-D) 500 mg-200 units per tablet 3 tablet  3 tablet Oral Daily With Breakfast    cholecalciferol (VITAMIN D3) tablet 1,000 Units  1,000 Units Oral Daily    co-enzyme Q-10 capsule 200 mg  200 mg Oral Daily    finasteride (PROSCAR) tablet 5 mg  5 mg Oral Daily    fluticasone-vilanterol (BREO ELLIPTA) 200-25 MCG/INH inhaler 1 puff  1 puff Inhalation Daily    furosemide (LASIX) tablet 20 mg  20 mg Oral Daily    heparin (porcine) 25,000 units in 0 45% NaCl 250 mL infusion (premix)  3-30 Units/kg/hr (Order-Specific) Intravenous Titrated    heparin (porcine) injection 3,200 Units  3,200 Units Intravenous Q1H PRN    heparin (porcine) injection 6,400 Units  6,400 Units Intravenous Q1H PRN    multivitamin-minerals (CENTRUM) tablet 1 tablet  1 tablet Oral Daily    ondansetron (ZOFRAN) injection 4 mg  4 mg Intravenous Q6H PRN    pantoprazole (PROTONIX) EC tablet 20 mg  20 mg Oral Early Morning    sacubitril-valsartan (ENTRESTO) 49-51 MG per tablet 1 tablet  1 tablet Oral BID     Home Medications: (Not in a hospital admission)      No Known Allergies    Objective   Vitals: Blood pressure 114/73, pulse 82, temperature 98 °F (36 7 °C), temperature source Oral, resp  rate 18, SpO2 96 %  Orthostatic Blood Pressures    Flowsheet Row Most Recent Value   Blood Pressure 114/73 filed at 07/22/2022 0809   Patient Position - Orthostatic VS Lying filed at 07/22/2022 0006            Invasive Devices  Report    Peripheral Intravenous Line  Duration           Peripheral IV 07/21/22 Left Antecubital <1 day    Peripheral IV 07/22/22 Right Forearm <1 day                Physical Exam  Constitutional:       General: He is not in acute distress  Appearance: Normal appearance  He is not ill-appearing or diaphoretic  HENT:      Head: Normocephalic and atraumatic  Eyes:      General:         Right eye: No discharge  Left eye: No discharge     Neck:      Comments: JVD elevated  Cardiovascular:      Rate and Rhythm: Normal rate and regular rhythm  Pulses: Normal pulses  Heart sounds: Normal heart sounds  No murmur heard  No gallop  Pulmonary:      Effort: Pulmonary effort is normal  No respiratory distress  Breath sounds: Normal breath sounds  No wheezing  Abdominal:      General: Abdomen is flat  There is no distension  Palpations: Abdomen is soft  Tenderness: There is no abdominal tenderness  Musculoskeletal:      Cervical back: Normal range of motion  Right lower leg: No edema  Left lower leg: No edema  Skin:     General: Skin is warm and dry  Capillary Refill: Capillary refill takes less than 2 seconds  Neurological:      General: No focal deficit present  Mental Status: He is alert and oriented to person, place, and time  Psychiatric:         Mood and Affect: Mood normal          Behavior: Behavior normal        Lab Results: I have personally reviewed pertinent lab results  Results from last 7 days   Lab Units 07/21/22  2143 07/21/22  1923   HS TNI 0HR ng/L  --  16   HS TNI 2HR ng/L 17  --      Results from last 7 days   Lab Units 07/22/22  0459   NT-PRO BNP pg/mL 6,971*     Results from last 7 days   Lab Units 07/22/22  0459 07/21/22  1923   POTASSIUM mmol/L 4 0 4 7   CO2 mmol/L 24 26   CHLORIDE mmol/L 113* 111*   BUN mg/dL 22 25   CREATININE mg/dL 1 36* 1 51*     Results from last 7 days   Lab Units 07/22/22  0459 07/21/22  1923   HEMOGLOBIN g/dL 13 2 13 5   HEMATOCRIT % 38 6 41 8   PLATELETS Thousands/uL 183 241         Results from last 7 days   Lab Units 07/22/22  0008   INR  1 26*         Imaging: I have personally reviewed pertinent reports  Telemetry:   NSR    EKG:   Date: NSR  Interpretation:  Increased UT interval, consistent with 1st degree AV block  NM myocardial perfusion spect (stress and/or rest)      There is a small to medium sized fixed inferoseptal perfusion defect   The rest of perfusion is somewhat heterogenous due to dilated cardiomyopathy  No definitive reversible ischemia The image quality was good  The left ventricle was  severely dilated  GATED SPECT:  The calculated left ventricular ejection fraction was 30 %  There was severely reduced myocardial thickening and motion of the inferior wall and septal wall of the left ventricle  SUMMARY:  -  Stress results: There was no chest pain during stress  -  ECG conclusions: The stress ECG was negative for ischemia and normal   -  Perfusion imaging: The left ventricle was severely dilated  -  Gated SPECT: The calculated left ventricular ejection fraction was 30 %  There was severely reduced myocardial thickening and motion of the inferior wall and septal wall of the left ventricle  -  Impressions and recommendations: Abnormal study after pharmacologic vasodilation without reproduction of symptoms  The cardiovascular risk is intermediate  Left ventricular systolic function was reduced, with regional wall motion  abnormalities  IMPRESSIONS: Abnormal study after pharmacologic vasodilation without reproduction of symptoms  The cardiovascular risk is intermediate  Left ventricular systolic function was reduced, with regional wall motion abnormalities  Echo complete with contrast if indicated    LEFT VENTRICLE:  The ventricle was moderately dilated  Systolic function was moderately to markedly reduced  Ejection fraction was estimated to be 35 %  There was severe diffuse hypokinesis with distinct regional wall motion abnormalities in the inferior wall region  The changes were consistent with eccentric hypertrophy  Left ventricular diastolic function parameters were abnormal     TRICUSPID VALVE: The valve structure was normal  There was normal leaflet separation  DOPPLER: The transtricuspid velocity was within the normal range  There was no evidence for stenosis  There was moderate to severe regurgitation  Regurgitation grade was 2-3+ on a scale of 0 to 4+   Estimated peak PA pressure was 45 mmHg  The findings suggest mild pulmonary hypertension  PULMONIC VALVE: Leaflets exhibited normal thickness, no calcification, and normal cuspal separation  DOPPLER: The transpulmonic velocity was within the normal range  There was trace regurgitation  PERICARDIUM: There was no pericardial effusion  AORTA: The root exhibited upper limit of normal size measuring 3 8 cm  There was mild dilatation of the ascending aorta measuring 4 0 cm  SYSTEM MEASUREMENT TABLES    2D  %FS: 15 43 %  Ao Diam: 3 77 cm  Ao asc: 4 03 cm  EDV(Teich): 215 42 ml  EF Biplane: 31 63 %  EF(Teich): 31 83 %  ESV(Teich): 146 84 ml  IVSd: 1 11 cm  LA Area: 30 86 cm2  LA Diam: 4 77 cm  LVEDV MOD A2C: 240 44 ml  LVEDV MOD A4C: 190 34 ml  LVEDV MOD BP: 241 35 ml  LVEF MOD A2C: 32 69 %  LVEF MOD A4C: 30 48 %  LVESV MOD A2C: 161 84 ml  LVESV MOD A4C: 132 32 ml  LVESV MOD BP: 165 ml  LVIDd: 6 49 cm  LVIDs: 5 49 cm  LVLd A2C: 11 45 cm  LVLd A4C: 8 92 cm  LVLs A2C: 10 03 cm  LVLs A4C: 7 85 cm  LVPWd: 0 96 cm  RA Area: 21 02 cm2  RVIDd: 4 63 cm  SV MOD A2C: 78 6 ml  SV MOD A4C: 58 02 ml  SV(Teich): 68 58 ml    CW  AR Dec [place]: 1 21 m/s2  AR Dec Time: 2694 17 ms  AR PHT: 781 31 ms  AR Vmax: 3 25 m/s  AR maxP 28 mmHg  TR Vmax: 2 78 m/s  TR maxP 95 mmHg    MM  TAPSE: 2 05 cm    PW  MV A Benigno: 0 42 m/s  MV Dec [place]: 5 12 m/s2  MV DecT: 200 11 ms  MV E Benigno: 1 02 m/s  MV E/A Ratio: 2 47  MV PHT: 58 03 ms  MVA By PHT: 3 79 cm2    [employer] Accredited Echocardiography Laboratory    Prepared and electronically signed by    Evelyn Vasquez MD  Signed 01-Oct-2020 14:47:41    No results found for this or any previous visit  VTE Prophylaxis: Heparin             Thank you for involving us in the care of your patient        Raymundo Hood MD  PGY-3

## 2022-07-23 VITALS
DIASTOLIC BLOOD PRESSURE: 71 MMHG | BODY MASS INDEX: 25.76 KG/M2 | HEART RATE: 68 BPM | RESPIRATION RATE: 18 BRPM | SYSTOLIC BLOOD PRESSURE: 113 MMHG | TEMPERATURE: 97.7 F | HEIGHT: 71 IN | WEIGHT: 184 LBS | OXYGEN SATURATION: 97 %

## 2022-07-23 PROBLEM — I26.99 ACUTE PULMONARY EMBOLISM (HCC): Status: ACTIVE | Noted: 2022-07-22

## 2022-07-23 LAB
APTT PPP: 81 SECONDS (ref 23–37)
INR PPP: 1.07 (ref 0.84–1.19)
PROTHROMBIN TIME: 14.1 SECONDS (ref 11.6–14.5)

## 2022-07-23 PROCEDURE — 99239 HOSP IP/OBS DSCHRG MGMT >30: CPT | Performed by: INTERNAL MEDICINE

## 2022-07-23 PROCEDURE — 85610 PROTHROMBIN TIME: CPT | Performed by: INTERNAL MEDICINE

## 2022-07-23 PROCEDURE — 85730 THROMBOPLASTIN TIME PARTIAL: CPT | Performed by: INTERNAL MEDICINE

## 2022-07-23 PROCEDURE — 99232 SBSQ HOSP IP/OBS MODERATE 35: CPT | Performed by: PHYSICIAN ASSISTANT

## 2022-07-23 RX ORDER — FUROSEMIDE 20 MG/1
10 TABLET ORAL DAILY
Start: 2022-07-23

## 2022-07-23 RX ADMIN — BISOPROLOL FUMARATE 10 MG: 5 TABLET ORAL at 10:05

## 2022-07-23 RX ADMIN — ATORVASTATIN CALCIUM 10 MG: 10 TABLET, FILM COATED ORAL at 10:01

## 2022-07-23 RX ADMIN — Medication 3 TABLET: at 10:04

## 2022-07-23 RX ADMIN — APIXABAN 10 MG: 5 TABLET, FILM COATED ORAL at 11:27

## 2022-07-23 RX ADMIN — FUROSEMIDE 10 MG: 20 TABLET ORAL at 10:01

## 2022-07-23 RX ADMIN — Medication 1 TABLET: at 10:01

## 2022-07-23 RX ADMIN — SACUBITRIL AND VALSARTAN 1 TABLET: 49; 51 TABLET, FILM COATED ORAL at 10:05

## 2022-07-23 RX ADMIN — PANTOPRAZOLE SODIUM 20 MG: 20 TABLET, DELAYED RELEASE ORAL at 05:06

## 2022-07-23 RX ADMIN — FINASTERIDE 5 MG: 5 TABLET, FILM COATED ORAL at 10:01

## 2022-07-23 RX ADMIN — Medication 200 MG: at 10:01

## 2022-07-23 RX ADMIN — Medication 1000 UNITS: at 10:01

## 2022-07-23 NOTE — PROGRESS NOTES
Post admit follow-up    No recurrence of hemoptysis  No chest pain or shortness of breath at present    1  Acute submassive pulmonary embolism - presented with hemoptysis  CT chest - Extensive pulmonary emboli seen within the distal bilateral main pulmonary arteries extending into the right upper and lower lobar branches and left lower lobar branches and further distally  Unprovoked  On a heparin drip at present  Pulmonology input appreciated - can change to NOAC's based on insurance coverage  Monitor overnight  CT abdomen/ pelvis ordered in view of history of melanoma  Eliquis sent to pharmacy for pricing  If Eliquis affordable transition from heparin drip to Eliquis in a m  2  Chronic HFrEF - nonischemic cardiomyopathy  Euvolemic at present  Continue bisoprolol, Entresto, Lasix  Monitor volume status  3  Hypertension - continue home medications  BP acceptable  4   HLD - continue statin    Discussed with wife at the bedside    Discussed with pulmonology

## 2022-07-23 NOTE — PROGRESS NOTES
Advanced Heart Failure / Pulmonary Hypertension Service Progress Note    Angeles Rivera 81 y o  male   MRN: [id number]  Unit/Bed#: [facility] 530-01; Encounter: [id number]    Assessment:  Principal Problem:    Acute submassive pulmonary embolism ([facility] )  Active Problems:    Burt esophagus    Cardiomyopathy ([facility] )    Essential hypertension    Hypertensive kidney disease with stage 3b chronic kidney disease ([facility] )    Chronic HFrEF (heart failure with reduced ejection fraction) ([place] [address] )    Mild persistent asthma without complication      Subjective:   Patient seen and examined  No significant events overnight  No current complaints  Continues with cough and intermittent hemoptysis  Objective: Intake/ Output: 927 4 mL / 730 mL (net positive 197 4 mL)  Weight: No weight today  Telemetry: NSR, rates in 70s  Today's Plan:   Switched to NOAC by pulmonology   Restarted PO Lasix 10 mg daily today   Stable for discharge today for HF standpoint   Will have office call patient early next week to arrange for hospital follow-up appointment within 7-10 days of discharge  Plan:  Acute bilateral pulmonary emboli   Impression: Unprovoked with known history of colon cancer and melanoma  Also with recent unintentional weight loss in 2-3 week time frame  CT chest / PE study 07/21/2022: "Nonobstructive pulmonary emboli  within distal bilateral main pulmonary arteries extending into lobar branches  Extensive pulmonary emboli within right upper lobe and right lower lobe and left lower lobe  Main pulmonary arteries more dilated when compared with prior exam right main pulmonary artery measures 3 2 cm, previously 2 4 cm "   Anticoagulation on Eliquis  Chronic HFrEF; LVEF 35%; LVIDd 5 3 cm; NYHA II; ACC/AHA Stage B   Etiology: nonischemic - presumed viral     TTE 06/21/2018: LVEF 50%  LVIDd 6 35 cm  TTE 01/29/2019: LVEF 40%  LVIDd 6 cm  Normal RV  Mild MR  PASP 40 mmHg     Nuclear stress test 01/31/2019:  Small to medium size fixed defect of inferior septum; no ischemia  LVEF 30%  TTE 10/01/2020: LVEF 35%  LVIDd 6 5 cm  Mildly dilated RV with mildly reduced RVSF  Moderate MR and TR  Mild AI  Ascending aorta 4 cm  TTE 07/22/2022: LVEF 35%  LVIDd 5 3 cm  Mildly dilated RV with mildly reduced RVSF  Mild MR  Mild to moderate TR  PASP 30 mmHg  No RVOT notching  Moderate AI  Aortic root 4 6 cm; ascending aorta 4 1 cm  Neurohormonal Blockade:  --Beta Blocker: bisoprolol 10 mg BID   --ARNi / ACEi / ARB: Entresto 49-51 mg BID  --Aldosterone Antagonist: No    --SGLT2 Inhibitor: No    --Home Diuretic: Lasix 10 mg daily  --Inpatient Diuretic: PO Lasix 10 mg daily    Sudden Cardiac Death Risk Reduction:  --[employer]onic dual chamber ICD in situ since 10/2018  --Interrogation from 06/07/2022: AP 67 2%   13 1%  Lead parameters WNL  1 NSVT episode; 12 beats  OptiVol WNL  Electrical Resynchronization:  --Candidacy for BiV device: -140 ms  Advanced Therapies (if appropriate): Will continue to monitor  History of supraventricular tachycardia: continues on BB as above  Hypertension  Hyperlipidemia  Burt's esophagus  History of colon cancer: s/p right hemicolectomy  History of melanoma    Vitals:   Blood pressure 113/71, pulse 68, temperature 97 7 °F (36 5 °C), temperature source Oral, resp  rate 18, height 5' 11" (1 803 m), weight 83 5 kg (184 lb), SpO2 97 %  Body mass index is 25 66 kg/m²  I/O last 3 completed shifts: In: 1927 4 [P O :720; I V :207 4;  IV Piggyback:1000]  Out: 2280 [Urine:2280]    Wt Readings from Last 10 Encounters:   07/22/22 83 5 kg (184 lb)   07/15/22 83 8 kg (184 lb 12 8 oz)   06/21/22 86 6 kg (191 lb)   05/13/22 89 3 kg (196 lb 12 8 oz)   02/16/22 90 5 kg (199 lb 9 6 oz)   02/04/22 90 7 kg (200 lb)   02/02/22 91 4 kg (201 lb 6 4 oz)   09/01/21 89 7 kg (197 lb 11 2 oz)   08/04/21 89 2 kg (196 lb 9 6 oz)   03/17/21 89 5 kg (197 lb 6 4 oz)     Vitals:    07/22/22 2242 07/23/22 0309 07/23/22 0655 07/23/22 1100   BP: 137/62 139/80 133/79 113/71   BP Location: Left arm  Right arm    Pulse: 70 74 71 68   Resp: 18 18 18 18   Temp: 97 6 °F (36 4 °C)  97 5 °F (36 4 °C) 97 7 °F (36 5 °C)   TempSrc: Oral  Oral Oral   SpO2: 98% 96% 97% 97%   Weight:       Height:           Physical Exam  Vitals reviewed  Constitutional:       General: He is awake  He is not in acute distress  Appearance: Normal appearance  He is well-developed and normal weight  He is not toxic-appearing or diaphoretic  HENT:      Head: Normocephalic  Nose: Nose normal       Mouth/Throat:      Mouth: Mucous membranes are moist    Eyes:      General: No scleral icterus  Conjunctiva/sclera: Conjunctivae normal    Neck:      Vascular: No JVD  Trachea: No tracheal deviation  Cardiovascular:      Rate and Rhythm: Normal rate and regular rhythm  No extrasystoles are present  Pulses: Normal pulses  Heart sounds: Murmur heard  Pulmonary:      Effort: Pulmonary effort is normal  No tachypnea, bradypnea or respiratory distress  Breath sounds: Normal air entry  No stridor or decreased air movement  Rhonchi (coarse; mild improvement with cough) present  No wheezing or rales  Abdominal:      General: Bowel sounds are normal  There is no distension  Palpations: Abdomen is soft  Tenderness: There is no abdominal tenderness  Musculoskeletal:      Cervical back: Neck supple  Right lower leg: No edema  Left lower leg: No edema  Skin:     General: Skin is warm and dry  Coloration: Skin is not jaundiced or pale  Neurological:      General: No focal deficit present  Mental Status: He is alert and oriented to person, place, and time  Psychiatric:         Attention and Perception: Attention normal          Mood and Affect: Mood and affect normal          Speech: Speech normal          Behavior: Behavior normal  Behavior is cooperative           Thought Content: Thought content normal      Central Line: No   Doyle Catheter: No      Current Facility-Administered Medications:     albuterol (PROVENTIL HFA,VENTOLIN HFA) inhaler 2 puff, 2 puff, Inhalation, Q4H PRN, Candace Garrett DO    aluminum-magnesium hydroxide-simethicone (MYLANTA) oral suspension 30 mL, 30 mL, Oral, Q6H PRN, Jay Jay Szymanski MD    apixaban (ELIQUIS) tablet 10 mg, 10 mg, Oral, BID, Gallo Longoria MD, 10 mg at 07/23/22 1127    atorvastatin (LIPITOR) tablet 10 mg, 10 mg, Oral, Daily, Jay Jay Szymanski MD, 10 mg at 07/23/22 1001    bisoprolol (ZEBETA) tablet 10 mg, 10 mg, Oral, BID, Jay Jay Szymanski MD, 10 mg at 07/23/22 1005    calcium carbonate-vitamin D (OSCAL-D) 500 mg-200 units per tablet 3 tablet, 3 tablet, Oral, Daily With Breakfast, aJy Jay Szymanski MD, 3 tablet at 07/23/22 1004    cholecalciferol (VITAMIN D3) tablet 1,000 Units, 1,000 Units, Oral, Daily, Jay Jay Szymanski MD, 1,000 Units at 07/23/22 1001    co-enzyme Q-10 capsule 200 mg, 200 mg, Oral, Daily, Jay Jay Szymanski MD, 200 mg at 07/23/22 1001    finasteride (PROSCAR) tablet 5 mg, 5 mg, Oral, Daily, Jay Jay Szymanski MD, 5 mg at 07/23/22 1001    furosemide (LASIX) tablet 10 mg, 10 mg, Oral, Daily, Jan Thorpe MD, 10 mg at 07/23/22 1001    multivitamin-minerals (CENTRUM) tablet 1 tablet, 1 tablet, Oral, Daily, Jay Jay Szymanski MD, 1 tablet at 07/23/22 1001    ondansetron (ZOFRAN) injection 4 mg, 4 mg, Intravenous, Q6H PRN, Jay Jay Szymanski MD    pantoprazole (PROTONIX) EC tablet 20 mg, 20 mg, Oral, Early Morning, Jay Jay Szymanski MD, 20 mg at 07/23/22 0506    sacubitril-valsartan (ENTRESTO) 49-51 MG per tablet 1 tablet, 1 tablet, Oral, BID, Jay Jay Szymanski MD, 1 tablet at 07/23/22 1005    Labs & Results:      Results from last 7 days   Lab Units 07/22/22  0459 07/21/22  1923   WBC Thousand/uL 9 96 10 89*   HEMOGLOBIN g/dL 13 2 13 5   HEMATOCRIT % 38 6 41 8   PLATELETS Thousands/uL 183 241         Results from last 7 days   Lab Units 07/22/22  0459 07/21/22  1923   POTASSIUM mmol/L 4 0 4 7   CHLORIDE mmol/L 113* 111*   CO2 mmol/L 24 26   BUN mg/dL 22 25   CREATININE mg/dL 1 36* 1 51*   CALCIUM mg/dL 8 9 9 3   ALK PHOS U/L 78 83   ALT U/L 31 37   AST U/L 22 26     Results from last 7 days   Lab Units 07/23/22  0516 07/22/22  0008   INR  1 07 1 26*     Steffanie Roman PA-C

## 2022-07-24 NOTE — DISCHARGE SUMMARY
Discharging Physician / Practitioner: Joseph Terry MD  PCP: Sourav Adair MD  Admission Date:   Admission Orders (From admission, onward)     Ordered        07/21/22 2257  Inpatient Admission  Once                      Discharge Date: 07/23/22     Principal discharge diagnosis:  Acute submassive pulmonary embolism    Secondary diagnoses:  1  Chronic HFrEF with LVEF of 35%  2  Nonischemic cardiomyopathy  3  Hypertension  4  Hyperlipidemia  5  History of colon cancer  6  History of melanoma  7  Asthma  8  Burt's esophagus  9  History of SVT    Consultations During Hospital Stay:  Pulmonology  Cardiology    Procedures Performed:   1  CTA chest PE study - Extensive pulmonary emboli seen within the distal bilateral main pulmonary arteries extending into the right upper and lower lobar branches and left lower lobar branches and further distally  Measured RV/LV ratio is within normal limits at less than 0 9  However, there is increase in size of the main pulmonary arteries which may be due to pulmonary arterial hypertension  Small opacities at the bilateral lower lobe bases may be due to atelectasis versus infarct  Trace bilateral pleural effusions  Cardiomegaly  Cholelithiasis without evidence of cholecystitis  2  CT abdomen and pelvis with oral contrast - Trace left pleural effusion  Small hiatal hernia  Cholelithiasis  Left renal cyst  Colonic diverticulosis  Right hemicolectomy  3  Echocardiogram -  Left Ventricle: Left ventricular cavity size is normal  Wall thickness is moderately increased  There is severe concentric hypertrophy  The left ventricular ejection fraction is 35%  Systolic function is severely reduced  There is severe global hypokinesis  Diastolic function is abnormal     IVS: There is systolic flattening of the interventricular septum consistent with right ventricle pressure overload    Right Ventricle: Right ventricular cavity size is dilated  Systolic function is mildly reduced     Left Atrium: The atrium is mildly dilated    Right Atrium: The atrium is dilated    Aortic Valve: There is moderate regurgitation    Mitral Valve: There is mild annular calcification  There is mild regurgitation    Tricuspid Valve: There is mild to moderate regurgitation  The right ventricular systolic pressure is mildly elevated  The estimated right ventricular systolic pressure is 70 56 mmHg    Aorta: The aortic root is mildly dilated  The ascending aorta is mildly dilated  The aortic root is 4 60 cm  The ascending aorta is 4 1 cm    Pericardium: There is a small pericardial effusion along the right ventricular free wall  The fluid exhibits no internal echoes  There is no echocardiographic evidence of tamponade  Outpatient Tests Requested:  Colonoscopy  Thrombosis panel    Hospital Course: Fady Syed is a 81 y o  male patient who originally presented to the hospital on 7/21/2022 due to hemoptysis since 1 week  CT chest showed an acute submassive pulmonary embolism as above  He was initially begun on a heparin drip on which he was continued overnight and was transitioned to Eliquis on 7/23/2022  He was discharged on Eliquis  He was followed by pulmonology and Cardiology both of whom cleared him for discharge home  His pulmonary embolism was of unclear etiology  He has a history of colon cancer and melanoma  CT abdomen and pelvis without contrast did not reveal evidence of malignancy  He has been advised to follow up with Hematology/Oncology as an outpatient  Colonoscopy and thrombosis panel should be obtained        Condition at Discharge: stable    Discharge Day Visit / Exam:   Subjective:    Vitals: Blood Pressure: 113/71 (07/23/22 1100)  Pulse: 68 (07/23/22 1100)  Temperature: 97 7 °F (36 5 °C) (07/23/22 1100)  Temp Source: Oral (07/23/22 1100)  Respirations: 18 (07/23/22 1100)  Height: 5' 11" (180 3 cm) (07/22/22 1030)  Weight - Scale: 83 5 kg (184 lb) (07/22/22 1030)  SpO2: 97 % (07/23/22 1100)  Exam:   Physical Exam  Vitals reviewed  HENT:      Head: Normocephalic  Nose: Nose normal       Mouth/Throat:      Mouth: Mucous membranes are moist    Eyes:      Extraocular Movements: Extraocular movements intact  Cardiovascular:      Rate and Rhythm: Normal rate and regular rhythm  Pulmonary:      Effort: Pulmonary effort is normal  No respiratory distress  Breath sounds: Normal breath sounds  No wheezing  Abdominal:      General: Bowel sounds are normal  There is no distension  Palpations: Abdomen is soft  Tenderness: There is no abdominal tenderness  Musculoskeletal:         General: No swelling  Cervical back: Neck supple  Skin:     General: Skin is dry  Neurological:      General: No focal deficit present  Mental Status: He is alert and oriented to person, place, and time  Psychiatric:         Mood and Affect: Mood normal          Behavior: Behavior normal           Discussion with Family: Updated  (wife) at bedside  Discharge instructions/Information to patient and family:   See after visit summary for information provided to patient and family  Provisions for Follow-Up Care:  See after visit summary for information related to follow-up care and any pertinent home health orders  Disposition:   Home    Planned Readmission:  No     Discharge Statement:  I spent 40 minutes discharging the patient  This time was spent on the day of discharge  I had direct contact with the patient on the day of discharge  Greater than 50% of the total time was spent examining patient, answering all patient questions, arranging and discussing plan of care with patient as well as directly providing post-discharge instructions  Additional time then spent on discharge activities  Discharge Medications:  See after visit summary for reconciled discharge medications provided to patient and/or family        **Please Note: This note may have been constructed using a voice recognition system**

## 2022-07-25 ENCOUNTER — TRANSITIONAL CARE MANAGEMENT (OUTPATIENT)
Dept: FAMILY MEDICINE CLINIC | Facility: CLINIC | Age: 82
End: 2022-07-25

## 2022-07-25 NOTE — UTILIZATION REVIEW
Notification of Discharge   This is a Notification of Discharge from our facility [address]  Please be advised that this patient has been discharge from our facility  Below you will find the admission and discharge date and time including the patients disposition  UTILIZATION REVIEW CONTACT:  Mojgan Middleton  Utilization   Network Utilization Review Department  Phone: [phone] x carefully listen to the prompts  All voicemails are confidential   Email: [email or web address]     PHYSICIAN ADVISORY SERVICES:  FOR BUNE-II-IFUZ REVIEW - MEDICAL NECESSITY DENIAL  Phone: [phone]  Fax: [phone]  Email: [email or web address]     PRESENTATION DATE: 7/21/2022  6:27 PM  OBERVATION ADMISSION DATE:   INPATIENT ADMISSION DATE: 7/21/22 10:57 PM   DISCHARGE DATE: 7/23/2022  1:44 PM  DISPOSITION: Home/Self Care Home/Self Care      IMPORTANT INFORMATION:  Send all requests for admission clinical reviews, approved or denied determinations and any other requests to dedicated fax number below belonging to the campus where the patient is receiving treatment   List of dedicated fax numbers:  [address] DENIALS (Administrative/Medical Necessity) [phone]   [address] (Maternity/NICU/Pediatrics) [phone]   [facility] [phone]   130 [facility] [phone]   [address] [place] [address] [phone]   [address] [phone]   [facility]  [phone]   [address] [place] [address]  [address]01 [facility] 1000 [facility] [phone]

## 2022-07-27 ENCOUNTER — OFFICE VISIT (OUTPATIENT)
Dept: FAMILY MEDICINE CLINIC | Facility: CLINIC | Age: 82
End: 2022-07-27
Payer: COMMERCIAL

## 2022-07-27 VITALS
TEMPERATURE: 98.5 F | WEIGHT: 184.2 LBS | OXYGEN SATURATION: 97 % | DIASTOLIC BLOOD PRESSURE: 82 MMHG | SYSTOLIC BLOOD PRESSURE: 102 MMHG | RESPIRATION RATE: 20 BRPM | HEIGHT: 71 IN | BODY MASS INDEX: 25.79 KG/M2 | HEART RATE: 76 BPM

## 2022-07-27 DIAGNOSIS — I50.22 CHRONIC HFREF (HEART FAILURE WITH REDUCED EJECTION FRACTION) (HCC): ICD-10-CM

## 2022-07-27 DIAGNOSIS — E78.2 MIXED HYPERLIPIDEMIA: ICD-10-CM

## 2022-07-27 DIAGNOSIS — Z09 HOSPITAL DISCHARGE FOLLOW-UP: Primary | ICD-10-CM

## 2022-07-27 DIAGNOSIS — I26.99 OTHER ACUTE PULMONARY EMBOLISM WITHOUT ACUTE COR PULMONALE (HCC): ICD-10-CM

## 2022-07-27 DIAGNOSIS — J45.30 MILD PERSISTENT ASTHMA WITHOUT COMPLICATION: ICD-10-CM

## 2022-07-27 PROCEDURE — 1111F DSCHRG MED/CURRENT MED MERGE: CPT | Performed by: FAMILY MEDICINE

## 2022-07-27 PROCEDURE — 99496 TRANSJ CARE MGMT HIGH F2F 7D: CPT | Performed by: FAMILY MEDICINE

## 2022-07-27 NOTE — PROGRESS NOTES
Chief Complaint   Patient presents with    Transition of Care Management     Discharged 07/23/22  Health Maintenance   Topic Date Due    SLP PLAN OF CARE  Never done    BMI: Followup Plan  02/03/2022    COVID-19 Vaccine (4 - Booster for Moderna series) 02/27/2022    Medicare Annual Wellness Visit (AWV)  08/04/2022    Influenza Vaccine (1) 09/01/2022    Depression Screening  02/02/2023    Fall Risk  05/20/2023    BMI: Adult  07/27/2023    DXA SCAN  03/02/2025    Pneumococcal Vaccine: 65+ Years  Completed    HIB Vaccine  Aged Out    Hepatitis B Vaccine  Aged Out    IPV Vaccine  Aged Out    Hepatitis A Vaccine  Aged Out    Meningococcal ACWY Vaccine  Aged Out    HPV Vaccine  Aged Out           Assessment/Plan:     Acute submassive pulmonary embolism ([facility] )  Continue eliquis  FU pulmonary and hem/onc  Chronic HFrEF (heart failure with reduced ejection fraction) (HCC)  Wt Readings from Last 3 Encounters:   07/27/22 83 6 kg (184 lb 3 2 oz)   07/22/22 83 5 kg (184 lb)   07/15/22 83 8 kg (184 lb 12 8 oz)     Continue entresto and lasix 10mg QD  FU cardiology  Mixed hyperlipidemia  Continue atorvastatin 10mg qhs  Mild persistent asthma without complication  FU pulmonary  Continue albuterol prn  Diagnoses and all orders for this visit:    Hospital discharge follow-up  Comments:  Reviewed hospital records  Other acute pulmonary embolism without acute cor pulmonale (HCC)    Chronic HFrEF (heart failure with reduced ejection fraction) ([address] )    Mixed hyperlipidemia    Mild persistent asthma without complication         Subjective:     Patient ID: Katharine Wing is a 81 y o  male  HPI    Pt is here by himself  Was in hospital 7/21-7/23/2022 for cough blood  CT chest showed acute pulmonary embolism  Pulmonary and cardiology consulted  Got heparin drip then transitioned to eliquis  Pt lost 17 lbs in last 6 months  CT abdomen/pelvis showed no malignancy     Hx of colon cancer s/p resection in 2004  Plan to do colonoscopy outpatient  Echo showed EF 35%  Discharged home  Pt feels better now  Will follow hem/onc on 8/11/2022  CHF/cardiomyopathy---He is on bisoprolol 10mg bid and entresto 49-51mg bid    FU cardiology for cardiomyopathy, tachycardia, s/p pacemaker  Stable    He is on lasix 10mg QD and entresto       Hyperlipidemia---He is on atorvastatin 10mg now  Denies SE      Asthma----Saw pulmonary, got Wixela 500-50 and he only use it prn  Does not need albuterol recently    Never smoked       FU Cancer care Q 6m-1y for melanoma s/p wide excision on right upper arm  Lily Mcneal for Burt's, do EGD every 3 years  Pt is on omeprazole 20mg daily now  Review of Systems   Constitutional: Positive for unexpected weight change  Negative for appetite change, chills and fever  HENT: Negative for congestion, ear pain, sinus pain and sore throat  Eyes: Negative for discharge and itching  Respiratory: Negative for apnea, cough, chest tightness, shortness of breath and wheezing  Cardiovascular: Negative for chest pain, palpitations and leg swelling  Gastrointestinal: Negative for abdominal pain, anal bleeding, constipation, diarrhea, nausea and vomiting  Endocrine: Negative for cold intolerance, heat intolerance and polyuria  Genitourinary: Negative for difficulty urinating and dysuria  Musculoskeletal: Negative for arthralgias, back pain and myalgias  Skin: Negative for rash  Neurological: Negative for dizziness and headaches  Psychiatric/Behavioral: Negative for agitation  Objective:     Physical Exam  Constitutional:       Appearance: He is well-developed  HENT:      Head: Normocephalic and atraumatic  Eyes:      General:         Right eye: No discharge  Left eye: No discharge  Conjunctiva/sclera: Conjunctivae normal    Cardiovascular:      Rate and Rhythm: Normal rate and regular rhythm  Heart sounds: Normal heart sounds  No murmur heard  No friction rub  No gallop  Pulmonary:      Effort: Pulmonary effort is normal  No respiratory distress  Breath sounds: Rales present  No wheezing  Abdominal:      General: Bowel sounds are normal  There is no distension  Palpations: Abdomen is soft  Tenderness: There is no abdominal tenderness  There is no guarding  Musculoskeletal:      Cervical back: Normal range of motion and neck supple  No tenderness  Right lower leg: No edema  Left lower leg: No edema  Lymphadenopathy:      Cervical: No cervical adenopathy  Neurological:      Mental Status: He is alert  Vitals:    07/27/22 1252   BP: 102/82   BP Location: Left arm   Patient Position: Sitting   Cuff Size: Adult   Pulse: 76   Resp: 20   Temp: 98 5 °F (36 9 °C)   TempSrc: Tympanic   SpO2: 97%   Weight: 83 6 kg (184 lb 3 2 oz)   Height: 5' 11" (1 803 m)       Transitional Care Management Review:  Andre Youngblood is a 81 y o  male here for TCM follow up  During the TCM phone call patient stated:    TCM Call     Date and time call was made  7/25/2022 12:08 PM    Hospital care reviewed  Records reviewed    Patient was hospitial[facility] at  [facility]    Date of Admission  07/21/22    Date of discharge  07/23/22    Diagnosis  Acute submassive pulmonary embolism ([facility] )    Disposition  Home    Were the patients medications reviewed and updated  No    Current Symptoms  None      TCM Call     Post hospital issues  None    Should patient be enrolled in anticoag monitoring? No    Scheduled for follow up?   Yes    Not clinically warranted  Follow up with Cardiology    Patients specialists  Cardiologist    Cardiologist name  BRANDON Mcdonald    Did you obtain your prescribed medications  Yes    Do you need help managing your prescriptions or medications  No    Is transportation to your appointment needed  No    I have advised the patient to call PCP with any new or worsening symptoms  [address] or [address] other    Support System  Spouse    The type of support provided  Emotional; Physical    Do you have social support  Yes, quite a bit    Are you recieving any outpatient services  No    Are you recieving home care services  No    Are you using any community resources  No    Current waiver services  No    Have you fallen in the last 12 months  No    Interperter language line needed  No    Counseling  Patient    Counseling topics  instructions for management          Geetha Jackson MD

## 2022-07-27 NOTE — ASSESSMENT & PLAN NOTE
Wt Readings from Last 3 Encounters:   07/27/22 83 6 kg (184 lb 3 2 oz)   07/22/22 83 5 kg (184 lb)   07/15/22 83 8 kg (184 lb 12 8 oz)     Continue entresto and lasix 10mg QD  FU cardiology

## 2022-07-28 NOTE — PROGRESS NOTES
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 ([place] [address] ) 08/06/2021    Mild persistent asthma without complication 18/52/8708    Encounter for follow-up surveillance of melanoma 07/11/2019    Chronic HFrEF (heart failure with reduced ejection fraction) ([place] [address] ) 01/29/2019    Hypertensive kidney disease with stage 3b chronic kidney disease ([place] [address] ) 01/04/2019    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 09/20/2018    Osteopenia 06/29/2018    Personal history of malignant melanoma 11/15/2017    Skin lesion 06/23/2017    Ank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 ([place] [address] ) 12/30/2014    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 12/02/2013    Microscopic hematuria 12/02/2013    Primary osteoarthritis of both knees 11/26/2013    Polymyalgia rheumatica ([place] [address] ) 11/26/2013    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    Impaired fasting glucose 05/30/2013    Butr esophagus 04/05/2013    Cardiac arrhythmia 04/05/2013    Essential hypertension 04/05/2013    Esophageal reflux 11/28/2012    Cardiomyopathy ([place] [address] ) 10/26/2012    Mixed hyperlipidemia 09/05/2012       Today's Plan:   Continue current medications   Outpatient cancer screening per PCP  Plan:  Bilateral pulmonary emboli              Impression: Unprovoked with known history of colon cancer and melanoma  Also with recent unintentional weight loss in 2-3 week time frame  CT chest / PE study 07/21/2022: "Nonobstructive pulmonary emboli  within distal bilateral main pulmonary arteries extending into lobar branches   Extensive pulmonary emboli within right upper lobe and right lower lobe and left lower lobe  Main pulmonary arteries more dilated when compared with prior exam right main pulmonary artery measures 3 2 cm, previously 2 4 cm "              Anticoagulation on Eliquis  Chronic HFrEF; LVEF 35%; LVIDd 5 3 cm; NYHA II; ACC/AHA Stage B              Etiology: nonischemic - presumed viral                TTE 06/21/2018: LVEF 50%  LVIDd 6 35 cm  TTE 01/29/2019: LVEF 40%  LVIDd 6 cm  Normal RV  Mild MR  PASP 40 mmHg  Nuclear stress test 01/31/2019:  Small to medium size fixed defect of inferior septum; no ischemia  LVEF 30%  TTE 10/01/2020: LVEF 35%  LVIDd 6 5 cm  Mildly dilated RV with mildly reduced RVSF  Moderate MR and TR  Mild AI  Ascending aorta 4 cm  TTE 07/22/2022: LVEF 35%  LVIDd 5 3 cm  Mildly dilated RV with mildly reduced RVSF  Mild MR  Mild to moderate TR  PASP 30 mmHg  No RVOT notching  Moderate AI  Aortic root 4 6 cm; ascending aorta 4 1 cm  Weight of 184 lbs on 07/22 (day before discharge)  Today, weighs 184 lbs  Most recent BMP from 07/22/2022: sodium 144; potassium 4 0; BUN 22; creatinine 1 36; eGFR 48  Neurohormonal Blockade:  --Beta Blocker: bisoprolol 10 mg BID   --ARNi / ACEi / ARB: Entresto 49-51 mg BID  --Aldosterone Antagonist: No    --SGLT2 Inhibitor: No    --Home Diuretic: Lasix 10 mg daily  Sudden Cardiac Death Risk Reduction:  --Medtronic dual chamber ICD in situ since 10/2018  --Interrogation from 06/07/2022: AP 67 2%   13 1%  Lead parameters WNL  1 NSVT episode; 12 beats  OptiVol WNL        Electrical Resynchronization:  --Candidacy for BiV device: -140 ms      Advanced Therapies (if appropriate): Will continue to monitor  Hypertension   BP of 106/70 mmHg in office today  Continue on medications as above  Chronic kidney disease, stage IIIa   Baseline creatinine of 1 3-1 6     Most recent BMP from 07/22/2022: sodium 144; potassium 4 0; BUN 22; creatinine 1 36; eGFR 48  History of supraventricular tachycardia: continues on BB as above  Hyperlipidemia  Burt's esophagus  History of colon cancer: s/p right hemicolectomy  History of melanoma    HPI:   Fuad Thornton is an 81-year-old man with a PMH as above who presents to the office for hospital follow-up  Follows with Dr Vipul Mcclellan as outpatient  Admitted to [facility] from 07/21 to 07/23/2022 after presenting with acute onset hemoptysis  CT chest revealed ""Nonobstructive pulmonary emboli  within distal bilateral main pulmonary arteries extending into lobar branches  Extensive pulmonary emboli within right upper lobe and right lower lobe and left lower lobe " IV heparin started, and pulmonology and cardiology consulted  HF GDMT unchanged  Transitioned to Eliquis on day of discharge  07/29/2022: Patient presents for hospital follow-up  No current cardiac complaints  Reports today is the 1st day since diagnosis of PE that he has felt improved with higher level of energy  Today's also the 1st day that he has not had any episodes of hemoptysis  Denies any acute bleeding events since being started on Eliquis  Of note, patient's wife and son both with diagnoses of Factor V Leiden      Past Medical History:   Diagnosis Date    AICD (automatic cardioverter/defibrillator) present     Arthritis     Asthma     Burt esophagus     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     Chronic HFrEF (heart failure with reduced ejection fraction) ([facility] [place] [facility] ) 2019    CKD (chronic kidney disease)     Colon cancer ([facility] ) 04/05/2013    Colon cancer screening 06/21/2022    COPD (chronic obstructive pulmonary disease) ([facility] [place] [facility] )     Diverticulitis     Last assessed: 4/5/13    Esophageal reflux     Gout     Hernia     Hyperlipidemia     Hypertension     Hypothyroidism 05/30/2013    Non-toxic multinodular goiter 11/28/2012    Pleural effusion     Polymyalgia rheumatica ([facility] )     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     Spondyloarthropathy     Last assessed: 11/28/12    Thrombocytopenia ([facility] ) 06/23/2015       Review of Systems   Constitutional: Positive for fatigue  Negative for activity change, appetite change, fever and unexpected weight change  HENT: Negative for congestion, postnasal drip, rhinorrhea, sneezing, sore throat and trouble swallowing  Eyes: Negative  Respiratory: Negative for cough, chest tightness and shortness of breath  Cardiovascular: Negative for chest pain, palpitations and leg swelling  Gastrointestinal: Negative for abdominal distention, abdominal pain, diarrhea, nausea and vomiting  Endocrine: Negative  Genitourinary: Negative for decreased urine volume, difficulty urinating, dysuria and urgency  Musculoskeletal: Negative  Skin: Negative  Allergic/Immunologic: Negative  Neurological: Negative for dizziness, tremors, syncope, weakness, light-headedness and headaches  Hematological: Negative  Psychiatric/Behavioral: Negative for agitation, confusion and sleep disturbance  The patient is not nervous/anxious  14-point ROS completed and negative except as stated above and/or in the HPI  No Known Allergies    Current Outpatient Medications:     albuterol (PROVENTIL HFA,VENTOLIN HFA) 90 mcg/act inhaler, Inhale 2 puffs every 6 (six) hours as needed for wheezing or shortness of breath, Disp: 1 Inhaler, Rfl: 1    apixaban (Eliquis) 5 mg, Take 2 tablets (10 mg total) by mouth 2 (two) times a day for 7 days, THEN 1 tablet (5 mg total) 2 (two) times a day for 23 days  , Disp: 74 tablet, Rfl: 0    atorvastatin (LIPITOR) 10 mg tablet, TAKE ONE TABLET BY MOUTH EVERY DAY, Disp: 90 tablet, Rfl: 3    bisoprolol (ZEBETA) 10 MG tablet, TAKE ONE TABLET BY MOUTH TWICE A DAY, Disp: 180 tablet, Rfl: 3    Calcium Carb-Cholecalciferol (CALCIUM 600 + D PO), Take 3 tablets by mouth daily  , Disp: , Rfl:    Coenzyme Q10 (CO Q-10) 200 MG CAPS, Take 1 tablet by mouth daily, Disp: , Rfl:     finasteride (PROSCAR) 5 mg tablet, TAKE ONE TABLET BY MOUTH EVERY DAY, Disp: 90 tablet, Rfl: 1    furosemide (LASIX) 20 mg tablet, Take 0 5 tablets (10 mg total) by mouth daily, Disp: , Rfl:     Multiple Vitamins-Minerals (CENTRUM SILVER PO), Take 1 tablet by mouth daily  , Disp: , Rfl:     omeprazole (PriLOSEC) 20 mg delayed release capsule, Take 1 capsule (20 mg total) by mouth daily, Disp: 90 capsule, Rfl: 2    PREVIDENT 5000 ENAMEL PROTECT 1 1-5 % PSTE, Use as directed, Disp: , Rfl: 3    sacubitril-valsartan (Entresto) 49-51 MG TABS, Take 1 tablet by mouth 2 (two) times a day, Disp: 180 tablet, Rfl: 3    tadalafil (CIALIS) 10 MG tablet, Take 1 tablet (10 mg total) by mouth daily as needed for erectile dysfunction, Disp: 10 tablet, Rfl: 1    Vitamin D, Cholecalciferol, 1000 UNITS CAPS, Take 1 tablet by mouth daily  , Disp: , Rfl:     Wixela Inhub 500-50 MCG/DOSE inhaler, Inhale 1 puff 2 (two) times a day, Disp: 1 Inhaler, Rfl: 0    Social History     Socioeconomic History    Marital status: /Civil Union     Spouse name: Not on file    Number of children: Not on file    Years of education: Not on file    Highest education level: Not on file   Occupational History    Occupation: Manager-Retired   Tobacco Use    Smoking status: Never Smoker    Smokeless tobacco: Never Used   Vaping Use    Vaping Use: Never used   Substance and Sexual Activity    Alcohol use: Never     Comment: Rare    Drug use: No    Sexual activity: Not Currently   Other Topics Concern    Not on file   Social History Narrative    Not on file     Social Determinants of Health     Financial Resource Strain: Not on file   Food Insecurity: Not on file   Transportation Needs: Not on file   Physical Activity: Not on file   Stress: Not on file   Social Connections: Not on file   Intimate Partner Violence: Not on file   Housing Stability: Not on file     Family History   Problem Relation Age of Onset    Heart failure Mother         Congestive    Hypertension Mother     Osteoporosis Mother     COPD Father     Emphysema Father     Hypertension Father     Heart disease Sister         Heart Valve Replacement    Osteoporosis Sister     Breast cancer Family     Factor V Leiden deficiency Son         on Xarelto       Vitals:   Blood pressure 106/70, pulse 75, weight 83 5 kg (184 lb), SpO2 96 %  Body mass index is 25 66 kg/m²  Wt Readings from Last 10 Encounters:   07/29/22 83 5 kg (184 lb)   07/27/22 83 6 kg (184 lb 3 2 oz)   07/22/22 83 5 kg (184 lb)   07/15/22 83 8 kg (184 lb 12 8 oz)   06/21/22 86 6 kg (191 lb)   05/13/22 89 3 kg (196 lb 12 8 oz)   02/16/22 90 5 kg (199 lb 9 6 oz)   02/04/22 90 7 kg (200 lb)   02/02/22 91 4 kg (201 lb 6 4 oz)   09/01/21 89 7 kg (197 lb 11 2 oz)     Vitals:    07/29/22 1545   BP: 106/70   BP Location: Left arm   Patient Position: Sitting   Cuff Size: Standard   Pulse: 75   SpO2: 96%   Weight: 83 5 kg (184 lb)       Physical Exam  Vitals reviewed  Constitutional:       General: He is awake  He is not in acute distress  Appearance: Normal appearance  He is well-developed and normal weight  He is not toxic-appearing or diaphoretic  Comments: Face mask present   HENT:      Head: Normocephalic  Nose: Nose normal       Mouth/Throat:      Mouth: Mucous membranes are moist    Eyes:      General: No scleral icterus  Conjunctiva/sclera: Conjunctivae normal    Neck:      Vascular: No JVD  Trachea: No tracheal deviation  Cardiovascular:      Rate and Rhythm: Normal rate and regular rhythm  No extrasystoles are present  Pulses: Normal pulses  Heart sounds: Murmur heard  Pulmonary:      Effort: Pulmonary effort is normal  No tachypnea, bradypnea or respiratory distress  Breath sounds: Normal air entry  No decreased air movement   Rhonchi (coarse, lower left lung; improve with deep cough) present  No decreased breath sounds or wheezing  Abdominal:      General: Bowel sounds are normal  There is no distension  Palpations: Abdomen is soft  Tenderness: There is no abdominal tenderness  Musculoskeletal:      Cervical back: Neck supple  Right lower leg: No edema  Left lower leg: No edema  Skin:     General: Skin is warm and dry  Coloration: Skin is not jaundiced or pale  Neurological:      General: No focal deficit present  Mental Status: He is alert and oriented to person, place, and time  Psychiatric:         Attention and Perception: Attention normal          Mood and Affect: Mood and affect normal          Speech: Speech normal          Behavior: Behavior normal  Behavior is cooperative  Thought Content:  Thought content normal        Labs & Results:  Lab Results   Component Value Date    WBC 9 96 07/22/2022    HGB 13 2 07/22/2022    HCT 38 6 07/22/2022    MCV 95 07/22/2022     07/22/2022     Lab Results   Component Value Date    SODIUM 144 07/22/2022    K 4 0 07/22/2022     (H) 07/22/2022    CO2 24 07/22/2022    BUN 22 07/22/2022    CREATININE 1 36 (H) 07/22/2022    GLUC 98 07/22/2022    CALCIUM 8 9 07/22/2022     Lab Results   Component Value Date    INR 1 07 07/23/2022    INR 1 26 (H) 07/22/2022    INR 0 97 10/10/2018    PROTIME 14 1 07/23/2022    PROTIME 16 0 (H) 07/22/2022    PROTIME 13 0 10/10/2018     Lab Results   Component Value Date    NTBNP 6,971 (H) 07/22/2022      Carolynn Christiansen PA-C

## 2022-07-29 ENCOUNTER — OFFICE VISIT (OUTPATIENT)
Dept: CARDIOLOGY CLINIC | Facility: CLINIC | Age: 82
End: 2022-07-29
Payer: COMMERCIAL

## 2022-07-29 VITALS
SYSTOLIC BLOOD PRESSURE: 106 MMHG | OXYGEN SATURATION: 96 % | HEART RATE: 75 BPM | WEIGHT: 184 LBS | BODY MASS INDEX: 25.66 KG/M2 | DIASTOLIC BLOOD PRESSURE: 70 MMHG

## 2022-07-29 DIAGNOSIS — I10 HYPERTENSION, UNSPECIFIED TYPE: ICD-10-CM

## 2022-07-29 DIAGNOSIS — I50.22 CHRONIC HFREF (HEART FAILURE WITH REDUCED EJECTION FRACTION) (HCC): ICD-10-CM

## 2022-07-29 DIAGNOSIS — N18.31 STAGE 3A CHRONIC KIDNEY DISEASE (HCC): ICD-10-CM

## 2022-07-29 DIAGNOSIS — Z86.711 HISTORY OF PULMONARY EMBOLISM: ICD-10-CM

## 2022-07-29 DIAGNOSIS — Z09 HOSPITAL DISCHARGE FOLLOW-UP: Primary | ICD-10-CM

## 2022-07-29 PROCEDURE — 1111F DSCHRG MED/CURRENT MED MERGE: CPT | Performed by: PHYSICIAN ASSISTANT

## 2022-07-29 PROCEDURE — 99214 OFFICE O/P EST MOD 30 MIN: CPT | Performed by: PHYSICIAN ASSISTANT

## 2022-07-29 NOTE — PATIENT INSTRUCTIONS
Please weigh yourself every day and keep a detailed log of weights  Contact the Heart Failure program at [phone] if you gain 3 lbs overnight or 5 lbs in 5-7 days  Limit daily sodium/salt intake to 6842-9453 mg daily to prevent fluid retention  Avoid canned foods, fast food/Chinese food, and processed meats (hot dogs, lunch meat, and sausage etc )  Limit fluid intake to 2000 mL or 2L (about 60-65 ounces) daily  Bring complete list of medications and log of daily weights to your follow-up appointment

## 2022-08-01 ENCOUNTER — OFFICE VISIT (OUTPATIENT)
Dept: PULMONOLOGY | Facility: CLINIC | Age: 82
End: 2022-08-01
Payer: COMMERCIAL

## 2022-08-01 VITALS
DIASTOLIC BLOOD PRESSURE: 70 MMHG | HEIGHT: 71 IN | TEMPERATURE: 97.1 F | BODY MASS INDEX: 25.79 KG/M2 | OXYGEN SATURATION: 98 % | WEIGHT: 184.2 LBS | HEART RATE: 74 BPM | RESPIRATION RATE: 18 BRPM | SYSTOLIC BLOOD PRESSURE: 124 MMHG

## 2022-08-01 DIAGNOSIS — I26.99 OTHER ACUTE PULMONARY EMBOLISM WITHOUT ACUTE COR PULMONALE (HCC): Primary | ICD-10-CM

## 2022-08-01 DIAGNOSIS — Z85.038 PERSONAL HISTORY OF COLON CANCER: ICD-10-CM

## 2022-08-01 DIAGNOSIS — J45.30 MILD PERSISTENT ASTHMA WITHOUT COMPLICATION: ICD-10-CM

## 2022-08-01 DIAGNOSIS — Z86.711 HISTORY OF PULMONARY EMBOLUS (PE): ICD-10-CM

## 2022-08-01 PROCEDURE — 99214 OFFICE O/P EST MOD 30 MIN: CPT | Performed by: INTERNAL MEDICINE

## 2022-08-01 NOTE — PROGRESS NOTES
Pulmonary Follow Up Note   Eva Simpson 81 y o  male MRN: [id number]  8/1/2022      Assessment/Plan:     History of pulmonary embolus (PE)  Patient was hospitalized in July with acute bilateral pulmonary embolism  He is tolerating Eliquis 5 mg twice daily  I would favor 6 months of therapy  His children have factor 5 Leiden with history of thromboembolic disease  We will check his genetic profile for factor 5 Leiden  Mild persistent asthma without complication  Patient uses Eliana Keto on an intermittent basis  Personal history of colon cancer  Patient has history of colon cancer resected in 2004  He is scheduled for colonoscopy with Dr Jeanine Ernst in September, however I would favor pushing this back until the new year, after he has completed 6 months of anticoagulation  Visit orders:    Diagnoses and all orders for this visit:    Other acute pulmonary embolism without acute cor pulmonale (HCC)  -     apixaban (Eliquis) 5 mg; Take 1 tablet (5 mg total) by mouth 2 (two) times a day  -     Factor 5 leiden; Future    History of pulmonary embolus (PE)    Mild persistent asthma without complication    Personal history of colon cancer        No follow-ups on file  History of Present Illness   HPI:  Eva Simpson is a 81 y o  male who is here today for hospital follow-up  Was admitted on 7/21/22 with hemoptysis  CT of the chest showed acute bilateral pulmonary emboli  He has been on Eliquis and is tolerating it well  He no longer has any hemoptysis  He has no shortness of breath at rest or with exertion  He remains fairly stable  He has a history of malignant melanoma of the arm status post surgery in 2017 and colon cancer status post resection in 2004  No current malignancy  He is due for colonoscopy in September  His wife and children all have factor 5 Leiden with history of thromboembolic disease, however he has never been tested      Patient has a history of asthma, seen by   Mirta several years ago  Patient has Brigid Shutter that he uses on occasion when he has some congestion  He does not have wheezing  Review of Systems   Constitutional: Negative for chills, fever and unexpected weight change  HENT: Negative for postnasal drip and sore throat  Eyes: Negative for visual disturbance  Respiratory:        As noted in HPI   Cardiovascular: Negative for chest pain  Gastrointestinal: Negative for abdominal pain, diarrhea and vomiting  Musculoskeletal: Negative for arthralgias  Skin: Negative for rash  Neurological: Negative for headaches  Hematological: Negative for adenopathy  Psychiatric/Behavioral: Negative  All other systems reviewed and are negative          Medical, Family and Social history reviewed and updated as appropriate    Historical Information   Past Medical History:   Diagnosis Date    AICD (automatic cardioverter/defibrillator) present     Arthritis     Asthma     Burt esophagus     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     Chronic HFrEF (heart failure with reduced ejection fraction) ([facility] ) 2019    CKD (chronic kidney disease)     [facility] ) 04/05/2013    Colon cancer screening 06/21/2022    COPD (chronic obstructive pulmonary disease) ([facility] )     Diverticulitis     Last assessed: 4/5/13    Esophageal reflux     Gout     Hernia     Hyperlipidemia     Hypertension     Hypothyroidism 05/30/2013    Non-toxic multinodular goiter 11/28/2012    Pleural effusion     Polymyalgia rheumatica (HCC)     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     Spondyloarthropathy     Last assessed: 11/28/12    Thrombocytopenia ([facility] ) 06/23/2015     Past Surgical History:   Procedure Laterality Date    ARM SKIN LESION BIOPSY / EXCISION      Malignant    ATRIAL ABLATION SURGERY      AV NODE ABLATION      CARDIAC DEFIBRILLATOR PLACEMENT  2009    Cardio-Defib Pulse Gen Venous Insertion of Electrode for Ventricular Pacing  Implantable    CARDIAC SURGERY  2009    Catheterization    COLONOSCOPY N/A 3/14/2016    Procedure: COLONOSCOPY;  Surgeon: Angela Mak MD;  Location: [facility]; Service  February 22, 2013, mildly enlarged prostate, history of colon CA with normal appearance of anastomosis at the midtransverse colon, severe diverticulosis in the sigmoid, descending and transverse colon  Repeat colonoscopy in 3 yrs    HEMICOLECTOMY Right 05/05/2004    INGUINAL HERNIA REPAIR Bilateral     Left 3/2004, right 5/2008    IR UPPER EXTREMITY VENOGRAM- DIAGNOSTIC  10/4/2018    KNEE SURGERY  1972    Meniscectomy    OR ESOPHAGOGASTRODUODENOSCOPY TRANSORAL DIAGNOSTIC N/A 12/5/2016    Procedure: ESOPHAGOGASTRODUODENOSCOPY (EGD); Surgeon: Colleen Weiner MD;  Location: [facility];   Service: Gastroenterology    TONSILLECTOMY AND ADENOIDECTOMY      UPPER GASTROINTESTINAL ENDOSCOPY       Family History   Problem Relation Age of Onset    Heart failure Mother         Congestive    Hypertension Mother     Osteoporosis Mother     COPD Father     Emphysema Father     Hypertension Father     Heart disease Sister         Heart Valve Replacement    Osteoporosis Sister     Breast cancer Family     Factor V Leiden deficiency Son         on Xarelto       Social History     Tobacco Use   Smoking Status Never Smoker   Smokeless Tobacco Never Used         Meds/Allergies     Current Outpatient Medications:     albuterol (PROVENTIL HFA,VENTOLIN HFA) 90 mcg/act inhaler, Inhale 2 puffs every 6 (six) hours as needed for wheezing or shortness of breath, Disp: 1 Inhaler, Rfl: 1    apixaban (Eliquis) 5 mg, Take 1 tablet (5 mg total) by mouth 2 (two) times a day, Disp: 180 tablet, Rfl: 1    atorvastatin (LIPITOR) 10 mg tablet, TAKE ONE TABLET BY MOUTH EVERY DAY, Disp: 90 tablet, Rfl: 3    bisoprolol (ZEBETA) 10 MG tablet, TAKE ONE TABLET BY MOUTH TWICE A DAY, Disp: 180 tablet, Rfl: 3    Calcium Carb-Cholecalciferol (CALCIUM 600 + D PO), Take 3 tablets by mouth daily  , Disp: , Rfl:     Coenzyme Q10 (CO Q-10) 200 MG CAPS, Take 1 tablet by mouth daily, Disp: , Rfl:     finasteride (PROSCAR) 5 mg tablet, TAKE ONE TABLET BY MOUTH EVERY DAY, Disp: 90 tablet, Rfl: 1    furosemide (LASIX) 20 mg tablet, Take 0 5 tablets (10 mg total) by mouth daily, Disp: , Rfl:     Multiple Vitamins-Minerals (CENTRUM SILVER PO), Take 1 tablet by mouth daily  , Disp: , Rfl:     omeprazole (PriLOSEC) 20 mg delayed release capsule, Take 1 capsule (20 mg total) by mouth daily, Disp: 90 capsule, Rfl: 2    PREVIDENT 5000 ENAMEL PROTECT 1 1-5 % PSTE, Use as directed, Disp: , Rfl: 3    sacubitril-valsartan (Entresto) 49-51 MG TABS, Take 1 tablet by mouth 2 (two) times a day, Disp: 180 tablet, Rfl: 3    tadalafil (CIALIS) 10 MG tablet, Take 1 tablet (10 mg total) by mouth daily as needed for erectile dysfunction, Disp: 10 tablet, Rfl: 1    Vitamin D, Cholecalciferol, 1000 UNITS CAPS, Take 1 tablet by mouth daily  , Disp: , Rfl:   No Known Allergies    Vitals: Blood pressure 124/70, pulse 74, temperature (!) 97 1 °F (36 2 °C), temperature source Tympanic, resp  rate 18, height 5' 11" (1 803 m), weight 83 6 kg (184 lb 3 2 oz), SpO2 98 %  Body mass index is 25 69 kg/m²  Oxygen Therapy  SpO2: 98 %    Physical Exam   Physical Exam  Constitutional:       General: He is not in acute distress  HENT:      Head: Normocephalic  Eyes:      General: No scleral icterus  Neck:      Vascular: No JVD  Cardiovascular:      Rate and Rhythm: Normal rate and regular rhythm  Pulmonary:      Breath sounds: No wheezing, rhonchi or rales  Abdominal:      Palpations: Abdomen is soft  Tenderness: There is no abdominal tenderness  Musculoskeletal:         General: No swelling  Cervical back: Neck supple  Lymphadenopathy:      Cervical: No cervical adenopathy  Skin:     General: Skin is warm and dry     Neurological:      Mental Status: He is alert and oriented to person, place, and time  Psychiatric:         Mood and Affect: Mood normal          Labs: I have personally reviewed pertinent lab results  Lab Results   Component Value Date    WBC 9 96 07/22/2022    HGB 13 2 07/22/2022    HCT 38 6 07/22/2022    MCV 95 07/22/2022     07/22/2022     Lab Results   Component Value Date    GLUCOSE 101 12/19/2015    CALCIUM 8 9 07/22/2022     (H) 12/19/2015    K 4 0 07/22/2022    CO2 24 07/22/2022     (H) 07/22/2022    BUN 22 07/22/2022    CREATININE 1 36 (H) 07/22/2022     No results found for: IGE  Lab Results   Component Value Date    ALT 31 07/22/2022    AST 22 07/22/2022    ALKPHOS 78 07/22/2022    BILITOT 0 95 12/19/2015     Imaging and other studies: I have personally reviewed pertinent reports     and I have personally reviewed pertinent films in PACS CT of the chest from 7/21/22 shows extensive bilateral pulmonary emboli in distal main pulmonary arteries extending in to subsegmental branches

## 2022-08-01 NOTE — LETTER
August 1, 2022     MD Cesar Schmid [address]    Patient: Vi Nesbitt   YOB: 1940   Date of Visit: 8/1/2022       Dear Dr Xander Vazquez:    Thank you for referring Vi Nesbitt to me for evaluation  Below are my notes for this consultation  If you have questions, please do not hesitate to call me  I look forward to following your patient along with you  Sincerely,        Guerline De Oliveira DO        CC: MD Guerline Garcia DO  8/1/2022 11:26 AM  Sign when Signing Visit  Pulmonary Follow Up Note   Vi Nesbitt 81 y o  male MRN: [id number]  8/1/2022      Assessment/Plan:     History of pulmonary embolus (PE)  Patient was hospitalized in July with acute bilateral pulmonary embolism  He is tolerating Eliquis 5 mg twice daily  I would favor 6 months of therapy  His children have factor 5 Leiden with history of thromboembolic disease  We will check his genetic profile for factor 5 Leiden  Mild persistent asthma without complication  Patient uses [facility] on an intermittent basis  Personal history of colon cancer  Patient has history of colon cancer resected in 2004  He is scheduled for colonoscopy with Dr Xander Vazquez in September, however I would favor pushing this back until the new year, after he has completed 6 months of anticoagulation  Visit orders:    Diagnoses and all orders for this visit:    Other acute pulmonary embolism without acute cor pulmonale (HCC)  -     apixaban (Eliquis) 5 mg; Take 1 tablet (5 mg total) by mouth 2 (two) times a day  -     Factor 5 leiden; Future    History of pulmonary embolus (PE)    Mild persistent asthma without complication    Personal history of colon cancer        No follow-ups on file  History of Present Illness   HPI:  Vi Nesbitt is a 81 y o  male who is here today for hospital follow-up  Was admitted on 7/21/22 with hemoptysis    CT of the chest showed acute bilateral pulmonary emboli  He has been on Eliquis and is tolerating it well  He no longer has any hemoptysis  He has no shortness of breath at rest or with exertion  He remains fairly stable  He has a history of malignant melanoma of the arm status post surgery in 2017 and colon cancer status post resection in 2004  No current malignancy  He is due for colonoscopy in September  His wife and children all have factor 5 Leiden with history of thromboembolic disease, however he has never been tested  Patient has a history of asthma, seen by Dr Belia Benavidez several years ago  Patient has Loya Sabins that he uses on occasion when he has some congestion  He does not have wheezing  Review of Systems   Constitutional: Negative for chills, fever and unexpected weight change  HENT: Negative for postnasal drip and sore throat  Eyes: Negative for visual disturbance  Respiratory:        As noted in HPI   Cardiovascular: Negative for chest pain  Gastrointestinal: Negative for abdominal pain, diarrhea and vomiting  Musculoskeletal: Negative for arthralgias  Skin: Negative for rash  Neurological: Negative for headaches  Hematological: Negative for adenopathy  Psychiatric/Behavioral: Negative  All other systems reviewed and are negative          Medical, Family and Social history reviewed and updated as appropriate    Historical Information   Past Medical History:   Diagnosis Date    AICD (automatic cardioverter/defibrillator) present     Arthritis     Asthma     Burt esophagus     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     Chronic [facility]EF [facility] with reduced [facility] fraction) [facility] 2019    CKD (chronic kidney disease)     Colon cancer ([facility] ) 04/05/2013    Colon cancer screening 06/21/2022    COPD (chronic obstructive pulmonary disease) ([facility] )     Diverticulitis     Last assessed: 4/5/13    Esophageal reflux     Gout     Hernia     Hyperlipidemia     Hypertension     Hypothyroidism 05/30/2013    Non-toxic multinodular goiter 11/28/2012    Pleural effusion     Polymyalgia rheumatica (HCC)     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     Spondyloarthropathy     Last assessed: 11/28/12    [facility]ocyt[facility] ) 06/23/2015     Past Surgical History:   Procedure Laterality Date    ARM SKIN LESION BIOPSY / EXCISION      Malignant    ATRIAL ABLATION SURGERY      AV NODE ABLATION      CARDIAC DEFIBRILLATOR PLACEMENT  2009    Cardio-Defib Pulse Gen Venous Insertion of Electrode for Ventricular Pacing  Implantable    CARDIAC SURGERY  2009    Catheterization    COLONOSCOPY N/A 3/14/2016    Procedure: COLONOSCOPY;  Surgeon: Vinicius Fuentes MD;  [facility]: [facility]; Service  February 22, 2013, mildly enlarged prostate, history of colon CA with normal appearance of anastomosis at the midtransverse colon, severe diverticulosis in the sigmoid, descending and transverse colon  Repeat colonoscopy in 3 yrs    HEMICOLECTOMY Right 05/05/2004    INGUINAL HERNIA REPAIR Bilateral     Left 3/2004, right 5/2008    IR UPPER EXTREMITY VENOGRAM- DIAGNOSTIC  10/4/2018    KNEE SURGERY  1972    Meniscectomy    IL ESOPHAGOGASTRODUODENOSCOPY TRANSORAL DIAGNOSTIC N/A 12/5/2016    Procedure: ESOPHAGOGASTRODUODENOSCOPY (EGD); Surgeon: Peter Hernandez MD;  Location: [facility];   Service: Gastroenterology    TONSILLECTOMY AND ADENOIDECTOMY      UPPER GASTROINTESTINAL ENDOSCOPY       Family History   Problem Relation Age of Onset    Heart failure Mother         Congestive    Hypertension Mother     Osteoporosis Mother     COPD Father     Emphysema Father     Hypertension Father     Heart disease Sister         Heart Valve Replacement    Osteoporosis Sister     Breast cancer Family     Factor V Leiden deficiency Son         on Xarelto       Social History     Tobacco Use   Smoking Status Never Smoker   Smokeless Tobacco Never Used         Meds/Allergies     Current Outpatient Medications:     albuterol (PROVENTIL HFA,VENTOLIN HFA) 90 mcg/act inhaler, Inhale 2 puffs every 6 (six) hours as needed for wheezing or shortness of breath, Disp: 1 Inhaler, Rfl: 1    apixaban (Eliquis) 5 mg, Take 1 tablet (5 mg total) by mouth 2 (two) times a day, Disp: 180 tablet, Rfl: 1    atorvastatin (LIPITOR) 10 mg tablet, TAKE ONE TABLET BY MOUTH EVERY DAY, Disp: 90 tablet, Rfl: 3    bisoprolol (ZEBETA) 10 MG tablet, TAKE ONE TABLET BY MOUTH TWICE A DAY, Disp: 180 tablet, Rfl: 3    Calcium Carb-Cholecalciferol (CALCIUM 600 + D PO), Take 3 tablets by mouth daily  , Disp: , Rfl:     Coenzyme Q10 (CO Q-10) 200 MG CAPS, Take 1 tablet by mouth daily, Disp: , Rfl:     finasteride (PROSCAR) 5 mg tablet, TAKE ONE TABLET BY MOUTH EVERY DAY, Disp: 90 tablet, Rfl: 1    furosemide (LASIX) 20 mg tablet, Take 0 5 tablets (10 mg total) by mouth daily, Disp: , Rfl:     Multiple Vitamins-Minerals (CENTRUM SILVER PO), Take 1 tablet by mouth daily  , Disp: , Rfl:     omeprazole (PriLOSEC) 20 mg delayed release capsule, Take 1 capsule (20 mg total) by mouth daily, Disp: 90 capsule, Rfl: 2    PREVIDENT 5000 ENAMEL PROTECT 1 1-5 % PSTE, Use as directed, Disp: , Rfl: 3    sacubitril-valsartan (Entresto) 49-51 MG TABS, Take 1 tablet by mouth 2 (two) times a day, Disp: 180 tablet, Rfl: 3    tadalafil (CIALIS) 10 MG tablet, Take 1 tablet (10 mg total) by mouth daily as needed for erectile dysfunction, Disp: 10 tablet, Rfl: 1    Vitamin D, Cholecalciferol, 1000 UNITS CAPS, Take 1 tablet by mouth daily  , Disp: , Rfl:   No Known Allergies    Vitals: Blood pressure 124/70, pulse 74, temperature (!) 97 1 °F (36 2 °C), temperature source Tympanic, resp  rate 18, height 5' 11" (1 803 m), weight 83 6 kg (184 lb 3 2 oz), SpO2 98 %  Body mass index is 25 69 kg/m²   Oxygen Therapy  SpO2: 98 %    Physical Exam   Physical Exam  Constitutional:       General: He is not in acute distress  HENT:      Head: Normocephalic  Eyes:      General: No scleral icterus  Neck:      Vascular: No JVD  Cardiovascular:      Rate and Rhythm: Normal rate and regular rhythm  Pulmonary:      Breath sounds: No wheezing, rhonchi or rales  Abdominal:      Palpations: Abdomen is soft  Tenderness: There is no abdominal tenderness  Musculoskeletal:         General: No swelling  Cervical back: Neck supple  Lymphadenopathy:      Cervical: No cervical adenopathy  Skin:     General: Skin is warm and dry  Neurological:      Mental Status: He is alert and oriented to person, place, and time  Psychiatric:         Mood and Affect: Mood normal          Labs: I have personally reviewed pertinent lab results  Lab Results   Component Value Date    WBC 9 96 07/22/2022    HGB 13 2 07/22/2022    HCT 38 6 07/22/2022    MCV 95 07/22/2022     07/22/2022     Lab Results   Component Value Date    GLUCOSE 101 12/19/2015    CALCIUM 8 9 07/22/2022     (H) 12/19/2015    K 4 0 07/22/2022    CO2 24 07/22/2022     (H) 07/22/2022    BUN 22 07/22/2022    CREATININE 1 36 (H) 07/22/2022     No results found for: IGE  Lab Results   Component Value Date    ALT 31 07/22/2022    AST 22 07/22/2022    ALKPHOS 78 07/22/2022    BILITOT 0 95 12/19/2015     Imaging and other studies: I have personally reviewed pertinent reports     and I have personally reviewed pertinent films in PACS CT of the chest from 7/21/22 shows extensive bilateral pulmonary emboli in distal main pulmonary arteries extending in to subsegmental branches

## 2022-08-01 NOTE — ASSESSMENT & PLAN NOTE
Patient was hospitalized in July with acute bilateral pulmonary embolism  He is tolerating Eliquis 5 mg twice daily  I would favor 6 months of therapy  His children have factor 5 Leiden with history of thromboembolic disease  We will check his genetic profile for factor 5 Leiden

## 2022-08-11 ENCOUNTER — CONSULT (OUTPATIENT)
Dept: HEMATOLOGY ONCOLOGY | Facility: CLINIC | Age: 82
End: 2022-08-11
Payer: COMMERCIAL

## 2022-08-11 VITALS
HEART RATE: 83 BPM | DIASTOLIC BLOOD PRESSURE: 80 MMHG | SYSTOLIC BLOOD PRESSURE: 128 MMHG | TEMPERATURE: 97.8 F | HEIGHT: 71 IN | BODY MASS INDEX: 25.9 KG/M2 | WEIGHT: 185 LBS | OXYGEN SATURATION: 96 % | RESPIRATION RATE: 18 BRPM

## 2022-08-11 DIAGNOSIS — Z86.711 HISTORY OF PULMONARY EMBOLUS (PE): Primary | ICD-10-CM

## 2022-08-11 PROCEDURE — 99204 OFFICE O/P NEW MOD 45 MIN: CPT | Performed by: PHYSICIAN ASSISTANT

## 2022-08-11 PROCEDURE — 3074F SYST BP LT 130 MM HG: CPT | Performed by: PHYSICIAN ASSISTANT

## 2022-08-11 PROCEDURE — 1160F RVW MEDS BY RX/DR IN RCRD: CPT | Performed by: PHYSICIAN ASSISTANT

## 2022-08-11 PROCEDURE — 3079F DIAST BP 80-89 MM HG: CPT | Performed by: PHYSICIAN ASSISTANT

## 2022-08-11 NOTE — LETTER
August 11, 2022     Ángel Renteria, [address] [place] [address]    Patient: Leoncio Wang   YOB: 1940   Date of Visit: 8/11/2022       Dear Dr oLu Littlejohn:    Thank you for referring Leoncio Wang to me for evaluation  Below are my notes for this consultation  If you have questions, please do not hesitate to call me  I look forward to following your patient along with you  Sincerely,        Ignacio Shah        CC: No Recipients  Ignacio Shah  8/11/2022  2:13 PM  Incomplete  Hematology/Oncology Outpatient Consult  Leoncio Wang 81 y o  male 1940 [id number]    Date:  8/11/2022      Assessment and Plan:        HPI:  45-year-old male presents for consultation regarding bilateral pulmonary embolisms  He was seen by his PCP on 07/15/2022 due to cough with phlegm without wheezing or shortness of breath  He was given antibiotics and chest x-ray  He then presented to the hospital on 07/21/2022 with continued cough and episode of hemoptysis  D-dimer was completed in elevated  CT of the chest was then performed  This showed extensive pulmonary emboli within the distal bilateral main pulmonary arteries extending into the right upper lobe and lower lobar branches and left lower lobe branches  He had CT abdomen and pelvis to rule out any malignancy due to personal history of melanoma  This only showed a trace left pleural effusion, small hiatal hernia, left renal cyst, cholelithiasis and colonic diverticulosis status post right hemicolectomy      Dr Destiney Draper Sept/Oct     ROS: Review of Systems    Past Medical History:   Diagnosis Date    AICD (automatic cardioverter/defibrillator) present     Arthritis     Asthma     Burt esophagus     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     Chronic HFrEF (heart failure with reduced ejection fraction) ([facility] ) 2019  CKD (chronic kidney disease)     [facility] cancer ([facility] ) 04/05/2013    Colon cancer screening 06/21/2022    COPD (chronic obstructive pulmonary disease) ([facility] )     Diverticulitis     Last assessed: 4/5/13    Esophageal reflux     Gout     Hernia     Hyperlipidemia     Hypertension     Hypothyroidism 05/30/2013    Non-toxic multinodular goiter 11/28/2012    Pleural effusion     Polymyalgia rheumatica (HCC)     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     Spondyloarthropathy     Last assessed: 11/28/12    Thrombocytopenia ([facility] ) 06/23/2015       Past Surgical History:   Procedure Laterality Date    ARM SKIN LESION BIOPSY / EXCISION      Malignant    ATRIAL ABLATION SURGERY      AV NODE ABLATION      CARDIAC DEFIBRILLATOR PLACEMENT  2009    Cardio-Defib Pulse Gen Venous Insertion of Electrode for Ventricular Pacing  Implantable    CARDIAC SURGERY  2009    Catheterization    COLONOSCOPY N/A 3/14/2016    Procedure: COLONOSCOPY;  Surgeon: Ale Nash MD;  [facility]; Service  February 22, 2013, mildly enlarged prostate, history of colon CA with normal appearance of anastomosis at the midtransverse colon, severe diverticulosis in the sigmoid, descending and transverse colon  Repeat colonoscopy in 3 yrs    HEMICOLECTOMY Right 05/05/2004    INGUINAL HERNIA REPAIR Bilateral     Left 3/2004, right 5/2008    IR UPPER EXTREMITY VENOGRAM- DIAGNOSTIC  10/4/2018    KNEE SURGERY  1972    Meniscectomy    MO ESOPHAGOGASTRODUODENOSCOPY TRANSORAL DIAGNOSTIC N/A 12/5/2016    Procedure: ESOPHAGOGASTRODUODENOSCOPY (EGD); Surgeon: Marelyn Hammans, MD;  [facility];   Service: Gastroenterology    TONSILLECTOMY AND ADENOIDECTOMY      UPPER GASTROINTESTINAL ENDOSCOPY         Social History     Socioeconomic History    Marital status: /Civil Union     Spouse name: Not on file    Number of children: Not on file    Years of education: Not on file    Highest education level: Not on file   Occupational History    Occupation: Manager-Retired   Tobacco Use    Smoking status: Never Smoker    Smokeless tobacco: Never Used   Vaping Use    Vaping Use: Never used   Substance and Sexual Activity    Alcohol use: Never     Comment: Rare    Drug use: No    Sexual activity: Not Currently   Other Topics Concern    Not on file   Social History Narrative    Not on file     Social Determinants of Health     Financial Resource Strain: Not on file   Food Insecurity: Not on file   Transportation Needs: Not on file   Physical Activity: Not on file   Stress: Not on file   Social Connections: Not on file   Intimate Partner Violence: Not on file   Housing Stability: Not on file       Family History   Problem Relation Age of Onset    Heart failure Mother         Congestive    Hypertension Mother     Osteoporosis Mother     COPD Father     Emphysema Father     Hypertension Father     Heart disease Sister         Heart Valve Replacement    Osteoporosis Sister     Breast cancer Family     Factor V Leiden deficiency Son         on Xarelto       No Known Allergies      Current Outpatient Medications:     albuterol (PROVENTIL HFA,VENTOLIN HFA) 90 mcg/act inhaler, Inhale 2 puffs every 6 (six) hours as needed for wheezing or shortness of breath, Disp: 1 Inhaler, Rfl: 1    apixaban (Eliquis) 5 mg, Take 1 tablet (5 mg total) by mouth 2 (two) times a day, Disp: 180 tablet, Rfl: 1    atorvastatin (LIPITOR) 10 mg tablet, TAKE ONE TABLET BY MOUTH EVERY DAY, Disp: 90 tablet, Rfl: 3    bisoprolol (ZEBETA) 10 MG tablet, TAKE ONE TABLET BY MOUTH TWICE A DAY, Disp: 180 tablet, Rfl: 3    Calcium Carb-Cholecalciferol (CALCIUM 600 + D PO), Take 3 tablets by mouth daily  , Disp: , Rfl:     Coenzyme Q10 (CO Q-10) 200 MG CAPS, Take 1 tablet by mouth daily, Disp: , Rfl:     finasteride (PROSCAR) 5 mg tablet, TAKE ONE TABLET BY MOUTH EVERY DAY, Disp: 90 tablet, Rfl: 1    furosemide (LASIX) 20 mg tablet, Take 0 5 tablets (10 mg total) by mouth daily, Disp: , Rfl:     Multiple Vitamins-Minerals (CENTRUM SILVER PO), Take 1 tablet by mouth daily  , Disp: , Rfl:     omeprazole (PriLOSEC) 20 mg delayed release capsule, Take 1 capsule (20 mg total) by mouth daily, Disp: 90 capsule, Rfl: 2    PREVIDENT 5000 ENAMEL PROTECT 1 1-5 % PSTE, Use as directed, Disp: , Rfl: 3    sacubitril-valsartan (Entresto) 49-51 MG TABS, Take 1 tablet by mouth 2 (two) times a day, Disp: 180 tablet, Rfl: 3    tadalafil (CIALIS) 10 MG tablet, Take 1 tablet (10 mg total) by mouth daily as needed for erectile dysfunction, Disp: 10 tablet, Rfl: 1    Vitamin D, Cholecalciferol, 1000 UNITS CAPS, Take 1 tablet by mouth daily  , Disp: , Rfl:       Physical Exam:  Ht 5' 11" (1 803 m)   BMI 25 69 kg/m²     Physical Exam      Labs:  Lab Results   Component Value Date    WBC 9 96 07/22/2022    HGB 13 2 07/22/2022    HCT 38 6 07/22/2022    MCV 95 07/22/2022     07/22/2022     Lab Results   Component Value Date     (H) 12/19/2015    K 4 0 07/22/2022     (H) 07/22/2022    CO2 24 07/22/2022    ANIONGAP 9 12/19/2015    BUN 22 07/22/2022    CREATININE 1 36 (H) 07/22/2022    GLUCOSE 101 12/19/2015    GLUF 92 01/25/2022    CALCIUM 8 9 07/22/2022    CORRECTEDCA 9 9 07/22/2022    AST 22 07/22/2022    ALT 31 07/22/2022    ALKPHOS 78 07/22/2022    PROT 7 3 12/19/2015    BILITOT 0 95 12/19/2015    EGFR 48 07/22/2022       Patient voiced understanding and agreement in the above discussion  Aware to contact our office with questions/symptoms in the interim  This note has been generated by voice recognition software system  Therefore, there may be spelling, grammar, and or syntax errors  Please contact if questions arise

## 2022-08-11 NOTE — PROGRESS NOTES
Hematology/Oncology Outpatient Consult  Emmanuel Mayer 81 y o  male 1940 [id number]    Date:  8/11/2022      Assessment and Plan:  1  History of pulmonary embolus (PE)  60-year-old male presents for consultation regarding bilateral pulmonary embolisms  This appears to be unprovoked  He had vague symptoms of cough and then hemoptysis which made him go to the hospital   His cancer screenings up today  He had full body imaging in the hospital   He has history of melanoma has upcoming dermatology appointment  Colonoscopy for surveillance due to history of colon cancer status post resection 2004 is due in September  This should be deferred until at least completion of 6 months of anticoagulation  Pulmonary already ordered Factor 5 testing  His son and daughter have this  However patient's wife has factor 5 unlikely the son has heterozygous trait  from her  Regards this recommendation will be full dose anticoagulation Eliquis 5 mg b i d  for 6 months  Reviewed that at 6 month interval will repeat D-dimer  Also to then consider preventative dosing due to unprovoked and his age  This would be Eliquis 2 5 mg b i d     If Eliquis is expensive especially in [facility] patient should call us to obtain samples  Follow-up in January     - CBC and differential; Future  - Comprehensive metabolic panel; Future  - Antithrombin III Activity; Future  - Beta-2 glycoprotein antibodies; Future  - Cardiolipin antibody; Future  - Lupus anticoagulant; Future  - Protein C activity; Future  - Protein S activity; Future  - Prothrombin gene mutation; Future  - D-dimer, quantitative; Future    HPI:  60-year-old male presents for consultation regarding bilateral pulmonary embolisms  He was seen by his PCP on 07/15/2022 due to cough with phlegm without wheezing or shortness of breath  He was given antibiotics and chest x-ray      He then presented to the hospital on 07/21/2022 with continued cough and episode of hemoptysis  D-dimer was completed in elevated  CT of the chest was then performed  This showed extensive pulmonary emboli within the distal bilateral main pulmonary arteries extending into the right upper lobe and lower lobar branches and left lower lobe branches  He had CT abdomen and pelvis to rule out any malignancy due to personal history of melanoma  This only showed a trace left pleural effusion, small hiatal hernia, left renal cyst, cholelithiasis and colonic diverticulosis status post right hemicolectomy  Dr Jamel May is derm for history of melanoma  He has a scheduled visit in Sept/Oct     History of colon cancer, had resection in 2004  ROS: Review of Systems   Constitutional: Negative for activity change, appetite change, chills, fatigue, fever and unexpected weight change  Respiratory: Negative for cough and shortness of breath  Cardiovascular: Negative for chest pain, palpitations and leg swelling  Gastrointestinal: Negative for abdominal pain, constipation, diarrhea, nausea and vomiting  Genitourinary: Negative for difficulty urinating, dysuria and hematuria  Musculoskeletal: Positive for back pain (right lower back pain, comes and goes at present time)  Negative for arthralgias  Skin: Negative  Neurological: Negative for dizziness, light-headedness, numbness and headaches  Hematological: Negative  Psychiatric/Behavioral: Negative          Past Medical History:   Diagnosis Date    AICD (automatic cardioverter/defibrillator) present     Arthritis     Asthma     Burt esophagus     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     Chronic HFrEF (heart failure with reduced ejection fraction) ([facility] ) 2019    CKD (chronic kidney disease)     Colon cancer ([facility] ) 04/05/2013    Colon cancer screening 06/21/2022    COPD (chronic obstructive pulmonary disease) ([facility] )     Diverticulitis     Last assessed: 4/5/13    Esophageal reflux     Gout     Hernia     Hyperlipidemia     Hypertension     Hypothyroidism 05/30/2013    Non-toxic multinodular goiter 11/28/2012    Pleural effusion     Polymyalgia rheumatica (HCC)     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     Spondyloarthropathy     Last assessed: 11/28/12    Thrombocytopenia ([facility] ) 06/23/2015       Past Surgical History:   Procedure Laterality Date    ARM SKIN LESION BIOPSY / EXCISION      Malignant    ATRIAL ABLATION SURGERY      AV NODE ABLATION      CARDIAC DEFIBRILLATOR PLACEMENT  2009    Cardio-Defib Pulse Gen Venous Insertion of Electrode for Ventricular Pacing  Implantable    CARDIAC SURGERY  2009    Catheterization    COLONOSCOPY N/A 3/14/2016    Procedure: COLONOSCOPY;  Surgeon: Gigi Bolaños MD;  Location: [facility]; Service  February 22, 2013, mildly enlarged prostate, history of colon CA with normal appearance of anastomosis at the midtransverse colon, severe diverticulosis in the sigmoid, descending and transverse colon  Repeat colonoscopy in 3 yrs    HEMICOLECTOMY Right 05/05/2004    INGUINAL HERNIA REPAIR Bilateral     Left 3/2004, right 5/2008    IR UPPER EXTREMITY VENOGRAM- DIAGNOSTIC  10/4/2018    KNEE SURGERY  1972    Meniscectomy    ME ESOPHAGOGASTRODUODENOSCOPY TRANSORAL DIAGNOSTIC N/A 12/5/2016    Procedure: ESOPHAGOGASTRODUODENOSCOPY (EGD); Surgeon: Geoffrey Iglesias MD;  Location: [facility];   Service: Gastroenterology    TONSILLECTOMY AND ADENOIDECTOMY      UPPER GASTROINTESTINAL ENDOSCOPY         Social History     Socioeconomic History    Marital status: /Civil Union     Spouse name: None    Number of children: None    Years of education: None    Highest education level: None   Occupational History    Occupation: Manager-Retired   Tobacco Use    Smoking status: Never Smoker    Smokeless tobacco: Never Used   Vaping Use    Vaping Use: Never used   Substance and Sexual Activity    Alcohol use: Never     Comment: Rare    Drug use: No    Sexual activity: Not Currently   Other Topics Concern    None   Social History Narrative    None     Social Determinants of Health     Financial Resource Strain: Not on file   Food Insecurity: Not on file   Transportation Needs: Not on file   Physical Activity: Not on file   Stress: Not on file   Social Connections: Not on file   Intimate Partner Violence: Not on file   Housing Stability: Not on file       Family History   Problem Relation Age of Onset    Heart failure Mother         Congestive    Hypertension Mother     Osteoporosis Mother     COPD Father     Emphysema Father     Hypertension Father     Heart disease Sister         Heart Valve Replacement    Osteoporosis Sister     Breast cancer Family     Factor V Leiden deficiency Son         on Xarelto       No Known Allergies      Current Outpatient Medications:     albuterol (PROVENTIL HFA,VENTOLIN HFA) 90 mcg/act inhaler, Inhale 2 puffs every 6 (six) hours as needed for wheezing or shortness of breath, Disp: 1 Inhaler, Rfl: 1    apixaban (Eliquis) 5 mg, Take 1 tablet (5 mg total) by mouth 2 (two) times a day, Disp: 180 tablet, Rfl: 1    atorvastatin (LIPITOR) 10 mg tablet, TAKE ONE TABLET BY MOUTH EVERY DAY, Disp: 90 tablet, Rfl: 3    bisoprolol (ZEBETA) 10 MG tablet, TAKE ONE TABLET BY MOUTH TWICE A DAY, Disp: 180 tablet, Rfl: 3    Calcium Carb-Cholecalciferol (CALCIUM 600 + D PO), Take 3 tablets by mouth daily  , Disp: , Rfl:     Coenzyme Q10 (CO Q-10) 200 MG CAPS, Take 1 tablet by mouth daily, Disp: , Rfl:     finasteride (PROSCAR) 5 mg tablet, TAKE ONE TABLET BY MOUTH EVERY DAY, Disp: 90 tablet, Rfl: 1    furosemide (LASIX) 20 mg tablet, Take 0 5 tablets (10 mg total) by mouth daily, Disp: , Rfl:     Multiple Vitamins-Minerals (CENTRUM SILVER PO), Take 1 tablet by mouth daily  , Disp: , Rfl:     omeprazole (PriLOSEC) 20 mg delayed release capsule, Take 1 capsule (20 mg total) by mouth daily, Disp: 90 capsule, Rfl: 2    PREVIDENT 5000 ENAMEL PROTECT 1 1-5 % PSTE, Use as directed, Disp: , Rfl: 3    sacubitril-valsartan (Entresto) 49-51 MG TABS, Take 1 tablet by mouth 2 (two) times a day, Disp: 180 tablet, Rfl: 3    tadalafil (CIALIS) 10 MG tablet, Take 1 tablet (10 mg total) by mouth daily as needed for erectile dysfunction, Disp: 10 tablet, Rfl: 1    Vitamin D, Cholecalciferol, 1000 UNITS CAPS, Take 1 tablet by mouth daily  , Disp: , Rfl:     Physical Exam:  /80   Pulse 83   Temp 97 8 °F (36 6 °C)   Resp 18   Ht 5' 11" (1 803 m)   Wt 83 9 kg (185 lb)   SpO2 96%   BMI 25 80 kg/m²     Physical Exam  Vitals reviewed  Constitutional:       General: He is not in acute distress  Appearance: He is well-developed  HENT:      Head: Normocephalic and atraumatic  Eyes:      General: No scleral icterus  Conjunctiva/sclera: Conjunctivae normal    Cardiovascular:      Rate and Rhythm: Normal rate and regular rhythm  Heart sounds: Normal heart sounds  No murmur heard  Pulmonary:      Effort: Pulmonary effort is normal  No respiratory distress  Breath sounds: Normal breath sounds  Chest:   Breasts:      Right: No axillary adenopathy or supraclavicular adenopathy  Left: No axillary adenopathy or supraclavicular adenopathy  Abdominal:      Palpations: Abdomen is soft  Tenderness: There is no abdominal tenderness  Musculoskeletal:         General: No tenderness  Normal range of motion  Cervical back: Normal range of motion and neck supple  Right lower leg: No edema  Left lower leg: No edema  Lymphadenopathy:      Cervical: No cervical adenopathy  Upper Body:      Right upper body: No supraclavicular or axillary adenopathy  Left upper body: No supraclavicular or axillary adenopathy  Skin:     General: Skin is warm and dry  Neurological:      Mental Status: He is alert and oriented to person, place, and time        Cranial Nerves: No cranial nerve deficit  Psychiatric:         Mood and Affect: Mood normal          Behavior: Behavior normal        Labs:  Lab Results   Component Value Date    WBC 9 96 07/22/2022    HGB 13 2 07/22/2022    HCT 38 6 07/22/2022    MCV 95 07/22/2022     07/22/2022     Patient voiced understanding and agreement in the above discussion  Aware to contact our office with questions/symptoms in the interim  This note has been generated by voice recognition software system  Therefore, there may be spelling, grammar, and or syntax errors  Please contact if questions arise

## 2022-08-16 ENCOUNTER — TELEPHONE (OUTPATIENT)
Dept: GASTROENTEROLOGY | Facility: CLINIC | Age: 82
End: 2022-08-16

## 2022-08-16 NOTE — TELEPHONE ENCOUNTER
----- Message from Denton Ortiz sent at 8/16/2022  9:57 AM EDT -----    ----- Message -----  From: Marelyn Hammans, MD  Sent: 8/11/2022   2:52 PM EDT  To: Gastroenterology Surgery Coordinator    Please see note from primary team and let the patient know  Thank you,    Lali Found  ----- Message -----  From: German Tapia PA-C  Sent: 8/11/2022   2:48 PM EDT  To: Marelyn Hammans, MD    Hi! Patient is scheduled for routine surveillance colonoscopy in Sept  He was dx with bilateral PE in July and now on blood thinner  Please delay surveillance scope until closer to 6 months of anticoagulation  Thanks!   Heather Daniels

## 2022-09-08 ENCOUNTER — REMOTE DEVICE CLINIC VISIT (OUTPATIENT)
Dept: CARDIOLOGY CLINIC | Facility: CLINIC | Age: 82
End: 2022-09-08
Payer: COMMERCIAL

## 2022-09-08 DIAGNOSIS — Z95.810 PRESENCE OF AUTOMATIC CARDIOVERTER/DEFIBRILLATOR (AICD): Primary | ICD-10-CM

## 2022-09-08 PROCEDURE — 93295 DEV INTERROG REMOTE 1/2/MLT: CPT | Performed by: INTERNAL MEDICINE

## 2022-09-08 PROCEDURE — 93296 REM INTERROG EVL PM/IDS: CPT | Performed by: INTERNAL MEDICINE

## 2022-09-08 NOTE — PROGRESS NOTES
Results for orders placed or performed in visit on 09/08/22   Cardiac EP device report    Narrative    MDT-DUAL CHAMBER ICD (AAIR-DDDR MODE)/ ACTIVE SYSTEM IS MRI CONDITIONAL  CARELINK TRANSMISSION: BATTERY VOLTAGE ADEQUATE  (5 1 YRS) AP 50%   5%  ALL AVAILABLE LEAD PARAMETERS WITHIN NORMAL LIMITS  NO SIGNIFICANT HIGH RATE EPISODES  OPTI-VOL WITHIN NORMAL LIMITS  NORMAL DEVICE FUNCTION  ---CONNER

## 2022-10-02 DIAGNOSIS — E27.0 HYPERCORTISOLEMIA (HCC): ICD-10-CM

## 2022-10-03 RX ORDER — ATORVASTATIN CALCIUM 10 MG/1
TABLET, FILM COATED ORAL
Qty: 90 TABLET | Refills: 3 | Status: SHIPPED | OUTPATIENT
Start: 2022-10-03

## 2022-10-10 ENCOUNTER — IMMUNIZATIONS (OUTPATIENT)
Dept: FAMILY MEDICINE CLINIC | Facility: CLINIC | Age: 82
End: 2022-10-10
Payer: COMMERCIAL

## 2022-10-10 DIAGNOSIS — Z23 ENCOUNTER FOR IMMUNIZATION: Primary | ICD-10-CM

## 2022-10-10 PROCEDURE — 90662 IIV NO PRSV INCREASED AG IM: CPT

## 2022-10-10 PROCEDURE — G0008 ADMIN INFLUENZA VIRUS VAC: HCPCS

## 2022-10-20 ENCOUNTER — TELEPHONE (OUTPATIENT)
Dept: HEMATOLOGY ONCOLOGY | Facility: CLINIC | Age: 82
End: 2022-10-20

## 2022-10-20 NOTE — TELEPHONE ENCOUNTER
Sent an urgent e-mail to onc finance regarding prior auth for this patient's labs  Will call patient when I receive an answer

## 2022-10-20 NOTE — TELEPHONE ENCOUNTER
CALL RETURN FORM   Reason for patient call? Patient calling to seek clarification on a prior authorization needed for his lab test for the prothrombin gene mutation  Patient's primary oncologist? Dr Ramesh Gr / Anila Haq   Name of person the patient was calling for? Dr Ramesh rG / Anila Haq   Any additional information to add, if applicable? Spoke with Viola Paystik via Teams and relayed to patient that once Rosa has an update from [employer], she will contact him with more information  Patient verbalized understanding and was appreciative of Rosa's assistance   Informed patient that the message will be forwarded to the team and someone will get back to them as soon as possible    Did you relay this information to the patient?  Yes

## 2022-10-26 ENCOUNTER — APPOINTMENT (OUTPATIENT)
Dept: LAB | Facility: CLINIC | Age: 82
End: 2022-10-26

## 2022-10-26 ENCOUNTER — TELEPHONE (OUTPATIENT)
Dept: HEMATOLOGY ONCOLOGY | Facility: CLINIC | Age: 82
End: 2022-10-26

## 2022-10-26 DIAGNOSIS — I13.0 HYPERTENSIVE HEART AND KIDNEY DISEASE WITH HF AND WITH CKD STAGE III (HCC): ICD-10-CM

## 2022-10-26 DIAGNOSIS — I42.0 DILATED CARDIOMYOPATHY (HCC): ICD-10-CM

## 2022-10-26 DIAGNOSIS — I26.99 OTHER ACUTE PULMONARY EMBOLISM WITHOUT ACUTE COR PULMONALE (HCC): ICD-10-CM

## 2022-10-26 DIAGNOSIS — R73.01 IMPAIRED FASTING GLUCOSE: ICD-10-CM

## 2022-10-26 DIAGNOSIS — I10 ESSENTIAL HYPERTENSION: ICD-10-CM

## 2022-10-26 DIAGNOSIS — N18.30 HYPERTENSIVE HEART AND KIDNEY DISEASE WITH HF AND WITH CKD STAGE III (HCC): ICD-10-CM

## 2022-10-26 DIAGNOSIS — Z86.711 HISTORY OF PULMONARY EMBOLUS (PE): ICD-10-CM

## 2022-10-26 DIAGNOSIS — E78.2 MIXED HYPERLIPIDEMIA: ICD-10-CM

## 2022-10-26 LAB
ALBUMIN SERPL BCP-MCNC: 3.4 G/DL (ref 3.5–5)
ALP SERPL-CCNC: 65 U/L (ref 46–116)
ALT SERPL W P-5'-P-CCNC: 28 U/L (ref 12–78)
ANION GAP SERPL CALCULATED.3IONS-SCNC: 6 MMOL/L (ref 4–13)
AST SERPL W P-5'-P-CCNC: 22 U/L (ref 5–45)
BASOPHILS # BLD AUTO: 0.05 THOUSANDS/ÂΜL (ref 0–0.1)
BASOPHILS NFR BLD AUTO: 1 % (ref 0–1)
BILIRUB SERPL-MCNC: 1.22 MG/DL (ref 0.2–1)
BUN SERPL-MCNC: 18 MG/DL (ref 5–25)
CALCIUM ALBUM COR SERPL-MCNC: 9.8 MG/DL (ref 8.3–10.1)
CALCIUM SERPL-MCNC: 9.3 MG/DL (ref 8.3–10.1)
CHLORIDE SERPL-SCNC: 111 MMOL/L (ref 96–108)
CHOLEST SERPL-MCNC: 136 MG/DL
CO2 SERPL-SCNC: 25 MMOL/L (ref 21–32)
CREAT SERPL-MCNC: 1.52 MG/DL (ref 0.6–1.3)
D DIMER PPP FEU-MCNC: 0.48 UG/ML FEU
EOSINOPHIL # BLD AUTO: 0.18 THOUSAND/ÂΜL (ref 0–0.61)
EOSINOPHIL NFR BLD AUTO: 3 % (ref 0–6)
ERYTHROCYTE [DISTWIDTH] IN BLOOD BY AUTOMATED COUNT: 14.1 % (ref 11.6–15.1)
EST. AVERAGE GLUCOSE BLD GHB EST-MCNC: 103 MG/DL
GFR SERPL CREATININE-BSD FRML MDRD: 42 ML/MIN/1.73SQ M
GLUCOSE P FAST SERPL-MCNC: 98 MG/DL (ref 65–99)
HBA1C MFR BLD: 5.2 %
HCT VFR BLD AUTO: 47.3 % (ref 36.5–49.3)
HDLC SERPL-MCNC: 54 MG/DL
HGB BLD-MCNC: 15.6 G/DL (ref 12–17)
IMM GRANULOCYTES # BLD AUTO: 0.02 THOUSAND/UL (ref 0–0.2)
IMM GRANULOCYTES NFR BLD AUTO: 0 % (ref 0–2)
LDLC SERPL CALC-MCNC: 58 MG/DL (ref 0–100)
LYMPHOCYTES # BLD AUTO: 1.6 THOUSANDS/ÂΜL (ref 0.6–4.47)
LYMPHOCYTES NFR BLD AUTO: 25 % (ref 14–44)
MCH RBC QN AUTO: 32.7 PG (ref 26.8–34.3)
MCHC RBC AUTO-ENTMCNC: 33 G/DL (ref 31.4–37.4)
MCV RBC AUTO: 99 FL (ref 82–98)
MONOCYTES # BLD AUTO: 0.54 THOUSAND/ÂΜL (ref 0.17–1.22)
MONOCYTES NFR BLD AUTO: 8 % (ref 4–12)
NEUTROPHILS # BLD AUTO: 4.08 THOUSANDS/ÂΜL (ref 1.85–7.62)
NEUTS SEG NFR BLD AUTO: 63 % (ref 43–75)
NONHDLC SERPL-MCNC: 82 MG/DL
NRBC BLD AUTO-RTO: 0 /100 WBCS
PLATELET # BLD AUTO: 184 THOUSANDS/UL (ref 149–390)
PMV BLD AUTO: 9.2 FL (ref 8.9–12.7)
POTASSIUM SERPL-SCNC: 4.2 MMOL/L (ref 3.5–5.3)
PROT SERPL-MCNC: 7.1 G/DL (ref 6.4–8.4)
RBC # BLD AUTO: 4.77 MILLION/UL (ref 3.88–5.62)
SODIUM SERPL-SCNC: 142 MMOL/L (ref 135–147)
TRIGL SERPL-MCNC: 118 MG/DL
TSH SERPL DL<=0.05 MIU/L-ACNC: 3.48 UIU/ML (ref 0.45–4.5)
WBC # BLD AUTO: 6.47 THOUSAND/UL (ref 4.31–10.16)

## 2022-10-26 NOTE — TELEPHONE ENCOUNTER
Informed patient our finance team looked into this and his insurance does not require pre-authorizations for labs  He is able to get them done  Patient voiced understanding and appreciation  From: [phone]   Sent: Wednesday, October 26, 2022 9:34 AM  To: Arlette Gordillo <[email or web address]  Subject: [EXTERNAL] Voice Mail (1 minute and 22 seconds)    WARNING! Based upon the critical issue with [employer] targeting Healthcare systems ALL attachments and links from this email should be heavily scrutinized  Hi my name is Yasmine Marin  My YOB: 1940  I had called last week about a pre authorization for a test that Cindy Bravo had order for me when I got the paper that she gave me  The script said this lab test requires pre authorization with some insurance carriers  I had called last week and told them that I called [facility] and they said I couldn't call that  The doctor or the person who ordered the test had a call for the pre authorization and somebody was supposed to call me back the same day  No one ever called me back and I want to go for the test today  I would like I would appreciate if somebody would call me back and let me know if this was pre authorized  Authorized  What I was told is that if it isn't pre authorized you have to pay for it  Thank you  My phone number is area code [phone]  I would really appreciate a call back  I've been waiting for a week now to go for a series of tests and this is the last one that I had at pre authorized, authorized  Thank you

## 2022-10-27 LAB
DEPRECATED AT III PPP: 97 % OF NORMAL (ref 92–136)
PROT C AG ACT/NOR PPP IA: 122 % OF NORMAL (ref 60–150)
PROT S ACT/NOR PPP: 76 % (ref 71–117)

## 2022-10-28 LAB
B2 GLYCOPROT1 IGA SERPL IA-ACNC: 3.2
B2 GLYCOPROT1 IGG SERPL IA-ACNC: 2.2
B2 GLYCOPROT1 IGM SERPL IA-ACNC: <2.4
CARDIOLIPIN IGA SER IA-ACNC: 3.4
CARDIOLIPIN IGG SER IA-ACNC: 3.9
CARDIOLIPIN IGM SER IA-ACNC: 1

## 2022-10-30 LAB
APTT SCREEN TO CONFIRM RATIO: 0.8 RATIO (ref 0–1.34)
CONFIRM APTT/NORMAL: 49.8 SEC (ref 0–47.6)
DRVVT IMM 1:2 NP PPP: 69.7 SEC (ref 0–40.4)
DRVVT SCREEN TO CONFIRM RATIO: 1.8 RATIO (ref 0.8–1.2)
LA PPP-IMP: ABNORMAL
SCREEN APTT: 47.7 SEC (ref 0–51.9)
SCREEN DRVVT: 141.3 SEC (ref 0–47)
THROMBIN TIME: 17.6 SEC (ref 0–23)

## 2022-11-02 ENCOUNTER — OFFICE VISIT (OUTPATIENT)
Dept: FAMILY MEDICINE CLINIC | Facility: CLINIC | Age: 82
End: 2022-11-02

## 2022-11-02 VITALS
WEIGHT: 190.6 LBS | DIASTOLIC BLOOD PRESSURE: 65 MMHG | BODY MASS INDEX: 26.68 KG/M2 | RESPIRATION RATE: 16 BRPM | HEART RATE: 84 BPM | OXYGEN SATURATION: 98 % | TEMPERATURE: 97.8 F | HEIGHT: 71 IN | SYSTOLIC BLOOD PRESSURE: 110 MMHG

## 2022-11-02 DIAGNOSIS — Z00.00 MEDICARE ANNUAL WELLNESS VISIT, SUBSEQUENT: ICD-10-CM

## 2022-11-02 DIAGNOSIS — E78.2 MIXED HYPERLIPIDEMIA: ICD-10-CM

## 2022-11-02 DIAGNOSIS — I10 ESSENTIAL HYPERTENSION: ICD-10-CM

## 2022-11-02 DIAGNOSIS — Z86.711 HISTORY OF PULMONARY EMBOLUS (PE): Primary | ICD-10-CM

## 2022-11-02 DIAGNOSIS — I50.22 CHRONIC HFREF (HEART FAILURE WITH REDUCED EJECTION FRACTION) (HCC): ICD-10-CM

## 2022-11-02 DIAGNOSIS — I12.9 HYPERTENSIVE KIDNEY DISEASE WITH STAGE 3B CHRONIC KIDNEY DISEASE (HCC): ICD-10-CM

## 2022-11-02 DIAGNOSIS — M54.50 CHRONIC BILATERAL LOW BACK PAIN WITHOUT SCIATICA: ICD-10-CM

## 2022-11-02 DIAGNOSIS — R73.01 IMPAIRED FASTING GLUCOSE: ICD-10-CM

## 2022-11-02 DIAGNOSIS — N18.32 HYPERTENSIVE KIDNEY DISEASE WITH STAGE 3B CHRONIC KIDNEY DISEASE (HCC): ICD-10-CM

## 2022-11-02 DIAGNOSIS — G89.29 CHRONIC BILATERAL LOW BACK PAIN WITHOUT SCIATICA: ICD-10-CM

## 2022-11-02 NOTE — PROGRESS NOTES
Chief Complaint   Patient presents with   • Medicare Wellness Visit     AWV   • Follow-up     6 month     Health Maintenance   Topic Date Due   • SLP PLAN OF CARE  Never done   • BMI: Followup Plan  02/03/2022   • COVID-19 Vaccine (4 - Booster for Moderna series) 02/27/2022   • Medicare Annual Wellness Visit (AWV)  08/04/2022   • Depression Screening  02/02/2023   • Fall Risk  11/02/2023   • BMI: Adult  11/02/2023   • DXA SCAN  03/02/2025   • Pneumococcal Vaccine: 65+ Years  Completed   • Influenza Vaccine  Completed   • HIB Vaccine  Aged Out   • Hepatitis B Vaccine  Aged Out   • IPV Vaccine  Aged Out   • Hepatitis A Vaccine  Aged Out   • Meningococcal ACWY Vaccine  Aged Out   • HPV Vaccine  Aged Out        Assessment and Plan:     Problem List Items Addressed This Visit        Endocrine    Impaired fasting glucose     10/2022 hgA1C 5 2 normal  Low carb diet  Relevant Orders    CBC    Comprehensive metabolic panel    Hemoglobin A1C    Lipid panel       Cardiovascular and Mediastinum    Essential hypertension     Controlled  DASH diet  Continue bisoprolol per cardiology  Relevant Orders    CBC    Comprehensive metabolic panel    Hemoglobin A1C    Lipid panel    Chronic HFrEF (heart failure with reduced ejection fraction) ([facility])     Wt Readings from Last 3 Encounters:   11/02/22 86 5 kg (190 lb 9 6 oz)   08/11/22 83 9 kg (185 lb)   08/01/22 83 6 kg (184 lb 3 2 oz)     Continue lasix and Entresto per cardiology  Relevant Orders    CBC    Comprehensive metabolic panel    Hemoglobin A1C    Lipid panel       Genitourinary    Hypertensive kidney disease with stage 3b chronic kidney disease [facility])     Lab Results   Component Value Date    EGFR 42 10/26/2022    EGFR 48 07/22/2022    EGFR 42 07/21/2022    CREATININE 1 52 (H) 10/26/2022    CREATININE 1 36 (H) 07/22/2022    CREATININE 1 51 (H) 07/21/2022     Stable  Avoid motrin/ibuprofen/aleve NSAIDs nephrotoxic agents              Relevant Orders CBC    Comprehensive metabolic panel    Hemoglobin A1C    Lipid panel       Other    Mixed hyperlipidemia     10/2022 Lipid good  Low fat diet  Continue atorvastatin 10mg qhs  Relevant Orders    CBC    Comprehensive metabolic panel    Hemoglobin A1C    Lipid panel    History of pulmonary embolus (PE) - Primary     FU hem/onc  Continue eliquis 5mg bid  Chronic bilateral low back pain without sciatica     The patient may use Tylenol 500mg u0xinao prn and should not exceed more than 3000mg in 24 hours  Avoid NSAIDs  Other Visit Diagnoses     Medicare annual wellness visit, subsequent            BMI Counseling: Body mass index is 26 58 kg/m²  Follow-up plan was not completed due to elderly patient (73 years old) where weight reduction/weight gain would complicate underlying health condition such as: illness or physical disability  Reviewed lab in 10/2022  TSH normal  CMP showed Cr 1 52 and GFR 42 stable  HgA1C 5 2 normal  Lipid 136/118/54/58 good  CBC ok    Got flu shot yearly    Got covid19 vaccines and boosters  Got PCV13 5/2015  Got pneumovax 2016  Got zoster in 2011  Got shingrix in 2020    FU Dr Zaid Soto for colonoscopy in 6/2019  Will do it later  3/2020 Dexa showed osteopenia  RTO in 6 months      Will think about POA and living will  Preventive health issues were discussed with patient, and age appropriate screening tests were ordered as noted in patient's After Visit Summary  Personalized health advice and appropriate referrals for health education or preventive services given if needed, as noted in patient's After Visit Summary  History of Present Illness:     Patient presents for a Medicare Wellness Visit    HPI     Pt is here by himself       Pulmonary Embolus in 8/2022---FU hem/onc  He is on eliquis 5mg bid  HTN---He is on bisoprolol 10mg bid  CHF---FU cardiology for cardiomyopathy, tachycardia, s/p pacemaker   Stable    He is on lasix 10mg QD and entresto  Wt at home stable per pt       CKD3---stable       Hyperlipidemia---He is on atorvastatin 10mg qhs  Denies SE       IFG---Follows low carb diet        Asthma----Saw pulmonary  Does not need albuterol  Never smoked       Back pain/Knee arthritis---saw orthopedics  Tried PT but no help  Cannot play golf any more per pt        FU Cancer care yearly for melanoma s/p wide excision on right upper arm 5 years ago  Stan Connors for Burt's, do EGD every 3 years  Pt is on omeprazole 20mg daily now       FU urology for BPH yearly  He is on proscar 5mg qhs       Never smoke  No alcohol       Lives with wife  Does all ADL's  Still drive  Denies recent falls    Denies depression           Patient Care Team:  Libby Trevizo MD as PCP - Ramón Gama MD Rogene Amen, MD Canda Coop, MD Elza Drape, MD as Endoscopist  Dionna Ramirez MD (Colon and Rectal Surgery)  Marycruz Carlson DO (Cardiology)  Tyrese Temple MD (Dermatology)     Review of Systems:     Review of Systems   Constitutional: Negative for appetite change, chills and fever  HENT: Negative for congestion, ear pain, sinus pain and sore throat  Eyes: Negative for discharge and itching  Respiratory: Negative for apnea, cough, chest tightness, shortness of breath and wheezing  Cardiovascular: Negative for chest pain, palpitations and leg swelling  Gastrointestinal: Negative for abdominal pain, anal bleeding, constipation, diarrhea, nausea and vomiting  Endocrine: Negative for cold intolerance, heat intolerance and polyuria  Genitourinary: Negative for difficulty urinating and dysuria  Musculoskeletal: Negative for arthralgias, back pain and myalgias  Skin: Negative for rash  Neurological: Negative for dizziness and headaches  Psychiatric/Behavioral: Negative for agitation          Problem List:     Patient Active Problem List   Diagnosis   • 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   • Microscopic hematuria   • Ank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 (HCC)   • Primary osteoarthritis of both knees   • Burt esophagus   • Cardiac arrhythmia   • Cardiomyopathy [facility])   • Esophageal reflux   • Mixed hyperlipidemia   • Essential hypertension   • Impaired fasting glucose   • Personal history of malignant melanoma   • Polymyalgia rheumatica (HCC)   • Right bundle branch block with left anterior fascicular block   • Skin lesion   • Osteopenia   • 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   • Hypertensive kidney disease with stage 3b chronic kidney disease (HCC)   • Chronic HFrEF (heart failure with reduced ejection fraction) ([facility] [address] )   • Encounter for follow-up surveillance of melanoma   • Mild persistent asthma without complication   • 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 ([facility] [address] )   • Personal history of colon cancer   • History of pulmonary embolus (PE)   • Chronic bilateral low back pain without sciatica      Past Medical and Surgical History:     Past Medical History:   Diagnosis Date   • AICD (automatic cardioverter/defibrillator) present    • Arthritis    • Asthma    • Burt esophagus    • 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    • Chronic HFrEF (heart failure with reduced ejection fraction) ([facility] [address] ) 2019   • CKD (chronic kidney disease)    • Colon cancer ([facility] [address] ) 04/05/2013   • Colon cancer screening 06/21/2022   • COPD (chronic obstructive pulmonary disease) ([facility] [address] )    • Diverticulitis     Last assessed: 4/5/13   • Esophageal reflux    • Gout    • Hernia    • Hyperlipidemia    • Hypertension    • Hypothyroidism 05/30/2013   • Non-toxic multinodular goiter 11/28/2012   • Pleural effusion    • Polymyalgia rheumatica (HCC)    • 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    • Spondyloarthropathy     Last assessed: 11/28/12   • Thrombocytopenia ([facility] [address] ) 06/23/2015     Past Surgical History:   Procedure Laterality Date   • ARM SKIN LESION BIOPSY / EXCISION      Malignant   • ATRIAL ABLATION SURGERY     • AV NODE ABLATION     • CARDIAC DEFIBRILLATOR PLACEMENT  2009    Cardio-Defib Pulse Gen Venous Insertion of Electrode for Ventricular Pacing  Implantable   • CARDIAC SURGERY  2009    Catheterization   • COLONOSCOPY N/A 3/14/2016    Procedure: COLONOSCOPY;  Surgeon: Ayesha Russell MD;  Location: [facility]; Service  February 22, 2013, mildly enlarged prostate, history of colon CA with normal appearance of anastomosis at the midtransverse colon, severe diverticulosis in the sigmoid, descending and transverse colon  Repeat colonoscopy in 3 yrs   • HEMICOLECTOMY Right 05/05/2004   • INGUINAL HERNIA REPAIR Bilateral     Left 3/2004, right 5/2008   • IR UPPER EXTREMITY VENOGRAM- DIAGNOSTIC  10/4/2018   • KNEE SURGERY  1972    Meniscectomy   • ND ESOPHAGOGASTRODUODENOSCOPY TRANSORAL DIAGNOSTIC N/A 12/5/2016    Procedure: ESOPHAGOGASTRODUODENOSCOPY (EGD); Surgeon: Romeo Lindsey MD;  Location: [facility];   Service: Gastroenterology   • TONSILLECTOMY AND ADENOIDECTOMY     • UPPER GASTROINTESTINAL ENDOSCOPY        Family History:     Family History   Problem Relation Age of Onset   • Heart failure Mother         Congestive   • Hypertension Mother    • Osteoporosis Mother    • COPD Father    • Emphysema Father    • Hypertension Father    • Heart disease Sister         Heart Valve Replacement   • Osteoporosis Sister    • Breast cancer Family    • Factor V Leiden deficiency Son         on Xarelto      Social History:     Social History     Socioeconomic History   • Marital status: /Civil Union     Spouse name: None   • Number of children: None   • Years of education: None   • Highest education level: None   Occupational History   • Occupation: Manager-Retired   Tobacco Use   • Smoking status: Never Smoker   • Smokeless tobacco: Never Used   Vaping Use   • Vaping Use: Never used   Substance and Sexual Activity   • Alcohol use: Never     Comment: Rare   • Drug use: No   • Sexual activity: Not Currently   Other Topics Concern   • None   Social History Narrative   • None     Social Determinants of Health     Financial Resource Strain: Low Risk    • Difficulty of Paying Living Expenses: Not hard at all   Food Insecurity: Not on file   Transportation Needs: No Transportation Needs   • Lack of Transportation (Medical): No   • Lack of Transportation (Non-Medical): No   Physical Activity: Not on file   Stress: Not on file   Social Connections: Not on file   Intimate Partner Violence: Not on file   Housing Stability: Not on file      Medications and Allergies:     Current Outpatient Medications   Medication Sig Dispense Refill   • albuterol (PROVENTIL HFA,VENTOLIN HFA) 90 mcg/act inhaler Inhale 2 puffs every 6 (six) hours as needed for wheezing or shortness of breath 1 Inhaler 1   • apixaban (Eliquis) 5 mg Take 1 tablet (5 mg total) by mouth 2 (two) times a day 180 tablet 1   • atorvastatin (LIPITOR) 10 mg tablet TAKE ONE TABLET BY MOUTH EVERY DAY 90 tablet 3   • bisoprolol (ZEBETA) 10 MG tablet TAKE ONE TABLET BY MOUTH TWICE A  tablet 3   • Calcium Carb-Cholecalciferol (CALCIUM 600 + D PO) Take 3 tablets by mouth daily       • Coenzyme Q10 (CO Q-10) 200 MG CAPS Take 1 tablet by mouth daily     • finasteride (PROSCAR) 5 mg tablet TAKE ONE TABLET BY MOUTH EVERY DAY 90 tablet 1   • furosemide (LASIX) 20 mg tablet Take 0 5 tablets (10 mg total) by mouth daily     • Multiple Vitamins-Minerals (CENTRUM SILVER PO) Take 1 tablet by mouth daily  • omeprazole (PriLOSEC) 20 mg delayed release capsule Take 1 capsule (20 mg total) by mouth daily 90 capsule 2   • PREVIDENT 5000 ENAMEL PROTECT 1 1-5 % PSTE Use as directed  3   • sacubitril-valsartan (Entresto) 49-51 MG TABS Take 1 tablet by mouth 2 (two) times a day 180 tablet 3   • Vitamin D, Cholecalciferol, 1000 UNITS CAPS Take 1 tablet by mouth daily         No current facility-administered medications for this visit  No Known Allergies   Immunizations:     Immunization History   Administered Date(s) Administered   • COVID-19 MODERNA VACC 0 5 ML IM 01/26/2021, 03/08/2021, 10/27/2021   • COVID-19 Moderna Vac BIVALENT 18 Yr+ Im (BOOSTER ONLY) 09/30/2022   • INFLUENZA 10/20/2018   • Influenza Split High Dose Preservative Free IM 10/28/2014, 10/26/2015, 11/11/2016, 10/23/2017   • Influenza, high dose seasonal 0 7 mL 11/05/2019, 10/12/2020, 10/11/2021, 10/10/2022   • Influenza, seasonal, injectable 11/27/2001, 10/04/2007, 12/09/2008, 10/29/2010, 10/19/2011, 09/29/2012, 10/31/2013   • Pneumococcal Conjugate 13-Valent 05/13/2015   • Pneumococcal Polysaccharide PPV23 11/28/2006, 12/21/2016   • Zoster 12/01/2011, 03/11/2020   • Zoster Vaccine Recombinant 03/11/2020, 07/06/2020      Health Maintenance:         Topic Date Due   • DXA SCAN  03/02/2025         Topic Date Due   • COVID-19 Vaccine (4 - Booster for Natha Larger series) 02/27/2022      Medicare Screening Tests and Risk Assessments:     Sloan Akbar is here for his Subsequent Wellness visit  Health Risk Assessment:   Patient rates overall health as good  Patient feels that their physical health rating is slightly worse  Patient is satisfied with their life  Eyesight was rated as same  Hearing was rated as same  Patient feels that their emotional and mental health rating is same  Patients states they are never, rarely angry  Patient states they are never, rarely unusually tired/fatigued  Pain experienced in the last 7 days has been some  Patient's pain rating has been 3/10  Patient states that he has experienced no weight loss or gain in last 6 months  Fall Risk Screening: In the past year, patient has experienced: no history of falling in past year      Home Safety:  Patient does not have trouble with stairs inside or outside of their home  Patient has working smoke alarms and has working carbon monoxide detector   Home safety hazards include: none  Nutrition:   Current diet is Regular and No Added Salt  Medications:   Patient is currently taking over-the-counter supplements  OTC medications include: Calcium-D3-Centrum Silver-CoQ10  Patient is able to manage medications  Activities of Daily Living (ADLs)/Instrumental Activities of Daily Living (IADLs):   Walk and transfer into and out of bed and chair?: Yes  Dress and groom yourself?: Yes    Bathe or shower yourself?: Yes    Feed yourself? Yes  Do your laundry/housekeeping?: Yes  Manage your money, pay your bills and track your expenses?: Yes  Make your own meals?: Yes    Do your own shopping?: Yes    Previous Hospitalizations:   Any hospitalizations or ED visits within the last 12 months?: Yes    How many hospitalizations have you had in the last year?: 1-2    Hospitalization Comments: Blood Clots in the lungs    Advance Care Planning:   Living will: Yes    Durable POA for healthcare: No    Advanced directive: Yes      Cognitive Screening:   Provider or family/friend/caregiver concerned regarding cognition?: No    PREVENTIVE SCREENINGS      Cardiovascular Screening:    General: History Lipid Disorder and Screening Current      Diabetes Screening:     General: Screening Current      Colorectal Cancer Screening:     General: History Colorectal Cancer      Prostate Cancer Screening:    General: Screening Not Indicated      Osteoporosis Screening:    General: Screening Current      Lung Cancer Screening:     General: Screening Not Indicated    Screening, Brief Intervention, and Referral to Treatment (SBIRT)    Screening  Typical number of drinks in a day: 0  Typical number of drinks in a week: 0  Interpretation: Low risk drinking behavior      AUDIT-C Screenin) How often did you have a drink containing alcohol in the past year? never  2) How many drinks did you have on a typical day when you were drinking in the past year? 0  3) How often did you have 6 or more drinks on one occasion in the past year? never    AUDIT-C Score: 0  Interpretation: Score 0-3 (male): Negative screen for alcohol misuse    Single Item Drug Screening:  How often have you used an illegal drug (including marijuana) or a prescription medication for non-medical reasons in the past year? never    Single Item Drug Screen Score: 0  Interpretation: Negative screen for possible drug use disorder    No exam data present     Physical Exam:     /65   Pulse 84   Temp 97 8 °F (36 6 °C) (Tympanic)   Resp 16   Ht 5' 11" (1 803 m)   Wt 86 5 kg (190 lb 9 6 oz)   SpO2 98%   BMI 26 58 kg/m²     Physical Exam  Vitals reviewed  Constitutional:       Appearance: Normal appearance  HENT:      Head: Normocephalic and atraumatic  Eyes:      General:         Right eye: No discharge  Left eye: No discharge  Conjunctiva/sclera: Conjunctivae normal    Neck:      Vascular: No carotid bruit  Cardiovascular:      Rate and Rhythm: Normal rate and regular rhythm  Heart sounds: Normal heart sounds  No murmur heard  No friction rub  No gallop  Pulmonary:      Effort: Pulmonary effort is normal  No respiratory distress  Breath sounds: Normal breath sounds  No wheezing or rales  Abdominal:      General: Bowel sounds are normal  There is no distension  Palpations: Abdomen is soft  Tenderness: There is no abdominal tenderness  Musculoskeletal:         General: No swelling, tenderness or deformity  Normal range of motion  Cervical back: Normal range of motion and neck supple  No muscular tenderness  Lymphadenopathy:      Cervical: No cervical adenopathy  Neurological:      Mental Status: He is alert     Psychiatric:         Mood and Affect: Mood normal           Lexx Jean MD

## 2022-11-02 NOTE — ASSESSMENT & PLAN NOTE
The patient may use Tylenol 500mg l5dmkgq prn and should not exceed more than 3000mg in 24 hours  Avoid NSAIDs

## 2022-11-02 NOTE — PATIENT INSTRUCTIONS
Medicare Preventive Visit Patient Instructions  Thank you for completing your Welcome to Medicare Visit or Medicare Annual Wellness Visit today  Your next wellness visit will be due in one year (11/3/2023)  The screening/preventive services that you may require over the next 5-10 years are detailed below  Some tests may not apply to you based off risk factors and/or age  Screening tests ordered at today's visit but not completed yet may show as past due  Also, please note that scanned in results may not display below  Preventive Screenings:  Service Recommendations Previous Testing/Comments   Colorectal Cancer Screening  · Colonoscopy    · Fecal Occult Blood Test (FOBT)/Fecal Immunochemical Test (FIT)  · Fecal DNA/Cologuard Test  · Flexible Sigmoidoscopy Age: 39-70 years old   Colonoscopy: every 10 years (May be performed more frequently if at higher risk)  OR  FOBT/FIT: every 1 year  OR  Cologuard: every 3 years  OR  Sigmoidoscopy: every 5 years  Screening may be recommended earlier than age 39 if at higher risk for colorectal cancer  Also, an individualized decision between you and your healthcare provider will decide whether screening between the ages of 74-80 would be appropriate  Colonoscopy: 06/17/2019  FOBT/FIT: Not on file  Cologuard: Not on file  Sigmoidoscopy: Not on file          Prostate Cancer Screening Individualized decision between patient and health care provider in men between ages of 53-78   Medicare will cover every 12 months beginning on the day after your 50th birthday PSA: 0 4 ng/mL           Hepatitis C Screening Once for adults born between 1945 and 1965  More frequently in patients at high risk for Hepatitis C Hep C Antibody: Not on file        Diabetes Screening 1-2 times per year if you're at risk for diabetes or have pre-diabetes Fasting glucose: 98 mg/dL (10/26/2022)  A1C: 5 2 % (10/26/2022)      Cholesterol Screening Once every 5 years if you don't have a lipid disorder   May order more often based on risk factors  Lipid panel: 10/26/2022         Other Preventive Screenings Covered by Medicare:  1  Abdominal Aortic Aneurysm (AAA) Screening: covered once if your at risk  You're considered to be at risk if you have a family history of AAA or a male between the age of 73-68 who smoking at least 100 cigarettes in your lifetime  2  Lung Cancer Screening: covers low dose CT scan once per year if you meet all of the following conditions: (1) Age 50-69; (2) No signs or symptoms of lung cancer; (3) Current smoker or have quit smoking within the last 15 years; (4) You have a tobacco smoking history of at least 20 pack years (packs per day x number of years you smoked); (5) You get a written order from a healthcare provider  3  Glaucoma Screening: covered annually if you're considered high risk: (1) You have diabetes OR (2) Family history of glaucoma OR (3)  aged 48 and older OR (3)  American aged 72 and older  3  Osteoporosis Screening: covered every 2 years if you meet one of the following conditions: (1) Have a vertebral abnormality; (2) On glucocorticoid therapy for more than 3 months; (3) Have primary hyperparathyroidism; (4) On osteoporosis medications and need to assess response to drug therapy  5  HIV Screening: covered annually if you're between the age of 12-76  Also covered annually if you are younger than 13 and older than 72 with risk factors for HIV infection  For pregnant patients, it is covered up to 3 times per pregnancy      Immunizations:  Immunization Recommendations   Influenza Vaccine Annual influenza vaccination during flu season is recommended for all persons aged >= 6 months who do not have contraindications   Pneumococcal Vaccine   * Pneumococcal conjugate vaccine = PCV13 (Prevnar 13), PCV15 (Vaxneuvance), PCV20 (Prevnar 20)  * Pneumococcal polysaccharide vaccine = PPSV23 (Pneumovax) Adults 25-60 years old: 1-3 doses may be recommended based on certain risk factors  Adults 72 years old: 1-2 doses may be recommended based off what pneumonia vaccine you previously received   Hepatitis B Vaccine 3 dose series if at intermediate or high risk (ex: diabetes, end stage renal disease, liver disease)   Tetanus (Td) Vaccine - COST NOT COVERED BY MEDICARE PART B Following completion of primary series, a booster dose should be given every 10 years to maintain immunity against tetanus  Td may also be given as tetanus wound prophylaxis  Tdap Vaccine - COST NOT COVERED BY MEDICARE PART B Recommended at least once for all adults  For pregnant patients, recommended with each pregnancy  Shingles Vaccine (Shingrix) - COST NOT COVERED BY MEDICARE PART B  2 shot series recommended in those aged 48 and above     Health Maintenance Due:      Topic Date Due   • DXA SCAN  03/02/2025     Immunizations Due:      Topic Date Due   • COVID-19 Vaccine (4 - Booster for [place] series) 02/27/2022     Advance Directives   What are advance directives? Advance directives are legal documents that state your wishes and plans for medical care  These plans are made ahead of time in case you lose your ability to make decisions for yourself  Advance directives can apply to any medical decision, such as the treatments you want, and if you want to donate organs  What are the types of advance directives? There are many types of advance directives, and each state has rules about how to use them  You may choose a combination of any of the following:  · Living will: This is a written record of the treatment you want  You can also choose which treatments you do not want, which to limit, and which to stop at a certain time  This includes surgery, medicine, IV fluid, and tube feedings  · Durable power of  for healthcare [facility]): This is a written record that states who you want to make healthcare choices for you when you are unable to make them for yourself   This person, called a proxy, is usually a family member or a friend  You may choose more than 1 proxy  · Do not resuscitate (DNR) order:  A DNR order is used in case your heart stops beating or you stop breathing  It is a request not to have certain forms of treatment, such as CPR  A DNR order may be included in other types of advance directives  · Medical directive: This covers the care that you want if you are in a coma, near death, or unable to make decisions for yourself  You can list the treatments you want for each condition  Treatment may include pain medicine, surgery, blood transfusions, dialysis, IV or tube feedings, and a ventilator (breathing machine)  · Values history: This document has questions about your views, beliefs, and how you feel and think about life  This information can help others choose the care that you would choose  Why are advance directives important? An advance directive helps you control your care  Although spoken wishes may be used, it is better to have your wishes written down  Spoken wishes can be misunderstood, or not followed  Treatments may be given even if you do not want them  An advance directive may make it easier for your family to make difficult choices about your care  Weight Management   Why it is important to manage your weight:  Being overweight increases your risk of health conditions such as heart disease, high blood pressure, type 2 diabetes, and certain types of cancer  It can also increase your risk for osteoarthritis, sleep apnea, and other respiratory problems  Aim for a slow, steady weight loss  Even a small amount of weight loss can lower your risk of health problems  How to lose weight safely:  A safe and healthy way to lose weight is to eat fewer calories and get regular exercise  You can lose up about 1 pound a week by decreasing the number of calories you eat by 500 calories each day     Healthy meal plan for weight management:  A healthy meal plan includes a variety of foods, contains fewer calories, and helps you stay healthy  A healthy meal plan includes the following:  · Eat whole-grain foods more often  A healthy meal plan should contain fiber  Fiber is the part of grains, fruits, and vegetables that is not broken down by your body  Whole-grain foods are healthy and provide extra fiber in your diet  Some examples of whole-grain foods are whole-wheat breads and pastas, oatmeal, brown rice, and bulgur  · Eat a variety of vegetables every day  Include dark, leafy greens such as spinach, kale, venecia greens, and mustard greens  Eat yellow and orange vegetables such as carrots, sweet potatoes, and winter squash  · Eat a variety of fruits every day  Choose fresh or canned fruit (canned in its own juice or light syrup) instead of juice  Fruit juice has very little or no fiber  · Eat low-fat dairy foods  Drink fat-free (skim) milk or 1% milk  Eat fat-free yogurt and low-fat cottage cheese  Try low-fat cheeses such as mozzarella and other reduced-fat cheeses  · Choose meat and other protein foods that are low in fat  Choose beans or other legumes such as split peas or lentils  Choose fish, skinless poultry (chicken or turkey), or lean cuts of red meat (beef or pork)  Before you cook meat or poultry, cut off any visible fat  · Use less fat and oil  Try baking foods instead of frying them  Add less fat, such as margarine, sour cream, regular salad dressing and mayonnaise to foods  Eat fewer high-fat foods  Some examples of high-fat foods include french fries, doughnuts, ice cream, and cakes  · Eat fewer sweets  Limit foods and drinks that are high in sugar  This includes candy, cookies, regular soda, and sweetened drinks  Exercise:  Exercise at least 30 minutes per day on most days of the week  Some examples of exercise include walking, biking, dancing, and swimming   You can also fit in more physical activity by taking the stairs instead of the elevator or parking farther away from stores  Ask your healthcare provider about the best exercise plan for you  © Copyright [employer] 2018 Information is for End User's use only and may not be sold, redistributed or otherwise used for commercial purposes   All illustrations and images included in CareNotes® are the copyrighted property of A D A M , Inc  or [address] [place] [address] [employer]

## 2022-11-02 NOTE — ASSESSMENT & PLAN NOTE
Wt Readings from Last 3 Encounters:   11/02/22 86 5 kg (190 lb 9 6 oz)   08/11/22 83 9 kg (185 lb)   08/01/22 83 6 kg (184 lb 3 2 oz)     Continue lasix and Entresto per cardiology

## 2022-11-02 NOTE — ASSESSMENT & PLAN NOTE
Lab Results   Component Value Date    EGFR 42 10/26/2022    EGFR 48 07/22/2022    EGFR 42 07/21/2022    CREATININE 1 52 (H) 10/26/2022    CREATININE 1 36 (H) 07/22/2022    CREATININE 1 51 (H) 07/21/2022     Stable  Avoid motrin/ibuprofen/aleve NSAIDs nephrotoxic agents

## 2022-11-14 ENCOUNTER — TELEPHONE (OUTPATIENT)
Dept: PULMONOLOGY | Facility: CLINIC | Age: 82
End: 2022-11-14

## 2022-11-14 NOTE — TELEPHONE ENCOUNTER
Pt has called in requesting a sample of Eliquis to hold him over [facility]   Please advise

## 2022-11-15 ENCOUNTER — OFFICE VISIT (OUTPATIENT)
Dept: FAMILY MEDICINE CLINIC | Facility: CLINIC | Age: 82
End: 2022-11-15

## 2022-11-15 VITALS
TEMPERATURE: 96.3 F | DIASTOLIC BLOOD PRESSURE: 64 MMHG | OXYGEN SATURATION: 100 % | SYSTOLIC BLOOD PRESSURE: 104 MMHG | BODY MASS INDEX: 26.32 KG/M2 | HEART RATE: 70 BPM | WEIGHT: 188 LBS | RESPIRATION RATE: 16 BRPM | HEIGHT: 71 IN

## 2022-11-15 DIAGNOSIS — R21 RASH: Primary | ICD-10-CM

## 2022-11-15 NOTE — ASSESSMENT & PLAN NOTE
Patient presents the office today with concern of a rash that appeared approximately 1 week ago on the left side of his neck  Patient states it started as a red raised area and he has been scratching it  He believes it may be related to her recent haircut that he had  He has not been using any new lotions, creams or shampoos  There is no new laundry detergent  Upon exam the patient does have several scabs on the left side of his neck due to him scratching and picking at that area  He does have 1 or 2 spots on his right again slightly scabbed over from picking  I did recommend to the patient that he refrain from picking at these areas and I do believe they will clear on their own  Hydrocortisone cream can be used  He will contact me should this worsen

## 2022-11-15 NOTE — PROGRESS NOTES
[facility]    NAME: Katharine Wing  AGE: 81 y o  SEX: male  : 1940     DATE: 11/15/2022     Assessment and Plan:     Problem List Items Addressed This Visit        Musculoskeletal and Integument    Rash - Primary     Patient presents the office today with concern of a rash that appeared approximately 1 week ago on the left side of his neck  Patient states it started as a red raised area and he has been scratching it  He believes it may be related to her recent haircut that he had  He has not been using any new lotions, creams or shampoos  There is no new laundry detergent  Upon exam the patient does have several scabs on the left side of his neck due to him scratching and picking at that area  He does have 1 or 2 spots on his right again slightly scabbed over from picking  I did recommend to the patient that he refrain from picking at these areas and I do believe they will clear on their own  Hydrocortisone cream can be used  He will contact me should this worsen  No follow-ups on file  Chief Complaint:     Chief Complaint   Patient presents with   • Rash     Below both ears and part of the neck         History of Present Illness:     Patient presents to the office today with concern of a rash on both sides of his neck  This is discussed above  He will use hydrocortisone cream on the area  He will contact me should this worsen  Review of Systems:     Review of Systems   Constitutional: Negative  Negative for fatigue  HENT: Negative  Negative for congestion, postnasal drip, rhinorrhea and trouble swallowing  Eyes: Negative  Negative for visual disturbance  Respiratory: Negative  Negative for choking and shortness of breath  Cardiovascular: Negative  Negative for chest pain  Gastrointestinal: Negative  Endocrine: Negative  Genitourinary: Negative  Musculoskeletal: Negative  Negative for arthralgias, back pain, myalgias and neck pain  Skin: Positive for rash and wound  Neurological: Negative for dizziness and headaches  Psychiatric/Behavioral: Negative           Problem List:     Patient Active Problem List   Diagnosis   • 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   • Microscopic hematuria   • Ank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 (HCC)   • Primary osteoarthritis of both knees   • Burt esophagus   • Cardiac arrhythmia   • Cardiomyopathy ([place] [id number] )   • Esophageal reflux   • Mixed hyperlipidemia   • Essential hypertension   • Impaired fasting glucose   • Personal history of malignant melanoma   • Polymyalgia rheumatica ([place] [id number] )   • Right bundle branch block with left anterior fascicular block   • Skin lesion   • Osteopenia   • 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   • Hypertensive kidney disease with stage 3b chronic kidney disease ([place] [address] )   • Chronic HFrEF (heart failure with reduced ejection fraction) ([place] [address] )   • Encounter for follow-up surveillance of melanoma   • Mild persistent asthma without complication   • 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 ([place] [address] )   • Personal history of colon cancer   • History of pulmonary embolus (PE)   • Chronic bilateral low back pain without sciatica   • Rash        Objective:     /64 (BP Location: Left arm, Patient Position: Sitting)   Pulse 70   Temp (!) 96 3 °F (35 7 °C) (Tympanic)   Resp 16   Ht 5' 11" (1 803 m)   Wt 85 3 kg (188 lb)   SpO2 100%   BMI 26 22 kg/m²     Current Outpatient Medications   Medication Sig Dispense Refill   • albuterol (PROVENTIL HFA,VENTOLIN HFA) 90 mcg/act inhaler Inhale 2 puffs every 6 (six) hours as needed for wheezing or shortness of breath 1 Inhaler 1   • apixaban (Eliquis) 5 mg Take 1 tablet (5 mg total) by mouth 2 (two) times a day 180 tablet 1   • atorvastatin (LIPITOR) 10 mg tablet TAKE ONE TABLET BY MOUTH EVERY DAY 90 tablet 3   • bisoprolol (ZEBETA) 10 MG tablet TAKE ONE TABLET BY MOUTH TWICE A  tablet 3   • Calcium Carb-Cholecalciferol (CALCIUM 600 + D PO) Take 3 tablets by mouth daily       • Coenzyme Q10 (CO Q-10) 200 MG CAPS Take 1 tablet by mouth daily     • finasteride (PROSCAR) 5 mg tablet TAKE ONE TABLET BY MOUTH EVERY DAY 90 tablet 1   • furosemide (LASIX) 20 mg tablet Take 0 5 tablets (10 mg total) by mouth daily     • Multiple Vitamins-Minerals (CENTRUM SILVER PO) Take 1 tablet by mouth daily  • omeprazole (PriLOSEC) 20 mg delayed release capsule Take 1 capsule (20 mg total) by mouth daily 90 capsule 2   • PREVIDENT 5000 ENAMEL PROTECT 1 1-5 % PSTE Use as directed  3   • sacubitril-valsartan (Entresto) 49-51 MG TABS Take 1 tablet by mouth 2 (two) times a day 180 tablet 3   • Vitamin D, Cholecalciferol, 1000 UNITS CAPS Take 1 tablet by mouth daily  No current facility-administered medications for this visit  Physical Exam  Vitals reviewed  Constitutional:       Appearance: Normal appearance  HENT:      Head: Normocephalic and atraumatic  Nose: Nose normal       Mouth/Throat:      Mouth: Mucous membranes are moist    Eyes:      Extraocular Movements: Extraocular movements intact  Pupils: Pupils are equal, round, and reactive to light  Cardiovascular:      Rate and Rhythm: Normal rate and regular rhythm  Pulses: Normal pulses  Heart sounds: Normal heart sounds  Pulmonary:      Effort: Pulmonary effort is normal       Breath sounds: Normal breath sounds  Musculoskeletal:         General: Normal range of motion  Skin:     General: Skin is warm  Comments: Scabbed areas   Neurological:      General: No focal deficit present  Mental Status: He is alert and oriented to person, place, and time  Psychiatric:         Mood and Affect: Mood normal          Behavior: Behavior normal          Thought Content:  Thought content normal          Judgment: Judgment normal          Gabriel [place], [id number] [address]

## 2022-11-16 NOTE — PROGRESS NOTES
Heart Failure Office Note - Daya Ennis 81 y o  male MRN: [id number]    @ Encounter: [id number]      Assessment/Plan:    Patient Active Problem List    Diagnosis Date Noted   • Encounter for follow-up surveillance of melanoma 07/11/2019   • Chronic HFrEF (heart failure with reduced ejection fraction) ([place] [address] ) 01/29/2019   • Hypertensive kidney disease with stage 3b chronic kidney disease ([place] [address] ) 01/04/2019   • 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 09/20/2018   • Osteopenia 06/29/2018   • Personal history of malignant melanoma 11/15/2017   • Skin lesion 06/23/2017   • Ank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 ([place] [address] ) 12/30/2014   • 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 12/02/2013   • Microscopic hematuria 12/02/2013   • Primary osteoarthritis of both knees 11/26/2013   • Polymyalgia rheumatica ([place] [address] ) 11/26/2013   • 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   • Impaired fasting glucose 05/30/2013   • 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   • Cardiac arrhythmia 04/05/2013   • Essential hypertension 04/05/2013   • Esophageal reflux 11/28/2012   • Cardiomyopathy ([place] [address] ) 10/26/2012   • Mixed hyperlipidemia 09/05/2012   • Rash 11/15/2022   • Chronic bilateral low back pain without sciatica 11/02/2022   • History of pulmonary embolus (PE) 07/22/2022   • Personal history of colon cancer 06/21/2022   • 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 ([place] [address] ) 08/06/2021   • Mild persistent asthma without complication 07/34/7320       # Chronic HFrEF, Stage C, NYHA 2  Etiology: NICM, presumed viral  No family hx of SCD  No ETOH    Weight: 199 lbs--> 188 lbs today  NT pro BNP: 1/29/19: 6192    Studies- personally reviewed by me  Echo 7/22/22:  LVEF: 35%  LVEDd: 5 3 cm  RV: mildly dilated, mildly reduced  PASP: 30 mmHg  RVOT: no notching  Moderate AI  Aortic root 4 6 cm    Echo 10/1/20:  LVEF: 35%  LVIDd: 6 5 cm  RV: mildly dilated, mildly reduced  MR: moderate  PASP:  RVOT:   Other: mild AI, ascending aorta 4 0 cm  TR: 2-3+    Echocardiogram 1/29/19  LVEF: 40%  LVIDd: 6 cm  RV: normal  MR: mild  PASP: 40 mmHg  RVOT:   Other:    Echo 6/21/18:  LVEF: 50%  LVEDd: 6 35 cm    Nuclear Stress Test 1/31/19: small to medium sized fixed defect infersoseptum  No ischemia  EF: 30%    Lab Results   Component Value Date    NTBNP 6,971 (H) 07/22/2022        Neurohormonal Blockade:  --Beta-Blocker: bisoprolol 10 mg daily  --ACEi, ARB or ARNi: Entresto 49/51 mg BID  --Aldosterone Receptor Blocker:  --Diuretic: lasix 20 mg daily    Sudden Cardiac Death Risk Reduction:  MDT dual chamber ICD: MRI conditional  Interrogation 9/8/22:  5%, no high rate episodes  Interrogation 12/3/21:  20%, no episodes, optivol normal  Interrogation 6/4/21:  9 3%, no high rate episodes, optivol normal    Electrical Resynchronization:  --Candidacy for BiV device: IVCD    Advanced Therapies (if appropriate): --Inotrope:  --LVAD/Transplant Candidacy:    # Acute bilateral pulmonary emboli  - unprovoked with colon CA and melanoma hx  Lupus anticoagulant positive  AC: Eliquis  CT chest / PE study 07/21/2022: "Nonobstructive pulmonary emboli  within distal bilateral main pulmonary arteries extending into lobar branches  Extensive pulmonary emboli within right upper lobe and right lower lobe and left lower lobe   Main pulmonary arteries more dilated when compared with prior exam right main pulmonary artery measures 3 2 cm, previously 2 4 cm "  # HTN- controlled, room to escalate therapy  # Mild AI and ascending aorta 4 cm on 10/1/20 echo  # SVT  # NSVT on device check- on bisoprolol   # hyperlipidemia- atorvastatin 10 mg  10/26/22: LDL 58, HDL 54  1/25/22: LDL 66, HDL 56  7/29/21: LDL 64, HDL 54  # hx of malignant melanoma  # CKD, Cr 1 52 on 10/26/22- unchanged from 7/29  # Barretts esophagus- EGD 6/12/20    Today's Plan:  On Eliquis for bilateral PE in July 2022  D dimer at 6 months  Saw Hematology, recommended consider prophylactic dosing 2 5 mg BID thereafter  Continue bisoprolol and Entresto for HFrEF  Lipids at goal on atorvastatin 10 mg   NSVT relatively quiet on bisoprolol, recent devic check  No indication for asa  --2g sodium diet  Weights daily    HPI:      82 yo recently had first evaluation in HF clinic for NICM diagnosed in 2009, s/p ICD, PSVT, HTN and dyslipidemia  Last in hospital in January for dyspnea felt to be more viral but was volume overloaded with NT pro BNP of 6192  Given one dose of IV lasix  LVEF: 40% with LVEDd 6 cm  He followed up with NP and weight was 204 lbs  Never smoked and does not drink  Plays golf 2x a week, cuts grass, does not feel limited in activity  No significant edema in legs  He is limited by knees and back not respiration  Pt admitted in July 2022 for acute bilateral pulmonary emboli, unprovoked with known history of colon cancer and melanoma  He had full body imaging in hospital      Interim:   Lupus anticoagulant 10/26/22- present- on [facility]  Interrogation 9/8/22:  5%, no high rate episodes  Weight down 11 lbs  No new complaints, does not feel limited     Review of Systems   Constitutional: Negative for activity change, appetite change, fatigue and unexpected weight change (wt down 11 lbs)  HENT: Negative for congestion and nosebleeds  Eyes: Negative  Respiratory: Negative for cough, chest tightness and shortness of breath  Cardiovascular: Negative for chest pain, palpitations and leg swelling  Gastrointestinal: Negative for abdominal distention  Endocrine: Negative  Genitourinary: Negative  Musculoskeletal: Positive for arthralgias  Skin: Negative  Neurological: Negative for dizziness, syncope and weakness  Hematological: Negative  Psychiatric/Behavioral: Negative          Past Medical History:   Diagnosis Date   • AICD (automatic cardioverter/defibrillator) present    • Arthritis    • Asthma    • Burt esophagus    • 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    • Chronic HFrEF (heart failure with reduced ejection fraction) ([place] [address] ) 2019   • CKD (chronic kidney disease)    • Colon cancer ([place] [address] ) 04/05/2013   • Colon cancer screening 06/21/2022   • COPD (chronic obstructive pulmonary disease) ([place] [address] )    • Diverticulitis     Last assessed: 4/5/13   • Esophageal reflux    • Gout    • Hernia    • Hyperlipidemia    • Hypertension    • Hypothyroidism 05/30/2013   • Non-toxic multinodular goiter 11/28/2012   • Pleural effusion    • Polymyalgia rheumatica (HCC)    • 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    • Spondyloarthropathy     Last assessed: 11/28/12   • Thrombocytopenia ([place] [address] ) 06/23/2015         No Known Allergies    Current Outpatient Medications:   •  albuterol (PROVENTIL HFA,VENTOLIN HFA) 90 mcg/act inhaler, Inhale 2 puffs every 6 (six) hours as needed for wheezing or shortness of breath, Disp: 1 Inhaler, Rfl: 1  •  apixaban (Eliquis) 5 mg, Take 1 tablet (5 mg total) by mouth 2 (two) times a day, Disp: 180 tablet, Rfl: 1  •  atorvastatin (LIPITOR) 10 mg tablet, TAKE ONE TABLET BY MOUTH EVERY DAY, Disp: 90 tablet, Rfl: 3  •  bisoprolol (ZEBETA) 10 MG tablet, TAKE ONE TABLET BY MOUTH TWICE A DAY, Disp: 180 tablet, Rfl: 3  •  Calcium Carb-Cholecalciferol (CALCIUM 600 + D PO), Take 3 tablets by mouth daily  , Disp: , Rfl:   •  Coenzyme Q10 (CO Q-10) 200 MG CAPS, Take 1 tablet by mouth daily, Disp: , Rfl:   •  Entresto 49-51 MG TABS, TAKE ONE TABLET BY MOUTH TWICE A DAY, Disp: 180 tablet, Rfl: 3  •  finasteride (PROSCAR) 5 mg tablet, TAKE ONE TABLET BY MOUTH EVERY DAY, Disp: 90 tablet, Rfl: 1  •  furosemide (LASIX) 20 mg tablet, TAKE ONE TABLET BY MOUTH EVERY DAY, Disp: 90 tablet, Rfl: 3  •  Multiple Vitamins-Minerals (CENTRUM SILVER PO), Take 1 tablet by mouth daily  , Disp: , Rfl:   •  omeprazole (PriLOSEC) 20 mg delayed release capsule, Take 1 capsule (20 mg total) by mouth daily, Disp: 90 capsule, Rfl: 2  •  PREVIDENT 5000 ENAMEL PROTECT 1 1-5 % PSTE, Use as directed, Disp: , Rfl: 3  •  Vitamin D, Cholecalciferol, 1000 UNITS CAPS, Take 1 tablet by mouth daily  , Disp: , Rfl:     Social History     Socioeconomic History   • Marital status: /Civil Union     Spouse name: Not on file   • Number of children: Not on file   • Years of education: Not on file   • Highest education level: Not on file   Occupational History   • Occupation: Manager-Retired   Tobacco Use   • Smoking status: Never   • Smokeless tobacco: Never   Vaping Use   • Vaping Use: Never used   Substance and Sexual Activity   • Alcohol use: Never     Comment: Rare   • Drug use: No   • Sexual activity: Not Currently   Other Topics Concern   • Not on file   Social History Narrative   • Not on file     Social Determinants of Health     Financial Resource Strain: Low Risk    • Difficulty of Paying Living Expenses: Not hard at all   Food Insecurity: Not on file   Transportation Needs: No Transportation Needs   • Lack of Transportation (Medical): No   • Lack of Transportation (Non-Medical): No   Physical Activity: Not on file   Stress: Not on file   Social Connections: Not on file   Intimate Partner Violence: Not on file   Housing Stability: Not on file       Family History   Problem Relation Age of Onset   • Heart failure Mother         Congestive   • Hypertension Mother    • Osteoporosis Mother    • COPD Father    • Emphysema Father    • Hypertension Father    • Heart disease Sister         Heart Valve Replacement   • Osteoporosis Sister    • Breast cancer Family    • Factor V Leiden deficiency Son         on Xarelto       Physical Exam:    Vitals: Blood pressure 110/60, pulse 74, height 5' 11" (1 803 m), weight 85 7 kg (188 lb 14 4 oz), SpO2 97 %  , Body mass index is 26 35 kg/m² ,   Wt Readings from Last 3 Encounters:   11/22/22 85 7 kg (188 lb 14 4 oz)   11/15/22 85 3 kg (188 lb)   11/02/22 86 5 kg (190 lb 9 6 oz)       Physical Exam  Constitutional:       Appearance: He is well-developed  HENT:      Head: Normocephalic and atraumatic  Eyes:      Pupils: Pupils are equal, round, and reactive to light  Neck:      Vascular: No JVD  Cardiovascular:      Rate and Rhythm: Normal rate and regular rhythm  Heart sounds: No murmur heard  Pulmonary:      Effort: Pulmonary effort is normal  No respiratory distress  Breath sounds: Normal breath sounds  Abdominal:      General: There is no distension  Palpations: Abdomen is soft  Tenderness: There is no abdominal tenderness  Musculoskeletal:         General: Normal range of motion  Cervical back: Normal range of motion  Skin:     General: Skin is warm and dry  Findings: No rash  Neurological:      Mental Status: He is alert and oriented to person, place, and time  Labs & Results:    Lab Results   Component Value Date    WBC 6 47 10/26/2022    HGB 15 6 10/26/2022    HCT 47 3 10/26/2022    MCV 99 (H) 10/26/2022     10/26/2022     Lab Results   Component Value Date    SODIUM 142 10/26/2022    K 4 2 10/26/2022     (H) 10/26/2022    CO2 25 10/26/2022    BUN 18 10/26/2022    CREATININE 1 52 (H) 10/26/2022    GLUC 98 07/22/2022    CALCIUM 9 3 10/26/2022     Lab Results   Component Value Date    NTBNP 6,971 (H) 07/22/2022      Lab Results   Component Value Date    CHOL 163 12/19/2015    CHOL 168 04/17/2015    CHOL 155 12/04/2014     Lab Results   Component Value Date    HDL 54 10/26/2022    HDL 56 01/25/2022    HDL 54 07/29/2021     Lab Results   Component Value Date    LDLCALC 58 10/26/2022    LDLCALC 66 01/25/2022    LDLCALC 64 07/29/2021     Lab Results   Component Value Date    TRIG 118 10/26/2022    TRIG 123 01/25/2022    TRIG 161 (H) 07/29/2021     No results found for: CHOLHDL    EKG personally reviewed by Donna Figueroa  Counseling / Coordination of Care  Time spent today 25 minutes    Greater than 50% of total time was spent with the patient and / or family counseling and / or coordination of care  We discussed diagnoses, most recent studies, tests and any changes in treatment plan  Thank you for the opportunity to participate in the care of this patient      [address] [facility] PULMONARY [facility]  MEDICAL DIRECTOR OF [facility] Yany Arauz

## 2022-11-20 DIAGNOSIS — I42.9 CARDIOMYOPATHY, UNSPECIFIED TYPE (HCC): ICD-10-CM

## 2022-11-20 DIAGNOSIS — I50.22 CHRONIC SYSTOLIC CHF (CONGESTIVE HEART FAILURE) (HCC): ICD-10-CM

## 2022-11-20 DIAGNOSIS — R06.02 SOB (SHORTNESS OF BREATH): ICD-10-CM

## 2022-11-21 RX ORDER — SACUBITRIL AND VALSARTAN 49; 51 MG/1; MG/1
TABLET, FILM COATED ORAL
Qty: 180 TABLET | Refills: 3 | Status: SHIPPED | OUTPATIENT
Start: 2022-11-21

## 2022-11-21 RX ORDER — FUROSEMIDE 20 MG/1
TABLET ORAL
Qty: 90 TABLET | Refills: 3 | Status: SHIPPED | OUTPATIENT
Start: 2022-11-21

## 2022-11-22 ENCOUNTER — OFFICE VISIT (OUTPATIENT)
Dept: CARDIOLOGY CLINIC | Facility: CLINIC | Age: 82
End: 2022-11-22

## 2022-11-22 VITALS
HEIGHT: 71 IN | SYSTOLIC BLOOD PRESSURE: 110 MMHG | BODY MASS INDEX: 26.44 KG/M2 | WEIGHT: 188.9 LBS | OXYGEN SATURATION: 97 % | HEART RATE: 74 BPM | DIASTOLIC BLOOD PRESSURE: 60 MMHG

## 2022-11-22 DIAGNOSIS — I50.22 CHRONIC HFREF (HEART FAILURE WITH REDUCED EJECTION FRACTION) (HCC): Primary | ICD-10-CM

## 2022-11-22 DIAGNOSIS — E78.2 MIXED HYPERLIPIDEMIA: ICD-10-CM

## 2022-11-22 DIAGNOSIS — Z86.711 HISTORY OF PULMONARY EMBOLUS (PE): ICD-10-CM

## 2022-11-22 DIAGNOSIS — Z45.02 ICD (IMPLANTABLE CARDIOVERTER-DEFIBRILLATOR) BATTERY DEPLETION: ICD-10-CM

## 2022-12-05 ENCOUNTER — OFFICE VISIT (OUTPATIENT)
Dept: PULMONOLOGY | Facility: CLINIC | Age: 82
End: 2022-12-05

## 2022-12-05 ENCOUNTER — TELEPHONE (OUTPATIENT)
Dept: GASTROENTEROLOGY | Facility: CLINIC | Age: 82
End: 2022-12-05

## 2022-12-05 VITALS
SYSTOLIC BLOOD PRESSURE: 112 MMHG | OXYGEN SATURATION: 91 % | HEART RATE: 97 BPM | DIASTOLIC BLOOD PRESSURE: 84 MMHG | TEMPERATURE: 96.8 F | HEIGHT: 71 IN | WEIGHT: 190 LBS | BODY MASS INDEX: 26.6 KG/M2

## 2022-12-05 DIAGNOSIS — Z86.711 HISTORY OF PULMONARY EMBOLUS (PE): Primary | ICD-10-CM

## 2022-12-05 DIAGNOSIS — Z85.038 PERSONAL HISTORY OF COLON CANCER: ICD-10-CM

## 2022-12-05 DIAGNOSIS — I10 HYPERTENSION, UNSPECIFIED TYPE: ICD-10-CM

## 2022-12-05 NOTE — PROGRESS NOTES
Pulmonary Follow Up Note   Fuad Thornton 81 y o  male MRN: [id number]  12/5/2022      Assessment/Plan:     History of pulmonary embolus (PE)  Patient was initially diagnosed with bilateral pulmonary embolism in July 2022  He will complete 6 months of anticoagulation at the end of January  He had positive lupus anticoagulant on labs, which should be repeated once off anticoagulation  We will also check D-dimer at that time  He has follow-up with Hematology who was also considering keeping him on prophylactic doses of Eliquis 2 5 mg twice daily to prevent recurrence  I will call him after labs and his hematology visit to determine whether he needs additional follow-up in this office  Personal history of colon cancer  Patient has personal history of colon cancer and his colonoscopy was deferred secondary to being on anticoagulation  We know that he will be stopping his anticoagulation at the end of January and I suggest that he set up his colonoscopy at the end of January/early February to optimize timing    Patient was provided with additional Eliquis samples today, as the medication is quite expensive for him  Visit orders:    Diagnoses and all orders for this visit:    History of pulmonary embolus (PE)  -     Lupus Anticoagulant Panel; Future  -     D-dimer, quantitative; Future    Personal history of colon cancer        History of Present Illness   HPI:  Fuad Thornton is a 81 y o  male who was hospitalized in July 2022 with acute bilateral pulmonary embolism  He has been on Eliquis 5 mg twice daily since that time  He underwent labs secondary to concern for hypercoagulable state which was negative with the exception of lupus anticoagulant  Patient has been doing well from pulmonary perspective  He has no cough, wheeze or shortness of breath  He remains active  He is not having any bleeding issues with the Eliquis      Review of Systems   Constitutional: Negative for appetite change and fever  HENT: Positive for rhinorrhea  Negative for ear pain, postnasal drip, sneezing, sore throat and trouble swallowing  Cardiovascular: Negative for chest pain  Musculoskeletal: Negative for myalgias  Neurological: Negative for headaches  All other systems reviewed and are negative  Medical, Family and Social history reviewed and updated as appropriate    Historical Information   Past Medical History:   Diagnosis Date   • AICD (automatic cardioverter/defibrillator) present    • Arthritis    • Asthma    • Burt esophagus    • 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    • Chronic HFrEF (heart failure with reduced ejection fraction) ([facility] ) 2019   • CKD (chronic kidney disease)    • Colon cancer ([facility] ) 04/05/2013   • Colon cancer screening 06/21/2022   • COPD (chronic obstructive pulmonary disease) ([facility] )    • Diverticulitis     Last assessed: 4/5/13   • Esophageal reflux    • Gout    • Hernia    • Hyperlipidemia    • Hypertension    • Hypothyroidism 05/30/2013   • Non-toxic multinodular goiter 11/28/2012   • Pleural effusion    • Polymyalgia rheumatica (HCC)    • 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    • Spondyloarthropathy     Last assessed: 11/28/12   • Thrombocytopenia ([facility] ) 06/23/2015     Past Surgical History:   Procedure Laterality Date   • ARM SKIN LESION BIOPSY / EXCISION      Malignant   • ATRIAL ABLATION SURGERY     • AV NODE ABLATION     • CARDIAC DEFIBRILLATOR PLACEMENT  2009    Cardio-Defib Pulse Gen Venous Insertion of Electrode for Ventricular Pacing  Implantable   • CARDIAC SURGERY  2009    Catheterization   • COLONOSCOPY N/A 3/14/2016    Procedure: COLONOSCOPY;  Surgeon: Yuliana Boo MD;  Location: [facility]; Service   February 22, 2013, mildly enlarged prostate, history of colon CA with normal appearance of anastomosis at the midtransverse colon, severe diverticulosis in the sigmoid, descending and transverse colon  Repeat colonoscopy in 3 yrs   • HEMICOLECTOMY Right 05/05/2004   • INGUINAL HERNIA REPAIR Bilateral     Left 3/2004, right 5/2008   • IR UPPER EXTREMITY VENOGRAM- DIAGNOSTIC  10/4/2018   • KNEE SURGERY  1972    Meniscectomy   • AL ESOPHAGOGASTRODUODENOSCOPY TRANSORAL DIAGNOSTIC N/A 12/5/2016    Procedure: ESOPHAGOGASTRODUODENOSCOPY (EGD); Surgeon: Madelyn Lewis MD;  Location: [facility];   Service: Gastroenterology   • TONSILLECTOMY AND ADENOIDECTOMY     • UPPER GASTROINTESTINAL ENDOSCOPY       Family History   Problem Relation Age of Onset   • Heart failure Mother         Congestive   • Hypertension Mother    • Osteoporosis Mother    • COPD Father    • Emphysema Father    • Hypertension Father    • Heart disease Sister         Heart Valve Replacement   • Osteoporosis Sister    • Breast cancer Family    • Factor V Leiden deficiency Son         on Xarelto       Social History     Tobacco Use   Smoking Status Never   Smokeless Tobacco Never         Meds/Allergies     Current Outpatient Medications:   •  albuterol (PROVENTIL HFA,VENTOLIN HFA) 90 mcg/act inhaler, Inhale 2 puffs every 6 (six) hours as needed for wheezing or shortness of breath, Disp: 1 Inhaler, Rfl: 1  •  apixaban (Eliquis) 5 mg, Take 1 tablet (5 mg total) by mouth 2 (two) times a day, Disp: 180 tablet, Rfl: 1  •  atorvastatin (LIPITOR) 10 mg tablet, TAKE ONE TABLET BY MOUTH EVERY DAY, Disp: 90 tablet, Rfl: 3  •  bisoprolol (ZEBETA) 10 MG tablet, TAKE ONE TABLET BY MOUTH TWICE A DAY, Disp: 180 tablet, Rfl: 3  •  Calcium Carb-Cholecalciferol (CALCIUM 600 + D PO), Take 3 tablets by mouth daily  , Disp: , Rfl:   •  Coenzyme Q10 (CO Q-10) 200 MG CAPS, Take 1 tablet by mouth daily, Disp: , Rfl:   •  Entresto 49-51 MG TABS, TAKE ONE TABLET BY MOUTH TWICE A DAY, Disp: 180 tablet, Rfl: 3  •  finasteride (PROSCAR) 5 mg tablet, TAKE ONE TABLET BY MOUTH EVERY DAY, Disp: 90 tablet, Rfl: 1  •  furosemide (LASIX) 20 mg tablet, TAKE ONE TABLET BY MOUTH EVERY DAY, Disp: 90 tablet, Rfl: 3  •  Multiple Vitamins-Minerals (CENTRUM SILVER PO), Take 1 tablet by mouth daily  , Disp: , Rfl:   •  omeprazole (PriLOSEC) 20 mg delayed release capsule, Take 1 capsule (20 mg total) by mouth daily, Disp: 90 capsule, Rfl: 2  •  PREVIDENT 5000 ENAMEL PROTECT 1 1-5 % PSTE, Use as directed, Disp: , Rfl: 3  •  Vitamin D, Cholecalciferol, 1000 UNITS CAPS, Take 1 tablet by mouth daily  , Disp: , Rfl:   No Known Allergies    Vitals: Blood pressure 112/84, pulse 97, temperature (!) 96 8 °F (36 °C), temperature source Tympanic, height 5' 11" (1 803 m), weight 86 2 kg (190 lb), SpO2 91 %  Body mass index is 26 5 kg/m²  Oxygen Therapy  SpO2: 91 %  Oxygen Therapy: None (Room air)    Physical Exam   Physical Exam  Constitutional:       General: He is not in acute distress  HENT:      Head: Normocephalic  Eyes:      General: No scleral icterus  Neck:      Vascular: No JVD  Cardiovascular:      Rate and Rhythm: Normal rate and regular rhythm  Pulmonary:      Breath sounds: No wheezing, rhonchi or rales  Abdominal:      Palpations: Abdomen is soft  Tenderness: There is no abdominal tenderness  Musculoskeletal:         General: No edema  Cervical back: Neck supple  Skin:     General: Skin is warm and dry  Neurological:      Mental Status: He is alert and oriented to person, place, and time  Psychiatric:         Mood and Affect: Mood and affect and mood normal      Labs: I have personally reviewed pertinent lab results    Lab Results   Component Value Date    WBC 6 47 10/26/2022    HGB 15 6 10/26/2022    HCT 47 3 10/26/2022    MCV 99 (H) 10/26/2022     10/26/2022     Lab Results   Component Value Date    GLUCOSE 101 12/19/2015    CALCIUM 9 3 10/26/2022     (H) 12/19/2015    K 4 2 10/26/2022    CO2 25 10/26/2022     (H) 10/26/2022    BUN 18 10/26/2022    CREATININE 1 52 (H) 10/26/2022     No results found for: IGE  Lab Results   Component Value Date    ALT 28 10/26/2022    AST 22 10/26/2022    ALKPHOS 65 10/26/2022    BILITOT 0 95 12/19/2015

## 2022-12-05 NOTE — ASSESSMENT & PLAN NOTE
Patient was initially diagnosed with bilateral pulmonary embolism in July 2022  He will complete 6 months of anticoagulation at the end of January  He had positive lupus anticoagulant on labs, which should be repeated once off anticoagulation  We will also check D-dimer at that time  He has follow-up with Hematology who was also considering keeping him on prophylactic doses of Eliquis 2 5 mg twice daily to prevent recurrence  I will call him after labs and his hematology visit to determine whether he needs additional follow-up in this office

## 2022-12-05 NOTE — LETTER
December 5, 2022     Sugar Howe MD  [address] [facility] [address] [facility]    Patient: Di Redmond   YOB: 1940   Date of Visit: 12/5/2022       Dear Dr Rendon Born:    Thank you for referring Di Redmond to me for evaluation  Below are my notes for this consultation  If you have questions, please do not hesitate to call me  I look forward to following your patient along with you  Sincerely,        Margot Felipe DO        CC: No Recipients  Margot Felipe DO  12/5/2022 12:41 PM  Sign when Signing Visit  Pulmonary Follow Up Note   Di Redmond 81 y o  male MRN: [id number]  12/5/2022      Assessment/Plan:     History of pulmonary embolus (PE)  Patient was initially diagnosed with bilateral pulmonary embolism in July 2022  He will complete 6 months of anticoagulation at the end of January  He had positive lupus anticoagulant on labs, which should be repeated once off anticoagulation  We will also check D-dimer at that time  He has follow-up with Hematology who was also considering keeping him on prophylactic doses of Eliquis 2 5 mg twice daily to prevent recurrence  I will call him after labs and his hematology visit to determine whether he needs additional follow-up in this office  Personal history of colon cancer  Patient has personal history of colon cancer and his colonoscopy was deferred secondary to being on anticoagulation  We know that he will be stopping his anticoagulation at the end of January and I suggest that he set up his colonoscopy at the end of January/early February to optimize timing    Patient was provided with additional Eliquis samples today, as the medication is quite expensive for him  Visit orders:    Diagnoses and all orders for this visit:    History of pulmonary embolus (PE)  -     Lupus Anticoagulant Panel; Future  -     D-dimer, quantitative;  Future    Personal history of colon cancer        History of Present Illness   HPI:  Breanna Sorto is a 81 y o  male who was hospitalized in July 2022 with acute bilateral pulmonary embolism  He has been on Eliquis 5 mg twice daily since that time  He underwent labs secondary to concern for hypercoagulable state which was negative with the exception of lupus anticoagulant  Patient has been doing well from pulmonary perspective  He has no cough, wheeze or shortness of breath  He remains active  He is not having any bleeding issues with the Eliquis  Review of Systems   Constitutional: Negative for appetite change and fever  HENT: Positive for rhinorrhea  Negative for ear pain, postnasal drip, sneezing, sore throat and trouble swallowing  Cardiovascular: Negative for chest pain  Musculoskeletal: Negative for myalgias  Neurological: Negative for headaches  All other systems reviewed and are negative        Medical, Family and Social history reviewed and updated as appropriate    Historical Information   Past Medical History:   Diagnosis Date   • AICD (automatic cardioverter/defibrillator) present    • Arthritis    • Asthma    • Burt esophagus    • 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    • Chronic HFrEF (heart failure with reduced ejection fraction) ([facility] [address] ) 2019   • CKD (chronic kidney disease)    • Colon cancer ([facility] [address] ) 04/05/2013   • Colon cancer screening 06/21/2022   • COPD (chronic obstructive pulmonary disease) ([facility] [address] )    • Diverticulitis     Last assessed: 4/5/13   • Esophageal reflux    • Gout    • Hernia    • Hyperlipidemia    • Hypertension    • Hypothyroidism 05/30/2013   • Non-toxic multinodular goiter 11/28/2012   • Pleural effusion    • Polymyalgia rheumatica (HCC)    • 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    • Spondyloarthropathy     Last assessed: 11/28/12   • Thrombocytopenia ([facility] [address] ) 06/23/2015     Past Surgical History:   Procedure Laterality Date   • ARM SKIN LESION BIOPSY / EXCISION      Malignant   • ATRIAL ABLATION SURGERY     • AV NODE ABLATION     • CARDIAC DEFIBRILLATOR PLACEMENT  2009    Cardio-Defib Pulse Gen Venous Insertion of Electrode for Ventricular Pacing  Implantable   • CARDIAC SURGERY  2009    Catheterization   • COLONOSCOPY N/A 3/14/2016    Procedure: COLONOSCOPY;  Surgeon: Dominique Grayson MD;  Location: [facility]; Service  February 22, 2013, mildly enlarged prostate, history of colon CA with normal appearance of anastomosis at the midtransverse colon, severe diverticulosis in the sigmoid, descending and transverse colon  Repeat colonoscopy in 3 yrs   • HEMICOLECTOMY Right 05/05/2004   • INGUINAL HERNIA REPAIR Bilateral     Left 3/2004, right 5/2008   • IR UPPER EXTREMITY VENOGRAM- DIAGNOSTIC  10/4/2018   • KNEE SURGERY  1972    Meniscectomy   • NV ESOPHAGOGASTRODUODENOSCOPY TRANSORAL DIAGNOSTIC N/A 12/5/2016    Procedure: ESOPHAGOGASTRODUODENOSCOPY (EGD); Surgeon: Refugio Rodriguez MD;  Location: [facility];   Service: Gastroenterology   • TONSILLECTOMY AND ADENOIDECTOMY     • UPPER GASTROINTESTINAL ENDOSCOPY       Family History   Problem Relation Age of Onset   • Heart failure Mother         Congestive   • Hypertension Mother    • Osteoporosis Mother    • COPD Father    • Emphysema Father    • Hypertension Father    • Heart disease Sister         Heart Valve Replacement   • Osteoporosis Sister    • Breast cancer Family    • Factor V Leiden deficiency Son         on Xarelto       Social History     Tobacco Use   Smoking Status Never   Smokeless Tobacco Never         Meds/Allergies      Current Outpatient Medications:   •  albuterol (PROVENTIL HFA,VENTOLIN HFA) 90 mcg/act inhaler, Inhale 2 puffs every 6 (six) hours as needed for wheezing or shortness of breath, Disp: 1 Inhaler, Rfl: 1  •  apixaban (Eliquis) 5 mg, Take 1 tablet (5 mg total) by mouth 2 (two) times a day, Disp: 180 tablet, Rfl: 1  •  atorvastatin (LIPITOR) 10 mg tablet, TAKE ONE TABLET BY MOUTH EVERY DAY, Disp: 90 tablet, Rfl: 3  •  bisoprolol (ZEBETA) 10 MG tablet, TAKE ONE TABLET BY MOUTH TWICE A DAY, Disp: 180 tablet, Rfl: 3  •  Calcium Carb-Cholecalciferol (CALCIUM 600 + D PO), Take 3 tablets by mouth daily  , Disp: , Rfl:   •  Coenzyme Q10 (CO Q-10) 200 MG CAPS, Take 1 tablet by mouth daily, Disp: , Rfl:   •  Entresto 49-51 MG TABS, TAKE ONE TABLET BY MOUTH TWICE A DAY, Disp: 180 tablet, Rfl: 3  •  finasteride (PROSCAR) 5 mg tablet, TAKE ONE TABLET BY MOUTH EVERY DAY, Disp: 90 tablet, Rfl: 1  •  furosemide (LASIX) 20 mg tablet, TAKE ONE TABLET BY MOUTH EVERY DAY, Disp: 90 tablet, Rfl: 3  •  Multiple Vitamins-Minerals (CENTRUM SILVER PO), Take 1 tablet by mouth daily  , Disp: , Rfl:   •  omeprazole (PriLOSEC) 20 mg delayed release capsule, Take 1 capsule (20 mg total) by mouth daily, Disp: 90 capsule, Rfl: 2  •  PREVIDENT 5000 ENAMEL PROTECT 1 1-5 % PSTE, Use as directed, Disp: , Rfl: 3  •  Vitamin D, Cholecalciferol, 1000 UNITS CAPS, Take 1 tablet by mouth daily  , Disp: , Rfl:   No Known Allergies    Vitals: Blood pressure 112/84, pulse 97, temperature (!) 96 8 °F (36 °C), temperature source Tympanic, height 5' 11" (1 803 m), weight 86 2 kg (190 lb), SpO2 91 %  Body mass index is 26 5 kg/m²  Oxygen Therapy  SpO2: 91 %  Oxygen Therapy: None (Room air)    Physical Exam   Physical Exam  Constitutional:       General: He is not in acute distress  HENT:      Head: Normocephalic  Eyes:      General: No scleral icterus  Neck:      Vascular: No JVD  Cardiovascular:      Rate and Rhythm: Normal rate and regular rhythm  Pulmonary:      Breath sounds: No wheezing, rhonchi or rales  Abdominal:      Palpations: Abdomen is soft  Tenderness: There is no abdominal tenderness  Musculoskeletal:         General: No edema  Cervical back: Neck supple  Skin:     General: Skin is warm and dry  Neurological:      Mental Status: He is alert and oriented to person, place, and time  Psychiatric:         Mood and Affect: Mood and affect and mood normal      Labs: I have personally reviewed pertinent lab results    Lab Results   Component Value Date    WBC 6 47 10/26/2022    HGB 15 6 10/26/2022    HCT 47 3 10/26/2022    MCV 99 (H) 10/26/2022     10/26/2022     Lab Results   Component Value Date    GLUCOSE 101 12/19/2015    CALCIUM 9 3 10/26/2022     (H) 12/19/2015    K 4 2 10/26/2022    CO2 25 10/26/2022     (H) 10/26/2022    BUN 18 10/26/2022    CREATININE 1 52 (H) 10/26/2022     No results found for: IGE  Lab Results   Component Value Date    ALT 28 10/26/2022    AST 22 10/26/2022    ALKPHOS 65 10/26/2022    BILITOT 0 95 12/19/2015

## 2022-12-05 NOTE — ASSESSMENT & PLAN NOTE
Patient has personal history of colon cancer and his colonoscopy was deferred secondary to being on anticoagulation    We know that he will be stopping his anticoagulation at the end of January and I suggest that he set up his colonoscopy at the end of January/early February to optimize timing

## 2022-12-06 ENCOUNTER — TELEPHONE (OUTPATIENT)
Dept: SURGICAL ONCOLOGY | Facility: CLINIC | Age: 82
End: 2022-12-06

## 2022-12-06 ENCOUNTER — TELEPHONE (OUTPATIENT)
Dept: HEMATOLOGY ONCOLOGY | Facility: CLINIC | Age: 82
End: 2022-12-06

## 2022-12-06 RX ORDER — BISOPROLOL FUMARATE 10 MG/1
TABLET, FILM COATED ORAL
Qty: 180 TABLET | Refills: 3 | Status: SHIPPED | OUTPATIENT
Start: 2022-12-06

## 2022-12-06 NOTE — TELEPHONE ENCOUNTER
Left voicemail for patient to reschedule 2/8 appointment with BRANDON Bob, due to her no longer going to [facility] on Wednesdays

## 2022-12-06 NOTE — TELEPHONE ENCOUNTER
Appointment Cancellation Or Reschedule     Person calling in Patient    If other than patient calling, are they listed on the communication consent form? N/A   Provider Elkin Burnett   Office Visit Date and Time 02/08/23 8AM   Office Visit Location [facility]   Did patient want to reschedule their office appointment? If so, when was it scheduled to? Yes 02/14/23 1PM   Did you have STAR scheduled for this appointment? No   Do you need STAR set up for your new appointment? If yes, please send to "PATIENT RIDESHARE" pool for STAR rescheduling N/A   If you are cancelling appointment, can we notify STAR to cancel ride? If yes, please send to "PATIENT RIDESHARE" pool for STAR to cancel service N/A   Is this patient calling to reschedule an infusion appointment? No   When is their next infusion appointment? N/A   Is this patient a Chemo patient? No   Reason for Cancellation or Reschedule Provider requested - Schedule for provider      If the patient is a treatment patient, please route this to the office nurse  If the patient is not on treatment, please route to the office MA  If the patient is a surgical oncology patient, please route to surg/onc clinical pool

## 2022-12-08 ENCOUNTER — REMOTE DEVICE CLINIC VISIT (OUTPATIENT)
Dept: CARDIOLOGY CLINIC | Facility: CLINIC | Age: 82
End: 2022-12-08

## 2022-12-08 DIAGNOSIS — Z95.810 PRESENCE OF AUTOMATIC CARDIOVERTER/DEFIBRILLATOR (AICD): Primary | ICD-10-CM

## 2022-12-08 NOTE — PROGRESS NOTES
Results for orders placed or performed in visit on 12/08/22   Cardiac EP device report    Narrative    MDT-DUAL CHAMBER ICD (AAIR-DDDR MODE)/ ACTIVE SYSTEM IS MRI CONDITIONAL  CARELINK TRANSMISSION: BATTERY VOLTAGE ADEQUATE  (4 4 YRS) AP 75%   16%  ALL AVAILABLE LEAD PARAMETERS WITHIN NORMAL LIMITS  NO SIGNIFICANT HIGH RATE EPISODES  OPTI-VOL WITHIN NORMAL LIMITS  NORMAL DEVICE FUNCTION  ---CONNER

## 2022-12-18 DIAGNOSIS — N40.1 BPH WITH OBSTRUCTION/LOWER URINARY TRACT SYMPTOMS: ICD-10-CM

## 2022-12-18 DIAGNOSIS — N13.8 BPH WITH OBSTRUCTION/LOWER URINARY TRACT SYMPTOMS: ICD-10-CM

## 2022-12-20 RX ORDER — FINASTERIDE 5 MG/1
TABLET, FILM COATED ORAL
Qty: 90 TABLET | Refills: 1 | Status: SHIPPED | OUTPATIENT
Start: 2022-12-20

## 2022-12-21 ENCOUNTER — TELEPHONE (OUTPATIENT)
Dept: OTHER | Facility: OTHER | Age: 82
End: 2022-12-21

## 2022-12-28 ENCOUNTER — TELEPHONE (OUTPATIENT)
Dept: HEMATOLOGY ONCOLOGY | Facility: CLINIC | Age: 82
End: 2022-12-28

## 2022-12-28 NOTE — TELEPHONE ENCOUNTER
Appointment Cancellation Or Reschedule     Person calling in Patient    If other than patient calling, are they listed on the communication consent form? Provider Dr Lynn Vazquez   Office Visit Date and Time 2/14/23   Office Visit Location [facility]   Did patient want to reschedule their office appointment? If so, when was it scheduled to? 2/20/23   Did you have STAR scheduled for this appointment? no   Do you need STAR set up for your new appointment? If yes, please send to "PATIENT RIDESHARE" pool for STAR rescheduling no   If you are cancelling appointment, can we notify STAR to cancel ride? If yes, please send to "PATIENT RIDESHARE" pool for STAR to cancel service no   Is this patient calling to reschedule an infusion appointment? no   When is their next infusion appointment? no   Is this patient a Chemo patient? no   Reason for Cancellation or Reschedule Patient wanted another date     If the patient is a treatment patient, please route this to the office nurse  If the patient is not on treatment, please route to the office MA  If the patient is a surgical oncology patient, please route to surg/onc clinical pool

## 2022-12-28 NOTE — TELEPHONE ENCOUNTER
Appointment Cancellation Or Reschedule     Person calling in Patient    If other than patient calling, are they listed on the communication consent form? Provider Merdis Spatz   Office Visit Date and Time 2/14/23   Office Visit Location [facility]   Did patient want to reschedule their office appointment? If so, when was it scheduled to? 2/20/23   Did you have STAR scheduled for this appointment? no   Do you need STAR set up for your new appointment? If yes, please send to "PATIENT RIDESHARE" pool for STAR rescheduling no   If you are cancelling appointment, can we notify STAR to cancel ride? If yes, please send to "PATIENT RIDESHARE" pool for STAR to cancel service no   Is this patient calling to reschedule an infusion appointment? no   When is their next infusion appointment? no   Is this patient a Chemo patient? no   Reason for Cancellation or Reschedule Needs a later date     If the patient is a treatment patient, please route this to the office nurse  If the patient is not on treatment, please route to the office MA  If the patient is a surgical oncology patient, please route to surg/onc clinical pool

## 2023-01-11 ENCOUNTER — APPOINTMENT (OUTPATIENT)
Dept: LAB | Facility: CLINIC | Age: 83
End: 2023-01-11

## 2023-01-11 DIAGNOSIS — G45.3 AMAUROSIS FUGAX: ICD-10-CM

## 2023-01-11 LAB — CRP SERPL QL: <3 MG/L

## 2023-01-17 ENCOUNTER — TELEPHONE (OUTPATIENT)
Dept: GASTROENTEROLOGY | Facility: CLINIC | Age: 83
End: 2023-01-17

## 2023-01-17 NOTE — TELEPHONE ENCOUNTER
Our mutual patient is scheduled for procedure: COLONOSCOPY    On: 02/14/23      With: Dr Slime Mendoza is taking the following blood thinner:  Eliquis    Can this be stopped ___2__ days prior to the procedure?       Physician Approving clearance: ________________________

## 2023-01-26 ENCOUNTER — OFFICE VISIT (OUTPATIENT)
Dept: HEMATOLOGY ONCOLOGY | Facility: CLINIC | Age: 83
End: 2023-01-26

## 2023-01-26 VITALS
WEIGHT: 191 LBS | HEART RATE: 76 BPM | DIASTOLIC BLOOD PRESSURE: 82 MMHG | BODY MASS INDEX: 26.74 KG/M2 | TEMPERATURE: 97.3 F | HEIGHT: 71 IN | SYSTOLIC BLOOD PRESSURE: 116 MMHG

## 2023-01-26 DIAGNOSIS — Z12.5 SCREENING FOR PROSTATE CANCER: ICD-10-CM

## 2023-01-26 DIAGNOSIS — Z86.711 HISTORY OF PULMONARY EMBOLUS (PE): Primary | ICD-10-CM

## 2023-01-26 RX ORDER — LATANOPROST 50 UG/ML
SOLUTION/ DROPS OPHTHALMIC
COMMUNITY
Start: 2023-01-24

## 2023-01-26 RX ORDER — SODIUM FLUORIDE1.1%, POTASSIUM NITRATE 5% 57.5; 5.8 MG/ML; MG/ML
GEL, DENTIFRICE DENTAL
COMMUNITY
Start: 2023-01-13

## 2023-01-26 NOTE — PROGRESS NOTES
Hematology/Oncology Outpatient Follow-up  Griselda Alvarenga 81 y o  male 1940 [id number]    Date:  1/26/2023      Assessment and Plan:    1  History of pulmonary embolus (PE)  43-year-old male presents for consultation regarding bilateral pulmonary embolisms  This appears to be unprovoked  He had vague symptoms of cough and then hemoptysis which made him go to the hospital    He had full body imaging in the hospital which was negative for any concern of occult malignancy  He has colonoscopy/EGD scheduled on 2/14/23  His dermatology visit is up-to-date and states that he now is on a yearly follow-up  He still follows with surgical oncology for which this appointment is scheduled due to his history of melanoma  Labs on 10/26/23  CBC normal   Antithrombin III activity 97%, normal  Beta-2 glycoprotein antibodies normal  Cardiolipin antibody normal  Protein C activity normal  Protein S activity normal  Factor V Leiden negative  Prothrombin gene mutation negative  D-dimer normal at 0 48, previously at diagnosis was 6 22  Lupus anticoagulant positive    We reviewed that labs were unrevealing for cause besides possible positive lupus anticoagulant but this is often falsely positive when on Eliquis and he was still taking Eliquis at time of blood test  He stopped Eliquis on Adriano 3  He is scheduled for repeat labs at pulmonary ordered including lupus anticoagulant on 2/5/2023  Advised to add PSA to this as well  He would like to review orders over the phone  We did review even regardless of lab testing he is recommended to be on preventative anticoagulation  Based on his renal function Eliquis is preferred over Xarelto  We reviewed option of Coumadin however he does not wish to proceed with Coumadin  He states that he would not qualify for assistance with Eliquis  Reviewed that the dose will be 2 5 mg twice daily  We could assist him with samples throughout the year to lessen the cost burden  He will think this over  - PSA Total, Diagnostic; Future    2  Screening for prostate cancer  - PSA Total, Diagnostic; Future    HPI:  50-year-old male presents for consultation regarding bilateral pulmonary embolisms      He was seen by his PCP on 07/15/2022 due to cough with phlegm without wheezing or shortness of breath  He was given antibiotics and chest x-ray      He then presented to the hospital on 07/21/2022 with continued cough and episode of hemoptysis  D-dimer was completed in elevated  CT of the chest was then performed  This showed extensive pulmonary emboli within the distal bilateral main pulmonary arteries extending into the right upper lobe and lower lobar branches and left lower lobe branches      He had CT abdomen and pelvis to rule out any malignancy due to personal history of melanoma  This only showed a trace left pleural effusion, small hiatal hernia, left renal cyst, cholelithiasis and colonic diverticulosis status post right hemicolectomy      Dr Fabricio Iniguez is derm for history of melanoma  He has a scheduled visit in Sept/Oct      History of colon cancer, had resection in 2004  Interval history:     ROS: Review of Systems   Constitutional: Negative for appetite change, chills, fatigue, fever and unexpected weight change  HENT: Negative for mouth sores and nosebleeds  Respiratory: Negative for cough and shortness of breath  Cardiovascular: Negative for chest pain, palpitations and leg swelling  Gastrointestinal: Negative for abdominal pain, blood in stool, constipation, diarrhea, nausea and vomiting  Genitourinary: Negative for difficulty urinating, dysuria and hematuria  Musculoskeletal: Negative for arthralgias  Skin: Negative  Neurological: Negative for dizziness, weakness, light-headedness, numbness and headaches  Hematological: Negative  Psychiatric/Behavioral: Negative          Past Medical History:   Diagnosis Date   • AICD (automatic cardioverter/defibrillator) present    • Arthritis    • Asthma    • Burt esophagus    • 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    • Chronic HFrEF (heart failure with reduced ejection fraction) ([facility] ) 2019   • CKD (chronic kidney disease)    • Colon cancer ([facility] ) 04/05/2013   • Colon cancer screening 06/21/2022   • COPD (chronic obstructive pulmonary disease) (HCC)    • Diverticulitis     Last assessed: 4/5/13   • Esophageal reflux    • Gout    • Hernia    • Hyperlipidemia    • Hypertension    • Hypothyroidism 05/30/2013   • Non-toxic multinodular goiter 11/28/2012   • Pleural effusion    • Polymyalgia rheumatica (HCC)    • 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    • Spondyloarthropathy     Last assessed: 11/28/12   • Thrombocytopenia ([facility] ) 06/23/2015       Past Surgical History:   Procedure Laterality Date   • ARM SKIN LESION BIOPSY / EXCISION      Malignant   • ATRIAL ABLATION SURGERY     • AV NODE ABLATION     • CARDIAC DEFIBRILLATOR PLACEMENT  2009    Cardio-Defib Pulse Gen Venous Insertion of Electrode for Ventricular Pacing  Implantable   • CARDIAC SURGERY  2009    Catheterization   • COLONOSCOPY N/A 3/14/2016    Procedure: COLONOSCOPY;  Surgeon: Haydee Chew MD;  Location: [facility]; Service  February 22, 2013, mildly enlarged prostate, history of colon CA with normal appearance of anastomosis at the midtransverse colon, severe diverticulosis in the sigmoid, descending and transverse colon  Repeat colonoscopy in 3 yrs   • HEMICOLECTOMY Right 05/05/2004   • INGUINAL HERNIA REPAIR Bilateral     Left 3/2004, right 5/2008   • IR UPPER EXTREMITY VENOGRAM- DIAGNOSTIC  10/4/2018   • KNEE SURGERY  1972    Meniscectomy   • FL ESOPHAGOGASTRODUODENOSCOPY TRANSORAL DIAGNOSTIC N/A 12/5/2016    Procedure: ESOPHAGOGASTRODUODENOSCOPY (EGD); Surgeon: Sammy Arora MD;  Location: [facility];   Service: Gastroenterology   • TONSILLECTOMY AND ADENOIDECTOMY     • UPPER GASTROINTESTINAL ENDOSCOPY         Social History     Socioeconomic History   • Marital status: /Civil Union     Spouse name: Not on file   • Number of children: Not on file   • Years of education: Not on file   • Highest education level: Not on file   Occupational History   • Occupation: Manager-Retired   Tobacco Use   • Smoking status: Never   • Smokeless tobacco: Never   Vaping Use   • Vaping Use: Never used   Substance and Sexual Activity   • Alcohol use: Never     Comment: Rare   • Drug use: No   • Sexual activity: Not Currently   Other Topics Concern   • Not on file   Social History Narrative   • Not on file     Social Determinants of Health     Financial Resource Strain: Low Risk    • Difficulty of Paying Living Expenses: Not hard at all   Food Insecurity: Not on file   Transportation Needs: No Transportation Needs   • Lack of Transportation (Medical): No   • Lack of Transportation (Non-Medical):  No   Physical Activity: Not on file   Stress: Not on file   Social Connections: Not on file   Intimate Partner Violence: Not on file   Housing Stability: Not on file       Family History   Problem Relation Age of Onset   • Heart failure Mother         Congestive   • Hypertension Mother    • Osteoporosis Mother    • COPD Father    • Emphysema Father    • Hypertension Father    • Heart disease Sister         Heart Valve Replacement   • Osteoporosis Sister    • Breast cancer Family    • Factor V Leiden deficiency Son         on Xarelto       No Known Allergies      Current Outpatient Medications:   •  albuterol (PROVENTIL HFA,VENTOLIN HFA) 90 mcg/act inhaler, Inhale 2 puffs every 6 (six) hours as needed for wheezing or shortness of breath, Disp: 1 Inhaler, Rfl: 1  •  apixaban (Eliquis) 5 mg, Take 1 tablet (5 mg total) by mouth 2 (two) times a day, Disp: 180 tablet, Rfl: 1  •  atorvastatin (LIPITOR) 10 mg tablet, TAKE ONE TABLET BY MOUTH EVERY DAY, Disp: 90 tablet, Rfl: 3  •  bisoprolol (ZEBETA) 10 MG tablet, TAKE ONE TABLET BY MOUTH TWICE A DAY, Disp: 180 tablet, Rfl: 3  •  Calcium Carb-Cholecalciferol (CALCIUM 600 + D PO), Take 3 tablets by mouth daily  , Disp: , Rfl:   •  Coenzyme Q10 (CO Q-10) 200 MG CAPS, Take 1 tablet by mouth daily, Disp: , Rfl:   •  Entresto 49-51 MG TABS, TAKE ONE TABLET BY MOUTH TWICE A DAY, Disp: 180 tablet, Rfl: 3  •  finasteride (PROSCAR) 5 mg tablet, TAKE ONE TABLET BY MOUTH EVERY DAY, Disp: 90 tablet, Rfl: 1  •  furosemide (LASIX) 20 mg tablet, TAKE ONE TABLET BY MOUTH EVERY DAY, Disp: 90 tablet, Rfl: 3  •  Multiple Vitamins-Minerals (CENTRUM SILVER PO), Take 1 tablet by mouth daily  , Disp: , Rfl:   •  omeprazole (PriLOSEC) 20 mg delayed release capsule, Take 1 capsule (20 mg total) by mouth daily, Disp: 90 capsule, Rfl: 2  •  PREVIDENT 5000 ENAMEL PROTECT 1 1-5 % PSTE, Use as directed, Disp: , Rfl: 3  •  Vitamin D, Cholecalciferol, 1000 UNITS CAPS, Take 1 tablet by mouth daily  , Disp: , Rfl:       Physical Exam:  There were no vitals taken for this visit  Physical Exam  Vitals reviewed  Constitutional:       General: He is not in acute distress  Appearance: He is well-developed  He is not ill-appearing  HENT:      Head: Normocephalic and atraumatic  Eyes:      General: No scleral icterus  Conjunctiva/sclera: Conjunctivae normal    Cardiovascular:      Rate and Rhythm: Normal rate and regular rhythm  Heart sounds: Normal heart sounds  No murmur heard  Pulmonary:      Effort: Pulmonary effort is normal  No respiratory distress  Breath sounds: Normal breath sounds  Abdominal:      Palpations: Abdomen is soft  Tenderness: There is no abdominal tenderness  Musculoskeletal:         General: No tenderness  Normal range of motion  Cervical back: Normal range of motion and neck supple  Right lower leg: No edema  Left lower leg: No edema  Lymphadenopathy:      Cervical: No cervical adenopathy  Skin:     General: Skin is warm and dry  Neurological:      Mental Status: He is alert and oriented to person, place, and time  Cranial Nerves: No cranial nerve deficit  Psychiatric:         Mood and Affect: Mood normal          Behavior: Behavior normal        Labs:  Lab Results   Component Value Date    WBC 6 47 10/26/2022    HGB 15 6 10/26/2022    HCT 47 3 10/26/2022    MCV 99 (H) 10/26/2022     10/26/2022     Lab Results   Component Value Date     (H) 12/19/2015    K 4 2 10/26/2022     (H) 10/26/2022    CO2 25 10/26/2022    ANIONGAP 9 12/19/2015    BUN 18 10/26/2022    CREATININE 1 52 (H) 10/26/2022    GLUCOSE 101 12/19/2015    GLUF 98 10/26/2022    CALCIUM 9 3 10/26/2022    CORRECTEDCA 9 8 10/26/2022    AST 22 10/26/2022    ALT 28 10/26/2022    ALKPHOS 65 10/26/2022    PROT 7 3 12/19/2015    BILITOT 0 95 12/19/2015    EGFR 42 10/26/2022       Patient voiced understanding and agreement in the above discussion  Aware to contact our office with questions/symptoms in the interim  This note has been generated by voice recognition software system  Therefore, there may be spelling, grammar, and or syntax errors  Please contact if questions arise

## 2023-01-31 ENCOUNTER — TELEPHONE (OUTPATIENT)
Dept: OTHER | Facility: OTHER | Age: 83
End: 2023-01-31

## 2023-01-31 NOTE — TELEPHONE ENCOUNTER
Patient is scheduled for a colonoscopy on 2/14/2023 @ [facility]  Patient would like to know if at that time he can also get an EGD done  Patient stated that he checked with his insurance company and that it would be covered

## 2023-01-31 NOTE — TELEPHONE ENCOUNTER
Lm for pt to cb  Procedure for has been rescheduled to a hospital setting for 03/02/23 @[facility] with armand Multani by Sean Iglesias   EGD added to Colonoscopy  Waiting on pt to cb to confirm

## 2023-02-03 ENCOUNTER — RA CDI HCC (OUTPATIENT)
Dept: OTHER | Facility: HOSPITAL | Age: 83
End: 2023-02-03

## 2023-02-03 NOTE — PROGRESS NOTES
[facility]  coding opportunities     I13 0     Chart Reviewed number of suggestions sent to Provider: 1     Patients Insurance     Medicare Insurance: [facility] [address]

## 2023-02-06 ENCOUNTER — APPOINTMENT (OUTPATIENT)
Dept: LAB | Facility: CLINIC | Age: 83
End: 2023-02-06

## 2023-02-06 ENCOUNTER — OFFICE VISIT (OUTPATIENT)
Dept: FAMILY MEDICINE CLINIC | Facility: CLINIC | Age: 83
End: 2023-02-06

## 2023-02-06 VITALS
BODY MASS INDEX: 27.1 KG/M2 | HEART RATE: 64 BPM | SYSTOLIC BLOOD PRESSURE: 130 MMHG | TEMPERATURE: 97.8 F | OXYGEN SATURATION: 93 % | WEIGHT: 193.6 LBS | DIASTOLIC BLOOD PRESSURE: 71 MMHG | RESPIRATION RATE: 16 BRPM | HEIGHT: 71 IN

## 2023-02-06 DIAGNOSIS — Z86.711 HISTORY OF PULMONARY EMBOLUS (PE): ICD-10-CM

## 2023-02-06 DIAGNOSIS — L30.9 DERMATITIS: Primary | ICD-10-CM

## 2023-02-06 DIAGNOSIS — Z12.5 SCREENING FOR PROSTATE CANCER: ICD-10-CM

## 2023-02-06 LAB
D DIMER PPP FEU-MCNC: 0.75 UG/ML FEU
PSA SERPL-MCNC: 0.4 NG/ML (ref 0–4)

## 2023-02-06 RX ORDER — TRIAMCINOLONE ACETONIDE 5 MG/G
CREAM TOPICAL 3 TIMES DAILY
Qty: 30 G | Refills: 0 | Status: SHIPPED | OUTPATIENT
Start: 2023-02-06

## 2023-02-06 NOTE — PROGRESS NOTES
Name: Rosalio Ortiz      : 1940      MRN: [id number]  Encounter Provider: Karyle Donovan, MD  Encounter Date: 2023   Encounter department: [facility] [address]    Chief Complaint   Patient presents with   • Rash     Facial and neck      Health Maintenance   Topic Date Due   • SLP PLAN OF CARE  Never done   • COVID-19 Vaccine (4 - Booster for Moderna series) 2022   • Fall Risk  2023   • Medicare Annual Wellness Visit (AWV)  2023   • Depression Screening  2024   • BMI: Adult  2024   • DXA SCAN  2025   • Pneumococcal Vaccine: 65+ Years  Completed   • Influenza Vaccine  Completed   • HIB Vaccine  Aged Out   • IPV Vaccine  Aged Out   • Hepatitis A Vaccine  Aged Out   • Meningococcal ACWY Vaccine  Aged Out   • HPV Vaccine  Aged Out   • Colonoscopy  Discontinued       Assessment & Plan     1  Dermatitis  -     triamcinolone (KENALOG) 0 5 % cream; Apply topically 3 (three) times a day           Subjective      HPI     Pt is here by himself  C/o rash on left neck for 2 weeks  "Maybe from my ambrocio" per pt  Scaly  No itchy  No pain  Denies fever  Denies SOB, CP, n/v/abd pain  He tried alcohol but no help  Review of Systems   Constitutional: Negative for appetite change, chills and fever  HENT: Negative for congestion, ear pain, sinus pain and sore throat  Eyes: Negative for discharge and itching  Respiratory: Negative for apnea, cough, chest tightness, shortness of breath and wheezing  Cardiovascular: Negative for chest pain, palpitations and leg swelling  Gastrointestinal: Negative for abdominal pain, anal bleeding, constipation, diarrhea, nausea and vomiting  Endocrine: Negative for cold intolerance, heat intolerance and polyuria  Genitourinary: Negative for difficulty urinating and dysuria  Musculoskeletal: Negative for arthralgias, back pain and myalgias  Skin: Positive for rash     Neurological: Negative for dizziness and headaches  Psychiatric/Behavioral: Negative for agitation  Current Outpatient Medications on File Prior to Visit   Medication Sig   • albuterol (PROVENTIL HFA,VENTOLIN HFA) 90 mcg/act inhaler Inhale 2 puffs every 6 (six) hours as needed for wheezing or shortness of breath   • apixaban (Eliquis) 5 mg Take 1 tablet (5 mg total) by mouth 2 (two) times a day   • atorvastatin (LIPITOR) 10 mg tablet TAKE ONE TABLET BY MOUTH EVERY DAY   • bisoprolol (ZEBETA) 10 MG tablet TAKE ONE TABLET BY MOUTH TWICE A DAY   • Calcium Carb-Cholecalciferol (CALCIUM 600 + D PO) Take 3 tablets by mouth daily     • Coenzyme Q10 (CO Q-10) 200 MG CAPS Take 1 tablet by mouth daily   • Entresto 49-51 MG TABS TAKE ONE TABLET BY MOUTH TWICE A DAY   • finasteride (PROSCAR) 5 mg tablet TAKE ONE TABLET BY MOUTH EVERY DAY   • furosemide (LASIX) 20 mg tablet TAKE ONE TABLET BY MOUTH EVERY DAY   • latanoprost (XALATAN) 0 005 % ophthalmic solution    • Multiple Vitamins-Minerals (CENTRUM SILVER PO) Take 1 tablet by mouth daily  • omeprazole (PriLOSEC) 20 mg delayed release capsule Take 1 capsule (20 mg total) by mouth daily   • PREVIDENT 5000 ENAMEL PROTECT 1 1-5 % PSTE Use as directed   • Sodium Fluoride 5000 Enamel 1 1-5 % GEL Use as directed   • Vitamin D, Cholecalciferol, 1000 UNITS CAPS Take 1 tablet by mouth daily  Objective     /71 (BP Location: Left arm, Patient Position: Sitting)   Pulse 64   Temp 97 8 °F (36 6 °C) (Tympanic)   Resp 16   Ht 5' 11" (1 803 m)   Wt 87 8 kg (193 lb 9 6 oz)   SpO2 93%   BMI 27 00 kg/m²     Physical Exam  Constitutional:       Appearance: He is well-developed  HENT:      Head: Normocephalic and atraumatic  Eyes:      Conjunctiva/sclera: Conjunctivae normal       Pupils: Pupils are equal, round, and reactive to light  Cardiovascular:      Rate and Rhythm: Normal rate and regular rhythm  Heart sounds: Normal heart sounds     Pulmonary:      Effort: Pulmonary effort is normal  Breath sounds: Normal breath sounds  Abdominal:      General: Bowel sounds are normal       Palpations: Abdomen is soft  Musculoskeletal:      Cervical back: Normal range of motion and neck supple  Skin:     Findings: Rash present  Comments: 2 papular lesions on left neck, size 5mm, no erythema or exudate   Neurological:      Mental Status: He is alert         Lynn Marley MD

## 2023-02-09 LAB
APTT SCREEN TO CONFIRM RATIO: 1 RATIO (ref 0–1.34)
CONFIRM APTT/NORMAL: 38.3 SEC (ref 0–47.6)
DRVVT IMM 1:2 NP PPP: 41 SEC (ref 0–40.4)
DRVVT SCREEN TO CONFIRM RATIO: 1.3 RATIO (ref 0.8–1.2)
LA PPP-IMP: ABNORMAL
SCREEN APTT: 40.1 SEC (ref 0–43.5)
SCREEN DRVVT: 48.8 SEC (ref 0–47)
THROMBIN TIME: 16.2 SEC (ref 0–23)

## 2023-02-14 ENCOUNTER — TELEPHONE (OUTPATIENT)
Dept: PULMONOLOGY | Facility: CLINIC | Age: 83
End: 2023-02-14

## 2023-02-15 ENCOUNTER — TELEPHONE (OUTPATIENT)
Dept: HEMATOLOGY ONCOLOGY | Facility: CLINIC | Age: 83
End: 2023-02-15

## 2023-02-15 NOTE — TELEPHONE ENCOUNTER
Called patient, no answer on cell or home  Left message to review labs/recommendations  Advised I will be out of the office  Advised to call back  Recommendation would be: lifelong preventative blood thinner, Eliquis 2 5 mg PO BID (not Xarelto due to CKD) OR Coumadin due to the fact that lupus anticoagulant is positive       PSA was normal

## 2023-02-16 ENCOUNTER — DOCUMENTATION (OUTPATIENT)
Dept: HEMATOLOGY ONCOLOGY | Facility: CLINIC | Age: 83
End: 2023-02-16

## 2023-02-16 DIAGNOSIS — Z86.711 HISTORY OF PULMONARY EMBOLUS (PE): Primary | ICD-10-CM

## 2023-02-16 NOTE — TELEPHONE ENCOUNTER
Reviewed with patient  He will start Eliquis 2 5mg PO BID  We will continue to fill for patient  He will pock up samples at the [facility] office today as he has a high co-pay  Will forward his information to oncology finance to check on Co-pay assistance  Will also send Rx to mail order pharmacy per patient request   Follow up appointment scheduled July 2023    Patient verbalized understanding    Will call with any additional questions or concerns

## 2023-02-20 ENCOUNTER — OFFICE VISIT (OUTPATIENT)
Dept: SURGICAL ONCOLOGY | Facility: CLINIC | Age: 83
End: 2023-02-20

## 2023-02-20 VITALS
BODY MASS INDEX: 27.02 KG/M2 | HEIGHT: 71 IN | HEART RATE: 76 BPM | DIASTOLIC BLOOD PRESSURE: 62 MMHG | WEIGHT: 193 LBS | OXYGEN SATURATION: 97 % | SYSTOLIC BLOOD PRESSURE: 124 MMHG

## 2023-02-20 DIAGNOSIS — Z85.820 ENCOUNTER FOR FOLLOW-UP SURVEILLANCE OF MELANOMA: Primary | ICD-10-CM

## 2023-02-20 DIAGNOSIS — Z85.820 PERSONAL HISTORY OF MALIGNANT MELANOMA: ICD-10-CM

## 2023-02-20 DIAGNOSIS — Z08 ENCOUNTER FOR FOLLOW-UP SURVEILLANCE OF MELANOMA: Primary | ICD-10-CM

## 2023-02-20 NOTE — PROGRESS NOTES
Surgical Oncology Follow Up       [address] [facility] [address]E [address]  [facility] [address]  3 Krystal Coronado  1940  [id number]  [address] [facility] [address] [address] [phone]    Diagnoses and all orders for this visit:    Encounter for follow-up surveillance of melanoma    Personal history of malignant melanoma        Chief Complaint   Patient presents with   • Follow-up     Pt presents for a f/u for surveillance of melanoma  Pt reports doing well with no new issues or pain  Return for new or worrisome symptoms  Oncology History   Personal history of malignant melanoma   10/31/2017 Initial Diagnosis    Malignant melanoma of right upper extremity ([facility] [address] )     12/5/2017 Surgery    Wide excision of right upper arm  [id number]         Diagnosis and Staging: [id number] melanoma right upper extremity December 2017   Treatment History: Wide excision melanoma December 2017   Current Therapy: Observation   Disease Status: TONY     History of Present Illness: The patient returns to the office today in follow-up for his history of a [id number] melanoma or the right upper arm  He is currently TONY at over 5 years and doing very well  He has not noticed any new lumps or lesions, and sees his dermatologist regularly  He denies any new SOB, cough, headaches, dizziness or weight loss  Review of Systems   Constitutional: Negative for activity change, appetite change, fatigue and unexpected weight change  HENT: Negative  Respiratory: Negative  Negative for cough and shortness of breath  Cardiovascular: Negative  Gastrointestinal: Negative  Musculoskeletal: Negative  Skin: Negative  Neurological: Negative  Negative for dizziness and headaches  Hematological: Negative  Negative for adenopathy  Psychiatric/Behavioral: Negative                Patient Active Problem List   Diagnosis   • 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   • Microscopic hematuria   • Ank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 (HCC)   • Primary osteoarthritis of both knees   • Burt esophagus   • Cardiac arrhythmia   • Cardiomyopathy [facility])   • Esophageal reflux   • Mixed hyperlipidemia   • Essential hypertension   • Impaired fasting glucose   • Personal history of malignant melanoma   • Polymyalgia rheumatica (HCC)   • Right bundle branch block with left anterior fascicular block   • Skin lesion   • Osteopenia   • 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   • Hypertensive kidney disease with stage 3b chronic kidney disease (HCC)   • Chronic HFrEF (heart failure with reduced ejection fraction) ([place] [address] )   • Encounter for follow-up surveillance of melanoma   • Mild persistent asthma without complication   • 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 ([place] [address] )   • Personal history of colon cancer   • History of pulmonary embolus (PE)   • Chronic bilateral low back pain without sciatica   • Rash     Past Medical History:   Diagnosis Date   • AICD (automatic cardioverter/defibrillator) present    • Arthritis    • Asthma    • Burt esophagus    • 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    • Chronic HFrEF (heart failure with reduced ejection fraction) ([place] [address] ) 2019   • CKD (chronic kidney disease)    • Colon cancer ([place] [address] ) 04/05/2013   • Colon cancer screening 06/21/2022   • COPD (chronic obstructive pulmonary disease) ([place] [address] )    • Diverticulitis     Last assessed: 4/5/13   • Esophageal reflux    • Gout    • Hernia    • Hyperlipidemia    • Hypertension    • Hypothyroidism 05/30/2013   • Non-toxic multinodular goiter 11/28/2012   • Pleural effusion    • Polymyalgia rheumatica (HCC)    • 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    • Spondyloarthropathy     Last assessed: 11/28/12   • Thrombocytopenia ([facility] [address] ) 06/23/2015     Past Surgical History:   Procedure Laterality Date   • ARM SKIN LESION BIOPSY / EXCISION      Malignant   • ATRIAL ABLATION SURGERY     • AV NODE ABLATION     • CARDIAC DEFIBRILLATOR PLACEMENT  2009    Cardio-Defib Pulse Gen Venous Insertion of Electrode for Ventricular Pacing  Implantable   • CARDIAC SURGERY  2009    Catheterization   • COLONOSCOPY N/A 3/14/2016    Procedure: COLONOSCOPY;  Surgeon: Ely Lehman MD;  [facility]; Service  February 22, 2013, mildly enlarged prostate, history of colon CA with normal appearance of anastomosis at the midtransverse colon, severe diverticulosis in the sigmoid, descending and transverse colon  Repeat colonoscopy in 3 yrs   • HEMICOLECTOMY Right 05/05/2004   • INGUINAL HERNIA REPAIR Bilateral     Left 3/2004, right 5/2008   • IR UPPER EXTREMITY VENOGRAM- DIAGNOSTIC  10/4/2018   • KNEE SURGERY  1972    Meniscectomy   • NV ESOPHAGOGASTRODUODENOSCOPY TRANSORAL DIAGNOSTIC N/A 12/5/2016    Procedure: ESOPHAGOGASTRODUODENOSCOPY (EGD); Surgeon: Elliott Antony MD;  [facility];   Service: Gastroenterology   • TONSILLECTOMY AND ADENOIDECTOMY     • UPPER GASTROINTESTINAL ENDOSCOPY       Family History   Problem Relation Age of Onset   • Heart failure Mother         Congestive   • Hypertension Mother    • Osteoporosis Mother    • COPD Father    • Emphysema Father    • Hypertension Father    • Heart disease Sister         Heart Valve Replacement   • Osteoporosis Sister    • Breast cancer Family    • Factor V Leiden deficiency Son         on Xarelto     Social History     Socioeconomic History   • Marital status: /Civil Union     Spouse name: Not on file   • Number of children: Not on file   • Years of education: Not on file   • Highest education level: Not on file   Occupational History   • Occupation: Manager-Retired   Tobacco Use   • Smoking status: Never   • Smokeless tobacco: Never   Vaping Use   • Vaping Use: Never used   Substance and Sexual Activity   • Alcohol use: Never     Comment: Rare   • Drug use: No   • Sexual activity: Not Currently   Other Topics Concern   • Not on file   Social History Narrative   • Not on file     Social Determinants of Health     Financial Resource Strain: Low Risk    • Difficulty of Paying Living Expenses: Not hard at all   Food Insecurity: Not on file   Transportation Needs: No Transportation Needs   • Lack of Transportation (Medical): No   • Lack of Transportation (Non-Medical): No   Physical Activity: Not on file   Stress: Not on file   Social Connections: Not on file   Intimate Partner Violence: Not on file   Housing Stability: Not on file       Current Outpatient Medications:   •  albuterol (PROVENTIL HFA,VENTOLIN HFA) 90 mcg/act inhaler, Inhale 2 puffs every 6 (six) hours as needed for wheezing or shortness of breath, Disp: 1 Inhaler, Rfl: 1  •  apixaban (ELIQUIS) 2 5 mg, Take 1 tablet (2 5 mg total) by mouth 2 (two) times a day, Disp: 180 tablet, Rfl: 1  •  atorvastatin (LIPITOR) 10 mg tablet, TAKE ONE TABLET BY MOUTH EVERY DAY, Disp: 90 tablet, Rfl: 3  •  bisoprolol (ZEBETA) 10 MG tablet, TAKE ONE TABLET BY MOUTH TWICE A DAY, Disp: 180 tablet, Rfl: 3  •  Calcium Carb-Cholecalciferol (CALCIUM 600 + D PO), Take 3 tablets by mouth daily  , Disp: , Rfl:   •  Coenzyme Q10 (CO Q-10) 200 MG CAPS, Take 1 tablet by mouth daily, Disp: , Rfl:   •  Entresto 49-51 MG TABS, TAKE ONE TABLET BY MOUTH TWICE A DAY, Disp: 180 tablet, Rfl: 3  •  finasteride (PROSCAR) 5 mg tablet, TAKE ONE TABLET BY MOUTH EVERY DAY, Disp: 90 tablet, Rfl: 1  •  furosemide (LASIX) 20 mg tablet, TAKE ONE TABLET BY MOUTH EVERY DAY, Disp: 90 tablet, Rfl: 3  •  latanoprost (XALATAN) 0 005 % ophthalmic solution, , Disp: , Rfl:   •  Multiple Vitamins-Minerals (CENTRUM SILVER PO), Take 1 tablet by mouth daily  , Disp: , Rfl:   •  omeprazole (PriLOSEC) 20 mg delayed release capsule, Take 1 capsule (20 mg total) by mouth daily, Disp: 90 capsule, Rfl: 2  •  triamcinolone (KENALOG) 0 5 % cream, Apply topically 3 (three) times a day, Disp: 30 g, Rfl: 0  •  Vitamin D, Cholecalciferol, 1000 UNITS CAPS, Take 1 tablet by mouth daily  , Disp: , Rfl:   No Known Allergies  Vitals:    02/20/23 1327   BP: 124/62   Pulse: 76   SpO2: 97%       Physical Exam  Vitals reviewed  Constitutional:       General: He is not in acute distress  Appearance: Normal appearance  He is normal weight  He is not ill-appearing or toxic-appearing  HENT:      Head: Normocephalic and atraumatic  Nose: Nose normal       Mouth/Throat:      Mouth: Mucous membranes are moist    Eyes:      General: No scleral icterus  Extraocular Movements: Extraocular movements intact  Conjunctiva/sclera: Conjunctivae normal       Pupils: Pupils are equal, round, and reactive to light  Cardiovascular:      Rate and Rhythm: Normal rate  Pulmonary:      Effort: Pulmonary effort is normal    Abdominal:      Palpations: Abdomen is soft  Musculoskeletal:         General: Normal range of motion  Cervical back: Normal range of motion and neck supple  Lymphadenopathy:      Cervical: No cervical adenopathy  Upper Body:      Right upper body: No supraclavicular or axillary adenopathy  Left upper body: No supraclavicular adenopathy  Skin:     General: Skin is warm and dry  Comments: Right upper arm scar without nodularity or pigmentation  No evidence of in-transit lesion  Neurological:      General: No focal deficit present  Mental Status: He is alert and oriented to person, place, and time  Psychiatric:         Mood and Affect: Mood normal          Behavior: Behavior normal          Thought Content: Thought content normal          Judgment: Judgment normal          Discussion/Summary: This is an 82 y/o male who presents today for continued melanoma surveillance    He continues to do well and there is no evidence of disease by symptoms or clinical exam   Since he maintains follow-up with his dermatologist, we will see him on an as-needed basis only  He has been advised to call with any new or unusual symptoms  He is agreeable with the plan, all questions have been answered

## 2023-03-01 ENCOUNTER — OFFICE VISIT (OUTPATIENT)
Dept: FAMILY MEDICINE CLINIC | Facility: CLINIC | Age: 83
End: 2023-03-01

## 2023-03-01 VITALS
HEART RATE: 62 BPM | TEMPERATURE: 97.3 F | BODY MASS INDEX: 27.3 KG/M2 | WEIGHT: 195 LBS | HEIGHT: 71 IN | RESPIRATION RATE: 14 BRPM | DIASTOLIC BLOOD PRESSURE: 62 MMHG | SYSTOLIC BLOOD PRESSURE: 128 MMHG

## 2023-03-01 DIAGNOSIS — L73.9 FOLLICULITIS: Primary | ICD-10-CM

## 2023-03-01 NOTE — PROGRESS NOTES
2-16  [facility] request from providers office to contact patient regarding high cost of Eliquis medication  Call placed to patient to discuss copay and patient assistance  No response  Left message for return call  2-22  Follow up call to patient, spoke with spouse   Patient was not home at the time and suggested I call back tomorrow or she will have him return my call

## 2023-03-01 NOTE — PROGRESS NOTES
Name: Griselda Alvarenga      : 1940      MRN: [id number]  Encounter Provider: Ricky Yuan MD  Encounter Date: 3/1/2023   Encounter department: [address]    Chief Complaint   Patient presents with   • Follow-up     Dermatitis  On neck and behind ears  Health Maintenance   Topic Date Due   • SLP PLAN OF CARE  Never done   • Fall Risk  2023   • Medicare Annual Wellness Visit (AWV)  2023   • Depression Screening  2024   • BMI: Adult  2024   • DXA SCAN  2025   • Pneumococcal Vaccine: 65+ Years  Completed   • Influenza Vaccine  Completed   • COVID-19 Vaccine  Completed   • HIB Vaccine  Aged Out   • IPV Vaccine  Aged Out   • Hepatitis A Vaccine  Aged Out   • Meningococcal ACWY Vaccine  Aged Out   • HPV Vaccine  Aged Out   • Colonoscopy  Discontinued     Assessment & Plan     1  Folliculitis  -     mupirocin (BACTROBAN) 2 % ointment; Apply topically 3 (three) times a day    If symptoms no improving, follow up with dermatology  Subjective      HPI     Pt is here by himself  C/o rash on neck for 1 month  "Maybe from my ambrocio" per pt  It was on his left side of neck, now on right side of neck also  No itchy  No pain  Pt states there are scabs and he tried to scratch scabs off but they do not go away  Denies fever  Denies SOB, CP, n/v/abd pain  He tried triamcinolone cream helped little  ROEL Lozano Asp for melanoma, s/p wide excision on right upper arm 5 years ago  Review of Systems   Constitutional: Negative for appetite change, chills and fever  HENT: Negative for congestion, ear pain, sinus pain and sore throat  Eyes: Negative for discharge and itching  Respiratory: Negative for apnea, cough, chest tightness, shortness of breath and wheezing  Cardiovascular: Negative for chest pain, palpitations and leg swelling     Gastrointestinal: Negative for abdominal pain, anal bleeding, constipation, diarrhea, nausea and vomiting  Endocrine: Negative for cold intolerance, heat intolerance and polyuria  Genitourinary: Negative for difficulty urinating and dysuria  Musculoskeletal: Negative for arthralgias, back pain and myalgias  Skin: Positive for rash  Neurological: Negative for dizziness and headaches  Psychiatric/Behavioral: Negative for agitation  Current Outpatient Medications on File Prior to Visit   Medication Sig   • albuterol (PROVENTIL HFA,VENTOLIN HFA) 90 mcg/act inhaler Inhale 2 puffs every 6 (six) hours as needed for wheezing or shortness of breath   • apixaban (ELIQUIS) 2 5 mg Take 1 tablet (2 5 mg total) by mouth 2 (two) times a day   • atorvastatin (LIPITOR) 10 mg tablet TAKE ONE TABLET BY MOUTH EVERY DAY   • bisoprolol (ZEBETA) 10 MG tablet TAKE ONE TABLET BY MOUTH TWICE A DAY   • Calcium Carb-Cholecalciferol (CALCIUM 600 + D PO) Take 3 tablets by mouth daily     • Coenzyme Q10 (CO Q-10) 200 MG CAPS Take 1 tablet by mouth daily   • Entresto 49-51 MG TABS TAKE ONE TABLET BY MOUTH TWICE A DAY   • finasteride (PROSCAR) 5 mg tablet TAKE ONE TABLET BY MOUTH EVERY DAY   • furosemide (LASIX) 20 mg tablet TAKE ONE TABLET BY MOUTH EVERY DAY   • latanoprost (XALATAN) 0 005 % ophthalmic solution    • Multiple Vitamins-Minerals (CENTRUM SILVER PO) Take 1 tablet by mouth daily  • omeprazole (PriLOSEC) 20 mg delayed release capsule Take 1 capsule (20 mg total) by mouth daily   • Vitamin D, Cholecalciferol, 1000 UNITS CAPS Take 1 tablet by mouth daily  • [DISCONTINUED] triamcinolone (KENALOG) 0 5 % cream Apply topically 3 (three) times a day       Objective     /62 (BP Location: Left arm, Patient Position: Sitting, Cuff Size: Adult)   Pulse 62   Temp (!) 97 3 °F (36 3 °C) (Tympanic)   Resp 14   Ht 5' 11" (1 803 m)   Wt 88 5 kg (195 lb)   BMI 27 20 kg/m²     Physical Exam  Constitutional:       Appearance: He is well-developed  HENT:      Head: Normocephalic and atraumatic     Eyes: Conjunctiva/sclera: Conjunctivae normal    Cardiovascular:      Rate and Rhythm: Normal rate and regular rhythm  Heart sounds: Normal heart sounds  Pulmonary:      Effort: Pulmonary effort is normal       Breath sounds: Normal breath sounds  Musculoskeletal:      Cervical back: Normal range of motion and neck supple  Skin:     Findings: Rash present  Comments: Maculopapular lesions, 2 on Left back of neck, 1 on right back of neck, size 3-5mm, no erythema or exudates    Neurological:      Mental Status: He is alert         Russel Schwartz MD

## 2023-03-02 ENCOUNTER — ANESTHESIA EVENT (OUTPATIENT)
Dept: GASTROENTEROLOGY | Facility: HOSPITAL | Age: 83
End: 2023-03-02

## 2023-03-02 ENCOUNTER — HOSPITAL ENCOUNTER (OUTPATIENT)
Dept: GASTROENTEROLOGY | Facility: HOSPITAL | Age: 83
Setting detail: OUTPATIENT SURGERY
End: 2023-03-02
Attending: INTERNAL MEDICINE

## 2023-03-02 ENCOUNTER — ANESTHESIA (OUTPATIENT)
Dept: GASTROENTEROLOGY | Facility: HOSPITAL | Age: 83
End: 2023-03-02

## 2023-03-02 VITALS
RESPIRATION RATE: 16 BRPM | SYSTOLIC BLOOD PRESSURE: 113 MMHG | OXYGEN SATURATION: 98 % | TEMPERATURE: 96.9 F | DIASTOLIC BLOOD PRESSURE: 67 MMHG | HEART RATE: 67 BPM

## 2023-03-02 DIAGNOSIS — Z85.038 PERSONAL HISTORY OF COLON CANCER: ICD-10-CM

## 2023-03-02 DIAGNOSIS — K22.70 BARRETT'S ESOPHAGUS WITHOUT DYSPLASIA: ICD-10-CM

## 2023-03-02 DIAGNOSIS — K21.9 GASTROESOPHAGEAL REFLUX DISEASE, UNSPECIFIED WHETHER ESOPHAGITIS PRESENT: ICD-10-CM

## 2023-03-02 RX ORDER — PROPOFOL 10 MG/ML
INJECTION, EMULSION INTRAVENOUS AS NEEDED
Status: DISCONTINUED | OUTPATIENT
Start: 2023-03-02 | End: 2023-03-02

## 2023-03-02 RX ORDER — SODIUM CHLORIDE 9 MG/ML
INJECTION, SOLUTION INTRAVENOUS CONTINUOUS PRN
Status: DISCONTINUED | OUTPATIENT
Start: 2023-03-02 | End: 2023-03-02

## 2023-03-02 RX ORDER — PROPOFOL 10 MG/ML
INJECTION, EMULSION INTRAVENOUS CONTINUOUS PRN
Status: DISCONTINUED | OUTPATIENT
Start: 2023-03-02 | End: 2023-03-02

## 2023-03-02 RX ADMIN — Medication 40 MG: at 13:00

## 2023-03-02 RX ADMIN — SODIUM CHLORIDE: 0.9 INJECTION, SOLUTION INTRAVENOUS at 12:27

## 2023-03-02 RX ADMIN — SODIUM CHLORIDE: 0.9 INJECTION, SOLUTION INTRAVENOUS at 13:06

## 2023-03-02 RX ADMIN — PROPOFOL 100 MCG/KG/MIN: 10 INJECTION, EMULSION INTRAVENOUS at 12:31

## 2023-03-02 RX ADMIN — LIDOCAINE HYDROCHLORIDE 100 MG: 20 INJECTION INTRAVENOUS at 12:30

## 2023-03-02 RX ADMIN — PROPOFOL 60 MG: 10 INJECTION, EMULSION INTRAVENOUS at 12:30

## 2023-03-02 RX ADMIN — PROPOFOL 20 MG: 10 INJECTION, EMULSION INTRAVENOUS at 12:34

## 2023-03-02 NOTE — ANESTHESIA PREPROCEDURE EVALUATION
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 (HCC)   (+) Hypertensive kidney disease with stage 3b chronic kidney disease (HCC)      MUSCULOSKELETAL   (+) Ank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 (HCC)   (+) Chronic bilateral low back pain without sciatica   (+) Primary osteoarthritis of both knees      NEURO/PSYCH   (+) Chronic bilateral low back pain without sciatica   (+) Encounter for follow-up surveillance of melanoma   (+) History of pulmonary embolus (PE)   (+) 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

## 2023-03-02 NOTE — H&P
History and Physical - [facility]  Peneloep Dodd 81 y o  male MRN: [id number]    HPI: Penelope Dodd is a 81y o  year old male who presents for EGD for surveillance of Burt's esophagus and colonoscopy for surveillance of colon polyps      Review of Systems    Historical Information   Past Medical History:   Diagnosis Date   • AICD (automatic cardioverter/defibrillator) present    • Arthritis    • Asthma    • Brut esophagus    • 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    • Chronic HFrEF (heart failure with reduced ejection fraction) ([place] [address] ) 2019   • CKD (chronic kidney disease)    • Colon cancer ([place] [address] ) 04/05/2013   • Colon cancer screening 06/21/2022   • COPD (chronic obstructive pulmonary disease) ([place] [address] )    • Diverticulitis     Last assessed: 4/5/13   • Esophageal reflux    • Gout    • Hernia    • Hyperlipidemia    • Hypertension    • Hypothyroidism 05/30/2013   • Non-toxic multinodular goiter 11/28/2012   • Pleural effusion    • Polymyalgia rheumatica (HCC)    • 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    • Spondyloarthropathy     Last assessed: 11/28/12   • Thrombocytopenia ([place] [address] ) 06/23/2015     Past Surgical History:   Procedure Laterality Date   • ARM SKIN LESION BIOPSY / EXCISION      Malignant   • ATRIAL ABLATION SURGERY     • AV NODE ABLATION     • CARDIAC DEFIBRILLATOR PLACEMENT  2009    Cardio-Defib Pulse Gen Venous Insertion of Electrode for Ventricular Pacing  Implantable   • CARDIAC SURGERY  2009    Catheterization   • COLONOSCOPY N/A 3/14/2016    Procedure: COLONOSCOPY;  Surgeon: Maximo Jefferson MD;  [facility]; Service  February 22, 2013, mildly enlarged prostate, history of colon CA with normal appearance of anastomosis at the midtransverse colon, severe diverticulosis in the sigmoid, descending and transverse colon   Repeat colonoscopy in 3 yrs   • HEMICOLECTOMY Right 05/05/2004   • INGUINAL HERNIA REPAIR Bilateral     Left 3/2004, right 5/2008   • IR UPPER EXTREMITY VENOGRAM- DIAGNOSTIC  10/4/2018   • KNEE SURGERY  1972    Meniscectomy   • DE ESOPHAGOGASTRODUODENOSCOPY TRANSORAL DIAGNOSTIC N/A 12/5/2016    Procedure: ESOPHAGOGASTRODUODENOSCOPY (EGD); Surgeon: Sammy Arora MD;  [facility];   Service: Gastroenterology   • TONSILLECTOMY AND ADENOIDECTOMY     • UPPER GASTROINTESTINAL ENDOSCOPY       Social History   Social History     Substance and Sexual Activity   Alcohol Use Never    Comment: Rare     Social History     Substance and Sexual Activity   Drug Use No     Social History     Tobacco Use   Smoking Status Never   • Passive exposure: Never   Smokeless Tobacco Never     Family History   Problem Relation Age of Onset   • Heart failure Mother         Congestive   • Hypertension Mother    • Osteoporosis Mother    • COPD Father    • Emphysema Father    • Hypertension Father    • Heart disease Sister         Heart Valve Replacement   • Osteoporosis Sister    • Breast cancer Family    • Factor V Leiden deficiency Son         on Xarelto       Meds/Allergies     (Not in a hospital admission)      No Known Allergies    Objective     /84   Pulse 58   Temp 98 °F (36 7 °C) (Tympanic)   Resp 18   SpO2 99%       PHYSICAL EXAM    Gen: NAD  CV: RRR  CHEST: Clear  ABD: soft, NT/ND  EXT: no edema  Neuro: AAO      ASSESSMENT/PLAN:  This is a 81y o  year old male here for EGD and colonoscopy for surveillance of Burt's esophagus and personal history of colon polyps    PLAN:   Procedure: EGD and colonoscopy with biopsy and possible polypectomy

## 2023-03-02 NOTE — ANESTHESIA POSTPROCEDURE EVALUATION
Post-Op Assessment Note    CV Status:  Stable  Pain Score: 0    Pain management: adequate     Mental Status:  Sleepy and arousable   Hydration Status:  Euvolemic   PONV Controlled:  Controlled   Airway Patency:  Patent      Post Op Vitals Reviewed: Yes      Staff: CRNA         No notable events documented      BP 95/60 (03/02/23 1312)    Temp (!) 96 9 °F (36 1 °C) (03/02/23 1312)    Pulse 66 (03/02/23 1312)   Resp 16 (03/02/23 1312)    SpO2 97 % (03/02/23 1312)

## 2023-03-06 ENCOUNTER — IN-CLINIC DEVICE VISIT (OUTPATIENT)
Dept: CARDIOLOGY CLINIC | Facility: CLINIC | Age: 83
End: 2023-03-06

## 2023-03-06 DIAGNOSIS — Z95.810 AICD (AUTOMATIC CARDIOVERTER/DEFIBRILLATOR) PRESENT: Primary | ICD-10-CM

## 2023-03-06 NOTE — PROGRESS NOTES
Results for orders placed or performed in visit on 03/06/23   Cardiac EP device report    Narrative    MDT-DUAL CHAMBER ICD (AAIR-DDDR MODE)/ ACTIVE SYSTEM IS MRI CONDITIONAL  DEVICE INTERROGATED IN THE [facility] OFFICE  BATTERY VOLTAGE ADEQUATE (3 7 YRS)  AP-66%, -13%  ALL LEAD PARAMETERS WITHIN NORMAL LIMITS  NO NEW SIGNIFICANT HIGH RATE EPISODES  OPTI-VOL WITHIN NORMAL LIMITS  NORMAL DEVICE FUNCTION   GV

## 2023-03-09 ENCOUNTER — TELEPHONE (OUTPATIENT)
Dept: GASTROENTEROLOGY | Facility: CLINIC | Age: 83
End: 2023-03-09

## 2023-03-09 ENCOUNTER — PREP FOR PROCEDURE (OUTPATIENT)
Dept: GASTROENTEROLOGY | Facility: CLINIC | Age: 83
End: 2023-03-09

## 2023-03-09 ENCOUNTER — TELEPHONE (OUTPATIENT)
Dept: CARDIOLOGY CLINIC | Facility: CLINIC | Age: 83
End: 2023-03-09

## 2023-03-09 DIAGNOSIS — K22.70 BARRETT'S ESOPHAGUS WITHOUT DYSPLASIA: Primary | ICD-10-CM

## 2023-03-09 DIAGNOSIS — K22.70 BARRETT'S ESOPHAGUS WITHOUT DYSPLASIA: ICD-10-CM

## 2023-03-09 RX ORDER — OMEPRAZOLE 20 MG/1
20 CAPSULE, DELAYED RELEASE ORAL DAILY
Qty: 90 CAPSULE | Refills: 2 | Status: SHIPPED | OUTPATIENT
Start: 2023-03-09

## 2023-03-09 NOTE — TELEPHONE ENCOUNTER
Ilene Eli MA 1 minute ago (4:42 PM)     ML  Our mutual patient is scheduled for procedure: EGD EMR     On: 4/27/23   With: Dr Hedy Hudson     He is taking the following blood thinner: Eliquis  Can this be stopped 2 days prior to the procedure?

## 2023-03-09 NOTE — TELEPHONE ENCOUNTER
Scheduled date of EGD EMR(as of today): 4/27/23  Physician performing EGD EMR: Dr Smith  Location of EGD EMR: [facility]  Instructions reviewed with patient by: Hollie/mailed  Clearances:Med Clearance message sent to [facility]

## 2023-03-09 NOTE — TELEPHONE ENCOUNTER
----- Message from Dilan Taylor MD sent at 3/2/2023  1:24 PM EST -----  Regarding: EGD with EMR  Hi,    Please schedule this patient for EGD with EMR in 1 to 2 months  I did his EGD today and took some biopsy  I want to schedule him for EGD with EMR once the biopsy is back  Dave this case with Dr Jd Montes      Best regards,  Lamar Ramirez

## 2023-03-09 NOTE — TELEPHONE ENCOUNTER
Our mutual patient is scheduled for procedure: EGD EMR    On: 4/27/23   With: Dr Volodymyr Hilliard    He is taking the following blood thinner: Eliquis  Can this be stopped 2 days prior to the procedure?       Physician Approving clearance: ________________________

## 2023-04-27 ENCOUNTER — HOSPITAL ENCOUNTER (OUTPATIENT)
Dept: GASTROENTEROLOGY | Facility: HOSPITAL | Age: 83
Setting detail: OUTPATIENT SURGERY
Discharge: HOME | End: 2023-04-27
Attending: INTERNAL MEDICINE

## 2023-04-27 ENCOUNTER — ANESTHESIA (OUTPATIENT)
Dept: GASTROENTEROLOGY | Facility: HOSPITAL | Age: 83
End: 2023-04-27

## 2023-04-27 ENCOUNTER — ANESTHESIA EVENT (OUTPATIENT)
Dept: GASTROENTEROLOGY | Facility: HOSPITAL | Age: 83
End: 2023-04-27

## 2023-04-27 ENCOUNTER — TRANSCRIBE ORDERS (OUTPATIENT)
Dept: GASTROENTEROLOGY | Facility: CLINIC | Age: 83
End: 2023-04-27

## 2023-04-27 VITALS
SYSTOLIC BLOOD PRESSURE: 121 MMHG | DIASTOLIC BLOOD PRESSURE: 77 MMHG | RESPIRATION RATE: 17 BRPM | BODY MASS INDEX: 26.22 KG/M2 | WEIGHT: 188 LBS | HEART RATE: 62 BPM | OXYGEN SATURATION: 95 % | TEMPERATURE: 97.2 F

## 2023-04-27 DIAGNOSIS — K22.70 BARRETT'S ESOPHAGUS WITHOUT DYSPLASIA: ICD-10-CM

## 2023-04-27 RX ORDER — LIDOCAINE HYDROCHLORIDE 10 MG/ML
INJECTION, SOLUTION EPIDURAL; INFILTRATION; INTRACAUDAL; PERINEURAL AS NEEDED
Status: DISCONTINUED | OUTPATIENT
Start: 2023-04-27 | End: 2023-04-27

## 2023-04-27 RX ORDER — PROPOFOL 10 MG/ML
INJECTION, EMULSION INTRAVENOUS CONTINUOUS PRN
Status: DISCONTINUED | OUTPATIENT
Start: 2023-04-27 | End: 2023-04-27

## 2023-04-27 RX ORDER — SODIUM CHLORIDE 9 MG/ML
INJECTION, SOLUTION INTRAVENOUS CONTINUOUS PRN
Status: DISCONTINUED | OUTPATIENT
Start: 2023-04-27 | End: 2023-04-27

## 2023-04-27 RX ORDER — SUCRALFATE ORAL 1 G/10ML
1 SUSPENSION ORAL 4 TIMES DAILY
Qty: 280 ML | Refills: 0 | Status: SHIPPED | OUTPATIENT
Start: 2023-04-27 | End: 2023-05-03 | Stop reason: ALTCHOICE

## 2023-04-27 RX ORDER — PROPOFOL 10 MG/ML
INJECTION, EMULSION INTRAVENOUS AS NEEDED
Status: DISCONTINUED | OUTPATIENT
Start: 2023-04-27 | End: 2023-04-27

## 2023-04-27 RX ORDER — FENTANYL CITRATE 50 UG/ML
INJECTION, SOLUTION INTRAMUSCULAR; INTRAVENOUS AS NEEDED
Status: DISCONTINUED | OUTPATIENT
Start: 2023-04-27 | End: 2023-04-27

## 2023-04-27 RX ADMIN — PROPOFOL 50 MG: 10 INJECTION, EMULSION INTRAVENOUS at 14:15

## 2023-04-27 RX ADMIN — PROPOFOL 100 MCG/KG/MIN: 10 INJECTION, EMULSION INTRAVENOUS at 14:15

## 2023-04-27 RX ADMIN — PHENYLEPHRINE HYDROCHLORIDE 30 MCG/MIN: 10 INJECTION INTRAVENOUS at 14:20

## 2023-04-27 RX ADMIN — SODIUM CHLORIDE: 0.9 INJECTION, SOLUTION INTRAVENOUS at 14:15

## 2023-04-27 RX ADMIN — FENTANYL CITRATE 25 MCG: 50 INJECTION, SOLUTION INTRAMUSCULAR; INTRAVENOUS at 14:15

## 2023-04-27 RX ADMIN — FENTANYL CITRATE 25 MCG: 50 INJECTION, SOLUTION INTRAMUSCULAR; INTRAVENOUS at 14:29

## 2023-04-27 RX ADMIN — LIDOCAINE HYDROCHLORIDE 80 MG: 10 INJECTION, SOLUTION EPIDURAL; INFILTRATION; INTRACAUDAL at 14:15

## 2023-04-27 NOTE — ANESTHESIA PREPROCEDURE EVALUATION
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)   (+) Hypertensive kidney disease with stage 3b chronic kidney disease (HCC)      MUSCULOSKELETAL   (+) Ank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 (HCC)   (+) Chronic bilateral low back pain without sciatica   (+) Primary osteoarthritis of both knees      NEURO/PSYCH   (+) Chronic bilateral low back pain without sciatica   (+) Encounter for follow-up surveillance of melanoma   (+) History of pulmonary embolus (PE)   (+) 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   (+) Personal history of colon cancer   (+) Personal history of malignant melanoma      PULMONARY   (+) Mild persistent asthma without complication   5/48/25 EF 35%   3/6/23 MDT-DUAL CHAMBER ICD (AAIR-DDDR MODE)/ ACTIVE SYSTEM IS MRI CONDITIONAL   DEVICE INTERROGATED IN THE [place] OFFICE  BATTERY VOLTAGE ADEQUATE (3 7 YRS)  AP-66%, -13%  ALL LEAD PARAMETERS WITHIN NORMAL LIMITS  NO NEW SIGNIFICANT HIGH RATE EPISODES  OPTI-VOL WITHIN NORMAL LIMITS  NORMAL DEVICE FUNCTION  Physical Exam    Airway    Mallampati score: II  TM Distance: >3 FB  Neck ROM: full     Dental   No notable dental hx     Cardiovascular      Pulmonary      Other Findings        Anesthesia Plan  ASA Score- 3     Anesthesia Type- IV sedation with anesthesia with ASA Monitors  Additional Monitors:   Airway Plan:           Plan Factors-Exercise tolerance (METS): >4 METS  Chart reviewed  EKG reviewed  Imaging results reviewed  Existing labs reviewed  Patient summary reviewed      Patient is not a current smoker  Induction- intravenous  Postoperative Plan-     Informed Consent- Anesthetic plan and risks discussed with patient  I personally reviewed this patient with the CRNA  Discussed and agreed on the Anesthesia Plan with the CRNA  Charlotte Espinal

## 2023-04-27 NOTE — ANESTHESIA POSTPROCEDURE EVALUATION
Post-Op Assessment Note    CV Status:  Stable  Pain Score: 0    Pain management: adequate     Mental Status:  Sleepy   Hydration Status:  Stable   PONV Controlled:  None   Airway Patency:  Patent      Post Op Vitals Reviewed: Yes      Staff: CRNA         No notable events documented      BP   129/78   Temp     Pulse  61   Resp      SpO2   97%

## 2023-04-27 NOTE — H&P
[facility]  History & Physical    Patient Info:  Name: Delbert Waters   Age: 81 y o  YOB: 1940   MRN: [id number]    HPI: Delbert Waters is a 81y o  year old male who presents for EGD and possible EMR or RFA of Burt's esophagus  EGD showed C4M5 Burt's esophagus with 8 mm nodule  Biopsy of the nodule and esophagus showed IM consistent with [facility] but no dysplasia  On Eliquis which has been on hold for 2 days  REVIEW OF SYSTEMS: Per the HPI, and otherwise unremarkable  Historical Information   Past Medical History:   Diagnosis Date    AICD (automatic cardioverter/defibrillator) present     Arthritis     Asthma     Burt esophagus     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     Chronic HFrEF (heart failure with reduced ejection fraction) ([facility] ) 2019    CKD (chronic kidney disease)     Colon cancer ([facility] ) 04/05/2013    Colon cancer screening 06/21/2022    COPD (chronic obstructive pulmonary disease) ([facility] )     Diverticulitis     Last assessed: 4/5/13    Esophageal reflux     Gout     Hernia     Hyperlipidemia     Hypertension     Hypothyroidism 05/30/2013    Non-toxic multinodular goiter 11/28/2012    Pleural effusion     Polymyalgia rheumatica (HCC)     [facility] 2022    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     Spondyloarthropathy     Last assessed: 11/28/12    Thrombocytopenia ([facility] ) 06/23/2015     Past Surgical History:   Procedure Laterality Date    ARM SKIN LESION BIOPSY / EXCISION      Malignant    ATRIAL ABLATION SURGERY      AV NODE ABLATION      CARDIAC DEFIBRILLATOR PLACEMENT  2009    Cardio-Defib Pulse Gen Venous Insertion of Electrode for Ventricular Pacing  Implantable    CARDIAC SURGERY  2009    Catheterization    COLONOSCOPY N/A 3/14/2016    Procedure: COLONOSCOPY;  Surgeon: Gregg Wang MD;  Location: [facility]; Service  February 22, 2013, mildly enlarged prostate, history of colon CA with normal appearance of anastomosis at the midtransverse colon, severe diverticulosis in the sigmoid, descending and transverse colon  Repeat colonoscopy in 3 yrs    HEMICOLECTOMY Right 05/05/2004    INGUINAL HERNIA REPAIR Bilateral     Left 3/2004, right 5/2008    IR UPPER EXTREMITY VENOGRAM- DIAGNOSTIC  10/4/2018    KNEE SURGERY  1972    Meniscectomy    OH ESOPHAGOGASTRODUODENOSCOPY TRANSORAL DIAGNOSTIC N/A 12/5/2016    Procedure: ESOPHAGOGASTRODUODENOSCOPY (EGD); Surgeon: Ingrid Garner MD;  Location: [facility];   Service: Gastroenterology    TONSILLECTOMY AND ADENOIDECTOMY      UPPER GASTROINTESTINAL ENDOSCOPY       Social History   Social History     Substance and Sexual Activity   Alcohol Use Never    Comment: Rare     Social History     Substance and Sexual Activity   Drug Use No     Social History     Tobacco Use   Smoking Status Never    Passive exposure: Never   Smokeless Tobacco Never     Family History   Problem Relation Age of Onset    Heart failure Mother         Congestive    Hypertension Mother     Osteoporosis Mother     COPD Father     Emphysema Father     Hypertension Father     Heart disease Sister         Heart Valve Replacement    Osteoporosis Sister     Breast cancer Family     Factor V Leiden deficiency Son         on Xarelto       MEDICATIONS & ALLERGIES:    Current Outpatient Medications   Medication Instructions    albuterol (PROVENTIL HFA,VENTOLIN HFA) 90 mcg/act inhaler 2 puffs, Inhalation, Every 6 hours PRN    apixaban (ELIQUIS) 2 5 mg, Oral, 2 times daily    atorvastatin (LIPITOR) 10 mg tablet TAKE ONE TABLET BY MOUTH EVERY DAY    bisoprolol (ZEBETA) 10 MG tablet TAKE ONE TABLET BY MOUTH TWICE A DAY    Calcium Carb-Cholecalciferol (CALCIUM 600 + D PO) 3 tablets, Oral, Daily    Coenzyme Q10 (CO Q-10) 200 MG CAPS 1 tablet, Oral, Daily    Entresto 49-51 MG TABS TAKE ONE TABLET BY MOUTH TWICE A DAY  finasteride (PROSCAR) 5 mg tablet TAKE ONE TABLET BY MOUTH EVERY DAY    furosemide (LASIX) 20 mg tablet TAKE ONE TABLET BY MOUTH EVERY DAY    latanoprost (XALATAN) 0 005 % ophthalmic solution No dose, route, or frequency recorded   Multiple Vitamins-Minerals (CENTRUM SILVER PO) 1 tablet, Oral, Daily    mupirocin (BACTROBAN) 2 % ointment Topical, 3 times daily    omeprazole (PRILOSEC) 20 mg, Oral, Daily    Vitamin D, Cholecalciferol, 1000 UNITS CAPS 1 tablet, Oral, Daily     No Known Allergies    PHYSICAL EXAM:    Objective   Blood pressure 138/76, pulse 60, temperature 97 6 °F (36 4 °C), temperature source Tympanic, resp  rate 18, weight 85 3 kg (188 lb), SpO2 95 %  Body mass index is 26 22 kg/m²  Gen: NAD  CV: RRR  CHEST: Clear  ABD: Soft, NT/ND  EXT: No edema    ASSESSMENT AND PLAN:  This is a 81y o  year old male here for EGD with possible EMR vs RFA, and he is stable and optimized for his procedure  DAVE Varghese  Chief Gastroenterology Fellow  [address] [place] [address]  Division of Gastroenterology & Hepatology  Available on [email or web address]    ** Please Note: This note is constructed using a voice recognition dictation system   **

## 2023-04-29 ENCOUNTER — APPOINTMENT (OUTPATIENT)
Dept: LAB | Facility: HOSPITAL | Age: 83
End: 2023-04-29

## 2023-04-29 ENCOUNTER — LAB (OUTPATIENT)
Dept: LAB | Facility: CLINIC | Age: 83
End: 2023-04-29

## 2023-04-29 DIAGNOSIS — N18.32 HYPERTENSIVE KIDNEY DISEASE WITH STAGE 3B CHRONIC KIDNEY DISEASE (HCC): ICD-10-CM

## 2023-04-29 DIAGNOSIS — E78.2 MIXED HYPERLIPIDEMIA: ICD-10-CM

## 2023-04-29 DIAGNOSIS — I50.22 CHRONIC HFREF (HEART FAILURE WITH REDUCED EJECTION FRACTION) (HCC): ICD-10-CM

## 2023-04-29 DIAGNOSIS — I10 ESSENTIAL HYPERTENSION: ICD-10-CM

## 2023-04-29 DIAGNOSIS — R73.01 IMPAIRED FASTING GLUCOSE: ICD-10-CM

## 2023-04-29 DIAGNOSIS — I12.9 HYPERTENSIVE KIDNEY DISEASE WITH STAGE 3B CHRONIC KIDNEY DISEASE (HCC): ICD-10-CM

## 2023-04-29 LAB
ALBUMIN SERPL BCP-MCNC: 3.3 G/DL (ref 3.5–5)
ALP SERPL-CCNC: 69 U/L (ref 46–116)
ALT SERPL W P-5'-P-CCNC: 20 U/L (ref 12–78)
ANION GAP SERPL CALCULATED.3IONS-SCNC: 1 MMOL/L (ref 4–13)
AST SERPL W P-5'-P-CCNC: 27 U/L (ref 5–45)
BILIRUB SERPL-MCNC: 1.37 MG/DL (ref 0.2–1)
BUN SERPL-MCNC: 19 MG/DL (ref 5–25)
CALCIUM ALBUM COR SERPL-MCNC: 9.7 MG/DL (ref 8.3–10.1)
CALCIUM SERPL-MCNC: 9.1 MG/DL (ref 8.3–10.1)
CHLORIDE SERPL-SCNC: 110 MMOL/L (ref 96–108)
CHOLEST SERPL-MCNC: 132 MG/DL
CO2 SERPL-SCNC: 27 MMOL/L (ref 21–32)
CREAT SERPL-MCNC: 1.53 MG/DL (ref 0.6–1.3)
ERYTHROCYTE [DISTWIDTH] IN BLOOD BY AUTOMATED COUNT: 14.3 % (ref 11.6–15.1)
GFR SERPL CREATININE-BSD FRML MDRD: 42 ML/MIN/1.73SQ M
GLUCOSE P FAST SERPL-MCNC: 98 MG/DL (ref 65–99)
HCT VFR BLD AUTO: 46.7 % (ref 36.5–49.3)
HDLC SERPL-MCNC: 51 MG/DL
HGB BLD-MCNC: 15.9 G/DL (ref 12–17)
LDLC SERPL CALC-MCNC: 56 MG/DL (ref 0–100)
MCH RBC QN AUTO: 32.4 PG (ref 26.8–34.3)
MCHC RBC AUTO-ENTMCNC: 34 G/DL (ref 31.4–37.4)
MCV RBC AUTO: 95 FL (ref 82–98)
NONHDLC SERPL-MCNC: 81 MG/DL
PLATELET # BLD AUTO: 196 THOUSANDS/UL (ref 149–390)
PMV BLD AUTO: 9.3 FL (ref 8.9–12.7)
POTASSIUM SERPL-SCNC: 4.6 MMOL/L (ref 3.5–5.3)
PROT SERPL-MCNC: 7.1 G/DL (ref 6.4–8.4)
RBC # BLD AUTO: 4.9 MILLION/UL (ref 3.88–5.62)
SODIUM SERPL-SCNC: 138 MMOL/L (ref 135–147)
TRIGL SERPL-MCNC: 125 MG/DL
WBC # BLD AUTO: 8.38 THOUSAND/UL (ref 4.31–10.16)

## 2023-05-02 ENCOUNTER — RA CDI HCC (OUTPATIENT)
Dept: OTHER | Facility: HOSPITAL | Age: 83
End: 2023-05-02

## 2023-05-02 LAB
EST. AVERAGE GLUCOSE BLD GHB EST-MCNC: 105 MG/DL
HBA1C MFR BLD: 5.3 %

## 2023-05-03 ENCOUNTER — OFFICE VISIT (OUTPATIENT)
Dept: FAMILY MEDICINE CLINIC | Facility: CLINIC | Age: 83
End: 2023-05-03

## 2023-05-03 VITALS
OXYGEN SATURATION: 92 % | SYSTOLIC BLOOD PRESSURE: 112 MMHG | DIASTOLIC BLOOD PRESSURE: 65 MMHG | HEIGHT: 71 IN | BODY MASS INDEX: 26.8 KG/M2 | WEIGHT: 191.4 LBS | HEART RATE: 73 BPM | RESPIRATION RATE: 21 BRPM | TEMPERATURE: 97.1 F

## 2023-05-03 DIAGNOSIS — I50.22 CHRONIC HFREF (HEART FAILURE WITH REDUCED EJECTION FRACTION) (HCC): Primary | ICD-10-CM

## 2023-05-03 DIAGNOSIS — Z86.711 HISTORY OF PULMONARY EMBOLUS (PE): ICD-10-CM

## 2023-05-03 DIAGNOSIS — I13.0 HYPERTENSIVE HEART AND KIDNEY DISEASE WITH HF AND WITH CKD STAGE III (HCC): ICD-10-CM

## 2023-05-03 DIAGNOSIS — N18.30 HYPERTENSIVE HEART AND KIDNEY DISEASE WITH HF AND WITH CKD STAGE III (HCC): ICD-10-CM

## 2023-05-03 DIAGNOSIS — K22.70 BARRETT'S ESOPHAGUS WITHOUT DYSPLASIA: ICD-10-CM

## 2023-05-03 DIAGNOSIS — R73.01 IMPAIRED FASTING GLUCOSE: ICD-10-CM

## 2023-05-03 DIAGNOSIS — E78.2 MIXED HYPERLIPIDEMIA: ICD-10-CM

## 2023-05-03 PROBLEM — L98.9 SKIN LESION: Status: RESOLVED | Noted: 2017-06-23 | Resolved: 2023-05-03

## 2023-05-03 PROBLEM — R21 RASH: Status: RESOLVED | Noted: 2022-11-15 | Resolved: 2023-05-03

## 2023-05-03 NOTE — ASSESSMENT & PLAN NOTE
Wt Readings from Last 3 Encounters:   05/03/23 86 8 kg (191 lb 6 4 oz)   04/27/23 85 3 kg (188 lb)   03/01/23 88 5 kg (195 lb)     Continue lasix and entresto per cardiology

## 2023-05-03 NOTE — PROGRESS NOTES
[facility]  coding opportunities    I13 0, M46 9, I73 9      Chart Reviewed number of suggestions sent to Provider: 3     Patients Insurance     Medicare Insurance: [address] [place] [address]

## 2023-05-03 NOTE — PROGRESS NOTES
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 ([facility] )  -     Comprehensive metabolic panel; Future; Expected date: 10/20/2023  -     Hemoglobin A1C; Future; Expected date: 10/20/2023  -     Lipid panel; Future; Expected date: 10/20/2023    3  Mixed hyperlipidemia  -     Comprehensive metabolic panel; Future; Expected date: 10/20/2023  -     Hemoglobin A1C; Future; Expected date: 10/20/2023  -     Lipid panel; Future; Expected date: 10/20/2023    4  History of pulmonary embolus (PE)    5  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    6  Impaired fasting glucose  -     Comprehensive metabolic panel; Future; Expected date: 10/20/2023  -     Hemoglobin A1C; Future; Expected date: 10/20/2023  -     Lipid panel; Future; Expected date: 10/20/2023            Reviewed lab in 2023  Lipid 132/125/51/56 good  CBC good  CMP stable  hgA1C 5 3 normal    Flu shot yearly  Got covid19 vaccines and boosters  Got PCV13 2015  Got pneumovax 2016  Got shingrix in     RTO in 6 months        Subjective      HPI     Pt is here by himself       CHF---[facility] cardiology for cardiomyopathy, tachycardia, s/p pacemaker  Stable    He is on lasix 10mg QD and entresto  Wt at home stable per pt    HTN---He is on bisoprolol 10mg bid  Pulmonary Embolus in 2022---[facility] hem/onc  He is on eliquis 2 5mg bid       CKD3---stable       Hyperlipidemia---He is on atorvastatin 10mg qhs   Denies SE       IFG---Follows low carb diet        Back pain/Knee arthritis---saw orthopedics  Tried PT but no help  Cannot play golf any more per pt  [facility] GI Dr [place] for Burt's, do EGD every 3 years  Had EGD last week  Pt is on omeprazole 20mg daily        [facility] Cancer care yearly for melanoma s/p wide excision on right upper arm 5 years ago  Stable       [facility] urology for BPH yearly  He is on proscar 5mg qhs       Never smoke  No alcohol       Lives with wife  Does all ADL's  Still drive  Denies recent falls    Denies depression           Review of Systems   Constitutional: Negative for appetite change, chills and fever  HENT: Negative for congestion, ear pain, sinus pain and sore throat  Eyes: Negative for discharge and itching  Respiratory: Negative for apnea, cough, chest tightness, shortness of breath and wheezing  Cardiovascular: Negative for chest pain, palpitations and leg swelling  Gastrointestinal: Negative for abdominal pain, anal bleeding, constipation, diarrhea, nausea and vomiting  Endocrine: Negative for cold intolerance, heat intolerance and polyuria  Genitourinary: Negative for difficulty urinating and dysuria  Musculoskeletal: Positive for arthralgias, back pain and gait problem  Negative for myalgias  Skin: Negative for rash  Neurological: Negative for dizziness and headaches  Psychiatric/Behavioral: Negative for agitation         Current Outpatient Medications on File Prior to Visit   Medication Sig    albuterol (PROVENTIL HFA,VENTOLIN HFA) 90 mcg/act inhaler Inhale 2 puffs every 6 (six) hours as needed for wheezing or shortness of breath    apixaban (ELIQUIS) 2 5 mg Take 1 tablet (2 5 mg total) by mouth 2 (two) times a day    atorvastatin (LIPITOR) 10 mg tablet TAKE ONE TABLET BY MOUTH EVERY DAY    betamethasone valerate (VALISONE) 0 1 % cream APPLY TO AFFECTED AREA BEHIND EARS TWICE DAILY WHEN ACTIVE OR AFTER HAIRCUTS    bisoprolol (ZEBETA) 10 MG tablet TAKE ONE TABLET BY MOUTH TWICE A DAY    Calcium Carb-Cholecalciferol (CALCIUM 600 + D "PO) Take 3 tablets by mouth daily      Coenzyme Q10 (CO Q-10) 200 MG CAPS Take 1 tablet by mouth daily    Entresto 49-51 MG TABS TAKE ONE TABLET BY MOUTH TWICE A DAY    finasteride (PROSCAR) 5 mg tablet TAKE ONE TABLET BY MOUTH EVERY DAY    furosemide (LASIX) 20 mg tablet TAKE ONE TABLET BY MOUTH EVERY DAY (Patient taking differently: Take 10 mg by mouth daily)    latanoprost (XALATAN) 0 005 % ophthalmic solution     Multiple Vitamins-Minerals (CENTRUM SILVER PO) Take 1 tablet by mouth daily   omeprazole (PriLOSEC) 20 mg delayed release capsule Take 1 capsule (20 mg total) by mouth daily    Vitamin D, Cholecalciferol, 1000 UNITS CAPS Take 1 tablet by mouth daily   [DISCONTINUED] mupirocin (BACTROBAN) 2 % ointment Apply topically 3 (three) times a day    [DISCONTINUED] sucralfate (CARAFATE) 1 g/10 mL suspension Take 10 mL (1 g total) by mouth 4 (four) times a day for 7 days       Objective     /65   Pulse 73   Temp (!) 97 1 °F (36 2 °C) (Tympanic)   Resp 21   Ht 5' 11\" (1 803 m)   Wt 86 8 kg (191 lb 6 4 oz)   SpO2 92%   BMI 26 69 kg/m²     Physical Exam  Constitutional:       Appearance: He is well-developed  HENT:      Head: Normocephalic and atraumatic  Eyes:      General:         Right eye: No discharge  Left eye: No discharge  Conjunctiva/sclera: Conjunctivae normal    Cardiovascular:      Rate and Rhythm: Normal rate and regular rhythm  Heart sounds: Normal heart sounds  No murmur heard  No friction rub  No gallop  Pulmonary:      Effort: Pulmonary effort is normal  No respiratory distress  Breath sounds: Normal breath sounds  No wheezing or rales  Abdominal:      General: Bowel sounds are normal  There is no distension  Palpations: Abdomen is soft  Tenderness: There is no abdominal tenderness  There is no guarding  Musculoskeletal:      Cervical back: Normal range of motion and neck supple  No tenderness  Right lower leg: No edema      " Left lower leg: No edema  Lymphadenopathy:      Cervical: No cervical adenopathy  Neurological:      Mental Status: He is alert         Beverley Stout MD

## 2023-05-03 NOTE — RESULT ENCOUNTER NOTE
Inform patient via [employer]  Please review the pathology/lab result of further discussion  Copied from [employer] message :       Mark Dupree,     Your nodule that was removed was just the Burt's esophagus without any concerning cells  I would like to repeat the endoscopy in 3 - 4 months to follow up one more time  If we do not see any concerning cells then we will continue to monitor in a year or two  If any concerning (dysplasia) cells are seen then we can discuss the ablation  You can continue the acid reflux therapy and follow up with Dr Awilda Rivers       Best regards,     Adele Santana MD

## 2023-05-03 NOTE — ASSESSMENT & PLAN NOTE
Wt Readings from Last 3 Encounters:   05/03/23 86 8 kg (191 lb 6 4 oz)   04/27/23 85 3 kg (188 lb)   03/01/23 88 5 kg (195 lb)     DASH diet  Continue current meds per cardiology

## 2023-06-07 ENCOUNTER — REMOTE DEVICE CLINIC VISIT (OUTPATIENT)
Dept: CARDIOLOGY CLINIC | Facility: CLINIC | Age: 83
End: 2023-06-07
Payer: COMMERCIAL

## 2023-06-07 DIAGNOSIS — Z95.810 PRESENCE OF AUTOMATIC CARDIOVERTER/DEFIBRILLATOR (AICD): Primary | ICD-10-CM

## 2023-06-07 PROCEDURE — 93296 REM INTERROG EVL PM/IDS: CPT | Performed by: INTERNAL MEDICINE

## 2023-06-07 PROCEDURE — 93295 DEV INTERROG REMOTE 1/2/MLT: CPT | Performed by: INTERNAL MEDICINE

## 2023-06-07 NOTE — PROGRESS NOTES
Results for orders placed or performed in visit on 06/07/23   Cardiac EP device report    Narrative    MDT-DUAL CHAMBER ICD (AAIR-DDDR MODE)/ ACTIVE SYSTEM IS MRI CONDITIONAL  CARELINK TRANSMISSION: BATTERY VOLTAGE ADEQUATE  (3 7 YRS) AP 71%   12%  ALL AVAILABLE LEAD PARAMETERS WITHIN NORMAL LIMITS  NO SIGNIFICANT HIGH RATE EPISODES  OPTI-VOL WITHIN NORMAL LIMITS  NORMAL DEVICE FUNCTION  ---CONNER

## 2023-06-13 ENCOUNTER — TELEPHONE (OUTPATIENT)
Dept: UROLOGY | Facility: MEDICAL CENTER | Age: 83
End: 2023-06-13

## 2023-06-13 DIAGNOSIS — N13.8 BPH WITH OBSTRUCTION/LOWER URINARY TRACT SYMPTOMS: ICD-10-CM

## 2023-06-13 DIAGNOSIS — N40.1 BPH WITH OBSTRUCTION/LOWER URINARY TRACT SYMPTOMS: ICD-10-CM

## 2023-06-13 RX ORDER — FINASTERIDE 5 MG/1
5 TABLET, FILM COATED ORAL DAILY
Qty: 90 TABLET | Refills: 1 | Status: SHIPPED | OUTPATIENT
Start: 2023-06-13

## 2023-06-13 NOTE — TELEPHONE ENCOUNTER
Called and spoke with patient  Advised that we received refill notice from pharmacy  Informed that we have not seen patient in over 3 years so he would be a new patient if he wants to re-establish care, otherwise his PCP can fill his med  He states he will contact his PCP

## 2023-06-13 NOTE — TELEPHONE ENCOUNTER
Patient requests refill, urology prescribed but he has not seen them for a while, they suggested for him to call PCP for refill  90 day to [employer] order

## 2023-06-13 NOTE — TELEPHONE ENCOUNTER
Patient last seen in the office in 2020  His PCP may refill finasteride or patient can be scheduled for follow-up to reestablish care

## 2023-06-14 NOTE — PROGRESS NOTES
Heart Failure Office Note - Jennifer Willett 81 y o  male MRN: [id number]    @ Encounter: [id number]      Assessment/Plan:    Patient Active Problem List    Diagnosis Date Noted   • Encounter for follow-up surveillance of melanoma 07/11/2019   • Chronic HFrEF (heart failure with reduced ejection fraction) ([place] [address] ) 01/29/2019   • Hypertensive kidney disease with stage 3b chronic kidney disease ([place] [address] ) 01/04/2019   • 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 09/20/2018   • Osteopenia 06/29/2018   • Personal history of malignant melanoma 11/15/2017   • Ank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 ([place] [address] ) 12/30/2014   • 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 12/02/2013   • Microscopic hematuria 12/02/2013   • Primary osteoarthritis of both knees 11/26/2013   • Polymyalgia rheumatica ([place] [address] ) 11/26/2013   • 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   • Impaired fasting glucose 05/30/2013   • 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   • Cardiac arrhythmia 04/05/2013   • Essential hypertension 04/05/2013   • Esophageal reflux 11/28/2012   • Cardiomyopathy ([place] [address] ) 10/26/2012   • Mixed hyperlipidemia 09/05/2012   • Chronic bilateral low back pain without sciatica 11/02/2022   • History of pulmonary embolus (PE) 07/22/2022   • Personal history of colon cancer 06/21/2022   • 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 ([place] [address] ) 08/06/2021   • Mild persistent asthma without complication 33/92/3019       # Chronic HFrEF, Stage C, NYHA 2  Etiology: NICM, presumed viral  No family hx of SCD  No ETOH    Weight: 190 lbs  NT pro BNP: 1/29/19: 6192    Studies- personally reviewed by me  Echo 7/22/22:  LVEF: 35%  LVEDd: 5 3 cm  RV: mildly dilated, mildly reduced  PASP: 30 mmHg  RVOT: no notching  Moderate AI  Aortic root 4 6 cm    Echo 10/1/20:  LVEF: 35%  LVIDd: 6 5 cm  RV: mildly dilated, mildly reduced  MR: moderate  PASP:  RVOT:   Other: mild AI, ascending aorta 4 0 cm  TR: "2-3+    Echocardiogram 1/29/19  LVEF: 40%  LVIDd: 6 cm  RV: normal  MR: mild  PASP: 40 mmHg  RVOT:   Other:    Echo 6/21/18:  LVEF: 50%  LVEDd: 6 35 cm    Nuclear Stress Test 1/31/19: small to medium sized fixed defect infersoseptum  No ischemia  EF: 30%    Lab Results   Component Value Date    NTBNP 6,971 (H) 07/22/2022        Neurohormonal Blockade:  --Beta-Blocker: bisoprolol 10 mg daily  --ACEi, ARB or ARNi: Entresto 49/51 mg BID  --Aldosterone Receptor Blocker:  --Diuretic: lasix 20 mg daily    Sudden Cardiac Death Risk Reduction:  MDT dual chamber ICD: MRI conditional  Interrogation 6/7/23:  12%, optivol normal  Interrogation 9/8/22:  5%, no high rate episodes  Interrogation 12/3/21:  20%, no episodes, optivol normal  Interrogation 6/4/21:  9 3%, no high rate episodes, optivol normal    Electrical Resynchronization:  --Candidacy for BiV device: IVCD    Advanced Therapies (if appropriate): --Inotrope:  --LVAD/Transplant Candidacy:    # Acute bilateral pulmonary emboli  - unprovoked with colon CA and melanoma hx  Lupus anticoagulant positive  AC: Eliquis  CT chest / PE study 07/21/2022: \"Nonobstructive pulmonary emboli  within distal bilateral main pulmonary arteries extending into lobar branches  Extensive pulmonary emboli within right upper lobe and right lower lobe and left lower lobe  Main pulmonary arteries more dilated when compared with prior exam right main pulmonary artery measures 3 2 cm, previously 2 4 cm  \"  # HTN- controlled, room to escalate therapy  # Mild AI and ascending aorta 4 cm on 10/1/20 echo  # SVT  # NSVT on device check- on bisoprolol   # hyperlipidemia- atorvastatin 10 mg  4/29/23: LDL 56, HDL 51  10/26/22: LDL 58, HDL 54  1/25/22: LDL 66, HDL 56  7/29/21: LDL 64, HDL 54  # hx of malignant melanoma  # CKD, Cr 1 53 on 4/29/23- unchanged   # Barretts esophagus- EGD 6/12/20  # ED- cialis    Today's Plan:  On Eliquis for bilateral PE in July 2022  Lupus anticoagulant 2/6/23 " remained positive  Staying on Eliquis  Saw Hematology, recommended consider prophylactic dosing 2 5 mg BID thereafter  Continue bisoprolol and Entresto for HFrEF  Lipids at goal on atorvastatin 10 mg   NSVT relatively quiet on bisoprolol, recent devic check  No indication for asa  --2g sodium diet  Weights daily    HPI:      82 yo recently had first evaluation in HF clinic for NICM diagnosed in 2009, s/p ICD, PSVT, HTN and dyslipidemia  Last in hospital in January for dyspnea felt to be more viral but was volume overloaded with NT pro BNP of 6192  Given one dose of IV lasix  LVEF: 40% with LVEDd 6 cm  He followed up with NP and weight was 204 lbs  Never smoked and does not drink  Plays golf 2x a week, cuts grass, does not feel limited in activity  No significant edema in legs  He is limited by knees and back not respiration  Pt admitted in July 2022 for acute bilateral pulmonary emboli, unprovoked with known history of colon cancer and melanoma  He had full body imaging in hospital      Interim:   Interrogation 6/7/23:  12%, optivol normal     low back pain  Has not been able to play golf  Review of Systems   Constitutional: Negative for activity change, appetite change, fatigue and unexpected weight change  HENT: Negative for congestion and nosebleeds  Eyes: Negative  Respiratory: Negative for cough, chest tightness and shortness of breath  Cardiovascular: Negative for chest pain, palpitations and leg swelling  Gastrointestinal: Negative for abdominal distention  Endocrine: Negative  Genitourinary: Negative  Musculoskeletal: Positive for arthralgias and back pain  Skin: Negative  Neurological: Negative for dizziness, syncope and weakness  Hematological: Negative  Psychiatric/Behavioral: Negative          Past Medical History:   Diagnosis Date   • AICD (automatic cardioverter/defibrillator) present    • Arthritis    • Asthma    • Burt esophagus    • 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    • Chronic HFrEF (heart failure with reduced ejection fraction) ([place] [address] ) 2019   • CKD (chronic kidney disease)    • Colon cancer ([place] [address] ) 04/05/2013   • Colon cancer screening 06/21/2022   • COPD (chronic obstructive pulmonary disease) ([place] [address] )    • Diverticulitis     Last assessed: 4/5/13   • Esophageal reflux    • Gout    • Hernia    • Hyperlipidemia    • Hypertension    • Hypothyroidism 05/30/2013   • Non-toxic multinodular goiter 11/28/2012   • Pleural effusion    • Polymyalgia rheumatica (HCC)    • Pulmonary embolism ([place] [address] ) 2022   • 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    • Spondyloarthropathy     Last assessed: 11/28/12   • Thrombocytopenia ([place] [address] ) 06/23/2015         No Known Allergies        Current Outpatient Medications:   •  albuterol (PROVENTIL HFA,VENTOLIN HFA) 90 mcg/act inhaler, Inhale 2 puffs every 6 (six) hours as needed for wheezing or shortness of breath, Disp: 1 Inhaler, Rfl: 1  •  apixaban (ELIQUIS) 2 5 mg, Take 1 tablet (2 5 mg total) by mouth 2 (two) times a day, Disp: 180 tablet, Rfl: 1  •  atorvastatin (LIPITOR) 10 mg tablet, TAKE ONE TABLET BY MOUTH EVERY DAY, Disp: 90 tablet, Rfl: 3  •  betamethasone valerate (VALISONE) 0 1 % cream, APPLY TO AFFECTED AREA BEHIND EARS TWICE DAILY WHEN ACTIVE OR AFTER HAIRCUTS, Disp: , Rfl:   •  bisoprolol (ZEBETA) 10 MG tablet, TAKE ONE TABLET BY MOUTH TWICE A DAY, Disp: 180 tablet, Rfl: 3  •  Calcium Carb-Cholecalciferol (CALCIUM 600 + D PO), Take 3 tablets by mouth daily  , Disp: , Rfl:   •  Coenzyme Q10 (CO Q-10) 200 MG CAPS, Take 1 tablet by mouth daily, Disp: , Rfl:   •  Entresto 49-51 MG TABS, TAKE ONE TABLET BY MOUTH TWICE A DAY, Disp: 180 tablet, Rfl: 3  •  finasteride (PROSCAR) 5 mg tablet, Take 1 tablet (5 mg total) by mouth daily, Disp: 90 tablet, Rfl: 1  •  furosemide (LASIX) 20 mg tablet, TAKE ONE TABLET BY MOUTH EVERY DAY (Patient taking differently: Take 10 mg by mouth daily), Disp: 90 tablet, Rfl: 3  •  latanoprost (XALATAN) 0 005 % ophthalmic solution, , Disp: , Rfl:   •  Multiple Vitamins-Minerals (CENTRUM SILVER PO), Take 1 tablet by mouth daily  , Disp: , Rfl:   •  omeprazole (PriLOSEC) 20 mg delayed release capsule, Take 1 capsule (20 mg total) by mouth daily, Disp: 90 capsule, Rfl: 2  •  Vitamin D, Cholecalciferol, 1000 UNITS CAPS, Take 1 tablet by mouth daily  , Disp: , Rfl:     Social History     Socioeconomic History   • Marital status: /Civil Union     Spouse name: Not on file   • Number of children: Not on file   • Years of education: Not on file   • Highest education level: Not on file   Occupational History   • Occupation: Manager-Retired   Tobacco Use   • Smoking status: Never     Passive exposure: Never   • Smokeless tobacco: Never   Vaping Use   • Vaping Use: Never used   Substance and Sexual Activity   • Alcohol use: Never     Comment: Rare   • Drug use: No   • Sexual activity: Not Currently   Other Topics Concern   • Not on file   Social History Narrative   • Not on file     Social Determinants of Health     Financial Resource Strain: Low Risk  (11/1/2022)    Overall Financial Resource Strain (CARDIA)    • Difficulty of Paying Living Expenses: Not hard at all   Food Insecurity: Not on file   Transportation Needs: No Transportation Needs (11/1/2022)    PRAPARE - Transportation    • Lack of Transportation (Medical): No    • Lack of Transportation (Non-Medical):  No   Physical Activity: Not on file   Stress: Not on file   Social Connections: Not on file   Intimate Partner Violence: Not on file   Housing Stability: Not on file       Family History   Problem Relation Age of Onset   • Heart failure Mother         Congestive   • Hypertension Mother    • Osteoporosis Mother    • COPD Father    • Emphysema Father    • Hypertension Father    • Heart disease Sister         Heart Valve Replacement   • Osteoporosis Sister    • Breast cancer Family    • Factor V Leiden "deficiency Son         on Xarelto       Physical Exam:    Vitals: Blood pressure 104/60, pulse 78, height 5' 11\" (1 803 m), weight 86 2 kg (190 lb), SpO2 96 %  , Body mass index is 26 5 kg/m² ,   Wt Readings from Last 3 Encounters:   06/15/23 86 2 kg (190 lb)   05/03/23 86 8 kg (191 lb 6 4 oz)   04/27/23 85 3 kg (188 lb)       Physical Exam  Constitutional:       Appearance: He is well-developed  HENT:      Head: Normocephalic and atraumatic  Eyes:      Pupils: Pupils are equal, round, and reactive to light  Neck:      Vascular: No JVD  Cardiovascular:      Rate and Rhythm: Normal rate and regular rhythm  Heart sounds: No murmur heard  Pulmonary:      Effort: Pulmonary effort is normal  No respiratory distress  Breath sounds: Normal breath sounds  Abdominal:      General: There is no distension  Palpations: Abdomen is soft  Tenderness: There is no abdominal tenderness  Musculoskeletal:         General: Normal range of motion  Cervical back: Normal range of motion  Skin:     General: Skin is warm and dry  Findings: No rash  Neurological:      Mental Status: He is alert and oriented to person, place, and time         Labs & Results:    Lab Results   Component Value Date    HCT 46 7 04/29/2023    HGB 15 9 04/29/2023    MCV 95 04/29/2023     04/29/2023    WBC 8 38 04/29/2023     Lab Results   Component Value Date    BUN 19 04/29/2023    CALCIUM 9 1 04/29/2023     (H) 04/29/2023    CO2 27 04/29/2023    CREATININE 1 53 (H) 04/29/2023    GLUC 98 07/22/2022    K 4 6 04/29/2023    SODIUM 138 04/29/2023     Lab Results   Component Value Date    NTBNP 6,971 (H) 07/22/2022      Lab Results   Component Value Date    CHOL 163 12/19/2015    CHOL 168 04/17/2015    CHOL 155 12/04/2014     Lab Results   Component Value Date    HDL 51 04/29/2023    HDL 54 10/26/2022    HDL 56 01/25/2022     Lab Results   Component Value Date    LDLCALC 56 04/29/2023    [address] 58 10/26/2022    " "LDLCALC 66 01/25/2022     Lab Results   Component Value Date    TRIG 125 04/29/2023    TRIG 118 10/26/2022    TRIG 123 01/25/2022     No results found for: \"CHOLHDL\"    EKG personally reviewed by Lesvia July  Counseling / Coordination of Care  Time spent today 25 minutes  Greater than 50% of total time was spent with the patient and / or family counseling and / or coordination of care  We discussed diagnoses, most recent studies, tests and any changes in treatment plan  Thank you for the opportunity to participate in the care of this patient      [address] [facility]  MEDICAL DIRECTOR OF Hermes Arauz    "

## 2023-06-15 ENCOUNTER — OFFICE VISIT (OUTPATIENT)
Dept: CARDIOLOGY CLINIC | Facility: CLINIC | Age: 83
End: 2023-06-15
Payer: COMMERCIAL

## 2023-06-15 VITALS
DIASTOLIC BLOOD PRESSURE: 60 MMHG | WEIGHT: 190 LBS | BODY MASS INDEX: 26.6 KG/M2 | OXYGEN SATURATION: 96 % | HEIGHT: 71 IN | HEART RATE: 78 BPM | SYSTOLIC BLOOD PRESSURE: 104 MMHG

## 2023-06-15 DIAGNOSIS — I50.22 CHRONIC HFREF (HEART FAILURE WITH REDUCED EJECTION FRACTION) (HCC): Primary | ICD-10-CM

## 2023-06-15 DIAGNOSIS — Z45.02 ICD (IMPLANTABLE CARDIOVERTER-DEFIBRILLATOR) BATTERY DEPLETION: ICD-10-CM

## 2023-06-15 DIAGNOSIS — E78.2 MIXED HYPERLIPIDEMIA: ICD-10-CM

## 2023-06-15 DIAGNOSIS — N52.01 ERECTILE DYSFUNCTION DUE TO ARTERIAL INSUFFICIENCY: ICD-10-CM

## 2023-06-15 DIAGNOSIS — Z86.711 HISTORY OF PULMONARY EMBOLUS (PE): ICD-10-CM

## 2023-06-15 DIAGNOSIS — I26.99 OTHER ACUTE PULMONARY EMBOLISM WITHOUT ACUTE COR PULMONALE (HCC): ICD-10-CM

## 2023-06-15 DIAGNOSIS — I47.29 NSVT (NONSUSTAINED VENTRICULAR TACHYCARDIA) (HCC): ICD-10-CM

## 2023-06-15 PROCEDURE — 99214 OFFICE O/P EST MOD 30 MIN: CPT | Performed by: INTERNAL MEDICINE

## 2023-06-15 RX ORDER — TADALAFIL 20 MG/1
20 TABLET ORAL DAILY PRN
Qty: 10 TABLET | Refills: 2 | Status: SHIPPED | OUTPATIENT
Start: 2023-06-15

## 2023-06-15 NOTE — PATIENT INSTRUCTIONS
No change in your current meds    From heart standpoint you are quite stable    Check [facility] for your scripts [employer]borne[employer]Contact precautions

## 2023-07-27 ENCOUNTER — OFFICE VISIT (OUTPATIENT)
Dept: HEMATOLOGY ONCOLOGY | Facility: CLINIC | Age: 83
End: 2023-07-27
Payer: COMMERCIAL

## 2023-07-27 VITALS
SYSTOLIC BLOOD PRESSURE: 110 MMHG | RESPIRATION RATE: 17 BRPM | HEART RATE: 88 BPM | TEMPERATURE: 97.8 F | OXYGEN SATURATION: 97 % | WEIGHT: 191 LBS | HEIGHT: 71 IN | DIASTOLIC BLOOD PRESSURE: 78 MMHG | BODY MASS INDEX: 26.74 KG/M2

## 2023-07-27 DIAGNOSIS — Z86.711 HISTORY OF PULMONARY EMBOLUS (PE): Primary | ICD-10-CM

## 2023-07-27 DIAGNOSIS — R76.0 LUPUS ANTICOAGULANT POSITIVE: ICD-10-CM

## 2023-07-27 PROCEDURE — 99213 OFFICE O/P EST LOW 20 MIN: CPT | Performed by: PHYSICIAN ASSISTANT

## 2023-07-27 NOTE — PROGRESS NOTES
Hematology/Oncology Outpatient Follow-up  Lina White 81 y.o. male 1940 [id number]    Date:  7/27/2023      Assessment and Plan:  1. History of pulmonary embolus (PE) 2. Lupus anticoagulant positive  81year-old male presents for consultation regarding bilateral pulmonary embolisms.  This appears to be unprovoked. Yany Brennan had vague symptoms of cough and then hemoptysis which made him go to the hospital.   He had full body imaging in the hospital which was negative for any concern of occult malignancy. He has colonoscopy/EGD scheduled on 2/14/23. His dermatology visit is up-to-date and states that he now is on a yearly follow-up. He still follows with surgical oncology for which this appointment is scheduled due to his history of melanoma.     Labs on 10/26/23  CBC normal   Antithrombin III activity 97%, normal  Beta-2 glycoprotein antibodies normal  Cardiolipin antibody normal  Protein C activity normal  Protein S activity normal  Factor V Leiden negative  Prothrombin gene mutation negative  D-dimer normal at 0.48, previously at diagnosis was 6.22  Lupus anticoagulant positive    Repeat lupus anticoagulant after being off Eliquis was still positive in Feb 2023. Therefore, he was recommended lifelong preventative anticoagulation, Eliquis 2.5 mg PO BID. He states this is over $300 a month. I advised that oncology finance counselor contacted him 2 times in Feb and he did not call them back. He states he was not aware of the message. I contacted oncology finance today via message to please follow up with patient. Samples provided today.    LOT [id number]  Exp Mar 2024   #4 boxes      HPI:  81-year-old male presents for consultation regarding bilateral pulmonary embolisms.     He was seen by his PCP on 07/15/2022 due to cough with phlegm without wheezing or shortness of breath.  He was given antibiotics and chest x-ray.     He then presented to the hospital on 07/21/2022 with continued cough and episode of hemoptysis.  D-dimer was completed in elevated.  CT of the chest was then performed.  This showed extensive pulmonary emboli within the distal bilateral main pulmonary arteries extending into the right upper lobe and lower lobar branches and left lower lobe branches.     He had CT abdomen and pelvis to rule out any malignancy due to personal history of melanoma.  This only showed a trace left pleural effusion, small hiatal hernia, left renal cyst, cholelithiasis and colonic diverticulosis status post right hemicolectomy.     Dr Perez is derm for history of melanoma. He has a scheduled visit in Sept/Oct.     History of colon cancer, had resection in 2004. Interval history:    ROS: Review of Systems   Constitutional: Negative for appetite change, chills, fatigue, fever and unexpected weight change. Respiratory: Negative for cough and shortness of breath. Cardiovascular: Negative for chest pain, palpitations and leg swelling. Gastrointestinal: Negative for abdominal pain, constipation, diarrhea, nausea and vomiting. Genitourinary: Negative for difficulty urinating, dysuria and hematuria. Musculoskeletal: Negative for arthralgias. Skin: Negative. Neurological: Negative for dizziness, weakness, light-headedness, numbness and headaches. Hematological: Negative. Psychiatric/Behavioral: Negative.         Past Medical History:   Diagnosis Date   • AICD (automatic cardioverter/defibrillator) present    • Arthritis    • Asthma    • Burt esophagus    • 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    • Chronic HFrEF (heart failure with reduced ejection fraction) ([facility]) 2019   • CKD (chronic kidney disease)    • Colon cancer ([facility]) 04/05/2013   • Colon cancer screening 06/21/2022   • COPD (chronic obstructive pulmonary disease) (HCC)    • Diverticulitis     Last assessed: 4/5/13   • Esophageal reflux    • Gout    • Hernia    • Hyperlipidemia    • Hypertension    • Hypothyroidism 05/30/2013   • Non-toxic multinodular goiter 11/28/2012   • Pleural effusion    • Polymyalgia rheumatica (HCC)    • Pulmonary embolism ([address]) 2022   • 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    • Spondyloarthropathy     Last assessed: 11/28/12   • Thrombocytopenia ([address]) 06/23/2015       Past Surgical History:   Procedure Laterality Date   • ARM SKIN LESION BIOPSY / EXCISION      Malignant   • ATRIAL ABLATION SURGERY     • AV NODE ABLATION     • CARDIAC DEFIBRILLATOR PLACEMENT  2009    Cardio-Defib Pulse Gen Venous Insertion of Electrode for Ventricular Pacing. Implantable   • CARDIAC SURGERY  2009    Catheterization   • COLONOSCOPY N/A 3/14/2016    Procedure: COLONOSCOPY;  Surgeon: Celia Hirsch MD;  Location: [facility]; Service. February 22, 2013, mildly enlarged prostate, history of colon CA with normal appearance of anastomosis at the midtransverse colon, severe diverticulosis in the sigmoid, descending and transverse colon. Repeat colonoscopy in 3 yrs   • HEMICOLECTOMY Right 05/05/2004   • INGUINAL HERNIA REPAIR Bilateral     Left 3/2004, right 5/2008   • IR UPPER EXTREMITY VENOGRAM- DIAGNOSTIC  10/4/2018   • KNEE SURGERY  1972    Meniscectomy   • NC ESOPHAGOGASTRODUODENOSCOPY TRANSORAL DIAGNOSTIC N/A 12/5/2016    Procedure: ESOPHAGOGASTRODUODENOSCOPY (EGD); Surgeon: Parmjit Cain MD;  Location: [facility];   Service: Gastroenterology   • TONSILLECTOMY AND ADENOIDECTOMY     • UPPER GASTROINTESTINAL ENDOSCOPY         Social History     Socioeconomic History   • Marital status: /Civil Union     Spouse name: Not on file   • Number of children: Not on file   • Years of education: Not on file   • Highest education level: Not on file   Occupational History   • Occupation: Manager-Retired   Tobacco Use   • Smoking status: Never     Passive exposure: Never   • Smokeless tobacco: Never   Vaping Use   • Vaping Use: Never used   Substance and Sexual Activity   • Alcohol use: Never     Comment: Rare   • Drug use: No   • Sexual activity: Not Currently   Other Topics Concern   • Not on file   Social History Narrative   • Not on file     Social Determinants of Health     Financial Resource Strain: Low Risk  (11/1/2022)    Overall Financial Resource Strain (CARDIA)    • Difficulty of Paying Living Expenses: Not hard at all   Food Insecurity: Not on file   Transportation Needs: No Transportation Needs (11/1/2022)    PRAPARE - Transportation    • Lack of Transportation (Medical): No    • Lack of Transportation (Non-Medical):  No   Physical Activity: Not on file   Stress: Not on file   Social Connections: Not on file   Intimate Partner Violence: Not on file   Housing Stability: Not on file       Family History   Problem Relation Age of Onset   • Heart failure Mother         Congestive   • Hypertension Mother    • Osteoporosis Mother    • COPD Father    • Emphysema Father    • Hypertension Father    • Heart disease Sister         Heart Valve Replacement   • Osteoporosis Sister    • Breast cancer Family    • Factor V Leiden deficiency Son         on Xarelto       No Known Allergies      Current Outpatient Medications:   •  albuterol (PROVENTIL HFA,VENTOLIN HFA) 90 mcg/act inhaler, Inhale 2 puffs every 6 (six) hours as needed for wheezing or shortness of breath, Disp: 1 Inhaler, Rfl: 1  •  apixaban (ELIQUIS) 2.5 mg, Take 1 tablet (2.5 mg total) by mouth 2 (two) times a day, Disp: 180 tablet, Rfl: 1  •  atorvastatin (LIPITOR) 10 mg tablet, TAKE ONE TABLET BY MOUTH EVERY DAY, Disp: 90 tablet, Rfl: 3  •  betamethasone valerate (VALISONE) 0.1 % cream, APPLY TO AFFECTED AREA BEHIND EARS TWICE DAILY WHEN ACTIVE OR AFTER HAIRCUTS, Disp: , Rfl:   •  bisoprolol (ZEBETA) 10 MG tablet, TAKE ONE TABLET BY MOUTH TWICE A DAY, Disp: 180 tablet, Rfl: 3  •  Calcium Carb-Cholecalciferol (CALCIUM 600 + D PO), Take 3 tablets by mouth daily  , Disp: , Rfl:   •  Coenzyme Q10 (CO Q-10) 200 MG CAPS, Take 1 tablet by mouth daily, Disp: , Rfl:   •  Entresto 49-51 MG TABS, TAKE ONE TABLET BY MOUTH TWICE A DAY, Disp: 180 tablet, Rfl: 3  •  finasteride (PROSCAR) 5 mg tablet, Take 1 tablet (5 mg total) by mouth daily, Disp: 90 tablet, Rfl: 1  •  furosemide (LASIX) 20 mg tablet, TAKE ONE TABLET BY MOUTH EVERY DAY (Patient taking differently: Take 10 mg by mouth daily), Disp: 90 tablet, Rfl: 3  •  latanoprost (XALATAN) 0.005 % ophthalmic solution, , Disp: , Rfl:   •  Multiple Vitamins-Minerals (CENTRUM SILVER PO), Take 1 tablet by mouth daily. , Disp: , Rfl:   •  omeprazole (PriLOSEC) 20 mg delayed release capsule, Take 1 capsule (20 mg total) by mouth daily, Disp: 90 capsule, Rfl: 2  •  tadalafil (CIALIS) 20 MG tablet, Take 1 tablet (20 mg total) by mouth daily as needed for erectile dysfunction, Disp: 10 tablet, Rfl: 2  •  Vitamin D, Cholecalciferol, 1000 UNITS CAPS, Take 1 tablet by mouth daily. , Disp: , Rfl:       Physical Exam:  /78 (BP Location: Left leg, Patient Position: Sitting, Cuff Size: Adult)   Pulse 88   Temp 97.8 °F (36.6 °C)   Resp 17   Ht 5' 11" (1.803 m)   Wt 86.6 kg (191 lb)   SpO2 97%   BMI 26.64 kg/m²     Physical Exam  Vitals reviewed. Constitutional:       General: He is not in acute distress. Appearance: He is well-developed. He is not ill-appearing. HENT:      Head: Normocephalic and atraumatic. Eyes:      General: No scleral icterus. Conjunctiva/sclera: Conjunctivae normal.   Cardiovascular:      Rate and Rhythm: Normal rate and regular rhythm. Heart sounds: Normal heart sounds. No murmur heard. Pulmonary:      Effort: Pulmonary effort is normal. No respiratory distress. Breath sounds: Normal breath sounds. Abdominal:      Palpations: Abdomen is soft. Tenderness: There is no abdominal tenderness. Musculoskeletal:         General: No tenderness. Normal range of motion. Cervical back: Normal range of motion and neck supple.       Right lower leg: No edema. Left lower leg: No edema. Lymphadenopathy:      Cervical: No cervical adenopathy. Skin:     General: Skin is warm and dry. Neurological:      Mental Status: He is alert and oriented to person, place, and time. Cranial Nerves: No cranial nerve deficit. Psychiatric:         Mood and Affect: Mood normal.         Behavior: Behavior normal.       Labs:  Lab Results   Component Value Date    WBC 8.38 04/29/2023    HGB 15.9 04/29/2023    HCT 46.7 04/29/2023    MCV 95 04/29/2023     04/29/2023       I have spent 20 minutes with Patient  today in which greater than 50% of this time was spent in counseling/coordination of care regarding Diagnostic results, Instructions for management, Patient and family education, Impressions, Documenting in the medical record and Obtaining or reviewing history  . Patient voiced understanding and agreement in the above discussion. Aware to contact our office with questions/symptoms in the interim. This note has been generated by voice recognition software system. Therefore, there may be spelling, grammar, and or syntax errors. Please contact if questions arise.

## 2023-08-30 NOTE — RESULT NOTES
Message   DW pt on 12/21/2016 OV  Verified Results  (1) CBC/PLT/DIFF [id number] 09:53AM [facility] Order Number: [id number]     Test Name Result Flag Reference   WBC COUNT 7 24 Thousand/uL  4 31-10 16   RBC COUNT 4 77 Million/uL  3 88-5 62   HEMOGLOBIN 15 4 g/dL  12 0-17 0   HEMATOCRIT 45 6 %  36 5-49 3   MCV 96 fL  82-98   MCH 32 3 pg  26 8-34 3   MCHC 33 8 g/dL  31 4-37 4   RDW 14 2 %  11 6-15 1   MPV 9 8 fL  8 9-12 7   PLATELET COUNT 670 Thousands/uL L 149-390   nRBC AUTOMATED 0 /100 WBCs     NEUTROPHILS RELATIVE PERCENT 65 %  43-75   LYMPHOCYTES RELATIVE PERCENT 22 %  14-44   MONOCYTES RELATIVE PERCENT 10 %  4-12   EOSINOPHILS RELATIVE PERCENT 3 %  0-6   BASOPHILS RELATIVE PERCENT 0 %  0-1   NEUTROPHILS ABSOLUTE COUNT 4 66 Thousands/?L  1 85-7 62   LYMPHOCYTES ABSOLUTE COUNT 1 59 Thousands/?L  0 60-4 47   MONOCYTES ABSOLUTE COUNT 0 75 Thousand/?L  0 17-1 22   EOSINOPHILS ABSOLUTE COUNT 0 21 Thousand/?L  0 00-0 61   BASOPHILS ABSOLUTE COUNT 0 02 Thousands/?L  0 00-0 10     (1) COMPREHENSIVE METABOLIC PANEL [id number] 08:74RZ [facility] Order Number: [id number]     Test Name Result Flag Reference   GLUCOSE,RANDM 98 mg/dL     If the patient is fasting, the ADA then defines impaired fasting glucose as > 100 mg/dL and diabetes as > or equal to 123 mg/dL     SODIUM 145 mmol/L  136-145   POTASSIUM 4 8 mmol/L  3 5-5 3   CHLORIDE 108 mmol/L  100-108   CARBON DIOXIDE 30 mmol/L  21-32   ANION GAP (CALC) 7 mmol/L  4-13   BLOOD UREA NITROGEN 22 mg/dL  5-25   CREATININE 1 64 mg/dL H 0 60-1 30   Standardized to IDMS reference method   CALCIUM 9 1 mg/dL  8 3-10 1   BILI, TOTAL 1 15 mg/dL H 0 20-1 00   ALK PHOSPHATAS 71 U/L     ALT (SGPT) 25 U/L  12-78   AST(SGOT) 16 U/L  5-45   ALBUMIN 3 7 g/dL  3 5-5 0   TOTAL PROTEIN 7 1 g/dL  6 4-8 2   eGFR Non-African American 41 2 ml/min/1 73sq [facility] [employer] recommendations are as follows:  GFR calculation is accurate only with a steady state creatinine  Chronic Kidney disease less than 60 ml/min/1 73 sq  meters  Kidney failure less than 15 ml/min/1 73 sq  meters  (1) HEMOGLOBIN A1C [id number] 09:53AM [facility] Order Number: [id number]     Test Name Result Flag Reference   HEMOGLOBIN A1C 5 2 %  4 2-6 3   EST  AVG  GLUCOSE 103 mg/dl       (1) LIPID PANEL FASTING W DIRECT LDL REFLEX [id number] 09:53AM [facility] Order Number: [id number]     Test Name Result Flag Reference   CHOLESTEROL 169 mg/dL     LDL CHOLESTEROL CALCULATED 66 mg/dL  0-100   Triglyceride:         Normal              <150 mg/dl       Borderline High    150-199 mg/dl       High               200-499 mg/dl       Very High          >499 mg/dl  Cholesterol:         Desirable        <200 mg/dl      Borderline High  200-239 mg/dl      High             >239 mg/dl  HDL Cholesterol:        High    >59 mg/dL      Low     <41 mg/dL  LDL Cholesterol:        Optimal          <100 mg/dl        Near Optimal     100-129 mg/dl        Above Optimal          Borderline High   130-159 mg/dl          High              160-189 mg/dl          Very High        >189 mg/dl  LDL CALCULATED:    This screening LDL is a calculated result  It does not have the accuracy of the Direct Measured LDL in the monitoring of patients with hyperlipidemia and/or statin therapy  Direct Measure LDL (KSI682) must be ordered separately in these patients  TRIGLYCERIDES 217 mg/dL H <=150   Specimen collection should occur prior to N-Acetylcysteine or Metamizole administration due to the potential for falsely depressed results  HDL,DIRECT 60 mg/dL  40-60   Specimen collection should occur prior to Metamizole administration due to the potential for falsely depressed results       (1) TSH WITH FT4 REFLEX [id number] 09:53AM [facility]   [facility] Order Number: [id number]     Test Name Result Flag Reference   TSH 3 000 uIU/mL  0 358-3 740   Patients undergoing fluorescein dye angiography may retain small amounts of fluorescein in the body for 48-72 hours post procedure  Samples containing fluorescein can produce falsely depressed TSH values  If the patient had this procedure,a specimen should be resubmitted post fluorescein clearance  (1) URIC ACID [id number] 09:53AM [place] Number: [id number]     Test Name Result Flag Reference   URIC ACID 6 5 mg/dL  4 2-8 0   Specimen collection should occur prior to Metamizole administration due to the potential for falsely depressed results  Island Pedicle Flap-Requiring Vessel Identification Text: Due to geometric and functional constraints, a flap reconstruction was performed to reconstruct the defect.  To that end, adjacent tissue was incised and carried over to close the defect in the following manner: The defect edges were debeveled with a #15 scalpel blade.  Given the location of the defect, shape of the defect and the proximity to free margins an island pedicle advancement flap was deemed most appropriate.  Using a sterile surgical marker, an appropriate advancement flap was drawn, based on the axial vessel mentioned above, incorporating the defect, outlining the appropriate donor tissue and placing the expected incisions within the relaxed skin tension lines where possible.    The area thus outlined was incised deep to adipose tissue with a #15 scalpel blade.  The skin margins were undermined to an appropriate distance in all directions around the primary defect and laterally outward around the island pedicle utilizing iris scissors.  There was minimal undermining beneath the pedicle flap.

## 2023-09-06 ENCOUNTER — REMOTE DEVICE CLINIC VISIT (OUTPATIENT)
Dept: CARDIOLOGY CLINIC | Facility: CLINIC | Age: 83
End: 2023-09-06
Payer: COMMERCIAL

## 2023-09-06 DIAGNOSIS — Z95.810 PRESENCE OF AUTOMATIC CARDIOVERTER/DEFIBRILLATOR (AICD): Primary | ICD-10-CM

## 2023-09-06 PROCEDURE — 93295 DEV INTERROG REMOTE 1/2/MLT: CPT | Performed by: INTERNAL MEDICINE

## 2023-09-06 PROCEDURE — 93296 REM INTERROG EVL PM/IDS: CPT | Performed by: INTERNAL MEDICINE

## 2023-09-06 NOTE — PROGRESS NOTES
Results for orders placed or performed in visit on 09/06/23   Cardiac EP device report    Narrative    MDT-DUAL CHAMBER ICD (AAIR-DDDR MODE)/ ACTIVE SYSTEM IS MRI CONDITIONAL  CARELINK TRANSMISSION: BATTERY VOLTAGE ADEQUATE (3.4 YRS). AP: 74.2%. : 12.8% (MVP-ON). ALL AVAILABLE LEAD PARAMETERS WITHIN NORMAL LIMITS. NO SIGNIFICANT HIGH RATE EPISODES. OPTI-VOL WITHIN NORMAL LIMITS. NORMAL DEVICE FUNCTION.   [id number] [address]

## 2023-10-16 ENCOUNTER — IMMUNIZATIONS (OUTPATIENT)
Dept: FAMILY MEDICINE CLINIC | Facility: CLINIC | Age: 83
End: 2023-10-16
Payer: COMMERCIAL

## 2023-10-16 DIAGNOSIS — Z23 ENCOUNTER FOR IMMUNIZATION: Primary | ICD-10-CM

## 2023-10-16 PROCEDURE — G0008 ADMIN INFLUENZA VIRUS VAC: HCPCS

## 2023-10-16 PROCEDURE — 90662 IIV NO PRSV INCREASED AG IM: CPT

## 2023-10-31 ENCOUNTER — RA CDI HCC (OUTPATIENT)
Dept: OTHER | Facility: HOSPITAL | Age: 83
End: 2023-10-31

## 2023-10-31 NOTE — PROGRESS NOTES
[address] coding opportunities       Chart reviewed, no opportunity found: [address] Review     Patients Insurance     Medicare Insurance: [employer]

## 2023-11-03 ENCOUNTER — LAB (OUTPATIENT)
Dept: LAB | Facility: CLINIC | Age: 83
End: 2023-11-03
Payer: COMMERCIAL

## 2023-11-03 DIAGNOSIS — I50.22 CHRONIC HFREF (HEART FAILURE WITH REDUCED EJECTION FRACTION) (HCC): ICD-10-CM

## 2023-11-03 DIAGNOSIS — N18.30 HYPERTENSIVE HEART AND KIDNEY DISEASE WITH HF AND WITH CKD STAGE III (HCC): ICD-10-CM

## 2023-11-03 DIAGNOSIS — R73.01 IMPAIRED FASTING GLUCOSE: ICD-10-CM

## 2023-11-03 DIAGNOSIS — E78.2 MIXED HYPERLIPIDEMIA: ICD-10-CM

## 2023-11-03 DIAGNOSIS — I13.0 HYPERTENSIVE HEART AND KIDNEY DISEASE WITH HF AND WITH CKD STAGE III (HCC): ICD-10-CM

## 2023-11-03 LAB
ALBUMIN SERPL BCP-MCNC: 3.8 G/DL (ref 3.5–5)
ALP SERPL-CCNC: 59 U/L (ref 34–104)
ALT SERPL W P-5'-P-CCNC: 11 U/L (ref 7–52)
ANION GAP SERPL CALCULATED.3IONS-SCNC: 6 MMOL/L
AST SERPL W P-5'-P-CCNC: 14 U/L (ref 13–39)
BILIRUB SERPL-MCNC: 1.66 MG/DL (ref 0.2–1)
BUN SERPL-MCNC: 20 MG/DL (ref 5–25)
CALCIUM SERPL-MCNC: 9.2 MG/DL (ref 8.4–10.2)
CHLORIDE SERPL-SCNC: 110 MMOL/L (ref 96–108)
CHOLEST SERPL-MCNC: 132 MG/DL
CO2 SERPL-SCNC: 27 MMOL/L (ref 21–32)
CREAT SERPL-MCNC: 1.41 MG/DL (ref 0.6–1.3)
EST. AVERAGE GLUCOSE BLD GHB EST-MCNC: 111 MG/DL
GFR SERPL CREATININE-BSD FRML MDRD: 46 ML/MIN/1.73SQ M
GLUCOSE P FAST SERPL-MCNC: 105 MG/DL (ref 65–99)
HBA1C MFR BLD: 5.5 %
HDLC SERPL-MCNC: 52 MG/DL
LDLC SERPL CALC-MCNC: 60 MG/DL (ref 0–100)
NONHDLC SERPL-MCNC: 80 MG/DL
POTASSIUM SERPL-SCNC: 4.8 MMOL/L (ref 3.5–5.3)
PROT SERPL-MCNC: 6.7 G/DL (ref 6.4–8.4)
SODIUM SERPL-SCNC: 143 MMOL/L (ref 135–147)
TRIGL SERPL-MCNC: 98 MG/DL

## 2023-11-03 PROCEDURE — 36415 COLL VENOUS BLD VENIPUNCTURE: CPT

## 2023-11-03 PROCEDURE — 80053 COMPREHEN METABOLIC PANEL: CPT

## 2023-11-03 PROCEDURE — 83036 HEMOGLOBIN GLYCOSYLATED A1C: CPT

## 2023-11-03 PROCEDURE — 80061 LIPID PANEL: CPT

## 2023-11-08 ENCOUNTER — RA CDI HCC (OUTPATIENT)
Dept: OTHER | Facility: HOSPITAL | Age: 83
End: 2023-11-08

## 2023-11-08 NOTE — PROGRESS NOTES
[address] [facility] coding opportunities    I73.9, G95.9, M45.9     Chart Reviewed number of suggestions sent to Provider: 3   GR    Patients Insurance     Medicare Insurance: [address] [facility]

## 2023-11-09 ENCOUNTER — OFFICE VISIT (OUTPATIENT)
Dept: FAMILY MEDICINE CLINIC | Facility: CLINIC | Age: 83
End: 2023-11-09
Payer: COMMERCIAL

## 2023-11-09 VITALS
OXYGEN SATURATION: 97 % | WEIGHT: 185.8 LBS | SYSTOLIC BLOOD PRESSURE: 126 MMHG | BODY MASS INDEX: 26.01 KG/M2 | DIASTOLIC BLOOD PRESSURE: 74 MMHG | RESPIRATION RATE: 18 BRPM | TEMPERATURE: 97 F | HEART RATE: 81 BPM | HEIGHT: 71 IN

## 2023-11-09 DIAGNOSIS — J40 BRONCHITIS: Primary | ICD-10-CM

## 2023-11-09 DIAGNOSIS — K22.70 BARRETT'S ESOPHAGUS WITHOUT DYSPLASIA: ICD-10-CM

## 2023-11-09 DIAGNOSIS — I10 ESSENTIAL HYPERTENSION: ICD-10-CM

## 2023-11-09 DIAGNOSIS — N40.1 BENIGN LOCALIZED HYPERPLASIA OF PROSTATE WITH URINARY OBSTRUCTION: ICD-10-CM

## 2023-11-09 DIAGNOSIS — I42.9 CARDIOMYOPATHY, UNSPECIFIED TYPE (HCC): ICD-10-CM

## 2023-11-09 DIAGNOSIS — R73.01 IMPAIRED FASTING GLUCOSE: ICD-10-CM

## 2023-11-09 DIAGNOSIS — I26.99 OTHER ACUTE PULMONARY EMBOLISM WITHOUT ACUTE COR PULMONALE (HCC): ICD-10-CM

## 2023-11-09 DIAGNOSIS — I42.0 DILATED CARDIOMYOPATHY (HCC): ICD-10-CM

## 2023-11-09 DIAGNOSIS — Z00.00 MEDICARE ANNUAL WELLNESS VISIT, SUBSEQUENT: ICD-10-CM

## 2023-11-09 DIAGNOSIS — N13.8 BENIGN LOCALIZED HYPERPLASIA OF PROSTATE WITH URINARY OBSTRUCTION: ICD-10-CM

## 2023-11-09 DIAGNOSIS — I12.9 HYPERTENSIVE KIDNEY DISEASE WITH STAGE 3B CHRONIC KIDNEY DISEASE (HCC): ICD-10-CM

## 2023-11-09 DIAGNOSIS — E78.2 MIXED HYPERLIPIDEMIA: ICD-10-CM

## 2023-11-09 DIAGNOSIS — N18.32 HYPERTENSIVE KIDNEY DISEASE WITH STAGE 3B CHRONIC KIDNEY DISEASE (HCC): ICD-10-CM

## 2023-11-09 PROCEDURE — 99214 OFFICE O/P EST MOD 30 MIN: CPT | Performed by: FAMILY MEDICINE

## 2023-11-09 PROCEDURE — G0439 PPPS, SUBSEQ VISIT: HCPCS | Performed by: FAMILY MEDICINE

## 2023-11-09 RX ORDER — BRIMONIDINE TARTRATE 1 MG/ML
SOLUTION/ DROPS OPHTHALMIC
COMMUNITY
Start: 2023-09-24

## 2023-11-09 RX ORDER — FUROSEMIDE 20 MG/1
10 TABLET ORAL DAILY
Qty: 45 TABLET | Refills: 0
Start: 2023-11-09

## 2023-11-09 RX ORDER — KETOCONAZOLE 20 MG/ML
SHAMPOO TOPICAL
COMMUNITY
Start: 2023-10-11

## 2023-11-09 RX ORDER — FLUTICASONE PROPIONATE AND SALMETEROL 500; 50 UG/1; UG/1
POWDER RESPIRATORY (INHALATION)
COMMUNITY

## 2023-11-09 RX ORDER — AZITHROMYCIN 250 MG/1
TABLET, FILM COATED ORAL
Qty: 6 TABLET | Refills: 0 | Status: SHIPPED | OUTPATIENT
Start: 2023-11-09 | End: 2023-11-13

## 2023-11-09 NOTE — PROGRESS NOTES
Assessment and Plan:     Problem List Items Addressed This Visit          Digestive    Burt esophagus     Continue omeprazole per GI. Endocrine    Impaired fasting glucose    Relevant Orders    CBC    Comprehensive metabolic panel    Hemoglobin A1C    Lipid panel       Cardiovascular and Mediastinum    Cardiomyopathy (HCC)     Continue lasix 10mg QD. FU cardiology. Relevant Medications    furosemide (LASIX) 20 mg tablet    Other Relevant Orders    CBC    Comprehensive metabolic panel    Hemoglobin A1C    Lipid panel    Essential hypertension     Controlled. DASH diet. Continue bisoprolol 10mg bid. Relevant Medications    furosemide (LASIX) 20 mg tablet    Other Relevant Orders    CBC    Comprehensive metabolic panel    Hemoglobin A1C    Lipid panel    Other acute pulmonary embolism without acute cor pulmonale (HCC)     Continue eliquis 2.5mg bid per hem/onc. Genitourinary    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     Continue proscar. Hypertensive kidney disease with stage 3b chronic kidney disease [facility])     Lab Results   Component Value Date    EGFR 46 11/03/2023    EGFR 42 04/29/2023    EGFR 42 10/26/2022    CREATININE 1.41 (H) 11/03/2023    CREATININE 1.53 (H) 04/29/2023    CREATININE 1.52 (H) 10/26/2022   Keep hydrated. Avoid motrin/ibuprofen/aleve NSAIDs nephrotoxic agents. Relevant Medications    furosemide (LASIX) 20 mg tablet       Other    Mixed hyperlipidemia     11/2023 Lipid good. Low fat diet. Continue atorvastatin 10mg qhs. Relevant Orders    CBC    Comprehensive metabolic panel    Hemoglobin A1C    Lipid panel     Other Visit Diagnoses       Bronchitis    -  Primary    Relevant Medications    Fluticasone-Salmeterol (Wixela Inhub) 500-50 mcg/dose inhaler    azithromycin (ZITHROMAX) 250 mg tablet    Medicare annual wellness visit, subsequent              BMI Counseling: Body mass index is 25.91 kg/m². Follow-up plan was not completed due to elderly patient (73 years old) where weight reduction/weight gain would complicate underlying health condition such as: illness or physical disability. Depression Screening and Follow-up Plan: Patient was screened for depression during today's encounter. They screened negative with a PHQ-2 score of 0. Reviewed lab in 11/2023  hgA1C 5.5 normal  CMP ok, GFR 46 stable  Lipid 132/98/52/60 good    Flu shot yearly. Got flu shot, covid19 and RSV shots already. Got PCV13 5/2015. Got pneumovax 2016. Got shingrix in 2020. RTO in 6 months. Will think about POA and living will. Preventive health issues were discussed with patient, and age appropriate screening tests were ordered as noted in patient's After Visit Summary. Personalized health advice and appropriate referrals for health education or preventive services given if needed, as noted in patient's After Visit Summary. History of Present Illness:     Patient presents for a Medicare Wellness Visit    HPI     Pt is here by himself. Cough with phlegm for 1 week. Denies wheezing, SOB, CP. No fever. Hx of asthma. He has wixela and albuterol but he did not use them. No smoking. Did not do home covid19 test. He is the only one sick at home. Wife feels fine. CHF---FU cardiology for cardiomyopathy, tachycardia, s/p pacemaker. Stable. He is on lasix 10mg QD and entresto. Wt at home stable per pt. HTN---He is on bisoprolol 10mg bid. Pulmonary Embolus in 8/2022---FU hem/onc. He is on eliquis 2.5mg bid. CKD3---stable. Hyperlipidemia---He is on atorvastatin 10mg qhs. Denies SE.      IFG---Follows low carb diet. Back pain/Knee arthritis---saw orthopedics. Tried PT but no help. FU GI Dr. Cody Martinez for Burt's, do EGD every 3 years. Had EGD last week. Pt is on omeprazole 20mg daily. FU Cancer care yearly for melanoma s/p wide excision on right upper arm 5 years ago. Stable. FU urology for BPH yearly. He is on proscar 5mg qhs. Never smoke. No alcohol. Lives with wife. Does all ADL's. Still drive. Denies recent falls. Denies depression. Patient Care Team:  Meggan Cassidy MD as PCP - Dejon Sandoval, MD Rochelle Haynes MD Gus Longs, MD Germain Hope, MD as Endoscopist  Horace Nj MD (Colon and Rectal Surgery)  Rick Gomez DO (Cardiology)  Lion Guzmán MD (Dermatology)     Review of Systems:     Review of Systems   Constitutional:  Negative for appetite change, chills and fever. HENT:  Negative for congestion, ear pain, sinus pain and sore throat. Eyes:  Negative for discharge and itching. Respiratory:  Positive for cough. Negative for apnea, chest tightness, shortness of breath and wheezing. Cardiovascular:  Negative for chest pain, palpitations and leg swelling. Gastrointestinal:  Negative for abdominal pain, anal bleeding, constipation, diarrhea, nausea and vomiting. Endocrine: Negative for cold intolerance, heat intolerance and polyuria. Genitourinary:  Negative for difficulty urinating and dysuria. Musculoskeletal:  Negative for arthralgias, back pain and myalgias. Skin:  Negative for rash. Neurological:  Negative for dizziness and headaches. Psychiatric/Behavioral:  Negative for agitation.          Problem List:     Patient Active Problem List   Diagnosis    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    Microscopic hematuria    Ank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 (HCC)    Primary osteoarthritis of both knees    Burt esophagus    Cardiac arrhythmia    Cardiomyopathy ([address])    Esophageal reflux    Mixed hyperlipidemia    Essential hypertension    Impaired fasting glucose    Personal history of malignant melanoma    Polymyalgia rheumatica (HCC)    Right bundle branch block with left anterior fascicular block    Osteopenia    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    Hypertensive kidney disease with stage 3b chronic kidney disease ([address])    Chronic HFrEF (heart failure with reduced ejection fraction) ([address])    Encounter for follow-up surveillance of melanoma    Mild persistent asthma without complication    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 ([address])    Personal history of colon cancer    History of pulmonary embolus (PE)    Chronic bilateral low back pain without sciatica    Other acute pulmonary embolism without acute cor pulmonale (HCC)    NSVT (nonsustained ventricular tachycardia) (HCC)      Past Medical and Surgical History:     Past Medical History:   Diagnosis Date    AICD (automatic cardioverter/defibrillator) present     Arthritis     Asthma     Burt esophagus     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     Chronic HFrEF (heart failure with reduced ejection fraction) ([address]) 2019    CKD (chronic kidney disease)     Colon cancer ([address]) 04/05/2013    Colon cancer screening 06/21/2022    COPD (chronic obstructive pulmonary disease) ([address])     Diverticulitis     Last assessed: 4/5/13    Esophageal reflux     Gout     Hernia     Hyperlipidemia     Hypertension     Hypothyroidism 05/30/2013    Non-toxic multinodular goiter 11/28/2012    Pleural effusion     Polymyalgia rheumatica ([address])     Pulmonary embolism ([address]) 2022    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     Spondyloarthropathy     Last assessed: 11/28/12    Thrombocytopenia ([address]) 06/23/2015     Past Surgical History:   Procedure Laterality Date    ARM SKIN LESION BIOPSY / EXCISION      Malignant    ATRIAL ABLATION SURGERY      AV NODE ABLATION      CARDIAC DEFIBRILLATOR PLACEMENT  2009    Cardio-Defib Pulse Gen Venous Insertion of Electrode for Ventricular Pacing.  Implantable    CARDIAC SURGERY  2009    Catheterization    COLONOSCOPY N/A 3/14/2016    Procedure: COLONOSCOPY;  Surgeon: Karli Long MD;  Location: [facility]; Service. February 22, 2013, mildly enlarged prostate, history of colon CA with normal appearance of anastomosis at the midtransverse colon, severe diverticulosis in the sigmoid, descending and transverse colon. Repeat colonoscopy in 3 yrs    HEMICOLECTOMY Right 05/05/2004    INGUINAL HERNIA REPAIR Bilateral     Left 3/2004, right 5/2008    IR UPPER EXTREMITY VENOGRAM- DIAGNOSTIC  10/4/2018    KNEE SURGERY  1972    Meniscectomy    IL ESOPHAGOGASTRODUODENOSCOPY TRANSORAL DIAGNOSTIC N/A 12/5/2016    Procedure: ESOPHAGOGASTRODUODENOSCOPY (EGD); Surgeon: Alma Delia Townsend MD;  Location: [facility];   Service: Gastroenterology    TONSILLECTOMY AND ADENOIDECTOMY      UPPER GASTROINTESTINAL ENDOSCOPY        Family History:     Family History   Problem Relation Age of Onset    Heart failure Mother         Congestive    Hypertension Mother     Osteoporosis Mother     COPD Father     Emphysema Father     Hypertension Father     Heart disease Sister         Heart Valve Replacement    Osteoporosis Sister     Breast cancer Family     Factor V Leiden deficiency Son         on Xarelto      Social History:     Social History     Socioeconomic History    Marital status: /Civil Union     Spouse name: None    Number of children: None    Years of education: None    Highest education level: None   Occupational History    Occupation: Manager-Retired   Tobacco Use    Smoking status: Never     Passive exposure: Never    Smokeless tobacco: Never   Vaping Use    Vaping Use: Never used   Substance and Sexual Activity    Alcohol use: Never     Comment: Rare    Drug use: No    Sexual activity: Not Currently   Other Topics Concern    None   Social History Narrative    None     Social Determinants of Health     Financial Resource Strain: Low Risk  (11/4/2023)    Overall Financial Resource Strain (CARDIA)     Difficulty of Paying Living Expenses: Not hard at all   Food Insecurity: Not on file   Transportation Needs: No Transportation Needs (11/4/2023)    PRAPARE - Transportation     Lack of Transportation (Medical): No     Lack of Transportation (Non-Medical):  No   Physical Activity: Not on file   Stress: Not on file   Social Connections: Not on file   Intimate Partner Violence: Not on file   Housing Stability: Not on file      Medications and Allergies:     Current Outpatient Medications   Medication Sig Dispense Refill    albuterol (PROVENTIL HFA,VENTOLIN HFA) 90 mcg/act inhaler Inhale 2 puffs every 6 (six) hours as needed for wheezing or shortness of breath 1 Inhaler 1    Alphagan P 0.1 %       apixaban (ELIQUIS) 2.5 mg Take 1 tablet (2.5 mg total) by mouth 2 (two) times a day 180 tablet 1    atorvastatin (LIPITOR) 10 mg tablet TAKE ONE TABLET BY MOUTH EVERY DAY 90 tablet 3    azithromycin (ZITHROMAX) 250 mg tablet Take 2 tabs on day 1, then take 1 tab daily from day 2-day 5. 6 tablet 0    betamethasone valerate (VALISONE) 0.1 % cream APPLY TO AFFECTED AREA BEHIND EARS TWICE DAILY WHEN ACTIVE OR AFTER HAIRCUTS      bisoprolol (ZEBETA) 10 MG tablet TAKE ONE TABLET BY MOUTH TWICE A  tablet 3    Calcium Carb-Cholecalciferol (CALCIUM 600 + D PO) Take 3 tablets by mouth daily        ciclopirox (LOPROX) 0.77 % cream APPLY TO AFFECTED AREA OF SKIN TWICE DAILY FOR 2 WEEKS THEN 2-3 TIMES WEEKLY AS MAINTENANCE      Coenzyme Q10 (CO Q-10) 200 MG CAPS Take 1 tablet by mouth daily      Entresto 49-51 MG TABS TAKE ONE TABLET BY MOUTH TWICE A  tablet 3    finasteride (PROSCAR) 5 mg tablet Take 1 tablet (5 mg total) by mouth daily 90 tablet 1    Fluticasone-Salmeterol (Wixela Inhub) 500-50 mcg/dose inhaler       furosemide (LASIX) 20 mg tablet Take 0.5 tablets (10 mg total) by mouth daily 45 tablet 0    ketoconazole (NIZORAL) 2 % shampoo LATHER IN TO SCALP AND LET IT SIT FOR 5 MINUTES PRIOR TO RINSING 3X WEEKLY AS NEEDED      latanoprost (XALATAN) 0.005 % ophthalmic solution       Multiple Vitamins-Minerals (CENTRUM SILVER PO) Take 1 tablet by mouth daily. omeprazole (PriLOSEC) 20 mg delayed release capsule Take 1 capsule (20 mg total) by mouth daily 90 capsule 2    tadalafil (CIALIS) 20 MG tablet Take 1 tablet (20 mg total) by mouth daily as needed for erectile dysfunction 10 tablet 2    Vitamin D, Cholecalciferol, 1000 UNITS CAPS Take 1 tablet by mouth daily. No current facility-administered medications for this visit. No Known Allergies   Immunizations:     Immunization History   Administered Date(s) Administered    COVID-19 MODERNA VACC 0.5 ML IM 01/26/2021, 03/08/2021, 10/27/2021    COVID-19 Moderna Vac BIVALENT 12 Yr+ IM 0.5 ML 09/30/2022    INFLUENZA 10/20/2018    Influenza Split High Dose Preservative Free IM 10/28/2014, 10/26/2015, 11/11/2016, 10/23/2017    Influenza, high dose seasonal 0.7 mL 11/05/2019, 10/12/2020, 10/11/2021, 10/10/2022, 10/16/2023    Influenza, seasonal, injectable 11/27/2001, 10/04/2007, 12/09/2008, 10/29/2010, 10/19/2011, 09/29/2012, 10/31/2013    Pneumococcal Conjugate 13-Valent 05/13/2015    Pneumococcal Polysaccharide PPV23 11/28/2006, 12/21/2016    Zoster 12/01/2011, 03/11/2020    Zoster Vaccine Recombinant 03/11/2020, 07/06/2020      Health Maintenance:         Topic Date Due    DXA SCAN  03/02/2025         Topic Date Due    COVID-19 Vaccine (5 - Moderna series) 01/30/2023      Medicare Screening Tests and Risk Assessments:     Yonathan Seth is here for his Subsequent Wellness visit. Health Risk Assessment:   Patient rates overall health as good. Patient feels that their physical health rating is slightly worse. Patient is very satisfied with their life. Eyesight was rated as slightly worse. Hearing was rated as same. Patient feels that their emotional and mental health rating is same. Patients states they are never, rarely angry. Patient states they are never, rarely unusually tired/fatigued. Pain experienced in the last 7 days has been some. Patient's pain rating has been 2/10.  Patient states that he has experienced no weight loss or gain in last 6 months. Depression Screening:   PHQ-2 Score: 0      Fall Risk Screening: In the past year, patient has experienced: no history of falling in past year      Home Safety:  Patient does not have trouble with stairs inside or outside of their home. Patient has working smoke alarms and has working carbon monoxide detector. Home safety hazards include: none. Nutrition:   Current diet is Regular. Medications:   Patient is currently taking over-the-counter supplements. OTC medications include: see medication list. Patient is able to manage medications. Activities of Daily Living (ADLs)/Instrumental Activities of Daily Living (IADLs):   Walk and transfer into and out of bed and chair?: Yes  Dress and groom yourself?: Yes    Bathe or shower yourself?: Yes    Feed yourself?  Yes  Do your laundry/housekeeping?: Yes  Manage your money, pay your bills and track your expenses?: Yes  Make your own meals?: Yes    Do your own shopping?: Yes    Previous Hospitalizations:   Any hospitalizations or ED visits within the last 12 months?: Yes    How many hospitalizations have you had in the last year?: 1-2    Advance Care Planning:   Living will: Yes    Durable POA for healthcare: No    Advanced directive: Yes      Cognitive Screening:   Provider or family/friend/caregiver concerned regarding cognition?: No    PREVENTIVE SCREENINGS      Cardiovascular Screening:    General: History Lipid Disorder and Screening Current      Diabetes Screening:     General: Screening Current      Colorectal Cancer Screening:     General: History Colorectal Cancer and Screening Not Indicated      Prostate Cancer Screening:    General: Screening Not Indicated      Osteoporosis Screening:    General: Screening Current      Lung Cancer Screening:     General: Screening Not Indicated    Screening, Brief Intervention, and Referral to Treatment (SBIRT)    Screening  Typical number of drinks in a day: 0  Typical number of drinks in a week: 0  Interpretation: Low risk drinking behavior. AUDIT-C Screenin) How often did you have a drink containing alcohol in the past year? never  2) How many drinks did you have on a typical day when you were drinking in the past year? 0  3) How often did you have 6 or more drinks on one occasion in the past year? never    AUDIT-C Score: 0  Interpretation: Score 0-3 (male): Negative screen for alcohol misuse    Single Item Drug Screening:  How often have you used an illegal drug (including marijuana) or a prescription medication for non-medical reasons in the past year? never    Single Item Drug Screen Score: 0  Interpretation: Negative screen for possible drug use disorder    No results found. Physical Exam:     /74   Pulse 81   Temp (!) 97 °F (36.1 °C) (Tympanic)   Resp 18   Ht 5' 11" (1.803 m)   Wt 84.3 kg (185 lb 12.8 oz)   SpO2 97%   BMI 25.91 kg/m²     Physical Exam  Vitals reviewed. Constitutional:       Appearance: Normal appearance. HENT:      Head: Normocephalic and atraumatic. Eyes:      General:         Right eye: No discharge. Left eye: No discharge. Conjunctiva/sclera: Conjunctivae normal.   Neck:      Vascular: No carotid bruit. Cardiovascular:      Rate and Rhythm: Normal rate and regular rhythm. Heart sounds: Normal heart sounds. No murmur heard. No friction rub. No gallop. Pulmonary:      Effort: Pulmonary effort is normal. No respiratory distress. Breath sounds: Rhonchi present. No wheezing or rales. Abdominal:      General: Bowel sounds are normal. There is no distension. Palpations: Abdomen is soft. Tenderness: There is no abdominal tenderness. Musculoskeletal:         General: No swelling, tenderness or deformity. Normal range of motion. Cervical back: Normal range of motion and neck supple. No muscular tenderness. Lymphadenopathy:      Cervical: No cervical adenopathy. Neurological:      Mental Status: He is alert.    Psychiatric:         Mood and Affect: Mood normal.          Zeinab Bergeron MD

## 2023-11-09 NOTE — PATIENT INSTRUCTIONS
Medicare Preventive Visit Patient Instructions  Thank you for completing your Welcome to Medicare Visit or Medicare Annual Wellness Visit today. Your next wellness visit will be due in one year (11/9/2024). The screening/preventive services that you may require over the next 5-10 years are detailed below. Some tests may not apply to you based off risk factors and/or age. Screening tests ordered at today's visit but not completed yet may show as past due. Also, please note that scanned in results may not display below. Preventive Screenings:  Service Recommendations Previous Testing/Comments   Colorectal Cancer Screening  Colonoscopy    Fecal Occult Blood Test (FOBT)/Fecal Immunochemical Test (FIT)  Fecal DNA/Cologuard Test  Flexible Sigmoidoscopy Age: 43-73 years old   Colonoscopy: every 10 years (May be performed more frequently if at higher risk)  OR  FOBT/FIT: every 1 year  OR  Cologuard: every 3 years  OR  Sigmoidoscopy: every 5 years  Screening may be recommended earlier than age 39 if at higher risk for colorectal cancer. Also, an individualized decision between you and your healthcare provider will decide whether screening between the ages of 77-80 would be appropriate.  Colonoscopy: 03/02/2023  FOBT/FIT: Not on file  Cologuard: Not on file  Sigmoidoscopy: Not on file    History Colorectal Cancer     Prostate Cancer Screening Individualized decision between patient and health care provider in men between ages of 53-66   Medicare will cover every 12 months beginning on the day after your 50th birthday PSA: 0.4 ng/mL     Screening Not Indicated     Hepatitis C Screening Once for adults born between 1945 and 1965  More frequently in patients at high risk for Hepatitis C Hep C Antibody: Not on file        Diabetes Screening 1-2 times per year if you're at risk for diabetes or have pre-diabetes Fasting glucose: 105 mg/dL (11/3/2023)  A1C: 5.5 % (11/3/2023)  Screening Current   Cholesterol Screening Once every 5 years if you don't have a lipid disorder. May order more often based on risk factors. Lipid panel: 11/03/2023  Screening Not Indicated  History Lipid Disorder      Other Preventive Screenings Covered by Medicare:  Abdominal Aortic Aneurysm (AAA) Screening: covered once if your at risk. You're considered to be at risk if you have a family history of AAA or a male between the age of 70-76 who smoking at least 100 cigarettes in your lifetime. Lung Cancer Screening: covers low dose CT scan once per year if you meet all of the following conditions: (1) Age 48-67; (2) No signs or symptoms of lung cancer; (3) Current smoker or have quit smoking within the last 15 years; (4) You have a tobacco smoking history of at least 20 pack years (packs per day x number of years you smoked); (5) You get a written order from a healthcare provider. Glaucoma Screening: covered annually if you're considered high risk: (1) You have diabetes OR (2) Family history of glaucoma OR (3)  aged 48 and older OR (3)  American aged 72 and older  Osteoporosis Screening: covered every 2 years if you meet one of the following conditions: (1) Have a vertebral abnormality; (2) On glucocorticoid therapy for more than 3 months; (3) Have primary hyperparathyroidism; (4) On osteoporosis medications and need to assess response to drug therapy. HIV Screening: covered annually if you're between the age of 14-79. Also covered annually if you are younger than 13 and older than 72 with risk factors for HIV infection. For pregnant patients, it is covered up to 3 times per pregnancy.     Immunizations:  Immunization Recommendations   Influenza Vaccine Annual influenza vaccination during flu season is recommended for all persons aged >= 6 months who do not have contraindications   Pneumococcal Vaccine   * Pneumococcal conjugate vaccine = PCV13 (Prevnar 13), PCV15 (Vaxneuvance), PCV20 (Prevnar 20)  * Pneumococcal polysaccharide vaccine = PPSV23 (Pneumovax) Adults 58-73 yo with certain risk factors or if 69+ yo  If never received any pneumonia vaccine: recommend Prevnar 20 (PCV20)  Give PCV20 if previously received 1 dose of PCV13 or PPSV23   Hepatitis B Vaccine 3 dose series if at intermediate or high risk (ex: diabetes, end stage renal disease, liver disease)   Respiratory syncytial virus (RSV) Vaccine - COVERED BY MEDICARE PART D  * RSVPreF3 (Arexvy) CDC recommends that adults 61years of age and older may receive a single dose of RSV vaccine using shared clinical decision-making (SCDM)   Tetanus (Td) Vaccine - COST NOT COVERED BY MEDICARE PART B Following completion of primary series, a booster dose should be given every 10 years to maintain immunity against tetanus. Td may also be given as tetanus wound prophylaxis. Tdap Vaccine - COST NOT COVERED BY MEDICARE PART B Recommended at least once for all adults. For pregnant patients, recommended with each pregnancy. Shingles Vaccine (Shingrix) - COST NOT COVERED BY MEDICARE PART B  2 shot series recommended in those 19 years and older who have or will have weakened immune systems or those 50 years and older     Health Maintenance Due:      Topic Date Due   • DXA SCAN  03/02/2025     Immunizations Due:      Topic Date Due   • COVID-19 Vaccine (5 - Moderna series) 01/30/2023     Advance Directives   What are advance directives? Advance directives are legal documents that state your wishes and plans for medical care. These plans are made ahead of time in case you lose your ability to make decisions for yourself. Advance directives can apply to any medical decision, such as the treatments you want, and if you want to donate organs. What are the types of advance directives? There are many types of advance directives, and each state has rules about how to use them. You may choose a combination of any of the following:  Living will: This is a written record of the treatment you want.  You can also choose which treatments you do not want, which to limit, and which to stop at a certain time. This includes surgery, medicine, IV fluid, and tube feedings. Durable power of  for [facility]): This is a written record that states who you want to make healthcare choices for you when you are unable to make them for yourself. This person, called a proxy, is usually a family member or a friend. You may choose more than 1 proxy. Do not resuscitate (DNR) order:  A DNR order is used in case your heart stops beating or you stop breathing. It is a request not to have certain forms of treatment, such as CPR. A DNR order may be included in other types of advance directives. Medical directive: This covers the care that you want if you are in a coma, near death, or unable to make decisions for yourself. You can list the treatments you want for each condition. Treatment may include pain medicine, surgery, blood transfusions, dialysis, IV or tube feedings, and a ventilator (breathing machine). Values history: This document has questions about your views, beliefs, and how you feel and think about life. This information can help others choose the care that you would choose. Why are advance directives important? An advance directive helps you control your care. Although spoken wishes may be used, it is better to have your wishes written down. Spoken wishes can be misunderstood, or not followed. Treatments may be given even if you do not want them. An advance directive may make it easier for your family to make difficult choices about your care. Weight Management   Why it is important to manage your weight:  Being overweight increases your risk of health conditions such as heart disease, high blood pressure, type 2 diabetes, and certain types of cancer. It can also increase your risk for osteoarthritis, sleep apnea, and other respiratory problems. Aim for a slow, steady weight loss.  Even a small amount of weight loss can lower your risk of health problems. How to lose weight safely:  A safe and healthy way to lose weight is to eat fewer calories and get regular exercise. You can lose up about 1 pound a week by decreasing the number of calories you eat by 500 calories each day. Healthy meal plan for weight management:  A healthy meal plan includes a variety of foods, contains fewer calories, and helps you stay healthy. A healthy meal plan includes the following:  Eat whole-grain foods more often. A healthy meal plan should contain fiber. Fiber is the part of grains, fruits, and vegetables that is not broken down by your body. Whole-grain foods are healthy and provide extra fiber in your diet. Some examples of whole-grain foods are whole-wheat breads and pastas, oatmeal, brown rice, and bulgur. Eat a variety of vegetables every day. Include dark, leafy greens such as spinach, kale, venecia greens, and mustard greens. Eat yellow and orange vegetables such as carrots, sweet potatoes, and winter squash. Eat a variety of fruits every day. Choose fresh or canned fruit (canned in its own juice or light syrup) instead of juice. Fruit juice has very little or no fiber. Eat low-fat dairy foods. Drink fat-free (skim) milk or 1% milk. Eat fat-free yogurt and low-fat cottage cheese. Try low-fat cheeses such as mozzarella and other reduced-fat cheeses. Choose meat and other protein foods that are low in fat. Choose beans or other legumes such as split peas or lentils. Choose fish, skinless poultry (chicken or turkey), or lean cuts of red meat (beef or pork). Before you cook meat or poultry, cut off any visible fat. Use less fat and oil. Try baking foods instead of frying them. Add less fat, such as margarine, sour cream, regular salad dressing and mayonnaise to foods. Eat fewer high-fat foods. Some examples of high-fat foods include french fries, doughnuts, ice cream, and cakes. Eat fewer sweets.   Limit foods and drinks that are high in sugar. This includes candy, cookies, regular soda, and sweetened drinks. Exercise:  Exercise at least 30 minutes per day on most days of the week. Some examples of exercise include walking, biking, dancing, and swimming. You can also fit in more physical activity by taking the stairs instead of the elevator or parking farther away from stores. Ask your healthcare provider about the best exercise plan for you. © Copyright [employer] 2018 Information is for End User's use only and may not be sold, redistributed or otherwise used for commercial purposes.  All illustrations and images included in CareNotes® are the copyrighted property of [employer]M., Inc. or  [employer]

## 2023-11-09 NOTE — ASSESSMENT & PLAN NOTE
Lab Results   Component Value Date    EGFR 46 11/03/2023    EGFR 42 04/29/2023    EGFR 42 10/26/2022    CREATININE 1.41 (H) 11/03/2023    CREATININE 1.53 (H) 04/29/2023    CREATININE 1.52 (H) 10/26/2022     Keep hydrated. Avoid motrin/ibuprofen/aleve NSAIDs nephrotoxic agents.

## 2023-11-29 DIAGNOSIS — I50.22 CHRONIC SYSTOLIC CHF (CONGESTIVE HEART FAILURE) (HCC): ICD-10-CM

## 2023-11-29 RX ORDER — SACUBITRIL AND VALSARTAN 49; 51 MG/1; MG/1
1 TABLET, FILM COATED ORAL 2 TIMES DAILY
Qty: 180 TABLET | Refills: 3 | Status: SHIPPED | OUTPATIENT
Start: 2023-11-29

## 2023-12-06 ENCOUNTER — REMOTE DEVICE CLINIC VISIT (OUTPATIENT)
Dept: CARDIOLOGY CLINIC | Facility: CLINIC | Age: 83
End: 2023-12-06
Payer: COMMERCIAL

## 2023-12-06 DIAGNOSIS — Z95.810 PRESENCE OF AUTOMATIC CARDIOVERTER/DEFIBRILLATOR (AICD): Primary | ICD-10-CM

## 2023-12-06 PROCEDURE — 93296 REM INTERROG EVL PM/IDS: CPT | Performed by: INTERNAL MEDICINE

## 2023-12-06 PROCEDURE — 93295 DEV INTERROG REMOTE 1/2/MLT: CPT | Performed by: INTERNAL MEDICINE

## 2023-12-06 NOTE — PROGRESS NOTES
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 AP 64.2%  5.3% (AAIR-DDDR 60PPM); ALL AVAILABLE LEAD PARAMETERS WITHIN NORMAL LIMITS. NO SIGNIFICANT HIGH RATE EPISODES. OPTI-VOL WITHIN NORMAL LIMITS. NORMAL DEVICE FUNCTION.   ES

## 2023-12-08 DIAGNOSIS — K22.70 BARRETT'S ESOPHAGUS WITHOUT DYSPLASIA: ICD-10-CM

## 2023-12-08 DIAGNOSIS — N13.8 BPH WITH OBSTRUCTION/LOWER URINARY TRACT SYMPTOMS: ICD-10-CM

## 2023-12-08 DIAGNOSIS — N40.1 BPH WITH OBSTRUCTION/LOWER URINARY TRACT SYMPTOMS: ICD-10-CM

## 2023-12-08 DIAGNOSIS — I10 HYPERTENSION, UNSPECIFIED TYPE: ICD-10-CM

## 2023-12-08 RX ORDER — OMEPRAZOLE 20 MG/1
20 CAPSULE, DELAYED RELEASE ORAL DAILY
Qty: 90 CAPSULE | Refills: 1 | Status: SHIPPED | OUTPATIENT
Start: 2023-12-08

## 2023-12-08 RX ORDER — BISOPROLOL FUMARATE 10 MG/1
10 TABLET, FILM COATED ORAL 2 TIMES DAILY
Qty: 180 TABLET | Refills: 3 | Status: SHIPPED | OUTPATIENT
Start: 2023-12-08

## 2023-12-08 RX ORDER — FINASTERIDE 5 MG/1
5 TABLET, FILM COATED ORAL DAILY
Qty: 90 TABLET | Refills: 1 | Status: SHIPPED | OUTPATIENT
Start: 2023-12-08

## 2023-12-08 NOTE — TELEPHONE ENCOUNTER
Reason for call:   [x] Refill   [] Prior Auth  [] Other:     Office:   [x] PCP/Provider - Jesus Hardy  [] Specialty/Provider -     Medication: Finasteride    Dose/Frequency: 5 mg Daily    Quantity: 90    Pharmacy: [facility] mail order    Does the patient have enough for 3 days?    [x] Yes   [] No - Send as HP to POD

## 2023-12-08 NOTE — TELEPHONE ENCOUNTER
Reason for call:   [x] Refill   [] Prior Auth  [] Other:     Office:   [] PCP/Provider -   [x] Specialty/Provider -Walker Alcaraz    Medication: Omeprazole    Dose/Frequency: 20 mg Daily    Quantity: 90    Pharmacy: [facility] mail order    Does the patient have enough for 3 days?    [x] Yes   [] No - Send as HP to POD

## 2023-12-08 NOTE — TELEPHONE ENCOUNTER
Reason for call:   [x] Refill   [] Prior Auth  [] Other:     Office:   [] PCP/Provider -   [x] Specialty/Provider - cardiology  Jamil Mcnulty    Medication: Bisoprolol    Dose/Frequency: 10 mg BID    Quantity: 180    Pharmacy: [facility] mail order [facility]    Does the patient have enough for 3 days?    [x] Yes   [] No - Send as HP to POD

## 2023-12-22 ENCOUNTER — OFFICE VISIT (OUTPATIENT)
Dept: FAMILY MEDICINE CLINIC | Facility: CLINIC | Age: 83
End: 2023-12-22
Payer: COMMERCIAL

## 2023-12-22 VITALS
DIASTOLIC BLOOD PRESSURE: 70 MMHG | WEIGHT: 187 LBS | HEIGHT: 71 IN | OXYGEN SATURATION: 97 % | RESPIRATION RATE: 18 BRPM | TEMPERATURE: 98 F | BODY MASS INDEX: 26.18 KG/M2 | SYSTOLIC BLOOD PRESSURE: 110 MMHG | HEART RATE: 82 BPM

## 2023-12-22 DIAGNOSIS — J45.30 MILD PERSISTENT ASTHMA WITHOUT COMPLICATION: Primary | ICD-10-CM

## 2023-12-22 PROCEDURE — 99213 OFFICE O/P EST LOW 20 MIN: CPT | Performed by: FAMILY MEDICINE

## 2023-12-22 NOTE — PROGRESS NOTES
Name: Christian Bolden      : 1940      MRN: [id number]  Encounter Provider: Jesus Garcia MD  Encounter Date: 2023   Encounter department: [facility]    Assessment & Plan     1. Mild persistent asthma without complication  Assessment & Plan:  Wheezing for 1.5 months. Pt did not use wixela or albuterol. Check CXR. Advised pt to use wixela. Will try to avoid prednisone because he is on eliqiuis.     Orders:  -     XR chest pa & lateral; Future; Expected date: 2023           Subjective      HPI    Pt is here by himself.   Wheezing for 1.5 months.   Come and go.   He does not feel sick.   Denies fever.   Denies cough, SOB or CP.   Denies n/v/diarrhea.   Hx of asthma. He has wixela and albuterol but he did not use them.   No smoking.   Got zpack in 2023 feels same.       Review of Systems   Constitutional:  Negative for appetite change, chills and fever.   HENT:  Negative for congestion, ear pain, sinus pain and sore throat.    Eyes:  Negative for discharge and itching.   Respiratory:  Positive for wheezing. Negative for apnea, cough, chest tightness and shortness of breath.    Cardiovascular:  Negative for chest pain, palpitations and leg swelling.   Gastrointestinal:  Negative for abdominal pain, anal bleeding, constipation, diarrhea, nausea and vomiting.   Endocrine: Negative for cold intolerance, heat intolerance and polyuria.   Genitourinary:  Negative for difficulty urinating and dysuria.   Musculoskeletal:  Negative for arthralgias, back pain and myalgias.   Skin:  Negative for rash.   Neurological:  Negative for dizziness and headaches.   Psychiatric/Behavioral:  Negative for agitation.        Current Outpatient Medications on File Prior to Visit   Medication Sig    albuterol (PROVENTIL HFA,VENTOLIN HFA) 90 mcg/act inhaler Inhale 2 puffs every 6 (six) hours as needed for wheezing or shortness of breath    Alphagan P 0.1 %     apixaban (ELIQUIS) 2.5 mg Take 1 tablet  "(2.5 mg total) by mouth 2 (two) times a day    atorvastatin (LIPITOR) 10 mg tablet TAKE ONE TABLET BY MOUTH EVERY DAY    betamethasone valerate (VALISONE) 0.1 % cream APPLY TO AFFECTED AREA BEHIND EARS TWICE DAILY WHEN ACTIVE OR AFTER HAIRCUTS    bisoprolol (ZEBETA) 10 MG tablet Take 1 tablet (10 mg total) by mouth 2 (two) times a day    Calcium Carb-Cholecalciferol (CALCIUM 600 + D PO) Take 3 tablets by mouth daily      ciclopirox (LOPROX) 0.77 % cream APPLY TO AFFECTED AREA OF SKIN TWICE DAILY FOR 2 WEEKS THEN 2-3 TIMES WEEKLY AS MAINTENANCE    Coenzyme Q10 (CO Q-10) 200 MG CAPS Take 1 tablet by mouth daily    finasteride (PROSCAR) 5 mg tablet Take 1 tablet (5 mg total) by mouth daily    Fluticasone-Salmeterol (Wixela Inhub) 500-50 mcg/dose inhaler     furosemide (LASIX) 20 mg tablet Take 0.5 tablets (10 mg total) by mouth daily    ketoconazole (NIZORAL) 2 % shampoo LATHER IN TO SCALP AND LET IT SIT FOR 5 MINUTES PRIOR TO RINSING 3X WEEKLY AS NEEDED    latanoprost (XALATAN) 0.005 % ophthalmic solution     Multiple Vitamins-Minerals (CENTRUM SILVER PO) Take 1 tablet by mouth daily.    omeprazole (PriLOSEC) 20 mg delayed release capsule Take 1 capsule (20 mg total) by mouth daily    sacubitril-valsartan (Entresto) 49-51 MG TABS Take 1 tablet by mouth 2 (two) times a day    tadalafil (CIALIS) 20 MG tablet Take 1 tablet (20 mg total) by mouth daily as needed for erectile dysfunction    Vitamin D, Cholecalciferol, 1000 UNITS CAPS Take 1 tablet by mouth daily.       Objective     /70   Pulse 82   Temp 98 °F (36.7 °C) (Tympanic)   Resp 18   Ht 5' 11\" (1.803 m)   Wt 84.8 kg (187 lb)   SpO2 97%   BMI 26.08 kg/m²     Physical Exam  Constitutional:       Appearance: He is well-developed.   HENT:      Head: Normocephalic and atraumatic.   Eyes:      General:         Right eye: No discharge.         Left eye: No discharge.      Conjunctiva/sclera: Conjunctivae normal.   Cardiovascular:      Rate and Rhythm: Normal " rate and regular rhythm.      Heart sounds: Normal heart sounds. No murmur heard.     No friction rub. No gallop.   Pulmonary:      Effort: Pulmonary effort is normal. No respiratory distress.      Breath sounds: Normal breath sounds. No wheezing or rales.   Abdominal:      General: Bowel sounds are normal. There is no distension.      Palpations: Abdomen is soft.      Tenderness: There is no abdominal tenderness. There is no guarding.   Musculoskeletal:         General: Normal range of motion.      Cervical back: Normal range of motion and neck supple.   Neurological:      Mental Status: He is alert.       Jesus Garcia MD

## 2023-12-22 NOTE — ASSESSMENT & PLAN NOTE
Wheezing for 1.5 months. Pt did not use wixela or albuterol. Check CXR. Advised pt to use wixela. Will try to avoid prednisone because he is on eliqiuis.

## 2023-12-24 ENCOUNTER — HOSPITAL ENCOUNTER (EMERGENCY)
Facility: HOSPITAL | Age: 83
Discharge: HOME | End: 2023-12-25
Attending: EMERGENCY MEDICINE
Payer: COMMERCIAL

## 2023-12-24 VITALS
DIASTOLIC BLOOD PRESSURE: 92 MMHG | TEMPERATURE: 98.6 F | RESPIRATION RATE: 18 BRPM | SYSTOLIC BLOOD PRESSURE: 135 MMHG | HEART RATE: 79 BPM | OXYGEN SATURATION: 95 %

## 2023-12-24 DIAGNOSIS — I50.1 PULMONARY EDEMA CARDIAC CAUSE (HCC): Primary | ICD-10-CM

## 2023-12-24 PROCEDURE — 83880 ASSAY OF NATRIURETIC PEPTIDE: CPT

## 2023-12-24 PROCEDURE — 80053 COMPREHEN METABOLIC PANEL: CPT

## 2023-12-24 PROCEDURE — 99285 EMERGENCY DEPT VISIT HI MDM: CPT

## 2023-12-24 PROCEDURE — 99285 EMERGENCY DEPT VISIT HI MDM: CPT | Performed by: EMERGENCY MEDICINE

## 2023-12-24 PROCEDURE — 93308 TTE F-UP OR LMTD: CPT | Performed by: EMERGENCY MEDICINE

## 2023-12-24 PROCEDURE — 85025 COMPLETE CBC W/AUTO DIFF WBC: CPT

## 2023-12-24 PROCEDURE — 36415 COLL VENOUS BLD VENIPUNCTURE: CPT

## 2023-12-25 ENCOUNTER — APPOINTMENT (EMERGENCY)
Dept: RADIOLOGY | Facility: HOSPITAL | Age: 83
End: 2023-12-25
Payer: COMMERCIAL

## 2023-12-25 LAB
2HR DELTA HS TROPONIN: -3 NG/L
ALBUMIN SERPL BCP-MCNC: 4 G/DL (ref 3.5–5)
ALP SERPL-CCNC: 57 U/L (ref 34–104)
ALT SERPL W P-5'-P-CCNC: 14 U/L (ref 7–52)
ANION GAP SERPL CALCULATED.3IONS-SCNC: 8 MMOL/L
AST SERPL W P-5'-P-CCNC: 16 U/L (ref 13–39)
ATRIAL RATE: 76 BPM
BASOPHILS # BLD AUTO: 0.04 THOUSANDS/ÂΜL (ref 0–0.1)
BASOPHILS NFR BLD AUTO: 0 % (ref 0–1)
BILIRUB SERPL-MCNC: 1.47 MG/DL (ref 0.2–1)
BNP SERPL-MCNC: >4700 PG/ML (ref 0–100)
BUN SERPL-MCNC: 24 MG/DL (ref 5–25)
CALCIUM SERPL-MCNC: 9.4 MG/DL (ref 8.4–10.2)
CARDIAC TROPONIN I PNL SERPL HS: 27 NG/L
CARDIAC TROPONIN I PNL SERPL HS: 30 NG/L
CHLORIDE SERPL-SCNC: 107 MMOL/L (ref 96–108)
CO2 SERPL-SCNC: 25 MMOL/L (ref 21–32)
CREAT SERPL-MCNC: 1.43 MG/DL (ref 0.6–1.3)
EOSINOPHIL # BLD AUTO: 0.24 THOUSAND/ÂΜL (ref 0–0.61)
EOSINOPHIL NFR BLD AUTO: 2 % (ref 0–6)
ERYTHROCYTE [DISTWIDTH] IN BLOOD BY AUTOMATED COUNT: 14.9 % (ref 11.6–15.1)
GFR SERPL CREATININE-BSD FRML MDRD: 45 ML/MIN/1.73SQ M
GLUCOSE SERPL-MCNC: 104 MG/DL (ref 65–140)
HCT VFR BLD AUTO: 39.6 % (ref 36.5–49.3)
HGB BLD-MCNC: 13.1 G/DL (ref 12–17)
IMM GRANULOCYTES # BLD AUTO: 0.03 THOUSAND/UL (ref 0–0.2)
IMM GRANULOCYTES NFR BLD AUTO: 0 % (ref 0–2)
LYMPHOCYTES # BLD AUTO: 1.66 THOUSANDS/ÂΜL (ref 0.6–4.47)
LYMPHOCYTES NFR BLD AUTO: 16 % (ref 14–44)
MCH RBC QN AUTO: 32.5 PG (ref 26.8–34.3)
MCHC RBC AUTO-ENTMCNC: 33.1 G/DL (ref 31.4–37.4)
MCV RBC AUTO: 98 FL (ref 82–98)
MONOCYTES # BLD AUTO: 0.85 THOUSAND/ÂΜL (ref 0.17–1.22)
MONOCYTES NFR BLD AUTO: 8 % (ref 4–12)
NEUTROPHILS # BLD AUTO: 7.9 THOUSANDS/ÂΜL (ref 1.85–7.62)
NEUTS SEG NFR BLD AUTO: 74 % (ref 43–75)
NRBC BLD AUTO-RTO: 0 /100 WBCS
P AXIS: 91 DEGREES
PLATELET # BLD AUTO: 213 THOUSANDS/UL (ref 149–390)
PMV BLD AUTO: 9.7 FL (ref 8.9–12.7)
POTASSIUM SERPL-SCNC: 4 MMOL/L (ref 3.5–5.3)
PR INTERVAL: 282 MS
PROT SERPL-MCNC: 7.2 G/DL (ref 6.4–8.4)
QRS AXIS: -60 DEGREES
QRSD INTERVAL: 140 MS
QT INTERVAL: 428 MS
QTC INTERVAL: 481 MS
RBC # BLD AUTO: 4.03 MILLION/UL (ref 3.88–5.62)
SODIUM SERPL-SCNC: 140 MMOL/L (ref 135–147)
T WAVE AXIS: 117 DEGREES
VENTRICULAR RATE: 76 BPM
WBC # BLD AUTO: 10.72 THOUSAND/UL (ref 4.31–10.16)

## 2023-12-25 PROCEDURE — 36415 COLL VENOUS BLD VENIPUNCTURE: CPT

## 2023-12-25 PROCEDURE — 93005 ELECTROCARDIOGRAM TRACING: CPT

## 2023-12-25 PROCEDURE — 84484 ASSAY OF TROPONIN QUANT: CPT

## 2023-12-25 PROCEDURE — 96374 THER/PROPH/DIAG INJ IV PUSH: CPT

## 2023-12-25 PROCEDURE — 71045 X-RAY EXAM CHEST 1 VIEW: CPT

## 2023-12-25 RX ORDER — FUROSEMIDE 10 MG/ML
20 INJECTION INTRAMUSCULAR; INTRAVENOUS ONCE
Status: COMPLETED | OUTPATIENT
Start: 2023-12-25 | End: 2023-12-25

## 2023-12-25 RX ADMIN — FUROSEMIDE 20 MG: 10 INJECTION, SOLUTION INTRAMUSCULAR; INTRAVENOUS at 00:24

## 2023-12-25 NOTE — ED ATTENDING ATTESTATION
12/24/2023  INorman MD, saw and evaluated the patient. I have discussed the patient with the resident/non-physician practitioner and agree with the resident's/non-physician practitioner's findings, Plan of Care, and MDM as documented in the resident's/non-physician practitioner's note, except where noted. All available labs and Radiology studies were reviewed.  I was present for key portions of any procedure(s) performed by the resident/non-physician practitioner and I was immediately available to provide assistance.       At this point I agree with the current assessment done in the Emergency Department.  I have conducted an independent evaluation of this patient a history and physical is as follows:    ED Course  ED Course as of 12/25/23 0151   Mon Dec 25, 2023   0018 Per resident h&p 84 YO presents for cough; AC with apixaban; I/P likely CHF     Emergency Department Note- Christian Bolden 83 y.o. male MRN: [id number]    Unit/Bed#: ED 25 Encounter: [id number]    Christian Bolden is a 83 y.o. male who presents with   Chief Complaint   Patient presents with    Cough     Intermittent cough, sob while lying down only, denies sob/cp at this time. Saw pcp for the same recently         History of Present Illness   HPI:  Christian Bolden is a 83 y.o. male who presents for evaluation of:  Cough, dyspnea with exertion, occasional orthopnea.  Patient is chronically anticoagulated with apixaban and states compliance.  Patient states that he takes furosemide to treat his congestive heart failure.  He has increased his dose of furosemide over the last couple days to treat a weight gain over the last several days.  He has a cough but it is not productive of sputum.  There are no sick contacts at home.  He denies associated fevers.    Review of Systems   Constitutional:  Negative for fatigue and fever.   HENT:  Negative for congestion and sore throat.    Respiratory:  Positive for cough and shortness  of breath.    Cardiovascular:  Negative for chest pain and palpitations.   Gastrointestinal:  Negative for abdominal pain and nausea.   Genitourinary:  Negative for flank pain and frequency.   Neurological:  Negative for light-headedness and headaches.   Psychiatric/Behavioral:  Negative for dysphoric mood and hallucinations.    All other systems reviewed and are negative.      Historical Information   Past Medical History:   Diagnosis Date    AICD (automatic cardioverter/defibrillator) present     Arthritis     Asthma     Burt esophagus     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     Chronic HFrEF (heart failure with reduced ejection fraction) ([facility]) 2019    CKD (chronic kidney disease)     Colon cancer ([facility]) 04/05/2013    Colon cancer screening 06/21/2022    COPD (chronic obstructive pulmonary disease) ([facility])     Diverticulitis     Last assessed: 4/5/13    Esophageal reflux     Gout     Hernia     Hyperlipidemia     Hypertension     Hypothyroidism 05/30/2013    Non-toxic multinodular goiter 11/28/2012    Pleural effusion     Polymyalgia rheumatica ([facility])     Pulmonary embolism ([facility]) 2022    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 Implantable    CARDIAC SURGERY  2009    Catheterization    COLONOSCOPY N/A 3/14/2016    Procedure: COLONOSCOPY;  Surgeon: Amanda Zamorano MD;  Location: [facility];  Service. February 22, 2013, mildly enlarged prostate, history of colon CA with normal appearance of anastomosis at the midtransverse colon, severe diverticulosis in the sigmoid, descending and transverse  colon. Repeat colonoscopy in 3 yrs    HEMICOLECTOMY Right 05/05/2004    INGUINAL HERNIA REPAIR Bilateral     Left 3/2004, right 5/2008    IR UPPER EXTREMITY VENOGRAM- DIAGNOSTIC  10/4/2018    KNEE SURGERY  1972    Meniscectomy    OR ESOPHAGOGASTRODUODENOSCOPY TRANSORAL DIAGNOSTIC N/A 12/5/2016    Procedure: ESOPHAGOGASTRODUODENOSCOPY (EGD);  Surgeon: Walker Fenton MD;  Location: [facility];  Service: Gastroenterology    TONSILLECTOMY AND ADENOIDECTOMY      UPPER GASTROINTESTINAL ENDOSCOPY       Social History   Social History     Substance and Sexual Activity   Alcohol Use Never    Comment: Rare     Social History     Substance and Sexual Activity   Drug Use No     Social History     Tobacco Use   Smoking Status Never    Passive exposure: Never   Smokeless Tobacco Never     Family History:   Family History   Problem Relation Age of Onset    Heart failure Mother         Congestive    Hypertension Mother     Osteoporosis Mother     COPD Father     Emphysema Father     Hypertension Father     Heart disease Sister         Heart Valve Replacement    Osteoporosis Sister     Breast cancer Family     Factor V Leiden deficiency Son         on Xarelto       Meds/Allergies   PTA meds:   Prior to Admission Medications   Prescriptions Last Dose Informant Patient Reported? Taking?   Alphagan P 0.1 %   Yes No   Calcium Carb-Cholecalciferol (CALCIUM 600 + D PO)  Self Yes No   Sig: Take 3 tablets by mouth daily     Coenzyme Q10 (CO Q-10) 200 MG CAPS  Self Yes No   Sig: Take 1 tablet by mouth daily   Fluticasone-Salmeterol (Wixela Inhub) 500-50 mcg/dose inhaler   Yes No   Multiple Vitamins-Minerals (CENTRUM SILVER PO)  Self Yes No   Sig: Take 1 tablet by mouth daily.   Vitamin D, Cholecalciferol, 1000 UNITS CAPS  Self Yes No   Sig: Take 1 tablet by mouth daily.   albuterol (PROVENTIL HFA,VENTOLIN HFA) 90 mcg/act inhaler  Self No No   Sig: Inhale 2 puffs every 6 (six) hours as needed for wheezing or shortness of breath   apixaban  (ELIQUIS) 2.5 mg   No No   Sig: Take 1 tablet (2.5 mg total) by mouth 2 (two) times a day   atorvastatin (LIPITOR) 10 mg tablet  Self No No   Sig: TAKE ONE TABLET BY MOUTH EVERY DAY   betamethasone valerate (VALISONE) 0.1 % cream   Yes No   Sig: APPLY TO AFFECTED AREA BEHIND EARS TWICE DAILY WHEN ACTIVE OR AFTER HAIRCUTS   bisoprolol (ZEBETA) 10 MG tablet   No No   Sig: Take 1 tablet (10 mg total) by mouth 2 (two) times a day   ciclopirox (LOPROX) 0.77 % cream   Yes No   Sig: APPLY TO AFFECTED AREA OF SKIN TWICE DAILY FOR 2 WEEKS THEN 2-3 TIMES WEEKLY AS MAINTENANCE   finasteride (PROSCAR) 5 mg tablet   No No   Sig: Take 1 tablet (5 mg total) by mouth daily   furosemide (LASIX) 20 mg tablet   No No   Sig: Take 0.5 tablets (10 mg total) by mouth daily   ketoconazole (NIZORAL) 2 % shampoo   Yes No   Sig: LATHER IN TO SCALP AND LET IT SIT FOR 5 MINUTES PRIOR TO RINSING 3X WEEKLY AS NEEDED   latanoprost (XALATAN) 0.005 % ophthalmic solution  Self Yes No   omeprazole (PriLOSEC) 20 mg delayed release capsule   No No   Sig: Take 1 capsule (20 mg total) by mouth daily   sacubitril-valsartan (Entresto) 49-51 MG TABS   No No   Sig: Take 1 tablet by mouth 2 (two) times a day   tadalafil (CIALIS) 20 MG tablet   No No   Sig: Take 1 tablet (20 mg total) by mouth daily as needed for erectile dysfunction      Facility-Administered Medications: None     No Known Allergies    Objective   First Vitals:   Blood Pressure: 135/92 (12/24/23 2324)  Pulse: 79 (12/24/23 2324)  Temperature: 98.6 °F (37 °C) (12/24/23 2324)  Temp Source: Oral (12/24/23 2324)  Respirations: 18 (12/24/23 2324)  SpO2: 95 % (12/24/23 2324)    Current Vitals:   Blood Pressure: 135/92 (12/24/23 2324)  Pulse: 79 (12/24/23 2324)  Temperature: 98.6 °F (37 °C) (12/24/23 2324)  Temp Source: Oral (12/24/23 2324)  Respirations: 18 (12/24/23 2324)  SpO2: 95 % (12/24/23 2324)    No intake or output data in the 24 hours ending 12/25/23 0151    Invasive Devices       Peripheral  Intravenous Line  Duration             Peripheral IV 23 Distal;Right;Upper;Ventral (anterior) Arm <1 day                    Physical Exam  Vitals and nursing note reviewed.   Constitutional:       General: He is not in acute distress.     Appearance: Normal appearance. He is well-developed.   HENT:      Head: Normocephalic and atraumatic.      Right Ear: External ear normal.      Left Ear: External ear normal.      Nose: Nose normal.      Mouth/Throat:      Pharynx: No oropharyngeal exudate.   Eyes:      Conjunctiva/sclera: Conjunctivae normal.      Pupils: Pupils are equal, round, and reactive to light.   Cardiovascular:      Rate and Rhythm: Normal rate and regular rhythm.   Pulmonary:      Effort: Pulmonary effort is normal. No respiratory distress.   Abdominal:      General: Abdomen is flat. There is no distension.      Palpations: Abdomen is soft.   Musculoskeletal:         General: No deformity. Normal range of motion.      Cervical back: Normal range of motion and neck supple.   Skin:     General: Skin is warm and dry.      Capillary Refill: Capillary refill takes less than 2 seconds.   Neurological:      General: No focal deficit present.      Mental Status: He is alert and oriented to person, place, and time. Mental status is at baseline.      Coordination: Coordination normal.   Psychiatric:         Mood and Affect: Mood normal.         Behavior: Behavior normal.         Thought Content: Thought content normal.         Judgment: Judgment normal.           Medical Decision Makin.  Dyspnea with exertion and orthopnea mild: Likely mild CHF flare; plan to give intravenous furosemide to treat mild CHF; Complete blood count obtained to assess for leukocytosis and anemia; Basic Metabolic profile obtained to assess for electrolyte disturbance, uremia, and disorders of glucose metabolism; electrocardiogram obtained to assess for arrhythmia; Troponin obtained to assess for myocardial infarction and  myocarditis.  Brain atretic peptide rule out congestive heart failure; chest x-ray rule out cardiomegaly and congestive heart failure.      Recent Results (from the past 36 hour(s))   CBC and differential    Collection Time: 12/24/23 11:56 PM   Result Value Ref Range    WBC 10.72 (H) 4.31 - 10.16 Thousand/uL    RBC 4.03 3.88 - 5.62 Million/uL    Hemoglobin 13.1 12.0 - 17.0 g/dL    Hematocrit 39.6 36.5 - 49.3 %    MCV 98 82 - 98 fL    MCH 32.5 26.8 - 34.3 pg    MCHC 33.1 31.4 - 37.4 g/dL    RDW 14.9 11.6 - 15.1 %    MPV 9.7 8.9 - 12.7 fL    Platelets 213 149 - 390 Thousands/uL    nRBC 0 /100 WBCs    Neutrophils Relative 74 43 - 75 %    Immat GRANS % 0 0 - 2 %    Lymphocytes Relative 16 14 - 44 %    Monocytes Relative 8 4 - 12 %    Eosinophils Relative 2 0 - 6 %    Basophils Relative 0 0 - 1 %    Neutrophils Absolute 7.90 (H) 1.85 - 7.62 Thousands/µL    Immature Grans Absolute 0.03 0.00 - 0.20 Thousand/uL    Lymphocytes Absolute 1.66 0.60 - 4.47 Thousands/µL    Monocytes Absolute 0.85 0.17 - 1.22 Thousand/µL    Eosinophils Absolute 0.24 0.00 - 0.61 Thousand/µL    Basophils Absolute 0.04 0.00 - 0.10 Thousands/µL   Comprehensive metabolic panel    Collection Time: 12/24/23 11:56 PM   Result Value Ref Range    Sodium 140 135 - 147 mmol/L    Potassium 4.0 3.5 - 5.3 mmol/L    Chloride 107 96 - 108 mmol/L    CO2 25 21 - 32 mmol/L    ANION GAP 8 mmol/L    BUN 24 5 - 25 mg/dL    Creatinine 1.43 (H) 0.60 - 1.30 mg/dL    Glucose 104 65 - 140 mg/dL    Calcium 9.4 8.4 - 10.2 mg/dL    AST 16 13 - 39 U/L    ALT 14 7 - 52 U/L    Alkaline Phosphatase 57 34 - 104 U/L    Total Protein 7.2 6.4 - 8.4 g/dL    Albumin 4.0 3.5 - 5.0 g/dL    Total Bilirubin 1.47 (H) 0.20 - 1.00 mg/dL    eGFR 45 ml/min/1.73sq m   B-Type Natriuretic Peptide(BNP)    Collection Time: 12/24/23 11:56 PM   Result Value Ref Range    BNP >4,700 (H) 0 - 100 pg/mL   ECG 12 lead    Collection Time: 12/25/23 12:01 AM   Result Value Ref Range    Ventricular Rate 76 BPM     "Atrial Rate 76 BPM    RI Interval 282 ms    QRSD Interval 140 ms    QT Interval 428 ms    QTC Interval 481 ms    P Axis 91 degrees    QRS Axis -60 degrees    T Wave Abercrombie 117 degrees   HS Troponin 0hr (reflex protocol)    Collection Time: 12/25/23 12:24 AM   Result Value Ref Range    hs TnI 0hr 30 \"Refer to ACS Flowchart\"- see link ng/L     XR chest 1 view portable   ED Interpretation   Vascular congestion, pulmonary edema, no infiltrate, no pneumothorax            Portions of the record may have been created with voice recognition software. Occasional wrong word or \"sound a like\" substitutions may have occurred due to the inherent limitations of voice recognition software.  Read the chart carefully and recognize, using context, where substitutions have occurred.        Critical Care Time  Procedures      "

## 2023-12-25 NOTE — DISCHARGE INSTRUCTIONS
You have been seen in the emergency department for evaluation of shortness of breath.  On our workup and evaluation, it appears that this is due to fluid overload due to CHF.  Please increase your furosemide dosage to 20 mg daily over the next 3 days.  Please contact your cardiologist or primary care physician regarding continuing an elevated dose.  Please follow-up with your PCP or cardiologist regarding further management.  Please return to the ED for worsening shortness of breath, or if new symptoms arise.

## 2023-12-26 ENCOUNTER — TELEPHONE (OUTPATIENT)
Dept: FAMILY MEDICINE CLINIC | Facility: CLINIC | Age: 83
End: 2023-12-26

## 2023-12-26 ENCOUNTER — VBI (OUTPATIENT)
Dept: FAMILY MEDICINE CLINIC | Facility: CLINIC | Age: 83
End: 2023-12-26

## 2023-12-26 DIAGNOSIS — I42.9 CARDIOMYOPATHY, UNSPECIFIED TYPE (HCC): ICD-10-CM

## 2023-12-26 DIAGNOSIS — N18.32 STAGE 3B CHRONIC KIDNEY DISEASE (HCC): Primary | ICD-10-CM

## 2023-12-26 RX ORDER — FUROSEMIDE 20 MG/1
20 TABLET ORAL DAILY
Start: 2023-12-26 | End: 2024-01-03 | Stop reason: SDUPTHER

## 2023-12-26 NOTE — TELEPHONE ENCOUNTER
Voicemail:  My name is Christian Campos. My YOB: 1940. My phone number is [phone]. I had a chest X-ray taken in [facility] prescribed by Doctor Horne, and I see the results right in my charts. I wonder if she can look at it and is there anything that she's been doing to release some of these following problems that I've been having or that it the X-ray indicates? Thank you.

## 2023-12-26 NOTE — TELEPHONE ENCOUNTER
12/26/23 1:37 PM    Patient contacted post ED visit, [facility] department spoke with patient/caregiver and outreach was successful.    Thank you.  Batsheva Luis [facility]

## 2023-12-26 NOTE — TELEPHONE ENCOUNTER
I called pt back. Reviewed ER notes. BNP elevated and CXR showed small left pleural effusion. Pt increased lasix to 20mg QD. BP at home 110/70. Pt feels better. No more wheezing. He can lay down. Continue lasix 20mg QD. Check BMP and BNP next week.

## 2023-12-26 NOTE — ED PROVIDER NOTES
History  Chief Complaint   Patient presents with    Cough     Intermittent cough, sob while lying down only, denies sob/cp at this time. Saw pcp for the same recently     Patient is an 83-year-old male, past medical history of CHF last EF in 2022 35%, asthma, presenting for evaluation of shortness of breath and cough.  Patient states that over the past few days, he has been exceedingly short of breath when laying down which is associated with a dry cough.  Patient states that while he is sitting up or walking around, he does not feel short of breath.  Patient says that this has been gone ongoing since mid November, with symptoms getting worse over the past few days.  Patient seen by his PCP regarding this, was diagnosed with likely bronchitis and given a Z-Zacarias which did not seem to improve symptoms.  Patient reports compliance with his Lasix, which he takes 10 mg daily.  Patient states that he was on higher doses previously, but was having issues with his blood pressure so they decreased his Lasix.  Patient states sometimes he does feel as though he has fluid in his lungs, and he doubles up on his Lasix at that time.  Patient states that he has not been doing this lately.  Patient denies any other symptoms at this time including fever, congestion, abdominal pain, GI symptoms, leg swelling, or chest pain.          Prior to Admission Medications   Prescriptions Last Dose Informant Patient Reported? Taking?   Alphagan P 0.1 %   Yes No   Calcium Carb-Cholecalciferol (CALCIUM 600 + D PO)  Self Yes No   Sig: Take 3 tablets by mouth daily     Coenzyme Q10 (CO Q-10) 200 MG CAPS  Self Yes No   Sig: Take 1 tablet by mouth daily   Fluticasone-Salmeterol (Wixela Inhub) 500-50 mcg/dose inhaler   Yes No   Multiple Vitamins-Minerals (CENTRUM SILVER PO)  Self Yes No   Sig: Take 1 tablet by mouth daily.   Vitamin D, Cholecalciferol, 1000 UNITS CAPS  Self Yes No   Sig: Take 1 tablet by mouth daily.   albuterol (PROVENTIL HFA,VENTOLIN  HFA) 90 mcg/act inhaler  Self No No   Sig: Inhale 2 puffs every 6 (six) hours as needed for wheezing or shortness of breath   apixaban (ELIQUIS) 2.5 mg   No No   Sig: Take 1 tablet (2.5 mg total) by mouth 2 (two) times a day   atorvastatin (LIPITOR) 10 mg tablet  Self No No   Sig: TAKE ONE TABLET BY MOUTH EVERY DAY   betamethasone valerate (VALISONE) 0.1 % cream   Yes No   Sig: APPLY TO AFFECTED AREA BEHIND EARS TWICE DAILY WHEN ACTIVE OR AFTER HAIRCUTS   bisoprolol (ZEBETA) 10 MG tablet   No No   Sig: Take 1 tablet (10 mg total) by mouth 2 (two) times a day   ciclopirox (LOPROX) 0.77 % cream   Yes No   Sig: APPLY TO AFFECTED AREA OF SKIN TWICE DAILY FOR 2 WEEKS THEN 2-3 TIMES WEEKLY AS MAINTENANCE   finasteride (PROSCAR) 5 mg tablet   No No   Sig: Take 1 tablet (5 mg total) by mouth daily   furosemide (LASIX) 20 mg tablet   No No   Sig: Take 0.5 tablets (10 mg total) by mouth daily   ketoconazole (NIZORAL) 2 % shampoo   Yes No   Sig: LATHER IN TO SCALP AND LET IT SIT FOR 5 MINUTES PRIOR TO RINSING 3X WEEKLY AS NEEDED   latanoprost (XALATAN) 0.005 % ophthalmic solution  Self Yes No   omeprazole (PriLOSEC) 20 mg delayed release capsule   No No   Sig: Take 1 capsule (20 mg total) by mouth daily   sacubitril-valsartan (Entresto) 49-51 MG TABS   No No   Sig: Take 1 tablet by mouth 2 (two) times a day   tadalafil (CIALIS) 20 MG tablet   No No   Sig: Take 1 tablet (20 mg total) by mouth daily as needed for erectile dysfunction      Facility-Administered Medications: None       Past Medical History:   Diagnosis Date    AICD (automatic cardioverter/defibrillator) present     Arthritis     Asthma     Burt esophagus     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     Chronic HFrEF (heart failure with reduced ejection fraction) ([facility]) 2019    CKD (chronic kidney disease)     Colon cancer ([facility]) 04/05/2013    Colon cancer screening 06/21/2022    COPD (chronic obstructive pulmonary disease) ([facility])      Diverticulitis     Last assessed: 4/5/13    Esophageal reflux     Gout     Hernia     Hyperlipidemia     Hypertension     Hypothyroidism 05/30/2013    Non-toxic multinodular goiter 11/28/2012    Pleural effusion     Polymyalgia rheumatica (HCC)     Pulmonary embolism (HCC) 2022    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 Implantable    CARDIAC SURGERY  2009    Catheterization    COLONOSCOPY N/A 3/14/2016    Procedure: COLONOSCOPY;  Surgeon: Amanda Zamorano MD;  Location: [facility];  Service. February 22, 2013, mildly enlarged prostate, history of colon CA with normal appearance of anastomosis at the midtransverse colon, severe diverticulosis in the sigmoid, descending and transverse colon. Repeat colonoscopy in 3 yrs    HEMICOLECTOMY Right 05/05/2004    INGUINAL HERNIA REPAIR Bilateral     Left 3/2004, right 5/2008    IR UPPER EXTREMITY VENOGRAM- DIAGNOSTIC  10/4/2018    KNEE SURGERY  1972    Meniscectomy    VA ESOPHAGOGASTRODUODENOSCOPY TRANSORAL DIAGNOSTIC N/A 12/5/2016    Procedure: ESOPHAGOGASTRODUODENOSCOPY (EGD);  Surgeon: Walker Fenton MD;  Location: [facility];  Service: Gastroenterology    TONSILLECTOMY AND ADENOIDECTOMY      UPPER GASTROINTESTINAL ENDOSCOPY         Family History   Problem Relation Age of Onset    Heart failure Mother         Congestive    Hypertension Mother     Osteoporosis Mother     COPD Father     Emphysema Father     Hypertension Father     Heart disease Sister         Heart Valve Replacement    Osteoporosis Sister     Breast cancer Family     Factor V Leiden deficiency Son         on  Xarelto     I have reviewed and agree with the history as documented.    E-Cigarette/Vaping    E-Cigarette Use Never User      E-Cigarette/Vaping Substances    Nicotine No     THC No     CBD No     Flavoring No     Other No     Unknown No      Social History     Tobacco Use    Smoking status: Never     Passive exposure: Never    Smokeless tobacco: Never   Vaping Use    Vaping status: Never Used   Substance Use Topics    Alcohol use: Never     Comment: Rare    Drug use: No        Review of Systems   Constitutional:  Negative for chills, fatigue and fever.   HENT:  Negative for congestion.    Respiratory:  Positive for cough and shortness of breath. Negative for chest tightness.    Cardiovascular:  Negative for chest pain and leg swelling.   Gastrointestinal:  Negative for abdominal pain, diarrhea, nausea and vomiting.   Genitourinary:  Negative for difficulty urinating.   Skin:  Negative for color change.   Neurological:  Negative for dizziness, syncope, weakness, numbness and headaches.       Physical Exam  ED Triage Vitals [12/24/23 2324]   Temperature Pulse Respirations Blood Pressure SpO2   98.6 °F (37 °C) 79 18 135/92 95 %      Temp Source Heart Rate Source Patient Position - Orthostatic VS BP Location FiO2 (%)   Oral -- -- -- --      Pain Score       --             Orthostatic Vital Signs  Vitals:    12/24/23 2324   BP: 135/92   Pulse: 79       Physical Exam  Vitals and nursing note reviewed.   Constitutional:       General: He is not in acute distress.     Appearance: Normal appearance. He is not ill-appearing or toxic-appearing.   HENT:      Head: Normocephalic and atraumatic.   Eyes:      General: No scleral icterus.     Extraocular Movements: Extraocular movements intact.   Cardiovascular:      Rate and Rhythm: Normal rate and regular rhythm.      Pulses: Normal pulses.      Heart sounds: Normal heart sounds. No murmur heard.  Pulmonary:      Effort: Pulmonary effort is normal. No respiratory distress.       Breath sounds: Rales present. No wheezing or rhonchi.   Chest:      Chest wall: No tenderness.   Abdominal:      General: Abdomen is flat. There is no distension.      Palpations: Abdomen is soft.      Tenderness: There is no abdominal tenderness.   Musculoskeletal:         General: No swelling, tenderness or signs of injury. Normal range of motion.      Cervical back: Normal range of motion.      Right lower leg: Edema (3+ pedal) present.      Left lower leg: Edema (3+ pedal) present.   Skin:     Capillary Refill: Capillary refill takes less than 2 seconds.   Neurological:      General: No focal deficit present.      Mental Status: He is alert.      Cranial Nerves: No cranial nerve deficit.      Sensory: No sensory deficit.      Motor: No weakness.      Gait: Gait normal.   Psychiatric:         Mood and Affect: Mood normal.         Behavior: Behavior normal.         ED Medications  Medications   furosemide (LASIX) injection 20 mg (20 mg Intravenous Given 12/25/23 0024)       Diagnostic Studies  Results Reviewed       Procedure Component Value Units Date/Time    HS Troponin I 2hr [[id number]]  (Normal) Collected: 12/25/23 0220    Lab Status: Final result Specimen: Blood from Arm, Right Updated: 12/25/23 0301     hs TnI 2hr 27 ng/L      Delta 2hr hsTnI -3 ng/L     HS Troponin 0hr (reflex protocol) [[id number]]  (Normal) Collected: 12/25/23 0024    Lab Status: Final result Specimen: Blood from Arm, Right Updated: 12/25/23 0101     hs TnI 0hr 30 ng/L     B-Type Natriuretic Peptide(BNP) [[id number]]  (Abnormal) Collected: 12/24/23 2356    Lab Status: Final result Specimen: Blood from Arm, Right Updated: 12/25/23 0032     BNP >4,700 pg/mL     Comprehensive metabolic panel [[id number]]  (Abnormal) Collected: 12/24/23 2356    Lab Status: Final result Specimen: Blood from Arm, Right Updated: 12/25/23 0030     Sodium 140 mmol/L      Potassium 4.0 mmol/L      Chloride 107 mmol/L      CO2 25 mmol/L      ANION GAP 8 mmol/L      BUN  24 mg/dL      Creatinine 1.43 mg/dL      Glucose 104 mg/dL      Calcium 9.4 mg/dL      AST 16 U/L      ALT 14 U/L      Alkaline Phosphatase 57 U/L      Total Protein 7.2 g/dL      Albumin 4.0 g/dL      Total Bilirubin 1.47 mg/dL      eGFR 45 ml/min/1.73sq m     Narrative:      National Kidney Disease Foundation guidelines for Chronic Kidney Disease (CKD):     Stage 1 with normal or high GFR (GFR > 90 mL/min/1.73 square meters)    Stage 2 Mild CKD (GFR = 60-89 mL/min/1.73 square meters)    Stage 3A Moderate CKD (GFR = 45-59 mL/min/1.73 square meters)    Stage 3B Moderate CKD (GFR = 30-44 mL/min/1.73 square meters)    Stage 4 Severe CKD (GFR = 15-29 mL/min/1.73 square meters)    Stage 5 End Stage CKD (GFR <15 mL/min/1.73 square meters)  Note: GFR calculation is accurate only with a steady state creatinine    CBC and differential [[id number]]  (Abnormal) Collected: 12/24/23 7671    Lab Status: Final result Specimen: Blood from Arm, Right Updated: 12/25/23 0023     WBC 10.72 Thousand/uL      RBC 4.03 Million/uL      Hemoglobin 13.1 g/dL      Hematocrit 39.6 %      MCV 98 fL      MCH 32.5 pg      MCHC 33.1 g/dL      RDW 14.9 %      MPV 9.7 fL      Platelets 213 Thousands/uL      nRBC 0 /100 WBCs      Neutrophils Relative 74 %      Immat GRANS % 0 %      Lymphocytes Relative 16 %      Monocytes Relative 8 %      Eosinophils Relative 2 %      Basophils Relative 0 %      Neutrophils Absolute 7.90 Thousands/µL      Immature Grans Absolute 0.03 Thousand/uL      Lymphocytes Absolute 1.66 Thousands/µL      Monocytes Absolute 0.85 Thousand/µL      Eosinophils Absolute 0.24 Thousand/µL      Basophils Absolute 0.04 Thousands/µL                    XR chest 1 view portable   ED Interpretation by Corine Navarro MD (12/25 0130)   Vascular congestion, pulmonary edema, no infiltrate, no pneumothorax            Procedures  POC Cardiac US    Date/Time: 12/24/2023 11:55 PM    Performed by: Corine Navarro MD  Authorized by: Corine Navarro  MD    Patient location:  ED  Other Assisting Provider: No    Procedure details:     Exam Type:  Diagnostic    Indications: dyspnea      Assessment / Evaluation for: cardiac function      Exam Type: initial exam      Image quality: diagnostic      Image availability:  Images available in PACS and video obtained  Patient Details:     Cardiac Rhythm:  Regular    Mechanical ventilation: No    Cardiac findings:     Echo technique: limited 2D      Views obtained: parasternal long axis, parasternal short axis, subcostal and apical      Pericardial effusion: absent      Tamponade physiology: absent      Wall motion: hypodynamic      LV systolic function: depressed      RV dilation: none    Pulmonary findings:     B-lines: more than 3    IVC findings:     IVC Size: indeterminate    Interpretation:     Fluid Status:  Hypervolemic        ED Course  ED Course as of 12/25/23 2207   Sun Dec 24, 2023   2340 Patient seen and evaluated by me  DDx: CHF exacerbation versus flash pulmonary edema, viral illness, ACS, electrolyte abnormality, anemia, pneumonia  Workup and plan: CBC, CMP, troponin, BNP, EKG, chest x-ray, POCUS   2355 POCUS significant for B-lines, hypodynamic left ventricle which appears similar to prior EF of 35%.  Will plan for 20 mg of IV Lasix, monitor for diuresis, patient can likely be discharged following diuresis.   Mon Dec 25, 2023   0039 Creatinine(!): 1.43  Baseline   0040 Comprehensive metabolic panel(!)  Normal   0040 BNP(!): >4,700  No prior for comparison   0040 CBC and differential(!)  Normal   0129 hs TnI 0hr: 30  Will reflex 2hr   0130 XR chest 1 view portable  Vascular congestion noted on my interpretation   0210 Patient reevaluated, diuresed a significant amount and feeling well enough for discharge at this time.  Explained to patient first troponin test and need to reflex a 2-hour troponin.  Patient reports understanding.   0310 Delta 2hr hsTnI: -3   0311 Patient discharged at this time.  Given return  precautions, instructed to increase his daily Lasix to 20 mg at this time over the next 3 days.  Instructed patient to call his cardiologist or PCP during this time to confirm how they would like him to continue taking his Lasix.  Patient reports understanding, all questions answered.             HEART Risk Score      Flowsheet Row Most Recent Value   Heart Score Risk Calculator    History 0 Filed at: 12/25/2023 0156   ECG 1 Filed at: 12/25/2023 0156   Age 2 Filed at: 12/25/2023 0156   Risk Factors 2 Filed at: 12/25/2023 0156   Troponin 1 Filed at: 12/25/2023 0156   HEART Score 6 Filed at: 12/25/2023 0156                                  Medical Decision Making  Please see ED course above regarding details of the MDM    Amount and/or Complexity of Data Reviewed  Labs: ordered. Decision-making details documented in ED Course.  Radiology: ordered and independent interpretation performed. Decision-making details documented in ED Course.    Risk  Prescription drug management.          Disposition  Final diagnoses:   Pulmonary edema cardiac cause (HCC)     Time reflects when diagnosis was documented in both MDM as applicable and the Disposition within this note       Time User Action Codes Description Comment    12/25/2023  3:10 AM Corine Navarro Add [I50.1] Pulmonary edema cardiac cause (HCC)           ED Disposition       ED Disposition   Discharge    Condition   Stable    Date/Time   Mon Dec 25, 2023 0310    Comment   Christian Bolden discharge to home/self care.                   Follow-up Information    None         Discharge Medication List as of 12/25/2023  3:11 AM        CONTINUE these medications which have NOT CHANGED    Details   albuterol (PROVENTIL HFA,VENTOLIN HFA) 90 mcg/act inhaler Inhale 2 puffs every 6 (six) hours as needed for wheezing or shortness of breath, Starting Sat 2/20/2021, Normal      Alphagan P 0.1 % Historical Med      apixaban (ELIQUIS) 2.5 mg Take 1 tablet (2.5 mg total) by mouth 2  (two) times a day, Starting Thu 2/16/2023, Normal      atorvastatin (LIPITOR) 10 mg tablet TAKE ONE TABLET BY MOUTH EVERY DAY, Normal      betamethasone valerate (VALISONE) 0.1 % cream APPLY TO AFFECTED AREA BEHIND EARS TWICE DAILY WHEN ACTIVE OR AFTER HAIRCUTS, Historical Med      bisoprolol (ZEBETA) 10 MG tablet Take 1 tablet (10 mg total) by mouth 2 (two) times a day, Starting Fri 12/8/2023, Normal      Calcium Carb-Cholecalciferol (CALCIUM 600 + D PO) Take 3 tablets by mouth daily  , Historical Med      ciclopirox (LOPROX) 0.77 % cream APPLY TO AFFECTED AREA OF SKIN TWICE DAILY FOR 2 WEEKS THEN 2-3 TIMES WEEKLY AS MAINTENANCE, Historical Med      Coenzyme Q10 (CO Q-10) 200 MG CAPS Take 1 tablet by mouth daily, Historical Med      finasteride (PROSCAR) 5 mg tablet Take 1 tablet (5 mg total) by mouth daily, Starting Fri 12/8/2023, Normal      Fluticasone-Salmeterol (Wixela Inhub) 500-50 mcg/dose inhaler Historical Med      furosemide (LASIX) 20 mg tablet Take 0.5 tablets (10 mg total) by mouth daily, Starting Thu 11/9/2023, No Print      ketoconazole (NIZORAL) 2 % shampoo LATHER IN TO SCALP AND LET IT SIT FOR 5 MINUTES PRIOR TO RINSING 3X WEEKLY AS NEEDED, Historical Med      latanoprost (XALATAN) 0.005 % ophthalmic solution Starting Tue 1/24/2023, Historical Med      Multiple Vitamins-Minerals (CENTRUM SILVER PO) Take 1 tablet by mouth daily., Historical Med      omeprazole (PriLOSEC) 20 mg delayed release capsule Take 1 capsule (20 mg total) by mouth daily, Starting Fri 12/8/2023, Normal      sacubitril-valsartan (Entresto) 49-51 MG TABS Take 1 tablet by mouth 2 (two) times a day, Starting Wed 11/29/2023, Normal      tadalafil (CIALIS) 20 MG tablet Take 1 tablet (20 mg total) by mouth daily as needed for erectile dysfunction, Starting Thu 6/15/2023, Normal      Vitamin D, Cholecalciferol, 1000 UNITS CAPS Take 1 tablet by mouth daily., Historical Med           No discharge procedures on file.    PDMP Review        None             ED Provider  Attending physically available and evaluated Christian Bolden. I managed the patient along with the ED Attending.    Electronically Signed by           Corine Navarro MD  12/25/23 6808       Corine Navarro MD  12/25/23 7111

## 2023-12-27 ENCOUNTER — TELEPHONE (OUTPATIENT)
Dept: CARDIOLOGY CLINIC | Facility: CLINIC | Age: 83
End: 2023-12-27

## 2023-12-27 DIAGNOSIS — Z86.711 HISTORY OF PULMONARY EMBOLUS (PE): ICD-10-CM

## 2023-12-27 NOTE — TELEPHONE ENCOUNTER
Medication Refill Request   Who are you speaking with? Patient   If it is not the patient, are they listed on an active communication consent form?     N/A   Which medication is being requested for refill?  Please list medication name and dosage  apixaban (ELIQUIS) 2.5 mg    How many pills does the patient have left?  n/a   Preferred Pharmacy / Address [facility] - [place], [place] - [address] [place] [address]  [address] [facility], [facility] [address]  Phone: [phone]  Fax: [phone]  IZA #: [phone]       Who is the prescribing provider? Dr. Wharton   Call back number  [phone]    Relevant Information  N/a

## 2023-12-27 NOTE — TELEPHONE ENCOUNTER
P/C had seen in ED on 12/24/23 with SOB along with occasional cough  Currently had been advised to increase Lasix  20 mg qd.    Continues to have SOB especially laying down at night time, if sitting in recliner is ok    Wt 180 lbs.   4 to 5 dqays ago 184 LBS    Slight swelling bilaterally in ankles/feet    EP report 12/6/2023 Normal function    PCP advised to call.   Chest xray done       BP S 's DBP 70-80  HR 80  PCP having bw next week bmp, BNP      Please advise     Apt 6 mo f/up Adriano

## 2024-01-02 DIAGNOSIS — Z86.711 HISTORY OF PULMONARY EMBOLUS (PE): ICD-10-CM

## 2024-01-03 ENCOUNTER — OFFICE VISIT (OUTPATIENT)
Dept: CARDIOLOGY CLINIC | Facility: CLINIC | Age: 84
End: 2024-01-03
Payer: COMMERCIAL

## 2024-01-03 VITALS
DIASTOLIC BLOOD PRESSURE: 76 MMHG | OXYGEN SATURATION: 97 % | WEIGHT: 180.1 LBS | SYSTOLIC BLOOD PRESSURE: 118 MMHG | BODY MASS INDEX: 25.21 KG/M2 | HEIGHT: 71 IN | HEART RATE: 77 BPM

## 2024-01-03 DIAGNOSIS — I10 HYPERTENSION, UNSPECIFIED TYPE: ICD-10-CM

## 2024-01-03 DIAGNOSIS — Z45.02 ICD (IMPLANTABLE CARDIOVERTER-DEFIBRILLATOR) BATTERY DEPLETION: ICD-10-CM

## 2024-01-03 DIAGNOSIS — I47.29 NSVT (NONSUSTAINED VENTRICULAR TACHYCARDIA) (HCC): ICD-10-CM

## 2024-01-03 DIAGNOSIS — I50.22 CHRONIC HFREF (HEART FAILURE WITH REDUCED EJECTION FRACTION) (HCC): ICD-10-CM

## 2024-01-03 DIAGNOSIS — I42.9 CARDIOMYOPATHY, UNSPECIFIED TYPE (HCC): Primary | ICD-10-CM

## 2024-01-03 PROCEDURE — 99214 OFFICE O/P EST MOD 30 MIN: CPT | Performed by: STUDENT IN AN ORGANIZED HEALTH CARE EDUCATION/TRAINING PROGRAM

## 2024-01-03 RX ORDER — SPIRONOLACTONE 25 MG/1
25 TABLET ORAL DAILY
Qty: 90 TABLET | Refills: 5 | Status: SHIPPED | OUTPATIENT
Start: 2024-01-03 | End: 2025-06-26

## 2024-01-03 RX ORDER — FUROSEMIDE 20 MG/1
40 TABLET ORAL DAILY
Qty: 180 TABLET | Refills: 5 | Status: SHIPPED | OUTPATIENT
Start: 2024-01-03 | End: 2025-06-26

## 2024-01-03 NOTE — PROGRESS NOTES
"Advanced Heart Failure/Pulmonary Hypertension Outpatient Note - Christian Bolden 83 y.o. male MRN: [id number]    @ Encounter: [id number]    Assessment:  83 y.o. male PMH per chart p/w HF fu. I first met Christian Bolden on 1/3/24 for late schedule addition acute visit. Pt requested appointment for ongoing orthopnea, SOB in setting of recent ED visit for ADHF and recent outpt uptitration of his lasix for SOB/cough.    The patient's primary cardiac team is HF, Dr. Franklyn CALDWELL, HFrEF, EF 35-40% some views, nondilated LV, RV size/fxn ok, +LVH  Nuclear Stress Test 1/31/19: small to medium sized fixed defect infersoseptum. No ischemia  Mild AI 2022 echo  1oAVB  Borderline wide qrs with LVH criteria  # Acute bilateral pulmonary emboli  - unprovoked with colon CA and melanoma hx  Lupus anticoagulant positive  AC: Eliquis  CT chest / PE study 07/21/2022: \"Nonobstructive pulmonary emboli  within distal bilateral main pulmonary arteries extending into lobar branches. Extensive pulmonary emboli within right upper lobe and right lower lobe and left lower lobe. Main pulmonary arteries more dilated when compared with prior exam...right main pulmonary artery measures 3.2 cm, previously 2.4 cm.\"  # HTN  # Mild AI and ascending aorta 4 cm  # SVT  # NSVT on device check- on bisoprolol   # hyperlipidemia  # hx of malignant melanoma  # CKD  # Barretts esophagus- EGD 6/12/20  No PMH carpal tunnel syndrome      I have reviewed all pertinent patient data including but not limited to:        Lab Units 12/24/23  2356 11/03/23  0932 04/29/23  0900   CREATININE mg/dL 1.43* 1.41* 1.53*         Lab Results   Component Value Date    K 4.0 12/24/2023     Lab Results   Component Value Date    HGBA1C 5.5 11/03/2023     Lab Results   Component Value Date    MDP9USEXTPCN 3.480 10/26/2022     Lab Results   Component Value Date    LDLCALC 60 11/03/2023     Lab Results   Component Value Date    BNP >4,700 (H) 12/24/2023      Lab Results "   Component Value Date    NTBNP 6,971 (H) 07/22/2022          Our last encounter (full visit/chart update/med refill):  Visit date not found  TODAY'S PLAN:     01/03/24  I am meeting patient for the first time today  Warm, near euvolemic by exam  Worse orthopnea, cough with clear sputum (cough recetnly improving her says), +PND. Less sense of VARGAS during awake hours.  + sleepy and fatigue  Home weight down to 177lbs - personal best range. Home SBP wnl.  No chest pain  Self covid swabbed negative in recent days  He got RSV, flu, covid booster about 1 mo ago, just before onset of his Sx    He has been taking lasix 10 qd for years he says  Returned to this same dosing after his recent ADHF admit  Then recently as outpt 12/27/23-Was escalated to lasix 80 x1 day then 40 qd for few days then lasix 20 qd then back to 10 qd  Also Took an extra lasix 20 last night during PND episode and felt better    Will escalate lasix dosing to 40 qd maintenance for now  Long discussion about evidence of worsening HF, when to self uptitrate home diuretic and call cardiology office    Would enhance gdmt starting with aldactone now  Fu BMP in 1 week - ordered today  Jardiance may be future option if continuing Sx, no strong contraindications    His echo and clinical features may be well explained by H/O HTN though he and wife report no very severe Hx of this  Some possible features of infiltrative CM as well    His past PE implicates other potential etiologies of his respiratory Sx as well    He has fu already scheduled 1/11/24    Strict RTC precautions given today    Neurohormonal Blockade:  --Beta-Blocker: bisoprolol 10 mg daily  --ACEi, ARB or ARNi: Entresto 49/51 mg BID  --Aldosterone Receptor Blocker: aldactone 25 qd  Sglt2i: not on    --Diuretic: lasix 10 mg daily>plan above     Sudden Cardiac Death Risk Reduction:  12/6/23:  MDT-DUAL CHAMBER ICD (AAIR-DDDR MODE)/ ACTIVE SYSTEM IS MRI CONDITIONAL   CARELINK TRANSMISSION: BATTERY  VOLTAGE ADEQUATE (3.1 YRS). AP 64.2%  5.3% (AAIR-DDDR 60PPM); ALL AVAILABLE LEAD PARAMETERS WITHIN NORMAL LIMITS. NO SIGNIFICANT HIGH RATE EPISODES. OPTI-VOL WITHIN NORMAL LIMITS. NORMAL DEVICE FUNCTION.   Electrical Resynchronization:  --Candidacy for BiV device: IVCD  Advanced Therapies (if appropriate):  --Inotrope:  --LVAD/Transplant Candidacy:    Studies:  I have reviewed all pertinent patient data/labs/imaging where available, including but not limited to the below studies. Selected results may be displayed here but comprehensive listing is omitted for note clarity and can be found in the epic chart.    ECG.    Echo.    Stress.    Cath.    HPI:   83 y.o. male PMH per chart p/w HF fu. I first met Christian Bolden on 1/3/24 for late schedule addition acute visit. Pt requested appointment for ongoing orthopnea, SOB in setting of recent ED visit for ADHF and recent outpt uptitration of his lasix for SOB/cough.  No new CP/dizziness/palpitations/syncope.  No new unintentional weight changes.  No new leg swelling, PND, pillow orthopnea.  No new fevers, chills, cough, nausea, vomiting, diarrhea, dysuria.      Interval History:   As noted in 'plan' section above and prior epic chart notes.    No new CP/SOB/dizziness/palpitations/syncope.  No new fatigue.  No new unintentional weight changes.  No new leg swelling.  No new fevers, chills, nausea, vomiting, diarrhea, dysuria.    Past Medical History:   Diagnosis Date    AICD (automatic cardioverter/defibrillator) present     Arthritis     Asthma     Burt esophagus     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     Chronic HFrEF (heart failure with reduced ejection fraction) ([facility]) 2019    CKD (chronic kidney disease)     Colon cancer ([facility]) 04/05/2013    Colon cancer screening 06/21/2022    COPD (chronic obstructive pulmonary disease) ([facility])     Diverticulitis     Last assessed: 4/5/13    Esophageal reflux     Gout     Hernia      Hyperlipidemia     Hypertension     Hypothyroidism 05/30/2013    Non-toxic multinodular goiter 11/28/2012    Pleural effusion     Polymyalgia rheumatica (HCC)     Pulmonary embolism ([facility]) 2022    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     Spondyloarthropathy     Last assessed: 11/28/12    Thrombocytopenia ([facility]) 06/23/2015     Patient Active Problem List    Diagnosis Date Noted    Other acute pulmonary embolism without acute cor pulmonale ([facility]) 06/15/2023    NSVT (nonsustained ventricular tachycardia) ([facility]) 06/15/2023    Chronic bilateral low back pain without sciatica 11/02/2022    History of pulmonary embolus (PE) 07/22/2022    Personal history of colon cancer 06/21/2022    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 ([facility]) 08/06/2021    Mild persistent asthma without complication 02/03/2021    Encounter for follow-up surveillance of melanoma 07/11/2019    Chronic HFrEF (heart failure with reduced ejection fraction) ([facility]) 01/29/2019    Hypertensive kidney disease with stage 3b chronic kidney disease ([facility]) 01/04/2019    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 09/20/2018    Osteopenia 06/29/2018    Personal history of malignant melanoma 11/15/2017    Ank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 ([facility]) 12/30/2014    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 12/02/2013    Microscopic hematuria 12/02/2013    Primary osteoarthritis of both knees 11/26/2013    Polymyalgia rheumatica (HCC) 11/26/2013    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    Impaired fasting glucose 05/30/2013    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    Cardiac arrhythmia 04/05/2013    Essential hypertension 04/05/2013    Esophageal reflux 11/28/2012    Cardiomyopathy (HCC) 10/26/2012    Mixed hyperlipidemia 09/05/2012       ROS:  10 point ROS negative except as specified in HPI/interval history    No Known Allergies  Current Outpatient Medications    Medication Instructions    albuterol (PROVENTIL HFA,VENTOLIN HFA) 90 mcg/act inhaler 2 puffs, Inhalation, Every 6 hours PRN    Alphagan P 0.1 %     apixaban (ELIQUIS) 2.5 mg, Oral, 2 times daily    atorvastatin (LIPITOR) 10 mg tablet TAKE ONE TABLET BY MOUTH EVERY DAY    betamethasone valerate (VALISONE) 0.1 % cream APPLY TO AFFECTED AREA BEHIND EARS TWICE DAILY WHEN ACTIVE OR AFTER HAIRCUTS    bisoprolol (ZEBETA) 10 mg, Oral, 2 times daily    Calcium Carb-Cholecalciferol (CALCIUM 600 + D PO) 3 tablets, Oral, Daily    ciclopirox (LOPROX) 0.77 % cream APPLY TO AFFECTED AREA OF SKIN TWICE DAILY FOR 2 WEEKS THEN 2-3 TIMES WEEKLY AS MAINTENANCE    Coenzyme Q10 (CO Q-10) 200 MG CAPS 1 tablet, Oral, Daily    finasteride (PROSCAR) 5 mg, Oral, Daily    Fluticasone-Salmeterol (Wixela Inhub) 500-50 mcg/dose inhaler     furosemide (LASIX) 40 mg, Oral, Daily    ketoconazole (NIZORAL) 2 % shampoo LATHER IN TO SCALP AND LET IT SIT FOR 5 MINUTES PRIOR TO RINSING 3X WEEKLY AS NEEDED    latanoprost (XALATAN) 0.005 % ophthalmic solution No dose, route, or frequency recorded.    Multiple Vitamins-Minerals (CENTRUM SILVER PO) 1 tablet, Oral, Daily    omeprazole (PRILOSEC) 20 mg, Oral, Daily    sacubitril-valsartan (Entresto) 49-51 MG TABS 1 tablet, Oral, 2 times daily    spironolactone (ALDACTONE) 25 mg, Oral, Daily    Vitamin D, Cholecalciferol, 1000 UNITS CAPS 1 tablet, Oral, Daily      Social History     Socioeconomic History    Marital status: /Civil Union     Spouse name: Not on file    Number of children: Not on file    Years of education: Not on file    Highest education level: Not on file   Occupational History    Occupation: Manager-Retired   Tobacco Use    Smoking status: Never     Passive exposure: Never    Smokeless tobacco: Never   Vaping Use    Vaping status: Never Used   Substance and Sexual Activity    Alcohol use: Never     Comment: Rare    Drug use: No    Sexual activity: Not Currently   Other Topics Concern  "   Not on file   Social History Narrative    Not on file     Social Determinants of Health     Financial Resource Strain: Low Risk  (11/4/2023)    Overall Financial Resource Strain (CARDIA)     Difficulty of Paying Living Expenses: Not hard at all   Food Insecurity: Not on file   Transportation Needs: No Transportation Needs (11/4/2023)    PRAPARE - Transportation     Lack of Transportation (Medical): No     Lack of Transportation (Non-Medical): No   Physical Activity: Not on file   Stress: Not on file   Social Connections: Not on file   Intimate Partner Violence: Not on file   Housing Stability: Not on file     Family History   Problem Relation Age of Onset    Heart failure Mother         Congestive    Hypertension Mother     Osteoporosis Mother     COPD Father     Emphysema Father     Hypertension Father     Heart disease Sister         Heart Valve Replacement    Osteoporosis Sister     Breast cancer Family     Factor V Leiden deficiency Son         on Xarelto     Physical Exam:  Vitals:    01/03/24 1411   BP: 118/76   BP Location: Left arm   Patient Position: Sitting   Cuff Size: Standard   Pulse: 77   SpO2: 97%   Weight: 81.7 kg (180 lb 1.6 oz)   Height: 5' 11\" (1.803 m)     Constitutional: NAD, non toxic  Ears/nose/mouth/throat: atraumatic  CV: RRR, no JVD  Resp: CTABL  GI: Soft, NTND  MSK: no swollen joints in exposed areas  Extr: No edema, warm LE  Pysche: Normal affect  Neuro: appropriate in conversation  Skin: dry and intact in exposed areas    Labs & Results:  Lab Results   Component Value Date    SODIUM 140 12/24/2023    K 4.0 12/24/2023     12/24/2023    CO2 25 12/24/2023    BUN 24 12/24/2023    CREATININE 1.43 (H) 12/24/2023    GLUC 104 12/24/2023    CALCIUM 9.4 12/24/2023     Lab Results   Component Value Date    WBC 10.72 (H) 12/24/2023    HGB 13.1 12/24/2023    HCT 39.6 12/24/2023    MCV 98 12/24/2023     12/24/2023       Counseling / Coordination of Care  Time spent today 27 minutes.  " Greater than 50% of total time was spent with the patient and / or family counseling and / or coordination of care.  We discussed diagnoses, most recent studies, tests and any changes in treatment plan.    Thank you for the opportunity to participate in the care of this patient.    Stiven Murillo MD  Attending Physician  Advanced Heart Failure and Transplant Cardiology  [facility]

## 2024-01-03 NOTE — TELEPHONE ENCOUNTER
"Patient called back today requesting an office to be seen today. He is aware Dr Estes is out of the office.      He said after the call on 12/27 and the increase in furosemide his weight did come down.  His weight this AM was 177 lbs. It had been up to 182 lbs when in the ER on 12/24.  When his weight came down, he resumed his original lasix dose of 10 mg daily, although his PCP advised him to remain on 20 mg daily due to CXR result while in the ER.    BNP in ER was 4700, Cr 1.43 (which seems to be stable for him)  K 4.0.    CXR on 12/24 showed \"mild interstitial edema with possible small left pleural effusion.\".    Patient said he is coughing up clear phlegm on occasion, has no edema.  Pulse ox reading is 95-96% though he sounds short of breath when speaking, He has additional orders for a repeat BMP and BNP from his PCP which he said he was instructed to obtain this week.    Patient said he felt better for a few days until last night when he developed increased orthopnea and is unable to lie flat.  BP today is 114/74, HR 67.  He took an additional dose of lasix 20 mg at 3 AM this morning when he felt the shortness of breath.  No edema.     Please advise.  "

## 2024-01-06 ENCOUNTER — APPOINTMENT (OUTPATIENT)
Dept: LAB | Facility: CLINIC | Age: 84
End: 2024-01-06
Payer: COMMERCIAL

## 2024-01-06 DIAGNOSIS — I42.9 CARDIOMYOPATHY, UNSPECIFIED TYPE (HCC): ICD-10-CM

## 2024-01-06 DIAGNOSIS — N18.32 STAGE 3B CHRONIC KIDNEY DISEASE (HCC): ICD-10-CM

## 2024-01-06 LAB
ANION GAP SERPL CALCULATED.3IONS-SCNC: 12 MMOL/L
BNP SERPL-MCNC: 1430 PG/ML (ref 0–100)
BUN SERPL-MCNC: 20 MG/DL (ref 5–25)
CALCIUM SERPL-MCNC: 9.4 MG/DL (ref 8.4–10.2)
CHLORIDE SERPL-SCNC: 106 MMOL/L (ref 96–108)
CO2 SERPL-SCNC: 25 MMOL/L (ref 21–32)
CREAT SERPL-MCNC: 1.44 MG/DL (ref 0.6–1.3)
GFR SERPL CREATININE-BSD FRML MDRD: 44 ML/MIN/1.73SQ M
GLUCOSE SERPL-MCNC: 98 MG/DL (ref 65–140)
POTASSIUM SERPL-SCNC: 4.5 MMOL/L (ref 3.5–5.3)
SODIUM SERPL-SCNC: 143 MMOL/L (ref 135–147)

## 2024-01-06 PROCEDURE — 80048 BASIC METABOLIC PNL TOTAL CA: CPT

## 2024-01-06 PROCEDURE — 83880 ASSAY OF NATRIURETIC PEPTIDE: CPT

## 2024-01-06 PROCEDURE — 36415 COLL VENOUS BLD VENIPUNCTURE: CPT

## 2024-01-09 ENCOUNTER — TELEPHONE (OUTPATIENT)
Dept: FAMILY MEDICINE CLINIC | Facility: CLINIC | Age: 84
End: 2024-01-09

## 2024-01-09 NOTE — PROGRESS NOTES
Heart Failure Office Note - Christian Bolden 83 y.o. male MRN: [id number]    @ Encounter: [id number]      Assessment/Plan:    Patient Active Problem List    Diagnosis Date Noted    Encounter for follow-up surveillance of melanoma 07/11/2019    Chronic HFrEF (heart failure with reduced ejection fraction) ([facility]) 01/29/2019    Hypertensive kidney disease with stage 3b chronic kidney disease ([facility]) 01/04/2019    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 09/20/2018    Osteopenia 06/29/2018    Personal history of malignant melanoma 11/15/2017    Ank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 ([facility]) 12/30/2014    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 12/02/2013    Microscopic hematuria 12/02/2013    Primary osteoarthritis of both knees 11/26/2013    Polymyalgia rheumatica ([facility]) 11/26/2013    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    Impaired fasting glucose 05/30/2013    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    Cardiac arrhythmia 04/05/2013    Essential hypertension 04/05/2013    Esophageal reflux 11/28/2012    Cardiomyopathy ([facility]) 10/26/2012    Mixed hyperlipidemia 09/05/2012    Other acute pulmonary embolism without acute cor pulmonale ([facility]) 06/15/2023    NSVT (nonsustained ventricular tachycardia) ([facility]) 06/15/2023    Chronic bilateral low back pain without sciatica 11/02/2022    History of pulmonary embolus (PE) 07/22/2022    Personal history of colon cancer 06/21/2022    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 ([facility]) 08/06/2021    Mild persistent asthma without complication 02/03/2021       # Chronic HFrEF, Stage C, NYHA 2  Etiology: NICM, presumed viral  No family hx of SCD  No ETOH    Weight: 190 lbs--> 179 lbs  BNP 1/6/24: 1430  NT pro BNP: 1/29/19: 6192    Studies- personally reviewed by me  Echo 7/22/22:  LVEF: 35%  LVEDd: 5.3 cm  RV: mildly dilated, mildly reduced  PASP: 30 mmHg  RVOT: no notching  Moderate AI  Aortic root 4.6  "cm    Echo 10/1/20:  LVEF: 35%  LVIDd: 6.5 cm  RV: mildly dilated, mildly reduced  MR: moderate  PASP:  RVOT:   Other: mild AI, ascending aorta 4.0 cm  TR: 2-3+    Echocardiogram 1/29/19  LVEF: 40%  LVIDd: 6 cm  RV: normal  MR: mild  PASP: 40 mmHg  RVOT:   Other:    Echo 6/21/18:  LVEF: 50%  LVEDd: 6.35 cm    Nuclear Stress Test 1/31/19: small to medium sized fixed defect infersoseptum. No ischemia  EF: 30%    Lab Results   Component Value Date    NTBNP 6,971 (H) 07/22/2022        Neurohormonal Blockade:  --Beta-Blocker: bisoprolol 10 mg daily  --ACEi, ARB or ARNi: Entresto 49/51 mg BID  --Aldosterone Receptor Blocker: spironolactone 25 mg daily  --SGLT2i: consider Jardiance  --Diuretic: lasix 40 mg daily    Sudden Cardiac Death Risk Reduction:  MDT dual chamber ICD: MRI conditional  Interrogation 6/7/23:  12%, optivol normal  Interrogation 9/8/22:  5%, no high rate episodes  Interrogation 12/3/21:  20%, no episodes, optivol normal  Interrogation 6/4/21:  9.3%, no high rate episodes, optivol normal    Electrical Resynchronization:  --Candidacy for BiV device: IVCD    Advanced Therapies (if appropriate):  --Inotrope:  --LVAD/Transplant Candidacy:    # Acute bilateral pulmonary emboli  - unprovoked with colon CA and melanoma hx  Lupus anticoagulant positive  AC: Eliquis  CT chest / PE study 07/21/2022: \"Nonobstructive pulmonary emboli  within distal bilateral main pulmonary arteries extending into lobar branches. Extensive pulmonary emboli within right upper lobe and right lower lobe and left lower lobe. Main pulmonary arteries more dilated when compared with prior exam...right main pulmonary artery measures 3.2 cm, previously 2.4 cm.\"  # HTN- controlled, room to escalate therapy  # Mild AI and ascending aorta 4 cm on 10/1/20 echo  # SVT  # NSVT on device check- on bisoprolol   # hyperlipidemia- atorvastatin 10 mg  4/29/23: LDL 56, HDL 51  10/26/22: LDL 58, HDL 54  1/25/22: LDL 66, HDL 56  7/29/21: LDL 64, HDL " 54  # hx of malignant melanoma  # CKD, Cr 1.44 on 1/6/24- unchanged   # Barretts esophagus- EGD 6/12/20  # ED- cialis    Today's Plan:  Maintain on lasix 40 mg daily  Check labs in one week  Now on spironolactone 25 mg  On Eliquis for bilateral PE in July 2022  Lupus anticoagulant 2/6/23 remained positive  Staying on Eliquis  Saw Hematology, recommended consider prophylactic dosing 2.5 mg BID thereafter  Continue bisoprolol and Entresto for HFrEF  Lipids at goal on atorvastatin 10 mg   NSVT relatively quiet on bisoprolol, recent devic check  No indication for asa  --2g sodium diet  Weights daily    HPI:      84 yo recently had first evaluation in HF clinic for NICM diagnosed in 2009, s/p ICD, PSVT, HTN and dyslipidemia.     Last in hospital in January for dyspnea felt to be more viral but was volume overloaded with NT pro BNP of 6192. Given one dose of IV lasix. LVEF: 40% with LVEDd 6 cm. He followed up with NP and weight was 204 lbs. Never smoked and does not drink.     Plays golf 2x a week, cuts grass, does not feel limited in activity. No significant edema in legs. He is limited by knees and back not respiration.      Pt admitted in July 2022 for acute bilateral pulmonary emboli, unprovoked with known history of colon cancer and melanoma. He had full body imaging in hospital.     Interim:   Saw Dr Murillo 1/3 after ER visit for orthopnea, SOB- in the ED  Then recently as outpt 12/27/23-Was escalated to lasix 80 x1 day then 40 qd for few days then lasix 20 qd then back to 10 qd   Dr Murillo escalated maintenance to 40 mg daily  1/6/24: cr 1.44  BNP: 1430  Review of Systems   Constitutional:  Negative for activity change, appetite change, fatigue and unexpected weight change (11 lbs weight loss).   HENT:  Negative for congestion and nosebleeds.    Eyes: Negative.    Respiratory:  Negative for cough, chest tightness and shortness of breath.    Cardiovascular:  Negative for chest pain, palpitations and leg swelling.    Gastrointestinal:  Negative for abdominal distention.   Endocrine: Negative.    Genitourinary: Negative.    Musculoskeletal:  Positive for arthralgias and back pain.   Skin: Negative.    Neurological:  Negative for dizziness, syncope and weakness.   Hematological: Negative.    Psychiatric/Behavioral: Negative.         Past Medical History:   Diagnosis Date    AICD (automatic cardioverter/defibrillator) present     Arthritis     Asthma     Burt esophagus     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     Chronic HFrEF (heart failure with reduced ejection fraction) ([facility]) 2019    CKD (chronic kidney disease)     Colon cancer ([facility]) 04/05/2013    Colon cancer screening 06/21/2022    COPD (chronic obstructive pulmonary disease) ([facility])     Diverticulitis     Last assessed: 4/5/13    Esophageal reflux     Gout     Hernia     Hyperlipidemia     Hypertension     Hypothyroidism 05/30/2013    Non-toxic multinodular goiter 11/28/2012    Pleural effusion     Polymyalgia rheumatica ([facility])     Pulmonary embolism ([facility]) 2022    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     Spondyloarthropathy     Last assessed: 11/28/12    Thrombocytopenia ([facility]) 06/23/2015         No Known Allergies  .    Current Outpatient Medications:     albuterol (PROVENTIL HFA,VENTOLIN HFA) 90 mcg/act inhaler, Inhale 2 puffs every 6 (six) hours as needed for wheezing or shortness of breath, Disp: 1 Inhaler, Rfl: 1    Alphagan P 0.1 %, , Disp: , Rfl:     apixaban (ELIQUIS) 2.5 mg, Take 1 tablet (2.5 mg total) by mouth 2 (two) times a day, Disp: 180 tablet, Rfl: 1    atorvastatin (LIPITOR) 10 mg tablet, TAKE ONE TABLET BY MOUTH EVERY DAY, Disp: 90 tablet, Rfl: 3    betamethasone valerate (VALISONE) 0.1 % cream, APPLY TO AFFECTED AREA BEHIND EARS TWICE DAILY WHEN ACTIVE OR AFTER HAIRCUTS, Disp: , Rfl:     bisoprolol (ZEBETA) 10 MG tablet, Take 1 tablet (10 mg total) by mouth 2 (two) times a day, Disp: 180 tablet,  Rfl: 3    Calcium Carb-Cholecalciferol (CALCIUM 600 + D PO), Take 3 tablets by mouth daily  , Disp: , Rfl:     ciclopirox (LOPROX) 0.77 % cream, APPLY TO AFFECTED AREA OF SKIN TWICE DAILY FOR 2 WEEKS THEN 2-3 TIMES WEEKLY AS MAINTENANCE, Disp: , Rfl:     Coenzyme Q10 (CO Q-10) 200 MG CAPS, Take 1 tablet by mouth daily, Disp: , Rfl:     finasteride (PROSCAR) 5 mg tablet, Take 1 tablet (5 mg total) by mouth daily, Disp: 90 tablet, Rfl: 1    Fluticasone-Salmeterol (Wixela Inhub) 500-50 mcg/dose inhaler, , Disp: , Rfl:     furosemide (LASIX) 20 mg tablet, Take 2 tablets (40 mg total) by mouth daily, Disp: 180 tablet, Rfl: 5    ketoconazole (NIZORAL) 2 % shampoo, LATHER IN TO SCALP AND LET IT SIT FOR 5 MINUTES PRIOR TO RINSING 3X WEEKLY AS NEEDED, Disp: , Rfl:     latanoprost (XALATAN) 0.005 % ophthalmic solution, , Disp: , Rfl:     Multiple Vitamins-Minerals (CENTRUM SILVER PO), Take 1 tablet by mouth daily., Disp: , Rfl:     omeprazole (PriLOSEC) 20 mg delayed release capsule, Take 1 capsule (20 mg total) by mouth daily, Disp: 90 capsule, Rfl: 1    sacubitril-valsartan (Entresto) 49-51 MG TABS, Take 1 tablet by mouth 2 (two) times a day, Disp: 180 tablet, Rfl: 3    spironolactone (ALDACTONE) 25 mg tablet, Take 1 tablet (25 mg total) by mouth daily, Disp: 90 tablet, Rfl: 5    Vitamin D, Cholecalciferol, 1000 UNITS CAPS, Take 1 tablet by mouth daily., Disp: , Rfl:     Social History     Socioeconomic History    Marital status: /Civil Union     Spouse name: Not on file    Number of children: Not on file    Years of education: Not on file    Highest education level: Not on file   Occupational History    Occupation: Manager-Retired   Tobacco Use    Smoking status: Never     Passive exposure: Never    Smokeless tobacco: Never   Vaping Use    Vaping status: Never Used   Substance and Sexual Activity    Alcohol use: Never     Comment: Rare    Drug use: No    Sexual activity: Not Currently   Other Topics Concern    Not  "on file   Social History Narrative    Not on file     Social Determinants of Health     Financial Resource Strain: Low Risk  (11/4/2023)    Overall Financial Resource Strain (CARDIA)     Difficulty of Paying Living Expenses: Not hard at all   Food Insecurity: Not on file   Transportation Needs: No Transportation Needs (11/4/2023)    PRAPARE - Transportation     Lack of Transportation (Medical): No     Lack of Transportation (Non-Medical): No   Physical Activity: Not on file   Stress: Not on file   Social Connections: Not on file   Intimate Partner Violence: Not on file   Housing Stability: Not on file       Family History   Problem Relation Age of Onset    Heart failure Mother         Congestive    Hypertension Mother     Osteoporosis Mother     COPD Father     Emphysema Father     Hypertension Father     Heart disease Sister         Heart Valve Replacement    Osteoporosis Sister     Breast cancer Family     Factor V Leiden deficiency Son         on Xarelto       Physical Exam:    Vitals: Blood pressure 98/58, pulse 71, height 5' 11\" (1.803 m), weight 81.2 kg (179 lb), SpO2 98%., Body mass index is 24.97 kg/m².,   Wt Readings from Last 3 Encounters:   01/11/24 81.2 kg (179 lb)   01/03/24 81.7 kg (180 lb 1.6 oz)   12/22/23 84.8 kg (187 lb)       Physical Exam  Constitutional:       Appearance: He is well-developed.   HENT:      Head: Normocephalic and atraumatic.   Eyes:      Pupils: Pupils are equal, round, and reactive to light.   Neck:      Vascular: No JVD.   Cardiovascular:      Rate and Rhythm: Normal rate and regular rhythm.      Heart sounds: No murmur heard.  Pulmonary:      Effort: Pulmonary effort is normal. No respiratory distress.      Breath sounds: Normal breath sounds.   Abdominal:      General: There is no distension.      Palpations: Abdomen is soft.      Tenderness: There is no abdominal tenderness.   Musculoskeletal:         General: Normal range of motion.      Cervical back: Normal range of " "motion.   Skin:     General: Skin is warm and dry.      Findings: No rash.   Neurological:      Mental Status: He is alert and oriented to person, place, and time.       Labs & Results:    Lab Results   Component Value Date    WBC 10.72 (H) 12/24/2023    HGB 13.1 12/24/2023    HCT 39.6 12/24/2023    MCV 98 12/24/2023     12/24/2023     Lab Results   Component Value Date    SODIUM 143 01/06/2024    K 4.5 01/06/2024     01/06/2024    CO2 25 01/06/2024    BUN 20 01/06/2024    CREATININE 1.44 (H) 01/06/2024    GLUC 98 01/06/2024    CALCIUM 9.4 01/06/2024     Lab Results   Component Value Date    NTBNP 6,971 (H) 07/22/2022      Lab Results   Component Value Date    CHOL 163 12/19/2015    CHOL 168 04/17/2015    CHOL 155 12/04/2014     Lab Results   Component Value Date    HDL 52 11/03/2023    HDL 51 04/29/2023    HDL 54 10/26/2022     Lab Results   Component Value Date    LDLCALC 60 11/03/2023    LDLCALC 56 04/29/2023    LDLCALC 58 10/26/2022     Lab Results   Component Value Date    TRIG 98 11/03/2023    TRIG 125 04/29/2023    TRIG 118 10/26/2022     No results found for: \"CHOLHDL\"    EKG personally reviewed by Corbin Abad.     Counseling / Coordination of Care  Time spent today 25 minutes.  Greater than 50% of total time was spent with the patient and / or family counseling and / or coordination of care.  We discussed diagnoses, most recent studies, tests and any changes in treatment plan  Thank you for the opportunity to participate in the care of this patient.    CORBIN ABAD D.O.  DIRECTOR OF HEART FAILURE/ PULMONARY HYPERTENSION  MEDICAL DIRECTOR OF LVAD PROGRAM  [facility]    "

## 2024-01-09 NOTE — TELEPHONE ENCOUNTER
Left detailed message for patient ---- Message from Jesus Garcia MD sent at 1/9/2024  8:19 AM EST -----  Kidney function stable  BNP improved. Continue current medications.

## 2024-01-11 ENCOUNTER — OFFICE VISIT (OUTPATIENT)
Dept: CARDIOLOGY CLINIC | Facility: CLINIC | Age: 84
End: 2024-01-11
Payer: COMMERCIAL

## 2024-01-11 VITALS
WEIGHT: 179 LBS | DIASTOLIC BLOOD PRESSURE: 58 MMHG | HEIGHT: 71 IN | BODY MASS INDEX: 25.06 KG/M2 | SYSTOLIC BLOOD PRESSURE: 98 MMHG | HEART RATE: 71 BPM | OXYGEN SATURATION: 98 %

## 2024-01-11 DIAGNOSIS — I10 ESSENTIAL HYPERTENSION: Primary | ICD-10-CM

## 2024-01-11 DIAGNOSIS — N18.32 HYPERTENSIVE KIDNEY DISEASE WITH STAGE 3B CHRONIC KIDNEY DISEASE (HCC): ICD-10-CM

## 2024-01-11 DIAGNOSIS — E27.0 HYPERCORTISOLEMIA (HCC): ICD-10-CM

## 2024-01-11 DIAGNOSIS — I50.22 CHRONIC HFREF (HEART FAILURE WITH REDUCED EJECTION FRACTION) (HCC): ICD-10-CM

## 2024-01-11 DIAGNOSIS — I12.9 HYPERTENSIVE KIDNEY DISEASE WITH STAGE 3B CHRONIC KIDNEY DISEASE (HCC): ICD-10-CM

## 2024-01-11 DIAGNOSIS — I50.23 ACUTE ON CHRONIC SYSTOLIC (CONGESTIVE) HEART FAILURE (HCC): ICD-10-CM

## 2024-01-11 DIAGNOSIS — I26.99 OTHER ACUTE PULMONARY EMBOLISM WITHOUT ACUTE COR PULMONALE (HCC): ICD-10-CM

## 2024-01-11 PROCEDURE — 99214 OFFICE O/P EST MOD 30 MIN: CPT | Performed by: INTERNAL MEDICINE

## 2024-01-11 NOTE — TELEPHONE ENCOUNTER
Eliquis 2.5 mg  Lot#: [id number]  Exp. Date: 04/24  Count Dispensed: 4    Per Dr. Estes at today's office visit.

## 2024-01-12 RX ORDER — ATORVASTATIN CALCIUM 10 MG/1
10 TABLET, FILM COATED ORAL DAILY
Qty: 90 TABLET | Refills: 1 | Status: SHIPPED | OUTPATIENT
Start: 2024-01-12

## 2024-02-14 ENCOUNTER — APPOINTMENT (OUTPATIENT)
Dept: LAB | Facility: CLINIC | Age: 84
End: 2024-02-14
Payer: COMMERCIAL

## 2024-02-14 DIAGNOSIS — I50.22 CHRONIC HFREF (HEART FAILURE WITH REDUCED EJECTION FRACTION) (HCC): ICD-10-CM

## 2024-02-14 LAB
ANION GAP SERPL CALCULATED.3IONS-SCNC: 8 MMOL/L
BUN SERPL-MCNC: 41 MG/DL (ref 5–25)
CALCIUM SERPL-MCNC: 9.1 MG/DL (ref 8.4–10.2)
CHLORIDE SERPL-SCNC: 106 MMOL/L (ref 96–108)
CO2 SERPL-SCNC: 27 MMOL/L (ref 21–32)
CREAT SERPL-MCNC: 2.06 MG/DL (ref 0.6–1.3)
GFR SERPL CREATININE-BSD FRML MDRD: 29 ML/MIN/1.73SQ M
GLUCOSE P FAST SERPL-MCNC: 100 MG/DL (ref 65–99)
POTASSIUM SERPL-SCNC: 4.4 MMOL/L (ref 3.5–5.3)
SODIUM SERPL-SCNC: 141 MMOL/L (ref 135–147)

## 2024-02-14 PROCEDURE — 80048 BASIC METABOLIC PNL TOTAL CA: CPT

## 2024-02-14 PROCEDURE — 36415 COLL VENOUS BLD VENIPUNCTURE: CPT

## 2024-03-06 ENCOUNTER — IN-CLINIC DEVICE VISIT (OUTPATIENT)
Dept: CARDIOLOGY CLINIC | Facility: CLINIC | Age: 84
End: 2024-03-06
Payer: COMMERCIAL

## 2024-03-06 DIAGNOSIS — Z95.810 PRESENCE OF AUTOMATIC CARDIOVERTER/DEFIBRILLATOR (AICD): Primary | ICD-10-CM

## 2024-03-06 PROCEDURE — 93283 PRGRMG EVAL IMPLANTABLE DFB: CPT | Performed by: INTERNAL MEDICINE

## 2024-03-06 NOTE — PROGRESS NOTES
Results for orders placed or performed in visit on 03/06/24   Cardiac EP device report    Narrative    MDT-DUAL CHAMBER ICD (AAIR-DDDR MODE)/ ACTIVE SYSTEM IS MRI CONDITIONAL  CARELINK TRANSMISSION:BATTERY VOLTAGE ADEQUATE. (3 YRS) AP 68%  9%. ALL AVAILABLE LEAD PARAMETERS WITHIN NORMAL LIMITS. NO SIGNIFICANT HIGH RATE EPISODES. OPTI-VOL WITHIN NORMAL LIMITS.NORMAL DEVICE FUNCTION.---CONNER

## 2024-03-28 ENCOUNTER — TELEPHONE (OUTPATIENT)
Dept: CARDIOLOGY CLINIC | Facility: CLINIC | Age: 84
End: 2024-03-28

## 2024-03-28 DIAGNOSIS — I50.22 CHRONIC SYSTOLIC CHF (CONGESTIVE HEART FAILURE), NYHA CLASS 3 (HCC): Primary | ICD-10-CM

## 2024-03-29 ENCOUNTER — APPOINTMENT (OUTPATIENT)
Dept: LAB | Facility: CLINIC | Age: 84
End: 2024-03-29
Payer: COMMERCIAL

## 2024-03-29 DIAGNOSIS — I50.22 CHRONIC SYSTOLIC CHF (CONGESTIVE HEART FAILURE), NYHA CLASS 3 (HCC): ICD-10-CM

## 2024-03-29 LAB
ANION GAP SERPL CALCULATED.3IONS-SCNC: 9 MMOL/L (ref 4–13)
BUN SERPL-MCNC: 35 MG/DL (ref 5–25)
CALCIUM SERPL-MCNC: 9.3 MG/DL (ref 8.4–10.2)
CHLORIDE SERPL-SCNC: 104 MMOL/L (ref 96–108)
CO2 SERPL-SCNC: 26 MMOL/L (ref 21–32)
CREAT SERPL-MCNC: 1.66 MG/DL (ref 0.6–1.3)
GFR SERPL CREATININE-BSD FRML MDRD: 37 ML/MIN/1.73SQ M
GLUCOSE P FAST SERPL-MCNC: 100 MG/DL (ref 65–99)
POTASSIUM SERPL-SCNC: 4.7 MMOL/L (ref 3.5–5.3)
SODIUM SERPL-SCNC: 139 MMOL/L (ref 135–147)

## 2024-03-29 PROCEDURE — 36415 COLL VENOUS BLD VENIPUNCTURE: CPT

## 2024-03-29 PROCEDURE — 80048 BASIC METABOLIC PNL TOTAL CA: CPT

## 2024-03-29 NOTE — TELEPHONE ENCOUNTER
Called pt and gave provider's message. Pt verbalized understanding. He states that he will go bloodwork this afternoon.

## 2024-04-10 ENCOUNTER — TELEPHONE (OUTPATIENT)
Age: 84
End: 2024-04-10

## 2024-04-10 NOTE — TELEPHONE ENCOUNTER
Caller: Christian Bolden/ patient     Doctor: Dr. Estes     Reason for call: Patient is having cataract surgery on 5/1. Recently saw Dr. Estes on 1/11/24 and Dr. Murillo on 1/3/24. Does patient need a CC? If so, can someone please schedule accordingly?    Call back#: requested a call on home phone number first, [phone] and then cell,     [phone]

## 2024-04-12 NOTE — PROGRESS NOTES
Heart Failure Office Note - Christian Bolden 83 y.o. 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) 08/06/2021    Mild persistent asthma without complication 02/03/2021    Encounter for follow-up surveillance of melanoma 07/11/2019    Acute on chronic systolic (congestive) heart failure (HCC) 01/29/2019    Hypertensive kidney disease with stage 3b chronic kidney disease (HCC) 01/04/2019    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 09/20/2018    Osteopenia 06/29/2018    Personal history of malignant melanoma 11/15/2017    Ank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 (HCC) 12/30/2014    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 12/02/2013    Microscopic hematuria 12/02/2013    Primary osteoarthritis of both knees 11/26/2013    Polymyalgia rheumatica (HCC) 11/26/2013    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    Impaired fasting glucose 05/30/2013    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    Cardiac arrhythmia 04/05/2013    Essential hypertension 04/05/2013    Esophageal reflux 11/28/2012    Cardiomyopathy ([facility]) 10/26/2012    Mixed hyperlipidemia 09/05/2012    Other acute pulmonary embolism without acute cor pulmonale ([facility]) 06/15/2023    NSVT (nonsustained ventricular tachycardia) ([facility]) 06/15/2023       # Chronic HFrEF, Stage C, NYHA 2  Etiology: NICM, presumed viral  No family hx of SCD  No ETOH    Weight: 190 lbs--> 179 lbs  BNP 1/6/24: 1430  NT pro BNP: 1/29/19: 6192    Studies- personally reviewed by me  Echo 7/22/22:  LVEF: 35%  LVEDd: 5.3 cm  RV: mildly dilated, mildly reduced  PASP: 30 mmHg  RVOT: no notching  Moderate AI  Aortic root 4.6 cm    Echo  "10/1/20:  LVEF: 35%  LVIDd: 6.5 cm  RV: mildly dilated, mildly reduced  MR: moderate  PASP:  RVOT:   Other: mild AI, ascending aorta 4.0 cm  TR: 2-3+    Echocardiogram 1/29/19  LVEF: 40%  LVIDd: 6 cm  RV: normal  MR: mild  PASP: 40 mmHg  RVOT:   Other:    Echo 6/21/18:  LVEF: 50%  LVEDd: 6.35 cm    Nuclear Stress Test 1/31/19: small to medium sized fixed defect infersoseptum. No ischemia  EF: 30%    Lab Results   Component Value Date    NTBNP 6,971 (H) 07/22/2022        Neurohormonal Blockade:  --Beta-Blocker: bisoprolol 10 mg daily  --ACEi, ARB or ARNi: Entresto 49/51 mg BID- 24/26 mg BID  --Aldosterone Receptor Blocker: spironolactone 25 mg daily  --SGLT2i: consider Jardiance  --Diuretic: lasix 10 mg daily    Sudden Cardiac Death Risk Reduction:  MDT dual chamber ICD: MRI conditional  Interrogation 3/6/24:  9%, optivol normal  Interrogation 6/7/23:  12%, optivol normal  Interrogation 9/8/22:  5%, no high rate episodes  Interrogation 12/3/21:  20%, no episodes, optivol normal  Interrogation 6/4/21:  9.3%, no high rate episodes, optivol normal    Electrical Resynchronization:  --Candidacy for BiV device: IVCD    Advanced Therapies (if appropriate):  --Inotrope:  --LVAD/Transplant Candidacy:    # Hx of bilateral pulmonary emboli  - unprovoked with colon CA and melanoma hx  Lupus anticoagulant positive  AC: Eliquis  CT chest / PE study 07/21/2022: \"Nonobstructive pulmonary emboli  within distal bilateral main pulmonary arteries extending into lobar branches. Extensive pulmonary emboli within right upper lobe and right lower lobe and left lower lobe. Main pulmonary arteries more dilated when compared with prior exam...right main pulmonary artery measures 3.2 cm, previously 2.4 cm.\"  # HTN- controlled, room to escalate therapy  # Mild AI and ascending aorta 4 cm on 10/1/20 echo  # SVT  # NSVT on device check- on bisoprolol   # hyperlipidemia- atorvastatin 10 mg  4/29/23: LDL 56, HDL 51  10/26/22: LDL 58, HDL " 54  1/25/22: LDL 66, HDL 56  7/29/21: LDL 64, HDL 54  # hx of malignant melanoma  # CKD, Cr 1.44 on 1/6/24, 1.66 on 3/29/24  # Barretts esophagus- EGD 6/12/20  # ED- cialis    Today's Plan:  Cardiovascular risk: acceptable for cataract surgery - can hold eliquis for surgery  Maintain on lasix 10 mg daily  Restart spironolactone 25 mg daily  On Eliquis for bilateral PE in July 2022  Lupus anticoagulant 2/6/23 remained positive  Saw Hematology, recommended consider prophylactic dosing 2.5 mg BID thereafter  Continue bisoprolol and Entresto for HFrEF  Lipids at goal on atorvastatin 10 mg   NSVT relatively quiet on bisoprolol, recent devic check  --2g sodium diet  Weights daily    HPI:      82 yo recently had first evaluation in HF clinic for NICM diagnosed in 2009, s/p ICD, PSVT, HTN and dyslipidemia.     Last in hospital in January for dyspnea felt to be more viral but was volume overloaded with NT pro BNP of 6192. Given one dose of IV lasix. LVEF: 40% with LVEDd 6 cm. He followed up with NP and weight was 204 lbs. Never smoked and does not drink.     Plays golf 2x a week, cuts grass, does not feel limited in activity. No significant edema in legs. He is limited by knees and back not respiration.      Pt admitted in July 2022 for acute bilateral pulmonary emboli, unprovoked with known history of colon cancer and melanoma. He had full body imaging in hospital.     Interim:   Needs cardiac clearance for cataract surgery on 5/1    Interrogation 3/6/24:  9%, optivol normal    He stopped spironolactone on his own due to low blood pressure  Getting orthostatic  Review of Systems   Constitutional:  Negative for activity change, appetite change, fatigue and unexpected weight change (11 lbs weight loss).   HENT:  Negative for congestion and nosebleeds.    Eyes: Negative.    Respiratory:  Negative for cough, chest tightness and shortness of breath.    Cardiovascular:  Negative for chest pain, palpitations and leg swelling.    Gastrointestinal:  Negative for abdominal distention.   Endocrine: Negative.    Genitourinary: Negative.    Musculoskeletal:  Positive for arthralgias and back pain.   Skin: Negative.    Neurological:  Negative for dizziness, syncope and weakness.   Hematological: Negative.    Psychiatric/Behavioral: Negative.         Past Medical History:   Diagnosis Date    AICD (automatic cardioverter/defibrillator) present     Arthritis     Asthma     Burt esophagus     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     Chronic HFrEF (heart failure with reduced ejection fraction) ([facility]) 2019    CKD (chronic kidney disease)     Colon cancer ([facility]) 04/05/2013    Colon cancer screening 06/21/2022    COPD (chronic obstructive pulmonary disease) ([facility])     Diverticulitis     Last assessed: 4/5/13    Esophageal reflux     Gout     Hernia     Hyperlipidemia     Hypertension     Hypothyroidism 05/30/2013    Non-toxic multinodular goiter 11/28/2012    Pleural effusion     Polymyalgia rheumatica ([facility])     Pulmonary embolism ([facility]) 2022    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     Spondyloarthropathy     Last assessed: 11/28/12    Thrombocytopenia ([facility]) 06/23/2015         No Known Allergies  .    Current Outpatient Medications:     albuterol (PROVENTIL HFA,VENTOLIN HFA) 90 mcg/act inhaler, Inhale 2 puffs every 6 (six) hours as needed for wheezing or shortness of breath, Disp: 1 Inhaler, Rfl: 1    Alphagan P 0.1 %, , Disp: , Rfl:     apixaban (ELIQUIS) 2.5 mg, Take 1 tablet (2.5 mg total) by mouth 2 (two) times a day, Disp: 180 tablet, Rfl: 1    atorvastatin (LIPITOR) 10 mg tablet, Take 1 tablet (10 mg total) by mouth daily, Disp: 90 tablet, Rfl: 1    betamethasone valerate (VALISONE) 0.1 % cream, APPLY TO AFFECTED AREA BEHIND EARS TWICE DAILY WHEN ACTIVE OR AFTER HAIRCUTS, Disp: , Rfl:     bisoprolol (ZEBETA) 10 MG tablet, Take 1 tablet (10 mg total) by mouth 2 (two) times a day, Disp: 180  tablet, Rfl: 3    Calcium Carb-Cholecalciferol (CALCIUM 600 + D PO), Take 3 tablets by mouth daily  , Disp: , Rfl:     ciclopirox (LOPROX) 0.77 % cream, APPLY TO AFFECTED AREA OF SKIN TWICE DAILY FOR 2 WEEKS THEN 2-3 TIMES WEEKLY AS MAINTENANCE, Disp: , Rfl:     Coenzyme Q10 (CO Q-10) 200 MG CAPS, Take 1 tablet by mouth daily, Disp: , Rfl:     finasteride (PROSCAR) 5 mg tablet, Take 1 tablet (5 mg total) by mouth daily, Disp: 90 tablet, Rfl: 1    Fluticasone-Salmeterol (Wixela Inhub) 500-50 mcg/dose inhaler, , Disp: , Rfl:     furosemide (LASIX) 20 mg tablet, Take 2 tablets (40 mg total) by mouth daily (Patient taking differently: Take 10 mg by mouth daily), Disp: 180 tablet, Rfl: 5    ketoconazole (NIZORAL) 2 % shampoo, LATHER IN TO SCALP AND LET IT SIT FOR 5 MINUTES PRIOR TO RINSING 3X WEEKLY AS NEEDED, Disp: , Rfl:     latanoprost (XALATAN) 0.005 % ophthalmic solution, , Disp: , Rfl:     Multiple Vitamins-Minerals (CENTRUM SILVER PO), Take 1 tablet by mouth daily., Disp: , Rfl:     omeprazole (PriLOSEC) 20 mg delayed release capsule, Take 1 capsule (20 mg total) by mouth daily, Disp: 90 capsule, Rfl: 1    sacubitril-valsartan (Entresto) 49-51 MG TABS, Take 1 tablet by mouth 2 (two) times a day, Disp: 180 tablet, Rfl: 3    Vitamin D, Cholecalciferol, 1000 UNITS CAPS, Take 1 tablet by mouth daily., Disp: , Rfl:     spironolactone (ALDACTONE) 25 mg tablet, Take 1 tablet (25 mg total) by mouth daily (Patient not taking: Reported on 4/16/2024), Disp: 90 tablet, Rfl: 5    Social History     Socioeconomic History    Marital status: /Civil Union     Spouse name: Not on file    Number of children: Not on file    Years of education: Not on file    Highest education level: Not on file   Occupational History    Occupation: Manager-Retired   Tobacco Use    Smoking status: Never     Passive exposure: Never    Smokeless tobacco: Never   Vaping Use    Vaping status: Never Used   Substance and Sexual Activity    Alcohol  "use: Never     Comment: Rare    Drug use: No    Sexual activity: Not Currently   Other Topics Concern    Not on file   Social History Narrative    Not on file     Social Determinants of Health     Financial Resource Strain: Low Risk  (11/4/2023)    Overall Financial Resource Strain (CARDIA)     Difficulty of Paying Living Expenses: Not hard at all   Food Insecurity: Not on file   Transportation Needs: No Transportation Needs (11/4/2023)    PRAPARE - Transportation     Lack of Transportation (Medical): No     Lack of Transportation (Non-Medical): No   Physical Activity: Not on file   Stress: Not on file   Social Connections: Not on file   Intimate Partner Violence: Not on file   Housing Stability: Not on file       Family History   Problem Relation Age of Onset    Heart failure Mother         Congestive    Hypertension Mother     Osteoporosis Mother     COPD Father     Emphysema Father     Hypertension Father     Heart disease Sister         Heart Valve Replacement    Osteoporosis Sister     Breast cancer Family     Factor V Leiden deficiency Son         on Xarelto       Physical Exam:    Vitals: Blood pressure 90/60, pulse 55, height 5' 11\" (1.803 m), weight 81.2 kg (179 lb), SpO2 98%., Body mass index is 24.97 kg/m².,   Wt Readings from Last 3 Encounters:   04/16/24 81.2 kg (179 lb)   01/11/24 81.2 kg (179 lb)   01/03/24 81.7 kg (180 lb 1.6 oz)       Physical Exam  Constitutional:       Appearance: He is well-developed.   HENT:      Head: Normocephalic and atraumatic.   Eyes:      Pupils: Pupils are equal, round, and reactive to light.   Neck:      Vascular: No JVD.   Cardiovascular:      Rate and Rhythm: Normal rate and regular rhythm.      Heart sounds: No murmur heard.  Pulmonary:      Effort: Pulmonary effort is normal. No respiratory distress.      Breath sounds: Normal breath sounds.   Abdominal:      General: There is no distension.      Palpations: Abdomen is soft.      Tenderness: There is no abdominal " "tenderness.   Musculoskeletal:         General: Normal range of motion.      Cervical back: Normal range of motion.   Skin:     General: Skin is warm and dry.      Findings: No rash.   Neurological:      Mental Status: He is alert and oriented to person, place, and time.       Labs & Results:    Lab Results   Component Value Date    WBC 10.72 (H) 12/24/2023    HGB 13.1 12/24/2023    HCT 39.6 12/24/2023    MCV 98 12/24/2023     12/24/2023     Lab Results   Component Value Date    SODIUM 139 03/29/2024    K 4.7 03/29/2024     03/29/2024    CO2 26 03/29/2024    BUN 35 (H) 03/29/2024    CREATININE 1.66 (H) 03/29/2024    GLUC 98 01/06/2024    CALCIUM 9.3 03/29/2024     Lab Results   Component Value Date    NTBNP 6,971 (H) 07/22/2022      Lab Results   Component Value Date    CHOL 163 12/19/2015    CHOL 168 04/17/2015    CHOL 155 12/04/2014     Lab Results   Component Value Date    HDL 52 11/03/2023    HDL 51 04/29/2023    HDL 54 10/26/2022     Lab Results   Component Value Date    LDLCALC 60 11/03/2023    LDLCALC 56 04/29/2023    LDLCALC 58 10/26/2022     Lab Results   Component Value Date    TRIG 98 11/03/2023    TRIG 125 04/29/2023    TRIG 118 10/26/2022     No results found for: \"CHOLHDL\"    EKG personally reviewed by Corbin Abad.     Counseling / Coordination of Care  Time spent today 25 minutes.  Greater than 50% of total time was spent with the patient and / or family counseling and / or coordination of care.  We discussed diagnoses, most recent studies, tests and any changes in treatment plan  Thank you for the opportunity to participate in the care of this patient.    CORBIN ABAD D.O.  DIRECTOR OF HEART FAILURE/ PULMONARY HYPERTENSION  MEDICAL DIRECTOR OF LVAD PROGRAM  [facility]    "

## 2024-04-16 ENCOUNTER — OFFICE VISIT (OUTPATIENT)
Dept: CARDIOLOGY CLINIC | Facility: CLINIC | Age: 84
End: 2024-04-16
Payer: COMMERCIAL

## 2024-04-16 VITALS
DIASTOLIC BLOOD PRESSURE: 60 MMHG | SYSTOLIC BLOOD PRESSURE: 90 MMHG | HEIGHT: 71 IN | OXYGEN SATURATION: 98 % | BODY MASS INDEX: 25.06 KG/M2 | WEIGHT: 179 LBS | HEART RATE: 55 BPM

## 2024-04-16 DIAGNOSIS — N18.30 HYPERTENSIVE HEART AND KIDNEY DISEASE WITH HF AND WITH CKD STAGE III (HCC): Primary | ICD-10-CM

## 2024-04-16 DIAGNOSIS — I50.22 CHRONIC SYSTOLIC CHF (CONGESTIVE HEART FAILURE) (HCC): ICD-10-CM

## 2024-04-16 DIAGNOSIS — I13.0 HYPERTENSIVE HEART AND KIDNEY DISEASE WITH HF AND WITH CKD STAGE III (HCC): Primary | ICD-10-CM

## 2024-04-16 DIAGNOSIS — I47.29 NSVT (NONSUSTAINED VENTRICULAR TACHYCARDIA) (HCC): ICD-10-CM

## 2024-04-16 DIAGNOSIS — I10 ESSENTIAL HYPERTENSION: ICD-10-CM

## 2024-04-16 DIAGNOSIS — Z86.711 HISTORY OF PULMONARY EMBOLUS (PE): ICD-10-CM

## 2024-04-16 DIAGNOSIS — I42.0 DILATED CARDIOMYOPATHY (HCC): ICD-10-CM

## 2024-04-16 PROCEDURE — 99214 OFFICE O/P EST MOD 30 MIN: CPT | Performed by: INTERNAL MEDICINE

## 2024-04-16 RX ORDER — SACUBITRIL AND VALSARTAN 24; 26 MG/1; MG/1
1 TABLET, FILM COATED ORAL 2 TIMES DAILY
Qty: 180 TABLET | Refills: 3 | Status: SHIPPED | OUTPATIENT
Start: 2024-04-16

## 2024-04-16 NOTE — LETTER
April 16, 2024     Purvi Reynaga MD  [address] [facility] [facility] [address]  [address]  [facility] [address]    Patient: Christian Bolden   YOB: 1940   Date of Visit: 4/16/2024       Dear Dr. Reynaga:    Thank you for referring Christian Bolden to me for evaluation. Below are my notes for this consultation.    If you have questions, please do not hesitate to call me. I look forward to following your patient along with you.         Sincerely,        Corbin Estes DO        CC: No Recipients    Corbin Estes DO  4/16/2024  9:22 AM  Signed  Heart Failure Office Note - Christian Bolden 83 y.o. 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) 08/06/2021   • Mild persistent asthma without complication 02/03/2021   • Encounter for follow-up surveillance of melanoma 07/11/2019   • Acute on chronic systolic (congestive) heart failure (HCC) 01/29/2019   • Hypertensive kidney disease with stage 3b chronic kidney disease (HCC) 01/04/2019   • 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 09/20/2018   • Osteopenia 06/29/2018   • Personal history of malignant melanoma 11/15/2017   • Ank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 (HCC) 12/30/2014   • 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 12/02/2013   • Microscopic hematuria 12/02/2013   • Primary osteoarthritis of both knees 11/26/2013   • Polymyalgia rheumatica (HCC) 11/26/2013   • 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   • Impaired fasting glucose 05/30/2013   • 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   • Cardiac arrhythmia 04/05/2013   • Essential hypertension 04/05/2013   • Esophageal reflux 11/28/2012   • Cardiomyopathy  ([facility]) 10/26/2012   • Mixed hyperlipidemia 09/05/2012   • Other acute pulmonary embolism without acute cor pulmonale ([facility]) 06/15/2023   • NSVT (nonsustained ventricular tachycardia) ([facility]) 06/15/2023       # Chronic HFrEF, Stage C, NYHA 2  Etiology: NICM, presumed viral  No family hx of SCD  No ETOH    Weight: 190 lbs--> 179 lbs  BNP 1/6/24: 1430  NT pro BNP: 1/29/19: 6192    Studies- personally reviewed by me  Echo 7/22/22:  LVEF: 35%  LVEDd: 5.3 cm  RV: mildly dilated, mildly reduced  PASP: 30 mmHg  RVOT: no notching  Moderate AI  Aortic root 4.6 cm    Echo 10/1/20:  LVEF: 35%  LVIDd: 6.5 cm  RV: mildly dilated, mildly reduced  MR: moderate  PASP:  RVOT:   Other: mild AI, ascending aorta 4.0 cm  TR: 2-3+    Echocardiogram 1/29/19  LVEF: 40%  LVIDd: 6 cm  RV: normal  MR: mild  PASP: 40 mmHg  RVOT:   Other:    Echo 6/21/18:  LVEF: 50%  LVEDd: 6.35 cm    Nuclear Stress Test 1/31/19: small to medium sized fixed defect infersoseptum. No ischemia  EF: 30%    Lab Results   Component Value Date    NTBNP 6,971 (H) 07/22/2022        Neurohormonal Blockade:  --Beta-Blocker: bisoprolol 10 mg daily  --ACEi, ARB or ARNi: Entresto 49/51 mg BID- 24/26 mg BID  --Aldosterone Receptor Blocker: spironolactone 25 mg daily  --SGLT2i: consider Jardiance  --Diuretic: lasix 10 mg daily    Sudden Cardiac Death Risk Reduction:  MDT dual chamber ICD: MRI conditional  Interrogation 3/6/24:  9%, optivol normal  Interrogation 6/7/23:  12%, optivol normal  Interrogation 9/8/22:  5%, no high rate episodes  Interrogation 12/3/21:  20%, no episodes, optivol normal  Interrogation 6/4/21:  9.3%, no high rate episodes, optivol normal    Electrical Resynchronization:  --Candidacy for BiV device: IVCD    Advanced Therapies (if appropriate):  --Inotrope:  --LVAD/Transplant Candidacy:    # Hx of bilateral pulmonary emboli  - unprovoked with colon CA and melanoma hx  Lupus anticoagulant positive  AC: Eliquis  CT chest / PE study 07/21/2022:  "\"Nonobstructive pulmonary emboli  within distal bilateral main pulmonary arteries extending into lobar branches. Extensive pulmonary emboli within right upper lobe and right lower lobe and left lower lobe. Main pulmonary arteries more dilated when compared with prior exam...right main pulmonary artery measures 3.2 cm, previously 2.4 cm.\"  # HTN- controlled, room to escalate therapy  # Mild AI and ascending aorta 4 cm on 10/1/20 echo  # SVT  # NSVT on device check- on bisoprolol   # hyperlipidemia- atorvastatin 10 mg  4/29/23: LDL 56, HDL 51  10/26/22: LDL 58, HDL 54  1/25/22: LDL 66, HDL 56  7/29/21: LDL 64, HDL 54  # hx of malignant melanoma  # CKD, Cr 1.44 on 1/6/24, 1.66 on 3/29/24  # Barretts esophagus- EGD 6/12/20  # ED- cialis    Today's Plan:  Cardiovascular risk: acceptable for cataract surgery - can hold eliquis for surgery  Maintain on lasix 10 mg daily  Restart spironolactone 25 mg daily  On Eliquis for bilateral PE in July 2022  Lupus anticoagulant 2/6/23 remained positive  Saw Hematology, recommended consider prophylactic dosing 2.5 mg BID thereafter  Continue bisoprolol and Entresto for HFrEF  Lipids at goal on atorvastatin 10 mg   NSVT relatively quiet on bisoprolol, recent devic check  --2g sodium diet  Weights daily    HPI:      84 yo recently had first evaluation in HF clinic for NICM diagnosed in 2009, s/p ICD, PSVT, HTN and dyslipidemia.     Last in hospital in January for dyspnea felt to be more viral but was volume overloaded with NT pro BNP of 6192. Given one dose of IV lasix. LVEF: 40% with LVEDd 6 cm. He followed up with NP and weight was 204 lbs. Never smoked and does not drink.     Plays golf 2x a week, cuts grass, does not feel limited in activity. No significant edema in legs. He is limited by knees and back not respiration.      Pt admitted in July 2022 for acute bilateral pulmonary emboli, unprovoked with known history of colon cancer and melanoma. He had full body imaging in hospital. "     Interim:   Needs cardiac clearance for cataract surgery on 5/1    Interrogation 3/6/24:  9%, optivol normal    He stopped spironolactone on his own due to low blood pressure  Getting orthostatic  Review of Systems   Constitutional:  Negative for activity change, appetite change, fatigue and unexpected weight change (11 lbs weight loss).   HENT:  Negative for congestion and nosebleeds.    Eyes: Negative.    Respiratory:  Negative for cough, chest tightness and shortness of breath.    Cardiovascular:  Negative for chest pain, palpitations and leg swelling.   Gastrointestinal:  Negative for abdominal distention.   Endocrine: Negative.    Genitourinary: Negative.    Musculoskeletal:  Positive for arthralgias and back pain.   Skin: Negative.    Neurological:  Negative for dizziness, syncope and weakness.   Hematological: Negative.    Psychiatric/Behavioral: Negative.         Past Medical History:   Diagnosis Date   • AICD (automatic cardioverter/defibrillator) present    • Arthritis    • Asthma    • Burt esophagus    • 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    • Chronic HFrEF (heart failure with reduced ejection fraction) ([facility]) 2019   • CKD (chronic kidney disease)    • Colon cancer ([facility]) 04/05/2013   • Colon cancer screening 06/21/2022   • COPD (chronic obstructive pulmonary disease) ([facility])    • Diverticulitis     Last assessed: 4/5/13   • Esophageal reflux    • Gout    • Hernia    • Hyperlipidemia    • Hypertension    • Hypothyroidism 05/30/2013   • Non-toxic multinodular goiter 11/28/2012   • Pleural effusion    • Polymyalgia rheumatica ([facility])    • Pulmonary embolism ([facility]) 2022   • 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    • Spondyloarthropathy     Last assessed: 11/28/12   • Thrombocytopenia ([facility]) 06/23/2015         No Known Allergies  .    Current Outpatient Medications:   •  albuterol (PROVENTIL HFA,VENTOLIN HFA) 90 mcg/act inhaler, Inhale 2 puffs  every 6 (six) hours as needed for wheezing or shortness of breath, Disp: 1 Inhaler, Rfl: 1  •  Alphagan P 0.1 %, , Disp: , Rfl:   •  apixaban (ELIQUIS) 2.5 mg, Take 1 tablet (2.5 mg total) by mouth 2 (two) times a day, Disp: 180 tablet, Rfl: 1  •  atorvastatin (LIPITOR) 10 mg tablet, Take 1 tablet (10 mg total) by mouth daily, Disp: 90 tablet, Rfl: 1  •  betamethasone valerate (VALISONE) 0.1 % cream, APPLY TO AFFECTED AREA BEHIND EARS TWICE DAILY WHEN ACTIVE OR AFTER HAIRCUTS, Disp: , Rfl:   •  bisoprolol (ZEBETA) 10 MG tablet, Take 1 tablet (10 mg total) by mouth 2 (two) times a day, Disp: 180 tablet, Rfl: 3  •  Calcium Carb-Cholecalciferol (CALCIUM 600 + D PO), Take 3 tablets by mouth daily  , Disp: , Rfl:   •  ciclopirox (LOPROX) 0.77 % cream, APPLY TO AFFECTED AREA OF SKIN TWICE DAILY FOR 2 WEEKS THEN 2-3 TIMES WEEKLY AS MAINTENANCE, Disp: , Rfl:   •  Coenzyme Q10 (CO Q-10) 200 MG CAPS, Take 1 tablet by mouth daily, Disp: , Rfl:   •  finasteride (PROSCAR) 5 mg tablet, Take 1 tablet (5 mg total) by mouth daily, Disp: 90 tablet, Rfl: 1  •  Fluticasone-Salmeterol (Wixela Inhub) 500-50 mcg/dose inhaler, , Disp: , Rfl:   •  furosemide (LASIX) 20 mg tablet, Take 2 tablets (40 mg total) by mouth daily (Patient taking differently: Take 10 mg by mouth daily), Disp: 180 tablet, Rfl: 5  •  ketoconazole (NIZORAL) 2 % shampoo, LATHER IN TO SCALP AND LET IT SIT FOR 5 MINUTES PRIOR TO RINSING 3X WEEKLY AS NEEDED, Disp: , Rfl:   •  latanoprost (XALATAN) 0.005 % ophthalmic solution, , Disp: , Rfl:   •  Multiple Vitamins-Minerals (CENTRUM SILVER PO), Take 1 tablet by mouth daily., Disp: , Rfl:   •  omeprazole (PriLOSEC) 20 mg delayed release capsule, Take 1 capsule (20 mg total) by mouth daily, Disp: 90 capsule, Rfl: 1  •  sacubitril-valsartan (Entresto) 49-51 MG TABS, Take 1 tablet by mouth 2 (two) times a day, Disp: 180 tablet, Rfl: 3  •  Vitamin D, Cholecalciferol, 1000 UNITS CAPS, Take 1 tablet by mouth daily., Disp: , Rfl:  "  •  spironolactone (ALDACTONE) 25 mg tablet, Take 1 tablet (25 mg total) by mouth daily (Patient not taking: Reported on 4/16/2024), Disp: 90 tablet, Rfl: 5    Social History     Socioeconomic History   • Marital status: /Civil Union     Spouse name: Not on file   • Number of children: Not on file   • Years of education: Not on file   • Highest education level: Not on file   Occupational History   • Occupation: Manager-Retired   Tobacco Use   • Smoking status: Never     Passive exposure: Never   • Smokeless tobacco: Never   Vaping Use   • Vaping status: Never Used   Substance and Sexual Activity   • Alcohol use: Never     Comment: Rare   • Drug use: No   • Sexual activity: Not Currently   Other Topics Concern   • Not on file   Social History Narrative   • Not on file     Social Determinants of Health     Financial Resource Strain: Low Risk  (11/4/2023)    Overall Financial Resource Strain (CARDIA)    • Difficulty of Paying Living Expenses: Not hard at all   Food Insecurity: Not on file   Transportation Needs: No Transportation Needs (11/4/2023)    PRAPARE - Transportation    • Lack of Transportation (Medical): No    • Lack of Transportation (Non-Medical): No   Physical Activity: Not on file   Stress: Not on file   Social Connections: Not on file   Intimate Partner Violence: Not on file   Housing Stability: Not on file       Family History   Problem Relation Age of Onset   • Heart failure Mother         Congestive   • Hypertension Mother    • Osteoporosis Mother    • COPD Father    • Emphysema Father    • Hypertension Father    • Heart disease Sister         Heart Valve Replacement   • Osteoporosis Sister    • Breast cancer Family    • Factor V Leiden deficiency Son         on Xarelto       Physical Exam:    Vitals: Blood pressure 90/60, pulse 55, height 5' 11\" (1.803 m), weight 81.2 kg (179 lb), SpO2 98%., Body mass index is 24.97 kg/m².,   Wt Readings from Last 3 Encounters:   04/16/24 81.2 kg (179 lb) " "  01/11/24 81.2 kg (179 lb)   01/03/24 81.7 kg (180 lb 1.6 oz)       Physical Exam  Constitutional:       Appearance: He is well-developed.   HENT:      Head: Normocephalic and atraumatic.   Eyes:      Pupils: Pupils are equal, round, and reactive to light.   Neck:      Vascular: No JVD.   Cardiovascular:      Rate and Rhythm: Normal rate and regular rhythm.      Heart sounds: No murmur heard.  Pulmonary:      Effort: Pulmonary effort is normal. No respiratory distress.      Breath sounds: Normal breath sounds.   Abdominal:      General: There is no distension.      Palpations: Abdomen is soft.      Tenderness: There is no abdominal tenderness.   Musculoskeletal:         General: Normal range of motion.      Cervical back: Normal range of motion.   Skin:     General: Skin is warm and dry.      Findings: No rash.   Neurological:      Mental Status: He is alert and oriented to person, place, and time.       Labs & Results:    Lab Results   Component Value Date    WBC 10.72 (H) 12/24/2023    HGB 13.1 12/24/2023    HCT 39.6 12/24/2023    MCV 98 12/24/2023     12/24/2023     Lab Results   Component Value Date    SODIUM 139 03/29/2024    K 4.7 03/29/2024     03/29/2024    CO2 26 03/29/2024    BUN 35 (H) 03/29/2024    CREATININE 1.66 (H) 03/29/2024    GLUC 98 01/06/2024    CALCIUM 9.3 03/29/2024     Lab Results   Component Value Date    NTBNP 6,971 (H) 07/22/2022      Lab Results   Component Value Date    CHOL 163 12/19/2015    CHOL 168 04/17/2015    CHOL 155 12/04/2014     Lab Results   Component Value Date    HDL 52 11/03/2023    HDL 51 04/29/2023    HDL 54 10/26/2022     Lab Results   Component Value Date    LDLCALC 60 11/03/2023    LDLCALC 56 04/29/2023    LDLCALC 58 10/26/2022     Lab Results   Component Value Date    TRIG 98 11/03/2023    TRIG 125 04/29/2023    TRIG 118 10/26/2022     No results found for: \"CHOLHDL\"    EKG personally reviewed by Corbin Estes.     Counseling / Coordination of Care  Time " spent today 25 minutes.  Greater than 50% of total time was spent with the patient and / or family counseling and / or coordination of care.  We discussed diagnoses, most recent studies, tests and any changes in treatment plan  Thank you for the opportunity to participate in the care of this patient.    STEPH ABAD D.O.  DIRECTOR OF HEART FAILURE/ PULMONARY HYPERTENSION  MEDICAL DIRECTOR OF LVAD PROGRAM  [facility]

## 2024-04-16 NOTE — PATIENT INSTRUCTIONS
Resume spironolactone 25 mg daily    Decreasing Entresto to 24/26 mg twice daily    You can hold lasix if not needed

## 2024-04-17 ENCOUNTER — CONSULT (OUTPATIENT)
Dept: FAMILY MEDICINE CLINIC | Facility: CLINIC | Age: 84
End: 2024-04-17
Payer: COMMERCIAL

## 2024-04-17 VITALS
SYSTOLIC BLOOD PRESSURE: 118 MMHG | BODY MASS INDEX: 25.2 KG/M2 | HEART RATE: 97 BPM | RESPIRATION RATE: 18 BRPM | HEIGHT: 71 IN | WEIGHT: 180 LBS | DIASTOLIC BLOOD PRESSURE: 76 MMHG | OXYGEN SATURATION: 96 % | TEMPERATURE: 96.7 F

## 2024-04-17 DIAGNOSIS — I42.0 DILATED CARDIOMYOPATHY (HCC): ICD-10-CM

## 2024-04-17 DIAGNOSIS — Z01.818 PREOP EXAMINATION: Primary | ICD-10-CM

## 2024-04-17 DIAGNOSIS — I10 ESSENTIAL HYPERTENSION: ICD-10-CM

## 2024-04-17 DIAGNOSIS — N40.1 BENIGN LOCALIZED HYPERPLASIA OF PROSTATE WITH URINARY OBSTRUCTION: ICD-10-CM

## 2024-04-17 DIAGNOSIS — N13.8 BENIGN LOCALIZED HYPERPLASIA OF PROSTATE WITH URINARY OBSTRUCTION: ICD-10-CM

## 2024-04-17 DIAGNOSIS — J45.20 MILD INTERMITTENT ASTHMA WITHOUT COMPLICATION: ICD-10-CM

## 2024-04-17 DIAGNOSIS — Z86.711 HISTORY OF PULMONARY EMBOLUS (PE): ICD-10-CM

## 2024-04-17 DIAGNOSIS — E78.2 MIXED HYPERLIPIDEMIA: ICD-10-CM

## 2024-04-17 DIAGNOSIS — H25.9 AGE-RELATED CATARACT OF BOTH EYES, UNSPECIFIED AGE-RELATED CATARACT TYPE: ICD-10-CM

## 2024-04-17 PROCEDURE — G2211 COMPLEX E/M VISIT ADD ON: HCPCS | Performed by: FAMILY MEDICINE

## 2024-04-17 PROCEDURE — 99214 OFFICE O/P EST MOD 30 MIN: CPT | Performed by: FAMILY MEDICINE

## 2024-04-17 NOTE — PROGRESS NOTES
Name: Christian Bolden      : 1940      MRN: [id number]  Encounter Provider: Jesus Garcia MD  Encounter Date: 2024   Encounter department: [facility]  Chief Complaint   Patient presents with    Pre-op Exam     pre-op [facility]- Cataract Surgery Right Eye May 1,24 Left Eye May 15 24- Needs EKG     Health Maintenance   Topic Date Due    SLP PLAN OF CARE  Never done    COVID-19 Vaccine ( season) 2023    Fall Risk  2024    Depression Screening  2024    Medicare Annual Wellness Visit (AWV)  2024    DXA SCAN  2025    Zoster Vaccine  Completed    Pneumococcal Vaccine: 65+ Years  Completed    Influenza Vaccine  Completed    HIB Vaccine  Aged Out    IPV Vaccine  Aged Out    Hepatitis A Vaccine  Aged Out    Meningococcal ACWY Vaccine  Aged Out    HPV Vaccine  Aged Out    Colonoscopy  Discontinued       Assessment & Plan     {There are no diagnoses linked to this encounter. (Refresh or delete this SmartLink)}       Subjective     HPI  Review of Systems    Past Medical History:   Diagnosis Date    AICD (automatic cardioverter/defibrillator) present     Arthritis     Asthma     Burt esophagus     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     Chronic HFrEF (heart failure with reduced ejection fraction) ([facility])     CKD (chronic kidney disease)     Colon cancer ([facility]) 2013    Colon cancer screening 2022    COPD (chronic obstructive pulmonary disease) ([facility])     Diverticulitis     Last assessed: 13    Esophageal reflux     Gout     Hernia     Hyperlipidemia     Hypertension     Hypothyroidism 2013    Non-toxic multinodular goiter 2012    Pleural effusion     Polymyalgia rheumatica ([facility])     Pulmonary embolism ([facility])     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     Spondyloarthropathy     Last assessed: 12    Thrombocytopenia ([facility]) 2015     Past  Surgical History:   Procedure Laterality Date    ARM SKIN LESION BIOPSY / EXCISION      Malignant    ATRIAL ABLATION SURGERY      AV NODE ABLATION      CARDIAC DEFIBRILLATOR PLACEMENT  2009    Cardio-Defib Pulse Gen Venous Insertion of Electrode for Ventricular Pacing. Implantable    CARDIAC SURGERY  2009    Catheterization    COLONOSCOPY N/A 3/14/2016    Procedure: COLONOSCOPY;  Surgeon: Amanda Zamorano MD;  Location: [facility];  Service. February 22, 2013, mildly enlarged prostate, history of colon CA with normal appearance of anastomosis at the midtransverse colon, severe diverticulosis in the sigmoid, descending and transverse colon. Repeat colonoscopy in 3 yrs    HEMICOLECTOMY Right 05/05/2004    INGUINAL HERNIA REPAIR Bilateral     Left 3/2004, right 5/2008    IR UPPER EXTREMITY VENOGRAM- DIAGNOSTIC  10/4/2018    KNEE SURGERY  1972    Meniscectomy    MT ESOPHAGOGASTRODUODENOSCOPY TRANSORAL DIAGNOSTIC N/A 12/5/2016    Procedure: ESOPHAGOGASTRODUODENOSCOPY (EGD);  Surgeon: Walker Fenton MD;  Location: [facility];  Service: Gastroenterology    TONSILLECTOMY AND ADENOIDECTOMY      UPPER GASTROINTESTINAL ENDOSCOPY       Family History   Problem Relation Age of Onset    Heart failure Mother         Congestive    Hypertension Mother     Osteoporosis Mother     COPD Father     Emphysema Father     Hypertension Father     Heart disease Sister         Heart Valve Replacement    Osteoporosis Sister     Breast cancer Family     Factor V Leiden deficiency Son         on Xarelto     Social History     Socioeconomic History    Marital status: /Civil Union     Spouse name: None    Number of children: None    Years of education: None    Highest education level: None   Occupational History    Occupation: Manager-Retired   Tobacco Use    Smoking status: Never     Passive exposure: Never    Smokeless tobacco: Never   Vaping Use    Vaping status: Never Used   Substance and Sexual Activity    Alcohol use: Never      Comment: Rare    Drug use: No    Sexual activity: Not Currently   Other Topics Concern    None   Social History Narrative    None     Social Determinants of Health     Financial Resource Strain: Low Risk  (11/4/2023)    Overall Financial Resource Strain (CARDIA)     Difficulty of Paying Living Expenses: Not hard at all   Food Insecurity: Not on file   Transportation Needs: No Transportation Needs (11/4/2023)    PRAPARE - Transportation     Lack of Transportation (Medical): No     Lack of Transportation (Non-Medical): No   Physical Activity: Not on file   Stress: Not on file   Social Connections: Not on file   Intimate Partner Violence: Not on file   Housing Stability: Not on file     Current Outpatient Medications on File Prior to Visit   Medication Sig    albuterol (PROVENTIL HFA,VENTOLIN HFA) 90 mcg/act inhaler Inhale 2 puffs every 6 (six) hours as needed for wheezing or shortness of breath    Alphagan P 0.1 %     apixaban (ELIQUIS) 2.5 mg Take 1 tablet (2.5 mg total) by mouth 2 (two) times a day    atorvastatin (LIPITOR) 10 mg tablet Take 1 tablet (10 mg total) by mouth daily    betamethasone valerate (VALISONE) 0.1 % cream APPLY TO AFFECTED AREA BEHIND EARS TWICE DAILY WHEN ACTIVE OR AFTER HAIRCUTS    bisoprolol (ZEBETA) 10 MG tablet Take 1 tablet (10 mg total) by mouth 2 (two) times a day    Calcium Carb-Cholecalciferol (CALCIUM 600 + D PO) Take 3 tablets by mouth daily      ciclopirox (LOPROX) 0.77 % cream APPLY TO AFFECTED AREA OF SKIN TWICE DAILY FOR 2 WEEKS THEN 2-3 TIMES WEEKLY AS MAINTENANCE    Coenzyme Q10 (CO Q-10) 200 MG CAPS Take 1 tablet by mouth daily    finasteride (PROSCAR) 5 mg tablet Take 1 tablet (5 mg total) by mouth daily    Fluticasone-Salmeterol (Wixela Inhub) 500-50 mcg/dose inhaler     furosemide (LASIX) 20 mg tablet Take 2 tablets (40 mg total) by mouth daily (Patient taking differently: Take 10 mg by mouth daily)    ketoconazole (NIZORAL) 2 % shampoo LATHER IN TO SCALP AND LET IT SIT  "FOR 5 MINUTES PRIOR TO RINSING 3X WEEKLY AS NEEDED    latanoprost (XALATAN) 0.005 % ophthalmic solution     Multiple Vitamins-Minerals (CENTRUM SILVER PO) Take 1 tablet by mouth daily.    omeprazole (PriLOSEC) 20 mg delayed release capsule Take 1 capsule (20 mg total) by mouth daily    sacubitril-valsartan (Entresto) 24-26 MG TABS Take 1 tablet by mouth 2 (two) times a day    Vitamin D, Cholecalciferol, 1000 UNITS CAPS Take 1 tablet by mouth daily.    spironolactone (ALDACTONE) 25 mg tablet Take 1 tablet (25 mg total) by mouth daily (Patient not taking: Reported on 4/16/2024)     No Known Allergies  Immunization History   Administered Date(s) Administered    COVID-19 MODERNA VACC 0.5 ML IM 01/26/2021, 03/08/2021, 10/27/2021    COVID-19 Moderna Vac BIVALENT 12 Yr+ IM 0.5 ML 09/30/2022    INFLUENZA 10/20/2018    Influenza Split High Dose Preservative Free IM 10/28/2014, 10/26/2015, 11/11/2016, 10/23/2017    Influenza, high dose seasonal 0.7 mL 11/05/2019, 10/12/2020, 10/11/2021, 10/10/2022, 10/16/2023    Influenza, seasonal, injectable 11/27/2001, 10/04/2007, 12/09/2008, 10/29/2010, 10/19/2011, 09/29/2012, 10/31/2013    Pneumococcal Conjugate 13-Valent 05/13/2015    Pneumococcal Polysaccharide PPV23 11/28/2006, 12/21/2016    Zoster 12/01/2011, 03/11/2020    Zoster Vaccine Recombinant 03/11/2020, 07/06/2020       Objective     /76   Pulse 97   Temp (!) 96.7 °F (35.9 °C) (Tympanic)   Resp 18   Ht 5' 11\" (1.803 m)   Wt 81.6 kg (180 lb)   SpO2 90%   BMI 25.10 kg/m²     Physical Exam  Jesus Garcia MD    "

## 2024-04-17 NOTE — LETTER
2024     Purvi Reynaga MD  [address] [facility] [address] [facility]  [address]  [facility] [address]    Patient: Christian Bolden   YOB: 1940   Date of Visit: 2024       Dear Dr. Reynaga:    Thank you for referring Christian Bolden to me for evaluation. Below are my notes for this consultation.    If you have questions, please do not hesitate to call me. I look forward to following your patient along with you.         Sincerely,        Jesus Garcia MD        CC: No Recipients    Jesus Garcia MD  2024 12:29 PM  Sign when Signing Visit  FAMILY MEDICINE PRE-OPERATIVE EVALUATION  [facility]    NAME: Christian Bolden  AGE: 83 y.o. SEX: male  : 1940     DATE: 2024     Internal Medicine Pre-Operative Evaluation:     Chief Complaint: Pre-operative Evaluation     Surgery: cataract  Anticipated Date of Surgery: right on 2024 and left 5/15/2024.   Referring Provider: Dr. Reynaga        History of Present Illness:     Christian Bolden is a 83 y.o. male who presents to the office today for a preoperative consultation at the request of surgeon, Dr. Reynaga, who plans on performing cataract surgery on right 2024 and left 5/15/2024. Planned anesthesia is  monitored . Patient has a bleeding risk of: no recent abnormal bleeding. Patient does not have objections to receiving blood products if needed. Current anti-platelet/anti-coagulation medications that the patient is prescribed includes: Apixaban (Eliquis).      Cardiomyopathy/CHF---s/p pacemaker. FU cardiology regularly.   He saw cardiology yesterday. BP was low. Decreased entresto to 24-26 bid.   Today BP is good 118/76.   He is on spironolactone 25mg QD and lasix 10mg as needed.   Wt at home stable per pt.     HTN---He is on bisoprolol 10mg bid. BP at home 100-110/60-70 per pt.       CKD3---stable. 3/2024 GFR 37.      Hyperlipidemia---He is on atorvastatin 10mg qhs. Denies SE.     Pulmonary  Embolus in 8/2022---FU hem/onc. He is on eliquis 2.5mg bid.     Asthma---controlled. He does not need wixela or albuterol.      IFG---Follows low carb diet.       Back pain/Knee arthritis---stable.     BPH---FU urology yearly. He is on proscar 5mg qhs.      Burt's---FU GI. Pt is on omeprazole 20mg daily.       FU Cancer care yearly for melanoma s/p wide excision on right upper arm 5 years ago. Stable.        Assessment of Chronic Conditions:   - see above     Assessment of Cardiac Risk:  Denies unstable or severe angina or MI in the last 6 weeks or history of stent placement in the last year   Denies decompensated heart failure (e.g. New onset heart failure, NYHA functional class IV heart failure, or worsening existing heart failure)  Denies significant arrhythmias such as high grade AV block, symptomatic ventricular arrhythmia, newly recognized ventricular tachycardia, supraventricular tachycardia with resting heart rate >100, or symptomatic bradycardia  Denies severe heart valve disease including aortic stenosis or symptomatic mitral stenosis     Exercise Capacity:  Able to walk 4 blocks without symptoms?: Yes  Able to walk 2 flights without symptoms?: Yes    Prior Anesthesia Reactions: No     Personal history of venous thromboembolic disease? Yes, he had PE in 2022.     History of steroid use for >2 weeks within last year? No          Review of Systems:     Review of Systems   Constitutional:  Negative for appetite change, chills and fever.   HENT:  Negative for congestion, ear pain, sinus pain and sore throat.    Eyes:  Negative for discharge and itching.   Respiratory:  Negative for apnea, cough, chest tightness, shortness of breath and wheezing.    Cardiovascular:  Negative for chest pain, palpitations and leg swelling.   Gastrointestinal:  Negative for abdominal pain, anal bleeding, constipation, diarrhea, nausea and vomiting.   Endocrine: Negative for cold intolerance, heat intolerance and polyuria.    Genitourinary:  Negative for difficulty urinating and dysuria.   Musculoskeletal:  Positive for arthralgias. Negative for back pain and myalgias.   Skin:  Negative for rash.   Neurological:  Negative for dizziness and headaches.   Psychiatric/Behavioral:  Negative for agitation.         Problem List:     Patient Active Problem List   Diagnosis   • 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   • Microscopic hematuria   • Ank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 (HCC)   • Primary osteoarthritis of both knees   • Burt esophagus   • Cardiac arrhythmia   • Cardiomyopathy (HCC)   • Esophageal reflux   • Mixed hyperlipidemia   • Essential hypertension   • Impaired fasting glucose   • Personal history of malignant melanoma   • Polymyalgia rheumatica (HCC)   • Right bundle branch block with left anterior fascicular block   • Osteopenia   • 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   • Hypertensive kidney disease with stage 3b chronic kidney disease (HCC)   • Acute on chronic systolic (congestive) heart failure ([facility])   • Encounter for follow-up surveillance of melanoma   • Mild intermittent asthma without complication   • 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 ([facility])   • Personal history of colon cancer   • History of pulmonary embolus (PE)   • Chronic bilateral low back pain without sciatica   • Other acute pulmonary embolism without acute cor pulmonale ([facility])   • NSVT (nonsustained ventricular tachycardia) ([facility])        Allergies:     No Known Allergies     Current Medications:       Current Outpatient Medications:   •  Alphagan P 0.1 %, , Disp: , Rfl:   •  apixaban (ELIQUIS) 2.5 mg, Take 1 tablet (2.5 mg total) by mouth 2 (two) times a day, Disp: 180 tablet, Rfl: 1  •  atorvastatin (LIPITOR) 10 mg tablet, Take 1 tablet (10 mg total) by mouth daily, Disp: 90 tablet, Rfl: 1  •  betamethasone valerate (VALISONE) 0.1 % cream, APPLY TO AFFECTED AREA BEHIND EARS TWICE  DAILY WHEN ACTIVE OR AFTER HAIRCUTS, Disp: , Rfl:   •  bisoprolol (ZEBETA) 10 MG tablet, Take 1 tablet (10 mg total) by mouth 2 (two) times a day, Disp: 180 tablet, Rfl: 3  •  Calcium Carb-Cholecalciferol (CALCIUM 600 + D PO), Take 3 tablets by mouth daily  , Disp: , Rfl:   •  ciclopirox (LOPROX) 0.77 % cream, APPLY TO AFFECTED AREA OF SKIN TWICE DAILY FOR 2 WEEKS THEN 2-3 TIMES WEEKLY AS MAINTENANCE, Disp: , Rfl:   •  Coenzyme Q10 (CO Q-10) 200 MG CAPS, Take 1 tablet by mouth daily, Disp: , Rfl:   •  finasteride (PROSCAR) 5 mg tablet, Take 1 tablet (5 mg total) by mouth daily, Disp: 90 tablet, Rfl: 1  •  furosemide (LASIX) 20 mg tablet, Take 2 tablets (40 mg total) by mouth daily (Patient taking differently: Take 10 mg by mouth daily), Disp: 180 tablet, Rfl: 5  •  ketoconazole (NIZORAL) 2 % shampoo, LATHER IN TO SCALP AND LET IT SIT FOR 5 MINUTES PRIOR TO RINSING 3X WEEKLY AS NEEDED, Disp: , Rfl:   •  latanoprost (XALATAN) 0.005 % ophthalmic solution, , Disp: , Rfl:   •  Multiple Vitamins-Minerals (CENTRUM SILVER PO), Take 1 tablet by mouth daily., Disp: , Rfl:   •  omeprazole (PriLOSEC) 20 mg delayed release capsule, Take 1 capsule (20 mg total) by mouth daily, Disp: 90 capsule, Rfl: 1  •  sacubitril-valsartan (Entresto) 24-26 MG TABS, Take 1 tablet by mouth 2 (two) times a day, Disp: 180 tablet, Rfl: 3  •  spironolactone (ALDACTONE) 25 mg tablet, Take 1 tablet (25 mg total) by mouth daily, Disp: 90 tablet, Rfl: 5  •  Vitamin D, Cholecalciferol, 1000 UNITS CAPS, Take 1 tablet by mouth daily., Disp: , Rfl:      Past History:     Past Medical History:   Diagnosis Date   • AICD (automatic cardioverter/defibrillator) present    • Arthritis    • Asthma    • Burt esophagus    • 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    • Chronic HFrEF (heart failure with reduced ejection fraction) ([facility]) 2019   • CKD (chronic kidney disease)    • Colon cancer (HCC) 04/05/2013   • Colon cancer screening  06/21/2022   • COPD (chronic obstructive pulmonary disease) (HCC)    • Diverticulitis     Last assessed: 4/5/13   • Esophageal reflux    • Gout    • Hernia    • Hyperlipidemia    • Hypertension    • Hypothyroidism 05/30/2013   • Non-toxic multinodular goiter 11/28/2012   • Pleural effusion    • Polymyalgia rheumatica (HCC)    • Pulmonary embolism (HCC) 2022   • 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    • Spondyloarthropathy     Last assessed: 11/28/12   • Thrombocytopenia (HCC) 06/23/2015        Past Surgical History:   Procedure Laterality Date   • ARM SKIN LESION BIOPSY / EXCISION      Malignant   • ATRIAL ABLATION SURGERY     • AV NODE ABLATION     • CARDIAC DEFIBRILLATOR PLACEMENT  2009    Cardio-Defib Pulse Gen Venous Insertion of Electrode for Ventricular Pacing. Implantable   • CARDIAC SURGERY  2009    Catheterization   • COLONOSCOPY N/A 3/14/2016    Procedure: COLONOSCOPY;  Surgeon: Amanda Zamorano MD;  Location: [facility];  Service. February 22, 2013, mildly enlarged prostate, history of colon CA with normal appearance of anastomosis at the midtransverse colon, severe diverticulosis in the sigmoid, descending and transverse colon. Repeat colonoscopy in 3 yrs   • HEMICOLECTOMY Right 05/05/2004   • INGUINAL HERNIA REPAIR Bilateral     Left 3/2004, right 5/2008   • IR UPPER EXTREMITY VENOGRAM- DIAGNOSTIC  10/4/2018   • KNEE SURGERY  1972    Meniscectomy   • RI ESOPHAGOGASTRODUODENOSCOPY TRANSORAL DIAGNOSTIC N/A 12/5/2016    Procedure: ESOPHAGOGASTRODUODENOSCOPY (EGD);  Surgeon: Walker Fenton MD;  Location: [facility];  Service: Gastroenterology   • TONSILLECTOMY AND ADENOIDECTOMY     • UPPER GASTROINTESTINAL ENDOSCOPY          Family History   Problem Relation Age of Onset   • Heart failure Mother         Congestive   • Hypertension Mother    • Osteoporosis Mother    • COPD Father    • Emphysema Father    • Hypertension Father    • Heart disease Sister         Heart Valve  "Replacement   • Osteoporosis Sister    • Breast cancer Family    • Factor V Leiden deficiency Son         on Xarelto        Social History     Socioeconomic History   • Marital status: /Civil Union     Spouse name: Not on file   • Number of children: Not on file   • Years of education: Not on file   • Highest education level: Not on file   Occupational History   • Occupation: Manager-Retired   Tobacco Use   • Smoking status: Never     Passive exposure: Never   • Smokeless tobacco: Never   Vaping Use   • Vaping status: Never Used   Substance and Sexual Activity   • Alcohol use: Never     Comment: Rare   • Drug use: No   • Sexual activity: Not Currently   Other Topics Concern   • Not on file   Social History Narrative   • Not on file     Social Determinants of Health     Financial Resource Strain: Low Risk  (11/4/2023)    Overall Financial Resource Strain (CARDIA)    • Difficulty of Paying Living Expenses: Not hard at all   Food Insecurity: Not on file   Transportation Needs: No Transportation Needs (11/4/2023)    PRAPARE - Transportation    • Lack of Transportation (Medical): No    • Lack of Transportation (Non-Medical): No   Physical Activity: Not on file   Stress: Not on file   Social Connections: Not on file   Intimate Partner Violence: Not on file   Housing Stability: Not on file        Physical Exam:      /76   Pulse 97   Temp (!) 96.7 °F (35.9 °C) (Tympanic)   Resp 18   Ht 5' 11\" (1.803 m)   Wt 81.6 kg (180 lb)   SpO2 96%   BMI 25.10 kg/m²     Physical Exam  Constitutional:       Appearance: He is well-developed.   HENT:      Head: Normocephalic and atraumatic.   Eyes:      General:         Right eye: No discharge.         Left eye: No discharge.      Conjunctiva/sclera: Conjunctivae normal.   Cardiovascular:      Rate and Rhythm: Normal rate and regular rhythm.      Heart sounds: Normal heart sounds. No murmur heard.     No friction rub. No gallop.   Pulmonary:      Effort: Pulmonary effort " is normal. No respiratory distress.      Breath sounds: No stridor. Rales present. No wheezing or rhonchi.   Abdominal:      General: Bowel sounds are normal. There is no distension.      Palpations: Abdomen is soft.      Tenderness: There is no abdominal tenderness. There is no guarding.   Musculoskeletal:         General: Normal range of motion.      Cervical back: Normal range of motion and neck supple.      Right lower leg: No edema.      Left lower leg: No edema.   Neurological:      Mental Status: He is alert.           Data:     Pre-operative work-up    None       Assessment:     1. Preop examination        2. Age-related cataract of both eyes, unspecified age-related cataract type        3. Dilated cardiomyopathy (HCC)        4. Essential hypertension        5. Mixed hyperlipidemia        6. History of pulmonary embolus (PE)        7. Mild intermittent asthma without complication        8. 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             Plan:     83 y.o. male with planned surgery: cataract.      Cardiac Risk Estimation: per the Revised Cardiac Risk Index (Circ. 100:1043, 1999), the patient's risk factors for cardiac complications include history of congestive heart failure, putting him in: RCI RISK CLASS I (0 risk factors, risk of major cardiac compl. appr. 0.5%).    1. Further preoperative workup as follows:   - None; no further preoperative work-up is required    2. Medication Management/Recommendations:   - None, continue medication regimen including morning of surgery, with sip of water    3. Prophylaxis for cardiac events with perioperative beta-blockers: not indicated.    4. Patient requires further consultation with: None    Clearance  Patient is CLEARED for surgery without any additional cardiac testing.     Jesus Garcia MD  [address] [address]  Phone#  [phone]  Fax#  [phone]

## 2024-04-17 NOTE — PROGRESS NOTES
FAMILY MEDICINE PRE-OPERATIVE EVALUATION  [facility] PHYSICIAN GROUP [facility]    NAME: Christian Bolden  AGE: 83 y.o. SEX: male  : 1940     DATE: 2024     Internal Medicine Pre-Operative Evaluation:     Chief Complaint: Pre-operative Evaluation     Surgery: cataract  Anticipated Date of Surgery: right on 2024 and left 5/15/2024.   Referring Provider: Dr. Reynaga        History of Present Illness:     Christian Bolden is a 83 y.o. male who presents to the office today for a preoperative consultation at the request of surgeon, Dr. Reynaga, who plans on performing cataract surgery on right 2024 and left 5/15/2024. Planned anesthesia is  monitored . Patient has a bleeding risk of: no recent abnormal bleeding. Patient does not have objections to receiving blood products if needed. Current anti-platelet/anti-coagulation medications that the patient is prescribed includes: Apixaban (Eliquis).      Cardiomyopathy/CHF---s/p pacemaker. FU cardiology regularly.   He saw cardiology yesterday. BP was low. Decreased entresto to 24-26 bid.   Today BP is good 118/76.   He is on spironolactone 25mg QD and lasix 10mg as needed.   Wt at home stable per pt.     HTN---He is on bisoprolol 10mg bid. BP at home 100-110/60-70 per pt.       CKD3---stable. 3/2024 GFR 37.      Hyperlipidemia---He is on atorvastatin 10mg qhs. Denies SE.     Pulmonary Embolus in 2022---FU hem/onc. He is on eliquis 2.5mg bid.     Asthma---controlled. He does not need wixela or albuterol.      IFG---Follows low carb diet.       Back pain/Knee arthritis---stable.     BPH---FU urology yearly. He is on proscar 5mg qhs.      Burt's---FU GI. Pt is on omeprazole 20mg daily.       FU Cancer care yearly for melanoma s/p wide excision on right upper arm 5 years ago. Stable.        Assessment of Chronic Conditions:   - see above     Assessment of Cardiac Risk:  Denies unstable or severe angina or MI in the last 6  weeks or history of stent placement in the last year   Denies decompensated heart failure (e.g. New onset heart failure, NYHA functional class IV heart failure, or worsening existing heart failure)  Denies significant arrhythmias such as high grade AV block, symptomatic ventricular arrhythmia, newly recognized ventricular tachycardia, supraventricular tachycardia with resting heart rate >100, or symptomatic bradycardia  Denies severe heart valve disease including aortic stenosis or symptomatic mitral stenosis     Exercise Capacity:  Able to walk 4 blocks without symptoms?: Yes  Able to walk 2 flights without symptoms?: Yes    Prior Anesthesia Reactions: No     Personal history of venous thromboembolic disease? Yes, he had PE in 2022.     History of steroid use for >2 weeks within last year? No          Review of Systems:     Review of Systems   Constitutional:  Negative for appetite change, chills and fever.   HENT:  Negative for congestion, ear pain, sinus pain and sore throat.    Eyes:  Negative for discharge and itching.   Respiratory:  Negative for apnea, cough, chest tightness, shortness of breath and wheezing.    Cardiovascular:  Negative for chest pain, palpitations and leg swelling.   Gastrointestinal:  Negative for abdominal pain, anal bleeding, constipation, diarrhea, nausea and vomiting.   Endocrine: Negative for cold intolerance, heat intolerance and polyuria.   Genitourinary:  Negative for difficulty urinating and dysuria.   Musculoskeletal:  Positive for arthralgias. Negative for back pain and myalgias.   Skin:  Negative for rash.   Neurological:  Negative for dizziness and headaches.   Psychiatric/Behavioral:  Negative for agitation.         Problem List:     Patient Active Problem List   Diagnosis    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    Microscopic hematuria    Ank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 (HCC)    Primary osteoarthritis of both knees    Burt esophagus    Cardiac  arrhythmia    Cardiomyopathy (HCC)    Esophageal reflux    Mixed hyperlipidemia    Essential hypertension    Impaired fasting glucose    Personal history of malignant melanoma    Polymyalgia rheumatica (HCC)    Right bundle branch block with left anterior fascicular block    Osteopenia    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    Hypertensive kidney disease with stage 3b chronic kidney disease (HCC)    Acute on chronic systolic (congestive) heart failure (HCC)    Encounter for follow-up surveillance of melanoma    Mild intermittent asthma without complication    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 (HCC)    Personal history of colon cancer    History of pulmonary embolus (PE)    Chronic bilateral low back pain without sciatica    Other acute pulmonary embolism without acute cor pulmonale (HCC)    NSVT (nonsustained ventricular tachycardia) (HCC)        Allergies:     No Known Allergies     Current Medications:       Current Outpatient Medications:     Alphagan P 0.1 %, , Disp: , Rfl:     apixaban (ELIQUIS) 2.5 mg, Take 1 tablet (2.5 mg total) by mouth 2 (two) times a day, Disp: 180 tablet, Rfl: 1    atorvastatin (LIPITOR) 10 mg tablet, Take 1 tablet (10 mg total) by mouth daily, Disp: 90 tablet, Rfl: 1    betamethasone valerate (VALISONE) 0.1 % cream, APPLY TO AFFECTED AREA BEHIND EARS TWICE DAILY WHEN ACTIVE OR AFTER HAIRCUTS, Disp: , Rfl:     bisoprolol (ZEBETA) 10 MG tablet, Take 1 tablet (10 mg total) by mouth 2 (two) times a day, Disp: 180 tablet, Rfl: 3    Calcium Carb-Cholecalciferol (CALCIUM 600 + D PO), Take 3 tablets by mouth daily  , Disp: , Rfl:     ciclopirox (LOPROX) 0.77 % cream, APPLY TO AFFECTED AREA OF SKIN TWICE DAILY FOR 2 WEEKS THEN 2-3 TIMES WEEKLY AS MAINTENANCE, Disp: , Rfl:     Coenzyme Q10 (CO Q-10) 200 MG CAPS, Take 1 tablet by mouth daily, Disp: , Rfl:     finasteride (PROSCAR) 5 mg tablet, Take 1 tablet (5 mg total) by mouth daily,  Disp: 90 tablet, Rfl: 1    furosemide (LASIX) 20 mg tablet, Take 2 tablets (40 mg total) by mouth daily (Patient taking differently: Take 10 mg by mouth daily), Disp: 180 tablet, Rfl: 5    ketoconazole (NIZORAL) 2 % shampoo, LATHER IN TO SCALP AND LET IT SIT FOR 5 MINUTES PRIOR TO RINSING 3X WEEKLY AS NEEDED, Disp: , Rfl:     latanoprost (XALATAN) 0.005 % ophthalmic solution, , Disp: , Rfl:     Multiple Vitamins-Minerals (CENTRUM SILVER PO), Take 1 tablet by mouth daily., Disp: , Rfl:     omeprazole (PriLOSEC) 20 mg delayed release capsule, Take 1 capsule (20 mg total) by mouth daily, Disp: 90 capsule, Rfl: 1    sacubitril-valsartan (Entresto) 24-26 MG TABS, Take 1 tablet by mouth 2 (two) times a day, Disp: 180 tablet, Rfl: 3    spironolactone (ALDACTONE) 25 mg tablet, Take 1 tablet (25 mg total) by mouth daily, Disp: 90 tablet, Rfl: 5    Vitamin D, Cholecalciferol, 1000 UNITS CAPS, Take 1 tablet by mouth daily., Disp: , Rfl:      Past History:     Past Medical History:   Diagnosis Date    AICD (automatic cardioverter/defibrillator) present     Arthritis     Asthma     Burt esophagus     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     Chronic HFrEF (heart failure with reduced ejection fraction) ([facility]) 2019    CKD (chronic kidney disease)     Colon cancer ([facility]) 04/05/2013    Colon cancer screening 06/21/2022    COPD (chronic obstructive pulmonary disease) ([facility])     Diverticulitis     Last assessed: 4/5/13    Esophageal reflux     Gout     Hernia     Hyperlipidemia     Hypertension     Hypothyroidism 05/30/2013    Non-toxic multinodular goiter 11/28/2012    Pleural effusion     Polymyalgia rheumatica (HCC)     Pulmonary embolism ([facility]) 2022    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     Spondyloarthropathy     Last assessed: 11/28/12    Thrombocytopenia ([facility]) 06/23/2015        Past Surgical History:   Procedure Laterality Date    ARM SKIN LESION BIOPSY / EXCISION       Malignant    ATRIAL ABLATION SURGERY      AV NODE ABLATION      CARDIAC DEFIBRILLATOR PLACEMENT  2009    Cardio-Defib Pulse Gen Venous Insertion of Electrode for Ventricular Pacing. Implantable    CARDIAC SURGERY  2009    Catheterization    COLONOSCOPY N/A 3/14/2016    Procedure: COLONOSCOPY;  Surgeon: Amanda Zamorano MD;  Location: [facility];  Service. February 22, 2013, mildly enlarged prostate, history of colon CA with normal appearance of anastomosis at the midtransverse colon, severe diverticulosis in the sigmoid, descending and transverse colon. Repeat colonoscopy in 3 yrs    HEMICOLECTOMY Right 05/05/2004    INGUINAL HERNIA REPAIR Bilateral     Left 3/2004, right 5/2008    IR UPPER EXTREMITY VENOGRAM- DIAGNOSTIC  10/4/2018    KNEE SURGERY  1972    Meniscectomy    MO ESOPHAGOGASTRODUODENOSCOPY TRANSORAL DIAGNOSTIC N/A 12/5/2016    Procedure: ESOPHAGOGASTRODUODENOSCOPY (EGD);  Surgeon: Walker Fenton MD;  Location: [facility];  Service: Gastroenterology    TONSILLECTOMY AND ADENOIDECTOMY      UPPER GASTROINTESTINAL ENDOSCOPY          Family History   Problem Relation Age of Onset    Heart failure Mother         Congestive    Hypertension Mother     Osteoporosis Mother     COPD Father     Emphysema Father     Hypertension Father     Heart disease Sister         Heart Valve Replacement    Osteoporosis Sister     Breast cancer Family     Factor V Leiden deficiency Son         on Xarelto        Social History     Socioeconomic History    Marital status: /Civil Union     Spouse name: Not on file    Number of children: Not on file    Years of education: Not on file    Highest education level: Not on file   Occupational History    Occupation: Manager-Retired   Tobacco Use    Smoking status: Never     Passive exposure: Never    Smokeless tobacco: Never   Vaping Use    Vaping status: Never Used   Substance and Sexual Activity    Alcohol use: Never     Comment: Rare    Drug use: No    Sexual activity: Not  "Currently   Other Topics Concern    Not on file   Social History Narrative    Not on file     Social Determinants of Health     Financial Resource Strain: Low Risk  (11/4/2023)    Overall Financial Resource Strain (CARDIA)     Difficulty of Paying Living Expenses: Not hard at all   Food Insecurity: Not on file   Transportation Needs: No Transportation Needs (11/4/2023)    PRAPARE - Transportation     Lack of Transportation (Medical): No     Lack of Transportation (Non-Medical): No   Physical Activity: Not on file   Stress: Not on file   Social Connections: Not on file   Intimate Partner Violence: Not on file   Housing Stability: Not on file        Physical Exam:      /76   Pulse 97   Temp (!) 96.7 °F (35.9 °C) (Tympanic)   Resp 18   Ht 5' 11\" (1.803 m)   Wt 81.6 kg (180 lb)   SpO2 96%   BMI 25.10 kg/m²     Physical Exam  Constitutional:       Appearance: He is well-developed.   HENT:      Head: Normocephalic and atraumatic.   Eyes:      General:         Right eye: No discharge.         Left eye: No discharge.      Conjunctiva/sclera: Conjunctivae normal.   Cardiovascular:      Rate and Rhythm: Normal rate and regular rhythm.      Heart sounds: Normal heart sounds. No murmur heard.     No friction rub. No gallop.   Pulmonary:      Effort: Pulmonary effort is normal. No respiratory distress.      Breath sounds: No stridor. Rales present. No wheezing or rhonchi.   Abdominal:      General: Bowel sounds are normal. There is no distension.      Palpations: Abdomen is soft.      Tenderness: There is no abdominal tenderness. There is no guarding.   Musculoskeletal:         General: Normal range of motion.      Cervical back: Normal range of motion and neck supple.      Right lower leg: No edema.      Left lower leg: No edema.   Neurological:      Mental Status: He is alert.           Data:     Pre-operative work-up    None       Assessment:     1. Preop examination        2. Age-related cataract of both eyes, " unspecified age-related cataract type        3. Dilated cardiomyopathy (HCC)        4. Essential hypertension        5. Mixed hyperlipidemia        6. History of pulmonary embolus (PE)        7. Mild intermittent asthma without complication        8. 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             Plan:     83 y.o. male with planned surgery: cataract.      Cardiac Risk Estimation: per the Revised Cardiac Risk Index (Circ. 100:1043, 1999), the patient's risk factors for cardiac complications include history of congestive heart failure, putting him in: RCI RISK CLASS I (0 risk factors, risk of major cardiac compl. appr. 0.5%).    1. Further preoperative workup as follows:   - None; no further preoperative work-up is required    2. Medication Management/Recommendations:   - None, continue medication regimen including morning of surgery, with sip of water    3. Prophylaxis for cardiac events with perioperative beta-blockers: not indicated.    4. Patient requires further consultation with: None    Clearance  Patient is CLEARED for surgery without any additional cardiac testing.     Jesus Garcia MD  [address] [address]  Phone#  [phone]  Fax#  [phone]

## 2024-04-29 ENCOUNTER — LAB (OUTPATIENT)
Dept: LAB | Facility: CLINIC | Age: 84
End: 2024-04-29
Payer: COMMERCIAL

## 2024-04-29 ENCOUNTER — RA CDI HCC (OUTPATIENT)
Dept: OTHER | Facility: HOSPITAL | Age: 84
End: 2024-04-29

## 2024-04-29 DIAGNOSIS — I42.0 DILATED CARDIOMYOPATHY (HCC): ICD-10-CM

## 2024-04-29 DIAGNOSIS — I10 ESSENTIAL HYPERTENSION: ICD-10-CM

## 2024-04-29 DIAGNOSIS — E78.2 MIXED HYPERLIPIDEMIA: ICD-10-CM

## 2024-04-29 DIAGNOSIS — R73.01 IMPAIRED FASTING GLUCOSE: ICD-10-CM

## 2024-04-29 LAB
ALBUMIN SERPL BCP-MCNC: 3.9 G/DL (ref 3.5–5)
ALP SERPL-CCNC: 53 U/L (ref 34–104)
ALT SERPL W P-5'-P-CCNC: 15 U/L (ref 7–52)
ANION GAP SERPL CALCULATED.3IONS-SCNC: 8 MMOL/L (ref 4–13)
AST SERPL W P-5'-P-CCNC: 18 U/L (ref 13–39)
BILIRUB SERPL-MCNC: 1.26 MG/DL (ref 0.2–1)
BUN SERPL-MCNC: 26 MG/DL (ref 5–25)
CALCIUM SERPL-MCNC: 9.5 MG/DL (ref 8.4–10.2)
CHLORIDE SERPL-SCNC: 105 MMOL/L (ref 96–108)
CHOLEST SERPL-MCNC: 147 MG/DL
CO2 SERPL-SCNC: 28 MMOL/L (ref 21–32)
CREAT SERPL-MCNC: 1.6 MG/DL (ref 0.6–1.3)
ERYTHROCYTE [DISTWIDTH] IN BLOOD BY AUTOMATED COUNT: 16.3 % (ref 11.6–15.1)
EST. AVERAGE GLUCOSE BLD GHB EST-MCNC: 103 MG/DL
GFR SERPL CREATININE-BSD FRML MDRD: 39 ML/MIN/1.73SQ M
GLUCOSE P FAST SERPL-MCNC: 88 MG/DL (ref 65–99)
HBA1C MFR BLD: 5.2 %
HCT VFR BLD AUTO: 41.7 % (ref 36.5–49.3)
HDLC SERPL-MCNC: 58 MG/DL
HGB BLD-MCNC: 13.7 G/DL (ref 12–17)
LDLC SERPL CALC-MCNC: 62 MG/DL (ref 0–100)
MCH RBC QN AUTO: 32.6 PG (ref 26.8–34.3)
MCHC RBC AUTO-ENTMCNC: 32.9 G/DL (ref 31.4–37.4)
MCV RBC AUTO: 99 FL (ref 82–98)
NONHDLC SERPL-MCNC: 89 MG/DL
PLATELET # BLD AUTO: 223 THOUSANDS/UL (ref 149–390)
PMV BLD AUTO: 9.2 FL (ref 8.9–12.7)
POTASSIUM SERPL-SCNC: 4.2 MMOL/L (ref 3.5–5.3)
PROT SERPL-MCNC: 6.9 G/DL (ref 6.4–8.4)
RBC # BLD AUTO: 4.2 MILLION/UL (ref 3.88–5.62)
SODIUM SERPL-SCNC: 141 MMOL/L (ref 135–147)
TRIGL SERPL-MCNC: 135 MG/DL
WBC # BLD AUTO: 9.02 THOUSAND/UL (ref 4.31–10.16)

## 2024-04-29 PROCEDURE — 80061 LIPID PANEL: CPT

## 2024-04-29 PROCEDURE — 83036 HEMOGLOBIN GLYCOSYLATED A1C: CPT

## 2024-04-29 PROCEDURE — 85027 COMPLETE CBC AUTOMATED: CPT

## 2024-04-29 PROCEDURE — 80053 COMPREHEN METABOLIC PANEL: CPT

## 2024-04-29 PROCEDURE — 36415 COLL VENOUS BLD VENIPUNCTURE: CPT

## 2024-05-02 ENCOUNTER — RA CDI HCC (OUTPATIENT)
Dept: OTHER | Facility: HOSPITAL | Age: 84
End: 2024-05-02

## 2024-05-08 ENCOUNTER — TELEPHONE (OUTPATIENT)
Dept: CARDIOLOGY CLINIC | Facility: CLINIC | Age: 84
End: 2024-05-08

## 2024-05-10 ENCOUNTER — OFFICE VISIT (OUTPATIENT)
Dept: FAMILY MEDICINE CLINIC | Facility: CLINIC | Age: 84
End: 2024-05-10
Payer: COMMERCIAL

## 2024-05-10 VITALS
DIASTOLIC BLOOD PRESSURE: 68 MMHG | RESPIRATION RATE: 16 BRPM | TEMPERATURE: 97.2 F | HEART RATE: 66 BPM | WEIGHT: 177.4 LBS | BODY MASS INDEX: 24.84 KG/M2 | HEIGHT: 71 IN | OXYGEN SATURATION: 93 % | SYSTOLIC BLOOD PRESSURE: 112 MMHG

## 2024-05-10 DIAGNOSIS — I13.0 HYPERTENSIVE HEART AND KIDNEY DISEASE WITH HF AND WITH CKD STAGE III (HCC): ICD-10-CM

## 2024-05-10 DIAGNOSIS — Z86.711 HISTORY OF PULMONARY EMBOLUS (PE): ICD-10-CM

## 2024-05-10 DIAGNOSIS — N18.30 HYPERTENSIVE HEART AND KIDNEY DISEASE WITH HF AND WITH CKD STAGE III (HCC): ICD-10-CM

## 2024-05-10 DIAGNOSIS — N40.1 BENIGN LOCALIZED HYPERPLASIA OF PROSTATE WITH URINARY OBSTRUCTION: ICD-10-CM

## 2024-05-10 DIAGNOSIS — E78.2 MIXED HYPERLIPIDEMIA: ICD-10-CM

## 2024-05-10 DIAGNOSIS — R73.01 IMPAIRED FASTING GLUCOSE: ICD-10-CM

## 2024-05-10 DIAGNOSIS — I10 ESSENTIAL HYPERTENSION: Primary | ICD-10-CM

## 2024-05-10 DIAGNOSIS — I42.0 DILATED CARDIOMYOPATHY (HCC): ICD-10-CM

## 2024-05-10 DIAGNOSIS — N13.8 BENIGN LOCALIZED HYPERPLASIA OF PROSTATE WITH URINARY OBSTRUCTION: ICD-10-CM

## 2024-05-10 DIAGNOSIS — I12.9 HYPERTENSIVE KIDNEY DISEASE WITH STAGE 3B CHRONIC KIDNEY DISEASE (HCC): ICD-10-CM

## 2024-05-10 DIAGNOSIS — N18.32 HYPERTENSIVE KIDNEY DISEASE WITH STAGE 3B CHRONIC KIDNEY DISEASE (HCC): ICD-10-CM

## 2024-05-10 PROCEDURE — 99214 OFFICE O/P EST MOD 30 MIN: CPT | Performed by: FAMILY MEDICINE

## 2024-05-10 PROCEDURE — G2211 COMPLEX E/M VISIT ADD ON: HCPCS | Performed by: FAMILY MEDICINE

## 2024-05-10 NOTE — ASSESSMENT & PLAN NOTE
Lab Results   Component Value Date    EGFR 39 04/29/2024    EGFR 37 03/29/2024    EGFR 29 02/14/2024    CREATININE 1.60 (H) 04/29/2024    CREATININE 1.66 (H) 03/29/2024    CREATININE 2.06 (H) 02/14/2024     Keep hydrated. Avoid motrin/ibuprofen/aleve NSAIDs nephrotoxic agents.

## 2024-05-10 NOTE — PROGRESS NOTES
Name: Christian Bolden      : 1940      MRN: [id number]  Encounter Provider: Jesus Garcia MD  Encounter Date: 5/10/2024   Encounter department: [facility]  Chief Complaint   Patient presents with    Follow-up     6 month f/u     Health Maintenance   Topic Date Due    SLP PLAN OF CARE  Never done    COVID-19 Vaccine ( season) 2023    Medicare Annual Wellness Visit (AWV)  2024    DXA SCAN  2025    Fall Risk  05/10/2025    Depression Screening  05/10/2025    Zoster Vaccine  Completed    Pneumococcal Vaccine: 65+ Years  Completed    Influenza Vaccine  Completed    HIB Vaccine  Aged Out    IPV Vaccine  Aged Out    Hepatitis A Vaccine  Aged Out    Meningococcal ACWY Vaccine  Aged Out    HPV Vaccine  Aged Out    Colonoscopy  Discontinued     Assessment & Plan     1. Essential hypertension  Assessment & Plan:  Controlled. DASH diet. Continue bisoprolol 10mg bid and  entresto 24-26mg bid per cardiology.     Orders:  -     Comprehensive metabolic panel; Future; Expected date: 10/27/2024  -     Hemoglobin A1C; Future; Expected date: 10/27/2024  -     Lipid panel; Future; Expected date: 10/27/2024    2. 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 (HCC)  Assessment & Plan:  Wt Readings from Last 3 Encounters:   05/10/24 80.5 kg (177 lb 6.4 oz)   24 81.6 kg (180 lb)   24 81.2 kg (179 lb)     Continue sprironolactone and lasix per cardiology.             Orders:  -     Comprehensive metabolic panel; Future; Expected date: 10/27/2024  -     Hemoglobin A1C; Future; Expected date: 10/27/2024  -     Lipid panel; Future; Expected date: 10/27/2024    3. Dilated cardiomyopathy (HCC)  -     Comprehensive metabolic panel; Future; Expected date: 10/27/2024  -     Hemoglobin A1C; Future; Expected date: 10/27/2024  -     Lipid panel; Future; Expected date: 10/27/2024    4. Impaired fasting glucose  Assessment & Plan:  2024 hgA1C 5.2  controlled. Low carb diet.     Orders:  -     Comprehensive metabolic panel; Future; Expected date: 10/27/2024  -     Hemoglobin A1C; Future; Expected date: 10/27/2024  -     Lipid panel; Future; Expected date: 10/27/2024    5. Mixed hyperlipidemia  Assessment & Plan:  4/2024 lipid good. Low fat diet. Continue atorvastatin 10mg qhs.     Orders:  -     Comprehensive metabolic panel; Future; Expected date: 10/27/2024  -     Hemoglobin A1C; Future; Expected date: 10/27/2024  -     Lipid panel; Future; Expected date: 10/27/2024    6. Hypertensive kidney disease with stage 3b chronic kidney disease (HCC)  Assessment & Plan:  Lab Results   Component Value Date    EGFR 39 04/29/2024    EGFR 37 03/29/2024    EGFR 29 02/14/2024    CREATININE 1.60 (H) 04/29/2024    CREATININE 1.66 (H) 03/29/2024    CREATININE 2.06 (H) 02/14/2024     Keep hydrated. Avoid motrin/ibuprofen/aleve NSAIDs nephrotoxic agents.         7. History of pulmonary embolus (PE)  Assessment & Plan:  Continue eliquis per hem/onc.       8. 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  Assessment & Plan:  Continue proscar 5mg Qd.           Depression Screening and Follow-up Plan: Patient was screened for depression during today's encounter. They screened negative with a PHQ-2 score of 0.      Reviewed lab in 4/2024  CMP ok  CBC ok  hgA1C 5.2 normal  Lipid 147/135/58/62 good    Flu shot yearly. Got flu shot, covid19 and RSV shots already.   Got PCV13 5/2015. Got pneumovax 2016.   Got shingrix in 2020.   RTO in 6 months.     Subjective      HPI    Pt is here by himself.   Cardiomyopathy/CHF---s/p pacemaker. FU cardiology regularly.   He is on entresto to 24-26 bid.   He is on spironolactone 25mg QD and lasix 10mg as needed.      HTN---He is on bisoprolol 10mg bid. BP at home 100-110/60-70 per pt. Sometimes lightheaded.       CKD3---stable. 4/2024 GFR 39.      Hyperlipidemia---He is on atorvastatin 10mg qhs. Denies SE.      Pulmonary Embolus  in 8/2022---FU hem/onc. He is on eliquis 2.5mg bid.      Asthma---controlled. He does not need wixela or albuterol.      IFG---Follows low carb diet.       Back pain/Knee arthritis---stable.      BPH---FU urology yearly. He is on proscar 5mg qhs.      Burt's---FU GI. Pt is on omeprazole 20mg daily.       FU Cancer care yearly for melanoma s/p wide excision on right upper arm 5 years ago. Stable.       Review of Systems   Constitutional:  Negative for appetite change, chills and fever.   HENT:  Negative for congestion, ear pain, sinus pain and sore throat.    Eyes:  Negative for discharge and itching.   Respiratory:  Negative for apnea, cough, chest tightness, shortness of breath and wheezing.    Cardiovascular:  Negative for chest pain, palpitations and leg swelling.   Gastrointestinal:  Negative for abdominal pain, anal bleeding, constipation, diarrhea, nausea and vomiting.   Endocrine: Negative for cold intolerance, heat intolerance and polyuria.   Genitourinary:  Negative for difficulty urinating and dysuria.   Musculoskeletal:  Positive for arthralgias. Negative for back pain and myalgias.   Skin:  Negative for rash.   Neurological:  Positive for light-headedness. Negative for dizziness and headaches.   Psychiatric/Behavioral:  Negative for agitation.        Current Outpatient Medications on File Prior to Visit   Medication Sig    Alphagan P 0.1 %     apixaban (ELIQUIS) 2.5 mg Take 1 tablet (2.5 mg total) by mouth 2 (two) times a day    atorvastatin (LIPITOR) 10 mg tablet Take 1 tablet (10 mg total) by mouth daily    betamethasone valerate (VALISONE) 0.1 % cream APPLY TO AFFECTED AREA BEHIND EARS TWICE DAILY WHEN ACTIVE OR AFTER HAIRCUTS    bisoprolol (ZEBETA) 10 MG tablet Take 1 tablet (10 mg total) by mouth 2 (two) times a day    Calcium Carb-Cholecalciferol (CALCIUM 600 + D PO) Take 3 tablets by mouth daily      Coenzyme Q10 (CO Q-10) 200 MG CAPS Take 1 tablet by mouth daily    finasteride (PROSCAR) 5 mg  "tablet Take 1 tablet (5 mg total) by mouth daily    furosemide (LASIX) 20 mg tablet Take 2 tablets (40 mg total) by mouth daily (Patient taking differently: Take 10 mg by mouth daily)    ketoconazole (NIZORAL) 2 % shampoo LATHER IN TO SCALP AND LET IT SIT FOR 5 MINUTES PRIOR TO RINSING 3X WEEKLY AS NEEDED    latanoprost (XALATAN) 0.005 % ophthalmic solution     Multiple Vitamins-Minerals (CENTRUM SILVER PO) Take 1 tablet by mouth daily.    omeprazole (PriLOSEC) 20 mg delayed release capsule Take 1 capsule (20 mg total) by mouth daily    sacubitril-valsartan (Entresto) 24-26 MG TABS Take 1 tablet by mouth 2 (two) times a day    spironolactone (ALDACTONE) 25 mg tablet Take 1 tablet (25 mg total) by mouth daily    Vitamin D, Cholecalciferol, 1000 UNITS CAPS Take 1 tablet by mouth daily.    ciclopirox (LOPROX) 0.77 % cream APPLY TO AFFECTED AREA OF SKIN TWICE DAILY FOR 2 WEEKS THEN 2-3 TIMES WEEKLY AS MAINTENANCE (Patient not taking: Reported on 5/10/2024)       Objective     /68   Pulse 66   Temp (!) 97.2 °F (36.2 °C) (Temporal)   Resp 16   Ht 5' 11\" (1.803 m)   Wt 80.5 kg (177 lb 6.4 oz)   SpO2 93%   BMI 24.74 kg/m²     Physical Exam  Constitutional:       Appearance: He is well-developed.   HENT:      Head: Normocephalic and atraumatic.   Eyes:      General:         Right eye: No discharge.         Left eye: No discharge.      Conjunctiva/sclera: Conjunctivae normal.   Cardiovascular:      Rate and Rhythm: Normal rate and regular rhythm.      Heart sounds: Normal heart sounds. No murmur heard.     No friction rub. No gallop.   Pulmonary:      Effort: Pulmonary effort is normal. No respiratory distress.      Breath sounds: Normal breath sounds. No wheezing or rales.   Abdominal:      General: Bowel sounds are normal. There is no distension.      Palpations: Abdomen is soft.      Tenderness: There is no abdominal tenderness. There is no guarding.   Musculoskeletal:      Cervical back: Normal range of " motion and neck supple.      Right lower leg: No edema.      Left lower leg: No edema.   Neurological:      Mental Status: He is alert.       Jesus Garcia MD

## 2024-05-10 NOTE — ASSESSMENT & PLAN NOTE
Wt Readings from Last 3 Encounters:   05/10/24 80.5 kg (177 lb 6.4 oz)   04/17/24 81.6 kg (180 lb)   04/16/24 81.2 kg (179 lb)     Continue sprironolactone and lasix per cardiology.

## 2024-06-07 ENCOUNTER — REMOTE DEVICE CLINIC VISIT (OUTPATIENT)
Dept: CARDIOLOGY CLINIC | Facility: CLINIC | Age: 84
End: 2024-06-07
Payer: COMMERCIAL

## 2024-06-07 DIAGNOSIS — Z95.810 AICD (AUTOMATIC CARDIOVERTER/DEFIBRILLATOR) PRESENT: Primary | ICD-10-CM

## 2024-06-07 PROCEDURE — 93296 REM INTERROG EVL PM/IDS: CPT | Performed by: INTERNAL MEDICINE

## 2024-06-07 PROCEDURE — 93295 DEV INTERROG REMOTE 1/2/MLT: CPT | Performed by: INTERNAL MEDICINE

## 2024-06-07 NOTE — PROGRESS NOTES
Results for orders placed or performed in visit on 06/07/24   Cardiac EP device report    Narrative    MDT-DUAL CHAMBER ICD (AAIR-DDDR MODE)/ ACTIVE SYSTEM IS MRI CONDITIONAL  CARELINK TRANSMISSION: BATTERY VOLTAGE ADEQUATE (2.8 YRS). AP-67%, -11%. ALL AVAILABLE LEAD PARAMETERS WITHIN NORMAL LIMITS. NO SIGNIFICANT HIGH RATE EPISODES. OPTI-VOL WITHIN NORMAL LIMITS. NORMAL DEVICE FUNCTION. GV

## 2024-06-10 DIAGNOSIS — K22.70 BARRETT'S ESOPHAGUS WITHOUT DYSPLASIA: ICD-10-CM

## 2024-06-10 RX ORDER — OMEPRAZOLE 20 MG/1
20 CAPSULE, DELAYED RELEASE ORAL DAILY
Qty: 90 CAPSULE | Refills: 1 | Status: SHIPPED | OUTPATIENT
Start: 2024-06-10

## 2024-06-23 DIAGNOSIS — N13.8 BPH WITH OBSTRUCTION/LOWER URINARY TRACT SYMPTOMS: ICD-10-CM

## 2024-06-23 DIAGNOSIS — N40.1 BPH WITH OBSTRUCTION/LOWER URINARY TRACT SYMPTOMS: ICD-10-CM

## 2024-06-24 RX ORDER — FINASTERIDE 5 MG/1
5 TABLET, FILM COATED ORAL DAILY
Qty: 90 TABLET | Refills: 1 | Status: SHIPPED | OUTPATIENT
Start: 2024-06-24

## 2024-07-14 DIAGNOSIS — E27.0 HYPERCORTISOLEMIA (HCC): ICD-10-CM

## 2024-07-14 RX ORDER — ATORVASTATIN CALCIUM 10 MG/1
10 TABLET, FILM COATED ORAL DAILY
Qty: 100 TABLET | Refills: 1 | Status: SHIPPED | OUTPATIENT
Start: 2024-07-14

## 2024-07-22 NOTE — PROGRESS NOTES
Heart Failure Office Note - Christian Bolden 84 y.o. 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) 08/06/2021    Mild intermittent asthma without complication 02/03/2021    Encounter for follow-up surveillance of melanoma 07/11/2019    Acute on chronic systolic (congestive) heart failure (HCC) 01/29/2019    Hypertensive kidney disease with stage 3b chronic kidney disease (HCC) 01/04/2019    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 09/20/2018    Osteopenia 06/29/2018    Personal history of malignant melanoma 11/15/2017    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 12/02/2013    Microscopic hematuria 12/02/2013    Primary osteoarthritis of both knees 11/26/2013    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    Impaired fasting glucose 05/30/2013    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    Cardiac arrhythmia 04/05/2013    Essential hypertension 04/05/2013    Esophageal reflux 11/28/2012    Cardiomyopathy (HCC) 10/26/2012    Mixed hyperlipidemia 09/05/2012    Other acute pulmonary embolism without acute cor pulmonale (HCC) 06/15/2023    NSVT (nonsustained ventricular tachycardia) ([facility]) 06/15/2023       # Chronic HFrEF, Stage C, NYHA 2  Etiology: NICM, presumed viral  No family hx of SCD  No ETOH    Weight: 190 lbs--> 179 lbs--> 173 lbs  BNP 1/6/24: 1430  NT pro BNP: 1/29/19: 6192    Studies- personally reviewed by me  Echo 7/22/22:  LVEF: 35%  LVEDd: 5.3 cm  RV: mildly dilated, mildly reduced  PASP: 30 mmHg  RVOT: no notching  Moderate AI  Aortic root 4.6 cm    Echo 10/1/20:  LVEF: 35%  LVIDd: 6.5 cm  RV: mildly dilated, mildly reduced  MR:  "moderate  PASP:  RVOT:   Other: mild AI, ascending aorta 4.0 cm  TR: 2-3+    Echocardiogram 1/29/19  LVEF: 40%  LVIDd: 6 cm  RV: normal  MR: mild  PASP: 40 mmHg  RVOT:   Other:    Echo 6/21/18:  LVEF: 50%  LVEDd: 6.35 cm    Nuclear Stress Test 1/31/19: small to medium sized fixed defect infersoseptum. No ischemia  EF: 30%    Lab Results   Component Value Date    NTBNP 6,971 (H) 07/22/2022        Neurohormonal Blockade:  --Beta-Blocker: bisoprolol 10 mg daily  --ACEi, ARB or ARNi: Entresto 24/26 mg BID  --Aldosterone Receptor Blocker: spironolactone 25 mg daily  --SGLT2i: consider Jardiance  --Diuretic: lasix 10 mg daily- stopping today    Sudden Cardiac Death Risk Reduction:  MDT dual chamber ICD: MRI conditional  Interrogation 6/7/24:  11%, optivol normal  Interrogation 3/6/24:  9%, optivol normal    Electrical Resynchronization:  --Candidacy for BiV device: IVCD    Advanced Therapies (if appropriate):  --Inotrope:  --LVAD/Transplant Candidacy:    # Hx of bilateral pulmonary emboli  - unprovoked with colon CA and melanoma hx  Lupus anticoagulant positive  AC: Eliquis  CT chest / PE study 07/21/2022: \"Nonobstructive pulmonary emboli  within distal bilateral main pulmonary arteries extending into lobar branches. Extensive pulmonary emboli within right upper lobe and right lower lobe and left lower lobe. Main pulmonary arteries more dilated when compared with prior exam...right main pulmonary artery measures 3.2 cm, previously 2.4 cm.\"  # HTN- controlled, room to escalate therapy  # Mild AI and ascending aorta 4 cm on 10/1/20 echo  # SVT  # NSVT on device check- on bisoprolol   # hyperlipidemia- atorvastatin 10 mg  4/29/24: LDL 62, HDL 58  4/29/23: LDL 56, HDL 51  # hx of malignant melanoma  # CKD, Cr 1.44 on 1/6/24, 1.6 on 4/29/24  # Barretts esophagus- EGD 6/12/20  # ED- cialis    Today's Plan:  Stop lasix  Hypotensive  and orthostatic  spironolactone 25 mg daily  On Eliquis for bilateral PE in July 2022  Lupus " anticoagulant 2/6/23 remained positive  Saw Hematology, recommended consider prophylactic dosing 2.5 mg BID thereafter  Continue bisoprolol and Entresto for HFrEF  Lipids at goal on atorvastatin 10 mg   NSVT relatively quiet on bisoprolol, recent devic check  --2g sodium diet  Weights daily    HPI:      85 yo recently had first evaluation in HF clinic for NICM diagnosed in 2009, s/p ICD, PSVT, HTN and dyslipidemia.     Last in hospital in January for dyspnea felt to be more viral but was volume overloaded with NT pro BNP of 6192. Given one dose of IV lasix. LVEF: 40% with LVEDd 6 cm. He followed up with NP and weight was 204 lbs. Never smoked and does not drink.     Plays golf 2x a week, cuts grass, does not feel limited in activity. No significant edema in legs. He is limited by knees and back not respiration.      Pt admitted in July 2022 for acute bilateral pulmonary emboli, unprovoked with known history of colon cancer and melanoma. He had full body imaging in hospital.     Interim:   Interrogation 6/7/24:  11%, optivol normal  Occasional orthostatic on standing  No dyspnea  Down another 8 lbs- down 30 lbs overall  Review of Systems   Constitutional:  Negative for activity change, appetite change, fatigue and unexpected weight change (8 lbs down).   HENT:  Negative for congestion and nosebleeds.    Eyes: Negative.    Respiratory:  Negative for cough, chest tightness and shortness of breath.    Cardiovascular:  Negative for chest pain, palpitations and leg swelling.   Gastrointestinal:  Negative for abdominal distention.   Endocrine: Negative.    Genitourinary: Negative.    Musculoskeletal:  Positive for arthralgias and back pain.   Skin: Negative.    Neurological:  Negative for dizziness, syncope and weakness.   Hematological: Negative.    Psychiatric/Behavioral: Negative.         Past Medical History:   Diagnosis Date    AICD (automatic cardioverter/defibrillator) present     Arthritis     Asthma     Burt  esophagus     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     Cancer ([facility]) Mar 2004    Chronic HFrEF (heart failure with reduced ejection fraction) ([facility]) 2019    Chronic kidney disease     CKD (chronic kidney disease)     Colon cancer ([facility]) 04/05/2013    Colon cancer screening 06/21/2022    COPD (chronic obstructive pulmonary disease) ([facility])     Disease of thyroid gland     Diverticulitis     Last assessed: 4/5/13    Esophageal reflux     GERD (gastroesophageal reflux disease) Feb 2009    Gout     Hernia     Hyperlipidemia     Hypertension     Hypothyroidism 05/30/2013    Non-toxic multinodular goiter 11/28/2012    Pleural effusion     Polymyalgia rheumatica ([facility])     Pulmonary embolism ([facility]) 2022    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     Spondyloarthropathy     Last assessed: 11/28/12    Thrombocytopenia ([facility]) 06/23/2015         No Known Allergies  .    Current Outpatient Medications:     Alphagan P 0.1 %, , Disp: , Rfl:     apixaban (ELIQUIS) 2.5 mg, Take 1 tablet (2.5 mg total) by mouth 2 (two) times a day, Disp: 180 tablet, Rfl: 1    atorvastatin (LIPITOR) 10 mg tablet, Take 1 tablet by mouth daily, Disp: 100 tablet, Rfl: 1    betamethasone valerate (VALISONE) 0.1 % cream, APPLY TO AFFECTED AREA BEHIND EARS TWICE DAILY WHEN ACTIVE OR AFTER HAIRCUTS, Disp: , Rfl:     bisoprolol (ZEBETA) 10 MG tablet, Take 1 tablet (10 mg total) by mouth 2 (two) times a day, Disp: 180 tablet, Rfl: 3    Calcium Carb-Cholecalciferol (CALCIUM 600 + D PO), Take 3 tablets by mouth daily  , Disp: , Rfl:     ciclopirox (LOPROX) 0.77 % cream, APPLY TO AFFECTED AREA OF SKIN TWICE DAILY FOR 2 WEEKS THEN 2-3 TIMES WEEKLY AS MAINTENANCE (Patient not taking: Reported on 5/10/2024), Disp: , Rfl:     Coenzyme Q10 (CO Q-10) 200 MG CAPS, Take 1 tablet by mouth daily, Disp: , Rfl:     finasteride (PROSCAR) 5 mg tablet, Take 1 tablet (5 mg total) by mouth daily, Disp: 90 tablet, Rfl: 1     furosemide (LASIX) 20 mg tablet, Take 2 tablets (40 mg total) by mouth daily (Patient taking differently: Take 10 mg by mouth daily), Disp: 180 tablet, Rfl: 5    ketoconazole (NIZORAL) 2 % shampoo, LATHER IN TO SCALP AND LET IT SIT FOR 5 MINUTES PRIOR TO RINSING 3X WEEKLY AS NEEDED, Disp: , Rfl:     latanoprost (XALATAN) 0.005 % ophthalmic solution, , Disp: , Rfl:     Multiple Vitamins-Minerals (CENTRUM SILVER PO), Take 1 tablet by mouth daily., Disp: , Rfl:     omeprazole (PriLOSEC) 20 mg delayed release capsule, TAKE 1 CAPSULE BY MOUTH EVERY DAY, Disp: 90 capsule, Rfl: 1    sacubitril-valsartan (Entresto) 24-26 MG TABS, Take 1 tablet by mouth 2 (two) times a day, Disp: 180 tablet, Rfl: 3    spironolactone (ALDACTONE) 25 mg tablet, Take 1 tablet (25 mg total) by mouth daily, Disp: 90 tablet, Rfl: 5    Vitamin D, Cholecalciferol, 1000 UNITS CAPS, Take 1 tablet by mouth daily., Disp: , Rfl:     Social History     Socioeconomic History    Marital status: /Civil Union     Spouse name: Not on file    Number of children: Not on file    Years of education: Not on file    Highest education level: Not on file   Occupational History    Occupation: Manager-Retired   Tobacco Use    Smoking status: Never     Passive exposure: Never    Smokeless tobacco: Never   Vaping Use    Vaping status: Never Used   Substance and Sexual Activity    Alcohol use: No     Comment: Rare    Drug use: No    Sexual activity: Not Currently     Partners: Female   Other Topics Concern    Not on file   Social History Narrative    Not on file     Social Determinants of Health     Financial Resource Strain: Low Risk  (11/4/2023)    Overall Financial Resource Strain (CARDIA)     Difficulty of Paying Living Expenses: Not hard at all   Food Insecurity: Not on file   Transportation Needs: No Transportation Needs (11/4/2023)    PRAPARE - Transportation     Lack of Transportation (Medical): No     Lack of Transportation (Non-Medical): No   Physical  Activity: Not on file   Stress: Not on file   Social Connections: Unknown (6/18/2024)    Received from [employer]     How often do you feel lonely or isolated from those around you? (Adult - for ages 18 years and over): Not on file   Intimate Partner Violence: Not on file   Housing Stability: Not on file       Family History   Problem Relation Age of Onset    Heart failure Mother         Congestive    Hypertension Mother     Osteoporosis Mother     Cancer Mother     COPD Father     Emphysema Father     Hypertension Father     Heart disease Father     Heart disease Sister         Heart Valve Replacement    Osteoporosis Sister     Breast cancer Family     Factor V Leiden deficiency Son         on Xarelto       Physical Exam:    Vitals: There were no vitals taken for this visit., There is no height or weight on file to calculate BMI.,   Wt Readings from Last 3 Encounters:   05/10/24 80.5 kg (177 lb 6.4 oz)   04/17/24 81.6 kg (180 lb)   04/16/24 81.2 kg (179 lb)       Physical Exam  Constitutional:       Appearance: He is well-developed.   HENT:      Head: Normocephalic and atraumatic.   Eyes:      Pupils: Pupils are equal, round, and reactive to light.   Neck:      Vascular: No JVD.   Cardiovascular:      Rate and Rhythm: Normal rate and regular rhythm.      Heart sounds: No murmur heard.  Pulmonary:      Effort: Pulmonary effort is normal. No respiratory distress.      Breath sounds: Normal breath sounds.   Abdominal:      General: There is no distension.      Palpations: Abdomen is soft.      Tenderness: There is no abdominal tenderness.   Musculoskeletal:         General: Normal range of motion.      Cervical back: Normal range of motion.   Skin:     General: Skin is warm and dry.      Findings: No rash.   Neurological:      Mental Status: He is alert and oriented to person, place, and time.       Labs & Results:    Lab Results   Component Value Date    WBC 9.02 04/29/2024    HGB 13.7 04/29/2024  "   HCT 41.7 04/29/2024    MCV 99 (H) 04/29/2024     04/29/2024     Lab Results   Component Value Date    SODIUM 141 04/29/2024    K 4.2 04/29/2024     04/29/2024    CO2 28 04/29/2024    BUN 26 (H) 04/29/2024    CREATININE 1.60 (H) 04/29/2024    GLUC 98 01/06/2024    CALCIUM 9.5 04/29/2024     Lab Results   Component Value Date    NTBNP 6,971 (H) 07/22/2022      Lab Results   Component Value Date    CHOL 163 12/19/2015    CHOL 168 04/17/2015    CHOL 155 12/04/2014     Lab Results   Component Value Date    HDL 58 04/29/2024    HDL 52 11/03/2023    HDL 51 04/29/2023     Lab Results   Component Value Date    LDLCALC 62 04/29/2024    LDLCALC 60 11/03/2023    LDLCALC 56 04/29/2023     Lab Results   Component Value Date    TRIG 135 04/29/2024    TRIG 98 11/03/2023    TRIG 125 04/29/2023     No results found for: \"CHOLHDL\"    EKG personally reviewed by Corbin Abad.     Counseling / Coordination of Care  Time spent today 25 minutes.  Greater than 50% of total time was spent with the patient and / or family counseling and / or coordination of care.  We discussed diagnoses, most recent studies, tests and any changes in treatment plan  Thank you for the opportunity to participate in the care of this patient.    CORBIN ABAD D.O.  DIRECTOR OF HEART FAILURE/ PULMONARY HYPERTENSION  MEDICAL DIRECTOR OF LVAD PROGRAM  [facility]    "

## 2024-07-24 ENCOUNTER — OFFICE VISIT (OUTPATIENT)
Dept: CARDIOLOGY CLINIC | Facility: CLINIC | Age: 84
End: 2024-07-24
Payer: COMMERCIAL

## 2024-07-24 VITALS
HEIGHT: 71 IN | WEIGHT: 173 LBS | HEART RATE: 74 BPM | OXYGEN SATURATION: 96 % | BODY MASS INDEX: 24.22 KG/M2 | DIASTOLIC BLOOD PRESSURE: 52 MMHG | SYSTOLIC BLOOD PRESSURE: 80 MMHG

## 2024-07-24 DIAGNOSIS — I50.22 CHRONIC SYSTOLIC CHF (CONGESTIVE HEART FAILURE), NYHA CLASS 2 (HCC): ICD-10-CM

## 2024-07-24 DIAGNOSIS — E78.00 PURE HYPERCHOLESTEROLEMIA: ICD-10-CM

## 2024-07-24 DIAGNOSIS — I10 ESSENTIAL HYPERTENSION: Primary | ICD-10-CM

## 2024-07-24 DIAGNOSIS — I47.29 NSVT (NONSUSTAINED VENTRICULAR TACHYCARDIA) (HCC): ICD-10-CM

## 2024-07-24 PROCEDURE — 99214 OFFICE O/P EST MOD 30 MIN: CPT | Performed by: INTERNAL MEDICINE

## 2024-07-24 NOTE — PATIENT INSTRUCTIONS
Stop lasix today    This should help with lightheaded on standing    Please check daily weights and contact the heart failure program at  if you gain 3 lb in one day or 5 lb in one week.    Please limit daily sodium intake to 2000 mg daily.    Please limit daily fluid intake to 2000 ml daily.  Bring complete list of medications to your follow up appointment.

## 2024-08-05 ENCOUNTER — OFFICE VISIT (OUTPATIENT)
Dept: HEMATOLOGY ONCOLOGY | Facility: CLINIC | Age: 84
End: 2024-08-05
Payer: COMMERCIAL

## 2024-08-05 VITALS
OXYGEN SATURATION: 99 % | SYSTOLIC BLOOD PRESSURE: 94 MMHG | HEART RATE: 76 BPM | BODY MASS INDEX: 23.94 KG/M2 | RESPIRATION RATE: 18 BRPM | HEIGHT: 71 IN | DIASTOLIC BLOOD PRESSURE: 60 MMHG | TEMPERATURE: 97.5 F | WEIGHT: 171 LBS

## 2024-08-05 DIAGNOSIS — Z86.711 HISTORY OF PULMONARY EMBOLUS (PE): Primary | ICD-10-CM

## 2024-08-05 PROCEDURE — 99213 OFFICE O/P EST LOW 20 MIN: CPT | Performed by: PHYSICIAN ASSISTANT

## 2024-08-05 NOTE — PROGRESS NOTES
Hematology/Oncology Outpatient Follow-up  Christian Bolden 84 y.o. male 1940 [id number]    Date:  8/5/2024      Assessment and Plan:  1. History of pulmonary embolus (PE) 2. Lupus anticoagulant positive  84-year-old male presents for consultation regarding bilateral pulmonary embolisms.  This appears to be unprovoked.  He had vague symptoms of cough and then hemoptysis which made him go to the hospital.     He had full body imaging in the hospital which was negative for any concern of occult malignancy. He has colonoscopy/EGD scheduled on 2/14/23.     His dermatology visit is up-to-date and states that he now is on a yearly follow-up for his history of melanoma.      Labs on 10/26/23  CBC normal   Antithrombin III activity 97%, normal  Beta-2 glycoprotein antibodies normal  Cardiolipin antibody normal  Protein C activity normal  Protein S activity normal  Factor V Leiden negative  Prothrombin gene mutation negative  D-dimer normal at 0.48, previously at diagnosis was 6.22  Lupus anticoagulant positive     Repeat lupus anticoagulant after being off Eliquis was still positive in Feb 2023.      Therefore, he was recommended lifelong preventative anticoagulation, Eliquis 2.5 mg PO BID.      Samples provided today, 4 boxes.     Follow up in 1 year.       HPI:  82-year-old male presents for consultation regarding bilateral pulmonary embolisms.     He was seen by his PCP on 07/15/2022 due to cough with phlegm without wheezing or shortness of breath.  He was given antibiotics and chest x-ray.     He then presented to the hospital on 07/21/2022 with continued cough and episode of hemoptysis.  D-dimer was completed in elevated.  CT of the chest was then performed.  This showed extensive pulmonary emboli within the distal bilateral main pulmonary arteries extending into the right upper lobe and lower lobar branches and left lower lobe branches.     He had CT abdomen and pelvis to rule out any malignancy due to  personal history of melanoma.  This only showed a trace left pleural effusion, small hiatal hernia, left renal cyst, cholelithiasis and colonic diverticulosis status post right hemicolectomy.     Dr Perez is derm for history of melanoma. He has a scheduled visit in Sept/Oct.     History of colon cancer, had resection in 2004.    Interval history: continued low BP, stopped diuretic 2 weeks ago     ROS: Review of Systems   Constitutional:  Negative for appetite change, chills, fever and unexpected weight change.   HENT:  Negative for mouth sores and nosebleeds.    Respiratory:  Negative for cough and shortness of breath.    Cardiovascular:  Negative for chest pain, palpitations and leg swelling.   Gastrointestinal:  Negative for abdominal pain, blood in stool, constipation, diarrhea, nausea and vomiting.   Genitourinary:  Negative for difficulty urinating, dysuria and hematuria.   Musculoskeletal:  Negative for arthralgias.   Skin: Negative.    Neurological:  Positive for dizziness (intermittent upon standing) and light-headedness (intermittent upon standing). Negative for weakness, numbness and headaches.   Hematological: Negative.    Psychiatric/Behavioral: Negative.       Past Medical History:   Diagnosis Date    AICD (automatic cardioverter/defibrillator) present     Arthritis     Asthma     Burt esophagus     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     Cancer (HCC) Mar 2004    Chronic HFrEF (heart failure with reduced ejection fraction) (HCC) 2019    Chronic kidney disease     CKD (chronic kidney disease)     Colon cancer (HCC) 04/05/2013    Colon cancer screening 06/21/2022    COPD (chronic obstructive pulmonary disease) (HCC)     Disease of thyroid gland     Diverticulitis     Last assessed: 4/5/13    Esophageal reflux     GERD (gastroesophageal reflux disease) Feb 2009    Gout     Hernia     Hyperlipidemia     Hypertension     Hypothyroidism 05/30/2013    Non-toxic multinodular  goiter 11/28/2012    Pleural effusion     Polymyalgia rheumatica (HCC)     Pulmonary embolism (HCC) 2022    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 Implantable    CARDIAC SURGERY  2009    Catheterization    COLON SURGERY  Mar 2004    COLONOSCOPY N/A 03/14/2016    Procedure: COLONOSCOPY;  Surgeon: Amanda Zamorano MD;  Location: [facility];  Service. February 22, 2013, mildly enlarged prostate, history of colon CA with normal appearance of anastomosis at the midtransverse colon, severe diverticulosis in the sigmoid, descending and transverse colon. Repeat colonoscopy in 3 yrs    HEMICOLECTOMY Right 05/05/2004    INGUINAL HERNIA REPAIR Bilateral     Left 3/2004, right 5/2008    IR UPPER EXTREMITY VENOGRAM- DIAGNOSTIC  10/04/2018    KNEE SURGERY  1972    Meniscectomy    TX ESOPHAGOGASTRODUODENOSCOPY TRANSORAL DIAGNOSTIC N/A 12/05/2016    Procedure: ESOPHAGOGASTRODUODENOSCOPY (EGD);  Surgeon: Walker Fenton MD;  Location: [facility];  Service: Gastroenterology    TONSILLECTOMY AND ADENOIDECTOMY      UPPER GASTROINTESTINAL ENDOSCOPY         Social History     Socioeconomic History    Marital status: /Civil Union     Spouse name: None    Number of children: None    Years of education: None    Highest education level: None   Occupational History    Occupation: Manager-Retired   Tobacco Use    Smoking status: Never     Passive exposure: Never    Smokeless tobacco: Never   Vaping Use    Vaping status: Never Used   Substance and Sexual Activity    Alcohol use: No     Comment: Rare    Drug use: No    Sexual activity: Not  Currently     Partners: Female   Other Topics Concern    None   Social History Narrative    None     Social Determinants of Health     Financial Resource Strain: Low Risk  (11/4/2023)    Overall Financial Resource Strain (CARDIA)     Difficulty of Paying Living Expenses: Not hard at all   Food Insecurity: Not on file   Transportation Needs: No Transportation Needs (11/4/2023)    PRAPARE - Transportation     Lack of Transportation (Medical): No     Lack of Transportation (Non-Medical): No   Physical Activity: Not on file   Stress: Not on file   Social Connections: Unknown (6/18/2024)    Received from [employer]     How often do you feel lonely or isolated from those around you? (Adult - for ages 18 years and over): Not on file   Intimate Partner Violence: Not on file   Housing Stability: Not on file       Family History   Problem Relation Age of Onset    Heart failure Mother         Congestive    Hypertension Mother     Osteoporosis Mother     Cancer Mother     COPD Father     Emphysema Father     Hypertension Father     Heart disease Father     Heart disease Sister         Heart Valve Replacement    Osteoporosis Sister     Breast cancer Family     Factor V Leiden deficiency Son         on Xarelto       No Known Allergies      Current Outpatient Medications:     Alphagan P 0.1 %, , Disp: , Rfl:     apixaban (ELIQUIS) 2.5 mg, Take 1 tablet (2.5 mg total) by mouth 2 (two) times a day, Disp: 180 tablet, Rfl: 1    atorvastatin (LIPITOR) 10 mg tablet, Take 1 tablet by mouth daily, Disp: 100 tablet, Rfl: 1    betamethasone valerate (VALISONE) 0.1 % cream, APPLY TO AFFECTED AREA BEHIND EARS TWICE DAILY WHEN ACTIVE OR AFTER HAIRCUTS, Disp: , Rfl:     bisoprolol (ZEBETA) 10 MG tablet, Take 1 tablet (10 mg total) by mouth 2 (two) times a day, Disp: 180 tablet, Rfl: 3    Calcium Carb-Cholecalciferol (CALCIUM 600 + D PO), Take 3 tablets by mouth daily  , Disp: , Rfl:     Coenzyme Q10 (CO Q-10) 200 MG CAPS,  "Take 1 tablet by mouth daily, Disp: , Rfl:     finasteride (PROSCAR) 5 mg tablet, Take 1 tablet (5 mg total) by mouth daily, Disp: 90 tablet, Rfl: 1    ketoconazole (NIZORAL) 2 % shampoo, LATHER IN TO SCALP AND LET IT SIT FOR 5 MINUTES PRIOR TO RINSING 3X WEEKLY AS NEEDED, Disp: , Rfl:     latanoprost (XALATAN) 0.005 % ophthalmic solution, , Disp: , Rfl:     Multiple Vitamins-Minerals (CENTRUM SILVER PO), Take 1 tablet by mouth daily., Disp: , Rfl:     omeprazole (PriLOSEC) 20 mg delayed release capsule, TAKE 1 CAPSULE BY MOUTH EVERY DAY, Disp: 90 capsule, Rfl: 1    sacubitril-valsartan (Entresto) 24-26 MG TABS, Take 1 tablet by mouth 2 (two) times a day, Disp: 180 tablet, Rfl: 3    spironolactone (ALDACTONE) 25 mg tablet, Take 1 tablet (25 mg total) by mouth daily, Disp: 90 tablet, Rfl: 5    Vitamin D, Cholecalciferol, 1000 UNITS CAPS, Take 1 tablet by mouth daily., Disp: , Rfl:     ciclopirox (LOPROX) 0.77 % cream, APPLY TO AFFECTED AREA OF SKIN TWICE DAILY FOR 2 WEEKS THEN 2-3 TIMES WEEKLY AS MAINTENANCE (Patient not taking: Reported on 5/10/2024), Disp: , Rfl:       Physical Exam:  BP 94/60 (BP Location: Right arm, Patient Position: Sitting, Cuff Size: Adult)   Pulse 76   Temp 97.5 °F (36.4 °C) (Temporal)   Resp 18   Ht 5' 11\" (1.803 m)   Wt 77.6 kg (171 lb)   SpO2 99%   BMI 23.85 kg/m²     Physical Exam  Vitals reviewed.   Constitutional:       General: He is not in acute distress.     Appearance: He is well-developed. He is not ill-appearing.   HENT:      Head: Normocephalic and atraumatic.   Eyes:      General: No scleral icterus.     Conjunctiva/sclera: Conjunctivae normal.   Cardiovascular:      Rate and Rhythm: Normal rate and regular rhythm.      Heart sounds: Normal heart sounds. No murmur heard.  Pulmonary:      Effort: Pulmonary effort is normal. No respiratory distress.      Breath sounds: Normal breath sounds.   Abdominal:      Palpations: Abdomen is soft.      Tenderness: There is no " abdominal tenderness.   Musculoskeletal:         General: No tenderness. Normal range of motion.      Cervical back: Normal range of motion and neck supple.      Right lower leg: No edema.      Left lower leg: No edema.   Lymphadenopathy:      Cervical: No cervical adenopathy.   Skin:     General: Skin is warm and dry.   Neurological:      Mental Status: He is alert and oriented to person, place, and time.      Cranial Nerves: No cranial nerve deficit.   Psychiatric:         Mood and Affect: Mood normal.         Behavior: Behavior normal.       Labs:  Lab Results   Component Value Date    WBC 9.02 04/29/2024    HGB 13.7 04/29/2024    HCT 41.7 04/29/2024    MCV 99 (H) 04/29/2024     04/29/2024     I have spent 20 minutes with Patient  today in which greater than 50% of this time was spent in counseling/coordination of care regarding Diagnostic results, Risks and benefits of tx options, Instructions for management, Impressions, Documenting in the medical record, Reviewing / ordering tests, medicine, procedures  , and Obtaining or reviewing history  .     Patient voiced understanding and agreement in the above discussion. Aware to contact our office with questions/symptoms in the interim.     This note has been generated by voice recognition software system.  Therefore, there may be spelling, grammar, and or syntax errors. Please contact if questions arise.

## 2024-09-09 ENCOUNTER — REMOTE DEVICE CLINIC VISIT (OUTPATIENT)
Dept: CARDIOLOGY CLINIC | Facility: CLINIC | Age: 84
End: 2024-09-09
Payer: COMMERCIAL

## 2024-09-09 DIAGNOSIS — I47.20 VENTRICULAR TACHYCARDIA (HCC): Primary | ICD-10-CM

## 2024-09-09 PROCEDURE — 93295 DEV INTERROG REMOTE 1/2/MLT: CPT | Performed by: INTERNAL MEDICINE

## 2024-09-09 PROCEDURE — 93296 REM INTERROG EVL PM/IDS: CPT | Performed by: INTERNAL MEDICINE

## 2024-09-09 NOTE — PROGRESS NOTES
Results for orders placed or performed in visit on 09/09/24   Cardiac EP device report    Narrative    MDT-DUAL CHAMBER ICD (AAIR-DDDR MODE)/ ACTIVE SYSTEM IS MRI CONDITIONAL  CARELINK TRANSMISSION: BATTERY VOLTAGE ADEQUATE (2.6 YRS). AP: 74.1%. : 9.8% (MVP-ON). ALL AVAILABLE LEAD PARAMETERS WITHIN NORMAL LIMITS. NO SIGNIFICANT HIGH RATE EPISODES. OPTI-VOL WITHIN NORMAL LIMITS. NORMAL DEVICE FUNCTION. CH

## 2024-10-20 DIAGNOSIS — Z86.711 HISTORY OF PULMONARY EMBOLUS (PE): ICD-10-CM

## 2024-10-21 RX ORDER — APIXABAN 2.5 MG/1
2.5 TABLET, FILM COATED ORAL 2 TIMES DAILY
Qty: 180 TABLET | Refills: 1 | Status: SHIPPED | OUTPATIENT
Start: 2024-10-21

## 2024-10-22 ENCOUNTER — IMMUNIZATIONS (OUTPATIENT)
Dept: FAMILY MEDICINE CLINIC | Facility: CLINIC | Age: 84
End: 2024-10-22
Payer: COMMERCIAL

## 2024-10-22 DIAGNOSIS — Z23 NEED FOR COVID-19 VACCINE: ICD-10-CM

## 2024-10-22 DIAGNOSIS — Z23 ENCOUNTER FOR IMMUNIZATION: Primary | ICD-10-CM

## 2024-10-22 PROCEDURE — 90662 IIV NO PRSV INCREASED AG IM: CPT

## 2024-10-22 PROCEDURE — G0008 ADMIN INFLUENZA VIRUS VAC: HCPCS

## 2024-10-22 PROCEDURE — 90480 ADMN SARSCOV2 VAC 1/ONLY CMP: CPT

## 2024-10-22 PROCEDURE — 91320 SARSCV2 VAC 30MCG TRS-SUC IM: CPT

## 2024-10-22 NOTE — PROGRESS NOTES
Heart Failure Office Note - Christian Bolden 84 y.o. 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) 08/06/2021    Mild intermittent asthma without complication 02/03/2021    Encounter for follow-up surveillance of melanoma 07/11/2019    Acute on chronic systolic (congestive) heart failure (HCC) 01/29/2019    Hypertensive kidney disease with stage 3b chronic kidney disease (HCC) 01/04/2019    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 09/20/2018    Osteopenia 06/29/2018    Personal history of malignant melanoma 11/15/2017    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 12/02/2013    Microscopic hematuria 12/02/2013    Primary osteoarthritis of both knees 11/26/2013    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    Impaired fasting glucose 05/30/2013    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    Cardiac arrhythmia 04/05/2013    Essential hypertension 04/05/2013    Esophageal reflux 11/28/2012    Cardiomyopathy (HCC) 10/26/2012    Mixed hyperlipidemia 09/05/2012    Other acute pulmonary embolism without acute cor pulmonale (HCC) 06/15/2023    NSVT (nonsustained ventricular tachycardia) ([facility]) 06/15/2023       # Chronic HFrEF, Stage C, NYHA 2  Etiology: NICM, presumed viral  No family hx of SCD  No ETOH    Weight: 190 lbs--> 179 lbs--> 173 lbs--> 174 lbs  BNP 1/6/24: 1430  NT pro BNP: 1/29/19: 6192    Studies- personally reviewed by me  Echo 7/22/22:  LVEF: 35%  LVEDd: 5.3 cm  RV: mildly dilated, mildly reduced  PASP: 30 mmHg  RVOT: no notching  Moderate AI  Aortic root 4.6 cm    Echo 10/1/20:  LVEF: 35%  LVIDd: 6.5 cm  RV: mildly dilated, mildly  "reduced  MR: moderate  PASP:  RVOT:   Other: mild AI, ascending aorta 4.0 cm  TR: 2-3+    Echocardiogram 1/29/19  LVEF: 40%  LVIDd: 6 cm  RV: normal  MR: mild  PASP: 40 mmHg  RVOT:   Other:    Echo 6/21/18:  LVEF: 50%  LVEDd: 6.35 cm    Nuclear Stress Test 1/31/19: small to medium sized fixed defect infersoseptum. No ischemia  EF: 30%    Lab Results   Component Value Date    NTBNP 6,971 (H) 07/22/2022        Neurohormonal Blockade:  --Beta-Blocker: bisoprolol 10 mg daily  --ACEi, ARB or ARNi: Entresto 24/26 mg BID  --Aldosterone Receptor Blocker: spironolactone 25 mg daily- decrease to 12.5 mg today  --SGLT2i: consider Jardiance but BPs low  --Diuretic:     Sudden Cardiac Death Risk Reduction:  MDT dual chamber ICD: MRI conditional  Interrogation 9/9/24:  9.8%, no episodes, optivol normal  Interrogation 6/7/24:  11%, optivol normal  Interrogation 3/6/24:  9%, optivol normal    Electrical Resynchronization:  --Candidacy for BiV device: IVCD    Advanced Therapies (if appropriate):  --Inotrope:  --LVAD/Transplant Candidacy:    # Hx of bilateral pulmonary emboli  - unprovoked with colon CA and melanoma hx  Lupus anticoagulant positive  AC: Eliquis  CT chest / PE study 07/21/2022: \"Nonobstructive pulmonary emboli  within distal bilateral main pulmonary arteries extending into lobar branches. Extensive pulmonary emboli within right upper lobe and right lower lobe and left lower lobe. Main pulmonary arteries more dilated when compared with prior exam...right main pulmonary artery measures 3.2 cm, previously 2.4 cm.\"  # HTN- controlled, room to escalate therapy  # Mild AI and ascending aorta 4 cm on 10/1/20 echo  # SVT  # NSVT on device check- on bisoprolol   # hyperlipidemia- atorvastatin 10 mg  4/29/24: LDL 62, HDL 58  4/29/23: LDL 56, HDL 51  # hx of malignant melanoma  # CKD, Cr 1.44 on 1/6/24, 1.6 on 4/29/24  # Barretts esophagus- EGD 6/12/20  # ED- cialis    Today's Plan:    spironolactone 25 mg daily- cut back " to 12.5 mg daily  On Eliquis for bilateral PE in July 2022  Lupus anticoagulant 2/6/23 remained positive  Saw Hematology, recommended consider prophylactic dosing 2.5 mg BID thereafter  Continue bisoprolol and Entresto for HFrEF  Lipids at goal on atorvastatin 10 mg   NSVT relatively quiet on bisoprolol, recent devic check  --2g sodium diet  Weights daily    HPI:      85 yo recently had first evaluation in HF clinic for NICM diagnosed in 2009, s/p ICD, PSVT, HTN and dyslipidemia.     Last in hospital in January for dyspnea felt to be more viral but was volume overloaded with NT pro BNP of 6192. Given one dose of IV lasix. LVEF: 40% with LVEDd 6 cm. He followed up with NP and weight was 204 lbs. Never smoked and does not drink.     Plays golf 2x a week, cuts grass, does not feel limited in activity. No significant edema in legs. He is limited by knees and back not respiration.      Pt admitted in July 2022 for acute bilateral pulmonary emboli, unprovoked with known history of colon cancer and melanoma. He had full body imaging in hospital.     Interim:   Was down a lot of weight- around 30 lbs total  Stopped lasix as was hypotensive and orthostatic  Interrogation 9/9/24:  9.8%, no episodes, optivol normal  Less lightheadedness now  Review of Systems   Constitutional:  Negative for activity change, appetite change, fatigue and unexpected weight change.   HENT:  Negative for congestion and nosebleeds.    Eyes: Negative.    Respiratory:  Negative for cough, chest tightness and shortness of breath.    Cardiovascular:  Negative for chest pain, palpitations and leg swelling.   Gastrointestinal:  Negative for abdominal distention.   Endocrine: Negative.    Genitourinary: Negative.    Musculoskeletal:  Positive for arthralgias and back pain.   Skin: Negative.    Neurological:  Negative for dizziness, syncope and weakness.   Hematological: Negative.    Psychiatric/Behavioral: Negative.         Past Medical History:    Diagnosis Date    AICD (automatic cardioverter/defibrillator) present     Arthritis     Asthma     Burt esophagus     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     Cancer ([facility]) Mar 2004    Chronic HFrEF (heart failure with reduced ejection fraction) ([facility]) 2019    Chronic kidney disease     CKD (chronic kidney disease)     Colon cancer ([facility]) 04/05/2013    Colon cancer screening 06/21/2022    COPD (chronic obstructive pulmonary disease) ([facility])     Disease of thyroid gland     Diverticulitis     Last assessed: 4/5/13    Esophageal reflux     GERD (gastroesophageal reflux disease) Feb 2009    Gout     Hernia     Hyperlipidemia     Hypertension     Hypothyroidism 05/30/2013    Non-toxic multinodular goiter 11/28/2012    Pleural effusion     Polymyalgia rheumatica ([facility])     Pulmonary embolism ([facility]) 2022    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     Spondyloarthropathy     Last assessed: 11/28/12    Thrombocytopenia ([facility]) 06/23/2015         No Known Allergies  .    Current Outpatient Medications:     Alphagan P 0.1 %, , Disp: , Rfl:     atorvastatin (LIPITOR) 10 mg tablet, Take 1 tablet by mouth daily, Disp: 100 tablet, Rfl: 1    betamethasone valerate (VALISONE) 0.1 % cream, APPLY TO AFFECTED AREA BEHIND EARS TWICE DAILY WHEN ACTIVE OR AFTER HAIRCUTS, Disp: , Rfl:     bisoprolol (ZEBETA) 10 MG tablet, Take 1 tablet (10 mg total) by mouth 2 (two) times a day, Disp: 180 tablet, Rfl: 3    Calcium Carb-Cholecalciferol (CALCIUM 600 + D PO), Take 3 tablets by mouth daily  , Disp: , Rfl:     Coenzyme Q10 (CO Q-10) 200 MG CAPS, Take 1 tablet by mouth daily, Disp: , Rfl:     Eliquis 2.5 MG, Take 1 tablet by mouth 2  times a day, Disp: 180 tablet, Rfl: 1    finasteride (PROSCAR) 5 mg tablet, Take 1 tablet (5 mg total) by mouth daily, Disp: 90 tablet, Rfl: 1    ketoconazole (NIZORAL) 2 % shampoo, LATHER IN TO SCALP AND LET IT SIT FOR 5 MINUTES PRIOR TO RINSING 3X WEEKLY AS  NEEDED, Disp: , Rfl:     latanoprost (XALATAN) 0.005 % ophthalmic solution, , Disp: , Rfl:     Multiple Vitamins-Minerals (CENTRUM SILVER PO), Take 1 tablet by mouth daily., Disp: , Rfl:     omeprazole (PriLOSEC) 20 mg delayed release capsule, TAKE 1 CAPSULE BY MOUTH EVERY DAY, Disp: 90 capsule, Rfl: 1    sacubitril-valsartan (Entresto) 24-26 MG TABS, Take 1 tablet by mouth 2 (two) times a day, Disp: 180 tablet, Rfl: 3    spironolactone (ALDACTONE) 25 mg tablet, Take 1 tablet (25 mg total) by mouth daily, Disp: 90 tablet, Rfl: 5    Vitamin D, Cholecalciferol, 1000 UNITS CAPS, Take 1 tablet by mouth daily., Disp: , Rfl:     ciclopirox (LOPROX) 0.77 % cream, APPLY TO AFFECTED AREA OF SKIN TWICE DAILY FOR 2 WEEKS THEN 2-3 TIMES WEEKLY AS MAINTENANCE (Patient not taking: Reported on 5/10/2024), Disp: , Rfl:     Social History     Socioeconomic History    Marital status: /Civil Union     Spouse name: Not on file    Number of children: Not on file    Years of education: Not on file    Highest education level: Not on file   Occupational History    Occupation: Manager-Retired   Tobacco Use    Smoking status: Never     Passive exposure: Never    Smokeless tobacco: Never   Vaping Use    Vaping status: Never Used   Substance and Sexual Activity    Alcohol use: No     Comment: Rare    Drug use: No    Sexual activity: Not Currently     Partners: Female   Other Topics Concern    Not on file   Social History Narrative    Not on file     Social Determinants of Health     Financial Resource Strain: Low Risk  (11/4/2023)    Overall Financial Resource Strain (CARDIA)     Difficulty of Paying Living Expenses: Not hard at all   Food Insecurity: Not on file   Transportation Needs: No Transportation Needs (11/4/2023)    PRAPARE - Transportation     Lack of Transportation (Medical): No     Lack of Transportation (Non-Medical): No   Physical Activity: Not on file   Stress: Not on file   Social Connections: Unknown (6/18/2024)     "Received from [employer]     How often do you feel lonely or isolated from those around you? (Adult - for ages 18 years and over): Not on file   Intimate Partner Violence: Not on file   Housing Stability: Not on file       Family History   Problem Relation Age of Onset    Heart failure Mother         Congestive    Hypertension Mother     Osteoporosis Mother     Cancer Mother     COPD Father     Emphysema Father     Hypertension Father     Heart disease Father     Heart disease Sister         Heart Valve Replacement    Osteoporosis Sister     Breast cancer Family     Factor V Leiden deficiency Son         on Xarelto       Physical Exam:    Vitals: Blood pressure 90/60, pulse 77, height 5' 11\" (1.803 m), weight 78.9 kg (174 lb)., Body mass index is 24.27 kg/m².,   Wt Readings from Last 3 Encounters:   10/24/24 78.9 kg (174 lb)   08/05/24 77.6 kg (171 lb)   07/24/24 78.5 kg (173 lb)       Physical Exam  Constitutional:       Appearance: He is well-developed.   HENT:      Head: Normocephalic and atraumatic.   Eyes:      Pupils: Pupils are equal, round, and reactive to light.   Neck:      Vascular: No JVD.   Cardiovascular:      Rate and Rhythm: Normal rate and regular rhythm.      Heart sounds: No murmur heard.  Pulmonary:      Effort: Pulmonary effort is normal. No respiratory distress.      Breath sounds: Normal breath sounds.   Abdominal:      General: There is no distension.      Palpations: Abdomen is soft.      Tenderness: There is no abdominal tenderness.   Musculoskeletal:         General: Normal range of motion.      Cervical back: Normal range of motion.   Skin:     General: Skin is warm and dry.      Findings: No rash.   Neurological:      Mental Status: He is alert and oriented to person, place, and time.       Labs & Results:    Lab Results   Component Value Date    WBC 9.02 04/29/2024    HGB 13.7 04/29/2024    HCT 41.7 04/29/2024    MCV 99 (H) 04/29/2024     04/29/2024     Lab " "Results   Component Value Date    SODIUM 141 04/29/2024    K 4.2 04/29/2024     04/29/2024    CO2 28 04/29/2024    BUN 26 (H) 04/29/2024    CREATININE 1.60 (H) 04/29/2024    GLUC 98 01/06/2024    CALCIUM 9.5 04/29/2024     Lab Results   Component Value Date    NTBNP 6,971 (H) 07/22/2022      Lab Results   Component Value Date    CHOL 163 12/19/2015    CHOL 168 04/17/2015    CHOL 155 12/04/2014     Lab Results   Component Value Date    HDL 58 04/29/2024    HDL 52 11/03/2023    HDL 51 04/29/2023     Lab Results   Component Value Date    LDLCALC 62 04/29/2024    LDLCALC 60 11/03/2023    LDLCALC 56 04/29/2023     Lab Results   Component Value Date    TRIG 135 04/29/2024    TRIG 98 11/03/2023    TRIG 125 04/29/2023     No results found for: \"CHOLHDL\"    EKG personally reviewed by Corbin Abad.     Counseling / Coordination of Care  Time spent today 25 minutes.  Greater than 50% of total time was spent with the patient and / or family counseling and / or coordination of care.  We discussed diagnoses, most recent studies, tests and any changes in treatment plan  Thank you for the opportunity to participate in the care of this patient.    CORBIN ABAD D.O.  DIRECTOR OF HEART FAILURE/ PULMONARY HYPERTENSION  MEDICAL DIRECTOR OF LVAD PROGRAM  [facility]    "

## 2024-10-24 ENCOUNTER — OFFICE VISIT (OUTPATIENT)
Dept: CARDIOLOGY CLINIC | Facility: CLINIC | Age: 84
End: 2024-10-24
Payer: COMMERCIAL

## 2024-10-24 VITALS
HEIGHT: 71 IN | BODY MASS INDEX: 24.36 KG/M2 | HEART RATE: 77 BPM | SYSTOLIC BLOOD PRESSURE: 90 MMHG | DIASTOLIC BLOOD PRESSURE: 60 MMHG | WEIGHT: 174 LBS

## 2024-10-24 DIAGNOSIS — I42.0 DILATED CARDIOMYOPATHY (HCC): ICD-10-CM

## 2024-10-24 DIAGNOSIS — I47.29 NSVT (NONSUSTAINED VENTRICULAR TACHYCARDIA) (HCC): ICD-10-CM

## 2024-10-24 DIAGNOSIS — N18.30 HYPERTENSIVE HEART AND KIDNEY DISEASE WITH HF AND WITH CKD STAGE III (HCC): ICD-10-CM

## 2024-10-24 DIAGNOSIS — I13.0 HYPERTENSIVE HEART AND KIDNEY DISEASE WITH HF AND WITH CKD STAGE III (HCC): ICD-10-CM

## 2024-10-24 DIAGNOSIS — I42.9 CARDIOMYOPATHY, UNSPECIFIED TYPE (HCC): ICD-10-CM

## 2024-10-24 DIAGNOSIS — I10 ESSENTIAL HYPERTENSION: Primary | ICD-10-CM

## 2024-10-24 PROCEDURE — 99214 OFFICE O/P EST MOD 30 MIN: CPT | Performed by: INTERNAL MEDICINE

## 2024-10-24 RX ORDER — SPIRONOLACTONE 25 MG/1
12.5 TABLET ORAL DAILY
Start: 2024-10-24 | End: 2026-04-17

## 2024-11-09 ENCOUNTER — LAB (OUTPATIENT)
Dept: LAB | Facility: CLINIC | Age: 84
End: 2024-11-09
Payer: COMMERCIAL

## 2024-11-09 DIAGNOSIS — I42.0 DILATED CARDIOMYOPATHY (HCC): ICD-10-CM

## 2024-11-09 DIAGNOSIS — E78.2 MIXED HYPERLIPIDEMIA: ICD-10-CM

## 2024-11-09 DIAGNOSIS — N18.30 HYPERTENSIVE HEART AND KIDNEY DISEASE WITH HF AND WITH CKD STAGE III (HCC): ICD-10-CM

## 2024-11-09 DIAGNOSIS — I13.0 HYPERTENSIVE HEART AND KIDNEY DISEASE WITH HF AND WITH CKD STAGE III (HCC): ICD-10-CM

## 2024-11-09 DIAGNOSIS — R73.01 IMPAIRED FASTING GLUCOSE: ICD-10-CM

## 2024-11-09 DIAGNOSIS — I10 ESSENTIAL HYPERTENSION: ICD-10-CM

## 2024-11-09 LAB
ALBUMIN SERPL BCG-MCNC: 3.8 G/DL (ref 3.5–5)
ALP SERPL-CCNC: 51 U/L (ref 34–104)
ALT SERPL W P-5'-P-CCNC: 25 U/L (ref 7–52)
ANION GAP SERPL CALCULATED.3IONS-SCNC: 6 MMOL/L (ref 4–13)
AST SERPL W P-5'-P-CCNC: 23 U/L (ref 13–39)
BILIRUB SERPL-MCNC: 0.71 MG/DL (ref 0.2–1)
BUN SERPL-MCNC: 29 MG/DL (ref 5–25)
CALCIUM SERPL-MCNC: 8.8 MG/DL (ref 8.4–10.2)
CHLORIDE SERPL-SCNC: 105 MMOL/L (ref 96–108)
CHOLEST SERPL-MCNC: 167 MG/DL
CO2 SERPL-SCNC: 29 MMOL/L (ref 21–32)
CREAT SERPL-MCNC: 1.56 MG/DL (ref 0.6–1.3)
EST. AVERAGE GLUCOSE BLD GHB EST-MCNC: 111 MG/DL
GFR SERPL CREATININE-BSD FRML MDRD: 40 ML/MIN/1.73SQ M
GLUCOSE P FAST SERPL-MCNC: 99 MG/DL (ref 65–99)
HBA1C MFR BLD: 5.5 %
HDLC SERPL-MCNC: 47 MG/DL
LDLC SERPL CALC-MCNC: 88 MG/DL (ref 0–100)
NONHDLC SERPL-MCNC: 120 MG/DL
POTASSIUM SERPL-SCNC: 4.9 MMOL/L (ref 3.5–5.3)
PROT SERPL-MCNC: 6.8 G/DL (ref 6.4–8.4)
SODIUM SERPL-SCNC: 140 MMOL/L (ref 135–147)
TRIGL SERPL-MCNC: 162 MG/DL

## 2024-11-09 PROCEDURE — 80053 COMPREHEN METABOLIC PANEL: CPT

## 2024-11-09 PROCEDURE — 83036 HEMOGLOBIN GLYCOSYLATED A1C: CPT

## 2024-11-09 PROCEDURE — 80061 LIPID PANEL: CPT

## 2024-11-09 PROCEDURE — 36415 COLL VENOUS BLD VENIPUNCTURE: CPT

## 2024-11-15 ENCOUNTER — OFFICE VISIT (OUTPATIENT)
Dept: FAMILY MEDICINE CLINIC | Facility: CLINIC | Age: 84
End: 2024-11-15
Payer: COMMERCIAL

## 2024-11-15 VITALS
RESPIRATION RATE: 16 BRPM | OXYGEN SATURATION: 96 % | DIASTOLIC BLOOD PRESSURE: 60 MMHG | HEART RATE: 63 BPM | WEIGHT: 175 LBS | SYSTOLIC BLOOD PRESSURE: 90 MMHG | TEMPERATURE: 97.3 F | BODY MASS INDEX: 24.5 KG/M2 | HEIGHT: 71 IN

## 2024-11-15 DIAGNOSIS — Z00.00 MEDICARE ANNUAL WELLNESS VISIT, SUBSEQUENT: ICD-10-CM

## 2024-11-15 DIAGNOSIS — E78.2 MIXED HYPERLIPIDEMIA: ICD-10-CM

## 2024-11-15 DIAGNOSIS — J45.20 MILD INTERMITTENT ASTHMA WITHOUT COMPLICATION: ICD-10-CM

## 2024-11-15 DIAGNOSIS — R73.01 IMPAIRED FASTING GLUCOSE: ICD-10-CM

## 2024-11-15 DIAGNOSIS — I42.0 DILATED CARDIOMYOPATHY (HCC): Primary | ICD-10-CM

## 2024-11-15 DIAGNOSIS — N13.8 BENIGN LOCALIZED HYPERPLASIA OF PROSTATE WITH URINARY OBSTRUCTION: ICD-10-CM

## 2024-11-15 DIAGNOSIS — I13.0 HYPERTENSIVE HEART AND KIDNEY DISEASE WITH HF AND WITH CKD STAGE III (HCC): ICD-10-CM

## 2024-11-15 DIAGNOSIS — N18.30 HYPERTENSIVE HEART AND KIDNEY DISEASE WITH HF AND WITH CKD STAGE III (HCC): ICD-10-CM

## 2024-11-15 DIAGNOSIS — I50.9 CHF (NYHA CLASS II, ACC/AHA STAGE C) (HCC): ICD-10-CM

## 2024-11-15 DIAGNOSIS — Z86.711 HISTORY OF PULMONARY EMBOLUS (PE): ICD-10-CM

## 2024-11-15 DIAGNOSIS — N40.1 BENIGN LOCALIZED HYPERPLASIA OF PROSTATE WITH URINARY OBSTRUCTION: ICD-10-CM

## 2024-11-15 DIAGNOSIS — I10 ESSENTIAL HYPERTENSION: ICD-10-CM

## 2024-11-15 PROBLEM — I50.84 END STAGE HEART FAILURE (HCC): Status: ACTIVE | Noted: 2024-11-15

## 2024-11-15 PROCEDURE — G0439 PPPS, SUBSEQ VISIT: HCPCS | Performed by: FAMILY MEDICINE

## 2024-11-15 PROCEDURE — 99214 OFFICE O/P EST MOD 30 MIN: CPT | Performed by: FAMILY MEDICINE

## 2024-11-15 NOTE — PROGRESS NOTES
Name: Christian Bolden      : 1940      MRN: [id number]  Encounter Provider: Jesus Garcia MD  Encounter Date: 11/15/2024   Encounter department: [facility]    Assessment & Plan  Dilated cardiomyopathy (HCC)    Orders:    CBC; Future    Comprehensive metabolic panel; Future    Hemoglobin A1C; Future    Lipid panel; Future    CHF (NYHA class II, ACC/AHA stage C) (HCC)  FU cardiology.        Essential hypertension  Continue current meds per cardiology.  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 Continue atorvastatin 10mg qhs per cardiology.   Orders:    CBC; Future    Comprehensive metabolic panel; Future    Hemoglobin A1C; Future    Lipid panel; Future    Impaired fasting glucose  Low carb diet.   Orders:    CBC; Future    Comprehensive metabolic panel; Future    Hemoglobin A1C; Future    Lipid panel; Future    History of pulmonary embolus (PE)  Continue eliquis 2.5mg bid.   Orders:    CBC; Future    Comprehensive metabolic panel; Future    Hemoglobin A1C; Future    Lipid panel; Future    Mild intermittent asthma without complication  Controlled. No need inhaler recently.        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  Continue finasteride 5mg qhs.        Medicare annual wellness visit, subsequent           Depression Screening and Follow-up Plan: Patient was screened for depression during today's encounter. They screened negative with a PHQ-2 score of 0.        Reviewed lab in 2024  hgA1C 5.5 normal  CMP ok GFR 40  Lipid 167/162/47/88  elevated TG    Flu shot yearly. Got flu shot, covid19 booster already this season.   Got PCV13 5/2015. Got pneumovax 2016.   Got shingrix in 2020.   RTO in 6 months.     Give ACP document.       Preventive health issues were discussed with patient, and age appropriate screening tests were ordered as noted in patient's After Visit Summary. Personalized health advice and appropriate referrals for health education or preventive services given if needed, as noted in patient's After Visit Summary.    History of Present Illness     HPI     Pt is here by himself.   Cardiomyopathy/CHF---s/p ICD. FU cardiology regularly.   He is on entresto to 24-26 bid.   He cut spironolactone to 12.5mg QD per cardiology because BP low at home.      HTN---He is on bisoprolol 10mg bid. BP at home /60-70 per pt. Sometimes lightheaded.      Hyperlipidemia---He is on atorvastatin 10mg qhs. Denies SE.      Pulmonary Embolus in 8/2022---FU hem/onc. He is on eliquis 2.5mg bid.      Asthma---controlled. He does not need wixela or albuterol.      IFG---Follows low carb diet.       Back pain/Knee arthritis---stable.      BPH---FU urology yearly. He is on proscar 5mg qhs.      Burt's---FU GI. Pt is on omeprazole 20mg daily.       Lives with wife. Does all ADL's.   Denies recent falls.   Denies depression.       Patient Care Team:  Jesus Garcia MD as PCP - General  DO Walker Moctezuma MD Darius Desai, MD Eric Mayer, MD Camille Eyvazzadeh, MD as Endoscopist  Amanda Zamorano MD (Colon and Rectal Surgery)  Corbin Estes DO (Cardiology)  Liam Garcia Jr., MD (Dermatology)  Miri Ortiz PA-C as Physician Assistant (Hematology and Oncology)    Review of Systems   Constitutional:  Negative for appetite change, chills and fever.   HENT:  Negative for congestion, ear pain, sinus pain and sore throat.    Eyes:  Negative for discharge and itching.   Respiratory:  Negative for apnea, cough, chest tightness, shortness of  breath and wheezing.    Cardiovascular:  Negative for chest pain, palpitations and leg swelling.   Gastrointestinal:  Negative for abdominal pain, anal bleeding, constipation, diarrhea, nausea and vomiting.   Endocrine: Negative for cold intolerance, heat intolerance and polyuria.   Genitourinary:  Negative for difficulty urinating and dysuria.   Musculoskeletal:  Negative for arthralgias, back pain and myalgias.   Skin:  Negative for rash.   Neurological:  Negative for dizziness and headaches.   Psychiatric/Behavioral:  Negative for agitation.      Medical History Reviewed by provider this encounter:  Tobacco  Problems  Med Hx  Surg Hx  Fam Hx  Soc Hx      Annual Wellness Visit Questionnaire   Christian is here for his Subsequent Wellness visit.     Health Risk Assessment:   Patient rates overall health as fair. Patient feels that their physical health rating is same. Patient is satisfied with their life. Eyesight was rated as same. Hearing was rated as same. Patient feels that their emotional and mental health rating is same. Patients states they are never, rarely angry. Patient states they are sometimes unusually tired/fatigued. Pain experienced in the last 7 days has been some. Patient's pain rating has been 2/10. Patient states that he has experienced no weight loss or gain in last 6 months.     Depression Screening:   PHQ-2 Score: 0      Fall Risk Screening:   In the past year, patient has experienced: no history of falling in past year      Home Safety:  Patient does not have trouble with stairs inside or outside of their home. Patient has working smoke alarms and has working carbon monoxide detector. Home safety hazards include: none.     Nutrition:   Current diet is Regular and No Added Salt.     Medications:   Patient is not currently taking any over-the-counter supplements. Patient is able to manage medications.     Activities of Daily Living (ADLs)/Instrumental Activities of Daily Living (IADLs):    Walk and transfer into and out of bed and chair?: Yes  Dress and groom yourself?: Yes    Bathe or shower yourself?: Yes    Feed yourself? Yes  Do your laundry/housekeeping?: Yes  Manage your money, pay your bills and track your expenses?: Yes  Make your own meals?: Yes    Do your own shopping?: Yes    Previous Hospitalizations:   Any hospitalizations or ED visits within the last 12 months?: Yes    How many hospitalizations have you had in the last year?: 1-2    Advance Care Planning:   Living will: Yes    Durable POA for healthcare: No    Advanced directive: Yes      Cognitive Screening:   Provider or family/friend/caregiver concerned regarding cognition?: No    PREVENTIVE SCREENINGS      Cardiovascular Screening:    General: History Lipid Disorder and Screening Current      Diabetes Screening:     General: Screening Current      Colorectal Cancer Screening:     General: History Colorectal Cancer      Prostate Cancer Screening:    General: Screening Not Indicated      Osteoporosis Screening:    General: Screening Current      Lung Cancer Screening:     General: Screening Not Indicated    Screening, Brief Intervention, and Referral to Treatment (SBIRT)    Screening  Typical number of drinks in a day: 0  Typical number of drinks in a week: 0  Interpretation: Low risk drinking behavior.    AUDIT-C Screenin) How often did you have a drink containing alcohol in the past year? never  2) How many drinks did you have on a typical day when you were drinking in the past year? 0  3) How often did you have 6 or more drinks on one occasion in the past year? never    AUDIT-C Score: 0  Interpretation: Score 0-3 (male): Negative screen for alcohol misuse    Single Item Drug Screening:  How often have you used an illegal drug (including marijuana) or a prescription medication for non-medical reasons in the past year? never    Single Item Drug Screen Score: 0  Interpretation: Negative screen for possible drug use  "disorder    Social Drivers of Health     Financial Resource Strain: Low Risk  (11/4/2023)    Overall Financial Resource Strain (CARDIA)     Difficulty of Paying Living Expenses: Not hard at all   Food Insecurity: No Food Insecurity (11/15/2024)    Hunger Vital Sign     Worried About Running Out of Food in the Last Year: Never true     Ran Out of Food in the Last Year: Never true   Transportation Needs: No Transportation Needs (11/15/2024)    PRAPARE - Transportation     Lack of Transportation (Medical): No     Lack of Transportation (Non-Medical): No   Housing Stability: Low Risk  (11/15/2024)    Housing Stability Vital Sign     Unable to Pay for Housing in the Last Year: No     Number of Times Moved in the Last Year: 1     Homeless in the Last Year: No   Utilities: Not At Risk (11/15/2024)    [facility] Utilities     Threatened with loss of utilities: No     No results found.    Objective   BP 90/60   Pulse 63   Temp (!) 97.3 °F (36.3 °C) (Temporal)   Resp 16   Ht 5' 11\" (1.803 m)   Wt 79.4 kg (175 lb)   SpO2 96%   BMI 24.41 kg/m²     Physical Exam  Constitutional:       Appearance: He is well-developed.   HENT:      Head: Normocephalic and atraumatic.   Eyes:      General:         Right eye: No discharge.         Left eye: No discharge.      Conjunctiva/sclera: Conjunctivae normal.   Cardiovascular:      Rate and Rhythm: Normal rate and regular rhythm.      Heart sounds: Normal heart sounds. No murmur heard.     No friction rub. No gallop.   Pulmonary:      Effort: Pulmonary effort is normal. No respiratory distress.      Breath sounds: Normal breath sounds. No wheezing or rales.   Abdominal:      General: Bowel sounds are normal. There is no distension.      Palpations: Abdomen is soft.      Tenderness: There is no abdominal tenderness. There is no guarding.   Musculoskeletal:         General: Normal range of motion.      Cervical back: Normal range of motion and neck supple.      Right lower leg: No edema.      " Left lower leg: No edema.   Neurological:      Mental Status: He is alert.

## 2024-11-15 NOTE — ASSESSMENT & PLAN NOTE
Orders:    CBC; Future    Comprehensive metabolic panel; Future    Hemoglobin A1C; Future    Lipid panel; Future    
Continue current meds per cardiology.  Orders:    CBC; Future    Comprehensive metabolic panel; Future    Hemoglobin A1C; Future    Lipid panel; Future    
Continue eliquis 2.5mg bid.   Orders:    CBC; Future    Comprehensive metabolic panel; Future    Hemoglobin A1C; Future    Lipid panel; Future    
Continue finasteride 5mg qhs.        
Controlled. No need inhaler recently.        
FU cardiology.        
Low carb diet.   Orders:    CBC; Future    Comprehensive metabolic panel; Future    Hemoglobin A1C; Future    Lipid panel; Future    
Low fat diet. Continue atorvastatin 10mg qhs per cardiology.   Orders:    CBC; Future    Comprehensive metabolic panel; Future    Hemoglobin A1C; Future    Lipid panel; Future    
Wt Readings from Last 3 Encounters:   11/15/24 79.4 kg (175 lb)   10/24/24 78.9 kg (174 lb)   08/05/24 77.6 kg (171 lb)       Orders:    CBC; Future    Comprehensive metabolic panel; Future    Hemoglobin A1C; Future    Lipid panel; Future    
MED/SURG

## 2024-12-09 DIAGNOSIS — I10 HYPERTENSION, UNSPECIFIED TYPE: ICD-10-CM

## 2024-12-10 ENCOUNTER — REMOTE DEVICE CLINIC VISIT (OUTPATIENT)
Dept: CARDIOLOGY CLINIC | Facility: CLINIC | Age: 84
End: 2024-12-10
Payer: COMMERCIAL

## 2024-12-10 ENCOUNTER — RESULTS FOLLOW-UP (OUTPATIENT)
Dept: CARDIOLOGY CLINIC | Facility: CLINIC | Age: 84
End: 2024-12-10

## 2024-12-10 DIAGNOSIS — Z95.810 PRESENCE OF AUTOMATIC CARDIOVERTER/DEFIBRILLATOR (AICD): Primary | ICD-10-CM

## 2024-12-10 PROCEDURE — 93296 REM INTERROG EVL PM/IDS: CPT | Performed by: INTERNAL MEDICINE

## 2024-12-10 PROCEDURE — 93295 DEV INTERROG REMOTE 1/2/MLT: CPT | Performed by: INTERNAL MEDICINE

## 2024-12-10 RX ORDER — BISOPROLOL FUMARATE 10 MG/1
10 TABLET, FILM COATED ORAL 2 TIMES DAILY
Qty: 180 TABLET | Refills: 1 | Status: SHIPPED | OUTPATIENT
Start: 2024-12-10

## 2024-12-10 NOTE — PROGRESS NOTES
Results for orders placed or performed in visit on 12/10/24   Cardiac EP device report    Narrative    MDT-DUAL CHAMBER ICD (AAIR-DDDR MODE)/ ACTIVE SYSTEM IS MRI CONDITIONAL  CARELINK TRANSMISSION: BATTERY VOLTAGE ADEQUATE. (2.4 YRS) AP 70%  15%. ALL AVAILABLE LEAD PARAMETERS WITHIN NORMAL LIMITS. NO SIGNIFICANT HIGH RATE EPISODES. OPTI-VOL WITHIN NORMAL LIMITS. NORMAL DEVICE FUNCTION.---CONNER

## 2024-12-16 DIAGNOSIS — K22.70 BARRETT'S ESOPHAGUS WITHOUT DYSPLASIA: ICD-10-CM

## 2024-12-16 NOTE — TELEPHONE ENCOUNTER
Spoke to pt.  Pt will call back with wife to make appt   negative No oral lesions; no gross abnormalities

## 2024-12-16 NOTE — TELEPHONE ENCOUNTER
Reason for call:   [x] Refill   [] Prior Auth  [] Other:     Office:   [] PCP/Provider -   [x] Specialty/Provider - [facility]     Medication: omeprazole (PriLOSEC) 20 mg delayed release capsule     Dose/Frequency: 20 mg, Daily     Quantity: 90    Pharmacy: [id number]    Does the patient have enough for 3 days?   [] Yes   [x] No - Send as HP to POD

## 2024-12-20 ENCOUNTER — OFFICE VISIT (OUTPATIENT)
Dept: GASTROENTEROLOGY | Facility: CLINIC | Age: 84
End: 2024-12-20
Payer: COMMERCIAL

## 2024-12-20 VITALS
BODY MASS INDEX: 25.62 KG/M2 | SYSTOLIC BLOOD PRESSURE: 124 MMHG | WEIGHT: 183 LBS | TEMPERATURE: 98.5 F | DIASTOLIC BLOOD PRESSURE: 60 MMHG | HEIGHT: 71 IN

## 2024-12-20 DIAGNOSIS — K22.70 BARRETT'S ESOPHAGUS WITHOUT DYSPLASIA: Primary | ICD-10-CM

## 2024-12-20 DIAGNOSIS — K21.9 GASTROESOPHAGEAL REFLUX DISEASE, UNSPECIFIED WHETHER ESOPHAGITIS PRESENT: ICD-10-CM

## 2024-12-20 PROCEDURE — 99213 OFFICE O/P EST LOW 20 MIN: CPT | Performed by: PHYSICIAN ASSISTANT

## 2024-12-20 PROCEDURE — G2211 COMPLEX E/M VISIT ADD ON: HCPCS | Performed by: PHYSICIAN ASSISTANT

## 2024-12-20 NOTE — ASSESSMENT & PLAN NOTE
Patient is stable and doing well overall on PPI therapy without complaints or alarming symptoms.  Will plan to continue omeprazole 20 mg once daily indefinitely.  Refill provided.  Reviewed his last EGD with Dr. Smith and pathology results.  Patient expressed understanding to results.  All questions answered.  We discussed that Per Dr. Smith it was recommended that he undergo repeat EGD in 3 to 4 months to follow-up on the esophagus 1 more time however this was never done.  I did explain to the patient that over time, as patients get older, the risks for procedures do increase and since he is doing well without any alarming symptoms I am unsure if he would benefit largely from EGD with surveillance of Burt's esophagus at this time.  He expressed understanding to this and was in agreement that he would generally like to avoid EGD moving forward if at all possible.  Patient did ask that I message Dr. Smith to get an opinion on this and to see if he feels strongly about patient undergoing repeat EGD for Burt's esophagus surveillance, I will do this.  All questions were answered.  Patient was appreciative of the visit today.    Orders:    omeprazole (PriLOSEC) 20 mg delayed release capsule; Take 1 capsule (20 mg total) by mouth daily before breakfast

## 2024-12-20 NOTE — PROGRESS NOTES
Name: Christian Bolden      : 1940      MRN: [id number]  Encounter Provider: Trinity Power PA-C  Encounter Date: 2024   Encounter department: 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  Will plan to continue omeprazole 20 mg once daily indefinitely.  Refill provided.  Reviewed his last EGD with Dr. Smith and pathology results.  Patient expressed understanding to results.  All questions answered.  We discussed that Per Dr. Smith it was recommended that he undergo repeat EGD in 3 to 4 months to follow-up on the esophagus 1 more time however this was never done.  I did explain to the patient that over time, as patients get older, the risks for procedures do increase and since he is doing well without any alarming symptoms I am unsure if he would benefit largely from EGD with surveillance of Burt's esophagus at this time.  He expressed understanding to this and was in agreement that he would generally like to avoid EGD moving forward if at all possible.  Patient did ask that I message Dr. Smith to get an opinion on this and to see if he feels strongly about patient undergoing repeat EGD for Burt's esophagus surveillance, I will do this.  All questions were answered.  Patient was appreciative of the visit today.    Orders:    omeprazole (PriLOSEC) 20 mg delayed release capsule; Take 1 capsule (20 mg total) by mouth daily before breakfast    Gastroesophageal reflux disease, unspecified whether esophagitis present  As above           Patient was instructed to call the office with any questions, concerns, new/ worsening/ persisting GI symptoms. Advised patient go to the ER with any severe or worsening abdominal pain, fevers/ chills, intractable N/V, chest pain, SOB, dizziness, lightheadedness, feeling something stuck in  esophagus that will not go down. Patient expressed understanding and is in agreement with treatment plan.     Will plan to follow up 1 year      History of Present Illness   HPI  Christian Bolden is a 84 y.o. male with a history of GERD and Burt's esophagus who presents to the office today for follow-up GERD and Burt's esophagus.    Patient reports feeling well. No new or specific GI complaints or concerns today.   Patient takes omeprazole 20 mg once daily before breakfast and tolerating well.  He is eating and drinking well.  He needs a refill of this medication.  BM's are regular and stool is brown and formed.   Patient denies any fevers/ chills, unintentional weight loss, decreased appetite, abdominal pain, nausea, vomiting, change in bowel habits, diarrhea, constipation, black or bloody stools, heartburn, dysphagia, odynophagia.   Denies chest pain, SOB    Patient last underwent EGD 4/27/2023, this was completed with EMR.  Report reviewed.  Notable for C4 M5 Burt's esophagus with an associated nodule, EMR was performed of the nodule.  Sliding hiatal hernia was also noted.  Otherwise the stomach and duodenum were normal.  Esophageal nodule was noted for Burt's esophagus and negative for dysplasia.  Per Dr. Smith it was recommended that he undergo repeat EGD in 3 to 4 months to follow-up on the esophagus 1 more time.    He is UTD on colonoscopy     Review of Systems   Constitutional:  Negative for chills and fever.   HENT:  Negative for ear pain and sore throat.    Eyes:  Negative for pain and visual disturbance.   Respiratory:  Negative for cough and shortness of breath.    Cardiovascular:  Negative for chest pain and palpitations.   Gastrointestinal:  Negative for abdominal pain and vomiting.   Genitourinary:  Negative for dysuria and hematuria.   Musculoskeletal:  Negative for arthralgias and back pain.   Skin:  Negative for color change and rash.   Neurological:  Negative for seizures and  "syncope.   All other systems reviewed and are negative.      Objective   /60 (BP Location: Right arm, Patient Position: Sitting, Cuff Size: Standard)   Temp 98.5 °F (36.9 °C) (Tympanic Core)   Ht 5' 11\" (1.803 m)   Wt 83 kg (183 lb)   BMI 25.52 kg/m²      Physical Exam  Vitals reviewed.   Constitutional:       General: He is not in acute distress.     Appearance: He is well-developed. He is not ill-appearing or diaphoretic.   HENT:      Head: Normocephalic and atraumatic.   Eyes:      Conjunctiva/sclera: Conjunctivae normal.   Cardiovascular:      Rate and Rhythm: Normal rate and regular rhythm.      Heart sounds: No murmur heard.  Pulmonary:      Effort: Pulmonary effort is normal. No respiratory distress.      Breath sounds: Normal breath sounds.   Abdominal:      General: Bowel sounds are normal.      Palpations: Abdomen is soft.      Tenderness: There is no abdominal tenderness.   Musculoskeletal:         General: No swelling.      Cervical back: Neck supple.   Skin:     General: Skin is warm and dry.      Capillary Refill: Capillary refill takes less than 2 seconds.   Neurological:      General: No focal deficit present.      Mental Status: He is alert and oriented to person, place, and time.   Psychiatric:         Mood and Affect: Mood normal.         Behavior: Behavior normal.       Lab Results   Component Value Date    WBC 9.02 04/29/2024    HGB 13.7 04/29/2024    HCT 41.7 04/29/2024    MCV 99 (H) 04/29/2024     04/29/2024     Lab Results   Component Value Date     (H) 12/19/2015    SODIUM 140 11/09/2024    K 4.9 11/09/2024     11/09/2024    CO2 29 11/09/2024    ANIONGAP 9 12/19/2015    AGAP 6 11/09/2024    BUN 29 (H) 11/09/2024    CREATININE 1.56 (H) 11/09/2024    GLUC 98 01/06/2024    GLUF 99 11/09/2024    CALCIUM 8.8 11/09/2024    AST 23 11/09/2024    ALT 25 11/09/2024    ALKPHOS 51 11/09/2024    PROT 7.3 12/19/2015    TP 6.8 11/09/2024    BILITOT 0.95 12/19/2015    TBILI 0.71 " 11/09/2024    EGFR 40 11/09/2024     Lab Results   Component Value Date    NJQ8QZXKBVSV 3.480 10/26/2022

## 2025-01-01 DIAGNOSIS — N13.8 BPH WITH OBSTRUCTION/LOWER URINARY TRACT SYMPTOMS: ICD-10-CM

## 2025-01-01 DIAGNOSIS — N40.1 BPH WITH OBSTRUCTION/LOWER URINARY TRACT SYMPTOMS: ICD-10-CM

## 2025-01-01 RX ORDER — FINASTERIDE 5 MG/1
5 TABLET, FILM COATED ORAL DAILY
Qty: 90 TABLET | Refills: 1 | Status: SHIPPED | OUTPATIENT
Start: 2025-01-01

## 2025-01-13 ENCOUNTER — TELEPHONE (OUTPATIENT)
Dept: GASTROENTEROLOGY | Facility: CLINIC | Age: 85
End: 2025-01-13

## 2025-01-13 NOTE — TELEPHONE ENCOUNTER
Called pt to make aware of recall for 1 yr for his EGD, pt is ok with the 1 yr recall, and the recall is ion the chart.

## 2025-01-13 NOTE — TELEPHONE ENCOUNTER
"Case discussed with Dr. Fenton and Dr. Smith   Per Dr. Fenton : \"If he has good functional status repeat in 1 year and if there is no dysplasia consider holding off. \"    Per above recommendations patient will be due for repeat EGD in January 2026  Will contact clerical staff to set recall/let patient know  "

## 2025-01-13 NOTE — TELEPHONE ENCOUNTER
----- Message from Trinity Power PA-C sent at 1/13/2025  9:38 AM EST -----  Regarding: FW: ?repeat EGD  Please call patient and let him know that I discussed with both Dr. Fenton and Dr. Smith need for repeat EGD   Please let the patient know that Dr. Fenton recommends patient undergo repeat EGD in 1 year, January 2026 to surveilleBarrett's esophagus.  If that is normal, no need for further EGD    Please place recall EGD for January 2026      Any questions or concerns please let me know.   Thanks!  Trinity Power PA-C  ----- Message -----  From: Walker Fenton MD  Sent: 1/12/2025   4:23 PM EST  To: Boris Smith MD; Trinity Power PA-C  Subject: RE: ?repeat EGD                                  If he has good functional status repeat in 1 year and if there is no dysplasia consider holding off. Thank you both  ----- Message -----  From: Boris Smith MD  Sent: 1/10/2025   3:19 PM EST  To: Walker Fenton MD; Trinity Power PA-C  Subject: RE: ?repeat EGD                                  Hello.   He has had C4M5 BE without dysplasia and last EGD done in 2023 without dysplasia. An EMR for a nodule was done which was negative as well.   He doesn't need a follow up just now but would need one next year. However, considering his age and being asymptomatic we could consider holding off.   He has seen Dr. Fenton in the past and would like his input as well.   Thanks.   Boris  ----- Message -----  From: Trinity Power PA-C  Sent: 12/23/2024   1:48 PM EST  To: Boris Smith MD  Subject: ?repeat EGD                                      Hello Dr. Smith,     If you could please review this upon your return to the office I would greatly appreciate it. I saw this patient in the office Friday afternoon.  He underwent EGD with EMR with you in April 2023.  He has been stable and doing well on PPI since then without complaints or alarming symptoms.  He is now 84 years old and has some cardiac comorbidities  (has defibrillator in place).  After  patient's last EGD/EMR pathology resulted you did recommend he undergo repeat EGD in 6 months.  During our office visit patient stated he wanted me to reach out to you to see if you felt he would benefit from a repeat EGD now/at this time even though he is asymptomatic and greater than 80.    Please let me know your thoughts whenever you can, I am happy to communicate to the patient.  I had the feeling that patient wanted to avoid future procedures but he still wanted your opinion on if he should have a surveillance EGD one last time.    Thanks!  Trinity Power PA-C

## 2025-02-04 DIAGNOSIS — E27.0 HYPERCORTISOLEMIA (HCC): ICD-10-CM

## 2025-02-05 RX ORDER — ATORVASTATIN CALCIUM 10 MG/1
10 TABLET, FILM COATED ORAL DAILY
Qty: 100 TABLET | Refills: 1 | Status: SHIPPED | OUTPATIENT
Start: 2025-02-05

## 2025-02-24 NOTE — PROGRESS NOTES
Heart Failure Office Note - Christian Topollytonia 84 y.o. 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) 08/06/2021    Mild intermittent asthma without complication 02/03/2021    Encounter for follow-up surveillance of melanoma 07/11/2019    Acute on chronic systolic (congestive) heart failure (HCC) 01/29/2019    Hypertensive kidney disease with stage 3b chronic kidney disease (HCC) 01/04/2019    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 09/20/2018    Osteopenia 06/29/2018    Personal history of malignant melanoma 11/15/2017    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 12/02/2013    Microscopic hematuria 12/02/2013    Primary osteoarthritis of both knees 11/26/2013    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    Impaired fasting glucose 05/30/2013    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    Cardiac arrhythmia 04/05/2013    Essential hypertension 04/05/2013    Esophageal reflux 11/28/2012    Cardiomyopathy (HCC) 10/26/2012    Mixed hyperlipidemia 09/05/2012    CHF (NYHA class II, ACC/AHA stage C) ([facility]) 11/15/2024    NSVT (nonsustained ventricular tachycardia) ([facility]) 06/15/2023       # Chronic HFrEF, Stage C, NYHA 2  Etiology: NICM, presumed viral  No family hx of SCD  No ETOH    Weight: 190 lbs--> 179 lbs--> 173 lbs--> 174 lbs--185 lbs  BNP 1/6/24: 1430  NT pro BNP: 1/29/19: 6192    Studies- personally reviewed by me  Echo 7/22/22:  LVEF: 35%  LVEDd: 5.3 cm  RV: mildly dilated, mildly reduced  PASP: 30 mmHg  RVOT: no notching  Moderate AI  Aortic root 4.6 cm    Echo 10/1/20:  LVEF: 35%  LVIDd: 6.5 cm  RV: mildly dilated, mildly reduced  MR:  "moderate  PASP:  RVOT:   Other: mild AI, ascending aorta 4.0 cm  TR: 2-3+    Echocardiogram 1/29/19  LVEF: 40%  LVIDd: 6 cm  RV: normal  MR: mild  PASP: 40 mmHg  RVOT:   Other:    Echo 6/21/18:  LVEF: 50%  LVEDd: 6.35 cm    Nuclear Stress Test 1/31/19: small to medium sized fixed defect infersoseptum. No ischemia  EF: 30%    Lab Results   Component Value Date    NTBNP 6,971 (H) 07/22/2022        Neurohormonal Blockade:  --Beta-Blocker: bisoprolol 10 mg daily  --ACEi, ARB or ARNi: Entresto 24/26 mg BID  --Aldosterone Receptor Blocker: spironolactone 12.5 mg today  --SGLT2i: consider Jardiance but BPs low  --Diuretic:     Sudden Cardiac Death Risk Reduction:  MDT dual chamber ICD: MRI conditional  Interrogation 12/10/24:  15%, optivol normal  Interrogation 9/9/24:  9.8%, no episodes, optivol normal  Interrogation 6/7/24:  11%, optivol normal  Interrogation 3/6/24:  9%, optivol normal    Electrical Resynchronization:  --Candidacy for BiV device: IVCD    Advanced Therapies (if appropriate):  --Inotrope:  --LVAD/Transplant Candidacy:    # Hx of bilateral pulmonary emboli  - unprovoked with colon CA and melanoma hx  Lupus anticoagulant positive  AC: Eliquis  CT chest / PE study 07/21/2022: \"Nonobstructive pulmonary emboli  within distal bilateral main pulmonary arteries extending into lobar branches. Extensive pulmonary emboli within right upper lobe and right lower lobe and left lower lobe. Main pulmonary arteries more dilated when compared with prior exam...right main pulmonary artery measures 3.2 cm, previously 2.4 cm.\"  # HTN- controlled, room to escalate therapy  # Mild AI and ascending aorta 4 cm on 10/1/20 echo  # SVT  # NSVT on device check- on bisoprolol   # hyperlipidemia- atorvastatin 10 mg  11/9/24: LDL 88, HDL 47  4/29/24: LDL 62, HDL 58  4/29/23: LDL 56, HDL 51  # hx of malignant melanoma  # CKD, Cr 1.56 on 11/9  # Barretts esophagus- EGD 6/12/20  # ED- cialis    Today's Plan:    On Eliquis for " bilateral PE in July 2022  Lupus anticoagulant 2/6/23 remained positive  Saw Hematology, recommended consider prophylactic dosing 2.5 mg BID thereafter  Continue bisoprolol and Entresto for HFrEF  Lipids at goal on atorvastatin 10 mg   NSVT relatively quiet on bisoprolol, recent devic check  --2g sodium diet  Weights daily    HPI:      83 yo recently had first evaluation in HF clinic for NICM diagnosed in 2009, s/p ICD, PSVT, HTN and dyslipidemia.     Last in hospital in January for dyspnea felt to be more viral but was volume overloaded with NT pro BNP of 6192. Given one dose of IV lasix. LVEF: 40% with LVEDd 6 cm. He followed up with NP and weight was 204 lbs. Never smoked and does not drink.     . No significant edema in legs. He is limited by knees and back not respiration.      Pt admitted in July 2022 for acute bilateral pulmonary emboli, unprovoked with known history of colon cancer and melanoma. He had full body imaging in hospital.     Interim:   Cut back spironolactone to 12.5 mg daily  Interrogation 12/10/24:  15%, optivol normal  Not that active- back - no longer cutting grass    Review of Systems   Constitutional:  Negative for activity change, appetite change, fatigue and unexpected weight change.   HENT:  Negative for congestion and nosebleeds.    Eyes: Negative.    Respiratory:  Negative for cough, chest tightness and shortness of breath.    Cardiovascular:  Negative for chest pain, palpitations and leg swelling.   Gastrointestinal:  Negative for abdominal distention.   Endocrine: Negative.    Genitourinary: Negative.    Musculoskeletal:  Positive for arthralgias and back pain.   Skin: Negative.    Neurological:  Negative for dizziness, syncope and weakness.   Hematological: Negative.    Psychiatric/Behavioral: Negative.         Past Medical History:   Diagnosis Date    AICD (automatic cardioverter/defibrillator) present     Arthritis     Asthma     Burt esophagus     Benign localized  hyperplasia of prostate with urinary obstruction     Cancer ([facility]) Mar 2004    Chronic HFrEF (heart failure with reduced ejection fraction) ([facility]) 2019    Chronic kidney disease     CKD (chronic kidney disease)     Colon cancer ([facility]) 04/05/2013    Colon cancer screening 06/21/2022    COPD (chronic obstructive pulmonary disease) ([facility])     Disease of thyroid gland     Diverticulitis     Last assessed: 4/5/13    Esophageal reflux     GERD (gastroesophageal reflux disease) Feb 2009    Gout     Hernia     Hyperlipidemia     Hypertension     Hypothyroidism 05/30/2013    Non-toxic multinodular goiter 11/28/2012    Pleural effusion     Polymyalgia rheumatica ([facility])     Pulmonary embolism ([facility]) 2022    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     Spondyloarthropathy     Last assessed: 11/28/12    Thrombocytopenia ([facility]) 06/23/2015         No Known Allergies  .    Current Outpatient Medications:     Alphagan P 0.1 %, , Disp: , Rfl:     atorvastatin (LIPITOR) 10 mg tablet, Take 1 tablet by mouth daily, Disp: 100 tablet, Rfl: 1    betamethasone valerate (VALISONE) 0.1 % cream, APPLY TO AFFECTED AREA BEHIND EARS TWICE DAILY WHEN ACTIVE OR AFTER HAIRCUTS, Disp: , Rfl:     bisoprolol (ZEBETA) 10 MG tablet, Take 1 tablet (10 mg total) by mouth 2 (two) times a day, Disp: 180 tablet, Rfl: 1    Calcium Carb-Cholecalciferol (CALCIUM 600 + D PO), Take 3 tablets by mouth daily  , Disp: , Rfl:     Coenzyme Q10 (CO Q-10) 200 MG CAPS, Take 1 tablet by mouth daily, Disp: , Rfl:     Eliquis 2.5 MG, Take 1 tablet by mouth 2  times a day, Disp: 180 tablet, Rfl: 1    finasteride (PROSCAR) 5 mg tablet, Take 1 tablet (5 mg total) by mouth daily, Disp: 90 tablet, Rfl: 1    ketoconazole (NIZORAL) 2 % shampoo, LATHER IN TO SCALP AND LET IT SIT FOR 5 MINUTES PRIOR TO RINSING 3X WEEKLY AS NEEDED, Disp: , Rfl:     latanoprost (XALATAN) 0.005 % ophthalmic solution, , Disp: , Rfl:     Multiple Vitamins-Minerals (CENTRUM SILVER PO),  Take 1 tablet by mouth daily., Disp: , Rfl:     omeprazole (PriLOSEC) 20 mg delayed release capsule, Take 1 capsule (20 mg total) by mouth daily before breakfast, Disp: 90 capsule, Rfl: 4    sacubitril-valsartan (Entresto) 24-26 MG TABS, Take 1 tablet by mouth 2 (two) times a day, Disp: 180 tablet, Rfl: 3    spironolactone (ALDACTONE) 25 mg tablet, Take 0.5 tablets (12.5 mg total) by mouth daily, Disp: , Rfl:     Vitamin D, Cholecalciferol, 1000 UNITS CAPS, Take 1 tablet by mouth daily., Disp: , Rfl:     ciclopirox (LOPROX) 0.77 % cream, APPLY TO AFFECTED AREA OF SKIN TWICE DAILY FOR 2 WEEKS THEN 2-3 TIMES WEEKLY AS MAINTENANCE (Patient not taking: Reported on 5/10/2024), Disp: , Rfl:     Social History     Socioeconomic History    Marital status: /Civil Union     Spouse name: Not on file    Number of children: Not on file    Years of education: Not on file    Highest education level: Not on file   Occupational History    Occupation: Manager-Retired   Tobacco Use    Smoking status: Never     Passive exposure: Never    Smokeless tobacco: Never   Vaping Use    Vaping status: Never Used   Substance and Sexual Activity    Alcohol use: No     Comment: Rare    Drug use: No    Sexual activity: Not Currently     Partners: Female   Other Topics Concern    Not on file   Social History Narrative    Not on file     Social Drivers of Health     Financial Resource Strain: Low Risk  (11/4/2023)    Overall Financial Resource Strain (CARDIA)     Difficulty of Paying Living Expenses: Not hard at all   Food Insecurity: No Food Insecurity (11/15/2024)    Hunger Vital Sign     Worried About Running Out of Food in the Last Year: Never true     Ran Out of Food in the Last Year: Never true   Transportation Needs: No Transportation Needs (11/15/2024)    PRAPARE - Transportation     Lack of Transportation (Medical): No     Lack of Transportation (Non-Medical): No   Physical Activity: Not on file   Stress: Not on file   Social  Connections: Unknown (6/18/2024)    Received from [facility]     How often do you feel lonely or isolated from those around you? (Adult - for ages 18 years and over): Not on file   Intimate Partner Violence: Not on file   Housing Stability: Low Risk  (11/15/2024)    Housing Stability Vital Sign     Unable to Pay for Housing in the Last Year: No     Number of Times Moved in the Last Year: 1     Homeless in the Last Year: No       Family History   Problem Relation Age of Onset    Heart failure Mother         Congestive    Hypertension Mother     Osteoporosis Mother     Cancer Mother     COPD Father     Emphysema Father     Hypertension Father     Heart disease Father     Heart disease Sister         Heart Valve Replacement    Osteoporosis Sister     Breast cancer Family     Factor V Leiden deficiency Son         on Xarelto       Physical Exam:    Vitals: Blood pressure 90/68, pulse 66, weight 83.9 kg (185 lb), SpO2 96%., Body mass index is 25.8 kg/m².,   Wt Readings from Last 3 Encounters:   02/25/25 83.9 kg (185 lb)   12/20/24 83 kg (183 lb)   11/15/24 79.4 kg (175 lb)       Physical Exam  Constitutional:       Appearance: He is well-developed.   HENT:      Head: Normocephalic and atraumatic.   Eyes:      Pupils: Pupils are equal, round, and reactive to light.   Neck:      Vascular: No JVD.   Cardiovascular:      Rate and Rhythm: Normal rate and regular rhythm.      Heart sounds: No murmur heard.  Pulmonary:      Effort: Pulmonary effort is normal. No respiratory distress.      Breath sounds: Normal breath sounds.   Abdominal:      General: There is no distension.      Palpations: Abdomen is soft.      Tenderness: There is no abdominal tenderness.   Musculoskeletal:         General: Normal range of motion.      Cervical back: Normal range of motion.   Skin:     General: Skin is warm and dry.      Findings: No rash.   Neurological:      Mental Status: He is alert and oriented to person, place, and  "time.       Labs & Results:    Lab Results   Component Value Date    WBC 9.02 04/29/2024    HGB 13.7 04/29/2024    HCT 41.7 04/29/2024    MCV 99 (H) 04/29/2024     04/29/2024     Lab Results   Component Value Date    SODIUM 140 11/09/2024    K 4.9 11/09/2024     11/09/2024    CO2 29 11/09/2024    BUN 29 (H) 11/09/2024    CREATININE 1.56 (H) 11/09/2024    GLUC 98 01/06/2024    CALCIUM 8.8 11/09/2024     Lab Results   Component Value Date    NTBNP 6,971 (H) 07/22/2022      Lab Results   Component Value Date    CHOL 163 12/19/2015    CHOL 168 04/17/2015    CHOL 155 12/04/2014     Lab Results   Component Value Date    HDL 47 11/09/2024    HDL 58 04/29/2024    HDL 52 11/03/2023     Lab Results   Component Value Date    LDLCALC 88 11/09/2024    LDLCALC 62 04/29/2024    LDLCALC 60 11/03/2023     Lab Results   Component Value Date    TRIG 162 (H) 11/09/2024    TRIG 135 04/29/2024    TRIG 98 11/03/2023     No results found for: \"CHOLHDL\"    EKG personally reviewed by Corbin Abad.     Counseling / Coordination of Care  Time spent today 25 minutes.  Greater than 50% of total time was spent with the patient and / or family counseling and / or coordination of care.  We discussed diagnoses, most recent studies, tests and any changes in treatment plan  Thank you for the opportunity to participate in the care of this patient.    CORBIN ABAD D.O.  DIRECTOR OF HEART FAILURE/ PULMONARY HYPERTENSION  MEDICAL DIRECTOR OF LVAD PROGRAM  [facility]    "

## 2025-02-25 ENCOUNTER — OFFICE VISIT (OUTPATIENT)
Dept: CARDIOLOGY CLINIC | Facility: CLINIC | Age: 85
End: 2025-02-25
Payer: COMMERCIAL

## 2025-02-25 VITALS
DIASTOLIC BLOOD PRESSURE: 68 MMHG | HEART RATE: 66 BPM | OXYGEN SATURATION: 96 % | BODY MASS INDEX: 25.8 KG/M2 | SYSTOLIC BLOOD PRESSURE: 90 MMHG | WEIGHT: 185 LBS

## 2025-02-25 DIAGNOSIS — I50.23 ACUTE ON CHRONIC SYSTOLIC (CONGESTIVE) HEART FAILURE (HCC): ICD-10-CM

## 2025-02-25 DIAGNOSIS — I13.0 HYPERTENSIVE HEART AND KIDNEY DISEASE WITH HF AND WITH CKD STAGE III (HCC): ICD-10-CM

## 2025-02-25 DIAGNOSIS — I42.0 DILATED CARDIOMYOPATHY (HCC): Primary | ICD-10-CM

## 2025-02-25 DIAGNOSIS — N18.30 HYPERTENSIVE HEART AND KIDNEY DISEASE WITH HF AND WITH CKD STAGE III (HCC): ICD-10-CM

## 2025-02-25 DIAGNOSIS — N18.32 CHRONIC KIDNEY DISEASE, STAGE 3B (HCC): ICD-10-CM

## 2025-02-25 DIAGNOSIS — I10 ESSENTIAL HYPERTENSION: ICD-10-CM

## 2025-02-25 DIAGNOSIS — I47.29 NSVT (NONSUSTAINED VENTRICULAR TACHYCARDIA) (HCC): ICD-10-CM

## 2025-02-25 DIAGNOSIS — I50.9 CHF (NYHA CLASS II, ACC/AHA STAGE C) (HCC): ICD-10-CM

## 2025-02-25 DIAGNOSIS — I50.84 END STAGE HEART FAILURE (HCC): ICD-10-CM

## 2025-02-25 PROCEDURE — 99214 OFFICE O/P EST MOD 30 MIN: CPT | Performed by: INTERNAL MEDICINE

## 2025-03-07 ENCOUNTER — TELEPHONE (OUTPATIENT)
Dept: FAMILY MEDICINE CLINIC | Facility: CLINIC | Age: 85
End: 2025-03-07

## 2025-03-11 ENCOUNTER — IN-CLINIC DEVICE VISIT (OUTPATIENT)
Dept: CARDIOLOGY CLINIC | Facility: CLINIC | Age: 85
End: 2025-03-11
Payer: COMMERCIAL

## 2025-03-11 DIAGNOSIS — I50.84 END STAGE HEART FAILURE (HCC): ICD-10-CM

## 2025-03-11 DIAGNOSIS — I50.23 ACUTE ON CHRONIC SYSTOLIC (CONGESTIVE) HEART FAILURE (HCC): ICD-10-CM

## 2025-03-11 DIAGNOSIS — I47.29 NSVT (NONSUSTAINED VENTRICULAR TACHYCARDIA) (HCC): Primary | ICD-10-CM

## 2025-03-11 DIAGNOSIS — Z95.810 PRESENCE OF IMPLANTABLE CARDIOVERTER-DEFIBRILLATOR (ICD): ICD-10-CM

## 2025-03-11 PROCEDURE — 93283 PRGRMG EVAL IMPLANTABLE DFB: CPT | Performed by: INTERNAL MEDICINE

## 2025-03-11 NOTE — PROGRESS NOTES
Results for orders placed or performed in visit on 03/11/25   Cardiac EP device report    Narrative    MDT-DUAL CHAMBER ICD (AAIR-DDDR MODE)/ ACTIVE SYSTEM IS MRI CONDITIONAL  DEVICE INTERROGATED IN THE [place] OFFICE.  BATTERY VOLTAGE ADEQUATE (2.5 YRS).  AP 61.4%   12.8%.   ALL LEAD PARAMETERS WITHIN NORMAL LIMITS.  NO SIGNIFICANT HIGH RATE EPISODES.  PT TAKES ELIQUIS, BISOPROLOL.  EF 35% (ECHO 7/22/2022).  TIME IN AT/AF <0.1%. OPTI-VOL WITHIN NORMAL LIMITS.   PER MDT FIELD ADVISORY, FV & FVT RX PATHWAYS RE-PROGRAMMED TO B-AX.  NORMAL DEVICE FUNCTION. PAS

## 2025-03-13 ENCOUNTER — RESULTS FOLLOW-UP (OUTPATIENT)
Dept: NON INVASIVE DIAGNOSTICS | Facility: HOSPITAL | Age: 85
End: 2025-03-13

## 2025-03-16 DIAGNOSIS — I42.9 CARDIOMYOPATHY, UNSPECIFIED TYPE (HCC): ICD-10-CM

## 2025-03-17 RX ORDER — SPIRONOLACTONE 25 MG/1
12.5 TABLET ORAL DAILY
Qty: 15 TABLET | Refills: 2 | Status: SHIPPED | OUTPATIENT
Start: 2025-03-17 | End: 2026-09-08

## 2025-04-14 DIAGNOSIS — Z86.711 HISTORY OF PULMONARY EMBOLUS (PE): ICD-10-CM

## 2025-04-15 RX ORDER — APIXABAN 2.5 MG/1
2.5 TABLET, FILM COATED ORAL 2 TIMES DAILY
Qty: 180 TABLET | Refills: 1 | Status: SHIPPED | OUTPATIENT
Start: 2025-04-15

## 2025-05-11 DIAGNOSIS — I42.0 DILATED CARDIOMYOPATHY (HCC): ICD-10-CM

## 2025-05-11 DIAGNOSIS — I50.22 CHRONIC SYSTOLIC CHF (CONGESTIVE HEART FAILURE) (HCC): ICD-10-CM

## 2025-05-11 DIAGNOSIS — I47.29 NSVT (NONSUSTAINED VENTRICULAR TACHYCARDIA) (HCC): ICD-10-CM

## 2025-05-12 ENCOUNTER — APPOINTMENT (OUTPATIENT)
Dept: LAB | Facility: CLINIC | Age: 85
End: 2025-05-12
Attending: FAMILY MEDICINE
Payer: COMMERCIAL

## 2025-05-12 DIAGNOSIS — I13.0 HYPERTENSIVE HEART AND KIDNEY DISEASE WITH HF AND WITH CKD STAGE III (HCC): ICD-10-CM

## 2025-05-12 DIAGNOSIS — N18.30 HYPERTENSIVE HEART AND KIDNEY DISEASE WITH HF AND WITH CKD STAGE III (HCC): ICD-10-CM

## 2025-05-12 DIAGNOSIS — E78.2 MIXED HYPERLIPIDEMIA: ICD-10-CM

## 2025-05-12 DIAGNOSIS — R73.01 IMPAIRED FASTING GLUCOSE: ICD-10-CM

## 2025-05-12 DIAGNOSIS — Z86.711 HISTORY OF PULMONARY EMBOLUS (PE): ICD-10-CM

## 2025-05-12 DIAGNOSIS — I42.0 DILATED CARDIOMYOPATHY (HCC): ICD-10-CM

## 2025-05-12 DIAGNOSIS — I10 ESSENTIAL HYPERTENSION: ICD-10-CM

## 2025-05-12 LAB
ALBUMIN SERPL BCG-MCNC: 4.1 G/DL (ref 3.5–5)
ALP SERPL-CCNC: 65 U/L (ref 34–104)
ALT SERPL W P-5'-P-CCNC: 21 U/L (ref 7–52)
ANION GAP SERPL CALCULATED.3IONS-SCNC: 8 MMOL/L (ref 4–13)
AST SERPL W P-5'-P-CCNC: 21 U/L (ref 13–39)
BILIRUB SERPL-MCNC: 1.12 MG/DL (ref 0.2–1)
BUN SERPL-MCNC: 31 MG/DL (ref 5–25)
CALCIUM SERPL-MCNC: 10 MG/DL (ref 8.4–10.2)
CHLORIDE SERPL-SCNC: 108 MMOL/L (ref 96–108)
CHOLEST SERPL-MCNC: 153 MG/DL (ref ?–200)
CO2 SERPL-SCNC: 27 MMOL/L (ref 21–32)
CREAT SERPL-MCNC: 1.62 MG/DL (ref 0.6–1.3)
ERYTHROCYTE [DISTWIDTH] IN BLOOD BY AUTOMATED COUNT: 14.6 % (ref 11.6–15.1)
EST. AVERAGE GLUCOSE BLD GHB EST-MCNC: 117 MG/DL
GFR SERPL CREATININE-BSD FRML MDRD: 38 ML/MIN/1.73SQ M
GLUCOSE P FAST SERPL-MCNC: 99 MG/DL (ref 65–99)
HBA1C MFR BLD: 5.7 %
HCT VFR BLD AUTO: 46 % (ref 36.5–49.3)
HDLC SERPL-MCNC: 52 MG/DL
HGB BLD-MCNC: 15.2 G/DL (ref 12–17)
LDLC SERPL CALC-MCNC: 73 MG/DL (ref 0–100)
MCH RBC QN AUTO: 32.4 PG (ref 26.8–34.3)
MCHC RBC AUTO-ENTMCNC: 33 G/DL (ref 31.4–37.4)
MCV RBC AUTO: 98 FL (ref 82–98)
NONHDLC SERPL-MCNC: 101 MG/DL
PLATELET # BLD AUTO: 229 THOUSANDS/UL (ref 149–390)
PMV BLD AUTO: 9.2 FL (ref 8.9–12.7)
POTASSIUM SERPL-SCNC: 5.3 MMOL/L (ref 3.5–5.3)
PROT SERPL-MCNC: 7.1 G/DL (ref 6.4–8.4)
RBC # BLD AUTO: 4.69 MILLION/UL (ref 3.88–5.62)
SODIUM SERPL-SCNC: 143 MMOL/L (ref 135–147)
TRIGL SERPL-MCNC: 141 MG/DL (ref ?–150)
WBC # BLD AUTO: 10.32 THOUSAND/UL (ref 4.31–10.16)

## 2025-05-12 PROCEDURE — 83036 HEMOGLOBIN GLYCOSYLATED A1C: CPT

## 2025-05-12 PROCEDURE — 85027 COMPLETE CBC AUTOMATED: CPT

## 2025-05-12 PROCEDURE — 80061 LIPID PANEL: CPT

## 2025-05-12 PROCEDURE — 36415 COLL VENOUS BLD VENIPUNCTURE: CPT

## 2025-05-12 PROCEDURE — 80053 COMPREHEN METABOLIC PANEL: CPT

## 2025-05-12 RX ORDER — SACUBITRIL AND VALSARTAN 24; 26 MG/1; MG/1
1 TABLET, FILM COATED ORAL 2 TIMES DAILY
Qty: 180 TABLET | Refills: 1 | Status: SHIPPED | OUTPATIENT
Start: 2025-05-12

## 2025-05-13 ENCOUNTER — RESULTS FOLLOW-UP (OUTPATIENT)
Dept: FAMILY MEDICINE CLINIC | Facility: CLINIC | Age: 85
End: 2025-05-13

## 2025-05-15 ENCOUNTER — RA CDI HCC (OUTPATIENT)
Dept: OTHER | Facility: HOSPITAL | Age: 85
End: 2025-05-15

## 2025-05-21 ENCOUNTER — OFFICE VISIT (OUTPATIENT)
Dept: FAMILY MEDICINE CLINIC | Facility: CLINIC | Age: 85
End: 2025-05-21
Payer: COMMERCIAL

## 2025-05-21 VITALS
HEIGHT: 71 IN | DIASTOLIC BLOOD PRESSURE: 80 MMHG | TEMPERATURE: 97.2 F | OXYGEN SATURATION: 97 % | BODY MASS INDEX: 26.32 KG/M2 | WEIGHT: 188 LBS | RESPIRATION RATE: 16 BRPM | HEART RATE: 69 BPM | SYSTOLIC BLOOD PRESSURE: 120 MMHG

## 2025-05-21 DIAGNOSIS — R73.01 IMPAIRED FASTING GLUCOSE: ICD-10-CM

## 2025-05-21 DIAGNOSIS — Z86.711 HISTORY OF PULMONARY EMBOLUS (PE): ICD-10-CM

## 2025-05-21 DIAGNOSIS — I13.0 HYPERTENSIVE HEART AND KIDNEY DISEASE WITH HF AND WITH CKD STAGE III (HCC): Primary | ICD-10-CM

## 2025-05-21 DIAGNOSIS — N18.30 HYPERTENSIVE HEART AND KIDNEY DISEASE WITH HF AND WITH CKD STAGE III (HCC): Primary | ICD-10-CM

## 2025-05-21 DIAGNOSIS — E78.2 MIXED HYPERLIPIDEMIA: ICD-10-CM

## 2025-05-21 DIAGNOSIS — Z23 ENCOUNTER FOR IMMUNIZATION: ICD-10-CM

## 2025-05-21 DIAGNOSIS — I42.0 DILATED CARDIOMYOPATHY (HCC): ICD-10-CM

## 2025-05-21 DIAGNOSIS — N18.32 STAGE 3B CHRONIC KIDNEY DISEASE (HCC): ICD-10-CM

## 2025-05-21 PROCEDURE — G2211 COMPLEX E/M VISIT ADD ON: HCPCS | Performed by: FAMILY MEDICINE

## 2025-05-21 PROCEDURE — 99214 OFFICE O/P EST MOD 30 MIN: CPT | Performed by: FAMILY MEDICINE

## 2025-05-21 PROCEDURE — 90677 PCV20 VACCINE IM: CPT

## 2025-05-21 PROCEDURE — G0009 ADMIN PNEUMOCOCCAL VACCINE: HCPCS

## 2025-05-21 RX ORDER — TIMOLOL MALEATE 5 MG/ML
1 SOLUTION/ DROPS OPHTHALMIC 2 TIMES DAILY
COMMUNITY

## 2025-05-21 RX ORDER — SODIUM FLUORIDE1.1%, POTASSIUM NITRATE 5% 5.8; 57.5 MG/ML; MG/ML
GEL, DENTIFRICE DENTAL
COMMUNITY
Start: 2025-02-14

## 2025-05-21 NOTE — ASSESSMENT & PLAN NOTE
Wt Readings from Last 3 Encounters:   05/21/25 85.3 kg (188 lb)   02/25/25 83.9 kg (185 lb)   12/20/24 83 kg (183 lb)     Controlled. DASH diet. Continue current meds per cardiology.           Orders:    CBC; Future    Comprehensive metabolic panel; Future    Hemoglobin A1C; Future    Lipid panel; Future

## 2025-05-21 NOTE — PROGRESS NOTES
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 DASH diet. Continue current meds per cardiology.           Orders:    CBC; Future    Comprehensive metabolic panel; Future    Hemoglobin A1C; Future    Lipid panel; Future    Dilated cardiomyopathy (HCC)  Continue Entresto and spironolactone per cardiology.   Orders:    CBC; Future    Comprehensive metabolic panel; Future    Hemoglobin A1C; Future    Lipid panel; Future    Impaired fasting glucose  Low carb diet.   Orders:    CBC; Future    Comprehensive metabolic panel; Future    Hemoglobin A1C; Future    Lipid panel; Future    Mixed hyperlipidemia  Low fat diet. Continue atorvastatin 10mg qhs.   Orders:    CBC; Future    Comprehensive metabolic panel; Future    Hemoglobin A1C; Future    Lipid panel; Future    Stage 3b chronic kidney disease (HCC)  Lab Results   Component Value Date    EGFR 38 2025    EGFR 40 2024    EGFR 39 2024    CREATININE 1.62 (H) 2025    CREATININE 1.56 (H) 2024    CREATININE 1.60 (H) 2024     Avoid motrin/ibuprofen/aleve NSAIDs nephrotoxic agents.          History of pulmonary embolus (PE)  Continue eliquis.        Encounter for immunization    Orders:    Pneumococcal Conjugate Vaccine 20-valent (Pcv20)    Reviewed lab in 2025  Lipid 153/141/52/73 ok  CBC ok  CMP ok Cr 1.62 GFR 38  HgA1C 5.7      Depression Screening and Follow-up Plan: Patient was screened for depression during today's encounter. They screened negative with a PHQ-2 score of 0.      Flu shot yearly. Got flu shot, covid19 booster already this season.   Give PCV20 today.  "  Got shingrix in 2020.   RTO in 6 months.       History of Present Illness   HPI    Pt is here by himself.   Cardiomyopathy/CHF---s/p ICD. FU cardiology regularly.   He is on entresto to 24-26 bid.   He cut spironolactone to 12.5mg QD per cardiology because BP low at home.      HTN---He is on bisoprolol 10mg bid. BP at home /60-70 per pt. Sometimes lightheaded.      Hyperlipidemia---He is on atorvastatin 10mg qhs. Denies SE.      Pulmonary Embolus in 8/2022---FU hem/onc. He is on eliquis 2.5mg bid.      Asthma---controlled. He does not need wixela or albuterol.      IFG---He ate cupcakes, soda every morning.      Back pain/Knee arthritis---stable.      BPH---FU urology yearly. He is on proscar 5mg qhs.      Burt's---FU GI. Pt is on omeprazole 20mg daily.       Lives with wife. Does all ADL's.   Denies recent falls.   Denies depression.             Review of Systems   Constitutional:  Negative for appetite change, chills and fever.   HENT:  Negative for congestion, ear pain, sinus pain and sore throat.    Eyes:  Negative for discharge and itching.   Respiratory:  Negative for apnea, cough, chest tightness, shortness of breath and wheezing.    Cardiovascular:  Negative for chest pain, palpitations and leg swelling.   Gastrointestinal:  Negative for abdominal pain, anal bleeding, constipation, diarrhea, nausea and vomiting.   Endocrine: Negative for cold intolerance, heat intolerance and polyuria.   Genitourinary:  Negative for difficulty urinating and dysuria.   Musculoskeletal:  Negative for arthralgias, back pain and myalgias.   Skin:  Negative for rash.   Neurological:  Negative for dizziness and headaches.   Psychiatric/Behavioral:  Negative for agitation.        Objective   /80   Pulse 69   Temp (!) 97.2 °F (36.2 °C) (Tympanic)   Resp 16   Ht 5' 11\" (1.803 m)   Wt 85.3 kg (188 lb)   SpO2 97%   BMI 26.22 kg/m²      Physical Exam  Constitutional:       Appearance: He is well-developed. "   HENT:      Head: Normocephalic and atraumatic.     Eyes:      General:         Right eye: No discharge.         Left eye: No discharge.      Conjunctiva/sclera: Conjunctivae normal.       Cardiovascular:      Rate and Rhythm: Normal rate and regular rhythm.      Heart sounds: Normal heart sounds. No murmur heard.     No friction rub. No gallop.   Pulmonary:      Effort: Pulmonary effort is normal. No respiratory distress.      Breath sounds: Normal breath sounds. No wheezing or rales.   Abdominal:      General: Bowel sounds are normal. There is no distension.      Palpations: Abdomen is soft.      Tenderness: There is no abdominal tenderness. There is no guarding.     Musculoskeletal:         General: Normal range of motion.      Cervical back: Normal range of motion and neck supple.      Right lower leg: No edema.      Left lower leg: No edema.     Neurological:      Mental Status: He is alert.

## 2025-05-21 NOTE — ASSESSMENT & PLAN NOTE
Continue Entresto and spironolactone per cardiology.   Orders:    CBC; Future    Comprehensive metabolic panel; Future    Hemoglobin A1C; Future    Lipid panel; Future

## 2025-05-21 NOTE — ASSESSMENT & PLAN NOTE
Low carb diet.   Orders:    CBC; Future    Comprehensive metabolic panel; Future    Hemoglobin A1C; Future    Lipid panel; Future

## 2025-05-21 NOTE — ASSESSMENT & PLAN NOTE
Lab Results   Component Value Date    EGFR 38 05/12/2025    EGFR 40 11/09/2024    EGFR 39 04/29/2024    CREATININE 1.62 (H) 05/12/2025    CREATININE 1.56 (H) 11/09/2024    CREATININE 1.60 (H) 04/29/2024     Avoid motrin/ibuprofen/aleve NSAIDs nephrotoxic agents.

## 2025-05-21 NOTE — ASSESSMENT & PLAN NOTE
Low fat diet. Continue atorvastatin 10mg qhs.   Orders:    CBC; Future    Comprehensive metabolic panel; Future    Hemoglobin A1C; Future    Lipid panel; Future

## 2025-06-16 DIAGNOSIS — I10 HYPERTENSION, UNSPECIFIED TYPE: ICD-10-CM

## 2025-06-16 RX ORDER — BISOPROLOL FUMARATE 10 MG/1
10 TABLET, FILM COATED ORAL 2 TIMES DAILY
Qty: 180 TABLET | Refills: 1 | Status: SHIPPED | OUTPATIENT
Start: 2025-06-16

## 2025-06-20 DIAGNOSIS — I42.9 CARDIOMYOPATHY, UNSPECIFIED TYPE (HCC): ICD-10-CM

## 2025-06-20 RX ORDER — SPIRONOLACTONE 25 MG/1
12.5 TABLET ORAL DAILY
Qty: 45 TABLET | Refills: 1 | Status: SHIPPED | OUTPATIENT
Start: 2025-06-20

## 2025-07-01 ENCOUNTER — TELEPHONE (OUTPATIENT)
Dept: CARDIOLOGY CLINIC | Facility: CLINIC | Age: 85
End: 2025-07-01

## 2025-07-01 NOTE — TELEPHONE ENCOUNTER
7/1/25 Pt called me back. Needs new reader. Having [employer] mail him out a new one. He will call me when it's charged. Kalyani     7/1/25 Spoke with wife about his missed remote. Gave her my number in case  he wants me to walk him through. KALYANI

## 2025-07-07 DIAGNOSIS — N40.1 BPH WITH OBSTRUCTION/LOWER URINARY TRACT SYMPTOMS: ICD-10-CM

## 2025-07-07 DIAGNOSIS — N13.8 BPH WITH OBSTRUCTION/LOWER URINARY TRACT SYMPTOMS: ICD-10-CM

## 2025-07-08 ENCOUNTER — REMOTE DEVICE CLINIC VISIT (OUTPATIENT)
Dept: CARDIOLOGY CLINIC | Facility: CLINIC | Age: 85
End: 2025-07-08
Payer: COMMERCIAL

## 2025-07-08 DIAGNOSIS — Z95.810 AICD (AUTOMATIC CARDIOVERTER/DEFIBRILLATOR) PRESENT: Primary | ICD-10-CM

## 2025-07-08 PROCEDURE — 93296 REM INTERROG EVL PM/IDS: CPT | Performed by: INTERNAL MEDICINE

## 2025-07-08 PROCEDURE — 93295 DEV INTERROG REMOTE 1/2/MLT: CPT | Performed by: INTERNAL MEDICINE

## 2025-07-08 NOTE — PROGRESS NOTES
"Results for orders placed or performed in visit on 07/08/25   Cardiac EP device report    Narrative    MDT-DUAL CHAMBER ICD (AAIR-DDDR MODE)/ ACTIVE SYSTEM IS MRI CONDITIONAL  CARELINK TRANSMISSION: PRESENTING EGRAM AP VS@ 68 BPM. BATTERY STATUS \"2 YRS.\" AP 62%  15%. ALL AVAILABLE LEAD PARAMETERS WITHIN NORMAL LIMITS. 1 BRIEF NSVT NOTED; NO THERAPIES GIVEN. PT ON BISOPROLOL & EF 35% (ECHO 2022). OPTI-VOL WITHIN NORMAL LIMITS. NORMAL DEVICE FUNCTION. NC         "

## 2025-07-09 ENCOUNTER — HOSPITAL ENCOUNTER (OUTPATIENT)
Dept: NON INVASIVE DIAGNOSTICS | Facility: CLINIC | Age: 85
Discharge: HOME | End: 2025-07-09
Payer: COMMERCIAL

## 2025-07-09 VITALS
HEART RATE: 69 BPM | SYSTOLIC BLOOD PRESSURE: 120 MMHG | DIASTOLIC BLOOD PRESSURE: 80 MMHG | WEIGHT: 188 LBS | HEIGHT: 71 IN | BODY MASS INDEX: 26.32 KG/M2

## 2025-07-09 DIAGNOSIS — N18.30 HYPERTENSIVE HEART AND KIDNEY DISEASE WITH HF AND WITH CKD STAGE III (HCC): ICD-10-CM

## 2025-07-09 DIAGNOSIS — I47.29 NSVT (NONSUSTAINED VENTRICULAR TACHYCARDIA) (HCC): ICD-10-CM

## 2025-07-09 DIAGNOSIS — I13.0 HYPERTENSIVE HEART AND KIDNEY DISEASE WITH HF AND WITH CKD STAGE III (HCC): ICD-10-CM

## 2025-07-09 DIAGNOSIS — I50.9 CHF (NYHA CLASS II, ACC/AHA STAGE C) (HCC): ICD-10-CM

## 2025-07-09 LAB
AORTIC ROOT: 4.3 CM
ASCENDING AORTA: 4.2 CM
AV REGURGITATION PRESSURE HALF TIME: 673 MS
BSA FOR ECHO PROCEDURE: 2.05 M2
FRACTIONAL SHORTENING: 20 (ref 28–44)
INTERVENTRICULAR SEPTUM IN DIASTOLE (PARASTERNAL SHORT AXIS VIEW): 1.5 CM
INTERVENTRICULAR SEPTUM: 1.5 CM (ref 0.6–1.1)
LAAS-AP2: 24.1 CM2
LAAS-AP4: 22.5 CM2
LEFT ATRIUM SIZE: 4.4 CM
LEFT ATRIUM VOLUME (MOD BIPLANE): 77 ML
LEFT ATRIUM VOLUME INDEX (MOD BIPLANE): 37.6 ML/M2
LEFT INTERNAL DIMENSION IN SYSTOLE: 4.4 CM (ref 2.1–4)
LEFT VENTRICULAR INTERNAL DIMENSION IN DIASTOLE: 5.5 CM (ref 3.5–6)
LEFT VENTRICULAR POSTERIOR WALL IN END DIASTOLE: 1.1 CM
LEFT VENTRICULAR STROKE VOLUME: 56 ML
LV EF US.2D.A4C+ESTIMATED: 37 %
LVSV (TEICH): 56 ML
MV E'TISSUE VEL-LAT: 6 CM/S
MV E'TISSUE VEL-SEP: 5 CM/S
RA PRESSURE ESTIMATED: 8 MMHG
RIGHT ATRIUM AREA SYSTOLE A4C: 22.9 CM2
RIGHT VENTRICLE ID DIMENSION: 4.8 CM
RV PSP: 39 MMHG
SL CV AV DECELERATION TIME RETROGRADE: 2320 MS
SL CV AV PEAK GRADIENT RETROGRADE: 31 MMHG
SL CV LEFT ATRIUM LENGTH A2C: 5.6 CM
SL CV PED ECHO LEFT VENTRICLE DIASTOLIC VOLUME (MOD BIPLANE) 2D: 144 ML
SL CV PED ECHO LEFT VENTRICLE SYSTOLIC VOLUME (MOD BIPLANE) 2D: 89 ML
TR MAX PG: 31 MMHG
TR PEAK VELOCITY: 2.8 M/S
TRICUSPID ANNULAR PLANE SYSTOLIC EXCURSION: 1.7 CM
TRICUSPID VALVE PEAK REGURGITATION VELOCITY: 2.78 M/S

## 2025-07-09 PROCEDURE — 93306 TTE W/DOPPLER COMPLETE: CPT | Performed by: INTERNAL MEDICINE

## 2025-07-09 PROCEDURE — 93306 TTE W/DOPPLER COMPLETE: CPT

## 2025-07-09 RX ORDER — FINASTERIDE 5 MG/1
5 TABLET, FILM COATED ORAL DAILY
Qty: 90 TABLET | Refills: 1 | Status: SHIPPED | OUTPATIENT
Start: 2025-07-09

## 2025-08-06 ENCOUNTER — OFFICE VISIT (OUTPATIENT)
Dept: CARDIOLOGY CLINIC | Facility: CLINIC | Age: 85
End: 2025-08-06
Payer: COMMERCIAL

## 2025-08-06 VITALS
HEART RATE: 62 BPM | BODY MASS INDEX: 25.9 KG/M2 | HEIGHT: 71 IN | SYSTOLIC BLOOD PRESSURE: 92 MMHG | DIASTOLIC BLOOD PRESSURE: 60 MMHG | WEIGHT: 185 LBS | OXYGEN SATURATION: 96 %

## 2025-08-06 DIAGNOSIS — I47.29 NSVT (NONSUSTAINED VENTRICULAR TACHYCARDIA) (HCC): ICD-10-CM

## 2025-08-06 DIAGNOSIS — I10 ESSENTIAL HYPERTENSION: ICD-10-CM

## 2025-08-06 DIAGNOSIS — N18.30 HYPERTENSIVE HEART AND KIDNEY DISEASE WITH HF AND WITH CKD STAGE III (HCC): ICD-10-CM

## 2025-08-06 DIAGNOSIS — I13.0 HYPERTENSIVE HEART AND KIDNEY DISEASE WITH HF AND WITH CKD STAGE III (HCC): ICD-10-CM

## 2025-08-06 DIAGNOSIS — I50.9 CHF (NYHA CLASS II, ACC/AHA STAGE C) (HCC): Primary | ICD-10-CM

## 2025-08-06 PROCEDURE — 99214 OFFICE O/P EST MOD 30 MIN: CPT | Performed by: INTERNAL MEDICINE
